# Patient Record
Sex: MALE | Race: BLACK OR AFRICAN AMERICAN | Employment: UNEMPLOYED | URBAN - METROPOLITAN AREA
[De-identification: names, ages, dates, MRNs, and addresses within clinical notes are randomized per-mention and may not be internally consistent; named-entity substitution may affect disease eponyms.]

---

## 2020-09-14 ENCOUNTER — APPOINTMENT (OUTPATIENT)
Dept: GENERAL RADIOLOGY | Age: 55
End: 2020-09-14
Attending: EMERGENCY MEDICINE
Payer: MEDICAID

## 2020-09-14 ENCOUNTER — HOSPITAL ENCOUNTER (EMERGENCY)
Age: 55
Discharge: HOME | End: 2020-09-14
Attending: EMERGENCY MEDICINE | Admitting: EMERGENCY MEDICINE
Payer: MEDICAID

## 2020-09-14 ENCOUNTER — APPOINTMENT (OUTPATIENT)
Dept: MRI IMAGING | Age: 55
End: 2020-09-14
Attending: EMERGENCY MEDICINE
Payer: MEDICAID

## 2020-09-14 ENCOUNTER — APPOINTMENT (OUTPATIENT)
Dept: VASCULAR SURGERY | Age: 55
End: 2020-09-14
Attending: EMERGENCY MEDICINE
Payer: MEDICAID

## 2020-09-14 VITALS
BODY MASS INDEX: 22.4 KG/M2 | RESPIRATION RATE: 15 BRPM | SYSTOLIC BLOOD PRESSURE: 120 MMHG | TEMPERATURE: 98.8 F | HEIGHT: 71 IN | WEIGHT: 160 LBS | DIASTOLIC BLOOD PRESSURE: 71 MMHG | OXYGEN SATURATION: 99 % | HEART RATE: 86 BPM

## 2020-09-14 DIAGNOSIS — E11.65 POORLY CONTROLLED DIABETES MELLITUS (HCC): ICD-10-CM

## 2020-09-14 DIAGNOSIS — L97.529 SKIN ULCER OF LEFT GREAT TOE, UNSPECIFIED ULCER STAGE (HCC): Primary | ICD-10-CM

## 2020-09-14 DIAGNOSIS — Z59.00 HOMELESSNESS: ICD-10-CM

## 2020-09-14 LAB
ALBUMIN SERPL-MCNC: 3.2 G/DL (ref 3.4–5)
ALBUMIN/GLOB SERPL: 0.7 {RATIO} (ref 0.8–1.7)
ALP SERPL-CCNC: 143 U/L (ref 45–117)
ALT SERPL-CCNC: 72 U/L (ref 16–61)
ANION GAP SERPL CALC-SCNC: 4 MMOL/L (ref 3–18)
AST SERPL-CCNC: 64 U/L (ref 10–38)
BASOPHILS # BLD: 0 K/UL (ref 0–0.1)
BASOPHILS NFR BLD: 0 % (ref 0–2)
BILIRUB SERPL-MCNC: 0.4 MG/DL (ref 0.2–1)
BUN SERPL-MCNC: 21 MG/DL (ref 7–18)
BUN/CREAT SERPL: 22 (ref 12–20)
CALCIUM SERPL-MCNC: 9.4 MG/DL (ref 8.5–10.1)
CHLORIDE SERPL-SCNC: 105 MMOL/L (ref 100–111)
CO2 SERPL-SCNC: 29 MMOL/L (ref 21–32)
CREAT SERPL-MCNC: 0.94 MG/DL (ref 0.6–1.3)
DIFFERENTIAL METHOD BLD: ABNORMAL
EOSINOPHIL # BLD: 0.3 K/UL (ref 0–0.4)
EOSINOPHIL NFR BLD: 3 % (ref 0–5)
ERYTHROCYTE [DISTWIDTH] IN BLOOD BY AUTOMATED COUNT: 11.5 % (ref 11.6–14.5)
GLOBULIN SER CALC-MCNC: 4.6 G/DL (ref 2–4)
GLUCOSE SERPL-MCNC: 224 MG/DL (ref 74–99)
HCT VFR BLD AUTO: 37.4 % (ref 36–48)
HGB BLD-MCNC: 13.5 G/DL (ref 13–16)
LACTATE BLD-SCNC: 0.83 MMOL/L (ref 0.4–2)
LYMPHOCYTES # BLD: 3 K/UL (ref 0.9–3.6)
LYMPHOCYTES NFR BLD: 34 % (ref 21–52)
MCH RBC QN AUTO: 32.7 PG (ref 24–34)
MCHC RBC AUTO-ENTMCNC: 36.1 G/DL (ref 31–37)
MCV RBC AUTO: 90.6 FL (ref 74–97)
MONOCYTES # BLD: 1 K/UL (ref 0.05–1.2)
MONOCYTES NFR BLD: 12 % (ref 3–10)
NEUTS SEG # BLD: 4.6 K/UL (ref 1.8–8)
NEUTS SEG NFR BLD: 51 % (ref 40–73)
PLATELET # BLD AUTO: 197 K/UL (ref 135–420)
PMV BLD AUTO: 10.8 FL (ref 9.2–11.8)
POTASSIUM SERPL-SCNC: 3.9 MMOL/L (ref 3.5–5.5)
PROT SERPL-MCNC: 7.8 G/DL (ref 6.4–8.2)
RBC # BLD AUTO: 4.13 M/UL (ref 4.7–5.5)
SODIUM SERPL-SCNC: 138 MMOL/L (ref 136–145)
WBC # BLD AUTO: 8.8 K/UL (ref 4.6–13.2)

## 2020-09-14 PROCEDURE — 87040 BLOOD CULTURE FOR BACTERIA: CPT

## 2020-09-14 PROCEDURE — 93923 UPR/LXTR ART STDY 3+ LVLS: CPT

## 2020-09-14 PROCEDURE — 73718 MRI LOWER EXTREMITY W/O DYE: CPT

## 2020-09-14 PROCEDURE — 96367 TX/PROPH/DG ADDL SEQ IV INF: CPT

## 2020-09-14 PROCEDURE — 83605 ASSAY OF LACTIC ACID: CPT

## 2020-09-14 PROCEDURE — 74011250637 HC RX REV CODE- 250/637: Performed by: PHYSICIAN ASSISTANT

## 2020-09-14 PROCEDURE — 87186 SC STD MICRODIL/AGAR DIL: CPT

## 2020-09-14 PROCEDURE — 99284 EMERGENCY DEPT VISIT MOD MDM: CPT

## 2020-09-14 PROCEDURE — 96366 THER/PROPH/DIAG IV INF ADDON: CPT

## 2020-09-14 PROCEDURE — 87205 SMEAR GRAM STAIN: CPT

## 2020-09-14 PROCEDURE — 74011250636 HC RX REV CODE- 250/636: Performed by: EMERGENCY MEDICINE

## 2020-09-14 PROCEDURE — 73660 X-RAY EXAM OF TOE(S): CPT

## 2020-09-14 PROCEDURE — 87147 CULTURE TYPE IMMUNOLOGIC: CPT

## 2020-09-14 PROCEDURE — 96365 THER/PROPH/DIAG IV INF INIT: CPT

## 2020-09-14 PROCEDURE — 80053 COMPREHEN METABOLIC PANEL: CPT

## 2020-09-14 PROCEDURE — 87077 CULTURE AEROBIC IDENTIFY: CPT

## 2020-09-14 PROCEDURE — 73630 X-RAY EXAM OF FOOT: CPT

## 2020-09-14 PROCEDURE — 74011000258 HC RX REV CODE- 258: Performed by: EMERGENCY MEDICINE

## 2020-09-14 PROCEDURE — 85025 COMPLETE CBC W/AUTO DIFF WBC: CPT

## 2020-09-14 RX ORDER — LEVOFLOXACIN 750 MG/1
750 TABLET ORAL DAILY
Qty: 7 TAB | Refills: 0 | Status: SHIPPED | OUTPATIENT
Start: 2020-09-14 | End: 2020-09-21

## 2020-09-14 RX ORDER — VANCOMYCIN 1.75 GRAM/500 ML IN 0.9 % SODIUM CHLORIDE INTRAVENOUS
1750 ONCE
Status: DISCONTINUED | OUTPATIENT
Start: 2020-09-14 | End: 2020-09-14 | Stop reason: DRUGHIGH

## 2020-09-14 RX ORDER — VANCOMYCIN 1.75 GRAM/500 ML IN 0.9 % SODIUM CHLORIDE INTRAVENOUS
1750 ONCE
Status: COMPLETED | OUTPATIENT
Start: 2020-09-14 | End: 2020-09-14

## 2020-09-14 RX ORDER — ACETAMINOPHEN 500 MG
1000 TABLET ORAL
Status: COMPLETED | OUTPATIENT
Start: 2020-09-14 | End: 2020-09-14

## 2020-09-14 RX ORDER — AMOXICILLIN AND CLAVULANATE POTASSIUM 875; 125 MG/1; MG/1
1 TABLET, FILM COATED ORAL 2 TIMES DAILY
Qty: 20 TAB | Refills: 0 | Status: SHIPPED | OUTPATIENT
Start: 2020-09-14 | End: 2020-09-24

## 2020-09-14 RX ADMIN — ACETAMINOPHEN 1000 MG: 500 TABLET ORAL at 20:09

## 2020-09-14 RX ADMIN — PIPERACILLIN AND TAZOBACTAM 4.5 G: 4; .5 INJECTION, POWDER, LYOPHILIZED, FOR SOLUTION INTRAVENOUS; PARENTERAL at 17:00

## 2020-09-14 RX ADMIN — PIPERACILLIN AND TAZOBACTAM 4.5 G: 4; .5 INJECTION, POWDER, LYOPHILIZED, FOR SOLUTION INTRAVENOUS; PARENTERAL at 20:10

## 2020-09-14 RX ADMIN — VANCOMYCIN HYDROCHLORIDE 1750 MG: 10 INJECTION, POWDER, LYOPHILIZED, FOR SOLUTION INTRAVENOUS at 17:40

## 2020-09-14 SDOH — ECONOMIC STABILITY - HOUSING INSECURITY: HOMELESSNESS UNSPECIFIED: Z59.00

## 2020-09-14 NOTE — ED PROVIDER NOTES
EMERGENCY DEPARTMENT HISTORY AND PHYSICAL EXAM    Date: 9/14/2020  Patient Name: Johanna Patel    History of Presenting Illness     Chief Complaint   Patient presents with    Foot Ulcer         History Provided By: Patient    Additional History (Context): Johanna Patel is a 50 y.o. male with diabetes, hypertension and hepatitis who presents with worsening right great toe discomfort in appearance. Patient was admitted to [facility] last month and discharged on topical bacitracin for a diagnosis of potential osteomyelitis. He was supposed to follow-up with podiatry but did not; is a known poorly medically compliant diabetic who is been recently homeless for about 6 months. PCP: None        Past History     Past Medical History:  Past Medical History:   Diagnosis Date    Diabetes ([address]     Hepatitis C infection     Hypertension        Past Surgical History:  History reviewed. No pertinent surgical history. Family History:  History reviewed. No pertinent family history. Social History:  Social History     Tobacco Use    Smoking status: Current Every Day Smoker     Packs/day: 1.00    Smokeless tobacco: Never Used   Substance Use Topics    Alcohol use: Never     Frequency: Never     Comment: socially     Drug use: Not Currently       Allergies:  No Known Allergies      Review of Systems   Review of Systems   Constitutional: Negative for fever. HENT: Negative. Eyes: Negative. Respiratory: Negative. Cardiovascular: Negative. Gastrointestinal: Negative. Endocrine: Negative. Genitourinary: Negative. Musculoskeletal: Positive for arthralgias. Skin: Negative. Neurological: Negative. Hematological: Negative. Psychiatric/Behavioral: Negative.       All Other Systems Negative  Physical Exam     Vitals:    09/14/20 1128   BP: 120/71   Pulse: 86   Resp: 15   Temp: 98.8 °F (37.1 °C)   SpO2: 99%   Weight: 72.6 kg (160 lb)   Height: 5' 11\" (1.803 m)     Physical Exam  Vitals signs and nursing note reviewed. Constitutional:       General: He is not in acute distress. Appearance: He is well-developed. He is not ill-appearing, toxic-appearing or diaphoretic. HENT:      Head: Normocephalic and atraumatic. Neck:      Musculoskeletal: Normal range of motion and neck supple. Thyroid: No thyromegaly. Vascular: No carotid bruit. Trachea: No tracheal deviation. Cardiovascular:      Rate and Rhythm: Normal rate and regular rhythm. Heart sounds: Normal heart sounds. No murmur. No friction rub. No gallop. Pulmonary:      Effort: Pulmonary effort is normal. No respiratory distress. Breath sounds: Normal breath sounds. No stridor. No wheezing or rales. Chest:      Chest wall: No tenderness. Abdominal:      General: There is no distension. Palpations: Abdomen is soft. There is no mass. Tenderness: There is no abdominal tenderness. There is no guarding or rebound. Musculoskeletal: Normal range of motion. General: Swelling and tenderness present. Skin:     General: Skin is warm and dry. Coloration: Skin is not pale. Findings: Lesion present. Comments: Right great toe: completely avulsed nail. The distal phalanx is swollen, slightly dusky with peeling skin at the tip. Ulcerated plantar surface. Left foot: dorsal distal fore foot surface ulcerated lesion. Size approx 0.25cm diameter. DP PT pulses palpable. Neurological:      Mental Status: He is alert. Psychiatric:         Speech: Speech normal.         Behavior: Behavior normal.         Thought Content:  Thought content normal.         Judgment: Judgment normal.              Diagnostic Study Results     Labs -     Recent Results (from the past 12 hour(s))   CBC WITH AUTOMATED DIFF    Collection Time: 09/14/20  1:10 PM   Result Value Ref Range    WBC 8.8 4.6 - 13.2 K/uL    RBC 4.13 (L) 4.70 - 5.50 M/uL    HGB 13.5 13.0 - 16.0 g/dL    HCT 37.4 36.0 - 48.0 %    MCV 90.6 74.0 - 97.0 FL    MCH 32.7 24.0 - 34.0 PG    MCHC 36.1 31.0 - 37.0 g/dL    RDW 11.5 (L) 11.6 - 14.5 %    PLATELET 075 824 - 078 K/uL    MPV 10.8 9.2 - 11.8 FL    NEUTROPHILS 51 40 - 73 %    LYMPHOCYTES 34 21 - 52 %    MONOCYTES 12 (H) 3 - 10 %    EOSINOPHILS 3 0 - 5 %    BASOPHILS 0 0 - 2 %    ABS. NEUTROPHILS 4.6 1.8 - 8.0 K/UL    ABS. LYMPHOCYTES 3.0 0.9 - 3.6 K/UL    ABS. MONOCYTES 1.0 0.05 - 1.2 K/UL    ABS. EOSINOPHILS 0.3 0.0 - 0.4 K/UL    ABS. BASOPHILS 0.0 0.0 - 0.1 K/UL    DF AUTOMATED     METABOLIC PANEL, COMPREHENSIVE    Collection Time: 09/14/20  1:10 PM   Result Value Ref Range    Sodium 138 136 - 145 mmol/L    Potassium 3.9 3.5 - 5.5 mmol/L    Chloride 105 100 - 111 mmol/L    CO2 29 21 - 32 mmol/L    Anion gap 4 3.0 - 18 mmol/L    Glucose 224 (H) 74 - 99 mg/dL    BUN 21 (H) 7.0 - 18 MG/DL    Creatinine 0.94 0.6 - 1.3 MG/DL    BUN/Creatinine ratio 22 (H) 12 - 20      GFR est AA >60 >60 ml/min/1.73m2    GFR est non-AA >60 >60 ml/min/1.73m2    Calcium 9.4 8.5 - 10.1 MG/DL    Bilirubin, total 0.4 0.2 - 1.0 MG/DL    ALT (SGPT) 72 (H) 16 - 61 U/L    AST (SGOT) 64 (H) 10 - 38 U/L    Alk.  phosphatase 143 (H) 45 - 117 U/L    Protein, total 7.8 6.4 - 8.2 g/dL    Albumin 3.2 (L) 3.4 - 5.0 g/dL    Globulin 4.6 (H) 2.0 - 4.0 g/dL    A-G Ratio 0.7 (L) 0.8 - 1.7     LOWER EXT ART PVR MULT LEVEL SEG PRESSURES    Collection Time: 09/14/20  3:19 PM   Result Value Ref Range    Left arm  mmHg    Left calf pressure 179 mmHg    Left posterior tibial 170 mmHg    Left anterior tibial 167 mmHg    Left toe pressure 133 mmHg    Right arm  mmHg    Right calf pressure 186 mmHg    Right posterior tibial 166 mmHg    Right anterior tibial 167 mmHg    Right toe pressure 150 mmHg    Left JUWAN 1.17     Right JUWAN 1.15     Left TBI 0.92     Right TBI 1.03    POC LACTIC ACID    Collection Time: 09/14/20  3:56 PM   Result Value Ref Range    Lactic Acid (POC) 0.83 0.40 - 2.00 mmol/L       Radiologic Studies -   XR GREAT TOE RT MIN 2 V Final Result   IMPRESSION:      No definite acute osseous abnormality. If symptoms persist recommend MRI for   clarification. MRI FOOT LT WO CONT    (Results Pending)   XR FOOT LT MIN 3 V    (Results Pending)     CT Results  (Last 48 hours)    None        CXR Results  (Last 48 hours)    None            Medical Decision Making   I am the first provider for this patient. I reviewed the vital signs, available nursing notes, past medical history, past surgical history, family history and social history. Vital Signs-Reviewed the patient's vital signs. Records Reviewed: Nursing Notes, Old Medical Records, Previous Radiology Studies and Previous Laboratory Studies    Procedures:  Procedures    Provider Notes (Medical Decision Making): spoke with Dr. Hal Hines for podiatry who will come to see pt after his office hrs and asks for MRI now. 5:40 PM  Hal Hines has examined pt; wants MRI to left foot as appearance of right foot is more c/w calloused skin and the open ulcer on the left foot needs further eval.      5:45 PM : Pt care transferred to [address] [place] [address]. History of patient complaint(s), available diagnostic reports and current treatment plan has been discussed thoroughly. Bedside rounding on patient occured : no . Intended disposition of patient : TBD: admit if osteomyelitis  Pending diagnostics reports and/or labs (please list): MRI, update podiatry. Dr. Sukumar Blanc cell number is [phone]. Culture sent; please see podiatry note re: outpatient ABX regimen if no osteomyelitis. GALEN Rodriguez's assistance in completion of this plan is great appreciated but it should be noted that I will be the provider of record for this patient. 9:55 PM  MRI negative for OM. Podiatry Hal Hines updated- recommends d/c home with augmentin and levaquin and f/u int he office 3-5 days. Culture was sent.    Kim Richards PA-C     MED RECONCILIATION:  Current Facility-Administered Medications   Medication Dose Route Frequency    piperacillin-tazobactam (ZOSYN) 4.5 g in 0.9% sodium chloride (MBP/ADV) 100 mL MBP  4.5 g IntraVENous Q6H    vancomycin (VANCOCIN) 1750 mg in  ml infusion  1,750 mg IntraVENous ONCE    Followed by   Fide Davis ON 9/15/2020] vancomycin (VANCOCIN) 1,000 mg in 0.9% sodium chloride (MBP/ADV) 250 mL adv  1,000 mg IntraVENous Q12H     No current outpatient medications on file. Disposition:  home      Follow-up Information    None         There are no discharge medications for this patient. Diagnosis     Clinical Impression:   1. Homelessness    2.  Poorly controlled diabetes mellitus ([facility] [address]

## 2020-09-14 NOTE — ED TRIAGE NOTES
Pt presents to triage from home with c/o diabetic ulcer to right great toe \"I think it's infected\" pt reports being hospitalized for it 2 weeks ago.

## 2020-09-14 NOTE — CONSULTS
Podiatry Consult    Subjective:         Date of Consultation: September 14, 2020     Referring Physician: GALEN Kc    Patient is a 50 y.o.  male who is being seen for bilateral great toe ulcers. Patient states that he was recently admitted to [facility] in [place] and referred to f/u with [id number]. However, patient had to move down here due to housing and couldn't follow up with them. Patient states that his right great toenail fell off and he had ulcer on plantar right great toe which healed with hyperkeratotic tissue. Patient is concerned about infection and decided to come in to ER today. Denies N/V/C/F. There are no active problems to display for this patient. Past Medical History:   Diagnosis Date    Diabetes ([address]     Hepatitis C infection     Hypertension       History reviewed. No pertinent surgical history. History reviewed. No pertinent family history. Social History     Tobacco Use    Smoking status: Current Every Day Smoker     Packs/day: 1.00    Smokeless tobacco: Never Used   Substance Use Topics    Alcohol use: Never     Frequency: Never     Comment: socially      Current Facility-Administered Medications   Medication Dose Route Frequency Provider Last Rate Last Dose    piperacillin-tazobactam (ZOSYN) 4.5 g in 0.9% sodium chloride (MBP/ADV) 100 mL MBP  4.5 g IntraVENous Q6H Romina George  mL/hr at 09/14/20 1700 4.5 g at 09/14/20 1700    vancomycin (VANCOCIN) 1750 mg in  ml infusion  1,750 mg IntraVENous Emily Hewitt MD        Followed by   Promise Edmonds ON 9/15/2020] vancomycin (VANCOCIN) 1,000 mg in 0.9% sodium chloride (MBP/ADV) 250 mL adv  1,000 mg IntraVENous Q12H Parvin Villanueva MD          No Known Allergies     Review of Systems:  A comprehensive review of systems was negative except for that written in the History of Present Illness.     Objective:     Patient Vitals for the past 8 hrs:   BP Temp Pulse Resp SpO2 Height Weight   09/14/20 1128 120/71 98.8 °F (37.1 °C) 86 15 99 % 5' 11\" (1.803 m) 72.6 kg (160 lb)     Temp (24hrs), Av.8 °F (37.1 °C), Min:98.8 °F (37.1 °C), Max:98.8 °F (37.1 °C)      Physical Exam:    Bilateral JEANETTE: palpable 2/4 DP and PT pulses, CFT less than 3 seconds to distal digits, right great toe nail plate not present, pre-ulcerative lesion to tip of right great toe plantarly with surrounding hyperkeratotic tissue. No erythema, edema or purulence noted. Left 1st MTPJ dorsal ulcer of about 0.3 cm which probes deep to periosteum/bone. Purulence noted as well. No erythema or edema present. Diminished protective sensation noted to both feet. Manual muscle strength 5/5 of all extrinsic muscle groups on both feet. Lab Review:   Recent Results (from the past 24 hour(s))   CBC WITH AUTOMATED DIFF    Collection Time: 20  1:10 PM   Result Value Ref Range    WBC 8.8 4.6 - 13.2 K/uL    RBC 4.13 (L) 4.70 - 5.50 M/uL    HGB 13.5 13.0 - 16.0 g/dL    HCT 37.4 36.0 - 48.0 %    MCV 90.6 74.0 - 97.0 FL    MCH 32.7 24.0 - 34.0 PG    MCHC 36.1 31.0 - 37.0 g/dL    RDW 11.5 (L) 11.6 - 14.5 %    PLATELET 938 719 - 105 K/uL    MPV 10.8 9.2 - 11.8 FL    NEUTROPHILS 51 40 - 73 %    LYMPHOCYTES 34 21 - 52 %    MONOCYTES 12 (H) 3 - 10 %    EOSINOPHILS 3 0 - 5 %    BASOPHILS 0 0 - 2 %    ABS. NEUTROPHILS 4.6 1.8 - 8.0 K/UL    ABS. LYMPHOCYTES 3.0 0.9 - 3.6 K/UL    ABS. MONOCYTES 1.0 0.05 - 1.2 K/UL    ABS. EOSINOPHILS 0.3 0.0 - 0.4 K/UL    ABS.  BASOPHILS 0.0 0.0 - 0.1 K/UL    DF AUTOMATED     METABOLIC PANEL, COMPREHENSIVE    Collection Time: 20  1:10 PM   Result Value Ref Range    Sodium 138 136 - 145 mmol/L    Potassium 3.9 3.5 - 5.5 mmol/L    Chloride 105 100 - 111 mmol/L    CO2 29 21 - 32 mmol/L    Anion gap 4 3.0 - 18 mmol/L    Glucose 224 (H) 74 - 99 mg/dL    BUN 21 (H) 7.0 - 18 MG/DL    Creatinine 0.94 0.6 - 1.3 MG/DL    BUN/Creatinine ratio 22 (H) 12 - 20      GFR est AA >60 >60 ml/min/1.73m2    GFR est non-AA >60 >60 ml/min/1.73m2 Calcium 9.4 8.5 - 10.1 MG/DL    Bilirubin, total 0.4 0.2 - 1.0 MG/DL    ALT (SGPT) 72 (H) 16 - 61 U/L    AST (SGOT) 64 (H) 10 - 38 U/L    Alk. phosphatase 143 (H) 45 - 117 U/L    Protein, total 7.8 6.4 - 8.2 g/dL    Albumin 3.2 (L) 3.4 - 5.0 g/dL    Globulin 4.6 (H) 2.0 - 4.0 g/dL    A-G Ratio 0.7 (L) 0.8 - 1.7     LOWER EXT ART PVR MULT LEVEL SEG PRESSURES    Collection Time: 09/14/20  3:19 PM   Result Value Ref Range    Left arm  mmHg    Left calf pressure 179 mmHg    Left posterior tibial 170 mmHg    Left anterior tibial 167 mmHg    Left toe pressure 133 mmHg    Right arm  mmHg    Right calf pressure 186 mmHg    Right posterior tibial 166 mmHg    Right anterior tibial 167 mmHg    Right toe pressure 150 mmHg    Left JUWAN 1.17     Right JUWAN 1.15     Left TBI 0.92     Right TBI 1.03    POC LACTIC ACID    Collection Time: 09/14/20  3:56 PM   Result Value Ref Range    Lactic Acid (POC) 0.83 0.40 - 2.00 mmol/L       Impression:     Uncontrolled DM with foot ulcer, possible osteomyelitis of left 1st met head    Recommendation:     - x-ray of right foot reviewed. No acute bony pathology noted. No soft tissue gas noted. - Rec x-ray and MRI of left foot to r/o osteomyelitis of 1st metatarsal head. - Remain NWB to bilateral forefoot. Please dispense surgical shoe. - If MRI is positive for OM then admit patient to hospitalist service and will need OR debridement/amputation. If MRI negative then patient can be discharged on PO Augmentin and levofloxacin and f/u in office in 3-5 days. Swab wound culture obtained from left 1st MTPJ ulcer.  - Right foot needs daily dressing change with betadine to nail bed and cover with dry gauze. - Will continue to follow.

## 2020-09-14 NOTE — ED NOTES
MRI called to obtain estimated time for test. [facility] in MRI notified this RN that the earliest time for the test would be 1730 this evening

## 2020-09-15 LAB
LEFT ABI: 1.17
LEFT ANTERIOR TIBIAL: 167 MMHG
LEFT ARM BP: 145 MMHG
LEFT CALF PRESSURE: 179 MMHG
LEFT POSTERIOR TIBIAL: 170 MMHG
LEFT TBI: 0.92
LEFT TOE PRESSURE: 133 MMHG
RIGHT ABI: 1.15
RIGHT ANTERIOR TIBIAL: 167 MMHG
RIGHT ARM BP: 140 MMHG
RIGHT CALF PRESSURE: 186 MMHG
RIGHT POSTERIOR TIBIAL: 166 MMHG
RIGHT TBI: 1.03
RIGHT TOE PRESSURE: 150 MMHG

## 2020-09-15 NOTE — DISCHARGE INSTRUCTIONS
Patient Education        Diabetic Foot Ulcer: Care Instructions  Your Care Instructions  Diabetes can damage the nerve endings and blood vessels in your feet. That means you are less likely to notice when your feet are injured. A small skin problem like a callus, blister, or cracked skin can turn into a larger sore, called a foot ulcer. Foot ulcers form most often on the pad (ball) of the foot or the bottom of the big toe. You can also get them on the top and bottom of each toe. Foot ulcers can get infected. If the infection is severe, then tissue in the foot can die. This is called gangrene. In that case, one or more of the toes, part or all of the foot, and sometimes part of the leg may have to be removed (amputated). Your doctor may have removed the dead tissue and cleaned the ulcer. Your foot wound may be wrapped in a protective bandage. It is very important to keep your weight off your injured foot. After a foot ulcer has formed, it will not heal as long as you keep putting weight on the area. Always get early treatment for foot problems. A minor irritation can lead to a major problem if it's not taken care of soon. Follow-up care is a key part of your treatment and safety. Be sure to make and go to all appointments, and call your doctor if you are having problems. It's also a good idea to know your test results and keep a list of the medicines you take. How can you care for yourself at home? · Follow your doctor's instructions about keeping pressure off the foot ulcer. You may need to use crutches or a wheelchair. Or you may wear a cast or a walking boot. · Follow your doctor's instructions on how to clean the ulcer and change the bandage. · If your doctor prescribed antibiotics, take them as directed. Do not stop taking them just because you feel better. You need to take the full course of antibiotics.   To prevent foot ulcers  · Keep your blood sugar close to normal by watching what and how much you eat. Track your blood sugar, take medicines if prescribed, and get regular exercise. · Do not smoke. Smoking affects blood flow and can make foot problems worse. If you need help quitting, talk to your doctor about stop-smoking programs and medicines. These can increase your chances of quitting for good. · Do not go barefoot. Protect your feet by wearing shoes that fit well. Choose shoes that are made of materials that are flexible and breathable, such as leather or cloth. · Inspect your feet daily for blisters, cuts, cracks, or sores. If you can't see well, use a mirror or have someone help you. · Have your doctor check your feet during each visit. If you have a foot problem, see your doctor. Do not try to treat your foot problem on your own. Home remedies or treatments that you can buy without a prescription (such as corn removers) can be harmful. When should you call for help? Call your doctor now or seek immediate medical care if:    · You have symptoms of infection, such as:  ? Increased pain, swelling, warmth, or redness. ? Red streaks leading from the area. ? Pus draining from the area. ? A fever. Watch closely for changes in your health, and be sure to contact your doctor if:    · You have a new problem with your feet, such as:  ? A new sore or ulcer. ? A break in the skin that is not healing after several days. ? Bleeding corns or calluses. ? An ingrown toenail.     · You do not get better as expected. Where can you learn more? Go to http://chelsi-gus.info/  Enter T131 in the search box to learn more about \"Diabetic Foot Ulcer: Care Instructions. \"  Current as of: December 20, 2019               Content Version: 12.6  © 9646-9199 [employer]. Care instructions adapted under license by [employer] (which disclaims liability or warranty for this information).  If you have questions about a medical condition or this instruction, always ask your healthcare professional. [address] any warranty or liability for your use of this information.

## 2020-09-17 LAB
BACTERIA SPEC CULT: ABNORMAL
GRAM STN SPEC: ABNORMAL
GRAM STN SPEC: ABNORMAL
SERVICE CMNT-IMP: ABNORMAL

## 2020-09-20 LAB
BACTERIA SPEC CULT: NORMAL
BACTERIA SPEC CULT: NORMAL
SERVICE CMNT-IMP: NORMAL
SERVICE CMNT-IMP: NORMAL

## 2020-11-09 ENCOUNTER — HOSPITAL ENCOUNTER (EMERGENCY)
Age: 55
Discharge: HOME | End: 2020-11-10
Attending: EMERGENCY MEDICINE
Payer: MEDICAID

## 2020-11-09 ENCOUNTER — APPOINTMENT (OUTPATIENT)
Dept: GENERAL RADIOLOGY | Age: 55
End: 2020-11-09
Attending: EMERGENCY MEDICINE
Payer: MEDICAID

## 2020-11-09 VITALS
TEMPERATURE: 98.5 F | DIASTOLIC BLOOD PRESSURE: 74 MMHG | HEIGHT: 71 IN | OXYGEN SATURATION: 99 % | HEART RATE: 77 BPM | WEIGHT: 148 LBS | SYSTOLIC BLOOD PRESSURE: 139 MMHG | BODY MASS INDEX: 20.72 KG/M2 | RESPIRATION RATE: 16 BRPM

## 2020-11-09 DIAGNOSIS — M79.674 PAIN OF TOE OF RIGHT FOOT: ICD-10-CM

## 2020-11-09 DIAGNOSIS — R73.9 HYPERGLYCEMIA: Primary | ICD-10-CM

## 2020-11-09 LAB
ADMINISTERED INITIALS, ADMINIT: NORMAL
ALBUMIN SERPL-MCNC: 2.9 G/DL (ref 3.4–5)
ALBUMIN/GLOB SERPL: 0.7 {RATIO} (ref 0.8–1.7)
ALP SERPL-CCNC: 338 U/L (ref 45–117)
ALT SERPL-CCNC: 55 U/L (ref 16–61)
ANION GAP SERPL CALC-SCNC: 6 MMOL/L (ref 3–18)
APPEARANCE UR: CLEAR
AST SERPL-CCNC: 42 U/L (ref 10–38)
BASOPHILS # BLD: 0 K/UL (ref 0–0.1)
BASOPHILS NFR BLD: 0 % (ref 0–2)
BILIRUB SERPL-MCNC: 0.3 MG/DL (ref 0.2–1)
BILIRUB UR QL: NEGATIVE
BUN SERPL-MCNC: 22 MG/DL (ref 7–18)
BUN/CREAT SERPL: 16 (ref 12–20)
CALCIUM SERPL-MCNC: 8.9 MG/DL (ref 8.5–10.1)
CHLORIDE SERPL-SCNC: 97 MMOL/L (ref 100–111)
CO2 SERPL-SCNC: 28 MMOL/L (ref 21–32)
COLOR UR: YELLOW
CREAT SERPL-MCNC: 1.35 MG/DL (ref 0.6–1.3)
D50 ADMINISTERED, D50ADM: 0 ML
D50 ORDER, D50ORD: 0 ML
DIFFERENTIAL METHOD BLD: ABNORMAL
EOSINOPHIL # BLD: 0.1 K/UL (ref 0–0.4)
EOSINOPHIL NFR BLD: 2 % (ref 0–5)
ERYTHROCYTE [DISTWIDTH] IN BLOOD BY AUTOMATED COUNT: 11.5 % (ref 11.6–14.5)
GLOBULIN SER CALC-MCNC: 4 G/DL (ref 2–4)
GLUCOSE SERPL-MCNC: 643 MG/DL (ref 74–99)
GLUCOSE UR STRIP.AUTO-MCNC: >1000 MG/DL
GLUCOSE, GLC: 601 MG/DL
HCT VFR BLD AUTO: 39 % (ref 36–48)
HGB BLD-MCNC: 14.1 G/DL (ref 13–16)
HGB UR QL STRIP: NEGATIVE
HIGH TARGET, HITG: 180 MG/DL
INSULIN ADMINSTERED, INSADM: 10.8 UNITS/HOUR
INSULIN ORDER, INSORD: 10.8 UNITS/HOUR
KETONES UR QL STRIP.AUTO: NEGATIVE MG/DL
LEUKOCYTE ESTERASE UR QL STRIP.AUTO: NEGATIVE
LOW TARGET, LOT: 140 MG/DL
LYMPHOCYTES # BLD: 2 K/UL (ref 0.9–3.6)
LYMPHOCYTES NFR BLD: 52 % (ref 21–52)
MCH RBC QN AUTO: 33.1 PG (ref 24–34)
MCHC RBC AUTO-ENTMCNC: 37.5 G/DL (ref 31–37)
MCV RBC AUTO: 88.3 FL (ref 74–97)
MINUTES UNTIL NEXT BG, NBG: 60 MIN
MONOCYTES # BLD: 0.4 K/UL (ref 0.05–1.2)
MONOCYTES NFR BLD: 11 % (ref 3–10)
MULTIPLIER, MUL: 0.02
NEUTS SEG # BLD: 1.3 K/UL (ref 1.8–8)
NEUTS SEG NFR BLD: 35 % (ref 40–73)
NITRITE UR QL STRIP.AUTO: NEGATIVE
ORDER INITIALS, ORDINIT: NORMAL
PH UR STRIP: 5.5 [PH] (ref 5–8)
PLATELET # BLD AUTO: 97 K/UL (ref 135–420)
PMV BLD AUTO: 12.8 FL (ref 9.2–11.8)
POTASSIUM SERPL-SCNC: 4.3 MMOL/L (ref 3.5–5.5)
PROT SERPL-MCNC: 6.9 G/DL (ref 6.4–8.2)
PROT UR STRIP-MCNC: NEGATIVE MG/DL
RBC # BLD AUTO: 4.26 M/UL (ref 4.7–5.5)
SODIUM SERPL-SCNC: 131 MMOL/L (ref 136–145)
SP GR UR REFRACTOMETRY: 1.03 (ref 1–1.03)
UROBILINOGEN UR QL STRIP.AUTO: 0.2 EU/DL (ref 0.2–1)
WBC # BLD AUTO: 3.8 K/UL (ref 4.6–13.2)

## 2020-11-09 PROCEDURE — 85025 COMPLETE CBC W/AUTO DIFF WBC: CPT

## 2020-11-09 PROCEDURE — 73660 X-RAY EXAM OF TOE(S): CPT

## 2020-11-09 PROCEDURE — 74011250636 HC RX REV CODE- 250/636: Performed by: EMERGENCY MEDICINE

## 2020-11-09 PROCEDURE — 80048 BASIC METABOLIC PNL TOTAL CA: CPT

## 2020-11-09 PROCEDURE — 80053 COMPREHEN METABOLIC PANEL: CPT

## 2020-11-09 PROCEDURE — 96365 THER/PROPH/DIAG IV INF INIT: CPT

## 2020-11-09 PROCEDURE — 83036 HEMOGLOBIN GLYCOSYLATED A1C: CPT

## 2020-11-09 PROCEDURE — 84100 ASSAY OF PHOSPHORUS: CPT

## 2020-11-09 PROCEDURE — 81003 URINALYSIS AUTO W/O SCOPE: CPT

## 2020-11-09 PROCEDURE — 83735 ASSAY OF MAGNESIUM: CPT

## 2020-11-09 PROCEDURE — 99284 EMERGENCY DEPT VISIT MOD MDM: CPT

## 2020-11-09 RX ORDER — DEXTROSE 50 % IN WATER (D50W) INTRAVENOUS SYRINGE
25-50 AS NEEDED
Status: DISCONTINUED | OUTPATIENT
Start: 2020-11-09 | End: 2020-11-10 | Stop reason: HOSPADM

## 2020-11-09 RX ORDER — MAGNESIUM SULFATE 100 %
4 CRYSTALS MISCELLANEOUS AS NEEDED
Status: DISCONTINUED | OUTPATIENT
Start: 2020-11-09 | End: 2020-11-10 | Stop reason: HOSPADM

## 2020-11-09 RX ADMIN — HUMAN INSULIN 10.8 UNITS/HR: 100 INJECTION, SOLUTION SUBCUTANEOUS at 22:55

## 2020-11-10 LAB
ADMINISTERED INITIALS, ADMINIT: NORMAL
ANION GAP SERPL CALC-SCNC: 5 MMOL/L (ref 3–18)
BUN SERPL-MCNC: 20 MG/DL (ref 7–18)
BUN/CREAT SERPL: 17 (ref 12–20)
CALCIUM SERPL-MCNC: 9 MG/DL (ref 8.5–10.1)
CHLORIDE SERPL-SCNC: 103 MMOL/L (ref 100–111)
CO2 SERPL-SCNC: 27 MMOL/L (ref 21–32)
CREAT SERPL-MCNC: 1.15 MG/DL (ref 0.6–1.3)
D50 ADMINISTERED, D50ADM: 0 ML
D50 ORDER, D50ORD: 0 ML
EST. AVERAGE GLUCOSE BLD GHB EST-MCNC: 258 MG/DL
GLUCOSE BLD STRIP.AUTO-MCNC: 211 MG/DL (ref 70–110)
GLUCOSE BLD STRIP.AUTO-MCNC: 286 MG/DL (ref 70–110)
GLUCOSE SERPL-MCNC: 405 MG/DL (ref 74–99)
GLUCOSE, GLC: 286 MG/DL
HBA1C MFR BLD: 10.6 % (ref 4.2–5.6)
HIGH TARGET, HITG: 180 MG/DL
INSULIN ADMINSTERED, INSADM: 2.3 UNITS/HOUR
INSULIN ORDER, INSORD: 2.3 UNITS/HOUR
LOW TARGET, LOT: 140 MG/DL
MAGNESIUM SERPL-MCNC: 2.2 MG/DL (ref 1.6–2.6)
MINUTES UNTIL NEXT BG, NBG: 60 MIN
MULTIPLIER, MUL: 0.01
ORDER INITIALS, ORDINIT: NORMAL
PHOSPHATE SERPL-MCNC: 3.1 MG/DL (ref 2.5–4.9)
POTASSIUM SERPL-SCNC: 3.7 MMOL/L (ref 3.5–5.5)
SODIUM SERPL-SCNC: 135 MMOL/L (ref 136–145)

## 2020-11-10 PROCEDURE — 74011250637 HC RX REV CODE- 250/637: Performed by: EMERGENCY MEDICINE

## 2020-11-10 PROCEDURE — 74011250636 HC RX REV CODE- 250/636: Performed by: EMERGENCY MEDICINE

## 2020-11-10 PROCEDURE — 82962 GLUCOSE BLOOD TEST: CPT

## 2020-11-10 RX ORDER — GABAPENTIN 100 MG/1
100 CAPSULE ORAL 3 TIMES DAILY
Status: DISCONTINUED | OUTPATIENT
Start: 2020-11-10 | End: 2020-11-10

## 2020-11-10 RX ORDER — GABAPENTIN 100 MG/1
100 CAPSULE ORAL
Status: COMPLETED | OUTPATIENT
Start: 2020-11-10 | End: 2020-11-10

## 2020-11-10 RX ADMIN — HUMAN INSULIN 2.3 UNITS/HR: 100 INJECTION, SOLUTION SUBCUTANEOUS at 00:14

## 2020-11-10 RX ADMIN — GABAPENTIN 100 MG: 100 CAPSULE ORAL at 01:29

## 2020-11-10 NOTE — ED PROVIDER NOTES
EMERGENCY DEPARTMENT HISTORY AND PHYSICAL EXAM  This was created with voice recognition software and transcription errors may be present. 8:31 PM  Date: 11/9/2020  Patient Name: Maikel Petty    History of Presenting Illness     Chief Complaint:    History Provided By:     HPI: Maikel Petty is a 50 y.o. male past medical history of diabetes hep C hypertension presents with elevated blood glucose. Patient states that he is noted that his readings are not reading any more on his glucometer. He states his glucometer goes up to 700. He notes that he is being followed by a physician for a foot wound but has not seen him in some time. Denies feeling sick chest notes he is feeling fatigue no fevers or chills. He notes he walked on his foot some yesterday and it hurt. He has diabetic neuropathy so he does not really feel his feet well. PCP: None      Past History     Past Medical History:  Past Medical History:   Diagnosis Date    Diabetes ([facility] [address]     Hepatitis C infection     Hypertension        Past Surgical History:  No past surgical history on file. Family History:  No family history on file. Social History:  Social History     Tobacco Use    Smoking status: Current Every Day Smoker     Packs/day: 1.00    Smokeless tobacco: Never Used   Substance Use Topics    Alcohol use: Never     Frequency: Never     Comment: socially     Drug use: Not Currently       Allergies:  No Known Allergies    Review of Systems     Review of Systems   Constitutional: Negative for chills. HENT: Negative for congestion. Respiratory: Positive for apnea. 10 point review of systems otherwise negative unless noted in HPI. Physical Exam       Physical Exam  Constitutional:       Appearance: He is well-developed. HENT:      Head: Normocephalic and atraumatic. Eyes:      Pupils: Pupils are equal, round, and reactive to light. Neck:      Musculoskeletal: Normal range of motion and neck supple. Cardiovascular:      Rate and Rhythm: Normal rate and regular rhythm. Heart sounds: Normal heart sounds. No murmur. No friction rub. Pulmonary:      Effort: Pulmonary effort is normal. No respiratory distress. Breath sounds: Normal breath sounds. No wheezing. Abdominal:      General: There is no distension. Palpations: Abdomen is soft. Tenderness: There is no abdominal tenderness. There is no guarding or rebound. Musculoskeletal: Normal range of motion. Comments: Patient does have a little bit of bruising to the bottom of his big toe on his right foot. There is no obvious erythema or warmth to that area. Skin:     General: Skin is warm and dry. Neurological:      Mental Status: He is alert and oriented to person, place, and time. Psychiatric:         Behavior: Behavior normal.         Thought Content: Thought content normal.         Diagnostic Study Results     Vital Signs   Visit Vitals  BP (!) 170/91   Pulse 77   Temp 98.5 °F (36.9 °C)   Resp 16   SpO2 98%      EKG:  Labs: CBC slightly low white count low platelets no shift no bands    Imaging:     Medical Decision Making     ED Course: Progress Notes, Reevaluation, and Consults:      Provider Notes (Medical Decision Making): Presents with elevated glucose and some bruising to the base of his right toe. Area is firm unclear if it is callused as its not similar to the left toe but it certainly is not warm or erythematous. Xray neg for osteo. Diagnosis     Clinical Impression: No diagnosis found.     Disposition:    Patient's Medications    No medications on file

## 2020-11-10 NOTE — DISCHARGE INSTRUCTIONS

## 2020-11-10 NOTE — ED TRIAGE NOTES
Patient has been out of his medication for the past 2 months including his insulin. He was going to go to the doctor's office tomorrow but he started feeling bad and nauseous. Patient's blood sugar read high on the meter.

## 2020-12-04 ENCOUNTER — HOSPITAL ENCOUNTER (EMERGENCY)
Age: 55
Discharge: HOME | End: 2020-12-04
Attending: EMERGENCY MEDICINE
Payer: MEDICAID

## 2020-12-04 VITALS
OXYGEN SATURATION: 99 % | DIASTOLIC BLOOD PRESSURE: 97 MMHG | SYSTOLIC BLOOD PRESSURE: 171 MMHG | HEIGHT: 71 IN | BODY MASS INDEX: 21.42 KG/M2 | TEMPERATURE: 98.5 F | HEART RATE: 80 BPM | WEIGHT: 153 LBS | RESPIRATION RATE: 16 BRPM

## 2020-12-04 DIAGNOSIS — F19.10 DRUG ABUSE (HCC): ICD-10-CM

## 2020-12-04 DIAGNOSIS — R73.9 HYPERGLYCEMIA: Primary | ICD-10-CM

## 2020-12-04 LAB
ALBUMIN SERPL-MCNC: 3.3 G/DL (ref 3.4–5)
ALBUMIN/GLOB SERPL: 0.7 {RATIO} (ref 0.8–1.7)
ALP SERPL-CCNC: 205 U/L (ref 45–117)
ALT SERPL-CCNC: 60 U/L (ref 16–61)
AMPHET UR QL SCN: NEGATIVE
ANION GAP SERPL CALC-SCNC: 6 MMOL/L (ref 3–18)
AST SERPL-CCNC: 49 U/L (ref 10–38)
BARBITURATES UR QL SCN: NEGATIVE
BASOPHILS # BLD: 0 K/UL (ref 0–0.1)
BASOPHILS NFR BLD: 0 % (ref 0–2)
BENZODIAZ UR QL: NEGATIVE
BILIRUB SERPL-MCNC: 0.4 MG/DL (ref 0.2–1)
BUN SERPL-MCNC: 18 MG/DL (ref 7–18)
BUN/CREAT SERPL: 13 (ref 12–20)
CALCIUM SERPL-MCNC: 9.3 MG/DL (ref 8.5–10.1)
CANNABINOIDS UR QL SCN: NEGATIVE
CHLORIDE SERPL-SCNC: 95 MMOL/L (ref 100–111)
CO2 SERPL-SCNC: 29 MMOL/L (ref 21–32)
COCAINE UR QL SCN: POSITIVE
CREAT SERPL-MCNC: 1.44 MG/DL (ref 0.6–1.3)
DIFFERENTIAL METHOD BLD: ABNORMAL
EOSINOPHIL # BLD: 0.1 K/UL (ref 0–0.4)
EOSINOPHIL NFR BLD: 2 % (ref 0–5)
ERYTHROCYTE [DISTWIDTH] IN BLOOD BY AUTOMATED COUNT: 11.7 % (ref 11.6–14.5)
ETHANOL SERPL-MCNC: <3 MG/DL (ref 0–3)
GLOBULIN SER CALC-MCNC: 4.8 G/DL (ref 2–4)
GLUCOSE BLD STRIP.AUTO-MCNC: 256 MG/DL (ref 70–110)
GLUCOSE SERPL-MCNC: 637 MG/DL (ref 74–99)
HCT VFR BLD AUTO: 40 % (ref 36–48)
HDSCOM,HDSCOM: ABNORMAL
HGB BLD-MCNC: 14.6 G/DL (ref 13–16)
LYMPHOCYTES # BLD: 2.2 K/UL (ref 0.9–3.6)
LYMPHOCYTES NFR BLD: 36 % (ref 21–52)
MCH RBC QN AUTO: 33 PG (ref 24–34)
MCHC RBC AUTO-ENTMCNC: 36.5 G/DL (ref 31–37)
MCV RBC AUTO: 90.5 FL (ref 74–97)
METHADONE UR QL: NEGATIVE
MONOCYTES # BLD: 0.8 K/UL (ref 0.05–1.2)
MONOCYTES NFR BLD: 13 % (ref 3–10)
NEUTS SEG # BLD: 3 K/UL (ref 1.8–8)
NEUTS SEG NFR BLD: 49 % (ref 40–73)
OPIATES UR QL: NEGATIVE
PCP UR QL: NEGATIVE
PLATELET # BLD AUTO: 165 K/UL (ref 135–420)
PMV BLD AUTO: 11.1 FL (ref 9.2–11.8)
POTASSIUM SERPL-SCNC: 4.4 MMOL/L (ref 3.5–5.5)
PROT SERPL-MCNC: 8.1 G/DL (ref 6.4–8.2)
RBC # BLD AUTO: 4.42 M/UL (ref 4.7–5.5)
SODIUM SERPL-SCNC: 130 MMOL/L (ref 136–145)
WBC # BLD AUTO: 6.1 K/UL (ref 4.6–13.2)

## 2020-12-04 PROCEDURE — 99283 EMERGENCY DEPT VISIT LOW MDM: CPT

## 2020-12-04 PROCEDURE — 80307 DRUG TEST PRSMV CHEM ANLYZR: CPT

## 2020-12-04 PROCEDURE — 85025 COMPLETE CBC W/AUTO DIFF WBC: CPT

## 2020-12-04 PROCEDURE — 94762 N-INVAS EAR/PLS OXIMTRY CONT: CPT

## 2020-12-04 PROCEDURE — 82962 GLUCOSE BLOOD TEST: CPT

## 2020-12-04 PROCEDURE — 80053 COMPREHEN METABOLIC PANEL: CPT

## 2020-12-04 NOTE — DISCHARGE INSTRUCTIONS
Patient Education        Substance Use Disorder: Care Instructions  Overview     You can improve your life and health by stopping your use of alcohol or drugs. When you don't drink or use drugs, you may feel and sleep better. You may get along better with your family, friends, and coworkers. There are medicines and programs that can help with substance use disorder. How can you care for yourself at home? · If you have been given medicine to help keep you sober or reduce your cravings, be sure to take it as prescribed. · Talk to your doctor about programs that can help you stop using drugs or drinking alcohol. · If your doctor prescribes disulfiram (Antabuse), do not drink any alcohol while you are taking this medicine. You may have severe, even life-threatening, side effects from even small amounts of alcohol. · Do not tempt yourself by keeping alcohol or drugs in your home. · Learn how to say no when other people drink or use drugs. Or don't spend time with people who drink or use drugs. · Use the time and money spent on drinking or drugs to do something fun with your family or friends. Preventing a relapse  · Do not drink alcohol or use drugs at all. Using any amount of alcohol or drugs greatly increases your risk for relapse. · Seek help from organizations such as Alcoholics Anonymous, Narcotics Anonymous, or treatment facilities if you feel the need to drink alcohol or use drugs again. · Remember that recovery is a lifelong process. · Stay away from situations, friends, or places that may lead you to drink or use drugs. · Have a plan to spot and deal with relapse. Learn to recognize changes in your thinking that lead you to drink or use drugs. These are warning signs. Get help before you start to drink or use drugs again. · Get help as soon as you can if you relapse. Some people make a plan with another person that outlines what they want that person to do for them if they relapse.  The plan usually includes how to handle the relapse and who to notify in case of relapse. · Don't give up. Remember that a relapse does not mean that you have failed. Use the experience to learn the triggers that lead you to drink or use drugs. Then quit again. Many people have several relapses before they are able to quit for good. Follow-up care is a key part of your treatment and safety. Be sure to make and go to all appointments, and call your doctor if you are having problems. It's also a good idea to know your test results and keep a list of the medicines you take. When should you call for help? Call  911 anytime you think you may need emergency care. For example, call if:    · You feel you cannot stop from hurting yourself or someone else. Call your doctor now or seek immediate medical care if:    · You have serious withdrawal symptoms, such as confusion, hallucinations, severe trembling, or seizures. Watch closely for changes in your health, and be sure to contact your doctor if:    · You have a relapse.     · You need more help or support to stop. Where can you learn more? Go to http://www.gray.com/  Enter H573 in the search box to learn more about \"Substance Use Disorder: Care Instructions. \"  Current as of: June 29, 2020               Content Version: 12.6  © 0410-2034 [employer]. Care instructions adapted under license by [employer] (which disclaims liability or warranty for this information). If you have questions about a medical condition or this instruction, always ask your healthcare professional. [place] [address] any warranty or liability for your use of this information.

## 2020-12-04 NOTE — ED PROVIDER NOTES
EMERGENCY DEPARTMENT HISTORY AND PHYSICAL EXAM  This was created with voice recognition software and transcription errors may be present. 2:46 PM  Date: 12/4/2020  Patient Name: Elaine Guillen    History of Presenting Illness     Chief Complaint:    History Provided By:     HPI: Elaine Guillen is a 50 y.o. male has medical history of diabetes hep C and hypertension who presents with wanting to quit opiates and cocaine. No suicidal homicidal ideation patient has a known history of diabetes and is on insulin he is type II diabetic he has no fever no chills no cough no shortness of breath no nausea no vomiting no abdominal pain no other symptoms. PCP: Ant Sherman DO      Past History     Past Medical History:  Past Medical History:   Diagnosis Date    Diabetes ([facility] [address]     Hepatitis C infection     Hypertension        Past Surgical History:  No past surgical history on file. Family History:  No family history on file. Social History:  Social History     Tobacco Use    Smoking status: Current Every Day Smoker     Packs/day: 1.00    Smokeless tobacco: Never Used   Substance Use Topics    Alcohol use: Never     Frequency: Never     Comment: socially     Drug use: Not Currently       Allergies:  No Known Allergies    Review of Systems     Review of Systems   All other systems reviewed and are negative. 10 point review of systems otherwise negative unless noted in HPI. Physical Exam       Physical Exam  Constitutional:       Appearance: He is well-developed. HENT:      Head: Normocephalic and atraumatic. Eyes:      Pupils: Pupils are equal, round, and reactive to light. Neck:      Musculoskeletal: Normal range of motion and neck supple. Cardiovascular:      Rate and Rhythm: Normal rate and regular rhythm. Heart sounds: Normal heart sounds. No murmur. No friction rub. Pulmonary:      Effort: Pulmonary effort is normal. No respiratory distress.       Breath sounds: Normal breath sounds. No wheezing. Abdominal:      General: There is no distension. Palpations: Abdomen is soft. Tenderness: There is no abdominal tenderness. There is no guarding or rebound. Musculoskeletal: Normal range of motion. Skin:     General: Skin is warm and dry. Neurological:      Mental Status: He is alert and oriented to person, place, and time. Psychiatric:         Behavior: Behavior normal.         Thought Content: Thought content normal.         Diagnostic Study Results     Vital Signs  EKG:  Labs:   Imaging:     Medical Decision Making     ED Course: Progress Notes, Reevaluation, and Consults:      Provider Notes (Medical Decision Making): 47-year-old male presents for opiate and cocaine abuse glucose is fairly high given some insulin here. He is not gap his bicarb is normal not in DKA feels fine    Hunter the case with crisis. Will recheck his blood sugar. now     Repeat blood glucose in 200s'    Dw/ crisis; they recommend outpt care. Will dC       Diagnosis     Clinical Impression: No diagnosis found.     Disposition:    Patient's Medications    No medications on file

## 2020-12-04 NOTE — BSMART NOTE
Patient was seen in [address] [place] [address] to provide resources for opioid/heroin and cocaine detox. Patient is not suicidal or homicidal and appears neat and well groomed. Patient requested to come into the hospital for detox. This writer tried to explain that we do not do straight opioid detox here and provided him a list of out patient resources that could help him. [facility] was on the list. The patient stated he had already been there and did not want treatment for his addiction, just detox. Patient has a history of Bipolar II but was not presenting with symptoms at this time. Patient was upset that we would not admit him to the behavioral health unit. He stated he could become suicidal if that would get him admitted. Patient does NOT meet criteria for admission. He is not a danger to self, others, or unable to care for himself at this time. Recommend discharge to outpatient care. Discussed patient with Dr Celestine Gama.

## 2020-12-04 NOTE — ED TRIAGE NOTES
Patient arrived from home presenting with withdrawal from heroine and cocaine. Last time used heroine and cocaine this morning.

## 2021-01-11 ENCOUNTER — HOSPITAL ENCOUNTER (INPATIENT)
Age: 56
LOS: 10 days | Discharge: HOME | DRG: 751 | End: 2021-01-21
Attending: EMERGENCY MEDICINE | Admitting: PSYCHIATRY & NEUROLOGY
Payer: MEDICAID

## 2021-01-11 DIAGNOSIS — M79.2 NEUROPATHIC PAIN: Primary | ICD-10-CM

## 2021-01-11 DIAGNOSIS — F33.2 MDD (MAJOR DEPRESSIVE DISORDER), RECURRENT SEVERE, WITHOUT PSYCHOSIS (HCC): ICD-10-CM

## 2021-01-11 PROBLEM — F32.3 MAJOR DEPRESSION WITH PSYCHOTIC FEATURES (HCC): Status: ACTIVE | Noted: 2021-01-11

## 2021-01-11 LAB
ALBUMIN SERPL-MCNC: 3 G/DL (ref 3.4–5)
ALBUMIN/GLOB SERPL: 0.7 {RATIO} (ref 0.8–1.7)
ALP SERPL-CCNC: 248 U/L (ref 45–117)
ALT SERPL-CCNC: 91 U/L (ref 16–61)
AMPHET UR QL SCN: NEGATIVE
ANION GAP SERPL CALC-SCNC: 4 MMOL/L (ref 3–18)
APPEARANCE UR: CLEAR
AST SERPL-CCNC: 110 U/L (ref 10–38)
BACTERIA URNS QL MICRO: NEGATIVE /HPF
BARBITURATES UR QL SCN: NEGATIVE
BASOPHILS # BLD: 0 K/UL (ref 0–0.1)
BASOPHILS NFR BLD: 0 % (ref 0–2)
BENZODIAZ UR QL: NEGATIVE
BILIRUB SERPL-MCNC: 0.4 MG/DL (ref 0.2–1)
BILIRUB UR QL: NEGATIVE
BUN SERPL-MCNC: 17 MG/DL (ref 7–18)
BUN/CREAT SERPL: 15 (ref 12–20)
CALCIUM SERPL-MCNC: 9.3 MG/DL (ref 8.5–10.1)
CANNABINOIDS UR QL SCN: NEGATIVE
CHLORIDE SERPL-SCNC: 103 MMOL/L (ref 100–111)
CO2 SERPL-SCNC: 30 MMOL/L (ref 21–32)
COCAINE UR QL SCN: POSITIVE
COLOR UR: YELLOW
COVID-19 RAPID TEST, COVR: NOT DETECTED
CREAT SERPL-MCNC: 1.1 MG/DL (ref 0.6–1.3)
DIFFERENTIAL METHOD BLD: ABNORMAL
EOSINOPHIL # BLD: 0.2 K/UL (ref 0–0.4)
EOSINOPHIL NFR BLD: 4 % (ref 0–5)
EPITH CASTS URNS QL MICRO: NEGATIVE /LPF (ref 0–5)
ERYTHROCYTE [DISTWIDTH] IN BLOOD BY AUTOMATED COUNT: 11.4 % (ref 11.6–14.5)
ETHANOL SERPL-MCNC: <3 MG/DL (ref 0–3)
GLOBULIN SER CALC-MCNC: 4.5 G/DL (ref 2–4)
GLUCOSE BLD STRIP.AUTO-MCNC: 195 MG/DL (ref 70–110)
GLUCOSE BLD STRIP.AUTO-MCNC: 334 MG/DL (ref 70–110)
GLUCOSE BLD STRIP.AUTO-MCNC: 355 MG/DL (ref 70–110)
GLUCOSE BLD STRIP.AUTO-MCNC: 361 MG/DL (ref 70–110)
GLUCOSE BLD STRIP.AUTO-MCNC: 405 MG/DL (ref 70–110)
GLUCOSE SERPL-MCNC: 348 MG/DL (ref 74–99)
GLUCOSE UR STRIP.AUTO-MCNC: >1000 MG/DL
HAV IGM SER QL: NEGATIVE
HBV CORE IGM SER QL: NEGATIVE
HBV SURFACE AG SER QL: <0.1 INDEX
HBV SURFACE AG SER QL: NEGATIVE
HCT VFR BLD AUTO: 40.8 % (ref 36–48)
HCV AB SER IA-ACNC: >11 INDEX
HCV AB SERPL QL IA: POSITIVE
HCV COMMENT,HCGAC: ABNORMAL
HDSCOM,HDSCOM: ABNORMAL
HGB BLD-MCNC: 14.4 G/DL (ref 13–16)
HGB UR QL STRIP: NEGATIVE
KETONES UR QL STRIP.AUTO: NEGATIVE MG/DL
LEUKOCYTE ESTERASE UR QL STRIP.AUTO: NEGATIVE
LYMPHOCYTES # BLD: 2.4 K/UL (ref 0.9–3.6)
LYMPHOCYTES NFR BLD: 42 % (ref 21–52)
MAGNESIUM SERPL-MCNC: 2.2 MG/DL (ref 1.6–2.6)
MCH RBC QN AUTO: 32.1 PG (ref 24–34)
MCHC RBC AUTO-ENTMCNC: 35.3 G/DL (ref 31–37)
MCV RBC AUTO: 91.1 FL (ref 74–97)
METHADONE UR QL: NEGATIVE
MONOCYTES # BLD: 0.7 K/UL (ref 0.05–1.2)
MONOCYTES NFR BLD: 12 % (ref 3–10)
NEUTS SEG # BLD: 2.3 K/UL (ref 1.8–8)
NEUTS SEG NFR BLD: 42 % (ref 40–73)
NITRITE UR QL STRIP.AUTO: NEGATIVE
OPIATES UR QL: NEGATIVE
PCP UR QL: NEGATIVE
PH UR STRIP: 6.5 [PH] (ref 5–8)
PLATELET # BLD AUTO: 156 K/UL (ref 135–420)
PMV BLD AUTO: 11.4 FL (ref 9.2–11.8)
POTASSIUM SERPL-SCNC: 4.3 MMOL/L (ref 3.5–5.5)
PROT SERPL-MCNC: 7.5 G/DL (ref 6.4–8.2)
PROT UR STRIP-MCNC: ABNORMAL MG/DL
RBC # BLD AUTO: 4.48 M/UL (ref 4.7–5.5)
RBC #/AREA URNS HPF: NORMAL /HPF (ref 0–5)
SODIUM SERPL-SCNC: 137 MMOL/L (ref 136–145)
SOURCE, COVRS: NORMAL
SP GR UR REFRACTOMETRY: >1.03 (ref 1–1.03)
SP1: ABNORMAL
SP2: ABNORMAL
SP3: ABNORMAL
SPECIMEN TYPE, XMCV1T: NORMAL
UROBILINOGEN UR QL STRIP.AUTO: 1 EU/DL (ref 0.2–1)
WBC # BLD AUTO: 5.5 K/UL (ref 4.6–13.2)
WBC URNS QL MICRO: NORMAL /HPF (ref 0–4)

## 2021-01-11 PROCEDURE — 80074 ACUTE HEPATITIS PANEL: CPT

## 2021-01-11 PROCEDURE — 82962 GLUCOSE BLOOD TEST: CPT

## 2021-01-11 PROCEDURE — 99285 EMERGENCY DEPT VISIT HI MDM: CPT

## 2021-01-11 PROCEDURE — 74011636637 HC RX REV CODE- 636/637: Performed by: PSYCHIATRY & NEUROLOGY

## 2021-01-11 PROCEDURE — 74011250637 HC RX REV CODE- 250/637: Performed by: PSYCHIATRY & NEUROLOGY

## 2021-01-11 PROCEDURE — 82077 ASSAY SPEC XCP UR&BREATH IA: CPT

## 2021-01-11 PROCEDURE — 80053 COMPREHEN METABOLIC PANEL: CPT

## 2021-01-11 PROCEDURE — 65220000005 HC RM SEMIPRIVATE PSYCH 3 OR 4 BED

## 2021-01-11 PROCEDURE — 85025 COMPLETE CBC W/AUTO DIFF WBC: CPT

## 2021-01-11 PROCEDURE — 87635 SARS-COV-2 COVID-19 AMP PRB: CPT

## 2021-01-11 PROCEDURE — 80307 DRUG TEST PRSMV CHEM ANLYZR: CPT

## 2021-01-11 PROCEDURE — 81001 URINALYSIS AUTO W/SCOPE: CPT

## 2021-01-11 PROCEDURE — 83735 ASSAY OF MAGNESIUM: CPT

## 2021-01-11 PROCEDURE — 74011250636 HC RX REV CODE- 250/636: Performed by: EMERGENCY MEDICINE

## 2021-01-11 RX ORDER — HALOPERIDOL 5 MG/ML
5 INJECTION INTRAMUSCULAR
Status: DISCONTINUED | OUTPATIENT
Start: 2021-01-11 | End: 2021-01-21 | Stop reason: HOSPADM

## 2021-01-11 RX ORDER — INSULIN GLARGINE 100 [IU]/ML
20 INJECTION, SOLUTION SUBCUTANEOUS
Status: DISCONTINUED | OUTPATIENT
Start: 2021-01-11 | End: 2021-01-12

## 2021-01-11 RX ORDER — SPIRONOLACTONE 25 MG/1
25 TABLET ORAL DAILY
Status: DISCONTINUED | OUTPATIENT
Start: 2021-01-11 | End: 2021-01-21 | Stop reason: HOSPADM

## 2021-01-11 RX ORDER — QUETIAPINE FUMARATE 100 MG/1
100 TABLET, FILM COATED ORAL
Status: DISCONTINUED | OUTPATIENT
Start: 2021-01-11 | End: 2021-01-21 | Stop reason: HOSPADM

## 2021-01-11 RX ORDER — MAGNESIUM SULFATE 100 %
16 CRYSTALS MISCELLANEOUS AS NEEDED
Status: DISCONTINUED | OUTPATIENT
Start: 2021-01-11 | End: 2021-01-21 | Stop reason: HOSPADM

## 2021-01-11 RX ORDER — DEXTROSE 50 % IN WATER (D50W) INTRAVENOUS SYRINGE
25-50 AS NEEDED
Status: DISCONTINUED | OUTPATIENT
Start: 2021-01-11 | End: 2021-01-21 | Stop reason: HOSPADM

## 2021-01-11 RX ORDER — PRAZOSIN HYDROCHLORIDE 1 MG/1
1 CAPSULE ORAL
Status: DISCONTINUED | OUTPATIENT
Start: 2021-01-11 | End: 2021-01-15

## 2021-01-11 RX ORDER — HYDROXYZINE PAMOATE 50 MG/1
50 CAPSULE ORAL
Status: DISCONTINUED | OUTPATIENT
Start: 2021-01-11 | End: 2021-01-21 | Stop reason: HOSPADM

## 2021-01-11 RX ORDER — GABAPENTIN 300 MG/1
300 CAPSULE ORAL 2 TIMES DAILY
Status: DISCONTINUED | OUTPATIENT
Start: 2021-01-11 | End: 2021-01-21 | Stop reason: HOSPADM

## 2021-01-11 RX ORDER — ROSUVASTATIN CALCIUM 20 MG/1
40 TABLET, COATED ORAL
Status: DISCONTINUED | OUTPATIENT
Start: 2021-01-11 | End: 2021-01-21 | Stop reason: HOSPADM

## 2021-01-11 RX ORDER — TRAZODONE HYDROCHLORIDE 50 MG/1
50 TABLET ORAL
Status: DISCONTINUED | OUTPATIENT
Start: 2021-01-11 | End: 2021-01-21 | Stop reason: HOSPADM

## 2021-01-11 RX ORDER — INSULIN LISPRO 100 [IU]/ML
INJECTION, SOLUTION INTRAVENOUS; SUBCUTANEOUS
Status: DISCONTINUED | OUTPATIENT
Start: 2021-01-11 | End: 2021-01-21 | Stop reason: HOSPADM

## 2021-01-11 RX ORDER — ACETAMINOPHEN 325 MG/1
650 TABLET ORAL
Status: DISCONTINUED | OUTPATIENT
Start: 2021-01-11 | End: 2021-01-21 | Stop reason: HOSPADM

## 2021-01-11 RX ORDER — INDAPAMIDE 2.5 MG/1
2.5 TABLET, FILM COATED ORAL DAILY
Status: DISCONTINUED | OUTPATIENT
Start: 2021-01-11 | End: 2021-01-19

## 2021-01-11 RX ORDER — HALOPERIDOL 5 MG/1
5 TABLET ORAL
Status: DISCONTINUED | OUTPATIENT
Start: 2021-01-11 | End: 2021-01-21 | Stop reason: HOSPADM

## 2021-01-11 RX ADMIN — ROSUVASTATIN CALCIUM 40 MG: 20 TABLET, COATED ORAL at 20:27

## 2021-01-11 RX ADMIN — SPIRONOLACTONE 25 MG: 25 TABLET ORAL at 18:15

## 2021-01-11 RX ADMIN — QUETIAPINE FUMARATE 100 MG: 100 TABLET, FILM COATED ORAL at 20:27

## 2021-01-11 RX ADMIN — GABAPENTIN 300 MG: 300 CAPSULE ORAL at 20:27

## 2021-01-11 RX ADMIN — INDAPAMIDE 2.5 MG: 2.5 TABLET ORAL at 18:15

## 2021-01-11 RX ADMIN — SODIUM CHLORIDE 1000 ML: 900 INJECTION, SOLUTION INTRAVENOUS at 08:13

## 2021-01-11 RX ADMIN — PRAZOSIN HYDROCHLORIDE 1 MG: 1 CAPSULE ORAL at 20:27

## 2021-01-11 RX ADMIN — INSULIN GLARGINE 20 UNITS: 100 INJECTION, SOLUTION SUBCUTANEOUS at 20:37

## 2021-01-11 RX ADMIN — INSULIN LISPRO 12 UNITS: 100 INJECTION, SOLUTION INTRAVENOUS; SUBCUTANEOUS at 21:19

## 2021-01-11 NOTE — ED NOTES
Patient states his blood sugar has been running, \"high\" lately. Patient states, he took 70/30 insulin at 0530 this morning.

## 2021-01-11 NOTE — BH NOTES
Arrives via wheelchair. Patient states to ER that he is SI with a plan to hurt self overdosing on insulin. Suffers from PTSD insomnia depression hypertension. Cooperative. Dull flat reserved sad depressed guarded. Isolative anxious paranoid. Stays to self through most of shift. Interacts very little with staff and or peers. Homeless. Presently patient does not state any SI HI AVH. Will continue to monitor and support. RNs will initiate develop implement review or revise treatment plan.

## 2021-01-11 NOTE — PROGRESS NOTES
Problem: Suicide  Goal: *STG: Remains safe in hospital  1/11/2021 1706 by Roderick PETERSON  Note: Remains safe daily while in hospital.  1/11/2021 1637 by Roderick PETERSON  Outcome: Progressing Towards Goal  Goal: *STG: Seeks staff when feelings of self harm or harm towards others arise  Outcome: Progressing Towards Goal  Goal: *STG: Attends activities and groups  Outcome: Progressing Towards Goal

## 2021-01-11 NOTE — ED TRIAGE NOTES
Patient A/O x 4, presented to ED with complaint of suicidal ideations x 1 week. Patient states his plan is to overdose on insulin. Patient has hx of diabetes.
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
## 2021-01-11 NOTE — GROUP NOTE
ELGIN  GROUP DOCUMENTATION INDIVIDUAL Group Therapy Note Date: 1/11/2021 Group Start Time: 4313 Group End Time: 4823 Group Topic: Nursing [facility] ADULT CHEM DEP Kizzy Wing, JUANCARLOS EISENBERG  GROUP DOCUMENTATION GROUP Group Therapy Note: Importance of humor in daily lives Attendees: 5 Attendance: Did Not Attend Additional Notes:  Patient did not attend due to admission process Bin Zablaa

## 2021-01-11 NOTE — ED NOTES
TRANSFER - OUT REPORT:    Verbal report given to [facility] (name) on Tracee Noriega  being transferred to [address] [place] [address] ([facility]) for routine progression of care       Report consisted of patients Situation, Background, Assessment and   Recommendations(SBAR). Information from the following report(s) ED Summary and MAR was reviewed with the receiving nurse. Lines:   Peripheral IV 01/11/21 Right Antecubital (Active)   Site Assessment Clean, dry, & intact 01/11/21 0812   Phlebitis Assessment 0 01/11/21 0812   Infiltration Assessment 0 01/11/21 0812   Dressing Status Clean, dry, & intact 01/11/21 0812   Dressing Type Transparent 01/11/21 0812   Hub Color/Line Status Patent; Flushed 01/11/21 1102        Opportunity for questions and clarification was provided.       Patient transported with:   Registered Nurse and security

## 2021-01-11 NOTE — DIABETES MGMT
Glycemic Control Plan of Care  T2DM with current A1c level of 10.6% (11/09/2020)  Home diabetes medications: Patient reported on 01/11:  Novolin 70/30 mix insulin 20 units two times daily before meals: breakfast and dinner    Patient was admitted today, 01/11/2011, with report of suicidal ideation for the past week. 01/11: Seen patient this evening and reported that he has been homeless for the past 2 weeks therefore not able to take his insulin. Glycemic recommendation(s):  1.) add basal lantus insulin 20 units daily starting today at bedtime. Order obtained. 2.) modify correctional lispro insulin to very resistant dose, done. 3.) add bedtime diabetic snack, done. Glycemic assessment:    POC BG 01/11 at time of review: 361, 195  Lab BG 01/11 at 0702 HR: 348        Current A1C: 10.6% (11/09/2020) which is equivalent to estimated average blood glucose of 258 mg/dL during the past 2-3 months    Current hospital diabetes medications:  Basal lantus insulin 20 units daily at betime, first dose ordered 01/11/2021. Correctional lispro insulin ACHS. Modified to very resistant dose. Total daily dose insulin requirement previous day: N/A. Patient admitted today, 01/11/2021    Diet: Diabetic consistent carb regular; bedtime diabetic snack    Goals:  Blood glucose will be within target range of  mg/dL by 01/14/2021    Education:  Pending assessment of home diabetes management and education.   ___  Refer to Diabetes Education Record  ___  Education not indicated at this time    Kamila Meyer RN [facility]  Pager: [phone]

## 2021-01-11 NOTE — ED PROVIDER NOTES
EMERGENCY DEPARTMENT HISTORY AND PHYSICAL EXAM  This was created with voice recognition software and transcription errors may be present. 9:08 AM  Date: 1/11/2021  Patient Name: Rachael Flood    History of Presenting Illness     Chief Complaint:    History Provided By:     HPI: Rachael Flood is a 50 y.o. male history of diabetes hep C hypertension PTSD who presents with suicidal ideation. Patient states he went to bed last night feeling some sort this per side woke up and was still having thoughts recommend for further evaluation no medical complaints at this time. Symptoms been going on for the past few days no aggravating or alleviating factors no other associated symptoms    PCP: Alberto Glass DO      Past History     Past Medical History:  Past Medical History:   Diagnosis Date    Diabetes ([facility] [address]     Hepatitis C infection     Hypertension        Past Surgical History:  No past surgical history on file. Family History:  No family history on file. Social History:  Social History     Tobacco Use    Smoking status: Current Every Day Smoker     Packs/day: 1.00    Smokeless tobacco: Never Used   Substance Use Topics    Alcohol use: Never     Frequency: Never     Comment: socially     Drug use: Not Currently       Allergies:  No Known Allergies    Review of Systems     Review of Systems   All other systems reviewed and are negative. 10 point review of systems otherwise negative unless noted in HPI. Physical Exam       Physical Exam  Constitutional:       Appearance: He is well-developed. HENT:      Head: Normocephalic and atraumatic. Eyes:      Pupils: Pupils are equal, round, and reactive to light. Neck:      Musculoskeletal: Normal range of motion and neck supple. Cardiovascular:      Rate and Rhythm: Normal rate and regular rhythm. Heart sounds: Normal heart sounds. No murmur. No friction rub. Pulmonary:      Effort: Pulmonary effort is normal. No respiratory distress. Breath sounds: Normal breath sounds. No wheezing. Abdominal:      General: There is no distension. Palpations: Abdomen is soft. Tenderness: There is no abdominal tenderness. There is no guarding or rebound. Musculoskeletal: Normal range of motion. Skin:     General: Skin is warm and dry. Neurological:      Mental Status: He is alert and oriented to person, place, and time. Psychiatric:         Behavior: Behavior normal.         Thought Content: Thought content normal.         Diagnostic Study Results     Vital Signs  EKG:  Labs: CBC is unremarkable UA is negative, she noted for slight bump in his LFTs  Alcohol negative drug screen positive for cocaine  Imaging:     Medical Decision Making     ED Course: Progress Notes, Reevaluation, and Consults:      Provider Notes (Medical Decision Making): 81-year-old gentleman presents with suicidal ideation will discuss with crisis         Diagnosis     Clinical Impression: No diagnosis found.     Disposition:    Patient's Medications    No medications on file

## 2021-01-11 NOTE — PROGRESS NOTES
Problem: Suicide  Goal: *STG: Remains safe in hospital  Description: Remains safe daily while in hospital.  1/11/2021 1706 by Romaine PETERSON  Note: Remains safe daily while in hospital.  1/11/2021 1637 by Romaine PETERSON  Outcome: Progressing Towards Goal  Goal: *STG: Seeks staff when feelings of self harm or harm towards others arise  Description: Seeks staff when feelings of self harm or harm towards others arise daily while in hospital.  Outcome: Progressing Towards Goal  Goal: *STG: Attends activities and groups  Description: Attends activities and groups daily while in hospital.  Outcome: Progressing Towards Goal

## 2021-01-12 PROBLEM — F33.2 MDD (MAJOR DEPRESSIVE DISORDER), RECURRENT SEVERE, WITHOUT PSYCHOSIS (HCC): Status: ACTIVE | Noted: 2021-01-12

## 2021-01-12 LAB
ATRIAL RATE: 91 BPM
CALCULATED P AXIS, ECG09: 42 DEGREES
CALCULATED R AXIS, ECG10: 23 DEGREES
CALCULATED T AXIS, ECG11: 60 DEGREES
DIAGNOSIS, 93000: NORMAL
GLUCOSE BLD STRIP.AUTO-MCNC: 217 MG/DL (ref 70–110)
GLUCOSE BLD STRIP.AUTO-MCNC: 227 MG/DL (ref 70–110)
GLUCOSE BLD STRIP.AUTO-MCNC: 351 MG/DL (ref 70–110)
GLUCOSE BLD STRIP.AUTO-MCNC: 416 MG/DL (ref 70–110)
GLUCOSE BLD STRIP.AUTO-MCNC: 454 MG/DL (ref 70–110)
P-R INTERVAL, ECG05: 146 MS
Q-T INTERVAL, ECG07: 340 MS
QRS DURATION, ECG06: 74 MS
QTC CALCULATION (BEZET), ECG08: 418 MS
VENTRICULAR RATE, ECG03: 91 BPM

## 2021-01-12 PROCEDURE — 99222 1ST HOSP IP/OBS MODERATE 55: CPT | Performed by: PSYCHIATRY & NEUROLOGY

## 2021-01-12 PROCEDURE — 93005 ELECTROCARDIOGRAM TRACING: CPT

## 2021-01-12 PROCEDURE — 74011250637 HC RX REV CODE- 250/637: Performed by: PSYCHIATRY & NEUROLOGY

## 2021-01-12 PROCEDURE — 82962 GLUCOSE BLOOD TEST: CPT

## 2021-01-12 PROCEDURE — 74011636637 HC RX REV CODE- 636/637: Performed by: PSYCHIATRY & NEUROLOGY

## 2021-01-12 PROCEDURE — 65220000005 HC RM SEMIPRIVATE PSYCH 3 OR 4 BED

## 2021-01-12 PROCEDURE — 74011000250 HC RX REV CODE- 250: Performed by: NURSE PRACTITIONER

## 2021-01-12 RX ORDER — INSULIN GLARGINE 100 [IU]/ML
10 INJECTION, SOLUTION SUBCUTANEOUS ONCE
Status: COMPLETED | OUTPATIENT
Start: 2021-01-12 | End: 2021-01-12

## 2021-01-12 RX ORDER — INSULIN GLARGINE 100 [IU]/ML
32 INJECTION, SOLUTION SUBCUTANEOUS
Status: DISCONTINUED | OUTPATIENT
Start: 2021-01-12 | End: 2021-01-13

## 2021-01-12 RX ORDER — BACITRACIN ZINC 500 UNIT/G
OINTMENT (GRAM) TOPICAL 2 TIMES DAILY
Status: DISCONTINUED | OUTPATIENT
Start: 2021-01-12 | End: 2021-01-21 | Stop reason: HOSPADM

## 2021-01-12 RX ADMIN — Medication: at 20:54

## 2021-01-12 RX ADMIN — GABAPENTIN 300 MG: 300 CAPSULE ORAL at 08:20

## 2021-01-12 RX ADMIN — PRAZOSIN HYDROCHLORIDE 1 MG: 1 CAPSULE ORAL at 20:32

## 2021-01-12 RX ADMIN — INSULIN LISPRO 15 UNITS: 100 INJECTION, SOLUTION INTRAVENOUS; SUBCUTANEOUS at 17:06

## 2021-01-12 RX ADMIN — INSULIN LISPRO 6 UNITS: 100 INJECTION, SOLUTION INTRAVENOUS; SUBCUTANEOUS at 08:20

## 2021-01-12 RX ADMIN — INSULIN LISPRO 15 UNITS: 100 INJECTION, SOLUTION INTRAVENOUS; SUBCUTANEOUS at 11:21

## 2021-01-12 RX ADMIN — QUETIAPINE FUMARATE 100 MG: 100 TABLET, FILM COATED ORAL at 20:24

## 2021-01-12 RX ADMIN — GABAPENTIN 300 MG: 300 CAPSULE ORAL at 20:24

## 2021-01-12 RX ADMIN — ROSUVASTATIN CALCIUM 40 MG: 20 TABLET, COATED ORAL at 20:23

## 2021-01-12 RX ADMIN — INDAPAMIDE 2.5 MG: 2.5 TABLET ORAL at 08:20

## 2021-01-12 RX ADMIN — SPIRONOLACTONE 25 MG: 25 TABLET ORAL at 08:20

## 2021-01-12 RX ADMIN — INSULIN GLARGINE 32 UNITS: 100 INJECTION, SOLUTION SUBCUTANEOUS at 20:42

## 2021-01-12 RX ADMIN — INSULIN LISPRO 6 UNITS: 100 INJECTION, SOLUTION INTRAVENOUS; SUBCUTANEOUS at 21:01

## 2021-01-12 RX ADMIN — INSULIN GLARGINE 10 UNITS: 100 INJECTION, SOLUTION SUBCUTANEOUS at 12:12

## 2021-01-12 NOTE — BSMART NOTE
ART THERAPY GROUP PROGRESS NOTE Group time:10:00 The patient did not refuse group, but did not join.

## 2021-01-12 NOTE — WOUND CARE
Physical Exam 
Musculoskeletal:  
     Feet: 
 
 
  
Left great toe diabetic ulcer, POA. Bed is pale, with pale whitish slough. Received with wound open to air, covered with lint from his sock. This is removed to reveal wound. Periwound is calloused and discolored. No drainage noted. Topical treatment protocol in place. Clean saline or with soap and water during shower, then apply thin layer of Therahoney and cover with silicone dressing. Change daily and prn soilage. Assist patient to provide wound care post shower. Care discussed with primary nurse, Fani Marr. Care turned over to nursing staff at this time. Kobe Sutherland RN, BSN, [facility]
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
## 2021-01-12 NOTE — PROGRESS NOTES
Problem: Suicide  Goal: *STG: Remains safe in hospital  Description: Remains safe daily while in hospital.  Outcome: Progressing Towards Goal as evidence by being free from harmful behaviors this shift. Problem: Diabetes Self-Management  Goal: *Monitoring blood glucose, interpreting and using results  Description: Identify recommended blood glucose targets  and personal targets. Outcome: Progressing Towards Goal as evidence by compliance with blood glucose checks and insulin administration per protocol. Patient has irritable edge but has been cooperative with staff. Patient compliant with wound care nurse assessment. Thera Honey left by wound care and placed in plastic bag in medication cabinet with patient name for future dressing changes. Patient made aware of location of Thera Honey. Patient compliant with scheduled medications and has not required PRN medications this shift. Patient has eaten all meals and was given snack. Patient has been compliant with unit guidelines, free from falls and harm. Will continue to monitor and provide interventions as appropriate.

## 2021-01-12 NOTE — DIABETES MGMT
Glycemic Control Plan of Care  T2DM with current A1c level of 10.6% (11/09/2020)  See separate notes, 01/12/2021, for assessment diabetes management prior to admission and education. Patient reported that he has been homeless for the past 2 weeks therefore not able to take his insulin. Home diabetes medications: Patient reported on 01/11:  Novolin 70/30 mix insulin 20 units two times daily before meals: breakfast and dinner    Patient was admitted on 01/11/2011 with report of suicidal ideation for the past week. 01/12: Seen patient this afternoon and discussed titration of lantus insulin dose due to elevated blood glucose readings. He verbalized understanding. Glycemic recommendation(s):   1.) Discussed recommendation to increase basal lantus insulin dose with Dr. Alisia Campbell because of persistent high blood sugar readings. Order obtained. Increased lantus insulin dose from 20 units daily at bedtime to 32 units starting tonight. Give patient a one time dose of lantus insulin 10 units now.  2.) if not able to continue on insulin at discharge because he is homeless, consider Janumet  mg two times daily with meals and follow-up with his PCP for dose adjustment. Glycemic assessment:    POC BG 01/11: 361, 195, 405, 355, 334  POC BG 01/12 at time of review: 227, 416 (at 11:16 AM), 778 (at 11:20 AM). Current A1C: 10.6% (11/09/2020) which is equivalent to estimated average blood glucose of 258 mg/dL during the past 2-3 months    Current hospital diabetes medications:  Increased basal lantus insulin dose to 32 units daily at bedtime, 01/12/2021  One time dose of lantus insulin 10 units, NOW, 01/12/2021  Correctional lispro insulin ACHS. Very resistant dose.     Total daily dose insulin requirement previous day: 01/11/2021:  Lantus: 20 units  Lispro: 12 units  TDD insulin: 32 units    Diet: Diabetic consistent carb regular; bedtime diabetic snack    Goals:  Blood glucose will be within target range of  mg/dL by 01/15/2021    Education:    __X_  Refer to Diabetes Education Record: 01/12/2021  ___  Education not indicated at this time    Georgina Benites RN [facility]  Pager: [phone]

## 2021-01-12 NOTE — DIABETES MGMT
Diabetes Patient/Family Education Record  Factors That  May Influence Patients Ability  to Learn or  Comply with Recommendations   []   Language barrier    []   Cultural needs   []   Motivation    []   Cognitive limitation    []   Physical   [x]   Education    []   Physiological factors   []   Hearing/vision/speaking impairment   []   Sabianist beliefs    []   Financial factors   []  Other:   []  No factors identified at this time. Person Instructed:   [x]   Patient   []   Family   []  Other     Preference for Learning:   [x]   Verbal   []   Written   []  Demonstration     Level of Comprehension & Competence:    [x]  Good                                      [] Fair                                     []  Poor                             []  Needs Reinforcement   [x]  Teachback completed    Education Component:   [x]  Medication management, including how to administer insulin (if appropriate) and potential medication interactions: Yes. Patient reported not taking his 70/30 mix insulin regularly. He was prescribed to take 20 units two times daily before meals. Patient also stated that it is challenging for him to take his insulin now that he is homeless. May need to consider taking oral medication for diabetes if he will be homeless at discharge. [x]  Nutritional management -obtain usual meal pattern: Yes. Patient is not following a specific meal plan but has basic understanding of nutrition for management of diabetes regarding serving size/portion control of carbs (starches, fruits, dairy) and limiting intake of concentrated sweets. [x]  Exercise: Yes. Patient knows that regular physical activity is part of his diabetes treatment plan. He is able to tolerate walking exercise. [x]  Signs, symptoms, and treatment of hyperglycemia and hypoglycemia: Yes.  Patient stated that his blood sugar has been running high before admission because he was not taking his insulin as prescribed and it will be challenging now to take it since he is homeless at this time. [x] Prevention, recognition and treatment of hyperglycemia and hypoglycemia: Yes. Patient knows about signs/symptoms of low blood sugar but reported no history of low blood sugar. He knows not to ignore the symptoms and to treat low blood sugar less than 70 right away by consuming concentrated sweets, recheck blood sugar within 15 minutes, and repeat treatment every 15 minutes until low blood sugar is above 70. Also discussed prevention by not missing meal.     [x]  Importance of blood glucose monitoring and how to obtain a blood glucose meter    [x]  Instruction on use of the blood glucose meter: Yes. Meter sample, Contour next [employer]. [employer] Flyer:  Generic meter at [place]. Cost of meter: $9.00  Cost of 50 count test strips: $9.00  Cost of 100 count lancets: $1.56     []  Discuss the importance of HbA1C monitoring    []  Sick day guidelines   [x]  Proper use and disposal of lancets, needles, syringes or insulin pens (if appropriate): Yes. [x]  Potential long-term complications (retinopathy, kidney disease, neuropathy, foot care): Yes. [x] Information about whom to contact in case of emergency or for more information: Yes. [x]  Goal:  Patient/family will demonstrate understanding of Diabetes Self Management Skills by: 01/19/2021  Plan for post-discharge education or self-management support:    [] Outpatient class schedule provided            [] Patient Declined    [] Scheduled for outpatient classes (date) _______  Verify:  Does patient understand how diabetes medications work? Yes. Does patient know what their most recent A1c is? Yes. Does patient monitor glucose at home? No, and patient reported that he is now homeless. Does patient have difficulty obtaining diabetes medications and testing supplies?  No. Noted that he has [place] Medicaid       Steven Banks RN [facility]  Pager: [phone]
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
## 2021-01-12 NOTE — BSMART NOTE
OCCUPATIONAL THERAPY PROGRESS NOTE Group Time:  1400 Attendance: The patient attended full group. Participation: The patient participated with minimal elaboration in the activity. Attention: The patient was able to focus on the activity. Interaction: The patient acknowledges others or responds to questions,  with no spontaneous interaction. Superficial in initial participation in activity .

## 2021-01-12 NOTE — H&P
History and Physical        Patient: Dimitri Downs               Sex: male          DOA: 1/11/2021         YOB: 1965      Age:  50 y.o.        LOS:  LOS: 1 day        HPI:     Dimitri Downs is a 50 y.o. AA male who was admitted experiencing depression and suicidal ideation after experiencing a negative situation where he lived. Principal Problem:    MDD (major depressive disorder), recurrent severe, without psychosis [facility] (1/12/2021)        Past Medical History:   Diagnosis Date    Diabetes ([facility]     Hepatitis C infection     Hypertension        No past surgical history on file. No family history on file. Social History     Socioeconomic History    Marital status: SINGLE     Spouse name: Not on file    Number of children: Not on file    Years of education: Not on file    Highest education level: Not on file   Tobacco Use    Smoking status: Current Every Day Smoker     Packs/day: 1.00    Smokeless tobacco: Never Used   Substance and Sexual Activity    Alcohol use: Never     Frequency: Never     Comment: socially     Drug use: Not Currently       Prior to Admission medications    Not on File       No Known Allergies    Review of Systems  A comprehensive review of systems was negative except for that written in the History of Present Illness. Physical Exam:      Visit Vitals  /74 (BP 1 Location: Right arm, BP Patient Position: At rest)   Pulse 90   Temp 98.1 °F (36.7 °C)   Resp 18   SpO2 100%       Physical Exam:  Physical Exam:   General:  Alert, cooperative, no distress, appears stated age. Eyes:  Conjunctivae/corneas clear. PERRL, EOMs intact. Fundi benign   Ears:  Normal TMs and external ear canals both ears. Nose: Nares normal. Septum midline. Mucosa normal. No drainage or sinus tenderness.    Mouth/Throat: Lips, mucosa, and tongue normal. Teeth and gums normal.   Neck: Supple, symmetrical, trachea midline, no adenopathy, thyroid: no enlargement/tenderness/nodules, no carotid bruit and no JVD. Back:   Symmetric, no curvature. ROM normal. No CVA tenderness. Lungs:   Clear to auscultation bilaterally. Heart:  Regular rate and rhythm, S1, S2 normal, no murmur, click, rub or gallop. Abdomen:   Soft, non-tender. Bowel sounds normal. No masses,  No organomegaly. Extremities: Extremities normal, atraumatic, no cyanosis or edema. Pulses: 2+ and symmetric all extremities. Skin: Skin color, texture, turgor normal. Raised, small, TTP, closed, pimple-like area right upper back. Lymph nodes: Cervical, supraclavicular, and axillary nodes normal.   Neurologic: CNII-XII intact. Normal strength, sensation and reflexes throughout. Assessment/Plan     Patient was appropriate during exam. Plan is for patient to participate in all unit activities, take medications as scheduled and   Follow all discharge orders.

## 2021-01-12 NOTE — BH NOTES
Doctor informed of critically high blood sugar result of 415 and 454. Just as doctor was informed, diabetic educator entered unit and was also consulted. Patient given 15 units per protocol and was able to have another consultation with diabetic educator. Will continue to monitor. UPDATE 1215:   Patient received one time dose of Lantus 10 units to abdomen per order. Patient also made aware \"he changed your Lantus so you'll get one dose in the morning and another at night. \"  Patient voiced understanding and able to note Lantus is long-acting Insulin.

## 2021-01-13 LAB
CHOLEST SERPL-MCNC: 146 MG/DL
GLUCOSE BLD STRIP.AUTO-MCNC: 256 MG/DL (ref 70–110)
GLUCOSE BLD STRIP.AUTO-MCNC: 278 MG/DL (ref 70–110)
GLUCOSE BLD STRIP.AUTO-MCNC: 285 MG/DL (ref 70–110)
GLUCOSE BLD STRIP.AUTO-MCNC: 304 MG/DL (ref 70–110)
HBA1C MFR BLD: 11.3 % (ref 4.2–5.6)
HDLC SERPL-MCNC: 39 MG/DL (ref 40–60)
HDLC SERPL: 3.7 {RATIO} (ref 0–5)
LDLC SERPL CALC-MCNC: 51.4 MG/DL (ref 0–100)
LIPID PROFILE,FLP: ABNORMAL
TRIGL SERPL-MCNC: 278 MG/DL (ref ?–150)
TSH SERPL DL<=0.05 MIU/L-ACNC: 0.95 UIU/ML (ref 0.36–3.74)
VLDLC SERPL CALC-MCNC: 55.6 MG/DL

## 2021-01-13 PROCEDURE — 82962 GLUCOSE BLOOD TEST: CPT

## 2021-01-13 PROCEDURE — 84443 ASSAY THYROID STIM HORMONE: CPT

## 2021-01-13 PROCEDURE — 36415 COLL VENOUS BLD VENIPUNCTURE: CPT

## 2021-01-13 PROCEDURE — 83036 HEMOGLOBIN GLYCOSYLATED A1C: CPT

## 2021-01-13 PROCEDURE — 74011250637 HC RX REV CODE- 250/637: Performed by: PSYCHIATRY & NEUROLOGY

## 2021-01-13 PROCEDURE — 74011636637 HC RX REV CODE- 636/637: Performed by: PSYCHIATRY & NEUROLOGY

## 2021-01-13 PROCEDURE — 65220000005 HC RM SEMIPRIVATE PSYCH 3 OR 4 BED

## 2021-01-13 PROCEDURE — 80061 LIPID PANEL: CPT

## 2021-01-13 PROCEDURE — 99232 SBSQ HOSP IP/OBS MODERATE 35: CPT | Performed by: PSYCHIATRY & NEUROLOGY

## 2021-01-13 RX ORDER — DULOXETIN HYDROCHLORIDE 30 MG/1
30 CAPSULE, DELAYED RELEASE ORAL DAILY
Status: DISCONTINUED | OUTPATIENT
Start: 2021-01-13 | End: 2021-01-15

## 2021-01-13 RX ORDER — INSULIN GLARGINE 100 [IU]/ML
5 INJECTION, SOLUTION SUBCUTANEOUS ONCE
Status: COMPLETED | OUTPATIENT
Start: 2021-01-13 | End: 2021-01-13

## 2021-01-13 RX ORDER — INSULIN GLARGINE 100 [IU]/ML
40 INJECTION, SOLUTION SUBCUTANEOUS
Status: DISCONTINUED | OUTPATIENT
Start: 2021-01-13 | End: 2021-01-14

## 2021-01-13 RX ADMIN — INSULIN LISPRO 9 UNITS: 100 INJECTION, SOLUTION INTRAVENOUS; SUBCUTANEOUS at 11:58

## 2021-01-13 RX ADMIN — INSULIN GLARGINE 40 UNITS: 100 INJECTION, SOLUTION SUBCUTANEOUS at 20:49

## 2021-01-13 RX ADMIN — QUETIAPINE FUMARATE 100 MG: 100 TABLET, FILM COATED ORAL at 20:19

## 2021-01-13 RX ADMIN — ROSUVASTATIN CALCIUM 40 MG: 20 TABLET, COATED ORAL at 20:18

## 2021-01-13 RX ADMIN — INSULIN GLARGINE 5 UNITS: 100 INJECTION, SOLUTION SUBCUTANEOUS at 11:58

## 2021-01-13 RX ADMIN — INSULIN LISPRO 12 UNITS: 100 INJECTION, SOLUTION INTRAVENOUS; SUBCUTANEOUS at 21:03

## 2021-01-13 RX ADMIN — Medication: at 08:00

## 2021-01-13 RX ADMIN — GABAPENTIN 300 MG: 300 CAPSULE ORAL at 08:17

## 2021-01-13 RX ADMIN — DULOXETINE HYDROCHLORIDE 30 MG: 30 CAPSULE, DELAYED RELEASE ORAL at 12:46

## 2021-01-13 RX ADMIN — GABAPENTIN 300 MG: 300 CAPSULE ORAL at 20:18

## 2021-01-13 RX ADMIN — INSULIN LISPRO 9 UNITS: 100 INJECTION, SOLUTION INTRAVENOUS; SUBCUTANEOUS at 08:17

## 2021-01-13 RX ADMIN — PRAZOSIN HYDROCHLORIDE 1 MG: 1 CAPSULE ORAL at 20:18

## 2021-01-13 RX ADMIN — INSULIN LISPRO 9 UNITS: 100 INJECTION, SOLUTION INTRAVENOUS; SUBCUTANEOUS at 16:36

## 2021-01-13 NOTE — BSMART NOTE
Pt. is a 77-year-old homeless male with history of Major depressive disorder, severe, recurrent, without psychotic symptoms. Post-traumatic stress disorder. Chronic alcohol use disorder, severe. Cocaine use disorder, severe. Opioid use disorder, severe. Pt.'s case was discussed this a.am  Pt. continues to appear depressed and withdrawn. SW Contact:SW met with pt. to discuss dc planning. Pt. Expressed he feels depressed. Pt expressed interest in the rehab programs this SW informed him about. SW discussed pt.'s rehab options [facility], [facility], [facility]. Pt also expressed interest in [facility] on [place], [place]. Pt. Ask of more information for both [facility] [address] [facility] [place]. SW encouraged pt. to utilize positive coping skills and safety plan. Pt. continues to have a flat and depressed affect but fair insight. SW will continue to assist pt. with dc planning.   
 
Derrick Valle MA, LMHP-R

## 2021-01-13 NOTE — BH NOTES
Dull flat sad depressed cooperative. Loose association. Anxious paranoid. Guarded reserved isolative. Interacts very little with staff and or peers. Stays to self through most of shift. Can be rude sarcastic condescending. Entitled demanding homeless. Will continue to monitor and support.

## 2021-01-13 NOTE — PROGRESS NOTES
Problem: Suicide  Goal: *STG: Remains safe in hospital  Description: Remains safe daily while in hospital.  Outcome: Progressing Towards Goal  Goal: *STG: Seeks staff when feelings of self harm or harm towards others arise  Description: Seeks staff when feelings of self harm or harm towards others arise daily while in hospital.  Outcome: Progressing Towards Goal  Goal: *STG/LTG: Complies with medication therapy  Description: Complies with medication therapy daily while in hospital.  Outcome: Progressing Towards Goal     Pt presents with dull affect, depressed mood. Pt has been withdrawn to self on the unit. Pt is participative in groups and is adherent with unit guidelines. Pt states, \"I still feel kind of depressed today. \" Pt endorses SI, but denies intent or plan at this time. Pt is medication compliant. Wound care on foot completed. Right great toe presents with scant amount of slough on the tip of his toe, and scant amount of purulent drainage. Area cleansed with NS and medihoney and gauze dressing was applied. Bacitracin was applied to area on pt's back. Area is raised with scant amount of purulent drainage, and is tender to the touch. Bandage was applied. Will continue to monitor.

## 2021-01-13 NOTE — BH NOTES
Patient visible in dayroom most of this shift. Pleasant on approach. Mood depressed. Denies SI/HI/AH. No glycemic reactions noted. Compliant with medications. Patient states he was taking an \"antibiotic\" for his stomach and noted he hasn't received it here; referred to his doctor in the am.  Contracts for safety on unit. Will continue to monitor and offer support as needed.

## 2021-01-13 NOTE — GROUP NOTE
ELGIN  GROUP DOCUMENTATION INDIVIDUAL Group Therapy Note Date: 1/12/2021 Group Start Time: 0 Group End Time: 1930 Group Topic: Nursing [facility] ADULT CHEM DEP Dane Velazquez, RN 
 
ELGIN  GROUP DOCUMENTATION GROUP Group Therapy Note Attendees: 4 Attendance: Attended Interventions/techniques: Challenged and Informed Follows Directions: Followed directions Interactions: Interacted appropriately Mental Status: Calm Behavior/appearance: Attentive and Cooperative Goals Achieved: Able to engage in interactions, Able to listen to others and Able to give feedback to another Jay Montes [place]

## 2021-01-13 NOTE — DIABETES MGMT
Glycemic Control Plan of Care  T2DM with current A1c level of 10.6% (11/09/2020)  See separate notes, 01/12/2021, for assessment diabetes management prior to admission and education. Patient reported that he has been homeless for the past 2 weeks therefore not able to take his insulin. Home diabetes medications: Patient reported on 01/11:  Novolin 70/30 mix insulin 20 units two times daily before meals: breakfast and dinner    Patient was admitted on 01/11/2011 with report of suicidal ideation for the past week. 01/13: Seen patient this morning and discussed need to titrate his lantus insulin dose because his blood glucose readings are still elevated. Divya Olmedo He verbalized understanding. Patient reported eating meals with very good appetite. Glycemic recommendation(s):   1.) Discussed recommendation to increase basal lantus insulin dose with Dr. Merlinda Pencil since blood glucose readings still elevated. Order obtained. Increased lantus insulin dose from 32 units daily at bedtime to 40 units starting tonight. Give patient a one time dose of lantus insulin 5 units now.  2.) if not able to continue on insulin at discharge because he is homeless, consider Janumet  mg two times daily with meals and follow-up with his PCP for dose adjustment. Glycemic assessment:    POC BG 01/12: 227, 416 and repeated with result of 454, 351, 217  POC BG 01/13 at time of review: 256        Current A1C: 10.6% (11/09/2020) which is equivalent to estimated average blood glucose of 258 mg/dL during the past 2-3 months    Current hospital diabetes medications:  Increased basal lantus insulin dose to 40 units daily at bedtime, 01/13/2021  One time dose of lantus insulin 5 units, NOW, 01/13/2021  Correctional lispro insulin ACHS. Very resistant dose.     Total daily dose insulin requirement previous day: 01/12/21:  Lantus: 42 units  Lispro: 42 units  TDD insulin: 84 units    Diet: Diabetic consistent carb regular; bedtime diabetic snack    Goals:  Blood glucose will be within target range of  mg/dL by 01/16/2021    Education:    __X_  Refer to Diabetes Education Record: 01/12/2021  ___  Education not indicated at this time    Uzair Burleson RN [facility]  Pager: [phone]

## 2021-01-13 NOTE — BSMART NOTE
OCCUPATIONAL THERAPY PROGRESS NOTE Group Time:  2211 Attendance: The patient attended full group. [place] Participation: The patient participated with moderate elaboration in the activity. Attention: The patient was able to focus on the activity. Interaction: The patient acknowledges others or responds to questions,  with no spontaneous interaction. Responses less avoidant than yesterday. Appropriate and on subject.

## 2021-01-13 NOTE — GROUP NOTE
ELGIN  GROUP DOCUMENTATION INDIVIDUAL Group Therapy Note Date: 1/13/2021 Group Start Time: 1000 Group End Time: 2392 Group Topic: Nursing [facility] 1 ADULT CHEM DEP Darryn EISENBERG  GROUP DOCUMENTATION GROUP Group Therapy Note Attendees: 2 Attendance: Did not attend Flaquita Fontanez

## 2021-01-13 NOTE — PROGRESS NOTES
[address]  Inpatient Progress Note     Date of Service: 01/13/21  Hospital Day: 2     Subjective/Interval History   01/13/21    Treatment Team Notes:  Notes reviewed and/or discussed and report that Jada Gustafson is a patient with a history of her recurrent major depressive disorder currently without psychotic symptoms recently admitted to the facility. Attention is invited to the dictated admission note which is self-explanatory. Patient interview: Jada Gustafson was interviewed by this writer today. Describing self as \"depressed, very low,\" and being uncomfortable withdrawing from \"opioids and cocaine,\" the patient was not very verbal when we met this morning. His case was also discussed with the diabetes treatment team they are helping greatly appreciated. We also are appreciated. Only help provided by the wound care nurse. Attention is invited to the consultants notes which are self-explanatory. Otherwise treatment with antidepressant therapy  to be started. The patient does have a chronic history of pain related difficulties and so duloxetine appears to be a good choice.       Objective     Visit Vitals  BP 95/69   Pulse 94   Temp 98.1 °F (36.7 °C)   Resp 18   SpO2 100%     Mild hypotension noted    Recent Results (from the past 24 hour(s))   GLUCOSE, POC    Collection Time: 01/12/21  4:35 PM   Result Value Ref Range    Glucose (POC) 351 (H) 70 - 110 mg/dL   GLUCOSE, POC    Collection Time: 01/12/21  8:03 PM   Result Value Ref Range    Glucose (POC) 217 (H) 70 - 110 mg/dL   HEMOGLOBIN A1C W/O EAG    Collection Time: 01/13/21  6:30 AM   Result Value Ref Range    Hemoglobin A1c 11.3 (H) 4.2 - 5.6 %   LIPID PANEL    Collection Time: 01/13/21  6:30 AM   Result Value Ref Range    LIPID PROFILE          Cholesterol, total 146 <200 MG/DL    Triglyceride 278 (H) <150 MG/DL    HDL Cholesterol 39 (L) 40 - 60 MG/DL    LDL, calculated 51.4 0 - 100 MG/DL    VLDL, calculated 55.6 MG/DL CHOL/HDL Ratio 3.7 0 - 5.0     TSH 3RD GENERATION    Collection Time: 01/13/21  6:30 AM   Result Value Ref Range    TSH 0.95 0.36 - 3.74 uIU/mL   GLUCOSE, POC    Collection Time: 01/13/21  6:38 AM   Result Value Ref Range    Glucose (POC) 256 (H) 70 - 110 mg/dL   GLUCOSE, POC    Collection Time: 01/13/21 11:34 AM   Result Value Ref Range    Glucose (POC) 285 (H) 70 - 110 mg/dL     Above test results noted. Elevated A1c associated to the lack off diabetes control    Mental Status Examination     Appearance/Hygiene 50 y.o. BLACK OR  male  Hygiene: Poor   Behavior/Social Relatedness  lives alone, irritable   Musculoskeletal Gait/Station: appropriate  Tone (flaccid, cogwheeling, spastic): not assessed  Psychomotor (hyperkinetic, hypokinetic): Irritable  Involuntary movements (tics, dyskinesias, akathisa, stereotypies): none   Speech   Rate, rhythm, volume, fluency and articulation are appropriate   Mood   depressed   Affect    labile at times   Thought Process Linear and goal directed   Thought Content and Perceptual Disturbances  still having suicidal thoughts however able to contract for safety. Denies any hallucinatory process. Sensorium and Cognition  Grossly intact   Insight  Limited   Judgment  Limited        Assessment/Plan      Psychiatric Diagnoses:   Patient Active Problem List   Diagnosis Code    Major depression with psychotic features ([address] F32.3    MDD (major depressive disorder), recurrent severe, without psychosis ([address] F33.2       Medical Diagnoses: Same    Psychosocial and contextual factors: Same    Level of impairment/disability: Severe    1. Treatment with duloxetine will be started. 2.  Reviewed instructions, risks, benefits and side effects of medications  3. Disposition/Discharge Date: self-care/home, to be determined.     Lee Ann Davenport MD, [address] [place] [address]  Psychiatry

## 2021-01-13 NOTE — BSMART NOTE
Social Work Group 1/12/21 Coping Skills Group Attendance  attended Number of participants 5 Time in 3:15 Time out 3:45 Total Time 30 Interaction  participated appears depressed Interventions/techniques Informed and encouraged, Provided positive feedback and support

## 2021-01-14 LAB
GLUCOSE BLD STRIP.AUTO-MCNC: 226 MG/DL (ref 70–110)
GLUCOSE BLD STRIP.AUTO-MCNC: 275 MG/DL (ref 70–110)
GLUCOSE BLD STRIP.AUTO-MCNC: 343 MG/DL (ref 70–110)
GLUCOSE BLD STRIP.AUTO-MCNC: 353 MG/DL (ref 70–110)

## 2021-01-14 PROCEDURE — 74011250637 HC RX REV CODE- 250/637: Performed by: PSYCHIATRY & NEUROLOGY

## 2021-01-14 PROCEDURE — 82962 GLUCOSE BLOOD TEST: CPT

## 2021-01-14 PROCEDURE — 65220000005 HC RM SEMIPRIVATE PSYCH 3 OR 4 BED

## 2021-01-14 PROCEDURE — 74011636637 HC RX REV CODE- 636/637: Performed by: PSYCHIATRY & NEUROLOGY

## 2021-01-14 PROCEDURE — 99232 SBSQ HOSP IP/OBS MODERATE 35: CPT | Performed by: PSYCHIATRY & NEUROLOGY

## 2021-01-14 RX ORDER — INSULIN GLARGINE 100 [IU]/ML
5 INJECTION, SOLUTION SUBCUTANEOUS ONCE
Status: COMPLETED | OUTPATIENT
Start: 2021-01-14 | End: 2021-01-14

## 2021-01-14 RX ORDER — PROMETHAZINE HYDROCHLORIDE 25 MG/1
25 TABLET ORAL ONCE
Status: COMPLETED | OUTPATIENT
Start: 2021-01-14 | End: 2021-01-14

## 2021-01-14 RX ORDER — INSULIN LISPRO 100 [IU]/ML
3 INJECTION, SOLUTION INTRAVENOUS; SUBCUTANEOUS
Status: DISCONTINUED | OUTPATIENT
Start: 2021-01-14 | End: 2021-01-15

## 2021-01-14 RX ORDER — LOPERAMIDE HYDROCHLORIDE 2 MG/1
4 CAPSULE ORAL ONCE
Status: COMPLETED | OUTPATIENT
Start: 2021-01-14 | End: 2021-01-14

## 2021-01-14 RX ORDER — INSULIN GLARGINE 100 [IU]/ML
45 INJECTION, SOLUTION SUBCUTANEOUS
Status: DISCONTINUED | OUTPATIENT
Start: 2021-01-14 | End: 2021-01-18

## 2021-01-14 RX ADMIN — INSULIN LISPRO 3 UNITS: 100 INJECTION, SOLUTION INTRAVENOUS; SUBCUTANEOUS at 11:30

## 2021-01-14 RX ADMIN — INSULIN GLARGINE 45 UNITS: 100 INJECTION, SOLUTION SUBCUTANEOUS at 20:56

## 2021-01-14 RX ADMIN — QUETIAPINE FUMARATE 100 MG: 100 TABLET, FILM COATED ORAL at 20:29

## 2021-01-14 RX ADMIN — INSULIN LISPRO 9 UNITS: 100 INJECTION, SOLUTION INTRAVENOUS; SUBCUTANEOUS at 07:30

## 2021-01-14 RX ADMIN — GABAPENTIN 300 MG: 300 CAPSULE ORAL at 08:25

## 2021-01-14 RX ADMIN — INSULIN LISPRO 15 UNITS: 100 INJECTION, SOLUTION INTRAVENOUS; SUBCUTANEOUS at 20:58

## 2021-01-14 RX ADMIN — PROMETHAZINE HYDROCHLORIDE 25 MG: 25 TABLET ORAL at 20:28

## 2021-01-14 RX ADMIN — DULOXETINE HYDROCHLORIDE 30 MG: 30 CAPSULE, DELAYED RELEASE ORAL at 08:25

## 2021-01-14 RX ADMIN — PRAZOSIN HYDROCHLORIDE 1 MG: 1 CAPSULE ORAL at 20:29

## 2021-01-14 RX ADMIN — ROSUVASTATIN CALCIUM 40 MG: 20 TABLET, COATED ORAL at 20:28

## 2021-01-14 RX ADMIN — LOPERAMIDE HYDROCHLORIDE 4 MG: 2 CAPSULE ORAL at 20:28

## 2021-01-14 RX ADMIN — INSULIN LISPRO 12 UNITS: 100 INJECTION, SOLUTION INTRAVENOUS; SUBCUTANEOUS at 11:30

## 2021-01-14 RX ADMIN — INSULIN LISPRO 3 UNITS: 100 INJECTION, SOLUTION INTRAVENOUS; SUBCUTANEOUS at 16:30

## 2021-01-14 RX ADMIN — INDAPAMIDE 2.5 MG: 2.5 TABLET ORAL at 08:25

## 2021-01-14 RX ADMIN — INSULIN GLARGINE 5 UNITS: 100 INJECTION, SOLUTION SUBCUTANEOUS at 12:00

## 2021-01-14 RX ADMIN — Medication: at 08:26

## 2021-01-14 RX ADMIN — SPIRONOLACTONE 25 MG: 25 TABLET ORAL at 08:25

## 2021-01-14 RX ADMIN — GABAPENTIN 300 MG: 300 CAPSULE ORAL at 20:29

## 2021-01-14 RX ADMIN — INSULIN LISPRO 6 UNITS: 100 INJECTION, SOLUTION INTRAVENOUS; SUBCUTANEOUS at 16:30

## 2021-01-14 NOTE — ROUTINE PROCESS
Dr. Ray Clinton called and notified about pt asking to resume his antibiotic (flagyl  500 mg TID) that he was taking prior to admission. Dean Carlson will not resume this medication until he discusses with patient tomorrow.

## 2021-01-14 NOTE — BH NOTES
FAUZIA Note: The above pt was re-directed for not having his mask on and the above pt verbalized to staff \"you are very ignorant you know that\"-\"I have a reason to be to be here you are just ignorant\" staff re-directed staff and educated him on the importance of wearing mask and washing hands.

## 2021-01-14 NOTE — BSMART NOTE
Pt. is a 81-year-old homeless male with history of Major depressive disorder, severe, recurrent, without psychotic symptoms.  Post-traumatic stress disorder.  Chronic alcohol use disorder, severe.  Cocaine use disorder, severe.  Opioid use disorder, severe. Pt.'s case was discussed this a.m . Pt. continues to have a flat and depressed affect. SW  assisting pt with exploring SA residential programs. SW Contact: SW met with pt to discuss dc planning. \" I am waiting for medication to kick in \". \" I still feel depressed\". \" I am trying to figure out to get a handle on this addiction thing\". SW discussed SA residential options to include [address] and information on [facility]. Pt.. is still indecisive if he would like to pursue  residential rehab. Pt. Appears depressed and has fair insight. Pt. contracts for safety and denies ideations and hallucinations.   SW will continue to assist pt. with dc planning.  
  
Gigi Max MA, LMHP-R

## 2021-01-14 NOTE — BSMART NOTE
SOCIAL WORK GROUP THERAPY PROGRESS NOTE Group Time:  1pm 
 
Group Topic:  Coping Skills Group Participation:  
 
Pt expressed not interested in attending session despite staff support

## 2021-01-14 NOTE — DIABETES MGMT
Glycemic Control Plan of Care  T2DM with current A1c level of 10.6% (11/09/2020)  See separate notes, 01/12/2021, for assessment diabetes management prior to admission and education. Patient reported that he has been homeless for the past 2 weeks therefore not able to take his insulin. Home diabetes medications: Patient reported on 01/11:  Novolin 70/30 mix insulin 20 units two times daily before meals: breakfast and dinner    Patient was admitted on 01/11/2011 with report of suicidal ideation for the past week. 01/14: Seen patient this morning and discussed need to cont titration of his lantus insulin dose since blood glucose readings are still elevated. He verbalized understanding. Patient reported eating meals with very good appetite, he is always hungry, and requested double portions of food but not his carbs. Glycemic recommendation(s):   1.) Discussed recommendation to increase basal lantus insulin dose with Dr. Ruchi Ramsey since blood glucose readings still elevated. Order obtained. Increased lantus insulin dose from 40 units daily at bedtime to 45 units starting tonight. Give patient a one time dose of lantus insulin 5 units now.  2.) add bolus mealtime lispro insulin TID AC in addition to sliding scale insulin, entered order. 3.) modify diabetic diet for double portions of vegetables, entered order. 4.) if not able to continue on insulin at discharge because he is homeless, consider Janumet  mg two times daily with meals and follow-up with his PCP for dose adjustment.     Glycemic assessment:    POC BG 01/13: 256, 285, 278, 304  POC BG 01/14 at time of review: 278        Current A1C: 10.6% (11/09/2020) which is equivalent to estimated average blood glucose of 258 mg/dL during the past 2-3 months    Current hospital diabetes medications:  Increased basal lantus insulin dose to 45 units daily at bedtime, 01/14/2021  One time dose of lantus insulin 5 units, NOW, 01/14/2021  Correctional lispro insulin ACHS. Very resistant dose.   Bolus mealtime lispro insulin 3 units TID AC, first dose 1/14/2021    Total daily dose insulin requirement previous day: 01/13/21:  Lantus: 45 units  Lispro: 39 units  TDD insulin: 84 units    Diet: Diabetic consistent carb regular; double portion vegetables; bedtime diabetic snack    Goals:  Blood glucose will be within target range of  mg/dL by 01/17/2021    Education:    __X_  Refer to Diabetes Education Record: 01/12/2021  ___  Education not indicated at this time    Della De Jesus RN [facility]  Pager: [phone]

## 2021-01-14 NOTE — PROGRESS NOTES
[address]  Inpatient Progress Note     Date of Service: 01/14/21  Hospital Day: 3     Subjective/Interval History   01/14/21    Treatment Team Notes:  Notes reviewed and/or discussed and report that Charlene Bocanegra is a patient with a history of a recurrent major depressive disorder and poly drug use disorder remaining rather withdrawn and helpless. Patient interview: Charlene Bocanegra was interviewed by this writer today. During today's session the patient was confronted with the statements he had made about not seeing the undersigned the day before. So today I reminded him how he had specifically said to me regarding his depression, and the way he was feeling physically. Shortly thereafter he was able \"to remember seeing me. \"  Rather despondent, the patient was suggested to begin to attend groups, the same as to spend longer periods of time away from his room. Obviously it is too soon for us to see any changes with the severity of his depression. Objective     Visit Vitals  BP (!) 188/60   Pulse 100   Temp 97.9 °F (36.6 °C)   Resp 18   SpO2 100%     Elevated systolic blood pressure noted above. Recent Results (from the past 24 hour(s))   GLUCOSE, POC    Collection Time: 01/13/21  4:26 PM   Result Value Ref Range    Glucose (POC) 278 (H) 70 - 110 mg/dL   GLUCOSE, POC    Collection Time: 01/13/21  8:17 PM   Result Value Ref Range    Glucose (POC) 304 (H) 70 - 110 mg/dL   GLUCOSE, POC    Collection Time: 01/14/21  6:23 AM   Result Value Ref Range    Glucose (POC) 275 (H) 70 - 110 mg/dL   GLUCOSE, POC    Collection Time: 01/14/21 11:07 AM   Result Value Ref Range    Glucose (POC) 343 (H) 70 - 110 mg/dL       Mental Status Examination     Appearance/Hygiene 50 y.o.  BLACK OR  male  Hygiene limited   Behavior/Social Relatedness  withdrawn   Musculoskeletal Gait/Station: appropriate  Tone (flaccid, cogwheeling, spastic): not assessed  Psychomotor (hyperkinetic, hypokinetic): Irritable  Involuntary movements (tics, dyskinesias, akathisa, stereotypies): none   Speech   Rate, rhythm, volume, fluency and articulation are appropriate   Mood   depressed   Affect    irritable   Thought Process Linear and goal directed   Thought Content and Perceptual Disturbances  suicidal ideations are still present. However he continues to indicate that he would be able to talk to her staff if unable to control thoughts of self-harm. No evidence of hallucinations, ideas of reference or influence, or delusional thoughts. Sensorium and Cognition  Grossly intact   Insight  Limited   Judgment  poor by history        Assessment/Plan      Psychiatric Diagnoses:   Patient Active Problem List   Diagnosis Code    Major depression with psychotic features ([facility] F32.3    MDD (major depressive disorder), recurrent severe, without psychosis ([facility] F33.2       Medical Diagnoses: Same    Psychosocial and contextual factors: Same    Level of impairment/disability: Severe      1. We will continue current combination of medications the same. Will discuss with his assigned inpatient  regarding the referral to an outpatient support system such as impact or others. 2.  Reviewed instructions, risks, benefits and side effects of medications  3. Disposition/Discharge Date: self-care/home, to be determined.     Rhys Klinefelter, MD, [facility]  Psychiatry

## 2021-01-14 NOTE — BH NOTES
FAUZIA Note: PT was monitored throughout the shift, PT was compliant with med pass and vitals. PT became disrespectful towards [facility]. PT called [facility] rude because PT was asked to put mask on. PT went into room after lunch and slept the remainder of the shift. PT will  continue to  Be monitored the remainder of the shift.

## 2021-01-15 LAB
GLUCOSE BLD STRIP.AUTO-MCNC: 163 MG/DL (ref 70–110)
GLUCOSE BLD STRIP.AUTO-MCNC: 183 MG/DL (ref 70–110)
GLUCOSE BLD STRIP.AUTO-MCNC: 297 MG/DL (ref 70–110)
GLUCOSE BLD STRIP.AUTO-MCNC: 487 MG/DL (ref 70–110)
GLUCOSE BLD STRIP.AUTO-MCNC: 490 MG/DL (ref 70–110)

## 2021-01-15 PROCEDURE — 74011250637 HC RX REV CODE- 250/637: Performed by: PSYCHIATRY & NEUROLOGY

## 2021-01-15 PROCEDURE — 65220000005 HC RM SEMIPRIVATE PSYCH 3 OR 4 BED

## 2021-01-15 PROCEDURE — 99232 SBSQ HOSP IP/OBS MODERATE 35: CPT | Performed by: PSYCHIATRY & NEUROLOGY

## 2021-01-15 PROCEDURE — 82962 GLUCOSE BLOOD TEST: CPT

## 2021-01-15 PROCEDURE — 74011636637 HC RX REV CODE- 636/637: Performed by: PSYCHIATRY & NEUROLOGY

## 2021-01-15 RX ORDER — DULOXETIN HYDROCHLORIDE 60 MG/1
60 CAPSULE, DELAYED RELEASE ORAL DAILY
Status: DISCONTINUED | OUTPATIENT
Start: 2021-01-16 | End: 2021-01-21 | Stop reason: HOSPADM

## 2021-01-15 RX ORDER — INSULIN LISPRO 100 [IU]/ML
6 INJECTION, SOLUTION INTRAVENOUS; SUBCUTANEOUS
Status: DISCONTINUED | OUTPATIENT
Start: 2021-01-15 | End: 2021-01-18

## 2021-01-15 RX ADMIN — GABAPENTIN 300 MG: 300 CAPSULE ORAL at 20:14

## 2021-01-15 RX ADMIN — INSULIN LISPRO 6 UNITS: 100 INJECTION, SOLUTION INTRAVENOUS; SUBCUTANEOUS at 16:56

## 2021-01-15 RX ADMIN — INSULIN LISPRO 3 UNITS: 100 INJECTION, SOLUTION INTRAVENOUS; SUBCUTANEOUS at 11:30

## 2021-01-15 RX ADMIN — INSULIN LISPRO 3 UNITS: 100 INJECTION, SOLUTION INTRAVENOUS; SUBCUTANEOUS at 20:17

## 2021-01-15 RX ADMIN — INSULIN LISPRO 3 UNITS: 100 INJECTION, SOLUTION INTRAVENOUS; SUBCUTANEOUS at 16:30

## 2021-01-15 RX ADMIN — INSULIN LISPRO 9 UNITS: 100 INJECTION, SOLUTION INTRAVENOUS; SUBCUTANEOUS at 08:12

## 2021-01-15 RX ADMIN — DULOXETINE HYDROCHLORIDE 30 MG: 30 CAPSULE, DELAYED RELEASE ORAL at 08:12

## 2021-01-15 RX ADMIN — INSULIN LISPRO 15 UNITS: 100 INJECTION, SOLUTION INTRAVENOUS; SUBCUTANEOUS at 11:48

## 2021-01-15 RX ADMIN — Medication: at 21:00

## 2021-01-15 RX ADMIN — INSULIN LISPRO 3 UNITS: 100 INJECTION, SOLUTION INTRAVENOUS; SUBCUTANEOUS at 08:14

## 2021-01-15 RX ADMIN — QUETIAPINE FUMARATE 100 MG: 100 TABLET, FILM COATED ORAL at 20:14

## 2021-01-15 RX ADMIN — INSULIN GLARGINE 45 UNITS: 100 INJECTION, SOLUTION SUBCUTANEOUS at 20:17

## 2021-01-15 RX ADMIN — Medication: at 08:00

## 2021-01-15 RX ADMIN — ROSUVASTATIN CALCIUM 40 MG: 20 TABLET, COATED ORAL at 20:14

## 2021-01-15 RX ADMIN — GABAPENTIN 300 MG: 300 CAPSULE ORAL at 08:12

## 2021-01-15 NOTE — BSMART NOTE
OCCUPATIONAL THERAPY PROGRESS NOTE Group time:4122 The patient did not awaken/get up when called for group.

## 2021-01-15 NOTE — BSMART NOTE
SOCIAL WORK GROUP THERAPY PROGRESS NOTE Group Time:  10:30am 
 
Group Topic:  Coping Skills Group Participation: Pt was unavailable for group due to still wanting to rest more despite staff reminders & encouragement.

## 2021-01-15 NOTE — PROGRESS NOTES
[address]  Inpatient Progress Note     Date of Service: 01/15/21  Hospital Day: 4     Subjective/Interval History   01/15/21    Treatment Team Notes:  Notes reviewed and/or discussed and report that Mai Valencia is a patient with a history of depression and polysubstance use disorder. The patient's group therapy participation has been very limited, with his being a strongly suggested to improve in that respect. Patient interview: Mai Valencia was interviewed by this writer today. Still depressed, withdrawn, tolerating current prescription for duloxetine which will be increased tomorrow morning. The patient's withdrawn behavior appears to be multifactorial.  He described the tiredness associated to cocaine withdrawal.  However he could be associated also with the severity of his depression, this considered to be high. Objective     Visit Vitals  BP (!) 88/60 (BP 1 Location: Right arm, BP Patient Position: Sitting)   Pulse 90   Temp 98.5 °F (36.9 °C)   Resp 18   SpO2 100%     Mild hypotension noted above. Recent Results (from the past 24 hour(s))   GLUCOSE, POC    Collection Time: 01/14/21  7:51 PM   Result Value Ref Range    Glucose (POC) 353 (H) 70 - 110 mg/dL   GLUCOSE, POC    Collection Time: 01/15/21  6:34 AM   Result Value Ref Range    Glucose (POC) 297 (H) 70 - 110 mg/dL   GLUCOSE, POC    Collection Time: 01/15/21 11:22 AM   Result Value Ref Range    Glucose (POC) 487 (HH) 70 - 110 mg/dL   GLUCOSE, POC    Collection Time: 01/15/21 11:23 AM   Result Value Ref Range    Glucose (POC) 490 (HH) 70 - 110 mg/dL   GLUCOSE, POC    Collection Time: 01/15/21  4:30 PM   Result Value Ref Range    Glucose (POC) 183 (H) 70 - 110 mg/dL     Elevated Accu-Chek results noted. Discussed with the diabetic treatment team.  Lack of treatment compliance with his diet appears to be the main problem. Lack of activity is also an influence.     Mental Status Examination     Appearance/Hygiene 50 y.o. BLACK OR  male  Hygiene: Limited   Behavior/Social Relatedness  withdrawn   Musculoskeletal Gait/Station: appropriate  Tone (flaccid, cogwheeling, spastic): not assessed  Psychomotor (hyperkinetic, hypokinetic): calm   Involuntary movements (tics, dyskinesias, akathisa, stereotypies): none   Speech   Rate, rhythm, volume, fluency and articulation are appropriate   Mood   depressed   Affect    irritable   Thought Process Linear and goal directed   Thought Content and Perceptual Disturbances  suicidal thoughts are less intense. No evidence of potential for harming others. No evidence of psychotic symptoms present. Sensorium and Cognition  Grossly intact   Insight  Limited   Judgment  Limited        Assessment/Plan      Psychiatric Diagnoses:   Patient Active Problem List   Diagnosis Code    Major depression with psychotic features ([facility] F32.3    MDD (major depressive disorder), recurrent severe, without psychosis ([facility] F33.2       Medical Diagnoses: Same    Psychosocial and contextual factors: Same    Level of impairment/disability: Severe    1. Duloxetine dose will be increased. Please see medications orders. We will continue to support group therapy activity. 2.  Reviewed instructions, risks, benefits and side effects of medications  3.   Disposition/Discharge Date: self-care/the patient is considering referral to a 28 days inpatient rehab program.    Madison Campo MD, [address] [place] [address]

## 2021-01-15 NOTE — PROGRESS NOTES
Problem: Suicide  Goal: *STG: Remains safe in hospital  Description: Remains safe daily while in hospital.  Outcome: Progressing Towards Goal  Note: Remains safe. Goal: *STG: Attends activities and groups  Description: Attends activities and groups daily while in hospital.  Outcome: Progressing Towards Goal  Note: Active. Patient alert and oriented. Patient tolerated evening meds. Patient observed dry heaving, reported nausea, and diarrhea. MD notified and patient's condition presented to MD on call. New orders provided. Patient made aware of situation and agreed with plan. Provided with crackers for stomach upset.   Will continue to monitor and support as needed

## 2021-01-15 NOTE — DIABETES MGMT
Glycemic Control Plan of Care  T2DM with current A1c level of 10.6% (11/09/2020)  See separate notes, 01/12/2021, for assessment diabetes management prior to admission and education. Patient reported that he has been homeless for the past 2 weeks therefore not able to take his insulin. Home diabetes medications: Patient reported on 01/11:  Novolin 70/30 mix insulin 20 units two times daily before meals: breakfast and dinner    Patient was admitted on 01/11/2011 with report of suicidal ideation for the past week. 01/15: Seen patient this morning to discuss persistent elevated blood sugar readings and I saw empty packets of helene crackers on the floor next to his bed. Patient stated that he has been eating all the time because he is always hungry therefore his accuchek blood sugar readings were not accurate. Patient understand about the importance of nutrition compliance but he just can't commit to it. Explained to patient that increasing insulin dose will not help if he doesn't comply with his diet. Discussed above with nursing staff because it is necessary to help remind the patient to only eat meals and bedtime snack in order to get an accurate fasting blood glucose reading and prevent hyperglycemia. Staff reported patient does not listen and helps himself by getting snack in the unit because it is available. Regarding insulin at discharge: patient is hoping to have a place to live, if not, he will go back to being homeless again. He would like to continue to take the 70/30 mix insulin. Patient verbalized understanding that insulin must be kept in room temperature - not exposed to heat and sun. I discussed oral diabetes meds and he will think about it. Glycemic recommendation(s):   1.) Discussed above with Dr. Kiet Rosa and recommendation to increase bolus mealtime dose of lispro which is in addition to sliding scale coverage. Order obtained.   2.) if not able to continue on insulin at discharge because he is homeless, consider Janumet  mg two times daily with meals and follow-up with his PCP for dose adjustment. Glycemic assessment:    POC BG 01/14: 275, 343, 226, 353   POC BG 01/15 at time of review: 297 (before breakfast), 487 and repeated with result of 490 (before lunch) - addressed with patient and he reported BG readings not fasting. See notes above. Current A1C: 10.6% (11/09/2020) which is equivalent to estimated average blood glucose of 258 mg/dL during the past 2-3 months    Current hospital diabetes medications:  Basal lantus insulin 45 units daily at bedtime. Correctional lispro insulin ACHS. Very resistant dose.   Increased bolus mealtime lispro insulin to 6 units TID AC on 1/15/2021    Total daily dose insulin requirement previous day: 01/14/21:  Lantus: 50 units  Lispro: 48 units  TDD insulin: 98 units    Diet: Diabetic consistent carb regular; double portion vegetables; bedtime diabetic snack    Goals:  Blood glucose will be within target range of  mg/dL by 01/18/2021    Education:    __X_  Refer to Diabetes Education Record: 01/12/2021  ___  Education not indicated at this time    Tabby Ngo RN [facility]  Pager: [phone]

## 2021-01-15 NOTE — BH NOTES
Patient reported having \"a stomach infection. I was taking antibiotics for it. But now it's getting worse. I haven't had any since I been here. \"  Patient with report of diarrhea, stomach pains, and nausea. Patient observed guarding stomach and dry heaving. MD on call made aware of patient's condition, new orders provided.

## 2021-01-15 NOTE — BSMART NOTE
Pt. is a 55-year-old homeless male with history of Major depressive disorder, severe, recurrent, without psychotic symptoms.  Post-traumatic stress disorder.  Chronic alcohol use disorder, severe.  Cocaine use disorder, severe.  Opioid use disorder, severe. 
  
Pt.'s case was discussed this a.m .Pt. has expressed feeling physically ill. Pt expressed to having a stomach bug.  SW informed the team pt can return to transitional housing.  
 
SW Contact:  SW met with pt.   Pt informed SW , she is feeling under the weather. \" I think I have a stomach virus\".  \" I have diarrhea and feel sweaty\".  Pt. 's medical being addressed by the nursing staff, SW provided positive encouragement.  Pt. Appears depressed and has fair insight.  Pt. contracts for safety and denies ideations and hallucinations.  SW will continue to assist pt. with dc planning.  
  
Jackie Pizarro MA, LMHP-R

## 2021-01-16 LAB
GLUCOSE BLD STRIP.AUTO-MCNC: 139 MG/DL (ref 70–110)
GLUCOSE BLD STRIP.AUTO-MCNC: 245 MG/DL (ref 70–110)
GLUCOSE BLD STRIP.AUTO-MCNC: 304 MG/DL (ref 70–110)
GLUCOSE BLD STRIP.AUTO-MCNC: 324 MG/DL (ref 70–110)

## 2021-01-16 PROCEDURE — 74011636637 HC RX REV CODE- 636/637: Performed by: PSYCHIATRY & NEUROLOGY

## 2021-01-16 PROCEDURE — 74011250637 HC RX REV CODE- 250/637: Performed by: PSYCHIATRY & NEUROLOGY

## 2021-01-16 PROCEDURE — 82962 GLUCOSE BLOOD TEST: CPT

## 2021-01-16 PROCEDURE — 65220000005 HC RM SEMIPRIVATE PSYCH 3 OR 4 BED

## 2021-01-16 PROCEDURE — 99231 SBSQ HOSP IP/OBS SF/LOW 25: CPT | Performed by: PSYCHIATRY & NEUROLOGY

## 2021-01-16 RX ORDER — METRONIDAZOLE 500 MG/1
500 TABLET ORAL 3 TIMES DAILY
Status: DISCONTINUED | OUTPATIENT
Start: 2021-01-16 | End: 2021-01-21 | Stop reason: HOSPADM

## 2021-01-16 RX ORDER — LOPERAMIDE HYDROCHLORIDE 2 MG/1
2 CAPSULE ORAL
Status: DISCONTINUED | OUTPATIENT
Start: 2021-01-16 | End: 2021-01-21 | Stop reason: HOSPADM

## 2021-01-16 RX ADMIN — LOPERAMIDE HYDROCHLORIDE 2 MG: 2 CAPSULE ORAL at 20:14

## 2021-01-16 RX ADMIN — DULOXETINE HYDROCHLORIDE 60 MG: 60 CAPSULE, DELAYED RELEASE PELLETS ORAL at 08:37

## 2021-01-16 RX ADMIN — METRONIDAZOLE 500 MG: 500 TABLET ORAL at 20:12

## 2021-01-16 RX ADMIN — INSULIN LISPRO 12 UNITS: 100 INJECTION, SOLUTION INTRAVENOUS; SUBCUTANEOUS at 11:31

## 2021-01-16 RX ADMIN — INSULIN LISPRO 6 UNITS: 100 INJECTION, SOLUTION INTRAVENOUS; SUBCUTANEOUS at 08:34

## 2021-01-16 RX ADMIN — METRONIDAZOLE 500 MG: 500 TABLET ORAL at 12:43

## 2021-01-16 RX ADMIN — QUETIAPINE FUMARATE 100 MG: 100 TABLET, FILM COATED ORAL at 21:00

## 2021-01-16 RX ADMIN — INSULIN LISPRO 6 UNITS: 100 INJECTION, SOLUTION INTRAVENOUS; SUBCUTANEOUS at 08:35

## 2021-01-16 RX ADMIN — INSULIN LISPRO 12 UNITS: 100 INJECTION, SOLUTION INTRAVENOUS; SUBCUTANEOUS at 21:12

## 2021-01-16 RX ADMIN — GABAPENTIN 300 MG: 300 CAPSULE ORAL at 08:37

## 2021-01-16 RX ADMIN — ROSUVASTATIN CALCIUM 40 MG: 20 TABLET, COATED ORAL at 20:12

## 2021-01-16 RX ADMIN — GABAPENTIN 300 MG: 300 CAPSULE ORAL at 20:13

## 2021-01-16 RX ADMIN — INSULIN GLARGINE 45 UNITS: 100 INJECTION, SOLUTION SUBCUTANEOUS at 20:15

## 2021-01-16 RX ADMIN — METRONIDAZOLE 500 MG: 500 TABLET ORAL at 14:00

## 2021-01-16 RX ADMIN — INSULIN LISPRO 6 UNITS: 100 INJECTION, SOLUTION INTRAVENOUS; SUBCUTANEOUS at 11:32

## 2021-01-16 RX ADMIN — INDAPAMIDE 2.5 MG: 2.5 TABLET ORAL at 08:36

## 2021-01-16 NOTE — PROGRESS NOTES
Problem: Suicide  Goal: *STG: Remains safe in hospital  Description: Remains safe daily while in hospital.  Outcome: Progressing Towards Goal  Goal: *STG: Seeks staff when feelings of self harm or harm towards others arise  Description: Seeks staff when feelings of self harm or harm towards others arise daily while in hospital.  Outcome: Progressing Towards Goal  Goal: *STG: Attends activities and groups  Description: Attends activities and groups daily while in hospital.  Outcome: Progressing Towards Goal    Patient has been cooperative throughout shift. Patient states he feels depressed and just not himself and doesn't know if it is because of new medications or not. Patient does not interact with staff but engages with staff appropriately.  Will continue to monitor

## 2021-01-16 NOTE — GROUP NOTE
IP  GROUP DOCUMENTATION INDIVIDUAL Group Therapy Note Date: 1/16/2021 Group Start Time: 0800 Group End Time: 0830 Group Topic: Medication [facility] 1 ADULT CHEM DEP Estelita Rodriguez RN 
 
[facility] GROUP DOCUMENTATION GROUP Group Therapy Note Attendees: 10 Attendance: Attended Interventions/techniques: Informed and Validated Follows Directions: Followed directions Interactions: Interacted appropriately Mental Status: Calm Behavior/appearance: Cooperative Goals Achieved: Identified medication adherence strategies JUANCARLOS Carrillo RN

## 2021-01-16 NOTE — GROUP NOTE
[facility] GROUP DOCUMENTATION INDIVIDUAL Group Therapy Note Date: 1/15/2021 Group Start Time: 2000 Group End Time: 2030 Group Topic: Nursing [facility] 1 ADULT CHEM DEP Cyrus Negro RN 
 
[facility] GROUP DOCUMENTATION GROUP Group Therapy Note Attendees: 9 Attendance: Attended Patient's Goal:  Understanding medication and reflection Interventions/techniques: Informed, Validated, Provide feedback and Reinforced Follows Directions: Followed directions Interactions: Interacted appropriately Mental Status: Calm and Flat Behavior/appearance: Cooperative Goals Achieved: Able to reflect/comment on own behavior and Able to receive feedback Lilly Vegas RN

## 2021-01-16 NOTE — GROUP NOTE
ELGIN MALDONADO GROUP DOCUMENTATION INDIVIDUAL Group Therapy Note Date: 1/15/2021 Group Start Time: 5630 Group End Time: 1526 Group Topic: Nursing [facility] 1 ADULT CHEM DEP Cathryne Curling IP  GROUP DOCUMENTATION GROUP Group Therapy Note Attendees: 4 Attendance: Did not attend Martha Staff

## 2021-01-16 NOTE — PROGRESS NOTES
[address]  Inpatient Progress Note     Date of Service: 01/16/21  Hospital Day: 5     Subjective/Interval History   01/16/21    Treatment Team Notes:  Notes reviewed and/or discussed and report that Erica Montoya is a patient with a history of depression and polysubstance dependence. Patient interview: Erica Montoya was interviewed by this writer today. He described the presence of diarrhea and he is being prescribed by his primary care physician with Flagyl 500 mg 3 times a day. This will be restarted. In addition he described the presence of a painful but not infected cyst on the upper area of his left scapula. There is no drainage and again there is no evidence of infection. An outpatient referral will be necessary. Objective     Visit Vitals  /75   Pulse 100   Temp 97.7 °F (36.5 °C)   Resp 18   SpO2 100%     Vitals are stable    Recent Results (from the past 24 hour(s))   GLUCOSE, POC    Collection Time: 01/15/21  4:30 PM   Result Value Ref Range    Glucose (POC) 183 (H) 70 - 110 mg/dL   GLUCOSE, POC    Collection Time: 01/15/21  8:13 PM   Result Value Ref Range    Glucose (POC) 163 (H) 70 - 110 mg/dL   GLUCOSE, POC    Collection Time: 01/16/21  7:49 AM   Result Value Ref Range    Glucose (POC) 245 (H) 70 - 110 mg/dL   GLUCOSE, POC    Collection Time: 01/16/21 11:22 AM   Result Value Ref Range    Glucose (POC) 304 (H) 70 - 110 mg/dL       Mental Status Examination     Appearance/Hygiene 50 y.o.  BLACK OR  male  Hygiene: Limited   Behavior/Social Relatedness Appropriate, less withdrawn, less irritable   Musculoskeletal Gait/Station: appropriate  Tone (flaccid, cogwheeling, spastic): not assessed  Psychomotor (hyperkinetic, hypokinetic): calm   Involuntary movements (tics, dyskinesias, akathisa, stereotypies): none   Speech   Rate, rhythm, volume, fluency and articulation are appropriate   Mood   depressed   Affect    mild irritability noted   Thought Process Linear and goal directed   Thought Content and Perceptual Disturbances Denies self-injurious behavior (SIB), suicidal ideation (SI), aggressive behavior or homicidal ideation (HI)    Denies auditory and visual hallucinations   Sensorium and Cognition  Grossly intact   Insight  improving   Judgment  improving        Assessment/Plan      Psychiatric Diagnoses:   Patient Active Problem List   Diagnosis Code    Major depression with psychotic features ([facility] [address] F32.3    MDD (major depressive disorder), recurrent severe, without psychosis ([facility] [address] F33.2       Medical Diagnoses: Same    Psychosocial and contextual factors: Same    Level of impairment/disability: Moderately severe    1. Still considering a referral to an inpatient 28 days rehab. His depression continues to be more stable. 2.  Reviewed instructions, risks, benefits and side effects of medications  3. Disposition/Discharge Date: self-care/possibly to rehab. Date not determined.     Melbourne Mcardle, MD, [address] [place] [address]  Psychiatry

## 2021-01-16 NOTE — BH NOTES
Blood glucose fingerstick at 1122 was 487. Recheck was 490. MD notified, and orders for IP consult to diabetes management were placed. Will continue to monitor for signs and symptoms of hyperglycemia.

## 2021-01-16 NOTE — PROGRESS NOTES
Problem: Suicide  Goal: *STG: Remains safe in hospital  Description: Remains safe daily while in hospital.  Outcome: Progressing Towards Goal  Goal: *STG: Attends activities and groups  Description: Attends activities and groups daily while in hospital.  Outcome: Progressing Towards Goal  Goal: *STG/LTG: Complies with medication therapy  Description: Complies with medication therapy daily while in hospital.  Outcome: Progressing Towards Goal     Pt presents with dull affect, depressed mood, anxious at times. Pt states, \"I feel much better today. My mood has been pretty good. \" Pt has been more sociable on the unit. Pt is adherent with unit guidelines. Pt denies SI/HI at this time. Pt is medication compliant. Will continue to monitor.

## 2021-01-17 LAB
GLUCOSE BLD STRIP.AUTO-MCNC: 221 MG/DL (ref 70–110)
GLUCOSE BLD STRIP.AUTO-MCNC: 233 MG/DL (ref 70–110)
GLUCOSE BLD STRIP.AUTO-MCNC: 336 MG/DL (ref 70–110)
GLUCOSE BLD STRIP.AUTO-MCNC: 346 MG/DL (ref 70–110)

## 2021-01-17 PROCEDURE — 74011250637 HC RX REV CODE- 250/637: Performed by: PSYCHIATRY & NEUROLOGY

## 2021-01-17 PROCEDURE — 99231 SBSQ HOSP IP/OBS SF/LOW 25: CPT | Performed by: PSYCHIATRY & NEUROLOGY

## 2021-01-17 PROCEDURE — 74011636637 HC RX REV CODE- 636/637: Performed by: PSYCHIATRY & NEUROLOGY

## 2021-01-17 PROCEDURE — 82962 GLUCOSE BLOOD TEST: CPT

## 2021-01-17 PROCEDURE — 65220000005 HC RM SEMIPRIVATE PSYCH 3 OR 4 BED

## 2021-01-17 RX ADMIN — INSULIN LISPRO 12 UNITS: 100 INJECTION, SOLUTION INTRAVENOUS; SUBCUTANEOUS at 11:33

## 2021-01-17 RX ADMIN — INSULIN LISPRO 6 UNITS: 100 INJECTION, SOLUTION INTRAVENOUS; SUBCUTANEOUS at 16:11

## 2021-01-17 RX ADMIN — GABAPENTIN 300 MG: 300 CAPSULE ORAL at 20:33

## 2021-01-17 RX ADMIN — DULOXETINE HYDROCHLORIDE 60 MG: 60 CAPSULE, DELAYED RELEASE PELLETS ORAL at 09:00

## 2021-01-17 RX ADMIN — INSULIN LISPRO 6 UNITS: 100 INJECTION, SOLUTION INTRAVENOUS; SUBCUTANEOUS at 09:18

## 2021-01-17 RX ADMIN — METRONIDAZOLE 500 MG: 500 TABLET ORAL at 20:33

## 2021-01-17 RX ADMIN — INSULIN LISPRO 12 UNITS: 100 INJECTION, SOLUTION INTRAVENOUS; SUBCUTANEOUS at 21:04

## 2021-01-17 RX ADMIN — GABAPENTIN 300 MG: 300 CAPSULE ORAL at 09:17

## 2021-01-17 RX ADMIN — INSULIN GLARGINE 45 UNITS: 100 INJECTION, SOLUTION SUBCUTANEOUS at 20:30

## 2021-01-17 RX ADMIN — INSULIN LISPRO 6 UNITS: 100 INJECTION, SOLUTION INTRAVENOUS; SUBCUTANEOUS at 09:20

## 2021-01-17 RX ADMIN — INSULIN LISPRO 6 UNITS: 100 INJECTION, SOLUTION INTRAVENOUS; SUBCUTANEOUS at 11:32

## 2021-01-17 RX ADMIN — TRAZODONE HYDROCHLORIDE 50 MG: 50 TABLET ORAL at 20:34

## 2021-01-17 RX ADMIN — INDAPAMIDE 2.5 MG: 2.5 TABLET ORAL at 09:17

## 2021-01-17 RX ADMIN — QUETIAPINE FUMARATE 100 MG: 100 TABLET, FILM COATED ORAL at 20:33

## 2021-01-17 RX ADMIN — ROSUVASTATIN CALCIUM 40 MG: 20 TABLET, COATED ORAL at 20:33

## 2021-01-17 RX ADMIN — METRONIDAZOLE 500 MG: 500 TABLET ORAL at 13:45

## 2021-01-17 RX ADMIN — METRONIDAZOLE 500 MG: 500 TABLET ORAL at 09:17

## 2021-01-17 NOTE — PROGRESS NOTES
[address]  Inpatient Progress Note     Date of Service: 01/17/21  Hospital Day: 6     Subjective/Interval History   01/17/21    Treatment Team Notes:  Notes reviewed and/or discussed and report that Carlos Castelan is a patient with a history of a mood disorder and polysubstance use disorder. Patient interview: Carlos Castelan was interviewed by this writer today. Group participation has begun to improve. He remains somewhat irritable, however. Still depressed. Objective     Visit Vitals  BP 98/80 (BP 1 Location: Right arm, BP Patient Position: At rest)   Pulse 96   Temp 97.6 °F (36.4 °C)   Resp 18   SpO2 100%     Vitals are stable    Recent Results (from the past 24 hour(s))   GLUCOSE, POC    Collection Time: 01/16/21  4:20 PM   Result Value Ref Range    Glucose (POC) 139 (H) 70 - 110 mg/dL   GLUCOSE, POC    Collection Time: 01/16/21  7:42 PM   Result Value Ref Range    Glucose (POC) 324 (H) 70 - 110 mg/dL   GLUCOSE, POC    Collection Time: 01/17/21  6:37 AM   Result Value Ref Range    Glucose (POC) 221 (H) 70 - 110 mg/dL   GLUCOSE, POC    Collection Time: 01/17/21 11:27 AM   Result Value Ref Range    Glucose (POC) 346 (H) 70 - 110 mg/dL     Above results noted     Mental Status Examination     Appearance/Hygiene 50 y.o. BLACK OR  male  Hygiene: Fair   Behavior/Social Relatedness Appropriate, less irritable   Musculoskeletal Gait/Station: appropriate  Tone (flaccid, cogwheeling, spastic): not assessed  Psychomotor (hyperkinetic, hypokinetic): calmer  Involuntary movements (tics, dyskinesias, akathisa, stereotypies): none   Speech   Rate, rhythm, volume, fluency and articulation are appropriate   Mood   depressed   Affect    labile at times   Thought Process Linear and goal directed   Thought Content and Perceptual Disturbances  no evidence of psychotic symptoms described.   Still hopeless at times, somewhat obsessive, continues to have difficulty letting go with what happened prior admission. Sensorium and Cognition  Grossly intact   Insight  improving   Judgment  improving        Assessment/Plan      Psychiatric Diagnoses:   Patient Active Problem List   Diagnosis Code    Major depression with psychotic features ([facility] [address] F32.3    MDD (major depressive disorder), recurrent severe, without psychosis ([facility] [address] F33.2       Medical Diagnoses: Same    Psychosocial and contextual factors: Same    Level of impairment/disability: Moderately severe      1. Tolerating current dose of duloxetine without difficulties. We will continue the same. 2.  Reviewed instructions, risks, benefits and side effects of medications  3.   Disposition/Discharge Date: self-care/hopefully to a 28 days rehab program.    Padma Andujar MD, [address] [place] [address]  Psychiatry the patient with a history of a mood disorder

## 2021-01-17 NOTE — BH NOTES
Patient spend some time in the milieu this evening watching television. Patient did not interact with peers while on the unit. Patient had snack and retired to bed early. Staff will continue to monitor patient for safety.

## 2021-01-17 NOTE — PROGRESS NOTES
Problem: Suicide  Goal: *STG: Remains safe in hospital  Description: Remains safe daily while in hospital.  Outcome: Progressing Towards Goal  Goal: *STG: Attends activities and groups  Description: Attends activities and groups daily while in hospital.  Outcome: Progressing Towards Goal  Goal: *STG/LTG: Complies with medication therapy  Description: Complies with medication therapy daily while in hospital.  Outcome: Progressing Towards Goal       Pat Mosquera is a 50 y.o. Male that presented today as cooperative and pleasant and offered no complaints. Pat Mosquera has been compliant with medications, groups and meals today. RN's will initiate, develop, implement, review, and revise treatment plans as necessary. Will continue to provide education, redirection, and support as needed or verbalized by patient.    Signed By: Fabián Cabrera     January 17, 2021

## 2021-01-17 NOTE — GROUP NOTE
ELGIN  GROUP DOCUMENTATION INDIVIDUAL Group Therapy Note Date: 1/17/2021 Group Start Time: 0800 Group End Time: 9314 Group Topic: Nursing [facility] 1 ADULT CHEM DEP 
 
 
 
[facility] GROUP DOCUMENTATION GROUP Group Therapy Note Attendees: 8 Attendance: Attended Interventions/techniques: Informed Follows Directions: Followed directions Interactions: Interacted appropriately Mental Status: Calm Behavior/appearance: Cooperative Goals Achieved: Able to listen to others Vin Luong RN

## 2021-01-18 LAB
GLUCOSE BLD STRIP.AUTO-MCNC: 156 MG/DL (ref 70–110)
GLUCOSE BLD STRIP.AUTO-MCNC: 262 MG/DL (ref 70–110)
GLUCOSE BLD STRIP.AUTO-MCNC: 280 MG/DL (ref 70–110)
GLUCOSE BLD STRIP.AUTO-MCNC: 404 MG/DL (ref 70–110)
GLUCOSE BLD STRIP.AUTO-MCNC: 473 MG/DL (ref 70–110)

## 2021-01-18 PROCEDURE — 74011250637 HC RX REV CODE- 250/637: Performed by: PSYCHIATRY & NEUROLOGY

## 2021-01-18 PROCEDURE — 82962 GLUCOSE BLOOD TEST: CPT

## 2021-01-18 PROCEDURE — 74011636637 HC RX REV CODE- 636/637: Performed by: PSYCHIATRY & NEUROLOGY

## 2021-01-18 PROCEDURE — 65220000003 HC RM SEMIPRIVATE PSYCH

## 2021-01-18 PROCEDURE — 99232 SBSQ HOSP IP/OBS MODERATE 35: CPT | Performed by: PSYCHIATRY & NEUROLOGY

## 2021-01-18 RX ORDER — INSULIN LISPRO 100 [IU]/ML
10 INJECTION, SOLUTION INTRAVENOUS; SUBCUTANEOUS
Status: DISCONTINUED | OUTPATIENT
Start: 2021-01-18 | End: 2021-01-21 | Stop reason: HOSPADM

## 2021-01-18 RX ORDER — INSULIN GLARGINE 100 [IU]/ML
55 INJECTION, SOLUTION SUBCUTANEOUS
Status: DISCONTINUED | OUTPATIENT
Start: 2021-01-18 | End: 2021-01-20

## 2021-01-18 RX ADMIN — Medication: at 08:00

## 2021-01-18 RX ADMIN — TRAZODONE HYDROCHLORIDE 50 MG: 50 TABLET ORAL at 22:27

## 2021-01-18 RX ADMIN — INSULIN LISPRO 15 UNITS: 100 INJECTION, SOLUTION INTRAVENOUS; SUBCUTANEOUS at 11:39

## 2021-01-18 RX ADMIN — METRONIDAZOLE 500 MG: 500 TABLET ORAL at 20:30

## 2021-01-18 RX ADMIN — Medication: at 21:09

## 2021-01-18 RX ADMIN — METRONIDAZOLE 500 MG: 500 TABLET ORAL at 14:13

## 2021-01-18 RX ADMIN — INSULIN LISPRO 9 UNITS: 100 INJECTION, SOLUTION INTRAVENOUS; SUBCUTANEOUS at 16:37

## 2021-01-18 RX ADMIN — QUETIAPINE FUMARATE 100 MG: 100 TABLET, FILM COATED ORAL at 20:30

## 2021-01-18 RX ADMIN — INSULIN LISPRO 10 UNITS: 100 INJECTION, SOLUTION INTRAVENOUS; SUBCUTANEOUS at 16:37

## 2021-01-18 RX ADMIN — INSULIN GLARGINE 55 UNITS: 100 INJECTION, SOLUTION SUBCUTANEOUS at 20:31

## 2021-01-18 RX ADMIN — ROSUVASTATIN CALCIUM 40 MG: 20 TABLET, COATED ORAL at 20:30

## 2021-01-18 RX ADMIN — INSULIN LISPRO 3 UNITS: 100 INJECTION, SOLUTION INTRAVENOUS; SUBCUTANEOUS at 21:11

## 2021-01-18 RX ADMIN — GABAPENTIN 300 MG: 300 CAPSULE ORAL at 20:30

## 2021-01-18 NOTE — DIABETES MGMT
Glycemic Control Plan of Care  T2DM with current A1c level of 10.6% (11/09/2020)  See separate notes, 01/12/2021, for assessment diabetes management prior to admission and education. Patient reported that he has been homeless for the past 2 weeks therefore not able to take his insulin. 1/15: Reinforced diabetes treatment compliance by adhering to diet. Patient reported that he has been eating snacks therefore his not accurate measurement of fasting BG. Regarding insulin at discharge: patient is hoping to have a place to live, if not, he will go back to being homeless again. He would like to continue to take the 70/30 mix insulin. Patient verbalized understanding that insulin must be kept in room temperature - not exposed to heat and sun. I discussed oral diabetes meds and he will think about it. Home diabetes medications: Patient reported on 01/11:  Novolin 70/30 mix insulin 20 units two times daily before meals: breakfast and dinner    Patient was admitted on 01/11/2011 with report of suicidal ideation for the past week. 01/18: Seen patient this afternoon and reported diet compliance since I spoke to him on 1/15 by not eating snacks in between meals. Glycemic recommendation(s):   1.) Discussed above with Dr. Cassia Bernal and recommendation to increase basal lantus insulin to 55 units daily at bedtime and increase bolus mealtime lispro insulin to 10 units. Obtained order. 2.) discussed insulin protocol compliance with RN: do not hold sliding scale insulin, do not hold bolus mealtime lispro insulin when patient is eating  2.) if not able to continue on insulin at discharge because he is homeless, consider Janumet  mg two times daily with meals and follow-up with his PCP for dose adjustment. Glycemic assessment:    Elevated pattern of post meal BG. POC BG 01/17: 221, 346, 233, 336   POC BG 01/18 at time of review: 262 (before breakfast), 404 and repeated with result of 473 (before lunch).   Received report that nursing held both bolus mealtime lispro and sliding scale insulin this morning because patient didn't eat his breakfast.  RN also reported that she held bolus mealtime lispro for lunch today and patient ate his meal.  Patient stated that he stopped eating snacks between meals since I spoke to him on 1/15. Current A1C: 10.6% (11/09/2020) which is equivalent to estimated average blood glucose of 258 mg/dL during the past 2-3 months    Current hospital diabetes medications:  Basal lantus insulin 55 units daily at bedtime. Correctional lispro insulin ACHS. Very resistant dose.   Increased bolus mealtime lispro insulin to 10 units TID AC on 1/15/2021    Total daily dose insulin requirement previous day: 01/17/21:  Lantus: 45 units  Lispro: 54 units  TDD insulin: 99 units    Diet: Diabetic consistent carb regular; double portion vegetables; bedtime diabetic snack    Goals:  Blood glucose will be within target range of  mg/dL by 01/21/2021    Education:    __X_  Refer to Diabetes Education Record: 01/12/2021  ___  Education not indicated at this time    Gifty Javier RN [facility]  Pager: [phone]

## 2021-01-18 NOTE — BSMART NOTE
SOCIAL WORK GROUP THERAPY PROGRESS NOTE Group Time:  10:30am 
 
Group Topic:  Coping Skills Group Participation: Pt was unavailable for group due to taking care of personal hygiene & meeting with staff.

## 2021-01-18 NOTE — PROGRESS NOTES
Problem: Suicide  Goal: *STG: Remains safe in hospital  Description: Remains safe daily while in hospital.  Outcome: Progressing Towards Goal  Goal: *STG: Attends activities and groups  Description: Attends activities and groups daily while in hospital.  Outcome: Progressing Towards Goal  Goal: *STG/LTG: No longer expresses self destructive or suicidal thoughts  Description: No longer expresses self destructive or suicidal thoughts daily while in hospital.  Outcome: Progressing Towards Goal     Pt presents with dull affect, depressed mood. Pt has been withdrawn to self on the unit, mostly resting in his room this afternoon. Pt denies SI/HI at this time. Pt is being treated per glucose monitoring protocol. Pt refused morning medications. Will continue to monitor.

## 2021-01-18 NOTE — BH NOTES
Blood glucose fingerstick 404. Recheck blood glucose of 473. Pt did not eat breakfast and refused morning insulin. MD notified. No orders received at this time. Will continue to monitor for signs and symptoms of hyperglycemia.

## 2021-01-18 NOTE — GROUP NOTE
ELGIN  GROUP DOCUMENTATION INDIVIDUAL Group Therapy Note Date: 1/18/2021 Group Start Time: 4918 Group End Time: 2140 Group Topic: Nursing [address] 1 ADULT CHEM CHAPIS EISENBERG  GROUP DOCUMENTATION GROUP Group Therapy Note Attendees: 5 Attendance: Did not attend Blank Vergara

## 2021-01-18 NOTE — BSMART NOTE
OCCUPATIONAL THERAPY PROGRESS NOTE Group time:6673 The patient did not refuse verbally, but, did not come to group. In bed.

## 2021-01-18 NOTE — PROGRESS NOTES
Comprehensive Nutrition Assessment    Type and Reason for Visit: Initial    Nutrition Recommendations/Plan:   -Continue Diabetic diet as tolerated and monitor po intake       Nutrition Assessment:  Pt is a 50 yr old male admitted for MDD. Met w/ pt on 1/18/21 and pt says appetite is good and ate all his breakfast this morning. Pt says last BM was 1/17/21 and UBW is 165-170#. Pt says he has been having nausea and diarrhea. Estimated Daily Nutrient Needs:  Energy (kcal): 4083-3648; Weight Used for Energy Requirements: Current  Protein (g): 56-69; Weight Used for Protein Requirements:    Fluid (ml/day): 0291-7716; Method Used for Fluid Requirements: 1 ml/kcal      Nutrition Related Findings:  none      Wounds:    None       Current Nutrition Therapies:  DIET DIABETIC CONSISTENT CARB Regular    Anthropometric Measures:  · Height:  5' 11\" (180.3 cm)  · Current Body Wt:  69.4 kg (153 lb)   · Admission Body Wt:  153 lb    · Usual Body Wt:  75 kg (165 lb 5.5 oz)     · Ideal Body Wt:  172 lbs:  89 %   · Adjusted Body Weight:   ; Weight Adjustment for: No adjustment   · Adjusted BMI:       · BMI Category:  Underweight (BMI less than 22) age over 72       Nutrition Diagnosis:   · Altered nutrition-related lab values related to endocrine dysfunction as evidenced by (blood glucose levels 404-473H)      Nutrition Interventions:   Food and/or Nutrient Delivery: Continue current diet  Nutrition Education and Counseling: No recommendations at this time  Coordination of Nutrition Care: Continue to monitor while inpatient    Goals:  PO nutrition intake will meet >75% of patient estimated nutritional needs within the next 7 days. Nutrition Monitoring and Evaluation:   Behavioral-Environmental Outcomes: None identified  Food/Nutrient Intake Outcomes: Food and nutrient intake  Physical Signs/Symptoms Outcomes: Diarrhea, Nausea/vomiting    Discharge Planning:     Too soon to determine, No discharge needs at this time Electronically signed by Janneth Parnell RD on 1/18/2021 at [address] [place] [address] St: [phone]

## 2021-01-18 NOTE — PROGRESS NOTES
[address]  Inpatient Progress Note     Date of Service: 01/18/21  Hospital Day: 7     Subjective/Interval History   01/18/21    Des Alf #8 RTI improvement Notes reviewed and/or discussed and report that Jag Mas is a patient with a history of depression and polysubstance use disorder is still withdrawn and irritable at times. Patient interview: Jag Mas was interviewed by this writer today. The patient remains depressed, is still withdrawn and psychomotor retarded. group participation improved somewhat yesterday, the patient being encouraged to do so today. The case was discussed with his assigned inpatient . When the patient is a stable he will be able to be discharged to [facility] crisis stabilization unit this possibly happening Wednesday or Thursday. Objective     Visit Vitals  BP 98/80 (BP 1 Location: Right arm, BP Patient Position: At rest)   Pulse 96   Temp 97.6 °F (36.4 °C)   Resp 18   SpO2 100%     Vitals are stable    Recent Results (from the past 24 hour(s))   GLUCOSE, POC    Collection Time: 01/17/21  3:27 PM   Result Value Ref Range    Glucose (POC) 233 (H) 70 - 110 mg/dL   GLUCOSE, POC    Collection Time: 01/17/21  7:34 PM   Result Value Ref Range    Glucose (POC) 336 (H) 70 - 110 mg/dL   GLUCOSE, POC    Collection Time: 01/18/21  6:16 AM   Result Value Ref Range    Glucose (POC) 262 (H) 70 - 110 mg/dL   GLUCOSE, POC    Collection Time: 01/18/21 11:34 AM   Result Value Ref Range    Glucose (POC) 404 (HH) 70 - 110 mg/dL   GLUCOSE, POC    Collection Time: 01/18/21 11:44 AM   Result Value Ref Range    Glucose (POC) 473 (HH) 70 - 110 mg/dL     Above results noticed    Mental Status Examination     Appearance/Hygiene 50 y.o.  BLACK OR  male  Hygiene: Limited   Behavior/Social Relatedness Appropriate   Musculoskeletal Gait/Station: appropriate  Tone (flaccid, cogwheeling, spastic): not assessed  Psychomotor (hyperkinetic, hypokinetic): calm   Involuntary movements (tics, dyskinesias, akathisa, stereotypies): none   Speech   Rate, rhythm, volume, fluency and articulation are appropriate   Mood   depressed   Affect    flat at times irritable others   Thought Process Linear and goal directed   Thought Content and Perceptual Disturbances  as the patient's depression gets better, his potential for self-harm is decreasing. Denies auditory and visual hallucinations   Sensorium and Cognition  Grossly intact   Insight  improving   Judgment  improving        Assessment/Plan      Psychiatric Diagnoses:   Patient Active Problem List   Diagnosis Code    Major depression with psychotic features ([facility] [address] F32.3    MDD (major depressive disorder), recurrent severe, without psychosis ([facility] [address] F33.2       Medical Diagnoses: Same    Psychosocial and contextual factors: Same    Level of impairment/disability: Moderately severe      1. The patient is tolerating his current dose of duloxetine very well. He also described his GI symptoms improving  2. Reviewed instructions, risks, benefits and side effects of medications  3. Disposition/Discharge Date: self-care/to [facility] crisis stabilization unit Wednesday or Thursday.     Joe Hummel MD, [address] [place] [address]  Psychiatry

## 2021-01-18 NOTE — BSMART NOTE
Pt. is a 82-year-old homeless male with history of Major depressive disorder, severe, recurrent, without psychotic symptoms.  Post-traumatic stress disorder.  Chronic alcohol use disorder, severe.  Cocaine use disorder, severe.  Opioid use disorder, severe. 
  
Pt.'s case was discussed this a.m. Pt. continues to be isolative at times. ELIANA Contact:  SW met with pt. to discuss dc planning this a.m. Pt reports his stomach is feeling a little better today than compared the last 2 days. Pt states he would like to explore  residential and crisis stabilization in [facility]. SW provided pt. with positive feedback and encouragement. Pt. denies ideations and hallucinations Pt. Pears depressed and insight is improving. SW will continue to assist pt. with dc planning. ELIANA Collateral: Pt was referred to [facility] Pt was denied due to recent SI on this admission. SW will assist with [address].    
 
[facility]  MA, [facility]

## 2021-01-19 LAB
GLUCOSE BLD STRIP.AUTO-MCNC: 188 MG/DL (ref 70–110)
GLUCOSE BLD STRIP.AUTO-MCNC: 231 MG/DL (ref 70–110)
GLUCOSE BLD STRIP.AUTO-MCNC: 236 MG/DL (ref 70–110)
GLUCOSE BLD STRIP.AUTO-MCNC: 395 MG/DL (ref 70–110)

## 2021-01-19 PROCEDURE — 74011636637 HC RX REV CODE- 636/637: Performed by: PSYCHIATRY & NEUROLOGY

## 2021-01-19 PROCEDURE — 99232 SBSQ HOSP IP/OBS MODERATE 35: CPT | Performed by: PSYCHIATRY & NEUROLOGY

## 2021-01-19 PROCEDURE — 65220000005 HC RM SEMIPRIVATE PSYCH 3 OR 4 BED

## 2021-01-19 PROCEDURE — 74011250637 HC RX REV CODE- 250/637: Performed by: PSYCHIATRY & NEUROLOGY

## 2021-01-19 PROCEDURE — 82962 GLUCOSE BLOOD TEST: CPT

## 2021-01-19 RX ORDER — INDAPAMIDE 2.5 MG/1
1.25 TABLET, FILM COATED ORAL DAILY
Status: DISCONTINUED | OUTPATIENT
Start: 2021-01-20 | End: 2021-01-21 | Stop reason: HOSPADM

## 2021-01-19 RX ADMIN — DULOXETINE HYDROCHLORIDE 60 MG: 60 CAPSULE, DELAYED RELEASE PELLETS ORAL at 08:01

## 2021-01-19 RX ADMIN — TRAZODONE HYDROCHLORIDE 50 MG: 50 TABLET ORAL at 21:05

## 2021-01-19 RX ADMIN — Medication: at 08:00

## 2021-01-19 RX ADMIN — GABAPENTIN 300 MG: 300 CAPSULE ORAL at 21:04

## 2021-01-19 RX ADMIN — INSULIN LISPRO 3 UNITS: 100 INJECTION, SOLUTION INTRAVENOUS; SUBCUTANEOUS at 07:56

## 2021-01-19 RX ADMIN — INSULIN LISPRO 6 UNITS: 100 INJECTION, SOLUTION INTRAVENOUS; SUBCUTANEOUS at 21:09

## 2021-01-19 RX ADMIN — QUETIAPINE FUMARATE 100 MG: 100 TABLET, FILM COATED ORAL at 21:04

## 2021-01-19 RX ADMIN — INSULIN LISPRO 10 UNITS: 100 INJECTION, SOLUTION INTRAVENOUS; SUBCUTANEOUS at 11:29

## 2021-01-19 RX ADMIN — INSULIN GLARGINE 55 UNITS: 100 INJECTION, SOLUTION SUBCUTANEOUS at 21:09

## 2021-01-19 RX ADMIN — METRONIDAZOLE 500 MG: 500 TABLET ORAL at 08:01

## 2021-01-19 RX ADMIN — INSULIN LISPRO 10 UNITS: 100 INJECTION, SOLUTION INTRAVENOUS; SUBCUTANEOUS at 16:46

## 2021-01-19 RX ADMIN — INSULIN LISPRO 6 UNITS: 100 INJECTION, SOLUTION INTRAVENOUS; SUBCUTANEOUS at 16:47

## 2021-01-19 RX ADMIN — INSULIN LISPRO 10 UNITS: 100 INJECTION, SOLUTION INTRAVENOUS; SUBCUTANEOUS at 07:56

## 2021-01-19 RX ADMIN — METRONIDAZOLE 500 MG: 500 TABLET ORAL at 21:04

## 2021-01-19 RX ADMIN — ROSUVASTATIN CALCIUM 40 MG: 20 TABLET, COATED ORAL at 21:04

## 2021-01-19 RX ADMIN — Medication: at 21:07

## 2021-01-19 RX ADMIN — INSULIN LISPRO 15 UNITS: 100 INJECTION, SOLUTION INTRAVENOUS; SUBCUTANEOUS at 11:30

## 2021-01-19 RX ADMIN — METRONIDAZOLE 500 MG: 500 TABLET ORAL at 14:43

## 2021-01-19 RX ADMIN — GABAPENTIN 300 MG: 300 CAPSULE ORAL at 08:01

## 2021-01-19 NOTE — PROGRESS NOTES
Problem: Suicide  Goal: *STG: Remains safe in hospital  Description: Remains safe daily while in hospital.  Outcome: Progressing Towards Goal  Goal: *STG:  Verbalizes alternative ways of dealing with maladaptive feelings/behaviors  Description: Verbalizes alternative ways of dealing with maladaptive feelings/behaviors daily while in hospital.  Outcome: Progressing Towards Goal  Goal: *STG/LTG: Complies with medication therapy  Description: Complies with medication therapy daily while in hospital.  Outcome: Progressing Towards Goal     Pt presents with dull affect, euthymic mood. Pt has been more interactive with his peers on the unit today. Pt states, \"I feel better today. My stomach feels good. \" Pt denies SI/HI at this time. BP medications held for BP 84/56 HR 92. Pt is medication compliant. Will continue to monitor.

## 2021-01-19 NOTE — BH NOTES
Pt appeared to have slept for 6.25 hours thus far. He was transferred to room 121 as a safety precaution (roommate unpredictable). Will continue to monitor for safety.

## 2021-01-19 NOTE — BH NOTES
Performed wound care to patient Right foot big toe and back will continue to follow current POC and interventions per policies/procotols.

## 2021-01-19 NOTE — BSMART NOTE
Pt. is a 74-year-old homeless male with history of Major depressive disorder, severe, recurrent, without psychotic symptoms.  Post-traumatic stress disorder.  Chronic alcohol use disorder, severe.  Cocaine use disorder, severe.  Opioid use disorder, severe. Pt.'s case was discussed this a.m  Pt. Was declined by [facility]. Pt. Will be dc to [facility]. SW Contact: SW met with pt. And discussed the above. Pt states he needs to focus and concentrate on his SA issues. Pt. Has agreed to a referral to [facility] and [employer]. SW provided pt with positive feedback. Pt. Expressed he feels as though the  medications are working for him. Pt. Denies ideations and hallucinations. Pt. 's mood and insight are improving. SW will continue to assist pt with dc planning.   
 
 
Shamika Copeland MA, LMHP-R

## 2021-01-19 NOTE — GROUP NOTE
IP  GROUP DOCUMENTATION INDIVIDUAL Group Therapy Note Date: 1/18/2021 Group Start Time: 1 Group End Time: 1945 Group Topic: Medication [facility] 1 ADULT CHEM DEP Angelito Rios RN 
 
[facility] GROUP DOCUMENTATION GROUP Group Therapy Note Attendees: 4 Attendance: Did not attend Additional Notes:  Patient asleep Inell JUANCARLOS Forbes

## 2021-01-19 NOTE — BSMART NOTE
SOCIAL WORK GROUP THERAPY PROGRESS NOTE Group Time:  10:30am 
 
Group Topic:  Coping Skills Group Participation:  
 
Pt expressed not interested in attending session despite staff support, choosing to try & sleep more in room.

## 2021-01-19 NOTE — DIABETES MGMT
Glycemic Control Plan of Care  T2DM with current A1c level of 10.6% (11/09/2020)  See separate notes, 01/12/2021, for assessment diabetes management prior to admission and education.  Patient reported that he has been homeless for the past 2 weeks therefore not able to take his insulin.  1/15: Reinforced diabetes treatment compliance by adhering to diet. Patient reported that he has been eating snacks therefore his not accurate measurement of fasting BG.  Regarding insulin at discharge: patient is hoping to have a place to live, if not, he will go back to being homeless again. He would like to continue to take the 70/30 mix insulin. Patient verbalized understanding that insulin must be kept in room temperature - not exposed to heat and sun. I discussed oral diabetes meds and he will think about it.     Home diabetes medications: Patient reported on 01/11:  Novolin 70/30 mix insulin 20 units two times daily before meals: breakfast and dinner    Patient was admitted on 01/11/2011 with report of suicidal ideation for the past week.    01/19: Seen patient this afternoon and discussed sudden spike of blood glucose from 188 before breakfast to 395 before lunch. Patient reported that he stopped eating snacks before meals and described his breakfast tray, diabetic, with 3 servings of fruit juice. Patient consumed everything but suspect that it was the servings of fruit juice (x3) that caused elevated blood glucose before lunch. Patient is willing to give up fruit juice with meals and trade it for fruits.    Glycemic recommendation(s):   1.) no change in insulin orders  2.) modify diabetic diet: no fruit juice, give only a serving of fruit  3.) if not able to continue on insulin at discharge because he is homeless, consider Janumet  mg two times daily with meals and follow-up with his PCP for dose adjustment.    Glycemic assessment:    POC BG 01/18: 262, 404, 473, 280. Nursing did not give scheduled bolus and sliding  scale insulin    POC BG 01/19 at time of review: 188 (before breakfast), 395 (before lunch, suspect due to fruit juice x3 servings with breakfast)        Current A1C: 10.6% (11/09/2020) which is equivalent to estimated average blood glucose of 258 mg/dL during the past 2-3 months    Current hospital diabetes medications:  Basal lantus insulin 55 units daily at bedtime. Correctional lispro insulin ACHS. Very resistant dose.   Increased bolus mealtime lispro insulin to 10 units TID AC on 1/15/2021    Total daily dose insulin requirement previous day: 01/18/21:  Lantus: 55 units  Lispro: 37 units  TDD insulin: 92 units    Diet: Diabetic consistent carb regular; double portion vegetables; no fruit juice; give only a serving of fruit with meals; bedtime diabetic snack    Goals:  Blood glucose will be within target range of  mg/dL by 01/22/2021    Education:    __X_  Refer to Diabetes Education Record: 01/12/2021  ___  Education not indicated at this time    Troy Perez RN [facility]  Pager: [phone]

## 2021-01-19 NOTE — BSMART NOTE
ART THERAPY GROUP PROGRESS NOTE Group time:950 The patient refused group despite encouragement. In bed.

## 2021-01-19 NOTE — PROGRESS NOTES
[address] [facility] [address]  Inpatient Progress Note     Date of Service: 01/19/21  Hospital Day: 8     Subjective/Interval History   01/19/21      Notes reviewed and/or discussed and report that Jammie Arnold is a patient with a history of her mood disorder and polysubstance use disorder, considering the possibility of a rehab referral.      Patient interview: Jammie Arnold was interviewed by this writer today. When the patient was seen individually by the undersigned this morning he described concerns about going to the crisis stabilization unit for only a short period of time. He indicated that he would like to be considered for a referral to the [facility] this issue being brought to the attention of his inpatient . However I was informed by his  that his request to go to the [facility] was denied by  them. Not clear as to the reason for which this happened. I also was informed by his  that depending upon how he is doing, his  stay in the crisis stabilization unit could be longer. Further discussion with the patient will follow. Objective     Visit Vitals  BP (!) 84/56 (BP 1 Location: Right arm, BP Patient Position: Sitting)   Pulse 92   Temp 97.7 °F (36.5 °C)   Resp 20   Ht 5' 11\" (1.803 m)   Wt 69.4 kg (153 lb)   SpO2 100%   BMI 21.34 kg/m²     Blood pressure remains low. The patient is being prescribed with indapamide 2.5 mg daily, the dose to be reduced to 1.25 mg starting tomorrow.     Recent Results (from the past 24 hour(s))   GLUCOSE, POC    Collection Time: 01/18/21 11:34 AM   Result Value Ref Range    Glucose (POC) 404 (HH) 70 - 110 mg/dL   GLUCOSE, POC    Collection Time: 01/18/21 11:44 AM   Result Value Ref Range    Glucose (POC) 473 (HH) 70 - 110 mg/dL   GLUCOSE, POC    Collection Time: 01/18/21  4:23 PM   Result Value Ref Range    Glucose (POC) 280 (H) 70 - 110 mg/dL   GLUCOSE, POC    Collection Time: 01/18/21  7:49 PM Result Value Ref Range    Glucose (POC) 156 (H) 70 - 110 mg/dL   GLUCOSE, POC    Collection Time: 01/19/21  5:25 AM   Result Value Ref Range    Glucose (POC) 188 (H) 70 - 110 mg/dL   GLUCOSE, POC    Collection Time: 01/19/21 10:40 AM   Result Value Ref Range    Glucose (POC) 395 (H) 70 - 110 mg/dL     Above results noted    Mental Status Examination     Appearance/Hygiene 50 y.o. BLACK OR  male  Hygiene: Limited   Behavior/Social Relatedness  still withdrawn   Musculoskeletal Gait/Station: appropriate  Tone (flaccid, cogwheeling, spastic): not assessed  Psychomotor (hyperkinetic, hypokinetic): calm   Involuntary movements (tics, dyskinesias, akathisa, stereotypies): none   Speech   Rate, rhythm, volume, fluency and articulation are appropriate   Mood   still depressed but improving   Affect    less irritable   Thought Process Linear and goal directed   Thought Content and Perceptual Disturbances Denies self-injurious behavior (SIB), suicidal ideation (SI), aggressive behavior or homicidal ideation (HI)    Denies auditory and visual hallucinations   Sensorium and Cognition  Grossly intact   Insight  improving   Judgment  improving        Assessment/Plan      Psychiatric Diagnoses:   Patient Active Problem List   Diagnosis Code    Major depression with psychotic features ([facility] [address] F32.3    MDD (major depressive disorder), recurrent severe, without psychosis ([address] F33.2       Medical Diagnoses: Same    Psychosocial and contextual factors: Same    Level of impairment/disability: Severe to moderately severe    1. Please see medications orders regarding decrease of the dose of the antihypertensive treatment. The patient had expressed concerns about the length of time that he will be allowed to stay in the crisis stabilization program.  I was informed that patient screw to stay longer if deemed appropriate by the providing staff.   2.  Reviewed instructions, risks, benefits and side effects of medications  3. Disposition/Discharge Date: self-care/to be determined. Behavioral Services  Medicare Certification Upon Admission    I certify that this patient's inpatient psychiatric hospital admission is medically necessary for:      [x] Treatment which could reasonably be expected to improve this patient's condition,       [] For diagnostic study;     AND     [x] The inpatient psychiatric services are provided while the individual is under the care of a physician and are included in the individualized plan of care. Estimated length of stay/service 5 more days. Plan for post-hospital care: Crisis stabilization unit in [place] [address].     Electronically signed by [unfilled] on 1/19/2021 at 11:36 AM      Lorraine Gaviria MD, [address] [place] [address]

## 2021-01-19 NOTE — BH NOTES
The writer has observed the patient's behavior throughout this shift ([id number]). During this shift patient has been withdrawn and isolative in his room for the vast majority. Patient did not attend group sessions, but did eat 100% of his meals. Patient seems to have poor eye contact and speaks quietly. In addition, patient appeared from his room to his meals and to take his scheduled medications and watch television. Will continue to monitor the patient's well-being and safety locations.

## 2021-01-19 NOTE — BH NOTES
Patient given Trazodone for insomnia per patient request will continue to follow current POC and interventions per policies/protocols. Mo Patel

## 2021-01-20 LAB
GLUCOSE BLD STRIP.AUTO-MCNC: 194 MG/DL (ref 70–110)
GLUCOSE BLD STRIP.AUTO-MCNC: 268 MG/DL (ref 70–110)
GLUCOSE BLD STRIP.AUTO-MCNC: 280 MG/DL (ref 70–110)
GLUCOSE BLD STRIP.AUTO-MCNC: 327 MG/DL (ref 70–110)

## 2021-01-20 PROCEDURE — 65220000005 HC RM SEMIPRIVATE PSYCH 3 OR 4 BED

## 2021-01-20 PROCEDURE — 74011636637 HC RX REV CODE- 636/637: Performed by: PSYCHIATRY & NEUROLOGY

## 2021-01-20 PROCEDURE — 74011250637 HC RX REV CODE- 250/637: Performed by: PSYCHIATRY & NEUROLOGY

## 2021-01-20 PROCEDURE — 99232 SBSQ HOSP IP/OBS MODERATE 35: CPT | Performed by: PSYCHIATRY & NEUROLOGY

## 2021-01-20 PROCEDURE — 82962 GLUCOSE BLOOD TEST: CPT

## 2021-01-20 RX ORDER — INSULIN GLARGINE 100 [IU]/ML
60 INJECTION, SOLUTION SUBCUTANEOUS
Status: DISCONTINUED | OUTPATIENT
Start: 2021-01-20 | End: 2021-01-21 | Stop reason: HOSPADM

## 2021-01-20 RX ADMIN — INSULIN LISPRO 10 UNITS: 100 INJECTION, SOLUTION INTRAVENOUS; SUBCUTANEOUS at 07:30

## 2021-01-20 RX ADMIN — INDAPAMIDE 1.25 MG: 2.5 TABLET, FILM COATED ORAL at 10:07

## 2021-01-20 RX ADMIN — Medication: at 20:26

## 2021-01-20 RX ADMIN — INSULIN LISPRO 9 UNITS: 100 INJECTION, SOLUTION INTRAVENOUS; SUBCUTANEOUS at 21:04

## 2021-01-20 RX ADMIN — DULOXETINE HYDROCHLORIDE 60 MG: 60 CAPSULE, DELAYED RELEASE PELLETS ORAL at 08:31

## 2021-01-20 RX ADMIN — Medication: at 08:31

## 2021-01-20 RX ADMIN — METRONIDAZOLE 500 MG: 500 TABLET ORAL at 20:25

## 2021-01-20 RX ADMIN — GABAPENTIN 300 MG: 300 CAPSULE ORAL at 08:31

## 2021-01-20 RX ADMIN — ROSUVASTATIN CALCIUM 40 MG: 20 TABLET, COATED ORAL at 20:25

## 2021-01-20 RX ADMIN — INSULIN LISPRO 9 UNITS: 100 INJECTION, SOLUTION INTRAVENOUS; SUBCUTANEOUS at 07:30

## 2021-01-20 RX ADMIN — INSULIN LISPRO 10 UNITS: 100 INJECTION, SOLUTION INTRAVENOUS; SUBCUTANEOUS at 16:30

## 2021-01-20 RX ADMIN — GABAPENTIN 300 MG: 300 CAPSULE ORAL at 20:25

## 2021-01-20 RX ADMIN — METRONIDAZOLE 500 MG: 500 TABLET ORAL at 08:31

## 2021-01-20 RX ADMIN — INSULIN GLARGINE 60 UNITS: 100 INJECTION, SOLUTION SUBCUTANEOUS at 20:31

## 2021-01-20 RX ADMIN — INSULIN LISPRO 12 UNITS: 100 INJECTION, SOLUTION INTRAVENOUS; SUBCUTANEOUS at 16:30

## 2021-01-20 RX ADMIN — INSULIN LISPRO 3 UNITS: 100 INJECTION, SOLUTION INTRAVENOUS; SUBCUTANEOUS at 11:30

## 2021-01-20 RX ADMIN — QUETIAPINE FUMARATE 100 MG: 100 TABLET, FILM COATED ORAL at 20:25

## 2021-01-20 RX ADMIN — INSULIN LISPRO 10 UNITS: 100 INJECTION, SOLUTION INTRAVENOUS; SUBCUTANEOUS at 11:30

## 2021-01-20 RX ADMIN — METRONIDAZOLE 500 MG: 500 TABLET ORAL at 14:11

## 2021-01-20 NOTE — BH NOTES
Treatment team met -     Medical Director: _____present   Psychiatrist: __x___present   Charge nurse: __x___present   MSW: _x___present   : _____present   Nurse Manager: _____present   Student RNs: _____present   Medical Students: _____present   Art Therapist: _____present   Clinical Coordinator: _x__present    Occupational Therapist: _____present   : _______ present  UR  _______ present  Crisis Supervisor_______present      Plan of care discussed and updated as appropriate. Patient was present for treatment team meeting. Patient voiced interest in  rehab, the  have made referrals. The patient will be going to [address] [place] [address] on tomorrow as step down, while waiting to hear the referrals. Patient stated he's feeling better and the medications is helping. Patient been medication compliant and going to some of the groups.

## 2021-01-20 NOTE — DIABETES MGMT
Glycemic Control Plan of Care  T2DM with current A1c level of 10.6% (11/09/2020)  See separate notes, 01/12/2021, for assessment diabetes management prior to admission and education. Patient reported that he has been homeless for the past 2 weeks therefore not able to take his insulin. 1/15: Reinforced diabetes treatment compliance by adhering to diet. Patient reported that he has been eating snacks therefore his not accurate measurement of fasting BG. Regarding insulin at discharge: patient is hoping to have a place to live, if not, he will go back to being homeless again. He would like to continue to take the 70/30 mix insulin. Patient verbalized understanding that insulin must be kept in room temperature - not exposed to heat and sun. I discussed oral diabetes meds and he will think about it. Home diabetes medications: Patient reported on 01/11:  Novolin 70/30 mix insulin 20 units two times daily before meals: breakfast and dinner    Patient was admitted on 01/11/2011 with report of suicidal ideation for the past week. 01/20: Seen patient this afternoon and reported that he did not get fruit juices with his meals. Patient anticipate disch tomorrow, crisis stabilization unit in [facility], instead of being homeless. Glycemic recommendation(s):   1.) increase basal lantus insulin dose to 60 units daily at bedtime starting tonight. Order obtained. 2.) if not able to continue on insulin at discharge because he is homeless, consider Janumet  mg two times daily with meals and follow-up with his PCP for dose adjustment. Glycemic assessment:    POC BG 01/19: 188, 395, 236, 231    POC BG 01/20 at time of review: 280, 194        Current A1C: 10.6% (11/09/2020) which is equivalent to estimated average blood glucose of 258 mg/dL during the past 2-3 months    Current hospital diabetes medications:  Basal lantus insulin 60 units daily at bedtime. Correctional lispro insulin ACHS.  Very resistant dose.  Bolus mealtime lispro insulin to 10 units TID AC    Total daily dose insulin requirement previous day: 01/19/21:  Lantus: 55 units  Lispro: 60 units  TDD insulin: 115 units    Diet: Diabetic consistent carb regular; double portion vegetables; no fruit juice; give only a serving of fruit with meals; bedtime diabetic snack    Goals:  Blood glucose will be within target range of  mg/dL by 01/23/2021    Education:    __X_  Refer to Diabetes Education Record: 01/12/2021  ___  Education not indicated at this time    Adams Amador RN [facility]  Pager: [phone]

## 2021-01-20 NOTE — BH NOTES
On 1/19/21 during the 3-11 pm shift, patient is polite and manner-able. Patient spent the majority of this shift in the milieu watching TV and occasionally laughing and socializing about the movies he is watching. Patient ate all of his dinner with no complaints and had snacks during snack time. Patient attended all groups this evening as well as participated. This writer has witnessed no behavorial issues from patient during this shift and will continue to monitor.

## 2021-01-20 NOTE — BSMART NOTE
ART THERAPY GROUP PROGRESS NOTE PATIENT SCHEDULED FOR GROUP AT: 1300 ATTENDANCE: Full PARTICIPATION LEVEL: Participates fully in the art process ATTENTION LEVEL :Able to focus on task FOCUS: Values SYMBOLIC & THEMATIC CONTENT AS NOTED IN IMAGERY: He was calm, alert, and presented with a bright affect. He was invested in the task at hand and actively participated in group discussion. He shared how \"spirituality sets the ground work\" for his personal choices and direction when discussing the importance of values.

## 2021-01-20 NOTE — PROGRESS NOTES
[address]  Inpatient Progress Note     Date of Service: 01/20/21  Hospital Day: 9     Subjective/Interval History   01/20/21    Treatment Team Notes:  Notes reviewed and/or discussed and report that Charlene Bocanegra is a patient with a history of a mood disorder discussed during treatment team today. The patient was present during the discussion and participated appropriately. Patient interview: Charlene Bocanegra was interviewed by this writer today. Depression is improving, most of the above treatment team discussion was for the purpose of determining disposition after discharge. Current plans are to refer the patient to the [facility] rehab program in [place]. Since it appears there will be some delay on his admission there due to beds not being available, a referral to the crisis stabilization unit in [facility]  is a still being followed. The patient is in agreement to the current treatment plan. Objective     Visit Vitals  /69 (BP 1 Location: Right arm, BP Patient Position: At rest)   Pulse 91   Temp 97.5 °F (36.4 °C)   Resp 20   Ht 5' 11\" (1.803 m)   Wt 69.4 kg (153 lb)   SpO2 100%   BMI 21.34 kg/m²     Vitals are stable    Recent Results (from the past 24 hour(s))   GLUCOSE, POC    Collection Time: 01/19/21  4:15 PM   Result Value Ref Range    Glucose (POC) 236 (H) 70 - 110 mg/dL   GLUCOSE, POC    Collection Time: 01/19/21  7:30 PM   Result Value Ref Range    Glucose (POC) 231 (H) 70 - 110 mg/dL   GLUCOSE, POC    Collection Time: 01/20/21  5:56 AM   Result Value Ref Range    Glucose (POC) 280 (H) 70 - 110 mg/dL   GLUCOSE, POC    Collection Time: 01/20/21 11:16 AM   Result Value Ref Range    Glucose (POC) 194 (H) 70 - 110 mg/dL       Mental Status Examination     Appearance/Hygiene 50 y.o.  [address] male  Hygiene limited   Behavior/Social Relatedness Appropriate   Musculoskeletal Gait/Station: appropriate  Tone (flaccid, cogwheeling, spastic): not assessed  Psychomotor (hyperkinetic, hypokinetic): calm   Involuntary movements (tics, dyskinesias, akathisa, stereotypies): none   Speech   Rate, rhythm, volume, fluency and articulation are appropriate   Mood   depression has improved   Affect    appropriate to situation   Thought Process Linear and goal directed   Thought Content and Perceptual Disturbances Denies self-injurious behavior (SIB), suicidal ideation (SI), aggressive behavior or homicidal ideation (HI)    Denies auditory and visual hallucinations   Sensorium and Cognition  Grossly intact   Insight  improving   Judgment  improving        Assessment/Plan      Psychiatric Diagnoses:   Patient Active Problem List   Diagnosis Code    Major depression with psychotic features ([facility] [address] F32.3    MDD (major depressive disorder), recurrent severe, without psychosis ([facility] [address] F33.2       Medical Diagnoses same    Psychosocial and contextual factors: Same    Level of impairment/disability: Moderately severe    1. The wound nurse checked the patient's back. Apparently he has a \"boil\" on his left upper back. Will ask a surgeon to see him. 2.  Reviewed instructions, risks, benefits and side effects of medications  3. Disposition/Discharge Date: self-care/to [place] crisis stabilization unit tomorrow.     Joseph Daly MD, [address] [facility]  Psychiatry

## 2021-01-20 NOTE — BSMART NOTE
Social Work Group 1/20/21 Recovery Plan and Goals Attendance attended Number of participants 5 Time in 2:35 Time out 3:05 Total Time 30 Participation  participated Interventions/techniques Informed, provided positive feedback 
and encouraged Kristopher Quan MA, LMHP-R

## 2021-01-20 NOTE — BH NOTES
Group Therapy Note    Patient: Johnny Shepherd    Group Type: Medication Management    Group Time: 15 minutes    Method used: Directed discussion    Attendance: Johnny Shepherd was      compliant with group and participated when directly confronted for 100% of the duration. Interaction Narrative: Johnny Shepherd had a Depressed mood, was Cooperative during group and Shannan Dhillon's thought content was Goal Directed. Writer Reinforced patient's understanding of medications by providing an overview of potential side effects, what the medications are for, and how to best take them to avoid any negative issues. Patient had Identified medication adherence strategies and voiced understanding of education given. Will continue to monitor and provide redirection, education, and support as needed.

## 2021-01-20 NOTE — BSMART NOTE
Pt. is a 53-year-old homeless male with history of Major depressive disorder, severe, recurrent, without psychotic symptoms.  Post-traumatic stress disorder.  Chronic alcohol use disorder, severe.  Cocaine use disorder, severe.  Opioid use disorder, severe. Pt. 's case was discussed in treatment team this a.m . \" I feel better\". Pt. denies ideations and hallucinations. The pt. has been referred to [facility] in [place], [place] for [address] [facility] program.  Pt. Will  step-down to crisis stabilization program in [address] [facility]. The pt.'s mood is improving. SW will assist pt with dc process.    
 
 
[facility] MA, AZAEL-R

## 2021-01-20 NOTE — BH NOTES
Progress Note   Writer informed Dr. Alisia Campbell of need for consult for general surgery for patient need for I and D to back area. Dr. Alisia Campbell to call and set up follow up, will continue to monitor and provide support as needed.

## 2021-01-20 NOTE — WOUND CARE
Physical Exam 
Pulmonary: Musculoskeletal:  
     Feet: 
 
 
  
Room 126/04: Focused wound assess Discussed care with primary nurse Keila Neil. Right great toe, distal tip, diabetic ulcer, 0.7x0.8x0.1cm, 100% dry yellow base, POA. Left upper shoulder, abscess boil, no drainage, POA. Topical treatment orders per nursing unit skin care protocol. Wound care orders Right great toe: Unit staff nurses to cleanse wound with wound spray and 4x4 gauze or patient can cleanse while in shower, apply thin layer of Therahoney Gel, cover with Baby Optifoam gentle Silicone dressing, change every 48hrs & prn soilage & after each shower. Pt has abscess boil on left posterior shoulder, suggest eval for surgical lancing of this area. Discussed with primary nurse Will turn over care to nursing staff at this time.  
Sajan CARRERAN, RN, [facility], [address]

## 2021-01-20 NOTE — BSMART NOTE
OCCUPATIONAL THERAPY PROGRESS NOTE Group Time:  1400 Attendance: The patient attended full group. Participation:. The patient participated with minimal elaboration in the activity. Attention: The patient was able to focus on the activity. Interaction: The patient occasionally  interacts with others.

## 2021-01-20 NOTE — GROUP NOTE
[facility] GROUP DOCUMENTATION INDIVIDUAL Group Therapy Note Date: 1/19/2021 Group Start Time: 1 Group End Time: 1945 Group Topic: Medication [facility] 1 ADULT CHEM DEP Timoteo Dent RN 
 
[facility] GROUP DOCUMENTATION GROUP Group Therapy Note Attendees: 3 Attendance: Attended Interventions/techniques: Informed Follows Directions: Followed directions Interactions: Interacted appropriately Mental Status: Calm Behavior/appearance: Cooperative Goals Achieved: Able to listen to others Andrew Reis RN

## 2021-01-20 NOTE — BH NOTES
Patient was sitting in Dayroom upon Staff arrival on unit this morning. Patient ate about 80% of his Breakfast and Lunch during shift today. Patient Attended and Participated in Group Sessions today during shift. Patient interacted well with Peers and Staff during shift today, as well as Patient did cooperate well with Staff as deemed necessary. Patient did Not exhibit any defiant, aggressive behavior during shift today. Patient spent time sitting in the Dayroom today during shift with Peers and Staff, as he was watching, and appeared very in tuned to Presidential Inauguration via TV. Staff will continue to monitor Patient for safety, behavior and location.

## 2021-01-20 NOTE — BH NOTES
Patient given Trazodone for insomnia per patient request will continue to follow current POC and interventions per protocols/poicies.

## 2021-01-21 VITALS
WEIGHT: 153 LBS | SYSTOLIC BLOOD PRESSURE: 104 MMHG | OXYGEN SATURATION: 98 % | RESPIRATION RATE: 16 BRPM | BODY MASS INDEX: 21.42 KG/M2 | DIASTOLIC BLOOD PRESSURE: 71 MMHG | TEMPERATURE: 98 F | HEART RATE: 94 BPM | HEIGHT: 71 IN

## 2021-01-21 LAB
GLUCOSE BLD STRIP.AUTO-MCNC: 157 MG/DL (ref 70–110)
GLUCOSE BLD STRIP.AUTO-MCNC: 180 MG/DL (ref 70–110)

## 2021-01-21 PROCEDURE — 74011636637 HC RX REV CODE- 636/637: Performed by: PSYCHIATRY & NEUROLOGY

## 2021-01-21 PROCEDURE — 74011250637 HC RX REV CODE- 250/637: Performed by: PSYCHIATRY & NEUROLOGY

## 2021-01-21 PROCEDURE — 99223 1ST HOSP IP/OBS HIGH 75: CPT | Performed by: SURGERY

## 2021-01-21 PROCEDURE — 82962 GLUCOSE BLOOD TEST: CPT

## 2021-01-21 PROCEDURE — 99238 HOSP IP/OBS DSCHRG MGMT 30/<: CPT | Performed by: PSYCHIATRY & NEUROLOGY

## 2021-01-21 RX ORDER — GABAPENTIN 300 MG/1
300 CAPSULE ORAL 2 TIMES DAILY
Qty: 60 CAP | Refills: 0 | OUTPATIENT
Start: 2021-01-21 | End: 2021-02-17

## 2021-01-21 RX ORDER — INDAPAMIDE 1.25 MG/1
1.25 TABLET, FILM COATED ORAL DAILY
Qty: 30 TAB | Refills: 0 | Status: SHIPPED | OUTPATIENT
Start: 2021-01-22 | End: 2021-04-10

## 2021-01-21 RX ORDER — QUETIAPINE FUMARATE 100 MG/1
100 TABLET, FILM COATED ORAL
Qty: 30 TAB | Refills: 0 | Status: SHIPPED | OUTPATIENT
Start: 2021-01-21 | End: 2021-04-10

## 2021-01-21 RX ORDER — ROSUVASTATIN CALCIUM 40 MG/1
40 TABLET, COATED ORAL
Qty: 30 TAB | Refills: 0 | Status: SHIPPED | OUTPATIENT
Start: 2021-01-21

## 2021-01-21 RX ORDER — METRONIDAZOLE 500 MG/1
500 TABLET ORAL 3 TIMES DAILY
Qty: 12 TAB | Refills: 0 | Status: ON HOLD
Start: 2021-01-21 | End: 2021-04-02 | Stop reason: ALTCHOICE

## 2021-01-21 RX ORDER — TRAZODONE HYDROCHLORIDE 50 MG/1
50 TABLET ORAL
Qty: 30 TAB | Refills: 0 | Status: SHIPPED | OUTPATIENT
Start: 2021-01-21

## 2021-01-21 RX ORDER — INSULIN LISPRO 100 [IU]/ML
INJECTION, SOLUTION INTRAVENOUS; SUBCUTANEOUS
Qty: 1 VIAL | Refills: 0 | Status: SHIPPED | OUTPATIENT
Start: 2021-01-21 | End: 2022-09-12

## 2021-01-21 RX ORDER — DULOXETIN HYDROCHLORIDE 60 MG/1
60 CAPSULE, DELAYED RELEASE ORAL DAILY
Qty: 30 CAP | Refills: 0 | Status: SHIPPED | OUTPATIENT
Start: 2021-01-22

## 2021-01-21 RX ORDER — INSULIN GLARGINE 100 [IU]/ML
INJECTION, SOLUTION SUBCUTANEOUS
Qty: 1 VIAL | Refills: 0 | Status: SHIPPED | OUTPATIENT
Start: 2021-01-21 | End: 2022-09-12

## 2021-01-21 RX ADMIN — GABAPENTIN 300 MG: 300 CAPSULE ORAL at 08:12

## 2021-01-21 RX ADMIN — METRONIDAZOLE 500 MG: 500 TABLET ORAL at 08:12

## 2021-01-21 RX ADMIN — DULOXETINE HYDROCHLORIDE 60 MG: 60 CAPSULE, DELAYED RELEASE PELLETS ORAL at 08:12

## 2021-01-21 RX ADMIN — INDAPAMIDE 1.25 MG: 2.5 TABLET, FILM COATED ORAL at 08:12

## 2021-01-21 RX ADMIN — Medication: at 08:12

## 2021-01-21 RX ADMIN — INSULIN LISPRO 3 UNITS: 100 INJECTION, SOLUTION INTRAVENOUS; SUBCUTANEOUS at 11:30

## 2021-01-21 RX ADMIN — INSULIN LISPRO 10 UNITS: 100 INJECTION, SOLUTION INTRAVENOUS; SUBCUTANEOUS at 11:30

## 2021-01-21 RX ADMIN — INSULIN LISPRO 10 UNITS: 100 INJECTION, SOLUTION INTRAVENOUS; SUBCUTANEOUS at 07:30

## 2021-01-21 RX ADMIN — INSULIN LISPRO 3 UNITS: 100 INJECTION, SOLUTION INTRAVENOUS; SUBCUTANEOUS at 07:30

## 2021-01-21 NOTE — BSMART NOTE
ART THERAPY GROUP PROGRESS NOTE Group time: 945 The patient did not awaken/get up when called for group.

## 2021-01-21 NOTE — DISCHARGE INSTRUCTIONS
Patient Education        Depression and Chronic Disease: Care Instructions  Your Care Instructions     A chronic disease is one that you have for a long time. Some chronic diseases can be controlled, but they usually cannot be cured. Depression is common in people with chronic diseases, but it often goes unnoticed. Many people have concerns about seeking treatment for a mental health problem. You may think it's a sign of weakness, or you don't want people to know about it. It's important to overcome these reasons for not seeking treatment. Treating depression or anxiety is good for your health. Follow-up care is a key part of your treatment and safety. Be sure to make and go to all appointments, and call your doctor if you are having problems. It's also a good idea to know your test results and keep a list of the medicines you take. How can you care for yourself at home? Watch for symptoms of depression  The symptoms of depression are often subtle at first. You may think they are caused by your disease rather than depression. Or you may think it is normal to be depressed when you have a chronic disease. If you are depressed you may:  · Feel sad or hopeless. · Feel guilty or worthless. · Not enjoy the things you used to enjoy. · Feel hopeless, as though life is not worth living. · Have trouble thinking or remembering. · Have low energy, and you may not eat or sleep well. · Pull away from others. · Think often about death or killing yourself. (Keep the numbers for these national suicide hotlines: 0-267-046-TALK [1-307.212.5241] and 8-900-PGGOTQL [1-686.109.2259]. )  Get treatment  By treating your depression, you can feel more hopeful and have more energy. If you feel better, you may take better care of yourself, so your health may improve. · Talk to your doctor if you have any changes in mood during treatment for your disease. · Ask your doctor for help.  Counseling, antidepressant medicine, or a combination of the two can help most people with depression. Often a combination works best. Counseling can also help you cope with having a chronic disease. When should you call for help? Call 911 anytime you think you may need emergency care. For example, call if:    · You feel like hurting yourself or someone else.     · Someone you know has depression and is about to attempt or is attempting suicide. Call your doctor now or seek immediate medical care if:    · You hear voices.     · Someone you know has depression and:  ? Starts to give away his or her possessions. ? Uses illegal drugs or drinks alcohol heavily. ? Talks or writes about death, including writing suicide notes or talking about guns, knives, or pills. ? Starts to spend a lot of time alone. ? Acts very aggressively or suddenly appears calm. Watch closely for changes in your health, and be sure to contact your doctor if:    · You do not get better as expected. Where can you learn more? Go to http://www.gray.com/  Enter A548 in the search box to learn more about \"Depression and Chronic Disease: Care Instructions. \"  Current as of: January 31, 2020               Content Version: 12.6  © 2593-2059 [employer]. Care instructions adapted under license by [employer] (which disclaims liability or warranty for this information). If you have questions about a medical condition or this instruction, always ask your healthcare professional. [place] [address] any warranty or liability for your use of this information.

## 2021-01-21 NOTE — BSMART NOTE
Pt. is a 73-year-old homeless male with history of Major   depressive disorder, severe, recurrent, without psychotic symptoms.  Post-traumatic stress disorder.  Chronic alcohol use disorder, severe.  Cocaine use disorder, severe.  Opioid use disorder, severe. Pt. Will be dc today. Pt. denies ideations and hallucinations. Pt. Will  step-down to crisis stabilization program in [facility]. The provide will pick pt up @ 11:30 today.  SW referred pt to [facility] for [facility]. The pt. 's insurance is not accepted by [facility] [place], LMHP-R

## 2021-01-21 NOTE — GROUP NOTE
[facility] GROUP DOCUMENTATION INDIVIDUAL Group Therapy Note Date: 1/20/2021 Group Start Time: 2000 Group End Time: 2030 Group Topic: Nursing [facility] ADULT CHEM DEP Ria Partida RN 
 
[facility] GROUP DOCUMENTATION GROUP Group Therapy Note Attendees: 6 Attendance: Attended Patient's Goal:  Understanding mediation and reflection. Interventions/techniques: Informed, Validated, Provide feedback and Reinforced Follows Directions: Followed directions Interactions: Interacted appropriately Mental Status: Calm Behavior/appearance: Cooperative Goals Achieved: Able to give feedback to another and Able to manage/cope with feelings Albert Thakkar RN

## 2021-01-21 NOTE — BH NOTES
Discharge Note                Upon discharge, patient presented as Stable and admits Calm. Patient received discharge instructions and verbalized understanding. Writer also offered patient the flu immunization and education, to which the patient said No (due to patient states does not wish to receive due to personal reasons) and so the flu immunization was not administered. All patient belongings have been returned:  Dental Appliance: Dental Appliances: None  Vision: Visual Aid: None  Hearing Aid:    Jewelry: Jewelry: None  Clothing: Clothing: Pants, Socks, Footwear, Shirt  Other Valuables: Other Valuables: Cell Phone  Valuables sent to safe:    Patient armband removed and shredded, and was escorted out of facility, ambulatory, with staff.

## 2021-01-21 NOTE — PROGRESS NOTES
Problem: Suicide  Goal: *STG: Remains safe in hospital  Description: Remains safe daily while in hospital.  Outcome: Progressing Towards Goal  Note: Remains safe.   Goal: *STG/LTG: Complies with medication therapy  Description: Complies with medication therapy daily while in hospital.  Outcome: Progressing Towards Goal  Note: Compliant.     Patient isolates to self in day area.  Pleasant upon approach.  Patient received education on medication being provided.  Patient is displeased with uncontrolled glucose assessments.  Causes for elevated blood glucose. Patient understood teaching.  Will continue to monitor and support as needed.

## 2021-01-22 NOTE — CONSULTS
[facility]  General Surgery    Subjective:      HPI: Patient is a very pleasant 51-year-old male with a past medical history remarkable for hepatitis C, hypertension, diabetes, major depressive disorder recurrent severe without psychosis, and major depression with psychotic features. I was asked to see the patient by his attending physician for assessment and management of the cystic lesion on the back. The patient states that the lesion was first noted last week. It increased in size rapidly and became painful. He denies any drainage. He has never had anything like this in the past.  He denies any injury to the site or insect bite. Patient Active Problem List    Diagnosis Date Noted    MDD (major depressive disorder), recurrent severe, without psychosis ([facility] 01/12/2021    Major depression with psychotic features ([facility] 01/11/2021     Past Medical History:   Diagnosis Date    Diabetes ([facility]     Hepatitis C infection     Hypertension       No past surgical history on file. No family history on file. Social History     Tobacco Use    Smoking status: Current Every Day Smoker     Packs/day: 1.00    Smokeless tobacco: Never Used   Substance Use Topics    Alcohol use: Never     Frequency: Never     Comment: socially       No Known Allergies    Prior to Admission medications    Medication Sig Start Date End Date Taking? Authorizing Provider   DULoxetine (CYMBALTA) 60 mg capsule Take 1 Cap by mouth daily. Indications: diabetic complication causing injury to some body nerves, major depressive disorder 1/22/21  Yes Echo Anderson MD   gabapentin (NEURONTIN) 300 mg capsule Take 1 Cap by mouth two (2) times a day. Max Daily Amount: 600 mg. Indications: neuropathic pain 1/21/21  Yes Echo Anderson MD   indapamide (LOZOL) 1.25 mg tablet Take 1 Tab by mouth daily.  Indications: high blood pressure 1/22/21  Yes Echo Anderson MD   insulin glargine (LANTUS) 100 unit/mL injection Administer 60 units underneath the skin, every night after supper  Indications: type 2 diabetes mellitus 1/21/21  Yes Lana Floyd MD   insulin lispro (HUMALOG) 100 unit/mL injection Administer 10 underneath the skin, three times a day before meals. Indications: type 2 diabetes mellitus 1/21/21  Yes Lana Floyd MD   metroNIDAZOLE (FLAGYL) 500 mg tablet Take 1 Tab by mouth three (3) times daily. Indications: GI infection 1/21/21  Yes Lana Floyd MD   QUEtiapine (SEROquel) 100 mg tablet Take 1 Tab by mouth nightly. Indications: additional medications to treat depression 1/21/21  Yes Lana Floyd MD   rosuvastatin (CRESTOR) 40 mg tablet Take 1 Tab by mouth nightly. Indications: high cholesterol 1/21/21  Yes Lana Floyd MD   traZODone (DESYREL) 50 mg tablet Take 1 Tab by mouth nightly as needed for Sleep. Indications: insomnia associated with depression 1/21/21  Yes Lana Floyd MD       Review of Systems:    14 systems were reviewed. The results are as above in the HPI and otherwise negative. Objective:     Vitals:    01/18/21 1938 01/19/21 0730 01/20/21 0741 01/21/21 0800   BP: 90/61 (!) 84/56 104/69 104/71   Pulse: 97 92 91 94   Resp:  20 20 16   Temp: 98.3 °F (36.8 °C) 97.7 °F (36.5 °C) 97.5 °F (36.4 °C) 98 °F (36.7 °C)   SpO2:    98%   Weight:       Height:           Physical Exam:  GENERAL: alert, cooperative, no distress, appears stated age,   EYE: conjunctivae/corneas clear. PERRL, EOM's intact. THROAT & NECK: normal and no erythema or exudates noted. ,    LYMPHATIC: Cervical, supraclavicular, and axillary nodes normal. ,   LUNG: clear to auscultation bilaterally,   HEART: regular rate and rhythm, S1, S2 normal, no murmur, click, rub or gallop,   ABDOMEN: soft, non-tender.  Bowel sounds normal. No masses,  no organomegaly,   EXTREMITIES:  extremities normal, atraumatic, no cyanosis or edema,   SKIN: Back-left upper-2 cm x 2 cm cystic lesion without surrounding cellulitis fluctuance or crepitance. NEUROLOGIC: AOx3. Cranial nerves 2-12 and sensation grossly intact. ,     Data Review:    Recent Results (from the past 24 hour(s))   GLUCOSE, POC    Collection Time: 01/20/21  8:29 PM   Result Value Ref Range    Glucose (POC) 268 (H) 70 - 110 mg/dL   GLUCOSE, POC    Collection Time: 01/21/21  6:37 AM   Result Value Ref Range    Glucose (POC) 157 (H) 70 - 110 mg/dL   GLUCOSE, POC    Collection Time: 01/21/21 10:59 AM   Result Value Ref Range    Glucose (POC) 180 (H) 70 - 110 mg/dL       Impression:     · Patient with a cystic lesion of the upper back on the left side.     Plan:     · Cystic lesion of the upper back the left side without evidence of infection  · No need for surgical invention at this time  · Please call the office to set up a follow-up appointment for removal upon discharge    Signed By: Vicki Colvin MD     January 21, 2021

## 2021-02-02 NOTE — DISCHARGE SUMMARY
[facility]    Name:  Micah Calvert  MR#:   [id number]  :  1965  ACCOUNT #:  [de-identified]  ADMIT DATE:  2021  DISCHARGE DATE:  2021    TYPE OF ADMISSION:  Voluntary. SIGNIFICANT FINDINGS:  History and physical exam was performed shortly after the patient was admitted to the facility, attention invited to this document, a place where the patient's reasons for admission, the findings upon his being evaluated at the emergency department, the same as his prior psychiatric history are very clearly stated. For the purpose of this discharge summary, the reader is advised that the patient, who has a history of recurrent major depressive disorder, had described his symptoms getting worse, this resulting from his losing his place to stay, which had been provided by his outpatient support program, due to difficulties with another individual that proven to be, as the patient said when he was admitted, a mistake on their part. Regardless, the patient, who has a history of depression and multiple medical problems including type 2 diabetes mellitus, hepatitis C infection, hypertension and PTSD, had described being suicidal when examined in the emergency department, so the basis for the admission. Multiple labs were performed while the patient was at the emergency department including a CBC with differential that showed minor, not clinically significant changes with a slight decrease of RBC to 4.48, normal hemoglobin and normal hematocrit noted. Urinalysis showed the presence of glycosuria related to the patient's diabetes with a trace of ketones, no evidence of an infection. Blood chemistry showed normal electrolytes, normal kidney functioning tests, an elevated blood sugar of 348 and also the presence of an alkaline phosphatase elevated to 248 with an AST of 110 and ALT of 91 associated to the patient's history of hepatitis C.   The last hemoglobin A1c in the chart was from 11/09/2020 and was 10.6. In addition, other studies performed in the emergency room including a urine which was positive for cocaine, a hepatitis panel A was requested also in the emergency room showing negative results; however, hepatitis C virus was considered to be highly positive. SARS-CoV-19 came back negative from a nasopharyngeal study. COURSE DURING HOSPITALIZATION AND TREATMENT:  Admitted to the adult program, the patient was seen on daily individual psychiatric basis and was referred to the groups within context of the program.  Rather depressed, the patient required treatment with a combination of medications which included during this admission treatment with quetiapine 100 mg every night and also duloxetine 60 mg a day hoping to help with his depression and the pain associated to his peripheral diabetic neuropathy. The patient was also requiring treatment with gabapentin 300 mg twice a day, and for the treatment of his hypertension, treatment with Lozol 1.25 mg daily was ordered. The patient had a history of some stomach difficulties for which his outpatient provider had prescribed him with metronidazole which was also prescribed during this admission. Due to his history of diabetes, our diabetes educator evaluated the patient with treatment with insulin being ordered in addition to treatment with insulin lispro 10 units underneath the skin three times a day before meals. While he was here, treatment with Lantus insulin was prescribed. However, it was switched by the time of discharge back to the prescription for insulin lispro which the patient felt rather comfortable with.     While in the facility, several other consults followed including our wound care nurse due to the presence of a left great toe diabetic ulcer which did not show the presence of an infection, treatment with TheraHoney and cover with silicone dressing was ordered with daily cleaning with saline or with salt and water during shower time. Nursing staff helped the patient with the necessary wound care that was required. In addition to this, Dr. Zainab Villa was requested to see the patient in consultation due to the presence of a cystic lesion on the patient's upper left back. Attention is invited to Dr. Zainab Villa' surgery consult which is self-explanatory. At the time of the patient's evaluation, no evidence of an infectious process in the cystic lesion, Dr. Zainab Villa suggested the patient to call his office after discharge from the facility to consider then surgical treatment of the lesion. He at the time of evaluation considered the patient not requiring an acute surgical treatment for this lesion. It is also to be mentioned that while the patient was hospitalized in the facility, not only he attended group therapy sessions but also was seen daily as stated with the support that he required being provided. Treatment with antidepressants proven to be effective with the patient considering, due to his history of drug use, the possibility of a referral to a chemical dependency rehab program.  The steps taken included a referral to the [facility] with attention being invited to his inpatient 's note which is self-explanatory. A referral to a program in the [facility] did not accept the [employer]. By the time of discharge, the patient was found to be much more stable, showing no evidence of suicidal thoughts. He did describe some questionable auditory hallucinations; however, steps to increase his quetiapine dose at night were refused by the patient. So, by the time of discharge, the same dose of Seroquel at 100 mg had been maintained with the patient showing positive stability with this dose.     CONDITION ON DISCHARGE:  Not suicidal or homicidal, not psychotic, showing no evidence of cognitive impairment and ready for referral to the acute rehab facility after being for a while in the crisis stabilization unit in the [facility] with his step-down being appropriate to be able to provide the patient with the [facility] that he [facility] [address] [facility] for [facility]. Other studies performed during the patient's admission included a lipid panel which showed triglycerides elevated to 279, total cholesterol of 136, HDL cholesterol 39, cholesterol-HDL ratio of 3.7, LDL of 51.4, and A1c elevated to 11.3%. A TSH was also obtained and showed normal results at 0.95 international units/mL. FINAL DIAGNOSES:  AXIS I:  Major depressive disorder, recurrent, without psychotic symptoms. Post-traumatic stress disorder, chronic. Chronic alcohol use disorder, severe. Cocaine use disorder, severe. Opioid use disorder, severe. AXIS II:  Deferred. AXIS III:  Hypertension, under treatment. Type 2 diabetes mellitus, under treatment. Diabetic neuropathy by history, right foot ulcer associated to his chronic smoking and also his type 2 diabetes. Cystic lesion, left upper back, showing no evidence of an infection. Elevated liver enzymes with history of hepatitis C infection. Dyslipidemia by history. DISPOSITION:  The patient was discharged to the [address] [place] [address] Unit awaiting for a call from the [address] [place] [address] for rehab admission to that treatment program.  He was also suggested to continue with further outpatient psychiatric care and to follow the treatment with the PCP of his choice for the treatment of his diabetes. He also was going to call Dr. Radha Hartman for an outpatient appointment to consider the possibility of surgical treatment of his cystic lesion on his back. PRESCRIPTIONS UPON DISCHARGE:  The following prescriptions will be given written for 30 days including  1. Cymbalta 60 mg every morning. 2.  Quetiapine 100 mg every night at bedtime. 3.  Gabapentin 300 mg twice a day. 4.  Lozol 1.25 mg daily.   5.  Insulin lispro 10 units underneath the skin three times a day before meals. 6.  Crestor 40 mg every night. 7.  Trazodone 50 mg at bedtime as needed for insomnia. Flagyl 500 mg oral three times a day to complete the outpatient prescription he was given prior to admission. No evidence of medication-related side effects noted upon discharge. Prognosis:  Fair to guarded depending upon the patient's treatment compliance and his being able to maintain himself drug-free.       Benita Bill MD, LFAPA      [id number]  D:  02/02/2021 16:31  T:  02/02/2021 17:34  JOB #:  [id number]

## 2021-02-17 ENCOUNTER — HOSPITAL ENCOUNTER (EMERGENCY)
Age: 56
Discharge: HOME | End: 2021-02-17
Attending: EMERGENCY MEDICINE
Payer: MEDICAID

## 2021-02-17 VITALS
BODY MASS INDEX: 22.4 KG/M2 | SYSTOLIC BLOOD PRESSURE: 107 MMHG | DIASTOLIC BLOOD PRESSURE: 72 MMHG | WEIGHT: 160 LBS | HEIGHT: 71 IN | TEMPERATURE: 98.1 F | OXYGEN SATURATION: 100 % | HEART RATE: 99 BPM | RESPIRATION RATE: 16 BRPM

## 2021-02-17 DIAGNOSIS — G56.03 CARPAL TUNNEL SYNDROME, BILATERAL: Primary | ICD-10-CM

## 2021-02-17 LAB
ALBUMIN SERPL-MCNC: 3.4 G/DL (ref 3.5–5)
ALBUMIN/GLOB SERPL: 0.8 {RATIO} (ref 1.1–2.2)
ALP SERPL-CCNC: 126 U/L (ref 45–117)
ALT SERPL-CCNC: 121 U/L (ref 12–78)
ANION GAP SERPL CALC-SCNC: 7 MMOL/L (ref 5–15)
AST SERPL W P-5'-P-CCNC: 120 U/L (ref 15–37)
BASOPHILS # BLD: 0 K/UL (ref 0–0.2)
BASOPHILS NFR BLD: 0 % (ref 0–2.5)
BILIRUB SERPL-MCNC: 0.3 MG/DL (ref 0.2–1)
BUN SERPL-MCNC: 27 MG/DL (ref 6–20)
BUN/CREAT SERPL: 19 (ref 12–20)
CA-I BLD-MCNC: 9.8 MG/DL (ref 8.5–10.1)
CHLORIDE SERPL-SCNC: 106 MMOL/L (ref 97–108)
CO2 SERPL-SCNC: 27 MMOL/L (ref 21–32)
CREAT SERPL-MCNC: 1.42 MG/DL (ref 0.7–1.3)
EOSINOPHIL # BLD: 0.2 K/UL (ref 0–0.7)
EOSINOPHIL NFR BLD: 3 % (ref 0.9–2.9)
ERYTHROCYTE [DISTWIDTH] IN BLOOD BY AUTOMATED COUNT: 11.7 % (ref 11.5–14.5)
ERYTHROCYTE [SEDIMENTATION RATE] IN BLOOD: 28 MM/HR
GLOBULIN SER CALC-MCNC: 4.3 G/DL (ref 2–4)
GLUCOSE BLD STRIP.AUTO-MCNC: 104 MG/DL (ref 65–100)
GLUCOSE BLD STRIP.AUTO-MCNC: 43 MG/DL (ref 65–100)
GLUCOSE BLD STRIP.AUTO-MCNC: 43 MG/DL (ref 65–100)
GLUCOSE BLD STRIP.AUTO-MCNC: 79 MG/DL (ref 65–100)
GLUCOSE SERPL-MCNC: 69 MG/DL (ref 65–100)
HCT VFR BLD AUTO: 40.6 % (ref 41–53)
HGB BLD-MCNC: 13.7 G/DL (ref 13.5–17.5)
LYMPHOCYTES # BLD: 2.6 K/UL (ref 1–4.8)
LYMPHOCYTES NFR BLD: 49 % (ref 20.5–51.1)
MCH RBC QN AUTO: 31.4 PG (ref 31–34)
MCHC RBC AUTO-ENTMCNC: 33.7 G/DL (ref 31–36)
MCV RBC AUTO: 92.9 FL (ref 80–100)
MONOCYTES # BLD: 0.7 K/UL (ref 0.2–2.4)
MONOCYTES NFR BLD: 13 % (ref 1.7–9.3)
NEUTS SEG # BLD: 1.9 K/UL (ref 1.8–7.7)
NEUTS SEG NFR BLD: 35 % (ref 42–75)
NRBC # BLD: 0 K/UL
NRBC BLD-RTO: 0 PER 100 WBC
PERFORMED BY, TECHID: ABNORMAL
PERFORMED BY, TECHID: NORMAL
PLATELET # BLD AUTO: 156 K/UL
PMV BLD AUTO: 8.2 FL (ref 6.5–11.5)
POTASSIUM SERPL-SCNC: 4.7 MMOL/L (ref 3.5–5.1)
PROT SERPL-MCNC: 7.7 G/DL (ref 6.4–8.2)
RBC # BLD AUTO: 4.37 M/UL (ref 4.5–5.9)
SODIUM SERPL-SCNC: 140 MMOL/L (ref 136–145)
URATE SERPL-MCNC: 3.7 MG/DL (ref 3.5–7.2)
WBC # BLD AUTO: 5.4 K/UL (ref 4.4–11.3)

## 2021-02-17 PROCEDURE — 85025 COMPLETE CBC W/AUTO DIFF WBC: CPT

## 2021-02-17 PROCEDURE — 74011250637 HC RX REV CODE- 250/637: Performed by: EMERGENCY MEDICINE

## 2021-02-17 PROCEDURE — 80053 COMPREHEN METABOLIC PANEL: CPT

## 2021-02-17 PROCEDURE — 85652 RBC SED RATE AUTOMATED: CPT

## 2021-02-17 PROCEDURE — 74011250636 HC RX REV CODE- 250/636: Performed by: EMERGENCY MEDICINE

## 2021-02-17 PROCEDURE — 84550 ASSAY OF BLOOD/URIC ACID: CPT

## 2021-02-17 PROCEDURE — 96372 THER/PROPH/DIAG INJ SC/IM: CPT

## 2021-02-17 PROCEDURE — 99284 EMERGENCY DEPT VISIT MOD MDM: CPT

## 2021-02-17 PROCEDURE — 74011636637 HC RX REV CODE- 636/637: Performed by: EMERGENCY MEDICINE

## 2021-02-17 PROCEDURE — 82962 GLUCOSE BLOOD TEST: CPT

## 2021-02-17 PROCEDURE — 36415 COLL VENOUS BLD VENIPUNCTURE: CPT

## 2021-02-17 RX ORDER — PREDNISONE 20 MG/1
20 TABLET ORAL DAILY
Qty: 10 TAB | Refills: 0 | Status: SHIPPED | OUTPATIENT
Start: 2021-02-17 | End: 2021-02-27

## 2021-02-17 RX ORDER — GABAPENTIN 300 MG/1
300 CAPSULE ORAL 3 TIMES DAILY
Status: DISCONTINUED | OUTPATIENT
Start: 2021-02-17 | End: 2021-02-17 | Stop reason: HOSPADM

## 2021-02-17 RX ORDER — PREDNISONE 20 MG/1
40 TABLET ORAL
Status: COMPLETED | OUTPATIENT
Start: 2021-02-17 | End: 2021-02-17

## 2021-02-17 RX ORDER — GABAPENTIN 300 MG/1
300 CAPSULE ORAL
Status: COMPLETED | OUTPATIENT
Start: 2021-02-17 | End: 2021-02-17

## 2021-02-17 RX ORDER — GABAPENTIN 300 MG/1
300 CAPSULE ORAL 3 TIMES DAILY
Qty: 30 CAP | Refills: 0 | Status: SHIPPED | OUTPATIENT
Start: 2021-02-17 | End: 2021-02-27

## 2021-02-17 RX ADMIN — METHYLPREDNISOLONE SODIUM SUCCINATE 125 MG: 125 INJECTION, POWDER, FOR SOLUTION INTRAMUSCULAR; INTRAVENOUS at 09:33

## 2021-02-17 RX ADMIN — GABAPENTIN 300 MG: 300 CAPSULE ORAL at 09:33

## 2021-02-17 RX ADMIN — PREDNISONE 40 MG: 20 TABLET ORAL at 09:33

## 2021-02-17 NOTE — ED NOTES
Ate box lunch. Dr Nelly Conti ntfd of blood sugar recheck of 79. Pt drove to hospital. Want sugar at least up to 90 prior to discharge.

## 2021-02-17 NOTE — DISCHARGE INSTRUCTIONS
Thank you! Thank you for allowing me to care for you in the emergency department. I sincerely hope that you are satisfied with your visit today. It is my goal to provide you with excellent care. Below you will find a list of your labs and imaging from your visit today. Should you have any questions regarding these results please do not hesitate to call the emergency department. Labs -     Recent Results (from the past 12 hour(s))   METABOLIC PANEL, COMPREHENSIVE    Collection Time: 02/17/21  9:20 AM   Result Value Ref Range    Sodium 140 136 - 145 mmol/L    Potassium 4.7 3.5 - 5.1 mmol/L    Chloride 106 97 - 108 mmol/L    CO2 27 21 - 32 mmol/L    Anion gap 7 5 - 15 mmol/L    Glucose 69 65 - 100 mg/dL    BUN 27 (H) 6 - 20 mg/dL    Creatinine 1.42 (H) 0.70 - 1.30 mg/dL    BUN/Creatinine ratio 19 12 - 20      GFR est AA >60 >60 ml/min/1.73m2    GFR est non-AA 52 (L) >60 ml/min/1.73m2    Calcium 9.8 8.5 - 10.1 mg/dL    Bilirubin, total 0.3 0.2 - 1.0 mg/dL    AST (SGOT) 120 (H) 15 - 37 U/L    ALT (SGPT) 121 (H) 12 - 78 U/L    Alk. phosphatase 126 (H) 45 - 117 U/L    Protein, total 7.7 6.4 - 8.2 g/dL    Albumin 3.4 (L) 3.5 - 5.0 g/dL    Globulin 4.3 (H) 2.0 - 4.0 g/dL    A-G Ratio 0.8 (L) 1.1 - 2.2     CBC WITH AUTOMATED DIFF    Collection Time: 02/17/21  9:20 AM   Result Value Ref Range    WBC 5.4 4.4 - 11.3 K/uL    RBC 4.37 (L) 4.50 - 5.90 M/uL    HGB 13.7 13.5 - 17.5 g/dL    HCT 40.6 (L) 41 - 53 %    MCV 92.9 80 - 100 FL    MCH 31.4 31 - 34 PG    MCHC 33.7 31.0 - 36.0 g/dL    RDW 11.7 11.5 - 14.5 %    PLATELET 971 K/uL    MPV 8.2 6.5 - 11.5 FL    NRBC 0.0  WBC    ABSOLUTE NRBC 0.00 K/uL    NEUTROPHILS 35 (L) 42 - 75 %    LYMPHOCYTES 49 20.5 - 51.1 %    MONOCYTES 13 (H) 1.7 - 9.3 %    EOSINOPHILS 3 (H) 0.9 - 2.9 %    BASOPHILS 0 0.0 - 2.5 %    ABS. NEUTROPHILS 1.9 1.8 - 7.7 K/UL    ABS. LYMPHOCYTES 2.6 1.0 - 4.8 K/UL    ABS. MONOCYTES 0.7 0.2 - 2.4 K/UL    ABS. EOSINOPHILS 0.2 0.0 - 0.7 K/UL    ABS. BASOPHILS 0.0 0.0 - 0.2 K/UL   URIC ACID    Collection Time: 02/17/21  9:20 AM   Result Value Ref Range    Uric acid 3.7 3.5 - 7.2 mg/dL       Radiologic Studies -   No orders to display     CT Results  (Last 48 hours)      None          CXR Results  (Last 48 hours)      None               If you feel that you have not received excellent quality care or timely care, please ask to speak to the nurse manager. Please choose us in the future for your continued health care needs. ------------------------------------------------------------------------------------------------------------  The exam and treatment you received in the Emergency Department were for an urgent problem and are not intended as complete care. It is important that you follow-up with a doctor, nurse practitioner, or physician assistant to:  (1) confirm your diagnosis,  (2) re-evaluation of changes in your illness and treatment, and  (3) for ongoing care. If your symptoms become worse or you do not improve as expected and you are unable to reach your usual health care provider, you should return to the Emergency Department. We are available 24 hours a day. Please take your discharge instructions with you when you go to your follow-up appointment. If you have any problem arranging a follow-up appointment, contact the Emergency Department immediately. If a prescription has been provided, please have it filled as soon as possible to prevent a delay in treatment. Read the entire medication instruction sheet provided to you by the pharmacy. If you have any questions or reservations about taking the medication due to side effects or interactions with other medications, please call your primary care physician or contact the ER to speak with the charge nurse.      Make an appointment with your family doctor or the physician you were referred to for follow-up of this visit as instructed on your discharge paperwork, as this is a mandatory follow-up. Return to the ER if you are unable to be seen or if you are unable to be seen in a timely manner. If you have any problem arranging the follow-up visit, contact the Emergency Department immediately.

## 2021-02-17 NOTE — LETTER
NOTIFICATION RETURN TO WORK / SCHOOL 
 
2/17/2021 11:24 AM 
 
Mr. Carlos Castelan [address] [place] [id number] To Whom It May Concern: 
 
Carlos Castelan is currently under the care of [facility] EMERGENCY DEPT. He will return to work/school on: 2/19/21 If there are questions or concerns please have the patient contact our office. Pt is here for treatment of hands bilaterally. Pt given medications and to follow up with PCP Sincerely, 
 
 
Dominique Doan RN

## 2021-02-17 NOTE — ED TRIAGE NOTES
.  Chief Complaint   Patient presents with    Hand Swelling     pt presents to ED states that he has had tingling and burn to hands bilaterally. Pt states hands are swollen, with knot to left hand. 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 Pt in [place].  States that his hands feell like they are on fire

## 2021-02-17 NOTE — ED NOTES
240 ml total orange juice given with helene crackers and peanut butter, blood sugar recheck and is 43. Box lunch given., Will reeval. HR 97, Sats 100%, /88. Dr Araceli Dumont.

## 2021-02-17 NOTE — ED NOTES
Pt called out and reports that he feels like his sugar is dropping, has history of DM, ate apple this am and took insulin. Blood sugar check at bedside and is 43. Orange juice given. Dr Ralph Pavon at bedside and ntfd.

## 2021-02-18 NOTE — ED PROVIDER NOTES
EMERGENCY DEPARTMENT HISTORY AND PHYSICAL EXAM      Date: 2/17/2021  Patient Name: Mai Valencia    History of Presenting Illness     Chief Complaint   Patient presents with    Hand Swelling     pt presents to ED states that he has had tingling and burn to hands bilaterally. Pt states hands are swollen, with knot to left hand. 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 Pt in [place]. States that his hands feell like they are on fire       History Provided By: Patient    HPI: Mai Valencia, 50 y.o. male with a past medical history significant for diabetes, hypertension and hyperlipidemia who presents to the ED with cc of bilateral hand pain and numbness x 24 hours. Patient denies any trauma, and pain & numbness is constant and does not worsens with movement. Good radial pulses noted bilaterally. There are no other complaints, changes, or physical findings at this time. PCP: Lane Hernandez, DO    No current facility-administered medications on file prior to encounter. Current Outpatient Medications on File Prior to Encounter   Medication Sig Dispense Refill    DULoxetine (CYMBALTA) 60 mg capsule Take 1 Cap by mouth daily. Indications: diabetic complication causing injury to some body nerves, major depressive disorder 30 Cap 0    indapamide (LOZOL) 1.25 mg tablet Take 1 Tab by mouth daily. Indications: high blood pressure 30 Tab 0    insulin glargine (LANTUS) 100 unit/mL injection Administer 60 units underneath the skin, every night after supper  Indications: type 2 diabetes mellitus 1 Vial 0    insulin lispro (HUMALOG) 100 unit/mL injection Administer 10 underneath the skin, three times a day before meals. Indications: type 2 diabetes mellitus 1 Vial 0    metroNIDAZOLE (FLAGYL) 500 mg tablet Take 1 Tab by mouth three (3) times daily. Indications: GI infection 12 Tab 0    QUEtiapine (SEROquel) 100 mg tablet Take 1 Tab by mouth nightly.  Indications: additional medications to treat depression 30 Tab 0    rosuvastatin (CRESTOR) 40 mg tablet Take 1 Tab by mouth nightly. Indications: high cholesterol 30 Tab 0    traZODone (DESYREL) 50 mg tablet Take 1 Tab by mouth nightly as needed for Sleep. Indications: insomnia associated with depression 30 Tab 0    [DISCONTINUED] gabapentin (NEURONTIN) 300 mg capsule Take 1 Cap by mouth two (2) times a day. Max Daily Amount: 600 mg. Indications: neuropathic pain 60 Cap 0       Past History     Past Medical History:  Past Medical History:   Diagnosis Date    Diabetes ([facility] [address]     Hepatitis C infection     Hypertension        Past Surgical History:  No past surgical history on file. Family History:  No family history on file. Social History:  Social History     Tobacco Use    Smoking status: Current Every Day Smoker     Packs/day: 1.00    Smokeless tobacco: Never Used   Substance Use Topics    Alcohol use: Never     Frequency: Never     Comment: socially     Drug use: Not Currently       Allergies:  No Known Allergies      Review of Systems     Review of Systems   Constitutional: Negative. HENT: Negative. Eyes: Negative. Respiratory: Negative. Cardiovascular: Negative. Gastrointestinal: Negative. Endocrine: Negative. Genitourinary: Negative. Musculoskeletal: Negative. Allergic/Immunologic: Negative. Neurological: Negative. Hematological: Negative. Psychiatric/Behavioral: Negative. Physical Exam     Physical Exam  Vitals signs and nursing note reviewed. Constitutional:       Appearance: Normal appearance. He is normal weight. HENT:      Head: Normocephalic and atraumatic. Right Ear: Tympanic membrane normal.      Left Ear: Tympanic membrane normal.      Nose: Nose normal.      Mouth/Throat:      Mouth: Mucous membranes are moist.   Eyes:      Extraocular Movements: Extraocular movements intact. Pupils: Pupils are equal, round, and reactive to light.    Neck:      Musculoskeletal: Normal range of motion and neck supple. Cardiovascular:      Rate and Rhythm: Normal rate and regular rhythm. Pulses: Normal pulses. Heart sounds: Normal heart sounds. Pulmonary:      Effort: Pulmonary effort is normal.      Breath sounds: Normal breath sounds. Abdominal:      General: Abdomen is flat. Bowel sounds are normal.      Palpations: Abdomen is soft. Musculoskeletal:         General: Tenderness present. Right hand: He exhibits decreased range of motion and tenderness. Decreased sensation noted. Decreased sensation is present in the ulnar distribution, is present in the medial distribution and is present in the radial distribution. He exhibits no finger abduction, no thumb/finger opposition and no wrist extension trouble. Left hand: He exhibits decreased range of motion and tenderness. Decreased sensation noted. Decreased sensation is present in the ulnar distribution, is present in the medial distribution and is present in the radial distribution. He exhibits no finger abduction, no thumb/finger opposition and no wrist extension trouble. Hands:    Skin:     General: Skin is warm and dry. Neurological:      Mental Status: He is alert and oriented to person, place, and time. Mental status is at baseline. Sensory: Sensory deficit present. Psychiatric:         Mood and Affect: Mood normal.         Behavior: Behavior normal.         Thought Content:  Thought content normal.         Judgment: Judgment normal.         Lab and Diagnostic Study Results     Labs -     Recent Results (from the past 12 hour(s))   GLUCOSE, POC    Collection Time: 02/17/21 10:05 AM   Result Value Ref Range    Glucose (POC) 43 (LL) 65 - 100 mg/dL    Performed by Manisha Wang    GLUCOSE, POC    Collection Time: 02/17/21 10:26 AM   Result Value Ref Range    Glucose (POC) 43 (LL) 65 - 100 mg/dL    Performed by ITT EXIM, POC    Collection Time: 02/17/21 10:48 AM   Result Value Ref Range    Glucose (POC) 79 65 - 100 mg/dL    Performed by Leobardo Armstrong    GLUCOSE, POC    Collection Time: 02/17/21 11:18 AM   Result Value Ref Range    Glucose (POC) 104 (H) 65 - 100 mg/dL    Performed by Kris Life        Radiologic Studies -   @lastxrresult@  CT Results  (Last 48 hours)    None        CXR Results  (Last 48 hours)    None            Medical Decision Making   - I am the first provider for this patient. - I reviewed the vital signs, available nursing notes, past medical history, past surgical history, family history and social history. - Initial assessment performed. The patients presenting problems have been discussed, and they are in agreement with the care plan formulated and outlined with them. I have encouraged them to ask questions as they arise throughout their visit. Vital Signs-Reviewed the patient's vital signs. No data found. Records Reviewed: Nursing Notes    The patient presents with Bilateral hand numbness with a differential diagnosis of Carpal tunnel syndrome, tendonitis, autoimmune, gout     ED Course: Patient's symptoms, examination and lab results were reviewed and noted. Patient's symptoms & findings appear to be consistent with Carpal tunnel syndrome         Provider Notes (Medical Decision Making):   Patient's symptoms, examination and lab results were reviewed and noted. Patient's symptoms & findings appear to be consistent with Carpal tunnel syndrome      MDM       Procedures   Medical Decision Makingedical Decision Making  Performed by: Ian Gordillo MD  PROCEDURES    None  Procedures       Disposition   Disposition: DC- Adult Discharges: All of the diagnostic tests were reviewed and questions answered. Diagnosis, care plan and treatment options were discussed. The patient understands the instructions and will follow up as directed. The patients results have been reviewed with them. They have been counseled regarding their diagnosis.   The patient verbally convey understanding and agreement of the signs, symptoms, diagnosis, treatment and prognosis and additionally agrees to follow up as recommended with their PCP in 24 - 48 hours. They also agree with the care-plan and convey that all of their questions have been answered. I have also put together some discharge instructions for them that include: 1) educational information regarding their diagnosis, 2) how to care for their diagnosis at home, as well a 3) list of reasons why they would want to return to the ED prior to their follow-up appointment, should their condition change. Discharged    DISCHARGE PLAN:  1. Cannot display discharge medications since this patient is not currently admitted. 2.   Follow-up Information     Follow up With Specialties Details Why Contact Marv Pantoja, DO Family Medicine Call in 3 days If symptoms worsen         3. Return to ED if worse   4. Discharge Medication List as of 2/17/2021 11:20 AM      START taking these medications    Details   predniSONE (DELTASONE) 20 mg tablet Take 20 mg by mouth daily for 10 days. With Breakfast, Normal, Disp-10 Tab, R-0         CONTINUE these medications which have CHANGED    Details   gabapentin (NEURONTIN) 300 mg capsule Take 1 Cap by mouth three (3) times daily for 10 days. Max Daily Amount: 900 mg. Indications: neuropathic pain, Normal, Disp-30 Cap, R-0         CONTINUE these medications which have NOT CHANGED    Details   DULoxetine (CYMBALTA) 60 mg capsule Take 1 Cap by mouth daily. Indications: diabetic complication causing injury to some body nerves, major depressive disorder, Print, Disp-30 Cap, R-0      indapamide (LOZOL) 1.25 mg tablet Take 1 Tab by mouth daily.  Indications: high blood pressure, Print, Disp-30 Tab, R-0      insulin glargine (LANTUS) 100 unit/mL injection Administer 60 units underneath the skin, every night after supper  Indications: type 2 diabetes mellitus, Print, Disp-1 Vial, R-0      insulin lispro (HUMALOG) 100 unit/mL injection Administer 10 underneath the skin, three times a day before meals. Indications: type 2 diabetes mellitus, Print, Disp-1 Vial, R-0      metroNIDAZOLE (FLAGYL) 500 mg tablet Take 1 Tab by mouth three (3) times daily. Indications: GI infection, No Print, Disp-12 Tab, R-0      QUEtiapine (SEROquel) 100 mg tablet Take 1 Tab by mouth nightly. Indications: additional medications to treat depression, Print, Disp-30 Tab, R-0      rosuvastatin (CRESTOR) 40 mg tablet Take 1 Tab by mouth nightly. Indications: high cholesterol, Print, Disp-30 Tab, R-0      traZODone (DESYREL) 50 mg tablet Take 1 Tab by mouth nightly as needed for Sleep. Indications: insomnia associated with depression, Print, Disp-30 Tab, R-0               Diagnosis     Clinical Impression:   1. Carpal tunnel syndrome, bilateral        Attestations:    Darcy Zhong MD    Please note that this dictation was completed with [employer], the [employer] voice recognition software. Quite often unanticipated grammatical, syntax, homophones, and other interpretive errors are inadvertently transcribed by the computer software. Please disregard these errors. Please excuse any errors that have escaped final proofreading. Thank you.

## 2021-02-19 NOTE — ED NOTES
2/19/2021 1228- Restrictions were mistakenly added to patient's work note. Pt came in requesting that the restrictions be removed, because his employer will not let him come back to work. Note corrected and given to patient.

## 2021-03-03 ENCOUNTER — HOSPITAL ENCOUNTER (EMERGENCY)
Age: 56
Discharge: HOME | End: 2021-03-03
Attending: EMERGENCY MEDICINE
Payer: MEDICAID

## 2021-03-03 VITALS
DIASTOLIC BLOOD PRESSURE: 90 MMHG | WEIGHT: 165 LBS | HEIGHT: 71 IN | SYSTOLIC BLOOD PRESSURE: 154 MMHG | TEMPERATURE: 97.3 F | HEART RATE: 95 BPM | BODY MASS INDEX: 23.1 KG/M2 | RESPIRATION RATE: 20 BRPM | OXYGEN SATURATION: 97 %

## 2021-03-03 DIAGNOSIS — G56.03 CARPAL TUNNEL SYNDROME ON BOTH SIDES: Primary | ICD-10-CM

## 2021-03-03 PROCEDURE — 74011636637 HC RX REV CODE- 636/637: Performed by: EMERGENCY MEDICINE

## 2021-03-03 PROCEDURE — 99284 EMERGENCY DEPT VISIT MOD MDM: CPT

## 2021-03-03 PROCEDURE — 74011250637 HC RX REV CODE- 250/637: Performed by: EMERGENCY MEDICINE

## 2021-03-03 RX ORDER — GABAPENTIN 300 MG/1
300 CAPSULE ORAL 3 TIMES DAILY
Qty: 30 CAP | Refills: 0 | Status: SHIPPED | OUTPATIENT
Start: 2021-03-03 | End: 2021-03-13

## 2021-03-03 RX ORDER — PREDNISONE 20 MG/1
40 TABLET ORAL
Status: COMPLETED | OUTPATIENT
Start: 2021-03-03 | End: 2021-03-03

## 2021-03-03 RX ORDER — HYDROCODONE BITARTRATE AND ACETAMINOPHEN 5; 325 MG/1; MG/1
2 TABLET ORAL
Status: COMPLETED | OUTPATIENT
Start: 2021-03-03 | End: 2021-03-03

## 2021-03-03 RX ADMIN — PREDNISONE 40 MG: 20 TABLET ORAL at 13:39

## 2021-03-03 RX ADMIN — HYDROCODONE BITARTRATE AND ACETAMINOPHEN 2 TABLET: 5; 325 TABLET ORAL at 13:39

## 2021-03-03 NOTE — ED NOTES
3/3/2021      RE: Michele Suarez      To Whom it May Concern: This is to certify that Michele Suarez may may return to work on March 05 2021. Please feel free to contact my office if you have any questions or concerns. Thank you for your assistance in this matter.     Sincerely,            Dr Camacho Morin RN

## 2021-03-04 NOTE — ED PROVIDER NOTES
EMERGENCY DEPARTMENT HISTORY AND PHYSICAL EXAM      Date: 3/3/2021  Patient Name: Nicolasa Sheikh    History of Presenting Illness     No chief complaint on file. History Provided By: Patient    HPI: Nicolasa Sheikh, 50 y.o. male with a past medical history significant diabetes, hypertension and hyperlipidemia presents to the ED with cc of bilateral numbness and pain of right & left hands. Patient was previously seen for the same complaints last week and was told to follow-up with neurologist to evaluated symptoms. Patient failed to do that and continued to work using his hands moving boxes constantly he states. No Hx of trauma, no fevers, no deformities, no infections and no skin lesions  There are no other complaints, changes, or physical findings at this time. PCP: Jose Charles, DO    No current facility-administered medications on file prior to encounter. Current Outpatient Medications on File Prior to Encounter   Medication Sig Dispense Refill    DULoxetine (CYMBALTA) 60 mg capsule Take 1 Cap by mouth daily. Indications: diabetic complication causing injury to some body nerves, major depressive disorder 30 Cap 0    indapamide (LOZOL) 1.25 mg tablet Take 1 Tab by mouth daily. Indications: high blood pressure 30 Tab 0    insulin glargine (LANTUS) 100 unit/mL injection Administer 60 units underneath the skin, every night after supper  Indications: type 2 diabetes mellitus 1 Vial 0    insulin lispro (HUMALOG) 100 unit/mL injection Administer 10 underneath the skin, three times a day before meals. Indications: type 2 diabetes mellitus 1 Vial 0    metroNIDAZOLE (FLAGYL) 500 mg tablet Take 1 Tab by mouth three (3) times daily. Indications: GI infection 12 Tab 0    QUEtiapine (SEROquel) 100 mg tablet Take 1 Tab by mouth nightly. Indications: additional medications to treat depression 30 Tab 0    rosuvastatin (CRESTOR) 40 mg tablet Take 1 Tab by mouth nightly.  Indications: high cholesterol 30 Tab 0  traZODone (DESYREL) 50 mg tablet Take 1 Tab by mouth nightly as needed for Sleep. Indications: insomnia associated with depression 30 Tab 0       Past History     Past Medical History:  Past Medical History:   Diagnosis Date    Diabetes ([address]     Hepatitis C infection     Hypertension        Past Surgical History:  No past surgical history on file. Family History:  No family history on file. Social History:  Social History     Tobacco Use    Smoking status: Current Every Day Smoker     Packs/day: 1.00    Smokeless tobacco: Never Used   Substance Use Topics    Alcohol use: Never     Frequency: Never     Comment: socially     Drug use: Not Currently       Allergies:  No Known Allergies      Review of Systems     Review of Systems   Constitutional: Negative. HENT: Negative. Eyes: Negative. Respiratory: Negative. Cardiovascular: Negative. Gastrointestinal: Negative. Endocrine: Negative. Genitourinary: Negative. Musculoskeletal: Negative. Allergic/Immunologic: Negative. Neurological: Positive for weakness and numbness. Hematological: Negative. Psychiatric/Behavioral: Negative. Physical Exam     Physical Exam  Vitals signs and nursing note reviewed. Constitutional:       Appearance: Normal appearance. He is normal weight. HENT:      Head: Normocephalic and atraumatic. Right Ear: Tympanic membrane normal.      Left Ear: Tympanic membrane normal.      Nose: Nose normal.      Mouth/Throat:      Mouth: Mucous membranes are moist.   Eyes:      Extraocular Movements: Extraocular movements intact. Pupils: Pupils are equal, round, and reactive to light. Neck:      Musculoskeletal: Normal range of motion and neck supple. Cardiovascular:      Rate and Rhythm: Normal rate and regular rhythm. Pulses: Normal pulses. Heart sounds: Normal heart sounds. Pulmonary:      Effort: Pulmonary effort is normal.      Breath sounds: Normal breath sounds. Abdominal:      General: Abdomen is flat. Bowel sounds are normal.      Palpations: Abdomen is soft. Musculoskeletal: Normal range of motion. Skin:     General: Skin is warm and dry. Neurological:      General: No focal deficit present. Mental Status: He is alert and oriented to person, place, and time. Mental status is at baseline. Cranial Nerves: Cranial nerves are intact. Motor: Weakness present. Coordination: Coordination abnormal.      Comments: Noted to have numbness and tingling of both hands on exam   Psychiatric:         Mood and Affect: Mood normal.         Behavior: Behavior normal.         Thought Content: Thought content normal.         Judgment: Judgment normal.         Lab and Diagnostic Study Results     Labs -   No results found for this or any previous visit (from the past 12 hour(s)). Radiologic Studies -   @lastxrresult@  CT Results  (Last 48 hours)    None        CXR Results  (Last 48 hours)    None            Medical Decision Making   - I am the first provider for this patient. - I reviewed the vital signs, available nursing notes, past medical history, past surgical history, family history and social history. - Initial assessment performed. The patients presenting problems have been discussed, and they are in agreement with the care plan formulated and outlined with them. I have encouraged them to ask questions as they arise throughout their visit. Vital Signs-Reviewed the patient's vital signs. No data found. Records Reviewed: Nursing Notes    The patient presents with numbness of hands with a differential diagnosis of Carpal tunnel syndrome, diabetic neuropathy, raynaud's disease and cellulitis       ED Course:   Patient's symptoms, examination were noted & reviewed. Patient was given pain medicines and told to follow up with neurologist to evaluate symptoms.  Findings consistent with Carpal tunnel syndrome         Provider Notes (Medical Decision Making):   Patient's symptoms, examination were noted & reviewed. Patient was given pain medicines and told to follow up with neurologist to evaluate symptoms. Findings consistent with Carpal tunnel syndrome    MDM       Procedures   Medical Decision Makingedical Decision Making  Performed by: Aidee Guillen MD  PROCEDURES:Procedures   None    Disposition   Disposition: DC- Adult Discharges: All of the diagnostic tests were reviewed and questions answered. Diagnosis, care plan and treatment options were discussed. The patient understands the instructions and will follow up as directed. The patients results have been reviewed with them. They have been counseled regarding their diagnosis. The patient verbally convey understanding and agreement of the signs, symptoms, diagnosis, treatment and prognosis and additionally agrees to follow up as recommended with their PCP in 24 - 48 hours. They also agree with the care-plan and convey that all of their questions have been answered. I have also put together some discharge instructions for them that include: 1) educational information regarding their diagnosis, 2) how to care for their diagnosis at home, as well a 3) list of reasons why they would want to return to the ED prior to their follow-up appointment, should their condition change. Discharged    DISCHARGE PLAN:  1. Cannot display discharge medications since this patient is not currently admitted. 2.   Follow-up Information     Follow up With Specialties Details Why Contact Info    Jill Enciso, DO Family Medicine Call in 3 days If symptoms worsen         3. Return to ED if worse   4. Discharge Medication List as of 3/3/2021  1:35 PM      START taking these medications    Details   gabapentin (NEURONTIN) 300 mg capsule Take 1 Cap by mouth three (3) times daily for 10 days. Max Daily Amount: 900 mg.  Indications: neuropathic pain, Normal, Disp-30 Cap, R-0         CONTINUE these medications which have NOT CHANGED    Details   DULoxetine (CYMBALTA) 60 mg capsule Take 1 Cap by mouth daily. Indications: diabetic complication causing injury to some body nerves, major depressive disorder, Print, Disp-30 Cap, R-0      indapamide (LOZOL) 1.25 mg tablet Take 1 Tab by mouth daily. Indications: high blood pressure, Print, Disp-30 Tab, R-0      insulin glargine (LANTUS) 100 unit/mL injection Administer 60 units underneath the skin, every night after supper  Indications: type 2 diabetes mellitus, Print, Disp-1 Vial, R-0      insulin lispro (HUMALOG) 100 unit/mL injection Administer 10 underneath the skin, three times a day before meals. Indications: type 2 diabetes mellitus, Print, Disp-1 Vial, R-0      metroNIDAZOLE (FLAGYL) 500 mg tablet Take 1 Tab by mouth three (3) times daily. Indications: GI infection, No Print, Disp-12 Tab, R-0      QUEtiapine (SEROquel) 100 mg tablet Take 1 Tab by mouth nightly. Indications: additional medications to treat depression, Print, Disp-30 Tab, R-0      rosuvastatin (CRESTOR) 40 mg tablet Take 1 Tab by mouth nightly. Indications: high cholesterol, Print, Disp-30 Tab, R-0      traZODone (DESYREL) 50 mg tablet Take 1 Tab by mouth nightly as needed for Sleep. Indications: insomnia associated with depression, Print, Disp-30 Tab, R-0               Diagnosis     Clinical Impression:   1. Carpal tunnel syndrome on both sides        Attestations:    Jose Rafael Singh MD    Please note that this dictation was completed with [employer], the [employer] voice recognition software. Quite often unanticipated grammatical, syntax, homophones, and other interpretive errors are inadvertently transcribed by the computer software. Please disregard these errors. Please excuse any errors that have escaped final proofreading. Thank you.

## 2021-04-01 ENCOUNTER — HOSPITAL ENCOUNTER (INPATIENT)
Age: 56
LOS: 8 days | Discharge: NURSING, REHAB OR LONG-TERM CARE | DRG: 344 | End: 2021-04-10
Attending: EMERGENCY MEDICINE | Admitting: FAMILY MEDICINE
Payer: MEDICAID

## 2021-04-01 ENCOUNTER — HOSPITAL ENCOUNTER (EMERGENCY)
Age: 56
Discharge: ANOTHER HOSPITAL | End: 2021-04-01
Attending: FAMILY MEDICINE
Payer: MEDICAID

## 2021-04-01 ENCOUNTER — APPOINTMENT (OUTPATIENT)
Dept: GENERAL RADIOLOGY | Age: 56
DRG: 344 | End: 2021-04-01
Attending: EMERGENCY MEDICINE
Payer: MEDICAID

## 2021-04-01 ENCOUNTER — APPOINTMENT (OUTPATIENT)
Dept: GENERAL RADIOLOGY | Age: 56
End: 2021-04-01
Attending: FAMILY MEDICINE
Payer: MEDICAID

## 2021-04-01 VITALS
WEIGHT: 160 LBS | OXYGEN SATURATION: 98 % | SYSTOLIC BLOOD PRESSURE: 164 MMHG | TEMPERATURE: 97.9 F | RESPIRATION RATE: 16 BRPM | BODY MASS INDEX: 22.4 KG/M2 | HEIGHT: 71 IN | HEART RATE: 76 BPM | DIASTOLIC BLOOD PRESSURE: 88 MMHG

## 2021-04-01 DIAGNOSIS — B18.2 CHRONIC HEPATITIS C WITHOUT HEPATIC COMA (HCC): ICD-10-CM

## 2021-04-01 DIAGNOSIS — Z22.322 MRSA COLONIZATION: ICD-10-CM

## 2021-04-01 DIAGNOSIS — R45.851 SUICIDAL IDEATION: ICD-10-CM

## 2021-04-01 DIAGNOSIS — F33.2 MDD (MAJOR DEPRESSIVE DISORDER), RECURRENT SEVERE, WITHOUT PSYCHOSIS (HCC): ICD-10-CM

## 2021-04-01 DIAGNOSIS — M86.9 OSTEOMYELITIS OF TOE (HCC): Primary | ICD-10-CM

## 2021-04-01 DIAGNOSIS — M86.9 OSTEOMYELITIS, UNSPECIFIED SITE, UNSPECIFIED TYPE (HCC): Primary | ICD-10-CM

## 2021-04-01 DIAGNOSIS — M86.671 OTHER CHRONIC OSTEOMYELITIS OF RIGHT FOOT (HCC): ICD-10-CM

## 2021-04-01 DIAGNOSIS — F14.29 COCAINE DEPENDENCE WITH COCAINE-INDUCED DISORDER (HCC): ICD-10-CM

## 2021-04-01 DIAGNOSIS — F32.3 MAJOR DEPRESSION WITH PSYCHOTIC FEATURES (HCC): ICD-10-CM

## 2021-04-01 LAB
ALBUMIN SERPL-MCNC: 2.9 G/DL (ref 3.5–5)
ALBUMIN/GLOB SERPL: 0.7 {RATIO} (ref 1.1–2.2)
ALP SERPL-CCNC: 204 U/L (ref 45–117)
ALT SERPL-CCNC: 50 U/L (ref 12–78)
AMPHET UR QL SCN: NEGATIVE
ANION GAP SERPL CALC-SCNC: 5 MMOL/L (ref 5–15)
APPEARANCE UR: CLEAR
AST SERPL W P-5'-P-CCNC: 42 U/L (ref 15–37)
BACTERIA URNS QL MICRO: ABNORMAL /HPF
BARBITURATES UR QL SCN: NEGATIVE
BASOPHILS # BLD: 0 K/UL (ref 0–0.2)
BASOPHILS NFR BLD: 0 % (ref 0–2.5)
BENZODIAZ UR QL: NEGATIVE
BILIRUB SERPL-MCNC: 0.3 MG/DL (ref 0.2–1)
BILIRUB UR QL: NEGATIVE
BUN SERPL-MCNC: 10 MG/DL (ref 6–20)
BUN/CREAT SERPL: 8 (ref 12–20)
CA-I BLD-MCNC: 9.5 MG/DL (ref 8.5–10.1)
CANNABINOIDS UR QL SCN: NEGATIVE
CHLORIDE SERPL-SCNC: 100 MMOL/L (ref 97–108)
CO2 SERPL-SCNC: 32 MMOL/L (ref 21–32)
COCAINE UR QL SCN: POSITIVE
COLOR UR: ABNORMAL
CREAT SERPL-MCNC: 1.23 MG/DL (ref 0.7–1.3)
DRUG SCRN COMMENT,DRGCM: ABNORMAL
ECSTASY, ECST: NEGATIVE
EOSINOPHIL # BLD: 0.1 K/UL (ref 0–0.7)
EOSINOPHIL NFR BLD: 1 % (ref 0.9–2.9)
ERYTHROCYTE [DISTWIDTH] IN BLOOD BY AUTOMATED COUNT: 13.5 % (ref 11.5–14.5)
ERYTHROCYTE [SEDIMENTATION RATE] IN BLOOD: 53 MM/HR
GLOBULIN SER CALC-MCNC: 4.3 G/DL (ref 2–4)
GLUCOSE BLD STRIP.AUTO-MCNC: 349 MG/DL (ref 65–100)
GLUCOSE SERPL-MCNC: 353 MG/DL (ref 65–100)
GLUCOSE UR STRIP.AUTO-MCNC: >1000 MG/DL
HCT VFR BLD AUTO: 37 % (ref 41–53)
HGB BLD-MCNC: 13.1 G/DL (ref 13.5–17.5)
HGB UR QL STRIP: NEGATIVE
KETONES UR QL STRIP.AUTO: NEGATIVE MG/DL
LEUKOCYTE ESTERASE UR QL STRIP.AUTO: NEGATIVE
LYMPHOCYTES # BLD: 2.2 K/UL (ref 1–4.8)
LYMPHOCYTES NFR BLD: 41 % (ref 20.5–51.1)
MCH RBC QN AUTO: 32.7 PG (ref 31–34)
MCHC RBC AUTO-ENTMCNC: 35.3 G/DL (ref 31–36)
MCV RBC AUTO: 92.7 FL (ref 80–100)
METHADONE UR QL: NEGATIVE
MONOCYTES # BLD: 0.8 K/UL (ref 0.2–2.4)
MONOCYTES NFR BLD: 15 % (ref 1.7–9.3)
NEUTS SEG # BLD: 2.3 K/UL (ref 1.8–7.7)
NEUTS SEG NFR BLD: 43 % (ref 42–75)
NITRITE UR QL STRIP.AUTO: NEGATIVE
NRBC # BLD: 0 K/UL
NRBC BLD-RTO: 0.1 PER 100 WBC
OPIATES UR QL: NEGATIVE
PCP UR QL: NEGATIVE
PERFORMED BY, TECHID: ABNORMAL
PH UR STRIP: 8 [PH] (ref 5–8)
PLATELET # BLD AUTO: 146 K/UL
PMV BLD AUTO: 9.3 FL (ref 6.5–11.5)
POTASSIUM SERPL-SCNC: 3.9 MMOL/L (ref 3.5–5.1)
PROT SERPL-MCNC: 7.2 G/DL (ref 6.4–8.2)
PROT UR STRIP-MCNC: ABNORMAL MG/DL
RBC # BLD AUTO: 4 M/UL (ref 4.5–5.9)
RBC #/AREA URNS HPF: ABNORMAL /HPF (ref 0–3)
SARS-COV-2, COV2: NORMAL
SARS-COV-2, COV2: NOT DETECTED
SODIUM SERPL-SCNC: 137 MMOL/L (ref 136–145)
SP GR UR REFRACTOMETRY: 1.02 (ref 1–1.03)
UROBILINOGEN UR QL STRIP.AUTO: 2 EU/DL (ref 0.2–1)
WBC # BLD AUTO: 5.4 K/UL (ref 4.4–11.3)
WBC URNS QL MICRO: ABNORMAL /HPF (ref 0–5)

## 2021-04-01 PROCEDURE — 96365 THER/PROPH/DIAG IV INF INIT: CPT

## 2021-04-01 PROCEDURE — 80053 COMPREHEN METABOLIC PANEL: CPT

## 2021-04-01 PROCEDURE — 96374 THER/PROPH/DIAG INJ IV PUSH: CPT

## 2021-04-01 PROCEDURE — 99285 EMERGENCY DEPT VISIT HI MDM: CPT

## 2021-04-01 PROCEDURE — 85652 RBC SED RATE AUTOMATED: CPT

## 2021-04-01 PROCEDURE — 74011250636 HC RX REV CODE- 250/636: Performed by: FAMILY MEDICINE

## 2021-04-01 PROCEDURE — 80307 DRUG TEST PRSMV CHEM ANLYZR: CPT

## 2021-04-01 PROCEDURE — 96366 THER/PROPH/DIAG IV INF ADDON: CPT

## 2021-04-01 PROCEDURE — 36415 COLL VENOUS BLD VENIPUNCTURE: CPT

## 2021-04-01 PROCEDURE — 87077 CULTURE AEROBIC IDENTIFY: CPT

## 2021-04-01 PROCEDURE — 96375 TX/PRO/DX INJ NEW DRUG ADDON: CPT

## 2021-04-01 PROCEDURE — 82962 GLUCOSE BLOOD TEST: CPT

## 2021-04-01 PROCEDURE — 87635 SARS-COV-2 COVID-19 AMP PRB: CPT

## 2021-04-01 PROCEDURE — 73630 X-RAY EXAM OF FOOT: CPT

## 2021-04-01 PROCEDURE — 87040 BLOOD CULTURE FOR BACTERIA: CPT

## 2021-04-01 PROCEDURE — 83605 ASSAY OF LACTIC ACID: CPT

## 2021-04-01 PROCEDURE — 87205 SMEAR GRAM STAIN: CPT

## 2021-04-01 PROCEDURE — 81001 URINALYSIS AUTO W/SCOPE: CPT

## 2021-04-01 PROCEDURE — 93005 ELECTROCARDIOGRAM TRACING: CPT

## 2021-04-01 PROCEDURE — 73660 X-RAY EXAM OF TOE(S): CPT

## 2021-04-01 PROCEDURE — 99284 EMERGENCY DEPT VISIT MOD MDM: CPT

## 2021-04-01 PROCEDURE — 87186 SC STD MICRODIL/AGAR DIL: CPT

## 2021-04-01 PROCEDURE — 85025 COMPLETE CBC W/AUTO DIFF WBC: CPT

## 2021-04-01 PROCEDURE — 86141 C-REACTIVE PROTEIN HS: CPT

## 2021-04-01 PROCEDURE — 86140 C-REACTIVE PROTEIN: CPT

## 2021-04-01 PROCEDURE — 83735 ASSAY OF MAGNESIUM: CPT

## 2021-04-01 RX ADMIN — VANCOMYCIN HYDROCHLORIDE 1250 MG: 1.25 INJECTION, POWDER, LYOPHILIZED, FOR SOLUTION INTRAVENOUS at 18:01

## 2021-04-01 NOTE — ED TRIAGE NOTES
AAO cooperative patient presents with police who have since departed requesting voluntary hold related to homelessness and SI. Patient denies any current attempt at self harm. Patient states his \"plan\" is to overdose. Patient denies HI.

## 2021-04-01 NOTE — Clinical Note
Patient Class[de-identified] OBSERVATION [343]   Type of Bed: Surgical [18]   Reason for Observation: osteomyelitis   Admitting Diagnosis: Osteomyelitis [facility]) [[id number]]   Admitting Physician: Reyna Monzon [[id number]]   Attending Physician: Reyna Monzon [[id number]]

## 2021-04-01 NOTE — ED PROVIDER NOTES
EMERGENCY DEPARTMENT HISTORY AND PHYSICAL EXAM      Date: 4/1/2021  Patient Name: Kenneth Guerrero    History of Presenting Illness     Chief Complaint   Patient presents with    Mental Health Problem       History Provided By:     Yoelleonel Luo, is an extremely pleasant 49 y.o. male presenting to the ED with a chief complaint of no pain. Right great toe pain. He states he is homeless and has been walking around in poor conditions. He states approximate month ago he developed a blister along his great toe and since this time it has become red, painful and the skin has been falling off. He states despite his best efforts to keep it clean and it seems to be getting worse. He states he is diabetic and has a hard time keeping his sugar under control. He denies any fevers, chills or rigors. No nausea vomiting or diarrhea. He still has a good appetite. Of note he states he has bipolar and has not been doing well. He states he is suicidal.  He has plans to potentially overdose on his pills. He denies any homicidal ideation. He smokes cigarettes on a daily basis. There are no other complaints, changes, or physical findings at this time. PCP: GOMEZ Chacko    No current facility-administered medications on file prior to encounter. No current outpatient medications on file prior to encounter. Past History     Past Medical History:  Past Medical History:   Diagnosis Date    Diabetes ([address]     Psychiatric disorder        Past Surgical History:  History reviewed. No pertinent surgical history. Family History:  History reviewed. No pertinent family history. Social History:  Social History     Tobacco Use    Smoking status: Current Every Day Smoker    Smokeless tobacco: Never Used   Substance Use Topics    Alcohol use: Yes    Drug use:  Yes       Allergies:  No Known Allergies      Review of Systems     Review of Systems   Constitutional: Negative for activity change, appetite change, chills, fatigue and fever. HENT: Negative for congestion and sore throat. Eyes: Negative for photophobia and visual disturbance. Respiratory: Negative for cough, shortness of breath and wheezing. Cardiovascular: Negative for chest pain, palpitations and leg swelling. Gastrointestinal: Negative for abdominal pain, diarrhea, nausea and vomiting. Endocrine: Negative for cold intolerance and heat intolerance. Musculoskeletal: Positive for gait problem. Negative for joint swelling. Right toe pain, redness, sores   Skin: Negative for color change and rash. Neurological: Negative for dizziness and headaches. Psychiatric/Behavioral:        Suicidal ideation with a plan         Physical Exam     Physical Exam  Constitutional:       Appearance: Normal appearance. HENT:      Head: Normocephalic and atraumatic. Mouth/Throat:      Mouth: Mucous membranes are moist.      Pharynx: Oropharynx is clear. Eyes:      Extraocular Movements: Extraocular movements intact. Conjunctiva/sclera: Conjunctivae normal.   Neck:      Musculoskeletal: Normal range of motion and neck supple. Cardiovascular:      Rate and Rhythm: Normal rate and regular rhythm. Heart sounds: Normal heart sounds. Pulmonary:      Effort: Pulmonary effort is normal. No respiratory distress. Breath sounds: Normal breath sounds. No wheezing, rhonchi or rales. Abdominal:      General: There is no distension. Palpations: Abdomen is soft. Tenderness: There is no abdominal tenderness. There is no guarding. Musculoskeletal:         General: No deformity. Left lower leg: No edema. Skin:     Findings: No erythema or rash. Comments: He has what appears to be fresh tissue loss at the distal third of the right great toe. It is erythematous, slightly warm to the touch. Minimal purulent drainage. No crepitus. No lymphangitis   Neurological:      General: No focal deficit present.       Mental Status: He is alert and oriented to person, place, and time. Gait: Gait normal.   Psychiatric:      Comments: Depressed affect           Lab and Diagnostic Study Results     Labs -     Recent Results (from the past 12 hour(s))   CBC WITH AUTOMATED DIFF    Collection Time: 04/01/21 12:30 PM   Result Value Ref Range    WBC 5.4 4.4 - 11.3 K/uL    RBC 4.00 (L) 4.50 - 5.90 M/uL    HGB 13.1 (L) 13.5 - 17.5 g/dL    HCT 37.0 (L) 41 - 53 %    MCV 92.7 80 - 100 FL    MCH 32.7 31 - 34 PG    MCHC 35.3 31.0 - 36.0 g/dL    RDW 13.5 11.5 - 14.5 %    PLATELET 199 K/uL    MPV 9.3 6.5 - 11.5 FL    NRBC 0.1  WBC    ABSOLUTE NRBC 0.00 K/uL    NEUTROPHILS 43 42 - 75 %    LYMPHOCYTES 41 20.5 - 51.1 %    MONOCYTES 15 (H) 1.7 - 9.3 %    EOSINOPHILS 1 0.9 - 2.9 %    BASOPHILS 0 0.0 - 2.5 %    ABS. NEUTROPHILS 2.3 1.8 - 7.7 K/UL    ABS. LYMPHOCYTES 2.2 1.0 - 4.8 K/UL    ABS. MONOCYTES 0.8 0.2 - 2.4 K/UL    ABS. EOSINOPHILS 0.1 0.0 - 0.7 K/UL    ABS. BASOPHILS 0.0 0.0 - 0.2 K/UL   METABOLIC PANEL, COMPREHENSIVE    Collection Time: 04/01/21 12:30 PM   Result Value Ref Range    Sodium 137 136 - 145 mmol/L    Potassium 3.9 3.5 - 5.1 mmol/L    Chloride 100 97 - 108 mmol/L    CO2 32 21 - 32 mmol/L    Anion gap 5 5 - 15 mmol/L    Glucose 353 (H) 65 - 100 mg/dL    BUN 10 6 - 20 mg/dL    Creatinine 1.23 0.70 - 1.30 mg/dL    BUN/Creatinine ratio 8 (L) 12 - 20      GFR est AA >60 >60 ml/min/1.73m2    GFR est non-AA >60 >60 ml/min/1.73m2    Calcium 9.5 8.5 - 10.1 mg/dL    Bilirubin, total 0.3 0.2 - 1.0 mg/dL    AST (SGOT) 42 (H) 15 - 37 U/L    ALT (SGPT) 50 12 - 78 U/L    Alk.  phosphatase 204 (H) 45 - 117 U/L    Protein, total 7.2 6.4 - 8.2 g/dL    Albumin 2.9 (L) 3.5 - 5.0 g/dL    Globulin 4.3 (H) 2.0 - 4.0 g/dL    A-G Ratio 0.7 (L) 1.1 - 2.2     SED RATE (ESR)    Collection Time: 04/01/21 12:30 PM   Result Value Ref Range    Sed rate, automated 53 mm/hr   SARS-COV-2    Collection Time: 04/01/21 12:35 PM   Result Value Ref Range SARS-CoV-2 Nasopharyngeal     URINALYSIS W/ RFLX MICROSCOPIC    Collection Time: 04/01/21  3:00 PM   Result Value Ref Range    Color Yellow/Straw      Appearance Clear Clear      Specific gravity 1.020 1.003 - 1.030      pH (UA) 8.0 5.0 - 8.0      Protein Trace (A) Negative mg/dL    Glucose >1,000 (A) Negative mg/dL    Ketone Negative Negative mg/dL    Bilirubin Negative Negative      Blood Negative Negative      Urobilinogen 2.0 (H) 0.2 - 1.0 EU/dL    Nitrites Negative Negative      Leukocyte Esterase Negative Negative     DRUG SCREEN, URINE    Collection Time: 04/01/21  3:00 PM   Result Value Ref Range    AMPHETAMINES Negative Negative      BARBITURATES Negative Negative      BENZODIAZEPINES Negative Negative      COCAINE Positive (A) Negative      ECSTASY, MDMA Negative Negative      METHADONE Negative Negative      OPIATES Negative Negative      PCP(PHENCYCLIDINE) Negative Negative      THC (TH-CANNABINOL) Negative Negative      Drug screen comment        This test is a screen for drugs of abuse in a medical setting only (i.e., they are unconfirmed results and as such must not be used for non-medical purposes, e.g.,employment testing, legal testing). Due to its inherent nature, false positive (FP) and false negative (FN) results may be obtained. Therefore, if necessary for medical care, recommend confirmation of positive findings by GC/MS. URINE MICROSCOPIC    Collection Time: 04/01/21  3:00 PM   Result Value Ref Range    WBC 0-4 0 - 5 /hpf    RBC 0-5 0 - 3 /hpf    Bacteria 1+ (A) Negative /hpf   GLUCOSE, POC    Collection Time: 04/01/21  6:13 PM   Result Value Ref Range    Glucose (POC) 349 (H) 65 - 100 mg/dL    Performed by Eugene Sanchez        Radiologic Studies -   @lastxrresult@  CT Results  (Last 48 hours)    None        CXR Results  (Last 48 hours)    None            Medical Decision Making   - I am the first provider for this patient.   - I reviewed the vital signs, available nursing notes, past medical history, past surgical history, family history and social history. - Initial assessment performed. The patients presenting problems have been discussed, and they are in agreement with the care plan formulated and outlined with them. I have encouraged them to ask questions as they arise throughout their visit. Vital Signs-Reviewed the patient's vital signs. Patient Vitals for the past 12 hrs:   Temp Pulse Resp BP SpO2   04/01/21 1906 -- 85 19 (!) 163/97 97 %   04/01/21 1209 -- -- -- -- 98 %   04/01/21 1148 97.9 °F (36.6 °C) 81 20 (!) 156/80 98 %         ED Course/ Provider Notes (Medical Decision Making):     Patient presented to the emergency department with a chief complaint of right toe pain and infection. Given the chronicity of this lesion I was concerned for osteomyelitis particularly in the setting of poor wound healing with his poorly controlled type 2 diabetes. No systemic signs of infection. CRP and ESR are still pending at the time of this dictation. X-ray of the toe demonstrates Bone demineralization, nonregular appearance tuft portion distal aspect, distal  phalanx right great toe. Findings concerning for osteomyelitis. Soft tissue swelling. Anticipate patient will need MRI for further work-up. He was started on vancomycin and Zosyn. The case was discussed in detail with physician assistant Abraham Laird who graciously accepted the ER to ER transfer. Of note, upon reevaluating the patient he states he is still having some thoughts of SI but states he no longer has a plan. Procedures   Medical Decision Makingedical Decision Making  Performed by: Janette Brown DO  Procedures  None       Disposition   Disposition:     Transfer to [facility]         Diagnosis     Clinical Impression:    1. Osteomyelitis of toe [facility])        Attestations:    Janettejagdish Brown, DO    Please note that this dictation was completed with [email or web address], the computer voice recognition software.   Quite often unanticipated grammatical, syntax, homophones, and other interpretive errors are inadvertently transcribed by the computer software. Please disregard these errors. Please excuse any errors that have escaped final proofreading. Thank you.

## 2021-04-01 NOTE — ED NOTES
Patient care and report received from [facility], [facility]. The patient is provided a warm blanket for comfort. The patient is requesting another meal tray. Same was denied due to being transferred to another facility and it is unknown what the treatment plan will be. The patient verbalized understanding of same. He has no other concerns at this time.

## 2021-04-02 PROBLEM — M86.9 OSTEOMYELITIS (HCC): Status: ACTIVE | Noted: 2021-04-02

## 2021-04-02 LAB
ALBUMIN SERPL-MCNC: 2.9 G/DL (ref 3.5–5)
ALBUMIN/GLOB SERPL: 0.7 {RATIO} (ref 1.1–2.2)
ALP SERPL-CCNC: 204 U/L (ref 45–117)
ALT SERPL-CCNC: 52 U/L (ref 12–78)
ANION GAP SERPL CALC-SCNC: 2 MMOL/L (ref 5–15)
AST SERPL W P-5'-P-CCNC: 44 U/L (ref 15–37)
ATRIAL RATE: 76 BPM
BASOPHILS # BLD: 0 K/UL (ref 0–0.1)
BASOPHILS NFR BLD: 0 % (ref 0–1)
BILIRUB SERPL-MCNC: 0.3 MG/DL (ref 0.2–1)
BUN SERPL-MCNC: 11 MG/DL (ref 6–20)
BUN/CREAT SERPL: 9 (ref 12–20)
CA-I BLD-MCNC: 9.1 MG/DL (ref 8.5–10.1)
CALCULATED R AXIS, ECG10: 34 DEGREES
CALCULATED T AXIS, ECG11: 47 DEGREES
CHLORIDE SERPL-SCNC: 100 MMOL/L (ref 97–108)
CO2 SERPL-SCNC: 32 MMOL/L (ref 21–32)
COVID-19 RAPID TEST, COVR: NOT DETECTED
CREAT SERPL-MCNC: 1.23 MG/DL (ref 0.7–1.3)
CRP SERPL HS-MCNC: 2.2 MG/L
CRP SERPL-MCNC: <0.29 MG/DL (ref 0–0.6)
DIAGNOSIS, 93000: NORMAL
DIFFERENTIAL METHOD BLD: ABNORMAL
EOSINOPHIL # BLD: 0.1 K/UL (ref 0–0.4)
EOSINOPHIL NFR BLD: 2 % (ref 0–7)
ERYTHROCYTE [DISTWIDTH] IN BLOOD BY AUTOMATED COUNT: 12.8 % (ref 11.5–14.5)
ERYTHROCYTE [SEDIMENTATION RATE] IN BLOOD: 43 MM/HR
GLOBULIN SER CALC-MCNC: 4.4 G/DL (ref 2–4)
GLUCOSE BLD STRIP.AUTO-MCNC: 219 MG/DL (ref 65–100)
GLUCOSE BLD STRIP.AUTO-MCNC: 349 MG/DL (ref 65–100)
GLUCOSE BLD STRIP.AUTO-MCNC: 388 MG/DL (ref 65–100)
GLUCOSE BLD STRIP.AUTO-MCNC: 495 MG/DL (ref 65–100)
GLUCOSE SERPL-MCNC: 402 MG/DL (ref 65–100)
HCT VFR BLD AUTO: 37.1 % (ref 36.6–50.3)
HGB BLD-MCNC: 12.9 G/DL (ref 12.1–17)
IMM GRANULOCYTES # BLD AUTO: 0 K/UL (ref 0–0.04)
IMM GRANULOCYTES NFR BLD AUTO: 0 % (ref 0–0.5)
LACTATE SERPL-SCNC: 1.2 MMOL/L (ref 0.4–2)
LYMPHOCYTES # BLD: 2.2 K/UL (ref 0.8–3.5)
LYMPHOCYTES NFR BLD: 42 % (ref 12–49)
MAGNESIUM SERPL-MCNC: 1.8 MG/DL (ref 1.6–2.4)
MCH RBC QN AUTO: 32.6 PG (ref 26–34)
MCHC RBC AUTO-ENTMCNC: 34.8 G/DL (ref 30–36.5)
MCV RBC AUTO: 93.7 FL (ref 80–99)
MONOCYTES # BLD: 0.6 K/UL (ref 0–1)
MONOCYTES NFR BLD: 11 % (ref 5–13)
MRSA DNA SPEC QL NAA+PROBE: DETECTED
NEUTS SEG # BLD: 2.4 K/UL (ref 1.8–8)
NEUTS SEG NFR BLD: 45 % (ref 32–75)
NRBC # BLD: 0 K/UL (ref 0–0.01)
NRBC BLD-RTO: 0 PER 100 WBC
P-R INTERVAL, ECG05: 159 MS
PERFORMED BY, TECHID: ABNORMAL
PLATELET # BLD AUTO: 173 K/UL (ref 150–400)
PMV BLD AUTO: 11.6 FL (ref 8.9–12.9)
POTASSIUM SERPL-SCNC: 4.2 MMOL/L (ref 3.5–5.1)
PROT SERPL-MCNC: 7.3 G/DL (ref 6.4–8.2)
Q-T INTERVAL, ECG07: 390 MS
QRS DURATION, ECG06: 85 MS
QTC CALCULATION (BEZET), ECG08: 439 MS
RBC # BLD AUTO: 3.96 M/UL (ref 4.1–5.7)
SODIUM SERPL-SCNC: 134 MMOL/L (ref 136–145)
SPECIMEN SOURCE: NORMAL
VANCOMYCIN TROUGH SERPL-MCNC: 10.5 UG/ML (ref 5–10)
VENTRICULAR RATE, ECG03: 76 BPM
WBC # BLD AUTO: 5.3 K/UL (ref 4.1–11.1)

## 2021-04-02 PROCEDURE — 80202 ASSAY OF VANCOMYCIN: CPT

## 2021-04-02 PROCEDURE — 65270000029 HC RM PRIVATE

## 2021-04-02 PROCEDURE — 74011250636 HC RX REV CODE- 250/636: Performed by: FAMILY MEDICINE

## 2021-04-02 PROCEDURE — 74011636637 HC RX REV CODE- 636/637: Performed by: EMERGENCY MEDICINE

## 2021-04-02 PROCEDURE — 87635 SARS-COV-2 COVID-19 AMP PRB: CPT

## 2021-04-02 PROCEDURE — 87641 MR-STAPH DNA AMP PROBE: CPT

## 2021-04-02 PROCEDURE — 87040 BLOOD CULTURE FOR BACTERIA: CPT

## 2021-04-02 PROCEDURE — 83036 HEMOGLOBIN GLYCOSYLATED A1C: CPT

## 2021-04-02 PROCEDURE — 99222 1ST HOSP IP/OBS MODERATE 55: CPT | Performed by: PODIATRIST

## 2021-04-02 PROCEDURE — 99222 1ST HOSP IP/OBS MODERATE 55: CPT | Performed by: INTERNAL MEDICINE

## 2021-04-02 PROCEDURE — 74011000258 HC RX REV CODE- 258: Performed by: FAMILY MEDICINE

## 2021-04-02 PROCEDURE — 74011000258 HC RX REV CODE- 258: Performed by: EMERGENCY MEDICINE

## 2021-04-02 PROCEDURE — 82962 GLUCOSE BLOOD TEST: CPT

## 2021-04-02 PROCEDURE — 74011636637 HC RX REV CODE- 636/637: Performed by: FAMILY MEDICINE

## 2021-04-02 PROCEDURE — 74011250637 HC RX REV CODE- 250/637: Performed by: FAMILY MEDICINE

## 2021-04-02 PROCEDURE — 99218 HC RM OBSERVATION: CPT

## 2021-04-02 PROCEDURE — 74011250636 HC RX REV CODE- 250/636: Performed by: EMERGENCY MEDICINE

## 2021-04-02 RX ORDER — INSULIN LISPRO 100 [IU]/ML
INJECTION, SOLUTION INTRAVENOUS; SUBCUTANEOUS EVERY 4 HOURS
Status: DISCONTINUED | OUTPATIENT
Start: 2021-04-02 | End: 2021-04-10 | Stop reason: HOSPADM

## 2021-04-02 RX ORDER — QUETIAPINE FUMARATE 100 MG/1
100 TABLET, FILM COATED ORAL
Status: DISCONTINUED | OUTPATIENT
Start: 2021-04-02 | End: 2021-04-10 | Stop reason: HOSPADM

## 2021-04-02 RX ORDER — TRAZODONE HYDROCHLORIDE 50 MG/1
50 TABLET ORAL
Status: DISCONTINUED | OUTPATIENT
Start: 2021-04-02 | End: 2021-04-10 | Stop reason: HOSPADM

## 2021-04-02 RX ORDER — INSULIN LISPRO 100 [IU]/ML
12 INJECTION, SOLUTION INTRAVENOUS; SUBCUTANEOUS ONCE
Status: COMPLETED | OUTPATIENT
Start: 2021-04-02 | End: 2021-04-02

## 2021-04-02 RX ORDER — MAGNESIUM SULFATE 100 %
4 CRYSTALS MISCELLANEOUS AS NEEDED
Status: DISCONTINUED | OUTPATIENT
Start: 2021-04-02 | End: 2021-04-10 | Stop reason: HOSPADM

## 2021-04-02 RX ORDER — INSULIN LISPRO 100 [IU]/ML
INJECTION, SOLUTION INTRAVENOUS; SUBCUTANEOUS
Status: DISCONTINUED | OUTPATIENT
Start: 2021-04-02 | End: 2021-04-02

## 2021-04-02 RX ORDER — POLYETHYLENE GLYCOL 3350 17 G/17G
17 POWDER, FOR SOLUTION ORAL DAILY PRN
Status: DISCONTINUED | OUTPATIENT
Start: 2021-04-02 | End: 2021-04-10 | Stop reason: HOSPADM

## 2021-04-02 RX ORDER — ACETAMINOPHEN 325 MG/1
650 TABLET ORAL
Status: DISCONTINUED | OUTPATIENT
Start: 2021-04-02 | End: 2021-04-10 | Stop reason: HOSPADM

## 2021-04-02 RX ORDER — MAGNESIUM SULFATE 100 %
4 CRYSTALS MISCELLANEOUS AS NEEDED
Status: DISCONTINUED | OUTPATIENT
Start: 2021-04-02 | End: 2021-04-02

## 2021-04-02 RX ORDER — HYDRALAZINE HYDROCHLORIDE 20 MG/ML
10 INJECTION INTRAMUSCULAR; INTRAVENOUS EVERY 6 HOURS
Status: DISCONTINUED | OUTPATIENT
Start: 2021-04-03 | End: 2021-04-02

## 2021-04-02 RX ORDER — ONDANSETRON 4 MG/1
4 TABLET, ORALLY DISINTEGRATING ORAL
Status: DISCONTINUED | OUTPATIENT
Start: 2021-04-02 | End: 2021-04-10 | Stop reason: HOSPADM

## 2021-04-02 RX ORDER — DEXTROSE 50 % IN WATER (D50W) INTRAVENOUS SYRINGE
25-50 AS NEEDED
Status: DISCONTINUED | OUTPATIENT
Start: 2021-04-02 | End: 2021-04-10 | Stop reason: HOSPADM

## 2021-04-02 RX ORDER — HYDRALAZINE HYDROCHLORIDE 20 MG/ML
10 INJECTION INTRAMUSCULAR; INTRAVENOUS
Status: DISCONTINUED | OUTPATIENT
Start: 2021-04-02 | End: 2021-04-10 | Stop reason: HOSPADM

## 2021-04-02 RX ORDER — SODIUM CHLORIDE 9 MG/ML
75 INJECTION, SOLUTION INTRAVENOUS CONTINUOUS
Status: DISCONTINUED | OUTPATIENT
Start: 2021-04-02 | End: 2021-04-08

## 2021-04-02 RX ORDER — ATORVASTATIN CALCIUM 40 MG/1
80 TABLET, FILM COATED ORAL
Status: DISCONTINUED | OUTPATIENT
Start: 2021-04-02 | End: 2021-04-10 | Stop reason: HOSPADM

## 2021-04-02 RX ORDER — ONDANSETRON 2 MG/ML
4 INJECTION INTRAMUSCULAR; INTRAVENOUS
Status: DISCONTINUED | OUTPATIENT
Start: 2021-04-02 | End: 2021-04-10 | Stop reason: HOSPADM

## 2021-04-02 RX ORDER — INSULIN GLARGINE 100 [IU]/ML
60 INJECTION, SOLUTION SUBCUTANEOUS
Status: DISCONTINUED | OUTPATIENT
Start: 2021-04-02 | End: 2021-04-10 | Stop reason: HOSPADM

## 2021-04-02 RX ORDER — DULOXETIN HYDROCHLORIDE 30 MG/1
60 CAPSULE, DELAYED RELEASE ORAL DAILY
Status: DISCONTINUED | OUTPATIENT
Start: 2021-04-02 | End: 2021-04-10 | Stop reason: HOSPADM

## 2021-04-02 RX ORDER — DEXTROSE 50 % IN WATER (D50W) INTRAVENOUS SYRINGE
25-50 AS NEEDED
Status: DISCONTINUED | OUTPATIENT
Start: 2021-04-02 | End: 2021-04-02

## 2021-04-02 RX ORDER — ACETAMINOPHEN 650 MG/1
650 SUPPOSITORY RECTAL
Status: DISCONTINUED | OUTPATIENT
Start: 2021-04-02 | End: 2021-04-10 | Stop reason: HOSPADM

## 2021-04-02 RX ADMIN — HYDRALAZINE HYDROCHLORIDE 10 MG: 20 INJECTION INTRAMUSCULAR; INTRAVENOUS at 23:00

## 2021-04-02 RX ADMIN — INSULIN LISPRO 15 UNITS: 100 INJECTION, SOLUTION INTRAVENOUS; SUBCUTANEOUS at 21:21

## 2021-04-02 RX ADMIN — PIPERACILLIN AND TAZOBACTAM 3.38 G: 3; .375 INJECTION, POWDER, FOR SOLUTION INTRAVENOUS at 01:58

## 2021-04-02 RX ADMIN — INSULIN LISPRO 12 UNITS: 100 INJECTION, SOLUTION INTRAVENOUS; SUBCUTANEOUS at 07:01

## 2021-04-02 RX ADMIN — QUETIAPINE FUMARATE 100 MG: 100 TABLET ORAL at 21:21

## 2021-04-02 RX ADMIN — DULOXETINE HYDROCHLORIDE 60 MG: 30 CAPSULE, DELAYED RELEASE ORAL at 12:52

## 2021-04-02 RX ADMIN — ATORVASTATIN CALCIUM 80 MG: 40 TABLET, FILM COATED ORAL at 21:20

## 2021-04-02 RX ADMIN — TRAZODONE HYDROCHLORIDE 50 MG: 50 TABLET ORAL at 21:20

## 2021-04-02 RX ADMIN — INSULIN LISPRO 10 UNITS: 100 INJECTION, SOLUTION INTRAVENOUS; SUBCUTANEOUS at 12:52

## 2021-04-02 RX ADMIN — SODIUM CHLORIDE 75 ML/HR: 9 INJECTION, SOLUTION INTRAVENOUS at 11:52

## 2021-04-02 RX ADMIN — VANCOMYCIN HYDROCHLORIDE 1000 MG: 1 INJECTION, POWDER, LYOPHILIZED, FOR SOLUTION INTRAVENOUS at 21:28

## 2021-04-02 RX ADMIN — PIPERACILLIN AND TAZOBACTAM 3.38 G: 3; .375 INJECTION, POWDER, LYOPHILIZED, FOR SOLUTION INTRAVENOUS at 11:52

## 2021-04-02 RX ADMIN — PIPERACILLIN AND TAZOBACTAM 3.38 G: 3; .375 INJECTION, POWDER, LYOPHILIZED, FOR SOLUTION INTRAVENOUS at 17:54

## 2021-04-02 RX ADMIN — VANCOMYCIN HYDROCHLORIDE 1500 MG: 10 INJECTION, POWDER, LYOPHILIZED, FOR SOLUTION INTRAVENOUS at 04:41

## 2021-04-02 RX ADMIN — INSULIN LISPRO 4 UNITS: 100 INJECTION, SOLUTION INTRAVENOUS; SUBCUTANEOUS at 17:54

## 2021-04-02 RX ADMIN — INSULIN GLARGINE 60 UNITS: 100 INJECTION, SOLUTION SUBCUTANEOUS at 21:21

## 2021-04-02 NOTE — PROGRESS NOTES
Reason for Admission:   osteomyelitis                    RUR Score:     18%             PCP: First and Last name:   GOMEZ Ordoñez     Name of Practice:    Are you a current patient: Yes/No:    Approximate date of last visit:    Can you participate in a virtual visit if needed:     Do you (patient/family) have any concerns for transition/discharge? Plan for utilizing home health:       Current Advanced Directive/Advance Care Plan:  Full Code      Healthcare Decision Maker:   Click here to complete [address] including selection of the Healthcare Decision Maker Relationship (ie \"Primary\")              Transition of Care Plan:          SNF / [facility]    The patient has been discharged on 1/21/21 to crisis stabilazation program in [facility]. The patient was already transferred to [place] [address] and Cm was not able to speak with the patient. Cm will follow with [address] [place] [address] consult and medical needs on DC time.

## 2021-04-02 NOTE — ROUTINE PROCESS
2 person skin assessment done by Meryl Justin RN and Sudheer Jorgensen RN. Diabetic ulcer on great right toe. No other open wounds or lesions. Will continue to monitor.

## 2021-04-02 NOTE — ROUTINE PROCESS
TRANSFER - OUT REPORT:    Verbal report given to Kristy Nelson  being transferred to  for routine progression of care       Report consisted of patients Situation, Background, Assessment and   Recommendations(SBAR). Information from the following report(s) SBAR, ED Summary, [facility] and Recent Results was reviewed with the receiving nurse. Lines:   Peripheral IV 04/02/21 Left;Right Hand (Active)        Opportunity for questions and clarification was provided.       Patient transported with:   Registered Nurse

## 2021-04-02 NOTE — PROGRESS NOTES
Pharmacy Vancomycin Dosing Service per Dr. Raghav Parmar for [facility]    Treatment Day one    Patient previously received vancomycin 1500mg IV loading dose    Administer vancomycin 1000mg IV [id number]    Draw vancomycin level 04/04/21 at 06:00 before 08:00 dose    Pharmacy will continue to follow per protocol,  Debby CraneD.

## 2021-04-02 NOTE — WOUND CARE
Confirmed that Dr. Sobia Singh will treat wound to right 1st toe. WCN will sign off. Re-consult WCN if needed.

## 2021-04-02 NOTE — ED PROVIDER NOTES
EMERGENCY DEPARTMENT HISTORY AND PHYSICAL EXAM      Date: 4/1/2021  Patient Name: Stephanie Ngo    History of Presenting Illness     Chief Complaint   Patient presents with    Transfer Of Care       History Provided By: Patient    HPI: Stephanie Ngo, 49 y.o. male with PMHx as noted below presents the emergency department for evaluation of possible osteomyelitis of the right great toe. Patient states he was a psychiatric patient in [place] [address] with a diagnosis was made so was transferred here for further evaluation. Patient is complaining of feeling fatigued for the last several days but otherwise no fevers, chills or other systemic symptoms. He does have a dull aching pain in the toe that is constant, worse with movement. Pain does not radiate. PCP: GOMEZ St    Current Facility-Administered Medications   Medication Dose Route Frequency Provider Last Rate Last Admin    vancomycin (VANCOCIN) 1,500 mg in 0.9% sodium chloride 500 mL IVPB  1,500 mg IntraVENous NOW Phyllis Lara  mL/hr at 04/02/21 0441 1,500 mg at 04/02/21 0441    insulin lispro (HUMALOG) injection 12 Units  12 Units SubCUTAneous ONCE Phyllis Lara MD        insulin lispro (HUMALOG) injection   SubCUTAneous AC&HS Phyllis Lara MD        glucose chewable tablet 16 g  4 Tab Oral PRN Phyllis Lara MD        glucagon (GLUCAGEN) injection 1 mg  1 mg IntraMUSCular PRN Phyllis Lara MD        dextrose (D50W) injection syrg 12.5-25 g  25-50 mL IntraVENous PRN Phyllis Lara MD         Current Outpatient Medications   Medication Sig Dispense Refill    DULoxetine (CYMBALTA) 60 mg capsule Take 1 Cap by mouth daily. Indications: diabetic complication causing injury to some body nerves, major depressive disorder 30 Cap 0    indapamide (LOZOL) 1.25 mg tablet Take 1 Tab by mouth daily.  Indications: high blood pressure 30 Tab 0    insulin glargine (LANTUS) 100 unit/mL injection Administer 60 units underneath the skin, every night after supper  Indications: type 2 diabetes mellitus 1 Vial 0    insulin lispro (HUMALOG) 100 unit/mL injection Administer 10 underneath the skin, three times a day before meals. Indications: type 2 diabetes mellitus 1 Vial 0    metroNIDAZOLE (FLAGYL) 500 mg tablet Take 1 Tab by mouth three (3) times daily. Indications: GI infection 12 Tab 0    QUEtiapine (SEROquel) 100 mg tablet Take 1 Tab by mouth nightly. Indications: additional medications to treat depression 30 Tab 0    rosuvastatin (CRESTOR) 40 mg tablet Take 1 Tab by mouth nightly. Indications: high cholesterol 30 Tab 0    traZODone (DESYREL) 50 mg tablet Take 1 Tab by mouth nightly as needed for Sleep. Indications: insomnia associated with depression 30 Tab 0       Past History     Past Medical History:  Past Medical History:   Diagnosis Date    Diabetes ([facility] [address]     Hepatitis C infection     Hypertension        Past Surgical History:  No past surgical history on file. Family History:  No family history on file. Social History:  Social History     Tobacco Use    Smoking status: Current Every Day Smoker     Packs/day: 1.00    Smokeless tobacco: Never Used   Substance Use Topics    Alcohol use: Yes     Frequency: Never     Comment: 2 times a week     Drug use: Yes     Types: Cocaine       Allergies:  No Known Allergies      Review of Systems   Review of Systems  Constitutional: Negative for fever, chills, and fatigue. HENT: Negative for congestion, sore throat, rhinorrhea, sneezing and neck stiffness   Eyes: Negative for discharge and redness. Respiratory: Negative for  shortness of breath, wheezing   Cardiovascular: Negative for chest pain, palpitations   Gastrointestinal: Negative for nausea, vomiting, abdominal pain, constipation, diarrhea and blood in stool.    Genitourinary: Negative for dysuria, urgency, frequency, hematuria, flank pain, decreased urine volume, discharge,   Musculoskeletal: Negative for myalgias or joint pain . Skin: Negative for rash or lesions . Neurological: Negative weakness, light-headedness, numbness and headaches. Physical Exam   Physical Exam    GENERAL: alert and oriented, no acute distress  EYES: PEERL, No injection, discharge or icterus. ENT: Mucous membranes pink and moist.  NECK: Supple  LUNGS: Airway patent. Non-labored respirations. Breath sounds clear with good air entry bilaterally. HEART: Regular rate and rhythm. No peripheral edema  ABDOMEN: Non-distended and non-tender, without guarding or rebound. SKIN:  warm, dry  EXTREMITIES: Right great toe is discolored, cool. NEUROLOGICAL: Alert, oriented      Diagnostic Study Results     Labs -     Recent Results (from the past 12 hour(s))   CBC WITH AUTOMATED DIFF    Collection Time: 04/01/21 11:35 PM   Result Value Ref Range    WBC 5.3 4.1 - 11.1 K/uL    RBC 3.96 (L) 4.10 - 5.70 M/uL    HGB 12.9 12.1 - 17.0 g/dL    HCT 37.1 36.6 - 50.3 %    MCV 93.7 80.0 - 99.0 FL    MCH 32.6 26.0 - 34.0 PG    MCHC 34.8 30.0 - 36.5 g/dL    RDW 12.8 11.5 - 14.5 %    PLATELET 497 611 - 897 K/uL    MPV 11.6 8.9 - 12.9 FL    NRBC 0.0 0  WBC    ABSOLUTE NRBC 0.00 0.00 - 0.01 K/uL    NEUTROPHILS 45 32 - 75 %    LYMPHOCYTES 42 12 - 49 %    MONOCYTES 11 5 - 13 %    EOSINOPHILS 2 0 - 7 %    BASOPHILS 0 0 - 1 %    IMMATURE GRANULOCYTES 0 0.0 - 0.5 %    ABS. NEUTROPHILS 2.4 1.8 - 8.0 K/UL    ABS. LYMPHOCYTES 2.2 0.8 - 3.5 K/UL    ABS. MONOCYTES 0.6 0.0 - 1.0 K/UL    ABS. EOSINOPHILS 0.1 0.0 - 0.4 K/UL    ABS. BASOPHILS 0.0 0.0 - 0.1 K/UL    ABS. IMM.  GRANS. 0.0 0.00 - 0.04 K/UL    DF AUTOMATED     METABOLIC PANEL, COMPREHENSIVE    Collection Time: 04/01/21 11:35 PM   Result Value Ref Range    Sodium 134 (L) 136 - 145 mmol/L    Potassium 4.2 3.5 - 5.1 mmol/L    Chloride 100 97 - 108 mmol/L    CO2 32 21 - 32 mmol/L    Anion gap 2 (L) 5 - 15 mmol/L    Glucose 402 (H) 65 - 100 mg/dL    BUN 11 6 - 20 mg/dL    Creatinine 1.23 0.70 - 1.30 mg/dL BUN/Creatinine ratio 9 (L) 12 - 20      GFR est AA >60 >60 ml/min/1.73m2    GFR est non-AA >60 >60 ml/min/1.73m2    Calcium 9.1 8.5 - 10.1 mg/dL    Bilirubin, total 0.3 0.2 - 1.0 mg/dL    AST (SGOT) 44 (H) 15 - 37 U/L    ALT (SGPT) 52 12 - 78 U/L    Alk. phosphatase 204 (H) 45 - 117 U/L    Protein, total 7.3 6.4 - 8.2 g/dL    Albumin 2.9 (L) 3.5 - 5.0 g/dL    Globulin 4.4 (H) 2.0 - 4.0 g/dL    A-G Ratio 0.7 (L) 1.1 - 2.2     LACTIC ACID    Collection Time: 04/01/21 11:35 PM   Result Value Ref Range    Lactic acid 1.2 0.4 - 2.0 mmol/L   MAGNESIUM    Collection Time: 04/01/21 11:35 PM   Result Value Ref Range    Magnesium 1.8 1.6 - 2.4 mg/dL   SED RATE (ESR)    Collection Time: 04/01/21 11:35 PM   Result Value Ref Range    Sed rate, automated 43 mm/hr   C REACTIVE PROTEIN, QT    Collection Time: 04/01/21 11:35 PM   Result Value Ref Range    C-Reactive protein <0.29 0.00 - 0.60 mg/dL       Radiologic Studies -   XR GREAT TOE RT MIN 2 V   Final Result   Osteomyelitis involving the terminal tuft of the distal phalange of   the great toe. CT Results  (Last 48 hours)    None        CXR Results  (Last 48 hours)    None            Medical Decision Making     IMarion MD am the first provider for this patient and am the attending of record for this patient encounter. I reviewed the vital signs, available nursing notes, past medical history, past surgical history, family history and social history. Vital Signs-Reviewed the patient's vital signs. Patient Vitals for the past 12 hrs:   Temp Pulse Resp BP SpO2   04/02/21 0449 -- 86 16 (!) 155/76 97 %   04/02/21 0346 -- 82 18 (!) 176/95 98 %   04/02/21 0202 -- 84 20 (!) 187/107 99 %   04/01/21 2200 98.3 °F (36.8 °C) 83 18 (!) 192/102 98 %       Records Reviewed: Nursing Notes and Old Medical Records    Provider Notes (Medical Decision Making): On presentation, the patient is well appearing, in no acute distress with normal vital signs.   Based on my history and exam the differential diagnosis for this patient includes osteomyelitis, sepsis, cellulitis, abscess. Work-up, imaging consistent with osteomyelitis. Culture sent. Started on broad-spectrum antibiotics. Will admit for further management. ED Course:   Initial assessment performed. The patients presenting problems have been discussed, and they are in agreement with the care plan formulated and outlined with them. I have encouraged them to ask questions as they arise throughout their visit. PROGRESS:  The patient has been re-evaluated . Reviewed available results with patient and have counseled them on diagnosis and care plan. They have expressed understanding, and all their questions have been answered. They agree with plan for admission. CONSULT:  Jens Yee MD spoke with the hospitalist.  Discussed HPI and PE, available diagnostic tests and clinical findings. He is in agreement with care plans as outlined and will evaluate for admission     Admit Note  Patient is being admitted to the hospitalist.  The results of their tests and reasons for their admission have been discussed with them and/or available family. They convey agreement and understanding for the need to be admitted and for their admission diagnosis. Consultation has been made with the inpatient physician specialist for hospitalization. Disposition:  admission    PLAN:  1. Admit     Diagnosis     Clinical Impression:   1. Osteomyelitis, unspecified site, unspecified type [facility])        Please note that this dictation was completed with Dragon, computer voice recognition software. Quite often unanticipated grammatical, syntax, homophones, and other interpretive errors are inadvertently transcribed by the computer software. Please disregard these errors. Additionally, please excuse any errors that have escaped final proofreading.

## 2021-04-02 NOTE — H&P
History and Physical    NAMEBerto Merritt   :  1965   MRN:  [id number]     Date/Time:  2021 9:30 AM    Patient PCP: GOMEZ Walker  ______________________________________________________________________             Subjective:     CHIEF COMPLAINT:     Right toe pain    HISTORY OF PRESENT ILLNESS:       Patient is a 49y.o. year old male homeless [place] not take any medications significant past medical history of diabetes hypertension hyperlipidemia hepatitis C carpal tunnel c/o  right toe pain, drink alcohol use drug cocaine severe depression bipolar    Patient was [facility] in [facility] and transferred here    Complaining of right toe pain for a while now is getting worse came to emergency room    X-ray done shows osteomyelitis    Past Medical History:   Diagnosis Date    Diabetes ([facility] [address]     Hepatitis C infection     Hypertension         No past surgical history on file. Social History     Tobacco Use    Smoking status: Current Every Day Smoker     Packs/day: 1.00    Smokeless tobacco: Never Used   Substance Use Topics    Alcohol use: Yes     Frequency: Never     Comment: 2 times a week         No family history on file. No Known Allergies     Prior to Admission medications    Medication Sig Start Date End Date Taking? Authorizing Provider   DULoxetine (CYMBALTA) 60 mg capsule Take 1 Cap by mouth daily. Indications: diabetic complication causing injury to some body nerves, major depressive disorder 21   Bj Ruiz MD   indapamide (LOZOL) 1.25 mg tablet Take 1 Tab by mouth daily. Indications: high blood pressure 21   Bj Ruiz MD   insulin glargine (LANTUS) 100 unit/mL injection Administer 60 units underneath the skin, every night after supper  Indications: type 2 diabetes mellitus 21   Bj Ruiz MD   insulin lispro (HUMALOG) 100 unit/mL injection Administer 10 underneath the skin, three times a day before meals.   Indications: type 2 diabetes mellitus 1/21/21   Chris Sandy MD   metroNIDAZOLE (FLAGYL) 500 mg tablet Take 1 Tab by mouth three (3) times daily. Indications: GI infection 1/21/21   Chris Sandy MD   QUEtiapine (SEROquel) 100 mg tablet Take 1 Tab by mouth nightly. Indications: additional medications to treat depression 1/21/21   Chris Sandy MD   rosuvastatin (CRESTOR) 40 mg tablet Take 1 Tab by mouth nightly. Indications: high cholesterol 1/21/21   Chris Sandy MD   traZODone (DESYREL) 50 mg tablet Take 1 Tab by mouth nightly as needed for Sleep.  Indications: insomnia associated with depression 1/21/21   Chris Sandy MD         Current Facility-Administered Medications:     insulin lispro (HUMALOG) injection, , SubCUTAneous, AC&HS, Ellen Robbins MD    glucose chewable tablet 16 g, 4 Tab, Oral, PRN, Ellen Robbins MD    glucagon (GLUCAGEN) injection 1 mg, 1 mg, IntraMUSCular, PRN, Ellen Robbins MD    dextrose (D50W) injection syrg 12.5-25 g, 25-50 mL, IntraVENous, PRN, Ellen Robbins MD    DULoxetine (CYMBALTA) capsule 60 mg, 60 mg, Oral, DAILY, Ellen Robbins MD    insulin glargine (LANTUS) injection 60 Units, 60 Units, SubCUTAneous, QHS, Garima Robbins MD    QUEtiapine (SEROquel) tablet 100 mg, 100 mg, Oral, QHS, Garima Robbins MD    rosuvastatin (CRESTOR) tablet 40 mg, 40 mg, Oral, QHS, Garima Robbins MD    traZODone (DESYREL) tablet 50 mg, 50 mg, Oral, QHS PRN, Ellen Robbins MD    insulin lispro (HUMALOG) injection, , SubCUTAneous, Q4H, Garima Robbins MD    glucose chewable tablet 16 g, 4 Tab, Oral, PRN, Garima Robbins MD    dextrose (D50W) injection syrg 12.5-25 g, 25-50 mL, IntraVENous, PRN, Garima Robbins MD    glucagon (GLUCAGEN) injection 1 mg, 1 mg, IntraMUSCular, PRN, Garima Robbnis MD    0.9% sodium chloride infusion, 75 mL/hr, IntraVENous, CONTINUOUS, Garima Robbins MD    acetaminophen (TYLENOL) tablet 650 mg, 650 mg, Oral, Q6H PRN **OR** acetaminophen (TYLENOL) suppository 650 mg, 650 mg, Rectal, Q6H PRN, Tucker Robbins MD    polyethylene glycol (MIRALAX) packet 17 g, 17 g, Oral, DAILY PRN, Tucker Robbins MD    ondansetron (ZOFRAN ODT) tablet 4 mg, 4 mg, Oral, Q8H PRN **OR** ondansetron (ZOFRAN) injection 4 mg, 4 mg, IntraVENous, Q6H PRN, Garima Robbins MD    piperacillin-tazobactam (ZOSYN) 3.375 g in 0.9% sodium chloride (MBP/ADV) 100 mL MBP, 3.375 g, IntraVENous, Q8H, Garima Robbins MD    Current Outpatient Medications:     DULoxetine (CYMBALTA) 60 mg capsule, Take 1 Cap by mouth daily. Indications: diabetic complication causing injury to some body nerves, major depressive disorder, Disp: 30 Cap, Rfl: 0    indapamide (LOZOL) 1.25 mg tablet, Take 1 Tab by mouth daily. Indications: high blood pressure, Disp: 30 Tab, Rfl: 0    insulin glargine (LANTUS) 100 unit/mL injection, Administer 60 units underneath the skin, every night after supper  Indications: type 2 diabetes mellitus, Disp: 1 Vial, Rfl: 0    insulin lispro (HUMALOG) 100 unit/mL injection, Administer 10 underneath the skin, three times a day before meals. Indications: type 2 diabetes mellitus, Disp: 1 Vial, Rfl: 0    metroNIDAZOLE (FLAGYL) 500 mg tablet, Take 1 Tab by mouth three (3) times daily. Indications: GI infection, Disp: 12 Tab, Rfl: 0    QUEtiapine (SEROquel) 100 mg tablet, Take 1 Tab by mouth nightly. Indications: additional medications to treat depression, Disp: 30 Tab, Rfl: 0    rosuvastatin (CRESTOR) 40 mg tablet, Take 1 Tab by mouth nightly. Indications: high cholesterol, Disp: 30 Tab, Rfl: 0    traZODone (DESYREL) 50 mg tablet, Take 1 Tab by mouth nightly as needed for Sleep.  Indications: insomnia associated with depression, Disp: 30 Tab, Rfl: 0    LAB DATA REVIEWED:    Recent Results (from the past 24 hour(s))   CBC WITH AUTOMATED DIFF    Collection Time: 04/01/21 11:35 PM   Result Value Ref Range    WBC 5.3 4.1 - 11.1 K/uL    RBC 3.96 (L) 4.10 - 5.70 M/uL    HGB 12.9 12.1 - 17.0 g/dL    HCT 37.1 36.6 - 50.3 %    MCV 93.7 80.0 - 99.0 FL    MCH 32.6 26.0 - 34.0 PG    MCHC 34.8 30.0 - 36.5 g/dL    RDW 12.8 11.5 - 14.5 %    PLATELET 654 218 - 447 K/uL    MPV 11.6 8.9 - 12.9 FL    NRBC 0.0 0  WBC    ABSOLUTE NRBC 0.00 0.00 - 0.01 K/uL    NEUTROPHILS 45 32 - 75 %    LYMPHOCYTES 42 12 - 49 %    MONOCYTES 11 5 - 13 %    EOSINOPHILS 2 0 - 7 %    BASOPHILS 0 0 - 1 %    IMMATURE GRANULOCYTES 0 0.0 - 0.5 %    ABS. NEUTROPHILS 2.4 1.8 - 8.0 K/UL    ABS. LYMPHOCYTES 2.2 0.8 - 3.5 K/UL    ABS. MONOCYTES 0.6 0.0 - 1.0 K/UL    ABS. EOSINOPHILS 0.1 0.0 - 0.4 K/UL    ABS. BASOPHILS 0.0 0.0 - 0.1 K/UL    ABS. IMM. GRANS. 0.0 0.00 - 0.04 K/UL    DF AUTOMATED     METABOLIC PANEL, COMPREHENSIVE    Collection Time: 04/01/21 11:35 PM   Result Value Ref Range    Sodium 134 (L) 136 - 145 mmol/L    Potassium 4.2 3.5 - 5.1 mmol/L    Chloride 100 97 - 108 mmol/L    CO2 32 21 - 32 mmol/L    Anion gap 2 (L) 5 - 15 mmol/L    Glucose 402 (H) 65 - 100 mg/dL    BUN 11 6 - 20 mg/dL    Creatinine 1.23 0.70 - 1.30 mg/dL    BUN/Creatinine ratio 9 (L) 12 - 20      GFR est AA >60 >60 ml/min/1.73m2    GFR est non-AA >60 >60 ml/min/1.73m2    Calcium 9.1 8.5 - 10.1 mg/dL    Bilirubin, total 0.3 0.2 - 1.0 mg/dL    AST (SGOT) 44 (H) 15 - 37 U/L    ALT (SGPT) 52 12 - 78 U/L    Alk.  phosphatase 204 (H) 45 - 117 U/L    Protein, total 7.3 6.4 - 8.2 g/dL    Albumin 2.9 (L) 3.5 - 5.0 g/dL    Globulin 4.4 (H) 2.0 - 4.0 g/dL    A-G Ratio 0.7 (L) 1.1 - 2.2     LACTIC ACID    Collection Time: 04/01/21 11:35 PM   Result Value Ref Range    Lactic acid 1.2 0.4 - 2.0 mmol/L   MAGNESIUM    Collection Time: 04/01/21 11:35 PM   Result Value Ref Range    Magnesium 1.8 1.6 - 2.4 mg/dL   SED RATE (ESR)    Collection Time: 04/01/21 11:35 PM   Result Value Ref Range    Sed rate, automated 43 mm/hr   C REACTIVE PROTEIN, QT    Collection Time: 04/01/21 11:35 PM   Result Value Ref Range    C-Reactive protein <0.29 0.00 - 0.60 mg/dL   GLUCOSE, POC    Collection Time: 04/02/21  6:52 AM   Result Value Ref Range    Glucose (POC) 495 (H) 65 - 100 mg/dL    Performed by Amadou Aldrich    COVID-19 RAPID TEST    Collection Time: 04/02/21  8:17 AM   Result Value Ref Range    Specimen source Nasopharyngeal      COVID-19 rapid test Not Detected Not Detected         XR Results (most recent):  Results from Hospital Encounter encounter on 04/01/21   XR GREAT TOE RT MIN 2 V    Narrative XR GREAT TOE RT MIN 2 V    Comparison: November 9, 2020. There is diffuse soft tissue swelling in the distal great toe. There has been  interval lytic destruction of the terminal tuft of the distal phalange  consistent with osteomyelitis. There is no soft tissue gas. Impression Osteomyelitis involving the terminal tuft of the distal phalange of  the great toe. XR GREAT TOE RT MIN 2 V   Final Result   Osteomyelitis involving the terminal tuft of the distal phalange of   the great toe. Review of Systems:  Constitutional: Negative for chills and fever. HENT: Negative. Eyes: Negative. Respiratory: Negative. Cardiovascular: Negative. Gastrointestinal: Negative for abdominal pain and nausea. Skin: Negative. /Right toe pain  Neurological: Negative. Objective:   VITALS:    Visit Vitals  /88 (BP 1 Location: Right upper arm, BP Patient Position: At rest)   Pulse 82   Temp 98.3 °F (36.8 °C)   Resp 16   Ht 5' 11\" (1.803 m)   Wt 72.6 kg (160 lb)   SpO2 97%   BMI 22.32 kg/m²       Physical Exam:   Constitutional: pt is oriented to person, place, and time. HENT:   Head: Normocephalic and atraumatic. Eyes: Pupils are equal, round, and reactive to light. EOM are normal.   Cardiovascular: Normal rate, regular rhythm and normal heart sounds. Pulmonary/Chest: Breath sounds normal. No wheezes. No rales. Exhibits no tenderness. Abdominal: Soft. Bowel sounds are normal. There is no abdominal tenderness.  There is no rebound and no guarding. Musculoskeletal: Right toe swollen tender   neurological: pt is alert and oriented to person, place, and time. Alert. Normal strength. No cranial nerve deficit or sensory deficit. Displays a negative Romberg sign.         ASSESSMENT & PLAN:    Osteomyelitis of right toe  Uncontrolled diabetes  Hypertension  Hyperlipidemia  Hepatitis C  Carpal tunnel syndrome  Alcohol dependency   cocaine Abuse    Telemetry floor order blood cultures wound cultures start on vancomycin and Zosyn ID consult and podiatry consult  Start on sliding scale insulin    Patient is homeless do not remember how much insulin taking     Consult psychiatry for bipolar depression    Current Facility-Administered Medications:     insulin lispro (HUMALOG) injection, , SubCUTAneous, AC&HS, Deo Robbins MD    glucose chewable tablet 16 g, 4 Tab, Oral, PRN, Deo Robbins MD    glucagon (GLUCAGEN) injection 1 mg, 1 mg, IntraMUSCular, PRN, Deo Robbins MD    dextrose (D50W) injection syrg 12.5-25 g, 25-50 mL, IntraVENous, PRN, Deo Robbins MD    DULoxetine (CYMBALTA) capsule 60 mg, 60 mg, Oral, DAILY, Deo Robbins MD    insulin glargine (LANTUS) injection 60 Units, 60 Units, SubCUTAneous, QHS, Garima Robbins MD    QUEtiapine (SEROquel) tablet 100 mg, 100 mg, Oral, QHS, Garima Robbins MD    rosuvastatin (CRESTOR) tablet 40 mg, 40 mg, Oral, QHS, Garima Robbins MD    traZODone (DESYREL) tablet 50 mg, 50 mg, Oral, QHS PRN, Garima Robbins MD    insulin lispro (HUMALOG) injection, , SubCUTAneous, Q4H, Garima Robbins MD    glucose chewable tablet 16 g, 4 Tab, Oral, PRN, Garima Robbins MD    dextrose (D50W) injection syrg 12.5-25 g, 25-50 mL, IntraVENous, PRN, Garima Robbins MD    glucagon (GLUCAGEN) injection 1 mg, 1 mg, IntraMUSCular, PRN, Garima Robbins MD    0.9% sodium chloride infusion, 75 mL/hr, IntraVENous, CONTINUOUS, Garima Robbins MD    acetaminophen (TYLENOL) tablet 650 mg, 650 mg, Oral, Q6H PRN **OR** acetaminophen (TYLENOL) suppository 650 mg, 650 mg, Rectal, Q6H PRN, Ellen Robbins MD    polyethylene glycol (MIRALAX) packet 17 g, 17 g, Oral, DAILY PRN, Ellen Robbins MD    ondansetron (ZOFRAN ODT) tablet 4 mg, 4 mg, Oral, Q8H PRN **OR** ondansetron (ZOFRAN) injection 4 mg, 4 mg, IntraVENous, Q6H PRN, Garima Robbins MD    piperacillin-tazobactam (ZOSYN) 3.375 g in 0.9% sodium chloride (MBP/ADV) 100 mL MBP, 3.375 g, IntraVENous, Q8H, Garima Robbins MD    Current Outpatient Medications:     DULoxetine (CYMBALTA) 60 mg capsule, Take 1 Cap by mouth daily. Indications: diabetic complication causing injury to some body nerves, major depressive disorder, Disp: 30 Cap, Rfl: 0    indapamide (LOZOL) 1.25 mg tablet, Take 1 Tab by mouth daily. Indications: high blood pressure, Disp: 30 Tab, Rfl: 0    insulin glargine (LANTUS) 100 unit/mL injection, Administer 60 units underneath the skin, every night after supper  Indications: type 2 diabetes mellitus, Disp: 1 Vial, Rfl: 0    insulin lispro (HUMALOG) 100 unit/mL injection, Administer 10 underneath the skin, three times a day before meals. Indications: type 2 diabetes mellitus, Disp: 1 Vial, Rfl: 0    metroNIDAZOLE (FLAGYL) 500 mg tablet, Take 1 Tab by mouth three (3) times daily. Indications: GI infection, Disp: 12 Tab, Rfl: 0    QUEtiapine (SEROquel) 100 mg tablet, Take 1 Tab by mouth nightly. Indications: additional medications to treat depression, Disp: 30 Tab, Rfl: 0    rosuvastatin (CRESTOR) 40 mg tablet, Take 1 Tab by mouth nightly. Indications: high cholesterol, Disp: 30 Tab, Rfl: 0    traZODone (DESYREL) 50 mg tablet, Take 1 Tab by mouth nightly as needed for Sleep.  Indications: insomnia associated with depression, Disp: 30 Tab, Rfl: 0        ________________________________________________________________________    Signed: Loral Raider, MD

## 2021-04-02 NOTE — CONSULTS
[facility] CONSULT NOTE    Subjective:         Date of Consultation: April 2, 2021     Referring Physician: Michael Martinez MD    Patient is a 49 y.o. male who is being seen for right hallux wound. Patient has a hx of HTN, Hep C, DM with Neuropathy and MDD. Patient states he has suffered with the wound to his right hallux for roughly 6 months. He states he has not received any care as he is homeless. He denies any associated pain. He denies any associated systemic signs of infection. He denies any overt traumatic inciting event. He denies any other pedal complaints    Patient Active Problem List    Diagnosis Date Noted    Osteomyelitis ([facility] 04/02/2021    MDD (major depressive disorder), recurrent severe, without psychosis ([facility] 01/12/2021    Major depression with psychotic features ([facility] 01/11/2021     Past Medical History:   Diagnosis Date    Diabetes ([facility]     Hepatitis C infection     Hypertension       No past surgical history on file. No family history on file.    Social History     Tobacco Use    Smoking status: Current Every Day Smoker     Packs/day: 1.00    Smokeless tobacco: Never Used   Substance Use Topics    Alcohol use: Yes     Frequency: Never     Comment: 2 times a week      Current Facility-Administered Medications   Medication Dose Route Frequency Provider Last Rate Last Admin    insulin lispro (HUMALOG) injection   SubCUTAneous AC&HS Graima Robbins MD   Stopped at 04/02/21 1230    glucose chewable tablet 16 g  4 Tab Oral PRN Paola Robbins MD        glucagon (GLUCAGEN) injection 1 mg  1 mg IntraMUSCular PRN Paola Robbins MD        dextrose (D50W) injection syrg 12.5-25 g  25-50 mL IntraVENous PRN Paola Robbins MD        DULoxetine (CYMBALTA) capsule 60 mg  60 mg Oral DAILY Garima Robbins MD   60 mg at 04/02/21 1252    insulin glargine (LANTUS) injection 60 Units  60 Units SubCUTAneous QHS Paola Robbins MD        QUEtiapine (SEROquel) tablet 100 mg  100 mg Oral QHS Dominga Robbins MD        atorvastatin (LIPITOR) tablet 80 mg  80 mg Oral QHS Garima Robbins MD        traZODone (DESYREL) tablet 50 mg  50 mg Oral QHS PRN Dominga Robbins MD       [address] [place] [address] insulin lispro (HUMALOG) injection   SubCUTAneous Q4H Garima Robbins MD   10 Units at 04/02/21 1252    glucose chewable tablet 16 g  4 Tab Oral PRN Dominga Robbins MD        dextrose (D50W) injection syrg 12.5-25 g  25-50 mL IntraVENous PRN Ashley Olson MD        glucagon (GLUCAGEN) injection 1 mg  1 mg IntraMUSCular PRN Ashley Olson MD        0.9% sodium chloride infusion  75 mL/hr IntraVENous CONTINUOUS Garima Robbins MD 75 mL/hr at 04/02/21 1152 75 mL/hr at 04/02/21 1152    acetaminophen (TYLENOL) tablet 650 mg  650 mg Oral Q6H PRN Dominga Robbins MD        Or   [address] [place] [address] acetaminophen (TYLENOL) suppository 650 mg  650 mg Rectal Q6H PRN Dominga Robbins MD        polyethylene glycol (MIRALAX) packet 17 g  17 g Oral DAILY PRN Dominga Robbins MD        ondansetron (ZOFRAN ODT) tablet 4 mg  4 mg Oral Q8H PRN Dominga Robbins MD        Or    ondansetron [facility]) injection 4 mg  4 mg IntraVENous Q6H PRN Dominga Robbins MD        piperacillin-tazobactam (ZOSYN) 3.375 g in 0.9% sodium chloride (MBP/ADV) 100 mL MBP  3.375 g IntraVENous Q8H Ashley Olson  mL/hr at 04/02/21 1152 3.375 g at 04/02/21 1152      No Known Allergies     Review of Systems:    Constitutional: No fever, No chills, No sweats, No weakness, No fatigue  Respiratory: No shortness of breath, No cough, No sputum production, No hemoptysis, No wheezing, No cyanosis. Cardiovascular: No chest pain, No palpitations, No bradycardia, No tachycardia, No peripheral edema, No syncope. Gastrointestinal: No nausea, No vomiting, No diarrhea, No constipation, No heartburn, No abdominal pain.   Endocrine: No excessive thirst, No polyuria, No cold intolerance, No heat intolerance, No excessive hunger. Immunologic: + immunocompromised, No recurrent fevers, + recurrent infections. Musculoskeletal: No back pain, No neck pain, No joint pain, No muscle pain, No claudication, No decreased range of motion, No trauma. Integumentary: No rash, No pruritus, No abrasions. Neurologic: Alert and oriented X4, No abnormal balance, No headache, No confusion, + numbness, No tingling. Psychiatric: + anxiety, + depression, + radha. Objective:     Patient Vitals for the past 8 hrs:   BP Temp Pulse Resp SpO2   21 1600 -- -- 85 -- --   21 1450 (!) 149/99 98.4 °F (36.9 °C) 89 18 100 %   21 1200 -- -- 84 -- --     Temp (24hrs), Av.4 °F (36.9 °C), Min:98.3 °F (36.8 °C), Max:98.4 °F (36.9 °C)      Physical Exam:    GEN: Pt is AAOx4 and in NAD. No dressing noted to B/L LE. No family noted at [facility]  DERM: Wound noted to the distal aspect of the right hallux. No breaks in skin. No proximal lymphatic streaking. No malodor or drainage noted  VASC: Pedal pulses (DP/PT) palpable to B/L LE. CFT<3sec to all digits of B/L LE. No pedal hair growth noted to the level of the digits for B/L LE. Skin temp is warm to warm from proximal to distal for B/L LE. Neg homans/mariama signs to B/L LE. No varicosities or telangectasias noted to B/L LE.  NEURO: Protective and epicritic sensations grossly absent to B/L LE  MSK: (-) POP, No gross deformities.  Good muscle tone and bulk noted to B/L LE.  PSYCH: Cooperative with normal mood and affect    Lab Review:   Recent Results (from the past 24 hour(s))   CBC WITH AUTOMATED DIFF    Collection Time: 21 11:35 PM   Result Value Ref Range    WBC 5.3 4.1 - 11.1 K/uL    RBC 3.96 (L) 4.10 - 5.70 M/uL    HGB 12.9 12.1 - 17.0 g/dL    HCT 37.1 36.6 - 50.3 %    MCV 93.7 80.0 - 99.0 FL    MCH 32.6 26.0 - 34.0 PG    MCHC 34.8 30.0 - 36.5 g/dL    RDW 12.8 11.5 - 14.5 %    PLATELET 948 915 - 419 K/uL    MPV 11.6 8.9 - 12.9 FL    NRBC 0.0 0  WBC    ABSOLUTE NRBC 0.00 0.00 - 0.01 K/uL    NEUTROPHILS 45 32 - 75 %    LYMPHOCYTES 42 12 - 49 %    MONOCYTES 11 5 - 13 %    EOSINOPHILS 2 0 - 7 %    BASOPHILS 0 0 - 1 %    IMMATURE GRANULOCYTES 0 0.0 - 0.5 %    ABS. NEUTROPHILS 2.4 1.8 - 8.0 K/UL    ABS. LYMPHOCYTES 2.2 0.8 - 3.5 K/UL    ABS. MONOCYTES 0.6 0.0 - 1.0 K/UL    ABS. EOSINOPHILS 0.1 0.0 - 0.4 K/UL    ABS. BASOPHILS 0.0 0.0 - 0.1 K/UL    ABS. IMM. GRANS. 0.0 0.00 - 0.04 K/UL    DF AUTOMATED     METABOLIC PANEL, COMPREHENSIVE    Collection Time: 04/01/21 11:35 PM   Result Value Ref Range    Sodium 134 (L) 136 - 145 mmol/L    Potassium 4.2 3.5 - 5.1 mmol/L    Chloride 100 97 - 108 mmol/L    CO2 32 21 - 32 mmol/L    Anion gap 2 (L) 5 - 15 mmol/L    Glucose 402 (H) 65 - 100 mg/dL    BUN 11 6 - 20 mg/dL    Creatinine 1.23 0.70 - 1.30 mg/dL    BUN/Creatinine ratio 9 (L) 12 - 20      GFR est AA >60 >60 ml/min/1.73m2    GFR est non-AA >60 >60 ml/min/1.73m2    Calcium 9.1 8.5 - 10.1 mg/dL    Bilirubin, total 0.3 0.2 - 1.0 mg/dL    AST (SGOT) 44 (H) 15 - 37 U/L    ALT (SGPT) 52 12 - 78 U/L    Alk.  phosphatase 204 (H) 45 - 117 U/L    Protein, total 7.3 6.4 - 8.2 g/dL    Albumin 2.9 (L) 3.5 - 5.0 g/dL    Globulin 4.4 (H) 2.0 - 4.0 g/dL    A-G Ratio 0.7 (L) 1.1 - 2.2     LACTIC ACID    Collection Time: 04/01/21 11:35 PM   Result Value Ref Range    Lactic acid 1.2 0.4 - 2.0 mmol/L   MAGNESIUM    Collection Time: 04/01/21 11:35 PM   Result Value Ref Range    Magnesium 1.8 1.6 - 2.4 mg/dL   SED RATE (ESR)    Collection Time: 04/01/21 11:35 PM   Result Value Ref Range    Sed rate, automated 43 mm/hr   C REACTIVE PROTEIN, QT    Collection Time: 04/01/21 11:35 PM   Result Value Ref Range    C-Reactive protein <0.29 0.00 - 0.60 mg/dL   GLUCOSE, POC    Collection Time: 04/02/21  6:52 AM   Result Value Ref Range    Glucose (POC) 495 (H) 65 - 100 mg/dL    Performed by [facility]    COVID-19 RAPID TEST    Collection Time: 04/02/21  8:17 AM   Result Value Ref Range    Specimen source Nasopharyngeal COVID-19 rapid test Not Detected Not Detected     MRSA SCREEN - PCR (NASAL)    Collection Time: 04/02/21 12:15 PM   Result Value Ref Range    MRSA by PCR, Nasal DETECTED (A) Not Detected     GLUCOSE, POC    Collection Time: 04/02/21 12:45 PM   Result Value Ref Range    Glucose (POC) 349 (H) 65 - 100 mg/dL    Performed by Chely Cobb, POC    Collection Time: 04/02/21  3:41 PM   Result Value Ref Range    Glucose (POC) 219 (H) 65 - 100 mg/dL    Performed by Lupe Milian        Impression:     Right hallux diabetic wound with underlying osteomyelitis  DM with Neuropathy    Recommendation:     Patient seen and evaluated at bedside  - Current labs personally reviewed and findings dicussed with patient  - XR images personally reviewed of the right foot and findings discussed with patient, noting osteomyelitis of the distal phalanx of the right hallux. No other fracture or dislocation noted  - Discuss treatment options, long-term IV antibiotics versus surgical excision. Patient to decide which course he would like to proceed with  - Recommend leaving open to air at this time, no wound care needed  - Antibiotics per infectious disease  - I will follow closely    Thank you for the consult! John Neely DPM, [facility]  [address] [place] [address], [address] [place] [address]:  [phone]  F:  [phone]  C:  [phone]

## 2021-04-02 NOTE — ED NOTES
Patient care and report given to [facility] with [facility].

## 2021-04-02 NOTE — CONSULTS
Infectious Disease Consult Note    Reason for Consult: Dr Kristofer Perkins   Date of Consultation: April 2, 2021  Date of Admission: 4/1/2021  Referring Physician: Right great toe osteomyelitis       HPI: 44-year-old black male admitted as a transfer from [facility] where he presented w c/o right great toe pain/swelling. His medical history is significant for bipolar d/o, chronic hep C infection, insulin-dependent DM, peripheral neuropathy and hyperlipidemia. He became homeless about 2 wks ago after losing his job and was unable to pay rent. He reports noticing a scab on his right great toe a couple of wks ago, w/o preceding trauma, denies noting increased drainage or gangrenous changes. X-ray of his right foot 4/1 revealed osteomyelitis involving the distal phalanx of his right great toe. He is MRSA colonized. Blood Cx is pending. He is on Vanc and Zosyn. ID has been consulted for mgt of his osteomyelitis. Review of Systems:     Gen: Negative for chills, fevers, weight loss, weight gain   HEENT: Negative for headache, vision changes, ear ache or discharge   CV:  Negative for chest pain, dyspnea on exertion, leg edema   Lungs: Negative for shortness of breath, cough, wheezing   Abdomen: Negative for abdominal pain, nausea, vomiting, diarrhea, constipation   Genitourinary: Negative for genital pain or genital discharge     Skin: Negative for rash, sores/open wounds   Musculoskeletal: Right great toe pain    Psych: Negative for manic behavior       Past Medical History:  Past Medical History:   Diagnosis Date    Diabetes ([address]     Hepatitis C infection     Hypertension        Past surgical history   No past surgical history on file.      Social History   Social History     Tobacco Use    Smoking status: Current Every Day Smoker     Packs/day: 1.00    Smokeless tobacco: Never Used   Substance Use Topics    Alcohol use: Yes     Frequency: Never     Comment: 2 times a week     Drug use: Yes     Types: Cocaine Family history   No family history on file. Allergies:  No Known Allergies      Medications:  No current facility-administered medications on file prior to encounter. Current Outpatient Medications on File Prior to Encounter   Medication Sig Dispense Refill    DULoxetine (CYMBALTA) 60 mg capsule Take 1 Cap by mouth daily. Indications: diabetic complication causing injury to some body nerves, major depressive disorder 30 Cap 0    indapamide (LOZOL) 1.25 mg tablet Take 1 Tab by mouth daily. Indications: high blood pressure 30 Tab 0    insulin glargine (LANTUS) 100 unit/mL injection Administer 60 units underneath the skin, every night after supper  Indications: type 2 diabetes mellitus 1 Vial 0    insulin lispro (HUMALOG) 100 unit/mL injection Administer 10 underneath the skin, three times a day before meals. Indications: type 2 diabetes mellitus 1 Vial 0    QUEtiapine (SEROquel) 100 mg tablet Take 1 Tab by mouth nightly. Indications: additional medications to treat depression 30 Tab 0    rosuvastatin (CRESTOR) 40 mg tablet Take 1 Tab by mouth nightly. Indications: high cholesterol 30 Tab 0    traZODone (DESYREL) 50 mg tablet Take 1 Tab by mouth nightly as needed for Sleep.  Indications: insomnia associated with depression 30 Tab 0       Physical Exam:    Vitals:   Patient Vitals for the past 24 hrs:   Temp Pulse Resp BP SpO2   04/02/21 1450 98.4 °F (36.9 °C) 89 18 (!) 149/99 100 %   04/02/21 1200 -- 84 -- -- --   04/02/21 0741 -- 82 16 136/88 97 %   04/02/21 0609 -- 73 16 (!) 155/76 100 %   04/02/21 0449 -- 86 16 (!) 155/76 97 %   04/02/21 0346 -- 82 18 (!) 176/95 98 %   04/02/21 0202 -- 84 20 (!) 187/107 99 %   04/01/21 2200 98.3 °F (36.8 °C) 83 18 (!) 192/102 98 %   ·   · GEN: NAD, AAO x 4  · HEENT: NCAT, PERRLA  · HEART: S1, S2+, RRR, No murmur   · Lungs: CTA B/l,decreased at the bases, no wheeze/rhonchi   · Abdomen: soft, ND, NT, +BS   · Genitourinary: no genital discharge, no ramiro  · Extremities: Right great toe swelling, ulcerated area, minimal drainage, no exposed bone      Labs:   Recent Labs     04/01/21  2335   WBC 5.3   HGB 12.9   HCT 37.1        Recent Labs     04/01/21  2335   BUN 11   CREA 1.23       Lab Results   Component Value Date/Time    C-Reactive protein <0.29 04/01/2021 11:35 PM      No results found for: SR       Microbiology Data:  - MRSA colonized   - Blood Cx 04/02: Pending     Imaging:   Right foot X-ray 04/01: Osteomyelitis involving the terminal tuft of the distal phalange of  the great toe. Assessment / Plan:     1. Osteomyelitis involving distal phalanx of right great toe, underlying DM       Afebrile, normal WBC on routine       No draining ulcer for Cx, MRSA colonized      Continue on Vanc and Zosyn for now      CRP and ESR added to morning labs    2. H/o chronic hep C, denies prior Tx, will check HCV RNA VL, out tx if detectable     3. H/o uncontrolled DM w associated neuropathy     4.  H/o Bipolar d/o and homelessness     Jennifer Rivera MD     4/2/2021

## 2021-04-03 LAB
ALBUMIN SERPL-MCNC: 2.4 G/DL (ref 3.5–5)
ALBUMIN/GLOB SERPL: 0.6 {RATIO} (ref 1.1–2.2)
ALP SERPL-CCNC: 126 U/L (ref 45–117)
ALT SERPL-CCNC: 44 U/L (ref 12–78)
ANION GAP SERPL CALC-SCNC: 7 MMOL/L (ref 5–15)
AST SERPL W P-5'-P-CCNC: 33 U/L (ref 15–37)
BASOPHILS # BLD: 0 K/UL (ref 0–0.1)
BASOPHILS NFR BLD: 0 % (ref 0–1)
BILIRUB SERPL-MCNC: 0.3 MG/DL (ref 0.2–1)
BUN SERPL-MCNC: 18 MG/DL (ref 6–20)
BUN/CREAT SERPL: 17 (ref 12–20)
CA-I BLD-MCNC: 9 MG/DL (ref 8.5–10.1)
CHLORIDE SERPL-SCNC: 104 MMOL/L (ref 97–108)
CO2 SERPL-SCNC: 28 MMOL/L (ref 21–32)
CREAT SERPL-MCNC: 1.07 MG/DL (ref 0.7–1.3)
CRP SERPL-MCNC: <0.29 MG/DL (ref 0–0.6)
DIFFERENTIAL METHOD BLD: ABNORMAL
EOSINOPHIL # BLD: 0.1 K/UL (ref 0–0.4)
EOSINOPHIL NFR BLD: 2 % (ref 0–7)
ERYTHROCYTE [DISTWIDTH] IN BLOOD BY AUTOMATED COUNT: 12.8 % (ref 11.5–14.5)
ERYTHROCYTE [SEDIMENTATION RATE] IN BLOOD: 45 MM/HR
EST. AVERAGE GLUCOSE BLD GHB EST-MCNC: 275 MG/DL
GLOBULIN SER CALC-MCNC: 3.8 G/DL (ref 2–4)
GLUCOSE BLD STRIP.AUTO-MCNC: 121 MG/DL (ref 65–100)
GLUCOSE BLD STRIP.AUTO-MCNC: 169 MG/DL (ref 65–100)
GLUCOSE BLD STRIP.AUTO-MCNC: 227 MG/DL (ref 65–100)
GLUCOSE BLD STRIP.AUTO-MCNC: 261 MG/DL (ref 65–100)
GLUCOSE BLD STRIP.AUTO-MCNC: 272 MG/DL (ref 65–100)
GLUCOSE BLD STRIP.AUTO-MCNC: 315 MG/DL (ref 65–100)
GLUCOSE BLD STRIP.AUTO-MCNC: 425 MG/DL (ref 65–100)
GLUCOSE SERPL-MCNC: 150 MG/DL (ref 65–100)
HBA1C MFR BLD: 11.2 % (ref 4–5.6)
HCT VFR BLD AUTO: 33.4 % (ref 36.6–50.3)
HGB BLD-MCNC: 11.5 G/DL (ref 12.1–17)
IMM GRANULOCYTES # BLD AUTO: 0 K/UL (ref 0–0.04)
IMM GRANULOCYTES NFR BLD AUTO: 1 % (ref 0–0.5)
LYMPHOCYTES # BLD: 2.4 K/UL (ref 0.8–3.5)
LYMPHOCYTES NFR BLD: 41 % (ref 12–49)
MCH RBC QN AUTO: 31.8 PG (ref 26–34)
MCHC RBC AUTO-ENTMCNC: 34.4 G/DL (ref 30–36.5)
MCV RBC AUTO: 92.3 FL (ref 80–99)
MONOCYTES # BLD: 0.7 K/UL (ref 0–1)
MONOCYTES NFR BLD: 12 % (ref 5–13)
NEUTS SEG # BLD: 2.6 K/UL (ref 1.8–8)
NEUTS SEG NFR BLD: 44 % (ref 32–75)
NRBC # BLD: 0 K/UL (ref 0–0.01)
NRBC BLD-RTO: 0 PER 100 WBC
PERFORMED BY, TECHID: ABNORMAL
PLATELET # BLD AUTO: 148 K/UL (ref 150–400)
PMV BLD AUTO: 11.4 FL (ref 8.9–12.9)
POTASSIUM SERPL-SCNC: 3.3 MMOL/L (ref 3.5–5.1)
PROT SERPL-MCNC: 6.2 G/DL (ref 6.4–8.2)
RBC # BLD AUTO: 3.62 M/UL (ref 4.1–5.7)
SODIUM SERPL-SCNC: 139 MMOL/L (ref 136–145)
WBC # BLD AUTO: 5.8 K/UL (ref 4.1–11.1)

## 2021-04-03 PROCEDURE — 86140 C-REACTIVE PROTEIN: CPT

## 2021-04-03 PROCEDURE — 74011250636 HC RX REV CODE- 250/636: Performed by: FAMILY MEDICINE

## 2021-04-03 PROCEDURE — 87522 HEPATITIS C REVRS TRNSCRPJ: CPT

## 2021-04-03 PROCEDURE — 74011250637 HC RX REV CODE- 250/637: Performed by: FAMILY MEDICINE

## 2021-04-03 PROCEDURE — 74011636637 HC RX REV CODE- 636/637: Performed by: FAMILY MEDICINE

## 2021-04-03 PROCEDURE — 74011000258 HC RX REV CODE- 258: Performed by: FAMILY MEDICINE

## 2021-04-03 PROCEDURE — 86803 HEPATITIS C AB TEST: CPT

## 2021-04-03 PROCEDURE — 80053 COMPREHEN METABOLIC PANEL: CPT

## 2021-04-03 PROCEDURE — 99232 SBSQ HOSP IP/OBS MODERATE 35: CPT | Performed by: INTERNAL MEDICINE

## 2021-04-03 PROCEDURE — 82962 GLUCOSE BLOOD TEST: CPT

## 2021-04-03 PROCEDURE — 85025 COMPLETE CBC W/AUTO DIFF WBC: CPT

## 2021-04-03 PROCEDURE — 85652 RBC SED RATE AUTOMATED: CPT

## 2021-04-03 PROCEDURE — 65270000029 HC RM PRIVATE

## 2021-04-03 PROCEDURE — 36415 COLL VENOUS BLD VENIPUNCTURE: CPT

## 2021-04-03 RX ORDER — POTASSIUM CHLORIDE 750 MG/1
40 TABLET, FILM COATED, EXTENDED RELEASE ORAL
Status: COMPLETED | OUTPATIENT
Start: 2021-04-03 | End: 2021-04-03

## 2021-04-03 RX ADMIN — INSULIN LISPRO 4 UNITS: 100 INJECTION, SOLUTION INTRAVENOUS; SUBCUTANEOUS at 13:30

## 2021-04-03 RX ADMIN — PIPERACILLIN AND TAZOBACTAM 3.38 G: 3; .375 INJECTION, POWDER, LYOPHILIZED, FOR SOLUTION INTRAVENOUS at 10:39

## 2021-04-03 RX ADMIN — PIPERACILLIN AND TAZOBACTAM 3.38 G: 3; .375 INJECTION, POWDER, LYOPHILIZED, FOR SOLUTION INTRAVENOUS at 01:35

## 2021-04-03 RX ADMIN — INSULIN LISPRO 7 UNITS: 100 INJECTION, SOLUTION INTRAVENOUS; SUBCUTANEOUS at 01:30

## 2021-04-03 RX ADMIN — INSULIN LISPRO 4 UNITS: 100 INJECTION, SOLUTION INTRAVENOUS; SUBCUTANEOUS at 21:30

## 2021-04-03 RX ADMIN — VANCOMYCIN HYDROCHLORIDE 1000 MG: 1 INJECTION, POWDER, LYOPHILIZED, FOR SOLUTION INTRAVENOUS at 20:49

## 2021-04-03 RX ADMIN — ATORVASTATIN CALCIUM 80 MG: 40 TABLET, FILM COATED ORAL at 21:49

## 2021-04-03 RX ADMIN — VANCOMYCIN HYDROCHLORIDE 1000 MG: 1 INJECTION, POWDER, LYOPHILIZED, FOR SOLUTION INTRAVENOUS at 09:16

## 2021-04-03 RX ADMIN — DULOXETINE HYDROCHLORIDE 60 MG: 30 CAPSULE, DELAYED RELEASE ORAL at 09:21

## 2021-04-03 RX ADMIN — INSULIN GLARGINE 60 UNITS: 100 INJECTION, SOLUTION SUBCUTANEOUS at 22:00

## 2021-04-03 RX ADMIN — INSULIN LISPRO 10 UNITS: 100 INJECTION, SOLUTION INTRAVENOUS; SUBCUTANEOUS at 17:30

## 2021-04-03 RX ADMIN — POTASSIUM CHLORIDE 40 MEQ: 750 TABLET, FILM COATED, EXTENDED RELEASE ORAL at 15:34

## 2021-04-03 RX ADMIN — SODIUM CHLORIDE 75 ML/HR: 9 INJECTION, SOLUTION INTRAVENOUS at 17:03

## 2021-04-03 RX ADMIN — PIPERACILLIN AND TAZOBACTAM 3.38 G: 3; .375 INJECTION, POWDER, LYOPHILIZED, FOR SOLUTION INTRAVENOUS at 17:03

## 2021-04-03 RX ADMIN — INSULIN LISPRO 3 UNITS: 100 INJECTION, SOLUTION INTRAVENOUS; SUBCUTANEOUS at 05:30

## 2021-04-03 RX ADMIN — HYDRALAZINE HYDROCHLORIDE 10 MG: 20 INJECTION INTRAMUSCULAR; INTRAVENOUS at 17:04

## 2021-04-03 NOTE — PROGRESS NOTES
Infectious Disease Progress Note               Subjective:   Pt seen on f/u, denies new complaints. No acute events since last seen. Denies new complaints. Objective:   Physical Exam:     Visit Vitals  /80 (BP 1 Location: Right upper arm, BP Patient Position: At rest)   Pulse 79   Temp 98.2 °F (36.8 °C)   Resp 18   Ht 5' 11\" (1.803 m)   Wt 160 lb (72.6 kg)   SpO2 99%   BMI 22.32 kg/m²      O2 Device: Room air    Temp (24hrs), Av.3 °F (36.8 °C), Min:98.1 °F (36.7 °C), Max:98.5 °F (36.9 °C)    No intake/output data recorded.  1901 -  0700  In: 50 [I.V.:50]  Out: -     General: NAD, alert, AAO x 4  HEENT: RIKA, Moist mucosa   Lungs: CTA b/l, decreased at the bases, no wheeze/rhonchi   Heart: S1S2+, RRR, no murmur  Abdo: Soft, NT, ND, +BS   : no indwelling rubio cath   Exts: Swollen right great toe, no drainage   Skin: No wounds, No rashes or lesions      Data Review:       Recent Days:  Recent Labs     21  0621  2335   WBC 5.8 5.3   HGB 11.5* 12.9   HCT 33.4* 37.1   * 173     Recent Labs     21  0600 21  2335   BUN 18 11   CREA 1.07 1.23       Lab Results   Component Value Date/Time    C-Reactive protein <0.29 2021 06:00 AM      Microbiology   - Blood Cx : Neg     Diagnostics   CXR Results  (Last 48 hours)    None        Assessment/Plan     1. Osteomyelitis involving distal phalanx of right great toe, underlying DM       No draining wound to Cx, MRSA colonized       Afebrile w a normal WBC on routine labs, ESR 45 and CRP <0.29      Continue on Vanc and Zosyn.  If no surgery, would recommend 6 wks of IV antibiotics       Arranging for out pt antibiotic will be a challenge w homeless status, and there is no guarantee infection will be eradicated w conservative mgt       Routine labs in the morning      2. H/o chronic hep C, denies prior Tx      HCV RNA VL is pending, out tx if detectable      3. H/o uncontrolled DM w associated neuropathy      4. H/o Bipolar d/o and homelessness     Criselda Rodrigues MD    4/3/2021

## 2021-04-03 NOTE — PROGRESS NOTES
Spiritual Care Assessment/Progress Note  [facility]      NAME: Saleem Ivory      MRN: [id number]  AGE: 49 y.o.  SEX: male  Zoroastrian Affiliation: Baptist   Language: English     4/3/2021     Total Time (in minutes): 6     Spiritual Assessment begun in [facility] through conversation with:         [x]Patient        [] Family    [] Friend(s)        Reason for Consult: Request by patient     Spiritual beliefs: (Please include comment if needed)     [] Identifies with a michelle tradition:         [] Supported by a michelle community:            [] Claims no spiritual orientation:           [] Seeking spiritual identity:                [] Adheres to an individual form of spirituality:           [x] Not able to assess:                           Identified resources for coping:      [] Prayer                               [] Music                  [] Guided Imagery     [] Family/friends                 [] Pet visits     [] Devotional reading                         [x] Unknown     [] Other:                                               Interventions offered during this visit: (See comments for more details)    Patient Interventions: Affirmation of emotions/emotional suffering, Coping skills reviewed/reinforced, Initial/Spiritual assessment, patient floor           Plan of Care:     [] Support spiritual and/or cultural needs    [] Support AMD and/or advance care planning process      [] Support grieving process   [] Coordinate Rites and/or Rituals    [] Coordination with community clergy   [] No spiritual needs identified at this time   [] Detailed Plan of Care below (See Comments)  [] Make referral to Music Therapy  [] Make referral to Pet Therapy     [] Make referral to Addiction services  [] Make referral to [facility]  [] Make referral to Spiritual Care Partner  [] No future visits requested        [x] Follow up upon further referrals     Comments:  Visited patient in the [facility] surgical unit for admission patient request.  I provided telechaplaincy due to patient's placement in isolation room and Mr. Zana Vegas answered the phone. He stated he is doing okay and seemed to indicate that he has no immediate needs at this time. I provided chaplaincy education and listened with empathy. I explored his needs and advised of  availability should any needs or concerns arise. Chaplains will follow up if further referrals are requested. Chaplain May Medina M.Div.    can be reached by calling the  at [facility]  [phone]

## 2021-04-03 NOTE — CONSULTS
[facility] [facility]    Name:  Benji Hernandez  MR#:  [id number]  :  1965  ACCOUNT #:  [de-identified]  DATE OF SERVICE:  2021    PSYCHIATRIC CONSULTATION    REASON FOR CONSULT:  Bipolar disorder. I was called by the nursing staff stating that the patient coming to , that Dr. Lb Hall has asked for a consult for bipolar disorder. I saw the patient, chart reviewed. CHIEF COMPLAINT:  \"Not so good. \"    HISTORY OF PRESENT ILLNESS:  He says he got a toe infection going on for a while and his past psychiatric followup for depression. He says he has had depression all his life, but just started getting help a few months ago. Currently taking Cymbalta. He says he was put in Crisis Stabilization Unit in [facility] from hospital in [facility], where he was there, I believe, a couple of weeks. He told he was homeless but staying in a motel. He is from [place]. He says Cymbalta is working. His appetite is good. Sleep is not good. Energy and motivation are not good. Denies suicidal thought, homicidal thought, hallucinations, delusions, paranoia. PAST HISTORY:  He had a prior psychiatric treatment, of course, most recently in [facility]. Diagnosis:  Bipolar disorder. He says he has no support system. He works at a [email or web address], I believe, in [facility]. 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 TRAUMA HISTORY:  None. FAMILY HISTORY:  None. SUBSTANCE ABUSE HISTORY:  Cigarettes one pack per day. Done some cocaine on and off. MEDICAL HISTORY:  Diabetes mellitus, hypertension, hyperlipidemia. CURRENT MEDICATIONS:  Atorvastatin 80 mg daily, duloxetine 60 mg daily, insulin, Zosyn, Seroquel 100 mg at night, vancomycin, p.r.n. Tylenol, dextrose, Glucagon, Zofran. ALLERGIES:  NO KNOWN ALLERGIES TO MEDICATIONS. MENTAL STATUS EXAM:  Tall and medium-built muscular male patient, alert, verbal, polite, cooperative, articulate.   During the whole time I was talking, he was playing on his telephone some kind of a game. He did reduce the volume on the TV. Flat affect. Denied suicidal thought or homicidal thought. He knew the day of the week and the date, month. Insight limited. Judgment fair. DIAGNOSES:  AXIS I:  History of bipolar disorder, in remission, no psychosis. Osteomyelitis, diabetes mellitus type 2, hyperlipidemia, hypertension, nicotine abuse, cocaine abuse. DISPOSITION:  Continue duloxetine. I talked to him about trying some Abilify to help with energy, motivation as he only had limited improvement with duloxetine. Recommend followup with [facility] upon discharge from here.       MD APPLE [id number]  D:  04/02/2021 15:13  T:  04/02/2021 19:15  JOB #:  [id number]

## 2021-04-03 NOTE — PROGRESS NOTES
General Daily Progress Note          Patient Name:   Homero Kinsey       YOB: 1965       Age:  49 y.o.       Admit Date: 4/1/2021      Subjective:         Patient is alert awake denies any chest pain or shortness of breath nausea vomiting diarrhea no constipation    He told me that he discussed with the podiatrist regarding treatment plan surgery with versus antibiotics IV        Objective:     Visit Vitals  /80 (BP 1 Location: Right upper arm, BP Patient Position: At rest)   Pulse 79   Temp 98.2 °F (36.8 °C)   Resp 18   Ht 5' 11\" (1.803 m)   Wt 72.6 kg (160 lb)   SpO2 99%   BMI 22.32 kg/m²        Recent Results (from the past 24 hour(s))   GLUCOSE, POC    Collection Time: 04/02/21  3:41 PM   Result Value Ref Range    Glucose (POC) 219 (H) 65 - 100 mg/dL    Performed by Pedrito Bradford    GLUCOSE, POC    Collection Time: 04/02/21  8:03 PM   Result Value Ref Range    Glucose (POC) 388 (H) 65 - 100 mg/dL    Performed by Jayne Cuba, TROUGH    Collection Time: 04/02/21  8:42 PM   Result Value Ref Range    Vancomycin,trough 10.5 (H) 5.0 - 10.0 ug/mL   GLUCOSE, POC    Collection Time: 04/03/21  1:29 AM   Result Value Ref Range    Glucose (POC) 261 (H) 65 - 100 mg/dL    Performed by [address] [place] [address], POC    Collection Time: 04/03/21  5:24 AM   Result Value Ref Range    Glucose (POC) 169 (H) 65 - 100 mg/dL    Performed by [address] [place] [address], COMPREHENSIVE    Collection Time: 04/03/21  6:00 AM   Result Value Ref Range    Sodium 139 136 - 145 mmol/L    Potassium 3.3 (L) 3.5 - 5.1 mmol/L    Chloride 104 97 - 108 mmol/L    CO2 28 21 - 32 mmol/L    Anion gap 7 5 - 15 mmol/L    Glucose 150 (H) 65 - 100 mg/dL    BUN 18 6 - 20 mg/dL    Creatinine 1.07 0.70 - 1.30 mg/dL    BUN/Creatinine ratio 17 12 - 20      GFR est AA >60 >60 ml/min/1.73m2    GFR est non-AA >60 >60 ml/min/1.73m2    Calcium 9.0 8.5 - 10.1 mg/dL    Bilirubin, total 0.3 0.2 - 1.0 mg/dL    AST (SGOT) 33 15 - 37 U/L    ALT (SGPT) 44 12 - 78 U/L    Alk. phosphatase 126 (H) 45 - 117 U/L    Protein, total 6.2 (L) 6.4 - 8.2 g/dL    Albumin 2.4 (L) 3.5 - 5.0 g/dL    Globulin 3.8 2.0 - 4.0 g/dL    A-G Ratio 0.6 (L) 1.1 - 2.2     CBC WITH AUTOMATED DIFF    Collection Time: 04/03/21  6:00 AM   Result Value Ref Range    WBC 5.8 4.1 - 11.1 K/uL    RBC 3.62 (L) 4.10 - 5.70 M/uL    HGB 11.5 (L) 12.1 - 17.0 g/dL    HCT 33.4 (L) 36.6 - 50.3 %    MCV 92.3 80.0 - 99.0 FL    MCH 31.8 26.0 - 34.0 PG    MCHC 34.4 30.0 - 36.5 g/dL    RDW 12.8 11.5 - 14.5 %    PLATELET 932 (L) 213 - 400 K/uL    MPV 11.4 8.9 - 12.9 FL    NRBC 0.0 0  WBC    ABSOLUTE NRBC 0.00 0.00 - 0.01 K/uL    NEUTROPHILS 44 32 - 75 %    LYMPHOCYTES 41 12 - 49 %    MONOCYTES 12 5 - 13 %    EOSINOPHILS 2 0 - 7 %    BASOPHILS 0 0 - 1 %    IMMATURE GRANULOCYTES 1 (H) 0.0 - 0.5 %    ABS. NEUTROPHILS 2.6 1.8 - 8.0 K/UL    ABS. LYMPHOCYTES 2.4 0.8 - 3.5 K/UL    ABS. MONOCYTES 0.7 0.0 - 1.0 K/UL    ABS. EOSINOPHILS 0.1 0.0 - 0.4 K/UL    ABS. BASOPHILS 0.0 0.0 - 0.1 K/UL    ABS. IMM. GRANS. 0.0 0.00 - 0.04 K/UL    DF AUTOMATED     C REACTIVE PROTEIN, QT    Collection Time: 04/03/21  6:00 AM   Result Value Ref Range    C-Reactive protein <0.29 0.00 - 0.60 mg/dL   SED RATE (ESR)    Collection Time: 04/03/21  6:00 AM   Result Value Ref Range    Sed rate, automated 45 mm/hr   GLUCOSE, POC    Collection Time: 04/03/21  7:33 AM   Result Value Ref Range    Glucose (POC) 121 (H) 65 - 100 mg/dL    Performed by Ralph Young    GLUCOSE, POC    Collection Time: 04/03/21 11:53 AM   Result Value Ref Range    Glucose (POC) 227 (H) 65 - 100 mg/dL    Performed by Ralph Young      [unfilled]      Review of Systems    Constitutional: Negative for chills and fever. HENT: Negative. Eyes: Negative. Respiratory: Negative. Cardiovascular: Negative. Gastrointestinal: Negative for abdominal pain and nausea. Skin: Negative. Neurological: Negative.         Physical Exam: Constitutional: pt is oriented to person, place, and time. HENT:   Head: Normocephalic and atraumatic. Eyes: Pupils are equal, round, and reactive to light. EOM are normal.   Cardiovascular: Normal rate, regular rhythm and normal heart sounds. Pulmonary/Chest: Breath sounds normal. No wheezes. No rales. Exhibits no tenderness. Abdominal: Soft. Bowel sounds are normal. There is no abdominal tenderness. There is no rebound and no guarding. Musculoskeletal: Normal range of motion. Neurological: pt is alert and oriented to person, place, and time. XR GREAT TOE RT MIN 2 V   Final Result   Osteomyelitis involving the terminal tuft of the distal phalange of   the great toe.            Recent Results (from the past 24 hour(s))   GLUCOSE, POC    Collection Time: 04/02/21  3:41 PM   Result Value Ref Range    Glucose (POC) 219 (H) 65 - 100 mg/dL    Performed by Melvin Stone    GLUCOSE, POC    Collection Time: 04/02/21  8:03 PM   Result Value Ref Range    Glucose (POC) 388 (H) 65 - 100 mg/dL    Performed by Mehran Stevens, TROUGH    Collection Time: 04/02/21  8:42 PM   Result Value Ref Range    Vancomycin,trough 10.5 (H) 5.0 - 10.0 ug/mL   GLUCOSE, POC    Collection Time: 04/03/21  1:29 AM   Result Value Ref Range    Glucose (POC) 261 (H) 65 - 100 mg/dL    Performed by [address] [place] [address], POC    Collection Time: 04/03/21  5:24 AM   Result Value Ref Range    Glucose (POC) 169 (H) 65 - 100 mg/dL    Performed by [address] [place] [address], COMPREHENSIVE    Collection Time: 04/03/21  6:00 AM   Result Value Ref Range    Sodium 139 136 - 145 mmol/L    Potassium 3.3 (L) 3.5 - 5.1 mmol/L    Chloride 104 97 - 108 mmol/L    CO2 28 21 - 32 mmol/L    Anion gap 7 5 - 15 mmol/L    Glucose 150 (H) 65 - 100 mg/dL    BUN 18 6 - 20 mg/dL    Creatinine 1.07 0.70 - 1.30 mg/dL    BUN/Creatinine ratio 17 12 - 20      GFR est AA >60 >60 ml/min/1.73m2    GFR est non-AA >60 >60 ml/min/1.73m2 Calcium 9.0 8.5 - 10.1 mg/dL    Bilirubin, total 0.3 0.2 - 1.0 mg/dL    AST (SGOT) 33 15 - 37 U/L    ALT (SGPT) 44 12 - 78 U/L    Alk. phosphatase 126 (H) 45 - 117 U/L    Protein, total 6.2 (L) 6.4 - 8.2 g/dL    Albumin 2.4 (L) 3.5 - 5.0 g/dL    Globulin 3.8 2.0 - 4.0 g/dL    A-G Ratio 0.6 (L) 1.1 - 2.2     CBC WITH AUTOMATED DIFF    Collection Time: 04/03/21  6:00 AM   Result Value Ref Range    WBC 5.8 4.1 - 11.1 K/uL    RBC 3.62 (L) 4.10 - 5.70 M/uL    HGB 11.5 (L) 12.1 - 17.0 g/dL    HCT 33.4 (L) 36.6 - 50.3 %    MCV 92.3 80.0 - 99.0 FL    MCH 31.8 26.0 - 34.0 PG    MCHC 34.4 30.0 - 36.5 g/dL    RDW 12.8 11.5 - 14.5 %    PLATELET 328 (L) 232 - 400 K/uL    MPV 11.4 8.9 - 12.9 FL    NRBC 0.0 0  WBC    ABSOLUTE NRBC 0.00 0.00 - 0.01 K/uL    NEUTROPHILS 44 32 - 75 %    LYMPHOCYTES 41 12 - 49 %    MONOCYTES 12 5 - 13 %    EOSINOPHILS 2 0 - 7 %    BASOPHILS 0 0 - 1 %    IMMATURE GRANULOCYTES 1 (H) 0.0 - 0.5 %    ABS. NEUTROPHILS 2.6 1.8 - 8.0 K/UL    ABS. LYMPHOCYTES 2.4 0.8 - 3.5 K/UL    ABS. MONOCYTES 0.7 0.0 - 1.0 K/UL    ABS. EOSINOPHILS 0.1 0.0 - 0.4 K/UL    ABS. BASOPHILS 0.0 0.0 - 0.1 K/UL    ABS. IMM.  GRANS. 0.0 0.00 - 0.04 K/UL    DF AUTOMATED     C REACTIVE PROTEIN, QT    Collection Time: 04/03/21  6:00 AM   Result Value Ref Range    C-Reactive protein <0.29 0.00 - 0.60 mg/dL   SED RATE (ESR)    Collection Time: 04/03/21  6:00 AM   Result Value Ref Range    Sed rate, automated 45 mm/hr   GLUCOSE, POC    Collection Time: 04/03/21  7:33 AM   Result Value Ref Range    Glucose (POC) 121 (H) 65 - 100 mg/dL    Performed by Dorma All    GLUCOSE, POC    Collection Time: 04/03/21 11:53 AM   Result Value Ref Range    Glucose (POC) 227 (H) 65 - 100 mg/dL    Performed by Dorma All        Results     Procedure Component Value Units Date/Time    MRSA SCREEN - PCR (NASAL) [[id number]]  (Abnormal) Collected: 04/02/21 1215    Order Status: Completed Specimen: Swab Updated: 04/02/21 1325     MRSA by PCR, Nasal DETECTED        Comment: Results verified, phoned to and read back by Melia Barger AT [address], BLOOD #1 [[id number]] Collected: 04/02/21 1211    Order Status: Completed Specimen: Blood Updated: 04/02/21 1354    CULTURE, BLOOD #2 [[id number]] Collected: 04/02/21 1211    Order Status: Completed Specimen: Blood Updated: 04/03/21 0923     Special Requests: No Special Requests        Culture result: No growth after 19 hours       CULTURE, Bobby Other STAIN [[id number]]     Order Status: Sent Specimen: Foot     COVID-19 RAPID TEST [[id number]] Collected: 04/02/21 0817    Order Status: Completed Specimen: Nasopharyngeal Updated: 04/02/21 0921     Specimen source Nasopharyngeal        COVID-19 rapid test Not Detected        Comment: Rapid Abbott ID Now   Rapid NAAT:  The specimen is NEGATIVE for SARS-CoV-2, the novel coronavirus associated with COVID-19. Negative results should be treated as presumptive and, if inconsistent with clinical signs and symptoms or necessary for patient management, should be tested with an alternative molecular assay. Negative results do not preclude SARS-CoV-2 infection and should not be used as the sole basis for patient management decisions. This test has been authorized by the FDA under   an Emergency Use Authorization (EUA) for use by authorized laboratories.  Fact sheet for Healthcare Providers: ConventionUpdate.co.nz Fact sheet for Patients: ConventionUpdate.co.nz   Methodology: Isothermal Nucleic Acid Amplification         CULTURE, BLOOD, PAIRED [[id number]] Collected: 04/01/21 2335    Order Status: Completed Specimen: Blood Updated: 04/03/21 0923     Special Requests: No Special Requests        Culture result: No growth 1 day              Labs:     Recent Labs     04/03/21 0600 04/01/21 2335   WBC 5.8 5.3   HGB 11.5* 12.9   HCT 33.4* 37.1   * 173     Recent Labs     04/03/21  0600 04/01/21 2335    134*   K 3.3* 4.2  100   CO2 28 32   BUN 18 11   CREA 1.07 1.23   * 402*   CA 9.0 9.1   MG  --  1.8     Recent Labs     04/03/21  0600 04/01/21  2335   ALT 44 52   * 204*   TBILI 0.3 0.3   TP 6.2* 7.3   ALB 2.4* 2.9*   GLOB 3.8 4.4*     No results for input(s): INR, PTP, APTT, INREXT in the last 72 hours. No results for input(s): FE, TIBC, PSAT, FERR in the last 72 hours. No results found for: FOL, RBCF   No results for input(s): PH, PCO2, PO2 in the last 72 hours. No results for input(s): CPK, CKNDX, TROIQ in the last 72 hours.     No lab exists for component: CPKMB  Lab Results   Component Value Date/Time    Cholesterol, total 146 01/13/2021 06:30 AM    HDL Cholesterol 39 (L) 01/13/2021 06:30 AM    LDL, calculated 51.4 01/13/2021 06:30 AM    Triglyceride 278 (H) 01/13/2021 06:30 AM    CHOL/HDL Ratio 3.7 01/13/2021 06:30 AM     Lab Results   Component Value Date/Time    Glucose (POC) 227 (H) 04/03/2021 11:53 AM    Glucose (POC) 121 (H) 04/03/2021 07:33 AM    Glucose (POC) 169 (H) 04/03/2021 05:24 AM    Glucose (POC) 261 (H) 04/03/2021 01:29 AM    Glucose (POC) 388 (H) 04/02/2021 08:03 PM     Lab Results   Component Value Date/Time    Color YELLOW 01/11/2021 07:02 AM    Appearance CLEAR 01/11/2021 07:02 AM    Specific gravity >1.030 (H) 01/11/2021 07:02 AM    pH (UA) 6.5 01/11/2021 07:02 AM    Protein TRACE (A) 01/11/2021 07:02 AM    Glucose >1,000 (A) 01/11/2021 07:02 AM    Ketone Negative 01/11/2021 07:02 AM    Bilirubin Negative 01/11/2021 07:02 AM    Urobilinogen 1.0 01/11/2021 07:02 AM    Nitrites Negative 01/11/2021 07:02 AM    Leukocyte Esterase Negative 01/11/2021 07:02 AM    Epithelial cells Negative 01/11/2021 07:02 AM    Bacteria Negative 01/11/2021 07:02 AM    WBC 0 to 1 01/11/2021 07:02 AM    RBC 0 to 1 01/11/2021 07:02 AM         Assessment:     Osteomyelitis of right toe  Uncontrolled diabetes  Hypertension  Hyperlipidemia  Hepatitis C  Carpal tunnel syndrome  Alcohol dependency   cocaine Abuse  History of bipolar disorder  MRSA nares        Plan:     Replace potassium  On vancomycin IV Zosyn    Follow with psychiatrist infectious disease and podiatrist  Repeat the labs in the morning  Monitor blood sugars closely          Current Facility-Administered Medications:     glucose chewable tablet 16 g, 4 Tab, Oral, PRN, Vandana Robbins MD    glucagon (GLUCAGEN) injection 1 mg, 1 mg, IntraMUSCular, PRN, Vandana Robbins MD    dextrose (D50W) injection syrg 12.5-25 g, 25-50 mL, IntraVENous, PRN, Vandana Robbins MD    DULoxetine (CYMBALTA) capsule 60 mg, 60 mg, Oral, DAILY, Garima Robbins MD, 60 mg at 04/03/21 3212    insulin glargine (LANTUS) injection 60 Units, 60 Units, SubCUTAneous, QHS, Garima Robbins MD, 60 Units at 04/02/21 2121    QUEtiapine (SEROquel) tablet 100 mg, 100 mg, Oral, QHS, Garima Robbins MD, 100 mg at 04/02/21 2121    atorvastatin (LIPITOR) tablet 80 mg, 80 mg, Oral, QHS, Garima Robbins MD, 80 mg at 04/02/21 2120    traZODone (DESYREL) tablet 50 mg, 50 mg, Oral, QHS PRN, Mariam Barnes MD, 50 mg at 04/02/21 2120    insulin lispro (HUMALOG) injection, , SubCUTAneous, Q4H, Mariam Barnes MD, Stopped at 04/03/21 0930    0.9% sodium chloride infusion, 75 mL/hr, IntraVENous, CONTINUOUS, Garima Robbins MD, Last Rate: 75 mL/hr at 04/02/21 1152, 75 mL/hr at 04/02/21 1152    acetaminophen (TYLENOL) tablet 650 mg, 650 mg, Oral, Q6H PRN **OR** acetaminophen (TYLENOL) suppository 650 mg, 650 mg, Rectal, Q6H PRN, Vandana Robbins MD    polyethylene glycol (MIRALAX) packet 17 g, 17 g, Oral, DAILY PRN, Vandana Robbins Julien, MD    ondansetron (ZOFRAN ODT) tablet 4 mg, 4 mg, Oral, Q8H PRN **OR** ondansetron (ZOFRAN) injection 4 mg, 4 mg, IntraVENous, Q6H PRN, Garima Robbins MD    piperacillin-tazobactam (ZOSYN) 3.375 g in 0.9% sodium chloride (MBP/ADV) 100 mL MBP, 3.375 g, IntraVENous, Q8H, Garima Robbins MD, Last Rate: 200 mL/hr at 04/03/21 1039, 3.375 g at 04/03/21 1039    VANCOMYCIN INFORMATION NOTE 1 Each, 1 Each, Other, Rx Dosing/Monitoring, Garima Robbins MD    vancomycin (VANCOCIN) 1,000 mg in 0.9% sodium chloride 250 mL (VIAL-MATE), 1,000 mg, IntraVENous, Q12H, Garima Robbins MD, 1,000 mg at 04/03/21 0916    [START ON 4/4/2021] Draw vancomycin trough on 04/04/21 at 06:00 before 08:00 dose, , Other, ONCE, Garima Robbins MD    hydrALAZINE (APRESOLINE) 20 mg/mL injection 10 mg, 10 mg, IntraVENous, Q6H PRN, Garima Robbins MD, 10 mg at 04/02/21 2300

## 2021-04-04 LAB
ALBUMIN SERPL-MCNC: 2.5 G/DL (ref 3.5–5)
ALBUMIN/GLOB SERPL: 0.6 {RATIO} (ref 1.1–2.2)
ALP SERPL-CCNC: 119 U/L (ref 45–117)
ALT SERPL-CCNC: 48 U/L (ref 12–78)
ANION GAP SERPL CALC-SCNC: 7 MMOL/L (ref 5–15)
AST SERPL W P-5'-P-CCNC: 43 U/L (ref 15–37)
BACTERIA SPEC CULT: NORMAL
BASOPHILS # BLD: 0 K/UL (ref 0–0.1)
BASOPHILS NFR BLD: 0 % (ref 0–1)
BILIRUB SERPL-MCNC: 0.4 MG/DL (ref 0.2–1)
BUN SERPL-MCNC: 18 MG/DL (ref 6–20)
BUN/CREAT SERPL: 17 (ref 12–20)
CA-I BLD-MCNC: 9 MG/DL (ref 8.5–10.1)
CHLORIDE SERPL-SCNC: 105 MMOL/L (ref 97–108)
CO2 SERPL-SCNC: 25 MMOL/L (ref 21–32)
CREAT SERPL-MCNC: 1.07 MG/DL (ref 0.7–1.3)
DATE LAST DOSE: ABNORMAL
DIFFERENTIAL METHOD BLD: ABNORMAL
EOSINOPHIL # BLD: 0.1 K/UL (ref 0–0.4)
EOSINOPHIL NFR BLD: 1 % (ref 0–7)
ERYTHROCYTE [DISTWIDTH] IN BLOOD BY AUTOMATED COUNT: 12.9 % (ref 11.5–14.5)
GLOBULIN SER CALC-MCNC: 4.1 G/DL (ref 2–4)
GLUCOSE BLD STRIP.AUTO-MCNC: 194 MG/DL (ref 65–100)
GLUCOSE BLD STRIP.AUTO-MCNC: 218 MG/DL (ref 65–100)
GLUCOSE BLD STRIP.AUTO-MCNC: 222 MG/DL (ref 65–100)
GLUCOSE BLD STRIP.AUTO-MCNC: 288 MG/DL (ref 65–100)
GLUCOSE BLD STRIP.AUTO-MCNC: 308 MG/DL (ref 65–100)
GLUCOSE BLD STRIP.AUTO-MCNC: 331 MG/DL (ref 65–100)
GLUCOSE SERPL-MCNC: 213 MG/DL (ref 65–100)
GRAM STN SPEC: NORMAL
GRAM STN SPEC: NORMAL
HCT VFR BLD AUTO: 34.7 % (ref 36.6–50.3)
HGB BLD-MCNC: 12.2 G/DL (ref 12.1–17)
IMM GRANULOCYTES # BLD AUTO: 0.1 K/UL (ref 0–0.04)
IMM GRANULOCYTES NFR BLD AUTO: 1 % (ref 0–0.5)
LYMPHOCYTES # BLD: 2.5 K/UL (ref 0.8–3.5)
LYMPHOCYTES NFR BLD: 40 % (ref 12–49)
MCH RBC QN AUTO: 32.6 PG (ref 26–34)
MCHC RBC AUTO-ENTMCNC: 35.2 G/DL (ref 30–36.5)
MCV RBC AUTO: 92.8 FL (ref 80–99)
MONOCYTES # BLD: 0.7 K/UL (ref 0–1)
MONOCYTES NFR BLD: 12 % (ref 5–13)
NEUTS SEG # BLD: 2.9 K/UL (ref 1.8–8)
NEUTS SEG NFR BLD: 46 % (ref 32–75)
PERFORMED BY, TECHID: ABNORMAL
PLATELET # BLD AUTO: 154 K/UL (ref 150–400)
PMV BLD AUTO: 11.2 FL (ref 8.9–12.9)
POTASSIUM SERPL-SCNC: 3.9 MMOL/L (ref 3.5–5.1)
PROT SERPL-MCNC: 6.6 G/DL (ref 6.4–8.2)
RBC # BLD AUTO: 3.74 M/UL (ref 4.1–5.7)
REPORTED DOSE,DOSE: ABNORMAL UNITS
SODIUM SERPL-SCNC: 137 MMOL/L (ref 136–145)
SPECIAL REQUESTS,SREQ: NORMAL
VANCOMYCIN TROUGH SERPL-MCNC: 14.4 UG/ML (ref 5–10)
WBC # BLD AUTO: 6.2 K/UL (ref 4.1–11.1)

## 2021-04-04 PROCEDURE — 74011000258 HC RX REV CODE- 258: Performed by: FAMILY MEDICINE

## 2021-04-04 PROCEDURE — 99232 SBSQ HOSP IP/OBS MODERATE 35: CPT | Performed by: PODIATRIST

## 2021-04-04 PROCEDURE — 36415 COLL VENOUS BLD VENIPUNCTURE: CPT

## 2021-04-04 PROCEDURE — 65270000029 HC RM PRIVATE

## 2021-04-04 PROCEDURE — 74011250637 HC RX REV CODE- 250/637: Performed by: FAMILY MEDICINE

## 2021-04-04 PROCEDURE — 74011636637 HC RX REV CODE- 636/637: Performed by: FAMILY MEDICINE

## 2021-04-04 PROCEDURE — 80053 COMPREHEN METABOLIC PANEL: CPT

## 2021-04-04 PROCEDURE — 74011250636 HC RX REV CODE- 250/636: Performed by: FAMILY MEDICINE

## 2021-04-04 PROCEDURE — 99232 SBSQ HOSP IP/OBS MODERATE 35: CPT | Performed by: INTERNAL MEDICINE

## 2021-04-04 PROCEDURE — 82962 GLUCOSE BLOOD TEST: CPT

## 2021-04-04 PROCEDURE — 74011250636 HC RX REV CODE- 250/636: Performed by: INTERNAL MEDICINE

## 2021-04-04 PROCEDURE — 80202 ASSAY OF VANCOMYCIN: CPT

## 2021-04-04 PROCEDURE — 85025 COMPLETE CBC W/AUTO DIFF WBC: CPT

## 2021-04-04 RX ORDER — HEPARIN SODIUM 5000 [USP'U]/ML
5000 INJECTION, SOLUTION INTRAVENOUS; SUBCUTANEOUS EVERY 8 HOURS
Status: DISCONTINUED | OUTPATIENT
Start: 2021-04-04 | End: 2021-04-10 | Stop reason: HOSPADM

## 2021-04-04 RX ADMIN — ACETAMINOPHEN 650 MG: 325 TABLET, FILM COATED ORAL at 22:42

## 2021-04-04 RX ADMIN — INSULIN LISPRO 3 UNITS: 100 INJECTION, SOLUTION INTRAVENOUS; SUBCUTANEOUS at 09:00

## 2021-04-04 RX ADMIN — INSULIN LISPRO 7 UNITS: 100 INJECTION, SOLUTION INTRAVENOUS; SUBCUTANEOUS at 01:30

## 2021-04-04 RX ADMIN — VANCOMYCIN HYDROCHLORIDE 1000 MG: 1 INJECTION, POWDER, LYOPHILIZED, FOR SOLUTION INTRAVENOUS at 08:16

## 2021-04-04 RX ADMIN — HYDRALAZINE HYDROCHLORIDE 10 MG: 20 INJECTION INTRAMUSCULAR; INTRAVENOUS at 11:56

## 2021-04-04 RX ADMIN — INSULIN LISPRO 4 UNITS: 100 INJECTION, SOLUTION INTRAVENOUS; SUBCUTANEOUS at 12:30

## 2021-04-04 RX ADMIN — HEPARIN SODIUM 5000 UNITS: 5000 INJECTION INTRAVENOUS; SUBCUTANEOUS at 22:42

## 2021-04-04 RX ADMIN — INSULIN GLARGINE 60 UNITS: 100 INJECTION, SOLUTION SUBCUTANEOUS at 22:00

## 2021-04-04 RX ADMIN — HEPARIN SODIUM 5000 UNITS: 5000 INJECTION INTRAVENOUS; SUBCUTANEOUS at 16:11

## 2021-04-04 RX ADMIN — ATORVASTATIN CALCIUM 80 MG: 40 TABLET, FILM COATED ORAL at 22:42

## 2021-04-04 RX ADMIN — VANCOMYCIN HYDROCHLORIDE 1250 MG: 1.25 INJECTION, POWDER, LYOPHILIZED, FOR SOLUTION INTRAVENOUS at 20:01

## 2021-04-04 RX ADMIN — DULOXETINE HYDROCHLORIDE 60 MG: 30 CAPSULE, DELAYED RELEASE ORAL at 08:15

## 2021-04-04 RX ADMIN — HYDRALAZINE HYDROCHLORIDE 10 MG: 20 INJECTION INTRAMUSCULAR; INTRAVENOUS at 01:45

## 2021-04-04 RX ADMIN — PIPERACILLIN AND TAZOBACTAM 3.38 G: 3; .375 INJECTION, POWDER, LYOPHILIZED, FOR SOLUTION INTRAVENOUS at 17:00

## 2021-04-04 RX ADMIN — INSULIN LISPRO 2 UNITS: 100 INJECTION, SOLUTION INTRAVENOUS; SUBCUTANEOUS at 05:30

## 2021-04-04 RX ADMIN — INSULIN LISPRO 5 UNITS: 100 INJECTION, SOLUTION INTRAVENOUS; SUBCUTANEOUS at 22:45

## 2021-04-04 RX ADMIN — INSULIN LISPRO 10 UNITS: 100 INJECTION, SOLUTION INTRAVENOUS; SUBCUTANEOUS at 17:00

## 2021-04-04 RX ADMIN — PIPERACILLIN AND TAZOBACTAM 3.38 G: 3; .375 INJECTION, POWDER, LYOPHILIZED, FOR SOLUTION INTRAVENOUS at 01:46

## 2021-04-04 RX ADMIN — PIPERACILLIN AND TAZOBACTAM 3.38 G: 3; .375 INJECTION, POWDER, LYOPHILIZED, FOR SOLUTION INTRAVENOUS at 09:00

## 2021-04-04 NOTE — PROGRESS NOTES
Progress Note  Date:2021       Room:  Patient Name:Gayle Dhillon     YOB: 1965     Age:56 y.o. Subjective    Subjective   Pt seen at [place]. Denies any new complaints. Per nursing, no acute overnight events    Review of Systems   Constitutional: No fever, No chills, No sweats, No weakness, No fatigue  Immunologic: + immunocompromised, No recurrent fevers, + recurrent infections. Musculoskeletal: No back pain, No neck pain, No joint pain, No muscle pain, No claudication, No decreased range of motion, No trauma. Integumentary: No rash, No pruritus, No abrasions. Neurologic: Alert and oriented X4, No abnormal balance, No headache, No confusion, + numbness, No tingling. Psychiatric: + anxiety, + depression, + radha. Objective         Vitals Last 24 Hours:  TEMPERATURE:  Temp  Av.4 °F (36.9 °C)  Min: 98.2 °F (36.8 °C)  Max: 98.5 °F (36.9 °C)  RESPIRATIONS RANGE: Resp  Av.6  Min: 16  Max: 18  PULSE OXIMETRY RANGE: SpO2  Av.5 %  Min: 99 %  Max: 100 %  PULSE RANGE: Pulse  Av.9  Min: 79  Max: 88  BLOOD PRESSURE RANGE: Systolic (85NOI), TCY:334 , Min:135 , KPP:046   ; Diastolic (50WPT), JMW:29, Min:80, Max:99    I/O (24Hr): No intake or output data in the 24 hours ending 21 1045  Objective   GEN: Pt is AAOx4 and in NAD. No dressing noted to B/L LE. No family noted at [place]  DERM: Wound noted to the distal aspect of the right hallux. No breaks in skin. No proximal lymphatic streaking. No malodor or drainage noted  VASC: Pedal pulses (DP/PT) palpable to B/L LE. CFT<3sec to all digits of B/L LE. No pedal hair growth noted to the level of the digits for B/L LE. Skin temp is warm to warm from proximal to distal for B/L LE. Neg homans/mariama signs to B/L LE. No varicosities or telangectasias noted to B/L LE.  NEURO: Protective and epicritic sensations grossly absent to B/L LE  MSK: (-) POP, No gross deformities.  Good muscle tone and bulk noted to B/L LE.  PSYCH: Cooperative with normal mood and affect    Labs/Imaging/Diagnostics    Labs:  CBC:  Recent Labs     04/04/21  0650 04/03/21  0600 04/01/21  2335   WBC 6.2 5.8 5.3   RBC 3.74* 3.62* 3.96*   HGB 12.2 11.5* 12.9   HCT 34.7* 33.4* 37.1   MCV 92.8 92.3 93.7   RDW 12.9 12.8 12.8    148* 173     CHEMISTRIES:  Recent Labs     04/04/21  0650 04/03/21  0600 04/01/21  2335    139 134*   K 3.9 3.3* 4.2    104 100   CO2 25 28 32   BUN 18 18 11   CA 9.0 9.0 9.1   MG  --   --  1.8   PT/INR:No results for input(s): INR, INREXT in the last 72 hours. No lab exists for component: PROTIME  APTT:No results for input(s): APTT in the last 72 hours. LIVER PROFILE:  Recent Labs     04/04/21  0650 04/03/21  0600 04/01/21  2335   AST 43* 33 44*   ALT 48 44 52     Lab Results   Component Value Date/Time    ALT (SGPT) 48 04/04/2021 06:50 AM    AST (SGOT) 43 (H) 04/04/2021 06:50 AM    Alk. phosphatase 119 (H) 04/04/2021 06:50 AM    Bilirubin, total 0.4 04/04/2021 06:50 AM       Imaging Last 24 Hours:  No results found. Assessment//Plan   Active Problems:    Osteomyelitis ([address] (4/2/2021)      Assessment & Plan    Right hallux diabetic wound with underlying osteomyelitis  DM with Neuropathy      Patient seen and evaluated at bedside  - Current labs personally reviewed and findings dicussed with patient  - Cont discussion regarding tx options. Pt would like to save his toe if possible. He will discuss with CM regarding options for outpatient placement as he is homeless  - Cont to recommend leaving open to air at this time, no wound care needed  - Antibiotics per infectious disease  - I will follow closely           John VALADEZ  [address], DPM, [facility]  [address] [place] [address], [address] [place] [address]  Savannah Bettencourtjayy:  [phone]  F:  [phone]  C:  [phone]    Electronically signed by Dean Patel DPM on 4/4/2021 at 10:45 AM

## 2021-04-04 NOTE — PROGRESS NOTES
Infectious Disease Progress Note           Subjective:   Stable, currently does not want by surgery on his right foot. Denies new complaints. No fever/chills   Objective:   Physical Exam:     Visit Vitals  BP (!) 176/88 (BP 1 Location: Right upper arm, BP Patient Position: At rest)   Pulse 77   Temp 98.6 °F (37 °C)   Resp 16   Ht 5' 11\" (1.803 m)   Wt 160 lb (72.6 kg)   SpO2 99%   BMI 22.32 kg/m²      O2 Device: Room air    Temp (24hrs), Av.5 °F (36.9 °C), Min:98.4 °F (36.9 °C), Max:98.6 °F (37 °C)    No intake/output data recorded. No intake/output data recorded. General: NAD, alert, AAO x 4  HEENT: RIKA, Moist mucosa   Lungs: CTA b/l, decreased at the bases, no wheeze/rhonchi   Heart: S1S2+, RRR, no murmur  Abdo: Soft, NT, ND, +BS   : no indwelling rubio cath   Exts: Swollen right great toe, no drainage   Skin: No wounds, No rashes or lesions      Data Review:       Recent Days:  Recent Labs     21  0650 21  0600 21  2335   WBC 6.2 5.8 5.3   HGB 12.2 11.5* 12.9   HCT 34.7* 33.4* 37.1    148* 173     Recent Labs     21  0650 21  0600 21  2335   BUN 18 18 11   CREA 1.07 1.07 1.23       Lab Results   Component Value Date/Time    C-Reactive protein <0.29 2021 06:00 AM      Microbiology   - Blood Cx : Neg     Diagnostics   CXR Results  (Last 48 hours)    None        Assessment/Plan     1. Osteomyelitis involving distal phalanx of right great toe, underlying DM       No draining wound to Cx, MRSA colonized       Afebrile w a normal WBC on routine labs, ESR 45 and CRP <0.29      Pt currently declines wound debridement, will continue on Van and Zosyn       If no Cxs to guide antimicrobial therapy, will recommend 6 wks of Vanc given MRSA colonization and Ceftriaxone for GNR coverage      2. H/o chronic hep C, denies prior Tx      HCV RNA VL is pending, out tx if detectable      3. H/o uncontrolled DM w associated neuropathy      4. Cocaine dependence w a positive UDS     Philipp Mack MD    4/4/2021

## 2021-04-04 NOTE — PROGRESS NOTES
Consult for Vancomycin Dosing by Pharmacy by Dr. Clement Arenas provided for this 49y.o. year old male , for indication of SSTI (osteomyelitis). Day of Therapy: 3  Goal of Level(s): 15-20mcg/dL    Other Current Antibiotics: Zosyn    Serum Creatinine Creatinine   Date Value Ref Range Status   04/04/2021 1.07 0.70 - 1.30 mg/dL Final   04/03/2021 1.07 0.70 - 1.30 mg/dL Final   04/01/2021 1.23 0.70 - 1.30 mg/dL Final      Creatinine Clearance Estimated Creatinine Clearance: 81 mL/min (based on SCr of 1.07 mg/dL). BUN Lab Results   Component Value Date/Time    BUN 18 04/04/2021 06:50 AM      WBC Lab Results   Component Value Date/Time    WBC 6.2 04/04/2021 06:50 AM      Temp 98.5 °F (36.9 °C)     Last Level:   Vancomycin,trough   Date Value Ref Range Status   04/04/2021 14.4 (H) 5.0 - 10.0 ug/mL Final     Comment:        Trough levels of 15-20 ug/mL should be targeted for patients with coagulase negative Staphylococcus and MRSA pneumonia, endocarditis,osteomyelitis, meningitis, and bacteremia, as well as patients not responding to lower levels. Trough levels of 10-15 ug/mL for infections from other sources (e.g. urinary tract, cellulitis) are appropriate. All patients receiving concomitant nephrotoxic therapies should have their function closely monitored regardless of peak or trough levels. Ht Readings from Last 1 Encounters:   04/01/21 180.3 cm (71\")        Wt Readings from Last 1 Encounters:   04/01/21 72.6 kg (160 lb)     Ideal body weight: 75.3 kg (166 lb 0.1 oz)     Previous Regimen: 1000mg IV q12h    New Regimen:   Vancomycin trough is subtherapeutic. Increased Vancomycin 1250mg IV q12h. Pharmacy to follow daily and will make changes to dose and/or frequency based on clinical status.   _________________________________     Pharmacist Saba Ambrocio

## 2021-04-04 NOTE — PROGRESS NOTES
General Daily Progress Note          Patient Name:   Obi Baer       YOB: 1965       Age:  49 y.o.       Admit Date: 4/1/2021      Subjective:         Patient is alert awake denies any chest pain or shortness of breath nausea vomiting diarrhea no constipation    He told me that he discussed with the podiatrist regarding treatment plan surgery versus antibiotics IV        Objective:     Visit Vitals  BP (!) 152/91 (BP 1 Location: Right upper arm, BP Patient Position: At rest)   Pulse 87   Temp 98.5 °F (36.9 °C)   Resp 16   Ht 5' 11\" (1.803 m)   Wt 72.6 kg (160 lb)   SpO2 99%   BMI 22.32 kg/m²        Recent Results (from the past 24 hour(s))   GLUCOSE, POC    Collection Time: 04/03/21 11:53 AM   Result Value Ref Range    Glucose (POC) 227 (H) 65 - 100 mg/dL    Performed by Brittany Ayala    GLUCOSE, POC    Collection Time: 04/03/21  3:11 PM   Result Value Ref Range    Glucose (POC) 425 (H) 65 - 100 mg/dL    Performed by Brtitany Ayala    GLUCOSE, POC    Collection Time: 04/03/21  3:30 PM   Result Value Ref Range    Glucose (POC) 315 (H) 65 - 100 mg/dL    Performed by Angy Braun    GLUCOSE, POC    Collection Time: 04/03/21  9:40 PM   Result Value Ref Range    Glucose (POC) 272 (H) 65 - 100 mg/dL    Performed by Tara Dubin    GLUCOSE, POC    Collection Time: 04/04/21  1:43 AM   Result Value Ref Range    Glucose (POC) 288 (H) 65 - 100 mg/dL    Performed by Tara Dubin    GLUCOSE, POC    Collection Time: 04/04/21  5:32 AM   Result Value Ref Range    Glucose (POC) 218 (H) 65 - 100 mg/dL    Performed by Harry Munguia, TROUGH    Collection Time: 04/04/21  6:50 AM   Result Value Ref Range    Vancomycin,trough 14.4 (H) 5.0 - 10.0 ug/mL    Reported dose date Not provided      Reported dose: Not provided Units   CBC WITH AUTOMATED DIFF    Collection Time: 04/04/21  6:50 AM   Result Value Ref Range    WBC 6.2 4.1 - 11.1 K/uL    RBC 3.74 (L) 4.10 - 5.70 M/uL    HGB 12.2 12.1 - 17.0 g/dL    HCT 34.7 (L) 36.6 - 50.3 %    MCV 92.8 80.0 - 99.0 FL    MCH 32.6 26.0 - 34.0 PG    MCHC 35.2 30.0 - 36.5 g/dL    RDW 12.9 11.5 - 14.5 %    PLATELET 420 035 - 937 K/uL    MPV 11.2 8.9 - 12.9 FL    NEUTROPHILS 46 32 - 75 %    LYMPHOCYTES 40 12 - 49 %    MONOCYTES 12 5 - 13 %    EOSINOPHILS 1 0 - 7 %    BASOPHILS 0 0 - 1 %    IMMATURE GRANULOCYTES 1 (H) 0.0 - 0.5 %    ABS. NEUTROPHILS 2.9 1.8 - 8.0 K/UL    ABS. LYMPHOCYTES 2.5 0.8 - 3.5 K/UL    ABS. MONOCYTES 0.7 0.0 - 1.0 K/UL    ABS. EOSINOPHILS 0.1 0.0 - 0.4 K/UL    ABS. BASOPHILS 0.0 0.0 - 0.1 K/UL    ABS. IMM. GRANS. 0.1 (H) 0.00 - 0.04 K/UL    DF AUTOMATED     METABOLIC PANEL, COMPREHENSIVE    Collection Time: 04/04/21  6:50 AM   Result Value Ref Range    Sodium 137 136 - 145 mmol/L    Potassium 3.9 3.5 - 5.1 mmol/L    Chloride 105 97 - 108 mmol/L    CO2 25 21 - 32 mmol/L    Anion gap 7 5 - 15 mmol/L    Glucose 213 (H) 65 - 100 mg/dL    BUN 18 6 - 20 mg/dL    Creatinine 1.07 0.70 - 1.30 mg/dL    BUN/Creatinine ratio 17 12 - 20      GFR est AA >60 >60 ml/min/1.73m2    GFR est non-AA >60 >60 ml/min/1.73m2    Calcium 9.0 8.5 - 10.1 mg/dL    Bilirubin, total 0.4 0.2 - 1.0 mg/dL    AST (SGOT) 43 (H) 15 - 37 U/L    ALT (SGPT) 48 12 - 78 U/L    Alk. phosphatase 119 (H) 45 - 117 U/L    Protein, total 6.6 6.4 - 8.2 g/dL    Albumin 2.5 (L) 3.5 - 5.0 g/dL    Globulin 4.1 (H) 2.0 - 4.0 g/dL    A-G Ratio 0.6 (L) 1.1 - 2.2     GLUCOSE, POC    Collection Time: 04/04/21  7:03 AM   Result Value Ref Range    Glucose (POC) 194 (H) 65 - 100 mg/dL    Performed by Roddy Garza      [unfilled]      Review of Systems    Constitutional: Negative for chills and fever. HENT: Negative. Eyes: Negative. Respiratory: Negative. Cardiovascular: Negative. Gastrointestinal: Negative for abdominal pain and nausea. Skin: Negative. Neurological: Negative. Physical Exam:      Constitutional: pt is oriented to person, place, and time.    HENT:   Head: Normocephalic and atraumatic. Eyes: Pupils are equal, round, and reactive to light. EOM are normal.   Cardiovascular: Normal rate, regular rhythm and normal heart sounds. Pulmonary/Chest: Breath sounds normal. No wheezes. No rales. Exhibits no tenderness. Abdominal: Soft. Bowel sounds are normal. There is no abdominal tenderness. There is no rebound and no guarding. Musculoskeletal: Normal range of motion. Neurological: pt is alert and oriented to person, place, and time. XR GREAT TOE RT MIN 2 V   Final Result   Osteomyelitis involving the terminal tuft of the distal phalange of   the great toe.            Recent Results (from the past 24 hour(s))   GLUCOSE, POC    Collection Time: 04/03/21 11:53 AM   Result Value Ref Range    Glucose (POC) 227 (H) 65 - 100 mg/dL    Performed by Ervin Monteiro    GLUCOSE, POC    Collection Time: 04/03/21  3:11 PM   Result Value Ref Range    Glucose (POC) 425 (H) 65 - 100 mg/dL    Performed by Ervin Monteiro    GLUCOSE, POC    Collection Time: 04/03/21  3:30 PM   Result Value Ref Range    Glucose (POC) 315 (H) 65 - 100 mg/dL    Performed by Geovanna Chinchilla    GLUCOSE, POC    Collection Time: 04/03/21  9:40 PM   Result Value Ref Range    Glucose (POC) 272 (H) 65 - 100 mg/dL    Performed by Lina Mt    GLUCOSE, POC    Collection Time: 04/04/21  1:43 AM   Result Value Ref Range    Glucose (POC) 288 (H) 65 - 100 mg/dL    Performed by Lina Mt    GLUCOSE, POC    Collection Time: 04/04/21  5:32 AM   Result Value Ref Range    Glucose (POC) 218 (H) 65 - 100 mg/dL    Performed by Lupe Mcadams TROUGH    Collection Time: 04/04/21  6:50 AM   Result Value Ref Range    Vancomycin,trough 14.4 (H) 5.0 - 10.0 ug/mL    Reported dose date Not provided      Reported dose: Not provided Units   CBC WITH AUTOMATED DIFF    Collection Time: 04/04/21  6:50 AM   Result Value Ref Range    WBC 6.2 4.1 - 11.1 K/uL    RBC 3.74 (L) 4.10 - 5.70 M/uL    HGB 12.2 12.1 - 17.0 g/dL    HCT 34.7 (L) 36.6 - 50.3 %    MCV 92.8 80.0 - 99.0 FL    MCH 32.6 26.0 - 34.0 PG    MCHC 35.2 30.0 - 36.5 g/dL    RDW 12.9 11.5 - 14.5 %    PLATELET 703 604 - 149 K/uL    MPV 11.2 8.9 - 12.9 FL    NEUTROPHILS 46 32 - 75 %    LYMPHOCYTES 40 12 - 49 %    MONOCYTES 12 5 - 13 %    EOSINOPHILS 1 0 - 7 %    BASOPHILS 0 0 - 1 %    IMMATURE GRANULOCYTES 1 (H) 0.0 - 0.5 %    ABS. NEUTROPHILS 2.9 1.8 - 8.0 K/UL    ABS. LYMPHOCYTES 2.5 0.8 - 3.5 K/UL    ABS. MONOCYTES 0.7 0.0 - 1.0 K/UL    ABS. EOSINOPHILS 0.1 0.0 - 0.4 K/UL    ABS. BASOPHILS 0.0 0.0 - 0.1 K/UL    ABS. IMM. GRANS. 0.1 (H) 0.00 - 0.04 K/UL    DF AUTOMATED     METABOLIC PANEL, COMPREHENSIVE    Collection Time: 04/04/21  6:50 AM   Result Value Ref Range    Sodium 137 136 - 145 mmol/L    Potassium 3.9 3.5 - 5.1 mmol/L    Chloride 105 97 - 108 mmol/L    CO2 25 21 - 32 mmol/L    Anion gap 7 5 - 15 mmol/L    Glucose 213 (H) 65 - 100 mg/dL    BUN 18 6 - 20 mg/dL    Creatinine 1.07 0.70 - 1.30 mg/dL    BUN/Creatinine ratio 17 12 - 20      GFR est AA >60 >60 ml/min/1.73m2    GFR est non-AA >60 >60 ml/min/1.73m2    Calcium 9.0 8.5 - 10.1 mg/dL    Bilirubin, total 0.4 0.2 - 1.0 mg/dL    AST (SGOT) 43 (H) 15 - 37 U/L    ALT (SGPT) 48 12 - 78 U/L    Alk.  phosphatase 119 (H) 45 - 117 U/L    Protein, total 6.6 6.4 - 8.2 g/dL    Albumin 2.5 (L) 3.5 - 5.0 g/dL    Globulin 4.1 (H) 2.0 - 4.0 g/dL    A-G Ratio 0.6 (L) 1.1 - 2.2     GLUCOSE, POC    Collection Time: 04/04/21  7:03 AM   Result Value Ref Range    Glucose (POC) 194 (H) 65 - 100 mg/dL    Performed by Mitchel Antonio        Results     Procedure Component Value Units Date/Time    MRSA SCREEN - PCR (NASAL) [[id number]]  (Abnormal) Collected: 04/02/21 1215    Order Status: Completed Specimen: Swab Updated: 04/02/21 1325     MRSA by PCR, Nasal DETECTED        Comment: Results verified, phoned to and read back by Constanza Zamudio AT 1325 BY        CULTURE, BLOOD #1 [[id number]] Collected: 04/02/21 1211    Order Status: Completed Specimen: Blood Updated: 04/04/21 1003     Special Requests: No Special Requests        Culture result: No growth 1 day       CULTURE, BLOOD #2 [[id number]] Collected: 04/02/21 1211    Order Status: Completed Specimen: Blood Updated: 04/04/21 0729     Special Requests: No Special Requests        Culture result: No growth 2 days       CULTURE, [place] STAIN [[id number]]     Order Status: Sent Specimen: Foot     COVID-19 RAPID TEST [[id number]] Collected: 04/02/21 0817    Order Status: Completed Specimen: Nasopharyngeal Updated: 04/02/21 0921     Specimen source Nasopharyngeal        COVID-19 rapid test Not Detected        Comment: Rapid Abbott ID Now   Rapid NAAT:  The specimen is NEGATIVE for SARS-CoV-2, the novel coronavirus associated with COVID-19. Negative results should be treated as presumptive and, if inconsistent with clinical signs and symptoms or necessary for patient management, should be tested with an alternative molecular assay. Negative results do not preclude SARS-CoV-2 infection and should not be used as the sole basis for patient management decisions. This test has been authorized by the FDA under   an Emergency Use Authorization (EUA) for use by authorized laboratories.  Fact sheet for Healthcare Providers: ConventionUpdate.co.nz Fact sheet for Patients: ConventionUpdate.co.nz   Methodology: Isothermal Nucleic Acid Amplification         CULTURE, BLOOD, PAIRED [[id number]] Collected: 04/01/21 2335    Order Status: Completed Specimen: Blood Updated: 04/04/21 0729     Special Requests: No Special Requests        Culture result: No growth 2 days              Labs:     Recent Labs     04/04/21  0650 04/03/21  0600   WBC 6.2 5.8   HGB 12.2 11.5*   HCT 34.7* 33.4*    148*     Recent Labs     04/04/21  0650 04/03/21  0600 04/01/21  2335    139 134*   K 3.9 3.3* 4.2    104 100   CO2 25 28 32   BUN 18 18 11   CREA 1.07 1.07 1.23 * 150* 402*   CA 9.0 9.0 9.1   MG  --   --  1.8     Recent Labs     04/04/21  0650 04/03/21  0600 04/01/21  2335   ALT 48 44 52   * 126* 204*   TBILI 0.4 0.3 0.3   TP 6.6 6.2* 7.3   ALB 2.5* 2.4* 2.9*   GLOB 4.1* 3.8 4.4*     No results for input(s): INR, PTP, APTT, INREXT, INREXT in the last 72 hours. No results for input(s): FE, TIBC, PSAT, FERR in the last 72 hours. No results found for: FOL, RBCF   No results for input(s): PH, PCO2, PO2 in the last 72 hours. No results for input(s): CPK, CKNDX, TROIQ in the last 72 hours.     No lab exists for component: CPKMB  Lab Results   Component Value Date/Time    Cholesterol, total 146 01/13/2021 06:30 AM    HDL Cholesterol 39 (L) 01/13/2021 06:30 AM    LDL, calculated 51.4 01/13/2021 06:30 AM    Triglyceride 278 (H) 01/13/2021 06:30 AM    CHOL/HDL Ratio 3.7 01/13/2021 06:30 AM     Lab Results   Component Value Date/Time    Glucose (POC) 194 (H) 04/04/2021 07:03 AM    Glucose (POC) 218 (H) 04/04/2021 05:32 AM    Glucose (POC) 288 (H) 04/04/2021 01:43 AM    Glucose (POC) 272 (H) 04/03/2021 09:40 PM    Glucose (POC) 315 (H) 04/03/2021 03:30 PM     Lab Results   Component Value Date/Time    Color YELLOW 01/11/2021 07:02 AM    Appearance CLEAR 01/11/2021 07:02 AM    Specific gravity >1.030 (H) 01/11/2021 07:02 AM    pH (UA) 6.5 01/11/2021 07:02 AM    Protein TRACE (A) 01/11/2021 07:02 AM    Glucose >1,000 (A) 01/11/2021 07:02 AM    Ketone Negative 01/11/2021 07:02 AM    Bilirubin Negative 01/11/2021 07:02 AM    Urobilinogen 1.0 01/11/2021 07:02 AM    Nitrites Negative 01/11/2021 07:02 AM    Leukocyte Esterase Negative 01/11/2021 07:02 AM    Epithelial cells Negative 01/11/2021 07:02 AM    Bacteria Negative 01/11/2021 07:02 AM    WBC 0 to 1 01/11/2021 07:02 AM    RBC 0 to 1 01/11/2021 07:02 AM         Assessment:     Osteomyelitis of right toe  Uncontrolled diabetes  Hypertension  Hyperlipidemia  Hepatitis C  Carpal tunnel syndrome  Alcohol dependency cocaine Abuse  History of bipolar disorder  MRSA nares        Plan:     Replace potassium  On vancomycin IV Zosyn  Continue Lipitor Cymbalta Lantus and Seroquel  Heparin subcu for DVT prophylaxis    Follow with psychiatrist infectious disease and podiatrist  Repeat the labs in the morning  Monitor blood sugars closely    Follow with podiatrist for further treatment plan        Current Facility-Administered Medications:     glucose chewable tablet 16 g, 4 Tab, Oral, PRN, Zaynab Robbins MD    glucagon (GLUCAGEN) injection 1 mg, 1 mg, IntraMUSCular, PRN, Zaynab Robbins MD    dextrose (D50W) injection syrg 12.5-25 g, 25-50 mL, IntraVENous, PRN, Zaynab Robbins MD    DULoxetine (CYMBALTA) capsule 60 mg, 60 mg, Oral, DAILY, Garima Robbins MD, 60 mg at 04/04/21 0815    insulin glargine (LANTUS) injection 60 Units, 60 Units, SubCUTAneous, QHS, Garima Robbins MD, 60 Units at 04/03/21 2200    QUEtiapine (SEROquel) tablet 100 mg, 100 mg, Oral, QHS, Garima Robbins MD, 100 mg at 04/02/21 2121    atorvastatin (LIPITOR) tablet 80 mg, 80 mg, Oral, QHS, Garima Robbins MD, 80 mg at 04/03/21 2149    traZODone (DESYREL) tablet 50 mg, 50 mg, Oral, QHS PRN, Julian Yousif MD, 50 mg at 04/02/21 2120    insulin lispro (HUMALOG) injection, , SubCUTAneous, Q4H, Garima Robbins MD, 3 Units at 04/04/21 0900    0.9% sodium chloride infusion, 75 mL/hr, IntraVENous, CONTINUOUS, Garima Robbins MD, Last Rate: 75 mL/hr at 04/03/21 1703, 75 mL/hr at 04/03/21 1703    acetaminophen (TYLENOL) tablet 650 mg, 650 mg, Oral, Q6H PRN **OR** acetaminophen (TYLENOL) suppository 650 mg, 650 mg, Rectal, Q6H PRN, Garima Robbins MD    polyethylene glycol (MIRALAX) packet 17 g, 17 g, Oral, DAILY PRN, Zaynab Robbins MD    ondansetron (ZOFRAN ODT) tablet 4 mg, 4 mg, Oral, Q8H PRN **OR** ondansetron (ZOFRAN) injection 4 mg, 4 mg, IntraVENous, Q6H PRN, Garima Robbins MD    piperacillin-tazobactam (ZOSYN) 3.375 g in 0.9% sodium chloride (MBP/ADV) 100 mL MBP, 3.375 g, IntraVENous, Q8H, Garima Robbins MD, Last Rate: 200 mL/hr at 04/04/21 0900, 3.375 g at 04/04/21 0900    VANCOMYCIN INFORMATION NOTE 1 Each, 1 Each, Other, Rx Dosing/Monitoring, Garima Robbins MD    vancomycin (VANCOCIN) 1,000 mg in 0.9% sodium chloride 250 mL (VIAL-MATE), 1,000 mg, IntraVENous, Q12H, Garima Robbins MD, 1,000 mg at 04/04/21 0816    hydrALAZINE (APRESOLINE) 20 mg/mL injection 10 mg, 10 mg, IntraVENous, Q6H PRN, Garima Robbins MD, 10 mg at 04/04/21 0145

## 2021-04-05 LAB
GLUCOSE BLD STRIP.AUTO-MCNC: 176 MG/DL (ref 65–100)
GLUCOSE BLD STRIP.AUTO-MCNC: 212 MG/DL (ref 65–100)
GLUCOSE BLD STRIP.AUTO-MCNC: 304 MG/DL (ref 65–100)
GLUCOSE BLD STRIP.AUTO-MCNC: 310 MG/DL (ref 65–100)
GLUCOSE BLD STRIP.AUTO-MCNC: 384 MG/DL (ref 65–100)
HCV AB SER IA-ACNC: >11 INDEX
HCV AB SERPL QL IA: REACTIVE
HCV COMMENT,HCGAC: ABNORMAL
PERFORMED BY, TECHID: ABNORMAL

## 2021-04-05 PROCEDURE — 74011250637 HC RX REV CODE- 250/637: Performed by: FAMILY MEDICINE

## 2021-04-05 PROCEDURE — 74011250636 HC RX REV CODE- 250/636: Performed by: INTERNAL MEDICINE

## 2021-04-05 PROCEDURE — 99232 SBSQ HOSP IP/OBS MODERATE 35: CPT | Performed by: INTERNAL MEDICINE

## 2021-04-05 PROCEDURE — 65270000029 HC RM PRIVATE

## 2021-04-05 PROCEDURE — 82962 GLUCOSE BLOOD TEST: CPT

## 2021-04-05 PROCEDURE — 74011000258 HC RX REV CODE- 258: Performed by: FAMILY MEDICINE

## 2021-04-05 PROCEDURE — 74011250636 HC RX REV CODE- 250/636: Performed by: FAMILY MEDICINE

## 2021-04-05 PROCEDURE — 74011636637 HC RX REV CODE- 636/637: Performed by: FAMILY MEDICINE

## 2021-04-05 RX ORDER — HYDRALAZINE HYDROCHLORIDE 25 MG/1
25 TABLET, FILM COATED ORAL 2 TIMES DAILY
Status: DISCONTINUED | OUTPATIENT
Start: 2021-04-05 | End: 2021-04-10 | Stop reason: HOSPADM

## 2021-04-05 RX ADMIN — HEPARIN SODIUM 5000 UNITS: 5000 INJECTION INTRAVENOUS; SUBCUTANEOUS at 22:02

## 2021-04-05 RX ADMIN — DULOXETINE HYDROCHLORIDE 60 MG: 30 CAPSULE, DELAYED RELEASE ORAL at 10:29

## 2021-04-05 RX ADMIN — INSULIN LISPRO 10 UNITS: 100 INJECTION, SOLUTION INTRAVENOUS; SUBCUTANEOUS at 17:30

## 2021-04-05 RX ADMIN — HYDRALAZINE HYDROCHLORIDE 10 MG: 20 INJECTION INTRAMUSCULAR; INTRAVENOUS at 02:11

## 2021-04-05 RX ADMIN — HEPARIN SODIUM 5000 UNITS: 5000 INJECTION INTRAVENOUS; SUBCUTANEOUS at 06:28

## 2021-04-05 RX ADMIN — INSULIN LISPRO 15 UNITS: 100 INJECTION, SOLUTION INTRAVENOUS; SUBCUTANEOUS at 02:30

## 2021-04-05 RX ADMIN — PIPERACILLIN AND TAZOBACTAM 3.38 G: 3; .375 INJECTION, POWDER, LYOPHILIZED, FOR SOLUTION INTRAVENOUS at 10:29

## 2021-04-05 RX ADMIN — INSULIN LISPRO 5 UNITS: 100 INJECTION, SOLUTION INTRAVENOUS; SUBCUTANEOUS at 22:00

## 2021-04-05 RX ADMIN — PIPERACILLIN AND TAZOBACTAM 3.38 G: 3; .375 INJECTION, POWDER, LYOPHILIZED, FOR SOLUTION INTRAVENOUS at 18:26

## 2021-04-05 RX ADMIN — INSULIN LISPRO 3 UNITS: 100 INJECTION, SOLUTION INTRAVENOUS; SUBCUTANEOUS at 06:00

## 2021-04-05 RX ADMIN — INSULIN LISPRO 4 UNITS: 100 INJECTION, SOLUTION INTRAVENOUS; SUBCUTANEOUS at 13:30

## 2021-04-05 RX ADMIN — INSULIN LISPRO 4 UNITS: 100 INJECTION, SOLUTION INTRAVENOUS; SUBCUTANEOUS at 09:30

## 2021-04-05 RX ADMIN — PIPERACILLIN AND TAZOBACTAM 3.38 G: 3; .375 INJECTION, POWDER, LYOPHILIZED, FOR SOLUTION INTRAVENOUS at 02:08

## 2021-04-05 RX ADMIN — HEPARIN SODIUM 5000 UNITS: 5000 INJECTION INTRAVENOUS; SUBCUTANEOUS at 14:19

## 2021-04-05 RX ADMIN — HYDRALAZINE HYDROCHLORIDE 25 MG: 25 TABLET, FILM COATED ORAL at 22:03

## 2021-04-05 RX ADMIN — INSULIN GLARGINE 60 UNITS: 100 INJECTION, SOLUTION SUBCUTANEOUS at 22:00

## 2021-04-05 RX ADMIN — VANCOMYCIN HYDROCHLORIDE 1250 MG: 1.25 INJECTION, POWDER, LYOPHILIZED, FOR SOLUTION INTRAVENOUS at 21:00

## 2021-04-05 RX ADMIN — ACETAMINOPHEN 650 MG: 325 TABLET, FILM COATED ORAL at 22:02

## 2021-04-05 RX ADMIN — ATORVASTATIN CALCIUM 80 MG: 40 TABLET, FILM COATED ORAL at 22:03

## 2021-04-05 RX ADMIN — VANCOMYCIN HYDROCHLORIDE 1250 MG: 1.25 INJECTION, POWDER, LYOPHILIZED, FOR SOLUTION INTRAVENOUS at 08:03

## 2021-04-05 NOTE — PROGRESS NOTES
Nutrition Education    · Verbally reviewed information with Patient  · Educated on Type II Diabetes Nutrition Therapy  · Written educational materials provided. · Contact name and number provided. · Refer to Patient Education activity for more details. RD provided DM edu for pt with A1c 11.2. Social history reviewed. Pt reports typically consuming 2 meals/day with occasional snacks. Usual foods described as red meats, \"a lot of starches\" and \"not a lot of greens\". Drinks primarily water. Pt reports usually cooking for self, likes frozen steamer bags, dislikes canned veg, and snacks on chips. Stated financial constraints d/t no longer working, lack of time and energy and depression interfere with eating habits and BG control. RD reviewed basics of food groups, CHO choices, Myplate method, tips for eating well on a budget, affordable food options and easy to prepare/microwavable meal options. Pt very receptive to edu. Teach-back method utilized.      Electronically signed by Elbert Mena on 4/5/2021 at 12:02 PM    Contact Number: [phone] or via [employer]

## 2021-04-05 NOTE — PROGRESS NOTES
Infectious Disease Progress Note           Subjective:   Pt seen and examined at bedside. Doing well, denies new complaints. No acute events since last seen   Objective:   Physical Exam:     Visit Vitals  BP (!) 154/92 (BP 1 Location: Right upper arm, BP Patient Position: At rest)   Pulse 85   Temp 98 °F (36.7 °C)   Resp 18   Ht 5' 11\" (1.803 m)   Wt 160 lb (72.6 kg)   SpO2 99%   BMI 22.32 kg/m²      O2 Device: Room air    Temp (24hrs), Av.3 °F (36.8 °C), Min:97.7 °F (36.5 °C), Max:99 °F (37.2 °C)    No intake/output data recorded. No intake/output data recorded. General: NAD, alert, AAO x 4  HEENT: RIKA, Moist mucosa   Lungs: CTA b/l, decreased at the bases, no wheeze/rhonchi   Heart: S1S2+, RRR, no murmur  Abdo: Soft, NT, ND, +BS   : no indwelling rubio cath   Exts: Swollen right great toe, no drainage   Skin: No wounds, No rashes or lesions    Data Review:       Recent Days:  Recent Labs     21  0650 21  0600   WBC 6.2 5.8   HGB 12.2 11.5*   HCT 34.7* 33.4*    148*     Recent Labs     21  0650 21  0600   BUN 18 18   CREA 1.07 1.07       Lab Results   Component Value Date/Time    C-Reactive protein <0.29 2021 06:00 AM      Microbiology   - Blood Cx : Neg     Diagnostics   CXR Results  (Last 48 hours)    None        Assessment/Plan     1. Osteomyelitis involving distal phalanx of right great toe, underlying DM       No draining wound to Cx, MRSA colonized       Afebrile w a normal WBC on routine labs, ESR 45 and CRP <0.29      Continue on Vanc and Zosyn for now, will plan for 6 wks of IV      Routine labs in the morning           2. H/o chronic hep C, denies prior Tx, HCV RNA VL is pending     3.  H/o uncontrolled DM w associated neuropathy      4. Cocaine dependence w a positive UDS     Dmitry Kirk MD    2021

## 2021-04-05 NOTE — PROGRESS NOTES
General Daily Progress Note          Patient Name:   Paige Cloud       YOB: 1965       Age:  49 y.o. Admit Date: 4/1/2021      Subjective:         Patient is alert awake denies any chest pain or shortness of breath nausea vomiting diarrhea no constipation      Patient discussed with the podiatrist and plan for IV antibiotic no surgery    Seen by infectious disease recommended vancomycin for 6 weeks        Objective:     Visit Vitals  /87 (BP 1 Location: Right upper arm, BP Patient Position: At rest)   Pulse 96   Temp 99 °F (37.2 °C)   Resp 18   Ht 5' 11\" (1.803 m)   Wt 72.6 kg (160 lb)   SpO2 99%   BMI 22.32 kg/m²        Recent Results (from the past 24 hour(s))   GLUCOSE, POC    Collection Time: 04/04/21 11:34 AM   Result Value Ref Range    Glucose (POC) 222 (H) 65 - 100 mg/dL    Performed by Claudean Peon    GLUCOSE, POC    Collection Time: 04/04/21  3:17 PM   Result Value Ref Range    Glucose (POC) 308 (H) 65 - 100 mg/dL    Performed by Claudean Peon    GLUCOSE, POC    Collection Time: 04/04/21  8:07 PM   Result Value Ref Range    Glucose (POC) 331 (H) 65 - 100 mg/dL    Performed by Hardy Garcia    GLUCOSE, POC    Collection Time: 04/05/21  2:08 AM   Result Value Ref Range    Glucose (POC) 384 (H) 65 - 100 mg/dL    Performed by Hardy Garcia    GLUCOSE, POC    Collection Time: 04/05/21  6:25 AM   Result Value Ref Range    Glucose (POC) 176 (H) 65 - 100 mg/dL    Performed by Hardy Garcia    GLUCOSE, POC    Collection Time: 04/05/21 10:20 AM   Result Value Ref Range    Glucose (POC) 212 (H) 65 - 100 mg/dL    Performed by Bin Burrows      [unfilled]      Review of Systems    Constitutional: Negative for chills and fever. HENT: Negative. Eyes: Negative. Respiratory: Negative. Cardiovascular: Negative. Gastrointestinal: Negative for abdominal pain and nausea. Skin: Negative. Neurological: Negative.         Physical Exam:      Constitutional: pt is oriented to person, place, and time. HENT:   Head: Normocephalic and atraumatic. Eyes: Pupils are equal, round, and reactive to light. EOM are normal.   Cardiovascular: Normal rate, regular rhythm and normal heart sounds. Pulmonary/Chest: Breath sounds normal. No wheezes. No rales. Exhibits no tenderness. Abdominal: Soft. Bowel sounds are normal. There is no abdominal tenderness. There is no rebound and no guarding. Musculoskeletal: Normal range of motion. Neurological: pt is alert and oriented to person, place, and time. XR GREAT TOE RT MIN 2 V   Final Result   Osteomyelitis involving the terminal tuft of the distal phalange of   the great toe.            Recent Results (from the past 24 hour(s))   GLUCOSE, POC    Collection Time: 04/04/21 11:34 AM   Result Value Ref Range    Glucose (POC) 222 (H) 65 - 100 mg/dL    Performed by Wandy Davis    GLUCOSE, POC    Collection Time: 04/04/21  3:17 PM   Result Value Ref Range    Glucose (POC) 308 (H) 65 - 100 mg/dL    Performed by Lars Ceja, POC    Collection Time: 04/04/21  8:07 PM   Result Value Ref Range    Glucose (POC) 331 (H) 65 - 100 mg/dL    Performed by Gautam Ann, POC    Collection Time: 04/05/21  2:08 AM   Result Value Ref Range    Glucose (POC) 384 (H) 65 - 100 mg/dL    Performed by Gautam Ann, POC    Collection Time: 04/05/21  6:25 AM   Result Value Ref Range    Glucose (POC) 176 (H) 65 - 100 mg/dL    Performed by Gautam Ann, POC    Collection Time: 04/05/21 10:20 AM   Result Value Ref Range    Glucose (POC) 212 (H) 65 - 100 mg/dL    Performed by Daniel Ngo        Results     Procedure Component Value Units Date/Time    MRSA SCREEN - PCR (NASAL) [[id number]]  (Abnormal) Collected: 04/02/21 1215    Order Status: Completed Specimen: Swab Updated: 04/02/21 1325     MRSA by PCR, Nasal DETECTED        Comment: Results verified, phoned to and read back by Darin Cerda AT [address], BLOOD #1 [[id number]] Collected: 04/02/21 1211    Order Status: Completed Specimen: Blood Updated: 04/05/21 0758     Special Requests: No Special Requests        Culture result: No growth 2 days       CULTURE, BLOOD #2 [[id number]] Collected: 04/02/21 1211    Order Status: Completed Specimen: Blood Updated: 04/05/21 0758     Special Requests: No Special Requests        Culture result: No growth 3 days       CULTURE, Ivette Parody STAIN [[id number]] Collected: 04/02/21 0930    Order Status: Canceled Specimen: Foot     COVID-19 RAPID TEST [[id number]] Collected: 04/02/21 0817    Order Status: Completed Specimen: Nasopharyngeal Updated: 04/02/21 0921     Specimen source Nasopharyngeal        COVID-19 rapid test Not Detected        Comment: Rapid Abbott ID Now   Rapid NAAT:  The specimen is NEGATIVE for SARS-CoV-2, the novel coronavirus associated with COVID-19. Negative results should be treated as presumptive and, if inconsistent with clinical signs and symptoms or necessary for patient management, should be tested with an alternative molecular assay. Negative results do not preclude SARS-CoV-2 infection and should not be used as the sole basis for patient management decisions. This test has been authorized by the FDA under   an Emergency Use Authorization (EUA) for use by authorized laboratories.  Fact sheet for Healthcare Providers: ConventionUpdate.co.nz Fact sheet for Patients: ConventionUpdate.co.nz   Methodology: Isothermal Nucleic Acid Amplification         CULTURE, BLOOD, PAIRED [[id number]] Collected: 04/01/21 2335    Order Status: Completed Specimen: Blood Updated: 04/05/21 0758     Special Requests: No Special Requests        Culture result: No growth 3 days              Labs:     Recent Labs     04/04/21  0650 04/03/21  0600   WBC 6.2 5.8   HGB 12.2 11.5*   HCT 34.7* 33.4*    148*     Recent Labs     04/04/21  0650 04/03/21  0600    139   K 3.9 3.3*    104   CO2 25 28   BUN 18 18   CREA 1.07 1.07   * 150*   CA 9.0 9.0     Recent Labs     04/04/21  0650 04/03/21  0600   ALT 48 44   * 126*   TBILI 0.4 0.3   TP 6.6 6.2*   ALB 2.5* 2.4*   GLOB 4.1* 3.8     No results for input(s): INR, PTP, APTT, INREXT, INREXT in the last 72 hours. No results for input(s): FE, TIBC, PSAT, FERR in the last 72 hours. No results found for: FOL, RBCF   No results for input(s): PH, PCO2, PO2 in the last 72 hours. No results for input(s): CPK, CKNDX, TROIQ in the last 72 hours.     No lab exists for component: CPKMB  Lab Results   Component Value Date/Time    Cholesterol, total 146 01/13/2021 06:30 AM    HDL Cholesterol 39 (L) 01/13/2021 06:30 AM    LDL, calculated 51.4 01/13/2021 06:30 AM    Triglyceride 278 (H) 01/13/2021 06:30 AM    CHOL/HDL Ratio 3.7 01/13/2021 06:30 AM     Lab Results   Component Value Date/Time    Glucose (POC) 212 (H) 04/05/2021 10:20 AM    Glucose (POC) 176 (H) 04/05/2021 06:25 AM    Glucose (POC) 384 (H) 04/05/2021 02:08 AM    Glucose (POC) 331 (H) 04/04/2021 08:07 PM    Glucose (POC) 308 (H) 04/04/2021 03:17 PM     Lab Results   Component Value Date/Time    Color YELLOW 01/11/2021 07:02 AM    Appearance CLEAR 01/11/2021 07:02 AM    Specific gravity >1.030 (H) 01/11/2021 07:02 AM    pH (UA) 6.5 01/11/2021 07:02 AM    Protein TRACE (A) 01/11/2021 07:02 AM    Glucose >1,000 (A) 01/11/2021 07:02 AM    Ketone Negative 01/11/2021 07:02 AM    Bilirubin Negative 01/11/2021 07:02 AM    Urobilinogen 1.0 01/11/2021 07:02 AM    Nitrites Negative 01/11/2021 07:02 AM    Leukocyte Esterase Negative 01/11/2021 07:02 AM    Epithelial cells Negative 01/11/2021 07:02 AM    Bacteria Negative 01/11/2021 07:02 AM    WBC 0 to 1 01/11/2021 07:02 AM    RBC 0 to 1 01/11/2021 07:02 AM         Assessment:     Osteomyelitis of right toe  Uncontrolled diabetes  Hypertension  Hyperlipidemia  Hepatitis C  Carpal tunnel syndrome  Alcohol dependency cocaine Abuse  History of bipolar disorder  MRSA nares        Plan:     Replace potassium  On vancomycin IV Zosyn  Continue Lipitor Cymbalta Lantus and Seroquel  Heparin subcu for DVT prophylaxis    Plan to discharge to skilled care facility for 6 weeks of IV antibiotic    consult for discharge planning        Current Facility-Administered Medications:     vancomycin (VANCOCIN) 1,250 mg in 0.9% sodium chloride 250 mL (VIAL-MATE), 1,250 mg, IntraVENous, Q12H, Jim Burrell MD, 1,250 mg at 04/05/21 0803    heparin (porcine) injection 5,000 Units, 5,000 Units, SubCUTAneous, Q8H, Garima Robbins MD, 5,000 Units at 04/05/21 5711    glucose chewable tablet 16 g, 4 Tab, Oral, PRN, Tucker Robbins MD    glucagon (GLUCAGEN) injection 1 mg, 1 mg, IntraMUSCular, PRN, Tucker Robbins MD    dextrose (D50W) injection syrg 12.5-25 g, 25-50 mL, IntraVENous, PRN, Tucker Robbins MD    DULoxetine (CYMBALTA) capsule 60 mg, 60 mg, Oral, DAILY, Garima Robbins MD, 60 mg at 04/05/21 1029    insulin glargine (LANTUS) injection 60 Units, 60 Units, SubCUTAneous, QHS, Garima Robbins MD, 60 Units at 04/04/21 2200    QUEtiapine (SEROquel) tablet 100 mg, 100 mg, Oral, QHS, Garima Robbins MD, 100 mg at 04/02/21 2121    atorvastatin (LIPITOR) tablet 80 mg, 80 mg, Oral, QHS, Garima Robbins MD, 80 mg at 04/04/21 2242    traZODone (DESYREL) tablet 50 mg, 50 mg, Oral, QHS PRN, Garima Robbins MD, 50 mg at 04/02/21 2120    insulin lispro (HUMALOG) injection, , SubCUTAneous, Q4H, Garima Robbins MD, 4 Units at 04/05/21 0930    0.9% sodium chloride infusion, 75 mL/hr, IntraVENous, CONTINUOUS, Garima Robbins MD, Last Rate: 75 mL/hr at 04/03/21 1703, 75 mL/hr at 04/03/21 1703    acetaminophen (TYLENOL) tablet 650 mg, 650 mg, Oral, Q6H PRN, 650 mg at 04/04/21 2242 **OR** acetaminophen (TYLENOL) suppository 650 mg, 650 mg, Rectal, Q6H PRN, Tucker Robbins MD    polyethylene glycol [facility]) packet 17 g, 17 g, Oral, DAILY PRN, Cinthya Robbins MD    ondansetron (ZOFRAN ODT) tablet 4 mg, 4 mg, Oral, Q8H PRN **OR** ondansetron (ZOFRAN) injection 4 mg, 4 mg, IntraVENous, Q6H PRN, Garima Robbins MD    piperacillin-tazobactam (ZOSYN) 3.375 g in 0.9% sodium chloride (MBP/ADV) 100 mL MBP, 3.375 g, IntraVENous, Q8H, Garima Robbins MD, Last Rate: 200 mL/hr at 04/05/21 1029, 3.375 g at 04/05/21 1029    VANCOMYCIN INFORMATION NOTE 1 Each, 1 Each, Other, Rx Dosing/Monitoring, Garima Robbins MD    hydrALAZINE (APRESOLINE) 20 mg/mL injection 10 mg, 10 mg, IntraVENous, Q6H PRN, Garima Robbins MD, 10 mg at 04/05/21 0211

## 2021-04-05 NOTE — PROGRESS NOTES
Problem: Falls - Risk of  Goal: *Absence of Falls  Description: Document Alba Aldridge Fall Risk and appropriate interventions in the flowsheet.   Outcome: Progressing Towards Goal  Note: Fall Risk Interventions:            Medication Interventions: Patient to call before getting OOB, Teach patient to arise slowly

## 2021-04-05 NOTE — PROGRESS NOTES
Problem: Pain - Acute  Goal: *Control of acute pain  Outcome: Progressing Towards Goal     Problem: Patient Education: Go to Patient Education Activity  Goal: Patient/Family Education  Outcome: Progressing Towards Goal     Problem: Diabetes Self-Management  Goal: *Disease process and treatment process  Description: Define diabetes and identify own type of diabetes; list 3 options for treating diabetes. Outcome: Progressing Towards Goal  Goal: *Incorporating nutritional management into lifestyle  Description: Describe effect of type, amount and timing of food on blood glucose; list 3 methods for planning meals. Outcome: Progressing Towards Goal  Goal: *Incorporating physical activity into lifestyle  Description: State effect of exercise on blood glucose levels. Outcome: Progressing Towards Goal  Goal: *Developing strategies to promote health/change behavior  Description: Define the ABC's of diabetes; identify appropriate screenings, schedule and personal plan for screenings. Outcome: Progressing Towards Goal  Goal: *Using medications safely  Description: State effect of diabetes medications on diabetes; name diabetes medication taking, action and side effects. Outcome: Progressing Towards Goal  Goal: *Monitoring blood glucose, interpreting and using results  Description: Identify recommended blood glucose targets  and personal targets. Outcome: Progressing Towards Goal  Goal: *Prevention, detection, treatment of acute complications  Description: List symptoms of hyper- and hypoglycemia; describe how to treat low blood sugar and actions for lowering  high blood glucose level. Outcome: Progressing Towards Goal  Goal: *Prevention, detection and treatment of chronic complications  Description: Define the natural course of diabetes and describe the relationship of blood glucose levels to long term complications of diabetes.   Outcome: Progressing Towards Goal  Goal: *Developing strategies to address psychosocial issues  Description: Describe feelings about living with diabetes; identify support needed and support network  Outcome: Progressing Towards Goal  Goal: *Insulin pump training  Outcome: Progressing Towards Goal  Goal: *Sick day guidelines  Outcome: Progressing Towards Goal  Goal: *Patient Specific Goal (EDIT GOAL, INSERT TEXT)  Outcome: Progressing Towards Goal     Problem: Falls - Risk of  Goal: *Absence of Falls  Description: Document Nicolás Fall Risk and appropriate interventions in the flowsheet.   Outcome: Progressing Towards Goal  Note: Fall Risk Interventions:            Medication Interventions: Patient to call before getting OOB, Teach patient to arise slowly                   Problem: Patient Education: Go to Patient Education Activity  Goal: Patient/Family Education  Outcome: Progressing Towards Goal     Problem: Patient Education: Go to Patient Education Activity  Goal: Patient/Family Education  Outcome: Progressing Towards Goal

## 2021-04-06 LAB
CREAT SERPL-MCNC: 0.97 MG/DL (ref 0.7–1.3)
DATE LAST DOSE: ABNORMAL
GLUCOSE BLD STRIP.AUTO-MCNC: 122 MG/DL (ref 65–100)
GLUCOSE BLD STRIP.AUTO-MCNC: 128 MG/DL (ref 65–100)
GLUCOSE BLD STRIP.AUTO-MCNC: 137 MG/DL (ref 65–100)
GLUCOSE BLD STRIP.AUTO-MCNC: 150 MG/DL (ref 65–100)
GLUCOSE BLD STRIP.AUTO-MCNC: 166 MG/DL (ref 65–100)
GLUCOSE BLD STRIP.AUTO-MCNC: 197 MG/DL (ref 65–100)
GLUCOSE BLD STRIP.AUTO-MCNC: 84 MG/DL (ref 65–100)
PERFORMED BY, TECHID: ABNORMAL
PERFORMED BY, TECHID: NORMAL
REPORTED DOSE,DOSE: ABNORMAL UNITS
VANCOMYCIN SERPL-MCNC: 19.8 UG/ML
VANCOMYCIN TROUGH SERPL-MCNC: 44 UG/ML (ref 5–10)

## 2021-04-06 PROCEDURE — 74011636637 HC RX REV CODE- 636/637: Performed by: FAMILY MEDICINE

## 2021-04-06 PROCEDURE — 99232 SBSQ HOSP IP/OBS MODERATE 35: CPT | Performed by: INTERNAL MEDICINE

## 2021-04-06 PROCEDURE — 74011250637 HC RX REV CODE- 250/637: Performed by: FAMILY MEDICINE

## 2021-04-06 PROCEDURE — 36415 COLL VENOUS BLD VENIPUNCTURE: CPT

## 2021-04-06 PROCEDURE — 80202 ASSAY OF VANCOMYCIN: CPT

## 2021-04-06 PROCEDURE — 74011250636 HC RX REV CODE- 250/636: Performed by: INTERNAL MEDICINE

## 2021-04-06 PROCEDURE — 82565 ASSAY OF CREATININE: CPT

## 2021-04-06 PROCEDURE — 74011000258 HC RX REV CODE- 258: Performed by: FAMILY MEDICINE

## 2021-04-06 PROCEDURE — 36569 INSJ PICC 5 YR+ W/O IMAGING: CPT

## 2021-04-06 PROCEDURE — 74011250636 HC RX REV CODE- 250/636: Performed by: FAMILY MEDICINE

## 2021-04-06 PROCEDURE — 82962 GLUCOSE BLOOD TEST: CPT

## 2021-04-06 PROCEDURE — 02HV33Z INSERTION OF INFUSION DEVICE INTO SUPERIOR VENA CAVA, PERCUTANEOUS APPROACH: ICD-10-PCS | Performed by: FAMILY MEDICINE

## 2021-04-06 PROCEDURE — 65270000029 HC RM PRIVATE

## 2021-04-06 RX ORDER — HYDRALAZINE HYDROCHLORIDE 25 MG/1
25 TABLET, FILM COATED ORAL 2 TIMES DAILY
Qty: 90 TAB | Refills: 0 | Status: ON HOLD | OUTPATIENT
Start: 2021-04-06 | End: 2021-06-24 | Stop reason: SDUPTHER

## 2021-04-06 RX ADMIN — DULOXETINE HYDROCHLORIDE 60 MG: 30 CAPSULE, DELAYED RELEASE ORAL at 08:54

## 2021-04-06 RX ADMIN — HEPARIN SODIUM 5000 UNITS: 5000 INJECTION INTRAVENOUS; SUBCUTANEOUS at 14:25

## 2021-04-06 RX ADMIN — ACETAMINOPHEN 650 MG: 325 TABLET, FILM COATED ORAL at 20:09

## 2021-04-06 RX ADMIN — QUETIAPINE FUMARATE 100 MG: 100 TABLET ORAL at 20:09

## 2021-04-06 RX ADMIN — PIPERACILLIN AND TAZOBACTAM 3.38 G: 3; .375 INJECTION, POWDER, LYOPHILIZED, FOR SOLUTION INTRAVENOUS at 08:54

## 2021-04-06 RX ADMIN — HYDRALAZINE HYDROCHLORIDE 10 MG: 20 INJECTION INTRAMUSCULAR; INTRAVENOUS at 02:28

## 2021-04-06 RX ADMIN — VANCOMYCIN HYDROCHLORIDE 1250 MG: 1.25 INJECTION, POWDER, LYOPHILIZED, FOR SOLUTION INTRAVENOUS at 08:54

## 2021-04-06 RX ADMIN — PIPERACILLIN AND TAZOBACTAM 3.38 G: 3; .375 INJECTION, POWDER, LYOPHILIZED, FOR SOLUTION INTRAVENOUS at 17:40

## 2021-04-06 RX ADMIN — INSULIN LISPRO 3 UNITS: 100 INJECTION, SOLUTION INTRAVENOUS; SUBCUTANEOUS at 13:30

## 2021-04-06 RX ADMIN — SODIUM CHLORIDE 75 ML/HR: 9 INJECTION, SOLUTION INTRAVENOUS at 02:28

## 2021-04-06 RX ADMIN — HYDRALAZINE HYDROCHLORIDE 25 MG: 25 TABLET, FILM COATED ORAL at 08:54

## 2021-04-06 RX ADMIN — PIPERACILLIN AND TAZOBACTAM 3.38 G: 3; .375 INJECTION, POWDER, LYOPHILIZED, FOR SOLUTION INTRAVENOUS at 02:28

## 2021-04-06 RX ADMIN — HYDRALAZINE HYDROCHLORIDE 25 MG: 25 TABLET, FILM COATED ORAL at 20:00

## 2021-04-06 RX ADMIN — INSULIN LISPRO 3 UNITS: 100 INJECTION, SOLUTION INTRAVENOUS; SUBCUTANEOUS at 20:30

## 2021-04-06 RX ADMIN — ATORVASTATIN CALCIUM 80 MG: 40 TABLET, FILM COATED ORAL at 20:09

## 2021-04-06 RX ADMIN — VANCOMYCIN HYDROCHLORIDE 1250 MG: 1.25 INJECTION, POWDER, LYOPHILIZED, FOR SOLUTION INTRAVENOUS at 21:56

## 2021-04-06 RX ADMIN — HEPARIN SODIUM 5000 UNITS: 5000 INJECTION INTRAVENOUS; SUBCUTANEOUS at 21:58

## 2021-04-06 RX ADMIN — INSULIN GLARGINE 60 UNITS: 100 INJECTION, SOLUTION SUBCUTANEOUS at 22:00

## 2021-04-06 RX ADMIN — HEPARIN SODIUM 5000 UNITS: 5000 INJECTION INTRAVENOUS; SUBCUTANEOUS at 06:00

## 2021-04-06 NOTE — PROGRESS NOTES
Vancomycin  trough was ordered to be drawn today at 0600. The dose was started at . The trough was drawn at 1046 which is approximately 1 hour after the dose finished infusing. The level is therefore invalid. I have ordered another trough level to be  drawn at 1900 this evening - prior to the 2100 dose being hung. Pharmacy will continue to monitor the vancomycin therapy for Mr. Caron Asher and adjust as necessary.   Beckie Montilla, [facility]

## 2021-04-06 NOTE — PROGRESS NOTES
Problem: Diabetes Self-Management  Goal: *Incorporating nutritional management into lifestyle  Description: Describe effect of type, amount and timing of food on blood glucose; list 3 methods for planning meals. Outcome: Progressing Towards Goal  Goal: *Developing strategies to promote health/change behavior  Description: Define the ABC's of diabetes; identify appropriate screenings, schedule and personal plan for screenings. Outcome: Progressing Towards Goal     Problem: Risk for Spread of Infection  Goal: Prevent transmission of infectious organism to others  Description: Prevent the transmission of infectious organisms to other patients, staff members, and visitors.   Outcome: Progressing Towards Goal

## 2021-04-06 NOTE — PROGRESS NOTES
Infectious Disease Progress Note           Subjective:   Doing well clinically, denies new complaints, no acute events since last seen. Currently declines any kind of surgery for his right foot. Objective:   Physical Exam:     Visit Vitals  BP (!) 169/99 (BP 1 Location: Right upper arm, BP Patient Position: At rest)   Pulse 79   Temp 98.7 °F (37.1 °C)   Resp 18   Ht 5' 11\" (1.803 m)   Wt 160 lb (72.6 kg)   SpO2 98%   BMI 22.32 kg/m²      O2 Device: Room air    Temp (24hrs), Av.6 °F (37 °C), Min:98 °F (36.7 °C), Max:99.1 °F (37.3 °C)    No intake/output data recorded.  1901 -  0700  In: 6008 [P.O.:750; I.V.:2595]  Out: -     General: NAD, alert, AAO x 4  HEENT: RIKA, Moist mucosa   Lungs: CTA b/l, decreased at the bases, no wheeze/rhonchi   Heart: S1S2+, RRR, no murmur  Abdo: Soft, NT, ND, +BS   : no indwelling rubio cath   Exts: Swollen right great toe, no drainage   Skin: No wounds, No rashes or lesions    Data Review:       Recent Days:  Recent Labs     21  0650   WBC 6.2   HGB 12.2   HCT 34.7*        Recent Labs     21  1046 21  0650   BUN  --  18   CREA 0.97 1.07       Lab Results   Component Value Date/Time    C-Reactive protein <0.29 2021 06:00 AM      Microbiology   - Blood Cx : Neg     Diagnostics   CXR Results  (Last 48 hours)    None        Assessment/Plan     1. Osteomyelitis involving distal phalanx of right great toe, underlying DM       No draining wound to Cx, MRSA colonized      ESR 45 and CRP <0.29. Remains afebrile with a normal WBC on routine labs      Currently does not want any surgery intervention       Continue on Vanco and Zosyn, plans for 6 weeks of antibiotics for treatment of osteomyelitis       PICC line placed for out pt antibiotics     2. H/o chronic hep C, denies prior Tx      HCV RNA VL is pending, will need tx if detectable     3.  H/o uncontrolled DM w associated neuropathy      4. Cocaine dependence w a positive UDS, counseled      Todd Funk MD    4/6/2021

## 2021-04-06 NOTE — PROGRESS NOTES
Problem: Pain - Acute  Goal: *Control of acute pain  Outcome: Progressing Towards Goal     Problem: Patient Education: Go to Patient Education Activity  Goal: Patient/Family Education  Outcome: Progressing Towards Goal     Problem: Diabetes Self-Management  Goal: *Disease process and treatment process  Description: Define diabetes and identify own type of diabetes; list 3 options for treating diabetes. Outcome: Progressing Towards Goal  Goal: *Incorporating nutritional management into lifestyle  Description: Describe effect of type, amount and timing of food on blood glucose; list 3 methods for planning meals. Outcome: Progressing Towards Goal  Goal: *Incorporating physical activity into lifestyle  Description: State effect of exercise on blood glucose levels. Outcome: Progressing Towards Goal  Goal: *Developing strategies to promote health/change behavior  Description: Define the ABC's of diabetes; identify appropriate screenings, schedule and personal plan for screenings. Outcome: Progressing Towards Goal  Goal: *Using medications safely  Description: State effect of diabetes medications on diabetes; name diabetes medication taking, action and side effects. Outcome: Progressing Towards Goal  Goal: *Monitoring blood glucose, interpreting and using results  Description: Identify recommended blood glucose targets  and personal targets. Outcome: Progressing Towards Goal  Goal: *Prevention, detection, treatment of acute complications  Description: List symptoms of hyper- and hypoglycemia; describe how to treat low blood sugar and actions for lowering  high blood glucose level. Outcome: Progressing Towards Goal  Goal: *Prevention, detection and treatment of chronic complications  Description: Define the natural course of diabetes and describe the relationship of blood glucose levels to long term complications of diabetes.   Outcome: Progressing Towards Goal  Goal: *Developing strategies to address psychosocial issues  Description: Describe feelings about living with diabetes; identify support needed and support network  Outcome: Progressing Towards Goal  Goal: *Insulin pump training  Outcome: Progressing Towards Goal  Goal: *Sick day guidelines  Outcome: Progressing Towards Goal  Goal: *Patient Specific Goal (EDIT GOAL, INSERT TEXT)  Outcome: Progressing Towards Goal     Problem: Patient Education: Go to Patient Education Activity  Goal: Patient/Family Education  Outcome: Progressing Towards Goal     Problem: Falls - Risk of  Goal: *Absence of Falls  Description: Document Nicolás Fall Risk and appropriate interventions in the flowsheet.   Outcome: Progressing Towards Goal  Note: Fall Risk Interventions:            Medication Interventions: Patient to call before getting OOB, Teach patient to arise slowly

## 2021-04-06 NOTE — MANAGEMENT PLAN
Upon completing [facility] and DMAS 95 patient states he has only been in an inpatient psych unit once in the last 2 years and that was in January. CM will continue to follow.

## 2021-04-06 NOTE — PROGRESS NOTES
PICC Placement Note    PRE-PROCEDURE VERIFICATION  Correct Procedure: yes  Correct Site:  yes  Temperature: Temp: 98.7 °F (37.1 °C), Temperature Source: Temp Source: Oral  Recent Labs     04/06/21  1046 04/04/21  0650   BUN  --  18   CREA 0.97 1.07   PLT  --  154   WBC  --  6.2     Allergies: Patient has no known allergies. Education materials for PICC Care given to family: yes. See Patient Education activity for further details. PICC Booklet placed on chart: yes    PROCEDURE DETAIL  A single lumen PICC line was started for antibiotic therapy. The following documentation is in addition to the PICC properties in the lines/airways flowsheet :  Lot #: [id number]  xylocaine used: yes  Mid-Arm Circumference: 41 (cm)  Internal Catheter Length: 40 (cm)  Internal Catheter Total Length: 40 (cm)  Vein Selection for PICC:right basilic  Central Line Bundle followed yes  Complication Related to Insertion: none    The placement was verified by X-ray: no.  The 3CG results state the tip location is on the right side and the tip overlies the lower superior vena cava    Line is okay to use: yes    Julieta Stubbs RN

## 2021-04-06 NOTE — MANAGEMENT PLAN
CM met with patient at bedside. Patient will need placement in LTC as he is homeless and will require IV ABX x 6 weeks. Patient is agreeable to placement in first available LTC. CM notes patient will require a PICC line. CM has notified nurse, Mila Figueredo. CM has sent referrals via [facility] for LTC placement. CM will continue to follow.

## 2021-04-07 LAB
ATRIAL RATE: 83 BPM
BACTERIA SPEC CULT: NORMAL
CALCULATED P AXIS, ECG09: 38 DEGREES
CALCULATED T AXIS, ECG11: 75 DEGREES
DIAGNOSIS, 93000: NORMAL
GLUCOSE BLD STRIP.AUTO-MCNC: 104 MG/DL (ref 65–100)
GLUCOSE BLD STRIP.AUTO-MCNC: 169 MG/DL (ref 65–100)
GLUCOSE BLD STRIP.AUTO-MCNC: 185 MG/DL (ref 65–100)
GLUCOSE BLD STRIP.AUTO-MCNC: 190 MG/DL (ref 65–100)
GLUCOSE BLD STRIP.AUTO-MCNC: 193 MG/DL (ref 65–100)
GLUCOSE BLD STRIP.AUTO-MCNC: 194 MG/DL (ref 65–100)
GLUCOSE BLD STRIP.AUTO-MCNC: 327 MG/DL (ref 65–100)
MAGNESIUM SERPL-MCNC: 2 MG/DL (ref 1.6–2.4)
P-R INTERVAL, ECG05: 146 MS
PERFORMED BY, TECHID: ABNORMAL
POTASSIUM SERPL-SCNC: 3.9 MMOL/L (ref 3.5–5.1)
Q-T INTERVAL, ECG07: 380 MS
QRS DURATION, ECG06: 90 MS
QTC CALCULATION (BEZET), ECG08: 446 MS
SPECIAL REQUESTS,SREQ: NORMAL
VENTRICULAR RATE, ECG03: 83 BPM

## 2021-04-07 PROCEDURE — 93005 ELECTROCARDIOGRAM TRACING: CPT

## 2021-04-07 PROCEDURE — 74011250636 HC RX REV CODE- 250/636: Performed by: INTERNAL MEDICINE

## 2021-04-07 PROCEDURE — 74011636637 HC RX REV CODE- 636/637: Performed by: FAMILY MEDICINE

## 2021-04-07 PROCEDURE — 36415 COLL VENOUS BLD VENIPUNCTURE: CPT

## 2021-04-07 PROCEDURE — 74011250637 HC RX REV CODE- 250/637: Performed by: FAMILY MEDICINE

## 2021-04-07 PROCEDURE — 65270000029 HC RM PRIVATE

## 2021-04-07 PROCEDURE — 84132 ASSAY OF SERUM POTASSIUM: CPT

## 2021-04-07 PROCEDURE — 83735 ASSAY OF MAGNESIUM: CPT

## 2021-04-07 PROCEDURE — 74011250636 HC RX REV CODE- 250/636: Performed by: FAMILY MEDICINE

## 2021-04-07 PROCEDURE — 82962 GLUCOSE BLOOD TEST: CPT

## 2021-04-07 PROCEDURE — 74011000258 HC RX REV CODE- 258: Performed by: FAMILY MEDICINE

## 2021-04-07 PROCEDURE — 74011250637 HC RX REV CODE- 250/637: Performed by: INTERNAL MEDICINE

## 2021-04-07 PROCEDURE — 99232 SBSQ HOSP IP/OBS MODERATE 35: CPT | Performed by: INTERNAL MEDICINE

## 2021-04-07 RX ORDER — MUPIROCIN 20 MG/G
OINTMENT TOPICAL 2 TIMES DAILY
Status: DISCONTINUED | OUTPATIENT
Start: 2021-04-07 | End: 2021-04-10 | Stop reason: HOSPADM

## 2021-04-07 RX ADMIN — QUETIAPINE FUMARATE 100 MG: 100 TABLET ORAL at 20:25

## 2021-04-07 RX ADMIN — HEPARIN SODIUM 5000 UNITS: 5000 INJECTION INTRAVENOUS; SUBCUTANEOUS at 05:07

## 2021-04-07 RX ADMIN — INSULIN GLARGINE 60 UNITS: 100 INJECTION, SOLUTION SUBCUTANEOUS at 22:00

## 2021-04-07 RX ADMIN — HYDRALAZINE HYDROCHLORIDE 25 MG: 25 TABLET, FILM COATED ORAL at 09:13

## 2021-04-07 RX ADMIN — POLYETHYLENE GLYCOL 3350 17 G: 17 POWDER, FOR SOLUTION ORAL at 21:54

## 2021-04-07 RX ADMIN — ATORVASTATIN CALCIUM 80 MG: 40 TABLET, FILM COATED ORAL at 20:23

## 2021-04-07 RX ADMIN — MUPIROCIN: 20 OINTMENT TOPICAL at 20:23

## 2021-04-07 RX ADMIN — DULOXETINE HYDROCHLORIDE 60 MG: 30 CAPSULE, DELAYED RELEASE ORAL at 09:13

## 2021-04-07 RX ADMIN — HYDRALAZINE HYDROCHLORIDE 25 MG: 25 TABLET, FILM COATED ORAL at 20:24

## 2021-04-07 RX ADMIN — INSULIN LISPRO 3 UNITS: 100 INJECTION, SOLUTION INTRAVENOUS; SUBCUTANEOUS at 19:38

## 2021-04-07 RX ADMIN — INSULIN LISPRO 3 UNITS: 100 INJECTION, SOLUTION INTRAVENOUS; SUBCUTANEOUS at 00:29

## 2021-04-07 RX ADMIN — SODIUM CHLORIDE 75 ML/HR: 9 INJECTION, SOLUTION INTRAVENOUS at 14:48

## 2021-04-07 RX ADMIN — HEPARIN SODIUM 5000 UNITS: 5000 INJECTION INTRAVENOUS; SUBCUTANEOUS at 19:37

## 2021-04-07 RX ADMIN — INSULIN LISPRO 3 UNITS: 100 INJECTION, SOLUTION INTRAVENOUS; SUBCUTANEOUS at 05:30

## 2021-04-07 RX ADMIN — PIPERACILLIN AND TAZOBACTAM 3.38 G: 3; .375 INJECTION, POWDER, LYOPHILIZED, FOR SOLUTION INTRAVENOUS at 00:28

## 2021-04-07 RX ADMIN — VANCOMYCIN HYDROCHLORIDE 1250 MG: 1.25 INJECTION, POWDER, LYOPHILIZED, FOR SOLUTION INTRAVENOUS at 09:13

## 2021-04-07 RX ADMIN — PIPERACILLIN AND TAZOBACTAM 3.38 G: 3; .375 INJECTION, POWDER, LYOPHILIZED, FOR SOLUTION INTRAVENOUS at 19:37

## 2021-04-07 RX ADMIN — VANCOMYCIN HYDROCHLORIDE 1250 MG: 1.25 INJECTION, POWDER, LYOPHILIZED, FOR SOLUTION INTRAVENOUS at 20:23

## 2021-04-07 NOTE — DISCHARGE SUMMARY
General Daily Progress Note Patient Name:  
Jessica Colon YOB: 1965 Age: 
49 y.o. Admit Date: 4/1/2021 Subjective:  
 
 
 
Patient is alert awake denies any chest pain or shortness of breath nausea vomiting diarrhea no constipation Patient discussed with the podiatrist and plan for IV antibiotic no surgery Seen by infectious disease recommended vancomycin for 6 weeks Waiting for placement for IV antibiotic 
 
vanco trough 44 Creatinine 0.97 Objective:  
 
Visit Vitals /75 (BP 1 Location: Left upper arm, BP Patient Position: Lying right side) Pulse 78 Temp 98.2 °F (36.8 °C) Resp 16 Ht 5' 11\" (1.803 m) Wt 160 lb (72.6 kg) SpO2 100% BMI 22.32 kg/m² Recent Results (from the past 24 hour(s)) Deisy Harris Collection Time: 04/06/21 10:46 AM  
Result Value Ref Range Vancomycin,trough 44.0 (HH) 5.0 - 10.0 ug/mL Reported dose date Not provided Reported dose: Not provided Units CREATININE Collection Time: 04/06/21 10:46 AM  
Result Value Ref Range Creatinine 0.97 0.70 - 1.30 mg/dL GLUCOSE, POC Collection Time: 04/06/21 12:52 PM  
Result Value Ref Range Glucose (POC) 166 (H) 65 - 100 mg/dL Performed by Karen Martinez GLUCOSE, POC Collection Time: 04/06/21  5:35 PM  
Result Value Ref Range Glucose (POC) 137 (H) 65 - 100 mg/dL Performed by Karen NEFF, POC Collection Time: 04/06/21  8:03 PM  
Result Value Ref Range Glucose (POC) 197 (H) 65 - 100 mg/dL Performed by Yoana Cheema Collection Time: 04/06/21  8:05 PM  
Result Value Ref Range Vancomycin, random 19.8 ug/mL GLUCOSE, POC Collection Time: 04/06/21 11:23 PM  
Result Value Ref Range Glucose (POC) 150 (H) 65 - 100 mg/dL Performed by Renato Tan   
GLUCOSE, POC Collection Time: 04/07/21  4:53 AM  
Result Value Ref Range  Glucose (POC) 190 (H) 65 - 100 mg/dL Performed by Romana Peeling   
 
[unfilled] Review of Systems Constitutional: Negative for chills and fever. HENT: Negative. Eyes: Negative. Respiratory: Negative. Cardiovascular: Negative. Gastrointestinal: Negative for abdominal pain and nausea. Skin: Negative. Neurological: Negative. Physical Exam:   
 
Constitutional: pt is oriented to person, place, and time. HENT:  
Head: Normocephalic and atraumatic. Eyes: Pupils are equal, round, and reactive to light. EOM are normal.  
Cardiovascular: Normal rate, regular rhythm and normal heart sounds. Pulmonary/Chest: Breath sounds normal. No wheezes. No rales. Exhibits no tenderness. Abdominal: Soft. Bowel sounds are normal. There is no abdominal tenderness. There is no rebound and no guarding. Musculoskeletal: Normal range of motion. Neurological: pt is alert and oriented to person, place, and time. XR GREAT TOE RT MIN 2 V Final Result Osteomyelitis involving the terminal tuft of the distal phalange of  
the great toe. Recent Results (from the past 24 hour(s)) Marwan Jara Collection Time: 04/06/21 10:46 AM  
Result Value Ref Range Vancomycin,trough 44.0 (HH) 5.0 - 10.0 ug/mL Reported dose date Not provided Reported dose: Not provided Units CREATININE Collection Time: 04/06/21 10:46 AM  
Result Value Ref Range Creatinine 0.97 0.70 - 1.30 mg/dL GLUCOSE, POC Collection Time: 04/06/21 12:52 PM  
Result Value Ref Range Glucose (POC) 166 (H) 65 - 100 mg/dL Performed by [employer] GLUCOSE, POC Collection Time: 04/06/21  5:35 PM  
Result Value Ref Range Glucose (POC) 137 (H) 65 - 100 mg/dL Performed by [employer] GLUCOSE, POC Collection Time: 04/06/21  8:03 PM  
Result Value Ref Range Glucose (POC) 197 (H) 65 - 100 mg/dL Performed by Olivia Domínguez  Collection Time: 04/06/21  8:05 PM  
Result Value Ref Range Vancomycin, random 19.8 ug/mL GLUCOSE, POC Collection Time: 04/06/21 11:23 PM  
Result Value Ref Range Glucose (POC) 150 (H) 65 - 100 mg/dL Performed by Hernan Bain   
GLUCOSE, POC Collection Time: 04/07/21  4:53 AM  
Result Value Ref Range Glucose (POC) 190 (H) 65 - 100 mg/dL Performed by Elizabeth Berg Results Procedure Component Value Units Date/Time MRSA SCREEN - PCR (NASAL) [[id number]]  (Abnormal) Collected: 04/02/21 1215 Order Status: Completed Specimen: Swab Updated: 04/02/21 1325 MRSA by PCR, Nasal DETECTED Comment: Results verified, phoned to and read back by Justina Caldwell [place] [address], BLOOD #1 [[id number]] Collected: 04/02/21 1211 Order Status: Completed Specimen: Blood Updated: 04/06/21 2224 Special Requests: No Special Requests Culture result: No growth 3 days CULTURE, BLOOD #2 [[id number]] Collected: 04/02/21 1211 Order Status: Completed Specimen: Blood Updated: 04/06/21 1272 Special Requests: No Special Requests Culture result: No growth 4 days CULTURE, Lonnie Corrente STAIN [[id number]] Collected: 04/02/21 0930 Order Status: Canceled Specimen: Foot COVID-19 RAPID TEST [[id number]] Collected: 04/02/21 0817 Order Status: Completed Specimen: Nasopharyngeal Updated: 04/02/21 3218 Specimen source Nasopharyngeal     
  COVID-19 rapid test Not Detected Comment: Rapid Abbott ID Now   Rapid NAAT:  The specimen is NEGATIVE for SARS-CoV-2, the novel coronavirus associated with COVID-19. Negative results should be treated as presumptive and, if inconsistent with clinical signs and symptoms or necessary for patient management, should be tested with an alternative molecular assay. Negative results do not preclude SARS-CoV-2 infection and should not be used as the sole basis for patient management decisions.    This test has been authorized by the FDA under  
an Emergency Use Authorization (EUA) for use by authorized laboratories. Fact sheet for Healthcare Providers: [employer]co.nz Fact sheet for Patients: [employer]co.nz   Methodology: Isothermal Nucleic Acid Amplification CULTURE, BLOOD, PAIRED [[id number]] Collected: 04/01/21 5179 Order Status: Completed Specimen: Blood Updated: 04/06/21 1466 Special Requests: No Special Requests Culture result: No growth 4 days Labs:  
 
No results for input(s): WBC, HGB, HCT, PLT, HGBEXT, HCTEXT, PLTEXT, HGBEXT, HCTEXT, PLTEXT in the last 72 hours. Recent Labs 04/06/21 
1046 CREA 0.97 No results for input(s): ALT, AP, TBIL, TBILI, TP, ALB, GLOB, GGT, AML, LPSE in the last 72 hours. No lab exists for component: SGOT, GPT, AMYP, HLPSE No results for input(s): INR, PTP, APTT, INREXT, INREXT in the last 72 hours. No results for input(s): FE, TIBC, PSAT, FERR in the last 72 hours. No results found for: FOL, RBCF No results for input(s): PH, PCO2, PO2 in the last 72 hours. No results for input(s): CPK, CKNDX, TROIQ in the last 72 hours. No lab exists for component: CPKMB Lab Results Component Value Date/Time Cholesterol, total 146 01/13/2021 06:30 AM  
 HDL Cholesterol 39 (L) 01/13/2021 06:30 AM  
 LDL, calculated 51.4 01/13/2021 06:30 AM  
 Triglyceride 278 (H) 01/13/2021 06:30 AM  
 CHOL/HDL Ratio 3.7 01/13/2021 06:30 AM  
 
Lab Results Component Value Date/Time Glucose (POC) 190 (H) 04/07/2021 04:53 AM  
 Glucose (POC) 150 (H) 04/06/2021 11:23 PM  
 Glucose (POC) 197 (H) 04/06/2021 08:03 PM  
 Glucose (POC) 137 (H) 04/06/2021 05:35 PM  
 Glucose (POC) 166 (H) 04/06/2021 12:52 PM  
 
Lab Results Component Value Date/Time  Color YELLOW 01/11/2021 07:02 AM  
 Appearance CLEAR 01/11/2021 07:02 AM  
 Specific gravity >1.030 (H) 01/11/2021 07:02 AM  
 pH (UA) 6.5 01/11/2021 07:02 AM  
 Protein TRACE (A) 01/11/2021 07:02 AM  
 Glucose >1,000 (A) 01/11/2021 07:02 AM  
 Ketone Negative 01/11/2021 07:02 AM  
 Bilirubin Negative 01/11/2021 07:02 AM  
 Urobilinogen 1.0 01/11/2021 07:02 AM  
 Nitrites Negative 01/11/2021 07:02 AM  
 Leukocyte Esterase Negative 01/11/2021 07:02 AM  
 Epithelial cells Negative 01/11/2021 07:02 AM  
 Bacteria Negative 01/11/2021 07:02 AM  
 WBC 0 to 1 01/11/2021 07:02 AM  
 RBC 0 to 1 01/11/2021 07:02 AM  
 
   
Assessment:  
 
Osteomyelitis of right toe Uncontrolled diabetes Hypertension Hyperlipidemia Hepatitis C Carpal tunnel syndrome Alcohol dependency 
 cocaine Abuse History of bipolar disorder MRSA nares Plan:  
 
Patient cleared for discharge to skilled care on IV antibiotic for 6 weeks  consult for discharge planning Current Facility-Administered Medications:  
  [START ON 4/9/2021] Vancomycin trough level 4/9/21 at 06:00, , Other, ONCE, Jim Burrell MD 
  hydrALAZINE (APRESOLINE) tablet 25 mg, 25 mg, Oral, BID, Garima Robbins MD, 25 mg at 04/06/21 2000   vancomycin (VANCOCIN) 1,250 mg in 0.9% sodium chloride 250 mL (VIAL-MATE), 1,250 mg, IntraVENous, Q12H, Jim Burrell MD, 1,250 mg at 04/06/21 2156   heparin (porcine) injection 5,000 Units, 5,000 Units, SubCUTAneous, Q8H, Garima Robbins MD, 5,000 Units at 04/07/21 0507 
  glucose chewable tablet 16 g, 4 Tab, Oral, PRN, Tucker Robbins MD 
  glucagon (GLUCAGEN) injection 1 mg, 1 mg, IntraMUSCular, PRN, Tucker Robbins MD 
  dextrose (D50W) injection syrg 12.5-25 g, 25-50 mL, IntraVENous, PRN, Tucker Robbins MD 
  DULoxetine (CYMBALTA) capsule 60 mg, 60 mg, Oral, DAILY, Garima Robbins MD, 60 mg at 04/06/21 3520   insulin glargine (LANTUS) injection 60 Units, 60 Units, SubCUTAneous, QHS, Garima Robbins MD, 60 Units at 04/06/21 2200   QUEtiapine (SEROquel) tablet 100 mg, 100 mg, Oral, QHS, Garima Robbins MD, 100 mg at 04/06/21 2009   atorvastatin (LIPITOR) tablet 80 mg, 80 mg, Oral, QHS, Garima Robbins MD, 80 mg at 04/06/21 2009   traZODone (DESYREL) tablet 50 mg, 50 mg, Oral, QHS PRN, Garima Robbins MD, 50 mg at 04/02/21 2120 
  insulin lispro (HUMALOG) injection, , SubCUTAneous, Q4H, Garima Robbins MD, 3 Units at 04/07/21 0530 
  0.9% sodium chloride infusion, 75 mL/hr, IntraVENous, CONTINUOUS, Garima Robbins MD, Last Rate: 75 mL/hr at 04/06/21 0228, 75 mL/hr at 04/06/21 0228   acetaminophen (TYLENOL) tablet 650 mg, 650 mg, Oral, Q6H PRN, 650 mg at 04/06/21 2009 **OR** acetaminophen (TYLENOL) suppository 650 mg, 650 mg, Rectal, Q6H PRN, Taniya Robbins MD 
  polyethylene glycol (MIRALAX) packet 17 g, 17 g, Oral, DAILY PRN, Taniya Robbins MD 
  ondansetron (ZOFRAN ODT) tablet 4 mg, 4 mg, Oral, Q8H PRN **OR** ondansetron (ZOFRAN) injection 4 mg, 4 mg, IntraVENous, Q6H PRN, Garima Robbins MD 
  piperacillin-tazobactam (ZOSYN) 3.375 g in 0.9% sodium chloride (MBP/ADV) 100 mL MBP, 3.375 g, IntraVENous, Q8H, Garima Robbins MD, Last Rate: 200 mL/hr at 04/07/21 0028, 3.375 g at 04/07/21 0028   VANCOMYCIN INFORMATION NOTE 1 Each, 1 Each, Other, Rx Dosing/Monitoring, Garima Robbins MD 
  hydrALAZINE (APRESOLINE) 20 mg/mL injection 10 mg, 10 mg, IntraVENous, Q6H PRN, Garima Robbins MD, 10 mg at 04/06/21 7299

## 2021-04-07 NOTE — PROGRESS NOTES
Infectious Disease Progress Note           Subjective:   Doing well, denies new complaints, no acute events since last seen. Objective:   Physical Exam:     Visit Vitals  BP (!) 145/82 (BP 1 Location: Left upper arm, BP Patient Position: At rest)   Pulse 76   Temp 98.2 °F (36.8 °C)   Resp 18   Ht 5' 11\" (1.803 m)   Wt 160 lb (72.6 kg)   SpO2 100%   BMI 22.32 kg/m²      O2 Device: Room air    Temp (24hrs), Av °F (36.7 °C), Min:97.7 °F (36.5 °C), Max:98.2 °F (36.8 °C)    No intake/output data recorded.  1901 -  0700  In: 4485 [P.O.:1300; I.V.:3185]  Out: -     General: NAD, alert, AAO x 4  HEENT: RIKA, Moist mucosa   Lungs: CTA b/l, decreased at the bases, no wheeze/rhonchi   Heart: S1S2+, RRR, no murmur  Abdo: Soft, NT, ND, +BS   : no indwelling rubio cath   Exts: Swollen right great toe, no drainage   Skin: No wounds, No rashes or lesions    Data Review:       Recent Days:  No results for input(s): WBC, HGB, HCT, PLT, HGBEXT, HCTEXT, PLTEXT, HGBEXT, HCTEXT, PLTEXT in the last 72 hours. Recent Labs     21  1046   CREA 0.97     Lab Results   Component Value Date/Time    C-Reactive protein <0.29 2021 06:00 AM      Microbiology   - Blood Cx : Neg     Diagnostics   CXR Results  (Last 48 hours)    None        Assessment/Plan     1. Osteomyelitis involving distal phalanx of right great toe, underlying DM       No draining wound to Cx, MRSA colonized      ESR 45 and CRP <0.29. Remains afebrile with a normal WBC on routine labs      Continue on Vanc and Zosyn, since no established MRSA infection, will likely continue Zosyn for the remainder of therapy       CBC, CRP and ESR w AM labs     2. MRSA colonization, will decolonized w mupirocin     3. H/o uncontrolled DM w associated neuropathy      4. Cocaine dependence w a positive UDS, counseled     5.  Chronic hep C untreated      Guilherme Benton MD    2021

## 2021-04-07 NOTE — DISCHARGE SUMMARY
General Daily Progress Note          Patient Name:   Zaira Nelson       YOB: 1965       Age:  49 y.o.       Admit Date: 4/1/2021      Subjective:         Patient is alert awake denies any chest pain or shortness of breath nausea vomiting diarrhea no constipation      Patient discussed with the podiatrist and plan for IV antibiotic no surgery    Seen by infectious disease recommended vancomycin for 6 weeks    Waiting for placement for IV antibiotic        Objective:     Visit Vitals  /74 (BP 1 Location: Left upper arm, BP Patient Position: At rest)   Pulse 77   Temp 97.8 °F (36.6 °C)   Resp 16   Ht 5' 11\" (1.803 m)   Wt 72.6 kg (160 lb)   SpO2 100%   BMI 22.32 kg/m²        Recent Results (from the past 24 hour(s))   GLUCOSE, POC    Collection Time: 04/06/21  7:48 AM   Result Value Ref Range    Glucose (POC) 128 (H) 65 - 100 mg/dL    Performed by Nancy Varma, TROUGH    Collection Time: 04/06/21 10:46 AM   Result Value Ref Range    Vancomycin,trough 44.0 (HH) 5.0 - 10.0 ug/mL    Reported dose date Not provided      Reported dose: Not provided Units   CREATININE    Collection Time: 04/06/21 10:46 AM   Result Value Ref Range    Creatinine 0.97 0.70 - 1.30 mg/dL   GLUCOSE, POC    Collection Time: 04/06/21 12:52 PM   Result Value Ref Range    Glucose (POC) 166 (H) 65 - 100 mg/dL    Performed by [address], POC    Collection Time: 04/06/21  5:35 PM   Result Value Ref Range    Glucose (POC) 137 (H) 65 - 100 mg/dL    Performed by [address], POC    Collection Time: 04/06/21  8:03 PM   Result Value Ref Range    Glucose (POC) 197 (H) 65 - 100 mg/dL    Performed by Neil Khan, RANDOM    Collection Time: 04/06/21  8:05 PM   Result Value Ref Range    Vancomycin, random 19.8 ug/mL   GLUCOSE, POC    Collection Time: 04/06/21 11:23 PM   Result Value Ref Range    Glucose (POC) 150 (H) 65 - 100 mg/dL    Performed by Víctor Newell GLUCOSE, POC    Collection Time: 04/07/21  4:53 AM   Result Value Ref Range    Glucose (POC) 190 (H) 65 - 100 mg/dL    Performed by Dianna Eastman      [unfilled]      Review of Systems    Constitutional: Negative for chills and fever. HENT: Negative. Eyes: Negative. Respiratory: Negative. Cardiovascular: Negative. Gastrointestinal: Negative for abdominal pain and nausea. Skin: Negative. Neurological: Negative. Physical Exam:      Constitutional: pt is oriented to person, place, and time. HENT:   Head: Normocephalic and atraumatic. Eyes: Pupils are equal, round, and reactive to light. EOM are normal.   Cardiovascular: Normal rate, regular rhythm and normal heart sounds. Pulmonary/Chest: Breath sounds normal. No wheezes. No rales. Exhibits no tenderness. Abdominal: Soft. Bowel sounds are normal. There is no abdominal tenderness. There is no rebound and no guarding. Musculoskeletal: Normal range of motion. Neurological: pt is alert and oriented to person, place, and time. XR GREAT TOE RT MIN 2 V   Final Result   Osteomyelitis involving the terminal tuft of the distal phalange of   the great toe.            Recent Results (from the past 24 hour(s))   GLUCOSE, POC    Collection Time: 04/06/21  7:48 AM   Result Value Ref Range    Glucose (POC) 128 (H) 65 - 100 mg/dL    Performed by Will Bailey, TROUGH    Collection Time: 04/06/21 10:46 AM   Result Value Ref Range    Vancomycin,trough 44.0 (HH) 5.0 - 10.0 ug/mL    Reported dose date Not provided      Reported dose: Not provided Units   CREATININE    Collection Time: 04/06/21 10:46 AM   Result Value Ref Range    Creatinine 0.97 0.70 - 1.30 mg/dL   GLUCOSE, POC    Collection Time: 04/06/21 12:52 PM   Result Value Ref Range    Glucose (POC) 166 (H) 65 - 100 mg/dL    Performed by [address], POC    Collection Time: 04/06/21  5:35 PM   Result Value Ref Range    Glucose (POC) 137 (H) 65 - 100 mg/dL    Performed by [address], POC    Collection Time: 04/06/21  8:03 PM   Result Value Ref Range    Glucose (POC) 197 (H) 65 - 100 mg/dL    Performed by Anitra Gates, RANDOM    Collection Time: 04/06/21  8:05 PM   Result Value Ref Range    Vancomycin, random 19.8 ug/mL   GLUCOSE, POC    Collection Time: 04/06/21 11:23 PM   Result Value Ref Range    Glucose (POC) 150 (H) 65 - 100 mg/dL    Performed by Sharlene, POC    Collection Time: 04/07/21  4:53 AM   Result Value Ref Range    Glucose (POC) 190 (H) 65 - 100 mg/dL    [facility]        Results     Procedure Component Value Units Date/Time    MRSA SCREEN - PCR (NASAL) [[id number]]  (Abnormal) Collected: 04/02/21 1215    Order Status: Completed Specimen: Swab Updated: 04/02/21 1325     MRSA by PCR, Nasal DETECTED        Comment: Results verified, phoned to and read back by Darin Cerda AT [address], BLOOD #1 [[id number]] Collected: 04/02/21 1211    Order Status: Completed Specimen: Blood Updated: 04/06/21 0924     Special Requests: No Special Requests        Culture result: No growth 3 days       CULTURE, BLOOD #2 [[id number]] Collected: 04/02/21 1211    Order Status: Completed Specimen: Blood Updated: 04/06/21 0924     Special Requests: No Special Requests        Culture result: No growth 4 days       CULTURE, Mark Vicki STAIN [[id number]] Collected: 04/02/21 0930    Order Status: Canceled Specimen: Foot     COVID-19 RAPID TEST [[id number]] Collected: 04/02/21 0817    Order Status: Completed Specimen: Nasopharyngeal Updated: 04/02/21 0921     Specimen source Nasopharyngeal        COVID-19 rapid test Not Detected        Comment: Rapid Abbott ID Now   Rapid NAAT:  The specimen is NEGATIVE for SARS-CoV-2, the novel coronavirus associated with COVID-19.    Negative results should be treated as presumptive and, if inconsistent with clinical signs and symptoms or necessary for patient management, should be tested with an alternative molecular assay. Negative results do not preclude SARS-CoV-2 infection and should not be used as the sole basis for patient management decisions. This test has been authorized by the FDA under   an Emergency Use Authorization (EUA) for use by authorized laboratories. Fact sheet for Healthcare Providers: [employer] The[employer]date.co.nz Fact sheet for Patients: [employer]co.nz   Methodology: Isothermal Nucleic Acid Amplification         CULTURE, BLOOD, PAIRED [[id number]] Collected: 04/01/21 2335    Order Status: Completed Specimen: Blood Updated: 04/06/21 0924     Special Requests: No Special Requests        Culture result: No growth 4 days              Labs:     No results for input(s): WBC, HGB, HCT, PLT, HGBEXT, HCTEXT, PLTEXT, HGBEXT, HCTEXT, PLTEXT in the last 72 hours. Recent Labs     04/06/21  1046   CREA 0.97     No results for input(s): ALT, AP, TBIL, TBILI, TP, ALB, GLOB, GGT, AML, LPSE in the last 72 hours. No lab exists for component: SGOT, GPT, AMYP, HLPSE  No results for input(s): INR, PTP, APTT, INREXT, INREXT in the last 72 hours. No results for input(s): FE, TIBC, PSAT, FERR in the last 72 hours. No results found for: FOL, RBCF   No results for input(s): PH, PCO2, PO2 in the last 72 hours. No results for input(s): CPK, CKNDX, TROIQ in the last 72 hours.     No lab exists for component: CPKMB  Lab Results   Component Value Date/Time    Cholesterol, total 146 01/13/2021 06:30 AM    HDL Cholesterol 39 (L) 01/13/2021 06:30 AM    LDL, calculated 51.4 01/13/2021 06:30 AM    Triglyceride 278 (H) 01/13/2021 06:30 AM    CHOL/HDL Ratio 3.7 01/13/2021 06:30 AM     Lab Results   Component Value Date/Time    Glucose (POC) 190 (H) 04/07/2021 04:53 AM    Glucose (POC) 150 (H) 04/06/2021 11:23 PM    Glucose (POC) 197 (H) 04/06/2021 08:03 PM    Glucose (POC) 137 (H) 04/06/2021 05:35 PM    Glucose (POC) 166 (H) 04/06/2021 12:52 PM     Lab Results   Component Value Date/Time    Color YELLOW 01/11/2021 07:02 AM    Appearance CLEAR 01/11/2021 07:02 AM    Specific gravity >1.030 (H) 01/11/2021 07:02 AM    pH (UA) 6.5 01/11/2021 07:02 AM    Protein TRACE (A) 01/11/2021 07:02 AM    Glucose >1,000 (A) 01/11/2021 07:02 AM    Ketone Negative 01/11/2021 07:02 AM    Bilirubin Negative 01/11/2021 07:02 AM    Urobilinogen 1.0 01/11/2021 07:02 AM    Nitrites Negative 01/11/2021 07:02 AM    Leukocyte Esterase Negative 01/11/2021 07:02 AM    Epithelial cells Negative 01/11/2021 07:02 AM    Bacteria Negative 01/11/2021 07:02 AM    WBC 0 to 1 01/11/2021 07:02 AM    RBC 0 to 1 01/11/2021 07:02 AM         Assessment:     Osteomyelitis of right toe  Uncontrolled diabetes  Hypertension  Hyperlipidemia  Hepatitis C  Carpal tunnel syndrome  Alcohol dependency   cocaine Abuse  History of bipolar disorder  MRSA nares        Plan:     Patient cleared for discharge to skilled care on IV antibiotic for 6 weeks      consult for discharge planning        Current Facility-Administered Medications:     [START ON 4/9/2021] Vancomycin trough level 4/9/21 at 06:00, , Other, ONCE, Jim Burrell MD    hydrALAZINE (APRESOLINE) tablet 25 mg, 25 mg, Oral, BID, Garima Robbins MD, 25 mg at 04/06/21 2000    vancomycin (VANCOCIN) 1,250 mg in 0.9% sodium chloride 250 mL (VIAL-MATE), 1,250 mg, IntraVENous, Q12H, Jim Burrell MD, 1,250 mg at 04/06/21 2156    heparin (porcine) injection 5,000 Units, 5,000 Units, SubCUTAneous, Q8H, Garima Robbins MD, 5,000 Units at 04/07/21 0507    glucose chewable tablet 16 g, 4 Tab, Oral, PRN, Garima Robbins MD    glucagon (GLUCAGEN) injection 1 mg, 1 mg, IntraMUSCular, PRN, Garima Robbins MD    dextrose (D50W) injection syrg 12.5-25 g, 25-50 mL, IntraVENous, PRN, Garima Robbins MD    DULoxetine (CYMBALTA) capsule 60 mg, 60 mg, Oral, DAILY, Garima Robbins MD, 60 mg at 04/06/21 0854    insulin glargine (LANTUS) injection 60 Units, 60 Units, SubCUTAneous, QHS, Garima Robbins MD, 60 Units at 04/06/21 2200    QUEtiapine (SEROquel) tablet 100 mg, 100 mg, Oral, QHS, Garima Robbins MD, 100 mg at 04/06/21 2009    atorvastatin (LIPITOR) tablet 80 mg, 80 mg, Oral, QHS, Garima Robbins MD, 80 mg at 04/06/21 2009    traZODone (DESYREL) tablet 50 mg, 50 mg, Oral, QHS PRN, Garima Robbins MD, 50 mg at 04/02/21 2120    insulin lispro (HUMALOG) injection, , SubCUTAneous, Q4H, Garima Robbins MD, 3 Units at 04/07/21 0530    0.9% sodium chloride infusion, 75 mL/hr, IntraVENous, CONTINUOUS, Garima Robbins MD, Last Rate: 75 mL/hr at 04/06/21 0228, 75 mL/hr at 04/06/21 0228    acetaminophen (TYLENOL) tablet 650 mg, 650 mg, Oral, Q6H PRN, 650 mg at 04/06/21 2009 **OR** acetaminophen (TYLENOL) suppository 650 mg, 650 mg, Rectal, Q6H PRN, Mulu Robbins MD    polyethylene glycol (MIRALAX) packet 17 g, 17 g, Oral, DAILY PRN, Mulu Robbins MD    ondansetron (ZOFRAN ODT) tablet 4 mg, 4 mg, Oral, Q8H PRN **OR** ondansetron (ZOFRAN) injection 4 mg, 4 mg, IntraVENous, Q6H PRN, Garima Robbins MD    piperacillin-tazobactam (ZOSYN) 3.375 g in 0.9% sodium chloride (MBP/ADV) 100 mL MBP, 3.375 g, IntraVENous, Q8H, Garima Robbins MD, Last Rate: 200 mL/hr at 04/07/21 0028, 3.375 g at 04/07/21 0028    VANCOMYCIN INFORMATION NOTE 1 Each, 1 Each, Other, Rx Dosing/Monitoring, Garima Robbins MD    hydrALAZINE (APRESOLINE) 20 mg/mL injection 10 mg, 10 mg, IntraVENous, Q6H PRN, Garima Robbins MD, 10 mg at 04/06/21 9681

## 2021-04-07 NOTE — PROGRESS NOTES
Consult for Vancomycin Dosing by Pharmacy by Dr. Francisco Raines provided for this 49y.o. year old male , for indication of SSTI (osteomyelitis). Day of Therapy: 5  Goal of Level(s): 15-20mcg/dL    Other Current Antibiotics: Zosyn    Serum Creatinine Creatinine   Date Value Ref Range Status   04/06/2021 0.97 0.70 - 1.30 mg/dL Final   04/04/2021 1.07 0.70 - 1.30 mg/dL Final   04/03/2021 1.07 0.70 - 1.30 mg/dL Final      Creatinine Clearance Estimated Creatinine Clearance: 89.4 mL/min (based on SCr of 0.97 mg/dL). BUN Lab Results   Component Value Date/Time    BUN 18 04/04/2021 06:50 AM      WBC Lab Results   Component Value Date/Time    WBC 6.2 04/04/2021 06:50 AM      Temp 98.7 °F (37.1 °C)     Last Level:   Vancomycin,trough   Date Value Ref Range Status   04/06/2021 44.0 (HH) 5.0 - 10.0 ug/mL Final     Comment:        Trough levels of 15-20 ug/mL should be targeted for patients with coagulase negative Staphylococcus and MRSA pneumonia, endocarditis,osteomyelitis, meningitis, and bacteremia, as well as patients not responding to lower levels. Trough levels of 10-15 ug/mL for infections from other sources (e.g. urinary tract, cellulitis) are appropriate. All patients receiving concomitant nephrotoxic therapies should have their function closely monitored regardless of peak or trough levels. Results verified, phoned to and read back by Betsy Chambers RN @ [phone]           Ht Readings from Last 1 Encounters:   04/01/21 180.3 cm (71\")        Wt Readings from Last 1 Encounters:   04/01/21 72.6 kg (160 lb)     Ideal body weight: 75.3 kg (166 lb 0.1 oz)     Previous Regimen: 1250mg IV q12h    New Regimen:   Vancomycin trough is 19.8 at 20:05, 1 hour before true trough. Continue regimen, with trough scheduled  4/9 at 06:00 before 08:00 dose. Pharmacy to follow daily and will make changes to dose and/or frequency based on clinical status.   _________________________________     Jonathan Valle, [facility] [place]

## 2021-04-07 NOTE — PROGRESS NOTES
Skin assessment completed with Faith Ruiz RN. Surgical incision dried, open to air on right great toe. Blister to left great toe. No other open skin or wounds noted.

## 2021-04-08 LAB
BACTERIA SPEC CULT: NORMAL
BACTERIA SPEC CULT: NORMAL
BASOPHILS # BLD: 0 K/UL (ref 0–0.1)
BASOPHILS NFR BLD: 0 % (ref 0–1)
CRP SERPL-MCNC: <0.29 MG/DL (ref 0–0.6)
DIFFERENTIAL METHOD BLD: ABNORMAL
EOSINOPHIL # BLD: 0.1 K/UL (ref 0–0.4)
EOSINOPHIL NFR BLD: 2 % (ref 0–7)
ERYTHROCYTE [DISTWIDTH] IN BLOOD BY AUTOMATED COUNT: 13.3 % (ref 11.5–14.5)
ERYTHROCYTE [SEDIMENTATION RATE] IN BLOOD: 36 MM/HR
GLUCOSE BLD STRIP.AUTO-MCNC: 139 MG/DL (ref 65–100)
GLUCOSE BLD STRIP.AUTO-MCNC: 173 MG/DL (ref 65–100)
GLUCOSE BLD STRIP.AUTO-MCNC: 183 MG/DL (ref 65–100)
GLUCOSE BLD STRIP.AUTO-MCNC: 208 MG/DL (ref 65–100)
GLUCOSE BLD STRIP.AUTO-MCNC: 83 MG/DL (ref 65–100)
HCT VFR BLD AUTO: 33.3 % (ref 36.6–50.3)
HGB BLD-MCNC: 11.4 G/DL (ref 12.1–17)
IMM GRANULOCYTES # BLD AUTO: 0 K/UL (ref 0–0.04)
IMM GRANULOCYTES NFR BLD AUTO: 0 % (ref 0–0.5)
LYMPHOCYTES # BLD: 2.3 K/UL (ref 0.8–3.5)
LYMPHOCYTES NFR BLD: 48 % (ref 12–49)
MCH RBC QN AUTO: 32 PG (ref 26–34)
MCHC RBC AUTO-ENTMCNC: 34.2 G/DL (ref 30–36.5)
MCV RBC AUTO: 93.5 FL (ref 80–99)
MONOCYTES # BLD: 0.7 K/UL (ref 0–1)
MONOCYTES NFR BLD: 15 % (ref 5–13)
NEUTS SEG # BLD: 1.6 K/UL (ref 1.8–8)
NEUTS SEG NFR BLD: 35 % (ref 32–75)
PERFORMED BY, TECHID: ABNORMAL
PERFORMED BY, TECHID: NORMAL
PLATELET # BLD AUTO: 153 K/UL (ref 150–400)
PMV BLD AUTO: 11.5 FL (ref 8.9–12.9)
RBC # BLD AUTO: 3.56 M/UL (ref 4.1–5.7)
SPECIAL REQUESTS,SREQ: NORMAL
SPECIAL REQUESTS,SREQ: NORMAL
WBC # BLD AUTO: 4.7 K/UL (ref 4.1–11.1)

## 2021-04-08 PROCEDURE — 82962 GLUCOSE BLOOD TEST: CPT

## 2021-04-08 PROCEDURE — 74011250636 HC RX REV CODE- 250/636: Performed by: FAMILY MEDICINE

## 2021-04-08 PROCEDURE — 85652 RBC SED RATE AUTOMATED: CPT

## 2021-04-08 PROCEDURE — 74011250637 HC RX REV CODE- 250/637: Performed by: FAMILY MEDICINE

## 2021-04-08 PROCEDURE — 86140 C-REACTIVE PROTEIN: CPT

## 2021-04-08 PROCEDURE — 74011636637 HC RX REV CODE- 636/637: Performed by: FAMILY MEDICINE

## 2021-04-08 PROCEDURE — 65270000029 HC RM PRIVATE

## 2021-04-08 PROCEDURE — 74011000258 HC RX REV CODE- 258: Performed by: FAMILY MEDICINE

## 2021-04-08 PROCEDURE — 36415 COLL VENOUS BLD VENIPUNCTURE: CPT

## 2021-04-08 PROCEDURE — 99232 SBSQ HOSP IP/OBS MODERATE 35: CPT | Performed by: INTERNAL MEDICINE

## 2021-04-08 PROCEDURE — 85025 COMPLETE CBC W/AUTO DIFF WBC: CPT

## 2021-04-08 PROCEDURE — 74011250637 HC RX REV CODE- 250/637: Performed by: INTERNAL MEDICINE

## 2021-04-08 PROCEDURE — 74011250636 HC RX REV CODE- 250/636: Performed by: INTERNAL MEDICINE

## 2021-04-08 RX ADMIN — INSULIN LISPRO 10 UNITS: 100 INJECTION, SOLUTION INTRAVENOUS; SUBCUTANEOUS at 00:06

## 2021-04-08 RX ADMIN — HYDRALAZINE HYDROCHLORIDE 25 MG: 25 TABLET, FILM COATED ORAL at 08:48

## 2021-04-08 RX ADMIN — DULOXETINE HYDROCHLORIDE 60 MG: 30 CAPSULE, DELAYED RELEASE ORAL at 08:48

## 2021-04-08 RX ADMIN — MUPIROCIN: 20 OINTMENT TOPICAL at 08:48

## 2021-04-08 RX ADMIN — QUETIAPINE FUMARATE 100 MG: 100 TABLET ORAL at 21:36

## 2021-04-08 RX ADMIN — HEPARIN SODIUM 5000 UNITS: 5000 INJECTION INTRAVENOUS; SUBCUTANEOUS at 16:16

## 2021-04-08 RX ADMIN — INSULIN LISPRO 4 UNITS: 100 INJECTION, SOLUTION INTRAVENOUS; SUBCUTANEOUS at 16:17

## 2021-04-08 RX ADMIN — PIPERACILLIN AND TAZOBACTAM 3.38 G: 3; .375 INJECTION, POWDER, LYOPHILIZED, FOR SOLUTION INTRAVENOUS at 04:00

## 2021-04-08 RX ADMIN — ATORVASTATIN CALCIUM 80 MG: 40 TABLET, FILM COATED ORAL at 21:00

## 2021-04-08 RX ADMIN — HEPARIN SODIUM 5000 UNITS: 5000 INJECTION INTRAVENOUS; SUBCUTANEOUS at 20:04

## 2021-04-08 RX ADMIN — INSULIN GLARGINE 60 UNITS: 100 INJECTION, SOLUTION SUBCUTANEOUS at 21:00

## 2021-04-08 RX ADMIN — HYDRALAZINE HYDROCHLORIDE 25 MG: 25 TABLET, FILM COATED ORAL at 20:04

## 2021-04-08 RX ADMIN — INSULIN LISPRO 3 UNITS: 100 INJECTION, SOLUTION INTRAVENOUS; SUBCUTANEOUS at 04:30

## 2021-04-08 RX ADMIN — MUPIROCIN: 20 OINTMENT TOPICAL at 20:04

## 2021-04-08 RX ADMIN — INSULIN LISPRO 4 UNITS: 100 INJECTION, SOLUTION INTRAVENOUS; SUBCUTANEOUS at 21:00

## 2021-04-08 RX ADMIN — VANCOMYCIN HYDROCHLORIDE 1250 MG: 1.25 INJECTION, POWDER, LYOPHILIZED, FOR SOLUTION INTRAVENOUS at 08:48

## 2021-04-08 RX ADMIN — PIPERACILLIN AND TAZOBACTAM 3.38 G: 3; .375 INJECTION, POWDER, LYOPHILIZED, FOR SOLUTION INTRAVENOUS at 16:15

## 2021-04-08 RX ADMIN — SODIUM CHLORIDE 75 ML/HR: 9 INJECTION, SOLUTION INTRAVENOUS at 04:05

## 2021-04-08 RX ADMIN — HYDRALAZINE HYDROCHLORIDE 10 MG: 20 INJECTION INTRAMUSCULAR; INTRAVENOUS at 20:04

## 2021-04-08 RX ADMIN — HEPARIN SODIUM 5000 UNITS: 5000 INJECTION INTRAVENOUS; SUBCUTANEOUS at 04:00

## 2021-04-08 NOTE — PROGRESS NOTES
Bedside shift change report given to Katheryn Armstrong RN (oncoming nurse) by Mamta Elena (offgoing nurse). Report included the following information SBAR.

## 2021-04-08 NOTE — PROGRESS NOTES
Dual Nurse Skin Assessment. Pt assessed with Emilie Cabrera RN. Pt skin is warm and dry to touch. Pt has foot ulcer noted on the right greater toe. No other skin issues noted.

## 2021-04-08 NOTE — PROGRESS NOTES
Infectious Disease Progress Note           Subjective:   Pt seen and examined at bedside. Doing well, denies new complaints. No acute events since last seen   Objective:   Physical Exam:     Visit Vitals  /78   Pulse 87   Temp 98.4 °F (36.9 °C)   Resp 16   Ht 5' 11\" (1.803 m)   Wt 160 lb (72.6 kg)   SpO2 99%   BMI 22.32 kg/m²      O2 Device: Room air    Temp (24hrs), Av °F (36.7 °C), Min:97 °F (36.1 °C), Max:98.7 °F (37.1 °C)    No intake/output data recorded.  1901 -  0700  In: 8572 [P.O.:1370; I.V.:1200]  Out: -     General: NAD, alert, AAO x 4  HEENT: RIKA, Moist mucosa   Lungs: CTA b/l, decreased at the bases, no wheeze/rhonchi   Heart: S1S2+, RRR, no murmur  Abdo: Soft, NT, ND, +BS   : no indwelling rubio cath   Exts: Swollen right great toe, no drainage   Skin: No wounds, No rashes or lesions    Data Review:       Recent Days:  Recent Labs     21  0605   WBC 4.7   HGB 11.4*   HCT 33.3*        Recent Labs     21  1046   CREA 0.97     Lab Results   Component Value Date/Time    C-Reactive protein <0.29 2021 06:05 AM      Microbiology   - Blood Cx : Neg     Diagnostics   CXR Results  (Last 48 hours)    None        Assessment/Plan     1. Osteomyelitis involving distal phalanx of right great toe, underlying DM       No draining wound to Cx, MRSA colonized      Remains afebrile w a normal WBC on routine labs      CRP <0.28 and ESR 36 ()       Continue on empiric Zosyn for 6 wk from , Vanc discontinued on         2. MRSA colonization, on day # 2/5 of mupirocin     3. H/o uncontrolled DM w associated neuropathy      4. Cocaine dependence w a positive UDS, counseled     5.  Chronic hep C untreated: Pt to f/u as out pt for treatment     Dispo: Awaiting placement for d/c      Ricardo Moreira MD    2021

## 2021-04-08 NOTE — PROGRESS NOTES
Bedside shift change report given to Oscar Zacarias RN (oncoming nurse) by Compa Mccormack RN (offgoing nurse). Report included the following information SBAR.

## 2021-04-08 NOTE — PROGRESS NOTES
Comprehensive Nutrition Assessment    Type and Reason for Visit: RD nutrition re-screen/LOS    Nutrition Recommendations/Plan:     Continue Diabetic diet  Honor pt preferences within [facility] restrictions    Document intakes, BMs in I/Os  Updated measured wt    Nutrition Assessment:  Admitted for R toe pain, Podiatry following for DFU. Pt cleared to d/c on IV abx, awaiting placement. Ordered Diabetic diet. PO intakes % consistently per EMR documentation. Pt denied specific preferences at this time. No acute nutrition concerns. DM edu provided - documented in separate note 4/5. Labs and meds reviewed. A1c 11.2. Malnutrition Assessment:  Malnutrition Status:  No malnutrition    Context:  Acute illness       Nutrition Related Findings:  NFPE not performed. Appears nourished. Denied c/s difficulty, n/v or c/d. BM documented 4/7. No edema. Wounds:    Diabetic ulcer(to toe)       Current Nutrition Therapies:  DIET DIABETIC CONSISTENT CARB Regular    Anthropometric Measures:  · Height:  5' 11\" (180.3 cm)  · Current Body Wt:  72.6 kg (160 lb)(4/1)   · Ideal Body Wt:  172 lbs:  93 %    · BMI Category:  Normal weight (BMI 18.5-24. 9)     Wt Readings from Last 5 Encounters:   04/01/21 72.6 kg (160 lb)   03/03/21 74.8 kg (165 lb)   02/17/21 72.6 kg (160 lb)   12/04/20 69.4 kg (153 lb)   11/09/20 67.1 kg (148 lb)   Per previous RD assessment, standing wt obtained 12/4 - 69.4kg. No updated wt this admit.     Nutrition Diagnosis:   No nutrition diagnosis at this time     Nutrition Interventions:   Food and/or Nutrient Delivery: Continue current diet  Nutrition Education and Counseling: Education completed(DM ed 4/5)  Coordination of Nutrition Care: Continue to monitor while inpatient    Nutrition Monitoring and Evaluation:   Behavioral-Environmental Outcomes: None identified  Food/Nutrient Intake Outcomes: Food and nutrient intake  Physical Signs/Symptoms Outcomes: GI status, Weight, Biochemical data    Discharge Planning: Recommend pursue outpatient diabetes education     Electronically signed by Greg Cerda on 4/8/2021 at 10:06 AM    Contact: [phone] or via [employer]

## 2021-04-08 NOTE — DISCHARGE SUMMARY
General Daily Progress Note          Patient Name:   Gogo Aly       YOB: 1965       Age:  49 y.o.       Admit Date: 4/1/2021      Subjective:       Patient resting the bed alert awake no new complaints      Patient discussed with the podiatrist and plan for IV antibiotic no surgery    Seen by infectious disease recommended vancomycin for 6 weeks    Waiting for placement for IV antibiotic        Objective:     Visit Vitals  /74   Pulse 84   Temp 97.7 °F (36.5 °C)   Resp 16   Ht 5' 11\" (1.803 m)   Wt 72.6 kg (160 lb)   SpO2 98%   BMI 22.32 kg/m²        Recent Results (from the past 24 hour(s))   GLUCOSE, POC    Collection Time: 04/07/21  8:43 AM   Result Value Ref Range    Glucose (POC) 104 (H) 65 - 100 mg/dL    Performed by Nita Kinney    EKG, 12 LEAD, INITIAL    Collection Time: 04/07/21  9:44 AM   Result Value Ref Range    Ventricular Rate 83 BPM    Atrial Rate 83 BPM    P-R Interval 146 ms    QRS Duration 90 ms    Q-T Interval 380 ms    QTC Calculation (Bezet) 446 ms    Calculated P Axis 38 degrees    Calculated T Axis 75 degrees    Diagnosis       Normal sinus rhythm  Nonspecific T wave abnormality  Abnormal ECG  No previous ECGs available  Confirmed by Tiffanie Roche ([id number]) on 4/7/2021 4:27:01 PM     MAGNESIUM    Collection Time: 04/07/21 10:18 AM   Result Value Ref Range    Magnesium 2.0 1.6 - 2.4 mg/dL   POTASSIUM    Collection Time: 04/07/21 10:18 AM   Result Value Ref Range    Potassium 3.9 3.5 - 5.1 mmol/L   GLUCOSE, POC    Collection Time: 04/07/21 11:20 AM   Result Value Ref Range    Glucose (POC) 185 (H) 65 - 100 mg/dL    Performed by Nita Kinney    GLUCOSE, POC    Collection Time: 04/07/21  3:44 PM   Result Value Ref Range    Glucose (POC) 194 (H) 65 - 100 mg/dL    Performed by CANDACE MOHR    GLUCOSE, POC    Collection Time: 04/07/21  6:11 PM   Result Value Ref Range    Glucose (POC) 193 (H) 65 - 100 mg/dL    Performed by Gina Hoff    GLUCOSE, POC Collection Time: 04/07/21  7:29 PM   Result Value Ref Range    Glucose (POC) 169 (H) 65 - 100 mg/dL    Performed by Sarah Ballesteros    GLUCOSE, POC    Collection Time: 04/07/21 11:48 PM   Result Value Ref Range    Glucose (POC) 327 (H) 65 - 100 mg/dL    Performed by [address], POC    Collection Time: 04/08/21  3:57 AM   Result Value Ref Range    Glucose (POC) 173 (H) 65 - 100 mg/dL    Performed by NAA CARLOS    C REACTIVE PROTEIN, QT    Collection Time: 04/08/21  6:05 AM   Result Value Ref Range    C-Reactive protein <0.29 0.00 - 0.60 mg/dL   CBC WITH AUTOMATED DIFF    Collection Time: 04/08/21  6:05 AM   Result Value Ref Range    WBC 4.7 4.1 - 11.1 K/uL    RBC 3.56 (L) 4.10 - 5.70 M/uL    HGB 11.4 (L) 12.1 - 17.0 g/dL    HCT 33.3 (L) 36.6 - 50.3 %    MCV 93.5 80.0 - 99.0 FL    MCH 32.0 26.0 - 34.0 PG    MCHC 34.2 30.0 - 36.5 g/dL    RDW 13.3 11.5 - 14.5 %    PLATELET 988 398 - 388 K/uL    MPV 11.5 8.9 - 12.9 FL    NEUTROPHILS 35 32 - 75 %    LYMPHOCYTES 48 12 - 49 %    MONOCYTES 15 (H) 5 - 13 %    EOSINOPHILS 2 0 - 7 %    BASOPHILS 0 0 - 1 %    IMMATURE GRANULOCYTES 0 0.0 - 0.5 %    ABS. NEUTROPHILS 1.6 (L) 1.8 - 8.0 K/UL    ABS. LYMPHOCYTES 2.3 0.8 - 3.5 K/UL    ABS. MONOCYTES 0.7 0.0 - 1.0 K/UL    ABS. EOSINOPHILS 0.1 0.0 - 0.4 K/UL    ABS. BASOPHILS 0.0 0.0 - 0.1 K/UL    ABS. IMM. GRANS. 0.0 0.00 - 0.04 K/UL    DF AUTOMATED       [unfilled]      Review of Systems    Constitutional: Negative for chills and fever. HENT: Negative. Eyes: Negative. Respiratory: Negative. Cardiovascular: Negative. Gastrointestinal: Negative for abdominal pain and nausea. Skin: Negative. Neurological: Negative. Physical Exam:      Constitutional: pt is oriented to person, place, and time. HENT:   Head: Normocephalic and atraumatic. Eyes: Pupils are equal, round, and reactive to light. EOM are normal.   Cardiovascular: Normal rate, regular rhythm and normal heart sounds. Pulmonary/Chest: Breath sounds normal. No wheezes. No rales. Exhibits no tenderness. Abdominal: Soft. Bowel sounds are normal. There is no abdominal tenderness. There is no rebound and no guarding. Musculoskeletal: Normal range of motion. Neurological: pt is alert and oriented to person, place, and time. XR GREAT TOE RT MIN 2 V   Final Result   Osteomyelitis involving the terminal tuft of the distal phalange of   the great toe.            Recent Results (from the past 24 hour(s))   GLUCOSE, POC    Collection Time: 04/07/21  8:43 AM   Result Value Ref Range    Glucose (POC) 104 (H) 65 - 100 mg/dL    Performed by Fide Julio    EKG, 12 LEAD, INITIAL    Collection Time: 04/07/21  9:44 AM   Result Value Ref Range    Ventricular Rate 83 BPM    Atrial Rate 83 BPM    P-R Interval 146 ms    QRS Duration 90 ms    Q-T Interval 380 ms    QTC Calculation (Bezet) 446 ms    Calculated P Axis 38 degrees    Calculated T Axis 75 degrees    Diagnosis       Normal sinus rhythm  Nonspecific T wave abnormality  Abnormal ECG  No previous ECGs available  Confirmed by Geovanna Light ([id number]) on 4/7/2021 4:27:01 PM     MAGNESIUM    Collection Time: 04/07/21 10:18 AM   Result Value Ref Range    Magnesium 2.0 1.6 - 2.4 mg/dL   POTASSIUM    Collection Time: 04/07/21 10:18 AM   Result Value Ref Range    Potassium 3.9 3.5 - 5.1 mmol/L   GLUCOSE, POC    Collection Time: 04/07/21 11:20 AM   Result Value Ref Range    Glucose (POC) 185 (H) 65 - 100 mg/dL    Performed by Fide Julio    GLUCOSE, POC    Collection Time: 04/07/21  3:44 PM   Result Value Ref Range    Glucose (POC) 194 (H) 65 - 100 mg/dL    Performed by CANDACE MOHR    GLUCOSE, POC    Collection Time: 04/07/21  6:11 PM   Result Value Ref Range    Glucose (POC) 193 (H) 65 - 100 mg/dL    Performed by Mark Humphrey    GLUCOSE, POC    Collection Time: 04/07/21  7:29 PM   Result Value Ref Range    Glucose (POC) 169 (H) 65 - 100 mg/dL    Performed by Paulina Moreno GLUCOSE, POC    Collection Time: 04/07/21 11:48 PM   Result Value Ref Range    Glucose (POC) 327 (H) 65 - 100 mg/dL    Performed by [employer], POC    Collection Time: 04/08/21  3:57 AM   Result Value Ref Range    Glucose (POC) 173 (H) 65 - 100 mg/dL    Performed by NAA CARLOS    C REACTIVE PROTEIN, QT    Collection Time: 04/08/21  6:05 AM   Result Value Ref Range    C-Reactive protein <0.29 0.00 - 0.60 mg/dL   CBC WITH AUTOMATED DIFF    Collection Time: 04/08/21  6:05 AM   Result Value Ref Range    WBC 4.7 4.1 - 11.1 K/uL    RBC 3.56 (L) 4.10 - 5.70 M/uL    HGB 11.4 (L) 12.1 - 17.0 g/dL    HCT 33.3 (L) 36.6 - 50.3 %    MCV 93.5 80.0 - 99.0 FL    MCH 32.0 26.0 - 34.0 PG    MCHC 34.2 30.0 - 36.5 g/dL    RDW 13.3 11.5 - 14.5 %    PLATELET 705 051 - 153 K/uL    MPV 11.5 8.9 - 12.9 FL    NEUTROPHILS 35 32 - 75 %    LYMPHOCYTES 48 12 - 49 %    MONOCYTES 15 (H) 5 - 13 %    EOSINOPHILS 2 0 - 7 %    BASOPHILS 0 0 - 1 %    IMMATURE GRANULOCYTES 0 0.0 - 0.5 %    ABS. NEUTROPHILS 1.6 (L) 1.8 - 8.0 K/UL    ABS. LYMPHOCYTES 2.3 0.8 - 3.5 K/UL    ABS. MONOCYTES 0.7 0.0 - 1.0 K/UL    ABS. EOSINOPHILS 0.1 0.0 - 0.4 K/UL    ABS. BASOPHILS 0.0 0.0 - 0.1 K/UL    ABS. IMM.  GRANS. 0.0 0.00 - 0.04 K/UL    DF AUTOMATED         Results     Procedure Component Value Units Date/Time    MRSA SCREEN - PCR (NASAL) [[id number]]  (Abnormal) Collected: 04/02/21 1215    Order Status: Completed Specimen: Swab Updated: 04/02/21 1325     MRSA by PCR, Nasal DETECTED        Comment: Results verified, phoned to and read back by Yi Adams AT [facility], BLOOD #1 [[id number]] Collected: 04/02/21 1211    Order Status: Completed Specimen: Blood Updated: 04/07/21 0913     Special Requests: No Special Requests        Culture result: No growth 4 days       CULTURE, BLOOD #2 [[id number]] Collected: 04/02/21 1211    Order Status: Completed Specimen: Blood Updated: 04/07/21 0913     Special Requests: No Special Requests Culture result: No growth 5 days       CULTURE, Tanisha Volodymyr [[id number]] Collected: 04/02/21 0930    Order Status: Canceled Specimen: Foot     COVID-19 RAPID TEST [[id number]] Collected: 04/02/21 0817    Order Status: Completed Specimen: Nasopharyngeal Updated: 04/02/21 0921     Specimen source Nasopharyngeal        COVID-19 rapid test Not Detected        Comment: Rapid Abbott ID Now   Rapid NAAT:  The specimen is NEGATIVE for SARS-CoV-2, the novel coronavirus associated with COVID-19. Negative results should be treated as presumptive and, if inconsistent with clinical signs and symptoms or necessary for patient management, should be tested with an alternative molecular assay. Negative results do not preclude SARS-CoV-2 infection and should not be used as the sole basis for patient management decisions. This test has been authorized by the FDA under   an Emergency Use Authorization (EUA) for use by authorized laboratories. Fact sheet for Healthcare Providers: CELLFORdaCraft Dragon.co.nz Fact sheet for Patients: Chip Estimate.co.nz   Methodology: Isothermal Nucleic Acid Amplification         CULTURE, BLOOD, PAIRED [[id number]] Collected: 04/01/21 2335    Order Status: Completed Specimen: Blood Updated: 04/07/21 0913     Special Requests: No Special Requests        Culture result: No growth 5 days              Labs:     Recent Labs     04/08/21  0605   WBC 4.7   HGB 11.4*   HCT 33.3*        Recent Labs     04/07/21  1018 04/06/21  1046   K 3.9  --    CREA  --  0.97   MG 2.0  --      No results for input(s): ALT, AP, TBIL, TBILI, TP, ALB, GLOB, GGT, AML, LPSE in the last 72 hours. No lab exists for component: SGOT, GPT, AMYP, HLPSE  No results for input(s): INR, PTP, APTT, INREXT, INREXT in the last 72 hours. No results for input(s): FE, TIBC, PSAT, FERR in the last 72 hours.    No results found for: FOL, RBCF   No results for input(s): PH, PCO2, PO2 in the last 72 hours. No results for input(s): CPK, CKNDX, TROIQ in the last 72 hours.     No lab exists for component: CPKMB  Lab Results   Component Value Date/Time    Cholesterol, total 146 01/13/2021 06:30 AM    HDL Cholesterol 39 (L) 01/13/2021 06:30 AM    LDL, calculated 51.4 01/13/2021 06:30 AM    Triglyceride 278 (H) 01/13/2021 06:30 AM    CHOL/HDL Ratio 3.7 01/13/2021 06:30 AM     Lab Results   Component Value Date/Time    Glucose (POC) 173 (H) 04/08/2021 03:57 AM    Glucose (POC) 327 (H) 04/07/2021 11:48 PM    Glucose (POC) 169 (H) 04/07/2021 07:29 PM    Glucose (POC) 193 (H) 04/07/2021 06:11 PM    Glucose (POC) 194 (H) 04/07/2021 03:44 PM     Lab Results   Component Value Date/Time    Color YELLOW 01/11/2021 07:02 AM    Appearance CLEAR 01/11/2021 07:02 AM    Specific gravity >1.030 (H) 01/11/2021 07:02 AM    pH (UA) 6.5 01/11/2021 07:02 AM    Protein TRACE (A) 01/11/2021 07:02 AM    Glucose >1,000 (A) 01/11/2021 07:02 AM    Ketone Negative 01/11/2021 07:02 AM    Bilirubin Negative 01/11/2021 07:02 AM    Urobilinogen 1.0 01/11/2021 07:02 AM    Nitrites Negative 01/11/2021 07:02 AM    Leukocyte Esterase Negative 01/11/2021 07:02 AM    Epithelial cells Negative 01/11/2021 07:02 AM    Bacteria Negative 01/11/2021 07:02 AM    WBC 0 to 1 01/11/2021 07:02 AM    RBC 0 to 1 01/11/2021 07:02 AM         Assessment:     Osteomyelitis of right toe  Uncontrolled diabetes  Hypertension  Hyperlipidemia  Hepatitis C  Carpal tunnel syndrome  Alcohol dependency   cocaine Abuse  History of bipolar disorder  MRSA nares        Plan:     Patient cleared for discharge to skilled care on IV antibiotic for 6 weeks      consult for discharge planning        Current Facility-Administered Medications:     mupirocin (BACTROBAN) 2 % ointment, , Topical, BID, Jim Burrell MD, Given at 04/07/21 2023    [START ON 4/9/2021] Vancomycin trough level 4/9/21 at 06:00, , Other, ONCE, Jim Burrell MD    hydrALAZINE (APRESOLINE) tablet 25 mg, 25 mg, Oral, BID, Garima Robbins MD, 25 mg at 04/07/21 2024    vancomycin (VANCOCIN) 1,250 mg in 0.9% sodium chloride 250 mL (VIAL-MATE), 1,250 mg, IntraVENous, Q12H, Jim Burrell MD, 1,250 mg at 04/07/21 2023    heparin (porcine) injection 5,000 Units, 5,000 Units, SubCUTAneous, Q8H, Garima Robbins MD, Stopped at 04/08/21 0600    glucose chewable tablet 16 g, 4 Tab, Oral, PRN, Guille Robbins MD    glucagon (GLUCAGEN) injection 1 mg, 1 mg, IntraMUSCular, PRN, Guille Robbins MD    dextrose (D50W) injection syrg 12.5-25 g, 25-50 mL, IntraVENous, PRN, Guille Robbins MD    DULoxetine (CYMBALTA) capsule 60 mg, 60 mg, Oral, DAILY, Garima Robbins MD, 60 mg at 04/07/21 0913    insulin glargine (LANTUS) injection 60 Units, 60 Units, SubCUTAneous, QHS, Garima Robbins MD, 60 Units at 04/07/21 2200    QUEtiapine (SEROquel) tablet 100 mg, 100 mg, Oral, QHS, Garima Robbins MD, 100 mg at 04/07/21 2025    atorvastatin (LIPITOR) tablet 80 mg, 80 mg, Oral, QHS, Garima Robbins MD, 80 mg at 04/07/21 2023    traZODone (DESYREL) tablet 50 mg, 50 mg, Oral, QHS PRN, Garima Robbins MD, 50 mg at 04/02/21 2120    insulin lispro (HUMALOG) injection, , SubCUTAneous, Q4H, Garima Robbins MD, 3 Units at 04/08/21 0430    0.9% sodium chloride infusion, 75 mL/hr, IntraVENous, CONTINUOUS, Garima Robbins MD, Last Rate: 75 mL/hr at 04/08/21 0405, 75 mL/hr at 04/08/21 0405    acetaminophen (TYLENOL) tablet 650 mg, 650 mg, Oral, Q6H PRN, 650 mg at 04/06/21 2009 **OR** acetaminophen (TYLENOL) suppository 650 mg, 650 mg, Rectal, Q6H PRN, Guille Robbins MD    polyethylene glycol (MIRALAX) packet 17 g, 17 g, Oral, DAILY PRN, Garima Robbins MD, 17 g at 04/07/21 2484    ondansetron (ZOFRAN ODT) tablet 4 mg, 4 mg, Oral, Q8H PRN **OR** ondansetron (ZOFRAN) injection 4 mg, 4 mg, IntraVENous, Q6H PRN, Garima Robbins MD    piperacillin-tazobactam (ZOSYN) 3.375 g in 0.9% sodium chloride (MBP/ADV) 100 mL MBP, 3.375 g, IntraVENous, Q8H, Garima Robbins MD, Last Rate: 25 mL/hr at 04/08/21 0400, 3.375 g at 04/08/21 0400    VANCOMYCIN INFORMATION NOTE 1 Each, 1 Each, Other, Rx Dosing/Monitoring, Garima Robbins MD    hydrALAZINE (APRESOLINE) 20 mg/mL injection 10 mg, 10 mg, IntraVENous, Q6H PRN, Garima Robbins MD, 10 mg at 04/06/21 5462

## 2021-04-09 VITALS
RESPIRATION RATE: 18 BRPM | HEIGHT: 71 IN | BODY MASS INDEX: 22.4 KG/M2 | DIASTOLIC BLOOD PRESSURE: 94 MMHG | SYSTOLIC BLOOD PRESSURE: 180 MMHG | HEART RATE: 82 BPM | OXYGEN SATURATION: 99 % | TEMPERATURE: 98.2 F | WEIGHT: 160 LBS

## 2021-04-09 LAB
BACTERIA SPEC CULT: NORMAL
GLUCOSE BLD STRIP.AUTO-MCNC: 133 MG/DL (ref 65–100)
GLUCOSE BLD STRIP.AUTO-MCNC: 171 MG/DL (ref 65–100)
GLUCOSE BLD STRIP.AUTO-MCNC: 188 MG/DL (ref 65–100)
GLUCOSE BLD STRIP.AUTO-MCNC: 190 MG/DL (ref 65–100)
GLUCOSE BLD STRIP.AUTO-MCNC: 236 MG/DL (ref 65–100)
GLUCOSE BLD STRIP.AUTO-MCNC: 238 MG/DL (ref 65–100)
PERFORMED BY, TECHID: ABNORMAL
SPECIAL REQUESTS,SREQ: NORMAL

## 2021-04-09 PROCEDURE — 74011636637 HC RX REV CODE- 636/637: Performed by: FAMILY MEDICINE

## 2021-04-09 PROCEDURE — 74011250637 HC RX REV CODE- 250/637: Performed by: INTERNAL MEDICINE

## 2021-04-09 PROCEDURE — 82962 GLUCOSE BLOOD TEST: CPT

## 2021-04-09 PROCEDURE — 74011000258 HC RX REV CODE- 258: Performed by: FAMILY MEDICINE

## 2021-04-09 PROCEDURE — 74011250637 HC RX REV CODE- 250/637: Performed by: FAMILY MEDICINE

## 2021-04-09 PROCEDURE — 99232 SBSQ HOSP IP/OBS MODERATE 35: CPT | Performed by: INTERNAL MEDICINE

## 2021-04-09 PROCEDURE — 65270000029 HC RM PRIVATE

## 2021-04-09 PROCEDURE — 74011250636 HC RX REV CODE- 250/636: Performed by: FAMILY MEDICINE

## 2021-04-09 RX ADMIN — HEPARIN SODIUM 5000 UNITS: 5000 INJECTION INTRAVENOUS; SUBCUTANEOUS at 05:23

## 2021-04-09 RX ADMIN — INSULIN LISPRO 3 UNITS: 100 INJECTION, SOLUTION INTRAVENOUS; SUBCUTANEOUS at 17:00

## 2021-04-09 RX ADMIN — PIPERACILLIN AND TAZOBACTAM 3.38 G: 3; .375 INJECTION, POWDER, LYOPHILIZED, FOR SOLUTION INTRAVENOUS at 01:18

## 2021-04-09 RX ADMIN — INSULIN GLARGINE 60 UNITS: 100 INJECTION, SOLUTION SUBCUTANEOUS at 21:00

## 2021-04-09 RX ADMIN — HEPARIN SODIUM 5000 UNITS: 5000 INJECTION INTRAVENOUS; SUBCUTANEOUS at 12:50

## 2021-04-09 RX ADMIN — DULOXETINE HYDROCHLORIDE 60 MG: 30 CAPSULE, DELAYED RELEASE ORAL at 09:09

## 2021-04-09 RX ADMIN — INSULIN LISPRO 7 UNITS: 100 INJECTION, SOLUTION INTRAVENOUS; SUBCUTANEOUS at 01:30

## 2021-04-09 RX ADMIN — PIPERACILLIN AND TAZOBACTAM 3.38 G: 3; .375 INJECTION, POWDER, LYOPHILIZED, FOR SOLUTION INTRAVENOUS at 09:10

## 2021-04-09 RX ADMIN — INSULIN LISPRO 3 UNITS: 100 INJECTION, SOLUTION INTRAVENOUS; SUBCUTANEOUS at 13:30

## 2021-04-09 RX ADMIN — HYDRALAZINE HYDROCHLORIDE 25 MG: 25 TABLET, FILM COATED ORAL at 09:10

## 2021-04-09 RX ADMIN — INSULIN LISPRO 3 UNITS: 100 INJECTION, SOLUTION INTRAVENOUS; SUBCUTANEOUS at 05:30

## 2021-04-09 RX ADMIN — INSULIN LISPRO 2 UNITS: 100 INJECTION, SOLUTION INTRAVENOUS; SUBCUTANEOUS at 21:30

## 2021-04-09 RX ADMIN — MUPIROCIN: 20 OINTMENT TOPICAL at 09:09

## 2021-04-09 RX ADMIN — HYDRALAZINE HYDROCHLORIDE 25 MG: 25 TABLET, FILM COATED ORAL at 20:36

## 2021-04-09 RX ADMIN — PIPERACILLIN AND TAZOBACTAM 3.38 G: 3; .375 INJECTION, POWDER, LYOPHILIZED, FOR SOLUTION INTRAVENOUS at 17:00

## 2021-04-09 NOTE — PROGRESS NOTES
Infectious Disease Progress Note           Subjective:   Assessed pt at bedside, doing well, denies new complaints, no acute events since last seen. Objective:   Physical Exam:     Visit Vitals  BP (!) 151/86 (BP Patient Position: At rest)   Pulse 82   Temp 97.7 °F (36.5 °C)   Resp 18   Ht 5' 11\" (1.803 m)   Wt 160 lb (72.6 kg)   SpO2 97%   BMI 22.32 kg/m²      O2 Device: None (Room air)    Temp (24hrs), Av.3 °F (36.8 °C), Min:97.7 °F (36.5 °C), Max:98.7 °F (37.1 °C)    No intake/output data recorded.  1901 -  0700  In: 1800 [P.O.:1200; I.V.:600]  Out: -     General: NAD, alert, AAO x 4  HEENT: RIKA, Moist mucosa   Lungs: CTA b/l, decreased at the bases, no wheeze/rhonchi   Heart: S1S2+, RRR, no murmur  Abdo: Soft, NT, ND, +BS   : no indwelling rubio cath   Exts: Swollen right great toe, no drainage   Skin: No wounds, No rashes or lesions    Data Review:       Recent Days:  Recent Labs     21  0605   WBC 4.7   HGB 11.4*   HCT 33.3*        No results for input(s): BUN, CREA in the last 72 hours. Lab Results   Component Value Date/Time    C-Reactive protein <0.29 2021 06:05 AM      Microbiology   - Blood Cx : Neg     Diagnostics   CXR Results  (Last 48 hours)    None        Assessment/Plan     1. Osteomyelitis involving distal phalanx of right great toe, underlying DM       No draining wound to Cx, MRSA colonized      CRP <0.28 and ESR 36 ()       Remains afebrile with a normal WBC on routine lab      On day # 8/42 of Zosyn. PICC line placed for out pt abx       D/c MARs up dated to reflect d/c antibiotics         2. MRSA colonization, on day # 3/5 of mupirocin     3. H/o uncontrolled DM w associated neuropathy      4. Cocaine dependence w a positive UDS, counseled     5.  Chronic hep C untreated: Pt to f/u as out pt for treatment     Cleared for d/c from ID stand point on long term Zosyn as above      Iglesia Browne MD    2021

## 2021-04-09 NOTE — PROGRESS NOTES
Report called to RN at [facility]. Patient aware of discharged, all appropriate documentation sent with transport.

## 2021-04-09 NOTE — DISCHARGE SUMMARY
General Daily Progress Note          Patient Name:   Stormy Barnes       YOB: 1965       Age:  49 y.o. Admit Date: 4/1/2021      Subjective:       Patient awake denies any chest pain or shortness of breath no new complaints      Patient discussed with the podiatrist and plan for IV antibiotic no surgery    Seen by infectious disease recommended vancomycin for 6 weeks    Waiting for placement for IV antibiotic        Objective:     Visit Vitals  /66 (BP 1 Location: Left upper arm, BP Patient Position: At rest)   Pulse 85   Temp 98.6 °F (37 °C)   Resp 18   Ht 5' 11\" (1.803 m)   Wt 72.6 kg (160 lb)   SpO2 99%   BMI 22.32 kg/m²        Recent Results (from the past 24 hour(s))   GLUCOSE, POC    Collection Time: 04/08/21 11:18 AM   Result Value Ref Range    Glucose (POC) 139 (H) 65 - 100 mg/dL    Performed by Garret Fuentes    GLUCOSE, POC    Collection Time: 04/08/21  4:04 PM   Result Value Ref Range    Glucose (POC) 208 (H) 65 - 100 mg/dL    Performed by Ramona Bocanegra    GLUCOSE, POC    Collection Time: 04/08/21  7:54 PM   Result Value Ref Range    Glucose (POC) 183 (H) 65 - 100 mg/dL    Performed by Jessi Oliveira, POC    Collection Time: 04/09/21 12:56 AM   Result Value Ref Range    Glucose (POC) 238 (H) 65 - 100 mg/dL    Performed by Melanie Worrell, POC    Collection Time: 04/09/21  5:11 AM   Result Value Ref Range    Glucose (POC) 188 (H) 65 - 100 mg/dL    Performed by Dianna Llanos    GLUCOSE, POC    Collection Time: 04/09/21  7:26 AM   Result Value Ref Range    Glucose (POC) 133 (H) 65 - 100 mg/dL    Performed by Monique Nuñez      [unfilled]      Review of Systems    Constitutional: Negative for chills and fever. HENT: Negative. Eyes: Negative. Respiratory: Negative. Cardiovascular: Negative. Gastrointestinal: Negative for abdominal pain and nausea. Skin: Negative. Neurological: Negative.         Physical Exam:      Constitutional: pt is oriented to person, place, and time. HENT:   Head: Normocephalic and atraumatic. Eyes: Pupils are equal, round, and reactive to light. EOM are normal.   Cardiovascular: Normal rate, regular rhythm and normal heart sounds. Pulmonary/Chest: Breath sounds normal. No wheezes. No rales. Exhibits no tenderness. Abdominal: Soft. Bowel sounds are normal. There is no abdominal tenderness. There is no rebound and no guarding. Musculoskeletal: Normal range of motion. Neurological: pt is alert and oriented to person, place, and time. XR GREAT TOE RT MIN 2 V   Final Result   Osteomyelitis involving the terminal tuft of the distal phalange of   the great toe.            Recent Results (from the past 24 hour(s))   GLUCOSE, POC    Collection Time: 04/08/21 11:18 AM   Result Value Ref Range    Glucose (POC) 139 (H) 65 - 100 mg/dL    Performed by Wali Joyce    GLUCOSE, POC    Collection Time: 04/08/21  4:04 PM   Result Value Ref Range    Glucose (POC) 208 (H) 65 - 100 mg/dL    Performed by Barbara Wong    GLUCOSE, POC    Collection Time: 04/08/21  7:54 PM   Result Value Ref Range    Glucose (POC) 183 (H) 65 - 100 mg/dL    Performed by Karson Zambrano, POC    Collection Time: 04/09/21 12:56 AM   Result Value Ref Range    Glucose (POC) 238 (H) 65 - 100 mg/dL    Performed by Robb Holter, POC    Collection Time: 04/09/21  5:11 AM   Result Value Ref Range    Glucose (POC) 188 (H) 65 - 100 mg/dL    Performed by Kierra Reynolds    GLUCOSE, POC    Collection Time: 04/09/21  7:26 AM   Result Value Ref Range    Glucose (POC) 133 (H) 65 - 100 mg/dL    Performed by Huber Bingham        Results     Procedure Component Value Units Date/Time    MRSA SCREEN - PCR (NASAL) [[id number]]  (Abnormal) Collected: 04/02/21 1215    Order Status: Completed Specimen: Swab Updated: 04/02/21 1325     MRSA by PCR, Nasal DETECTED        Comment: Results verified, phoned to and read back by ALECIA Vizcaino #1 [[id number]] Collected: 04/02/21 1211    Order Status: Completed Specimen: Blood Updated: 04/08/21 0808     Special Requests: No Special Requests        Culture result: No growth 5 days       CULTURE, BLOOD #2 [[id number]] Collected: 04/02/21 1211    Order Status: Completed Specimen: Blood Updated: 04/08/21 0808     Special Requests: No Special Requests        Culture result: No growth 6 days       CULTURE, [place] STAIN [[id number]] Collected: 04/02/21 0930    Order Status: Canceled Specimen: Foot     COVID-19 RAPID TEST [[id number]] Collected: 04/02/21 0817    Order Status: Completed Specimen: Nasopharyngeal Updated: 04/02/21 0921     Specimen source Nasopharyngeal        COVID-19 rapid test Not Detected        Comment: Rapid Abbott ID Now   Rapid NAAT:  The specimen is NEGATIVE for SARS-CoV-2, the novel coronavirus associated with COVID-19. Negative results should be treated as presumptive and, if inconsistent with clinical signs and symptoms or necessary for patient management, should be tested with an alternative molecular assay. Negative results do not preclude SARS-CoV-2 infection and should not be used as the sole basis for patient management decisions. This test has been authorized by the FDA under   an Emergency Use Authorization (EUA) for use by authorized laboratories.  Fact sheet for Healthcare Providers: ConventionUpdate.co.nz Fact sheet for Patients: ConventionUpdate.co.nz   Methodology: Isothermal Nucleic Acid Amplification         CULTURE, BLOOD, PAIRED [[id number]] Collected: 04/01/21 2335    Order Status: Completed Specimen: Blood Updated: 04/08/21 0808     Special Requests: No Special Requests        Culture result: No growth 6 days              Labs:     Recent Labs     04/08/21  0605   WBC 4.7   HGB 11.4*   HCT 33.3*        Recent Labs     04/07/21  1018 04/06/21  1046   K 3.9  --    CREA  --  0.97   MG 2.0  --      No results for input(s): ALT, AP, TBIL, TBILI, TP, ALB, GLOB, GGT, AML, LPSE in the last 72 hours. No lab exists for component: SGOT, GPT, AMYP, HLPSE  No results for input(s): INR, PTP, APTT, INREXT, INREXT in the last 72 hours. No results for input(s): FE, TIBC, PSAT, FERR in the last 72 hours. No results found for: FOL, RBCF   No results for input(s): PH, PCO2, PO2 in the last 72 hours. No results for input(s): CPK, CKNDX, TROIQ in the last 72 hours.     No lab exists for component: CPKMB  Lab Results   Component Value Date/Time    Cholesterol, total 146 01/13/2021 06:30 AM    HDL Cholesterol 39 (L) 01/13/2021 06:30 AM    LDL, calculated 51.4 01/13/2021 06:30 AM    Triglyceride 278 (H) 01/13/2021 06:30 AM    CHOL/HDL Ratio 3.7 01/13/2021 06:30 AM     Lab Results   Component Value Date/Time    Glucose (POC) 133 (H) 04/09/2021 07:26 AM    Glucose (POC) 188 (H) 04/09/2021 05:11 AM    Glucose (POC) 238 (H) 04/09/2021 12:56 AM    Glucose (POC) 183 (H) 04/08/2021 07:54 PM    Glucose (POC) 208 (H) 04/08/2021 04:04 PM     Lab Results   Component Value Date/Time    Color YELLOW 01/11/2021 07:02 AM    Appearance CLEAR 01/11/2021 07:02 AM    Specific gravity >1.030 (H) 01/11/2021 07:02 AM    pH (UA) 6.5 01/11/2021 07:02 AM    Protein TRACE (A) 01/11/2021 07:02 AM    Glucose >1,000 (A) 01/11/2021 07:02 AM    Ketone Negative 01/11/2021 07:02 AM    Bilirubin Negative 01/11/2021 07:02 AM    Urobilinogen 1.0 01/11/2021 07:02 AM    Nitrites Negative 01/11/2021 07:02 AM    Leukocyte Esterase Negative 01/11/2021 07:02 AM    Epithelial cells Negative 01/11/2021 07:02 AM    Bacteria Negative 01/11/2021 07:02 AM    WBC 0 to 1 01/11/2021 07:02 AM    RBC 0 to 1 01/11/2021 07:02 AM         Assessment:     Osteomyelitis of right toe  Uncontrolled diabetes  Hypertension  Hyperlipidemia  Hepatitis C  Carpal tunnel syndrome  Alcohol dependency   cocaine Abuse  History of bipolar disorder  MRSA nares        Plan:     Patient cleared for discharge to skilled care on IV antibiotic for 6 weeks      consult for discharge planning  Continue Lipitor 80 mg daily  Continue Cymbalta 60 mg daily  Subcu heparin for DVT prophylaxis  Hydralazine 25 mg twice a day  On Lantus 60 units subcu at bedtime  On sliding scale insulin  On IV Zosyn every 8 hours and   Seroquel 100 mg at bedtime        Current Facility-Administered Medications:     mupirocin (BACTROBAN) 2 % ointment, , Topical, BID, Jim Burrell MD, Given at 04/08/21 2004    hydrALAZINE (APRESOLINE) tablet 25 mg, 25 mg, Oral, BID, Garima Robbins MD, 25 mg at 04/08/21 2004    heparin (porcine) injection 5,000 Units, 5,000 Units, SubCUTAneous, Q8H, Garima Robbins MD, 5,000 Units at 04/09/21 0523    glucose chewable tablet 16 g, 4 Tab, Oral, PRN, Garima Robbins MD    glucagon (GLUCAGEN) injection 1 mg, 1 mg, IntraMUSCular, PRN, Garima Robbins MD    dextrose (D50W) injection syrg 12.5-25 g, 25-50 mL, IntraVENous, PRN, Marion Robbins MD    DULoxetine (CYMBALTA) capsule 60 mg, 60 mg, Oral, DAILY, Garima Robbins MD, 60 mg at 04/08/21 0848    insulin glargine (LANTUS) injection 60 Units, 60 Units, SubCUTAneous, QHS, Garima Robbins MD, 60 Units at 04/08/21 2100    QUEtiapine (SEROquel) tablet 100 mg, 100 mg, Oral, QHS, Garima Robbins MD, 100 mg at 04/08/21 2136    atorvastatin (LIPITOR) tablet 80 mg, 80 mg, Oral, QHS, Garima Robbins MD, 80 mg at 04/08/21 2100    traZODone (DESYREL) tablet 50 mg, 50 mg, Oral, QHS PRN, Garima Robbins MD, 50 mg at 04/02/21 2120    insulin lispro (HUMALOG) injection, , SubCUTAneous, Q4H, Garima Robbins MD, Stopped at 04/09/21 0930    acetaminophen (TYLENOL) tablet 650 mg, 650 mg, Oral, Q6H PRN, 650 mg at 04/06/21 2009 **OR** acetaminophen (TYLENOL) suppository 650 mg, 650 mg, Rectal, Q6H PRN, Garima Robbins MD    polyethylene glycol (MIRALAX) packet 17 g, 17 g, Oral, DAILY PRN, Garima Robbins MD, 17 g at 04/07/21 2154    ondansetron [facility] ODT) tablet 4 mg, 4 mg, Oral, Q8H PRN **OR** ondansetron (ZOFRAN) injection 4 mg, 4 mg, IntraVENous, Q6H PRN, Garima Robbins MD    piperacillin-tazobactam (ZOSYN) 3.375 g in 0.9% sodium chloride (MBP/ADV) 100 mL MBP, 3.375 g, IntraVENous, Q8H, Garima Robbins MD, Last Rate: 200 mL/hr at 04/09/21 0118, 3.375 g at 04/09/21 0118    hydrALAZINE (APRESOLINE) 20 mg/mL injection 10 mg, 10 mg, IntraVENous, Q6H PRN, Garima Robbins MD, 10 mg at 04/08/21 2004

## 2021-04-09 NOTE — PROGRESS NOTES
Problem: Pain - Acute  Goal: *Control of acute pain  Outcome: Progressing Towards Goal     Problem: Patient Education: Go to Patient Education Activity  Goal: Patient/Family Education  Outcome: Progressing Towards Goal     Problem: Diabetes Self-Management  Goal: *Disease process and treatment process  Description: Define diabetes and identify own type of diabetes; list 3 options for treating diabetes. Outcome: Progressing Towards Goal  Goal: *Incorporating nutritional management into lifestyle  Description: Describe effect of type, amount and timing of food on blood glucose; list 3 methods for planning meals. Outcome: Progressing Towards Goal  Goal: *Incorporating physical activity into lifestyle  Description: State effect of exercise on blood glucose levels. Outcome: Progressing Towards Goal  Goal: *Developing strategies to promote health/change behavior  Description: Define the ABC's of diabetes; identify appropriate screenings, schedule and personal plan for screenings. Outcome: Progressing Towards Goal  Goal: *Using medications safely  Description: State effect of diabetes medications on diabetes; name diabetes medication taking, action and side effects. Outcome: Progressing Towards Goal  Goal: *Monitoring blood glucose, interpreting and using results  Description: Identify recommended blood glucose targets  and personal targets. Outcome: Progressing Towards Goal  Goal: *Prevention, detection, treatment of acute complications  Description: List symptoms of hyper- and hypoglycemia; describe how to treat low blood sugar and actions for lowering  high blood glucose level. Outcome: Progressing Towards Goal  Goal: *Prevention, detection and treatment of chronic complications  Description: Define the natural course of diabetes and describe the relationship of blood glucose levels to long term complications of diabetes.   Outcome: Progressing Towards Goal  Goal: *Developing strategies to address psychosocial issues  Description: Describe feelings about living with diabetes; identify support needed and support network  Outcome: Progressing Towards Goal  Goal: *Insulin pump training  Outcome: Progressing Towards Goal  Goal: *Sick day guidelines  Outcome: Progressing Towards Goal  Goal: *Patient Specific Goal (EDIT GOAL, INSERT TEXT)  Outcome: Progressing Towards Goal     Problem: Patient Education: Go to Patient Education Activity  Goal: Patient/Family Education  Outcome: Progressing Towards Goal     Problem: Falls - Risk of  Goal: *Absence of Falls  Description: Document Nicolás Fall Risk and appropriate interventions in the flowsheet. Outcome: Progressing Towards Goal  Note: Fall Risk Interventions:            Medication Interventions: Evaluate medications/consider consulting pharmacy, Patient to call before getting OOB, Teach patient to arise slowly         History of Falls Interventions: Consult care management for discharge planning, Evaluate medications/consider consulting pharmacy, Assess for delayed presentation/identification of injury for 48 hrs (comment for end date)         Problem: Patient Education: Go to Patient Education Activity  Goal: Patient/Family Education  Outcome: Progressing Towards Goal     Problem: Risk for Spread of Infection  Goal: Prevent transmission of infectious organism to others  Description: Prevent the transmission of infectious organisms to other patients, staff members, and visitors.   Outcome: Progressing Towards Goal     Problem: Patient Education:  Go to Education Activity  Goal: Patient/Family Education  Outcome: Progressing Towards Goal     Problem: Patient Education: Go to Patient Education Activity  Goal: Patient/Family Education  Outcome: Progressing Towards Goal

## 2021-04-10 NOTE — PROGRESS NOTES
Pt transport arrived. Peripheral IV removed PICC line still in place. Vital signs stable. Report given to EMS patient taken to [facility].

## 2021-06-02 ENCOUNTER — HOSPITAL ENCOUNTER (OUTPATIENT)
Dept: GENERAL RADIOLOGY | Age: 56
Discharge: HOME | End: 2021-06-02
Payer: MEDICAID

## 2021-06-02 ENCOUNTER — TRANSCRIBE ORDER (OUTPATIENT)
Dept: REGISTRATION | Age: 56
End: 2021-06-02

## 2021-06-02 DIAGNOSIS — L97.509 TOE ULCER (HCC): Primary | ICD-10-CM

## 2021-06-02 DIAGNOSIS — L97.509 TOE ULCER (HCC): ICD-10-CM

## 2021-06-02 PROCEDURE — 73630 X-RAY EXAM OF FOOT: CPT

## 2021-06-18 ENCOUNTER — HOSPITAL ENCOUNTER (INPATIENT)
Age: 56
LOS: 5 days | Discharge: NURSING, REHAB OR LONG-TERM CARE | DRG: 710 | End: 2021-06-24
Attending: EMERGENCY MEDICINE | Admitting: INTERNAL MEDICINE
Payer: MEDICAID

## 2021-06-18 ENCOUNTER — APPOINTMENT (OUTPATIENT)
Dept: GENERAL RADIOLOGY | Age: 56
DRG: 710 | End: 2021-06-18
Attending: PHYSICIAN ASSISTANT
Payer: MEDICAID

## 2021-06-18 DIAGNOSIS — Z79.4 TYPE 2 DIABETES MELLITUS WITH DIABETIC PERIPHERAL ANGIOPATHY WITHOUT GANGRENE, WITH LONG-TERM CURRENT USE OF INSULIN (HCC): Primary | ICD-10-CM

## 2021-06-18 DIAGNOSIS — L97.526 DIABETIC ULCER OF TOE OF LEFT FOOT ASSOCIATED WITH TYPE 2 DIABETES MELLITUS, WITH BONE INVOLVEMENT WITHOUT EVIDENCE OF NECROSIS (HCC): ICD-10-CM

## 2021-06-18 DIAGNOSIS — N17.9 AKI (ACUTE KIDNEY INJURY) (HCC): ICD-10-CM

## 2021-06-18 DIAGNOSIS — A41.9 SEVERE SEPSIS (HCC): ICD-10-CM

## 2021-06-18 DIAGNOSIS — R65.20 SEVERE SEPSIS (HCC): ICD-10-CM

## 2021-06-18 DIAGNOSIS — E11.621 DIABETIC ULCER OF TOE OF LEFT FOOT ASSOCIATED WITH TYPE 2 DIABETES MELLITUS, WITH BONE INVOLVEMENT WITHOUT EVIDENCE OF NECROSIS (HCC): ICD-10-CM

## 2021-06-18 DIAGNOSIS — E11.51 TYPE 2 DIABETES MELLITUS WITH DIABETIC PERIPHERAL ANGIOPATHY WITHOUT GANGRENE, WITH LONG-TERM CURRENT USE OF INSULIN (HCC): Primary | ICD-10-CM

## 2021-06-18 LAB
ALBUMIN SERPL-MCNC: 3.5 G/DL (ref 3.4–5)
ALBUMIN/GLOB SERPL: 0.6 {RATIO} (ref 0.8–1.7)
ALP SERPL-CCNC: 73 U/L (ref 45–117)
ALT SERPL-CCNC: 40 U/L (ref 16–61)
ANION GAP SERPL CALC-SCNC: 10 MMOL/L (ref 3–18)
AST SERPL-CCNC: 29 U/L (ref 10–38)
BASOPHILS # BLD: 0 K/UL (ref 0–0.1)
BASOPHILS NFR BLD: 0 % (ref 0–2)
BILIRUB SERPL-MCNC: 0.9 MG/DL (ref 0.2–1)
BUN SERPL-MCNC: 21 MG/DL (ref 7–18)
BUN/CREAT SERPL: 15 (ref 12–20)
CALCIUM SERPL-MCNC: 9.6 MG/DL (ref 8.5–10.1)
CHLORIDE SERPL-SCNC: 89 MMOL/L (ref 100–111)
CO2 SERPL-SCNC: 26 MMOL/L (ref 21–32)
CREAT SERPL-MCNC: 1.4 MG/DL (ref 0.6–1.3)
DIFFERENTIAL METHOD BLD: ABNORMAL
EOSINOPHIL # BLD: 0 K/UL (ref 0–0.4)
EOSINOPHIL NFR BLD: 0 % (ref 0–5)
ERYTHROCYTE [DISTWIDTH] IN BLOOD BY AUTOMATED COUNT: 10.7 % (ref 11.6–14.5)
GLOBULIN SER CALC-MCNC: 6 G/DL (ref 2–4)
GLUCOSE SERPL-MCNC: 408 MG/DL (ref 74–99)
HCT VFR BLD AUTO: 38.9 % (ref 36–48)
HGB BLD-MCNC: 13.7 G/DL (ref 13–16)
LACTATE SERPL-SCNC: 1.6 MMOL/L (ref 0.4–2)
LYMPHOCYTES # BLD: 1.6 K/UL (ref 0.9–3.6)
LYMPHOCYTES NFR BLD: 13 % (ref 21–52)
MCH RBC QN AUTO: 31.1 PG (ref 24–34)
MCHC RBC AUTO-ENTMCNC: 35.2 G/DL (ref 31–37)
MCV RBC AUTO: 88.4 FL (ref 74–97)
MONOCYTES # BLD: 1.2 K/UL (ref 0.05–1.2)
MONOCYTES NFR BLD: 10 % (ref 3–10)
NEUTS SEG # BLD: 9.5 K/UL (ref 1.8–8)
NEUTS SEG NFR BLD: 76 % (ref 40–73)
PLATELET # BLD AUTO: 166 K/UL (ref 135–420)
PMV BLD AUTO: 11.5 FL (ref 9.2–11.8)
POTASSIUM SERPL-SCNC: 4.2 MMOL/L (ref 3.5–5.5)
PROT SERPL-MCNC: 9.5 G/DL (ref 6.4–8.2)
RBC # BLD AUTO: 4.4 M/UL (ref 4.35–5.65)
SODIUM SERPL-SCNC: 125 MMOL/L (ref 136–145)
VANCOMYCIN SERPL-MCNC: <0.8 UG/ML (ref 5–40)
WBC # BLD AUTO: 12.4 K/UL (ref 4.6–13.2)

## 2021-06-18 PROCEDURE — 73630 X-RAY EXAM OF FOOT: CPT

## 2021-06-18 PROCEDURE — 99285 EMERGENCY DEPT VISIT HI MDM: CPT

## 2021-06-18 PROCEDURE — 80202 ASSAY OF VANCOMYCIN: CPT

## 2021-06-18 PROCEDURE — 83605 ASSAY OF LACTIC ACID: CPT

## 2021-06-18 PROCEDURE — 96366 THER/PROPH/DIAG IV INF ADDON: CPT

## 2021-06-18 PROCEDURE — 74011000258 HC RX REV CODE- 258: Performed by: INTERNAL MEDICINE

## 2021-06-18 PROCEDURE — 96365 THER/PROPH/DIAG IV INF INIT: CPT

## 2021-06-18 PROCEDURE — 71045 X-RAY EXAM CHEST 1 VIEW: CPT

## 2021-06-18 PROCEDURE — 74011250636 HC RX REV CODE- 250/636: Performed by: PHYSICIAN ASSISTANT

## 2021-06-18 PROCEDURE — 93005 ELECTROCARDIOGRAM TRACING: CPT

## 2021-06-18 PROCEDURE — 87040 BLOOD CULTURE FOR BACTERIA: CPT

## 2021-06-18 PROCEDURE — 85025 COMPLETE CBC W/AUTO DIFF WBC: CPT

## 2021-06-18 PROCEDURE — 74011000258 HC RX REV CODE- 258: Performed by: PHYSICIAN ASSISTANT

## 2021-06-18 PROCEDURE — 80053 COMPREHEN METABOLIC PANEL: CPT

## 2021-06-18 RX ORDER — SODIUM CHLORIDE 0.9 % (FLUSH) 0.9 %
5-10 SYRINGE (ML) INJECTION AS NEEDED
Status: DISCONTINUED | OUTPATIENT
Start: 2021-06-18 | End: 2021-06-24 | Stop reason: HOSPADM

## 2021-06-18 RX ORDER — VANCOMYCIN/0.9 % SOD CHLORIDE 1.5G/250ML
1500 PLASTIC BAG, INJECTION (ML) INTRAVENOUS ONCE
Status: COMPLETED | OUTPATIENT
Start: 2021-06-18 | End: 2021-06-19

## 2021-06-18 RX ADMIN — SODIUM CHLORIDE 1000 ML: 900 INJECTION, SOLUTION INTRAVENOUS at 22:45

## 2021-06-18 RX ADMIN — SODIUM CHLORIDE 1000 ML: 900 INJECTION, SOLUTION INTRAVENOUS at 22:44

## 2021-06-18 RX ADMIN — VANCOMYCIN HYDROCHLORIDE 1500 MG: 10 INJECTION, POWDER, LYOPHILIZED, FOR SOLUTION INTRAVENOUS at 23:02

## 2021-06-18 RX ADMIN — SODIUM CHLORIDE 178 ML: 9 INJECTION, SOLUTION INTRAVENOUS at 22:58

## 2021-06-18 RX ADMIN — PIPERACILLIN AND TAZOBACTAM 3.38 G: 3; .375 INJECTION, POWDER, LYOPHILIZED, FOR SOLUTION INTRAVENOUS at 22:50

## 2021-06-18 NOTE — Clinical Note
Status[de-identified] INPATIENT [101]   Type of Bed: Telemetry [19]   Cardiac Monitoring Required?: No   Inpatient Hospitalization Certified Necessary for the Following Reasons: 3.  Patient receiving treatment that can only be provided in an inpatient setting (further clarification in H&P documentation)   Admitting Diagnosis: Severe sepsis [facility] [[id number]]   Admitting Physician: Quintin Ruelas [[id number]]   Attending Physician: Quintin Ruelas [[id number]]   Estimated Length of Stay: 3-4 Midnights   Discharge Plan[de-identified] Home with Office Follow-up

## 2021-06-19 PROBLEM — R50.9 FEVER: Status: ACTIVE | Noted: 2021-06-19

## 2021-06-19 PROBLEM — R65.20 SEVERE SEPSIS (HCC): Status: ACTIVE | Noted: 2021-06-19

## 2021-06-19 PROBLEM — N17.9 ACUTE KIDNEY FAILURE (HCC): Status: ACTIVE | Noted: 2021-06-19

## 2021-06-19 PROBLEM — A41.9 SEVERE SEPSIS (HCC): Status: ACTIVE | Noted: 2021-06-19

## 2021-06-19 PROBLEM — I10 HYPERTENSION: Status: ACTIVE | Noted: 2021-06-19

## 2021-06-19 PROBLEM — R73.9 HYPERGLYCEMIA: Status: ACTIVE | Noted: 2021-06-19

## 2021-06-19 PROBLEM — E11.65 HYPERGLYCEMIA DUE TO TYPE 2 DIABETES MELLITUS (HCC): Status: ACTIVE | Noted: 2021-06-19

## 2021-06-19 PROBLEM — E87.1 HYPONATREMIA: Status: ACTIVE | Noted: 2021-06-19

## 2021-06-19 PROBLEM — R00.0 TACHYCARDIA: Status: ACTIVE | Noted: 2021-06-19

## 2021-06-19 LAB
ALBUMIN SERPL-MCNC: 2.7 G/DL (ref 3.4–5)
ALBUMIN/GLOB SERPL: 0.6 {RATIO} (ref 0.8–1.7)
ALP SERPL-CCNC: 48 U/L (ref 45–117)
ALT SERPL-CCNC: 27 U/L (ref 16–61)
ANION GAP SERPL CALC-SCNC: 5 MMOL/L (ref 3–18)
APPEARANCE UR: CLEAR
AST SERPL-CCNC: 23 U/L (ref 10–38)
BACTERIA URNS QL MICRO: ABNORMAL /HPF
BILIRUB DIRECT SERPL-MCNC: 0.5 MG/DL (ref 0–0.2)
BILIRUB SERPL-MCNC: 1 MG/DL (ref 0.2–1)
BILIRUB UR QL: NEGATIVE
BUN SERPL-MCNC: 16 MG/DL (ref 7–18)
BUN/CREAT SERPL: 16 (ref 12–20)
CALCIUM SERPL-MCNC: 8.8 MG/DL (ref 8.5–10.1)
CHLORIDE SERPL-SCNC: 102 MMOL/L (ref 100–111)
CO2 SERPL-SCNC: 26 MMOL/L (ref 21–32)
COLOR UR: YELLOW
CREAT SERPL-MCNC: 1.01 MG/DL (ref 0.6–1.3)
EPITH CASTS URNS QL MICRO: ABNORMAL /LPF (ref 0–5)
FOLATE SERPL-MCNC: >20 NG/ML (ref 3.1–17.5)
GLOBULIN SER CALC-MCNC: 4.3 G/DL (ref 2–4)
GLUCOSE BLD STRIP.AUTO-MCNC: 181 MG/DL (ref 70–110)
GLUCOSE BLD STRIP.AUTO-MCNC: 189 MG/DL (ref 70–110)
GLUCOSE BLD STRIP.AUTO-MCNC: 195 MG/DL (ref 70–110)
GLUCOSE BLD STRIP.AUTO-MCNC: 216 MG/DL (ref 70–110)
GLUCOSE BLD STRIP.AUTO-MCNC: 330 MG/DL (ref 70–110)
GLUCOSE SERPL-MCNC: 215 MG/DL (ref 74–99)
GLUCOSE UR STRIP.AUTO-MCNC: >1000 MG/DL
HBA1C MFR BLD: 11.3 % (ref 4.2–5.6)
HGB UR QL STRIP: ABNORMAL
KETONES UR QL STRIP.AUTO: 15 MG/DL
LEUKOCYTE ESTERASE UR QL STRIP.AUTO: NEGATIVE
MAGNESIUM SERPL-MCNC: 2.2 MG/DL (ref 1.6–2.6)
NITRITE UR QL STRIP.AUTO: NEGATIVE
PH UR STRIP: 5 [PH] (ref 5–8)
POTASSIUM SERPL-SCNC: 3.9 MMOL/L (ref 3.5–5.5)
PROCALCITONIN SERPL-MCNC: 0.84 NG/ML
PROT SERPL-MCNC: 7 G/DL (ref 6.4–8.2)
PROT UR STRIP-MCNC: 100 MG/DL
RBC #/AREA URNS HPF: ABNORMAL /HPF (ref 0–5)
SODIUM SERPL-SCNC: 133 MMOL/L (ref 136–145)
SP GR UR REFRACTOMETRY: 1.02 (ref 1–1.03)
TSH SERPL DL<=0.05 MIU/L-ACNC: 1.24 UIU/ML (ref 0.36–3.74)
UROBILINOGEN UR QL STRIP.AUTO: 1 EU/DL (ref 0.2–1)
VIT B12 SERPL-MCNC: 392 PG/ML (ref 211–911)
WBC URNS QL MICRO: ABNORMAL /HPF (ref 0–5)

## 2021-06-19 PROCEDURE — 99218 HC RM OBSERVATION: CPT

## 2021-06-19 PROCEDURE — 2709999900 HC NON-CHARGEABLE SUPPLY

## 2021-06-19 PROCEDURE — 74011636637 HC RX REV CODE- 636/637: Performed by: INTERNAL MEDICINE

## 2021-06-19 PROCEDURE — 87077 CULTURE AEROBIC IDENTIFY: CPT

## 2021-06-19 PROCEDURE — 96372 THER/PROPH/DIAG INJ SC/IM: CPT

## 2021-06-19 PROCEDURE — 74011250637 HC RX REV CODE- 250/637: Performed by: INTERNAL MEDICINE

## 2021-06-19 PROCEDURE — 65270000029 HC RM PRIVATE

## 2021-06-19 PROCEDURE — 74011250637 HC RX REV CODE- 250/637: Performed by: FAMILY MEDICINE

## 2021-06-19 PROCEDURE — 97165 OT EVAL LOW COMPLEX 30 MIN: CPT

## 2021-06-19 PROCEDURE — 80353 DRUG SCREENING COCAINE: CPT

## 2021-06-19 PROCEDURE — 84443 ASSAY THYROID STIM HORMONE: CPT

## 2021-06-19 PROCEDURE — 83735 ASSAY OF MAGNESIUM: CPT

## 2021-06-19 PROCEDURE — 96376 TX/PRO/DX INJ SAME DRUG ADON: CPT

## 2021-06-19 PROCEDURE — 99219 PR INITIAL OBSERVATION CARE/DAY 50 MINUTES: CPT | Performed by: INTERNAL MEDICINE

## 2021-06-19 PROCEDURE — 87186 SC STD MICRODIL/AGAR DIL: CPT

## 2021-06-19 PROCEDURE — 74011250636 HC RX REV CODE- 250/636: Performed by: EMERGENCY MEDICINE

## 2021-06-19 PROCEDURE — 82746 ASSAY OF FOLIC ACID SERUM: CPT

## 2021-06-19 PROCEDURE — 81001 URINALYSIS AUTO W/SCOPE: CPT

## 2021-06-19 PROCEDURE — 74011250636 HC RX REV CODE- 250/636: Performed by: INTERNAL MEDICINE

## 2021-06-19 PROCEDURE — 82962 GLUCOSE BLOOD TEST: CPT

## 2021-06-19 PROCEDURE — 74011636637 HC RX REV CODE- 636/637: Performed by: EMERGENCY MEDICINE

## 2021-06-19 PROCEDURE — 87147 CULTURE TYPE IMMUNOLOGIC: CPT

## 2021-06-19 PROCEDURE — 80048 BASIC METABOLIC PNL TOTAL CA: CPT

## 2021-06-19 PROCEDURE — 87070 CULTURE OTHR SPECIMN AEROBIC: CPT

## 2021-06-19 PROCEDURE — 83036 HEMOGLOBIN GLYCOSYLATED A1C: CPT

## 2021-06-19 PROCEDURE — 84145 PROCALCITONIN (PCT): CPT

## 2021-06-19 PROCEDURE — 80076 HEPATIC FUNCTION PANEL: CPT

## 2021-06-19 PROCEDURE — 74011000258 HC RX REV CODE- 258: Performed by: INTERNAL MEDICINE

## 2021-06-19 PROCEDURE — 96375 TX/PRO/DX INJ NEW DRUG ADDON: CPT

## 2021-06-19 PROCEDURE — 80307 DRUG TEST PRSMV CHEM ANLYZR: CPT

## 2021-06-19 PROCEDURE — 36415 COLL VENOUS BLD VENIPUNCTURE: CPT

## 2021-06-19 PROCEDURE — 97161 PT EVAL LOW COMPLEX 20 MIN: CPT

## 2021-06-19 RX ORDER — LORAZEPAM 2 MG/ML
0.5 INJECTION INTRAMUSCULAR
Status: COMPLETED | OUTPATIENT
Start: 2021-06-19 | End: 2021-06-19

## 2021-06-19 RX ORDER — INSULIN LISPRO 100 [IU]/ML
20 INJECTION, SOLUTION INTRAVENOUS; SUBCUTANEOUS
Status: COMPLETED | OUTPATIENT
Start: 2021-06-19 | End: 2021-06-19

## 2021-06-19 RX ORDER — POLYETHYLENE GLYCOL 3350 17 G/17G
17 POWDER, FOR SOLUTION ORAL DAILY PRN
Status: DISCONTINUED | OUTPATIENT
Start: 2021-06-19 | End: 2021-06-24 | Stop reason: HOSPADM

## 2021-06-19 RX ORDER — INSULIN LISPRO 100 [IU]/ML
INJECTION, SOLUTION INTRAVENOUS; SUBCUTANEOUS
Status: DISCONTINUED | OUTPATIENT
Start: 2021-06-19 | End: 2021-06-24 | Stop reason: HOSPADM

## 2021-06-19 RX ORDER — SODIUM CHLORIDE 0.9 % (FLUSH) 0.9 %
5-40 SYRINGE (ML) INJECTION AS NEEDED
Status: DISCONTINUED | OUTPATIENT
Start: 2021-06-19 | End: 2021-06-24 | Stop reason: HOSPADM

## 2021-06-19 RX ORDER — ENOXAPARIN SODIUM 100 MG/ML
40 INJECTION SUBCUTANEOUS DAILY
Status: DISCONTINUED | OUTPATIENT
Start: 2021-06-19 | End: 2021-06-24 | Stop reason: HOSPADM

## 2021-06-19 RX ORDER — HYDRALAZINE HYDROCHLORIDE 25 MG/1
25 TABLET, FILM COATED ORAL 2 TIMES DAILY
Status: DISCONTINUED | OUTPATIENT
Start: 2021-06-19 | End: 2021-06-24 | Stop reason: HOSPADM

## 2021-06-19 RX ORDER — TRAZODONE HYDROCHLORIDE 50 MG/1
50 TABLET ORAL
Status: DISCONTINUED | OUTPATIENT
Start: 2021-06-19 | End: 2021-06-24 | Stop reason: HOSPADM

## 2021-06-19 RX ORDER — ROSUVASTATIN CALCIUM 20 MG/1
40 TABLET, COATED ORAL
Status: DISCONTINUED | OUTPATIENT
Start: 2021-06-19 | End: 2021-06-24 | Stop reason: HOSPADM

## 2021-06-19 RX ORDER — ACETAMINOPHEN 650 MG/1
650 SUPPOSITORY RECTAL
Status: DISCONTINUED | OUTPATIENT
Start: 2021-06-19 | End: 2021-06-24 | Stop reason: HOSPADM

## 2021-06-19 RX ORDER — MAGNESIUM SULFATE 100 %
16 CRYSTALS MISCELLANEOUS AS NEEDED
Status: DISCONTINUED | OUTPATIENT
Start: 2021-06-19 | End: 2021-06-24 | Stop reason: HOSPADM

## 2021-06-19 RX ORDER — SODIUM CHLORIDE 0.9 % (FLUSH) 0.9 %
5-40 SYRINGE (ML) INJECTION EVERY 8 HOURS
Status: DISCONTINUED | OUTPATIENT
Start: 2021-06-19 | End: 2021-06-24 | Stop reason: HOSPADM

## 2021-06-19 RX ORDER — ACETAMINOPHEN 325 MG/1
650 TABLET ORAL
Status: DISCONTINUED | OUTPATIENT
Start: 2021-06-19 | End: 2021-06-24 | Stop reason: HOSPADM

## 2021-06-19 RX ORDER — ONDANSETRON 4 MG/1
4 TABLET, ORALLY DISINTEGRATING ORAL
Status: DISCONTINUED | OUTPATIENT
Start: 2021-06-19 | End: 2021-06-24 | Stop reason: HOSPADM

## 2021-06-19 RX ORDER — DEXTROSE 50 % IN WATER (D50W) INTRAVENOUS SYRINGE
25-50 AS NEEDED
Status: DISCONTINUED | OUTPATIENT
Start: 2021-06-19 | End: 2021-06-24 | Stop reason: HOSPADM

## 2021-06-19 RX ORDER — OXYCODONE AND ACETAMINOPHEN 5; 325 MG/1; MG/1
1-2 TABLET ORAL
Status: DISCONTINUED | OUTPATIENT
Start: 2021-06-19 | End: 2021-06-22

## 2021-06-19 RX ORDER — SODIUM CHLORIDE 9 MG/ML
100 INJECTION, SOLUTION INTRAVENOUS CONTINUOUS
Status: DISCONTINUED | OUTPATIENT
Start: 2021-06-19 | End: 2021-06-24 | Stop reason: HOSPADM

## 2021-06-19 RX ORDER — ONDANSETRON 2 MG/ML
4 INJECTION INTRAMUSCULAR; INTRAVENOUS
Status: DISCONTINUED | OUTPATIENT
Start: 2021-06-19 | End: 2021-06-24 | Stop reason: HOSPADM

## 2021-06-19 RX ADMIN — LORAZEPAM 0.5 MG: 2 INJECTION INTRAMUSCULAR; INTRAVENOUS at 01:45

## 2021-06-19 RX ADMIN — ACETAMINOPHEN 650 MG: 325 TABLET ORAL at 13:52

## 2021-06-19 RX ADMIN — SODIUM CHLORIDE 100 ML/HR: 900 INJECTION, SOLUTION INTRAVENOUS at 04:25

## 2021-06-19 RX ADMIN — INSULIN LISPRO 4 UNITS: 100 INJECTION, SOLUTION INTRAVENOUS; SUBCUTANEOUS at 08:28

## 2021-06-19 RX ADMIN — PIPERACILLIN AND TAZOBACTAM 3.38 G: 3; .375 INJECTION, POWDER, LYOPHILIZED, FOR SOLUTION INTRAVENOUS at 23:20

## 2021-06-19 RX ADMIN — INSULIN LISPRO 20 UNITS: 100 INJECTION, SOLUTION INTRAVENOUS; SUBCUTANEOUS at 02:45

## 2021-06-19 RX ADMIN — INSULIN LISPRO 2 UNITS: 100 INJECTION, SOLUTION INTRAVENOUS; SUBCUTANEOUS at 17:54

## 2021-06-19 RX ADMIN — INSULIN LISPRO 2 UNITS: 100 INJECTION, SOLUTION INTRAVENOUS; SUBCUTANEOUS at 21:11

## 2021-06-19 RX ADMIN — ENOXAPARIN SODIUM 40 MG: 40 INJECTION SUBCUTANEOUS at 08:28

## 2021-06-19 RX ADMIN — Medication 10 ML: at 14:00

## 2021-06-19 RX ADMIN — VANCOMYCIN HYDROCHLORIDE 750 MG: 750 INJECTION, POWDER, LYOPHILIZED, FOR SOLUTION INTRAVENOUS at 11:33

## 2021-06-19 RX ADMIN — Medication 10 ML: at 21:12

## 2021-06-19 RX ADMIN — ACETAMINOPHEN 650 MG: 325 TABLET ORAL at 21:11

## 2021-06-19 RX ADMIN — OXYCODONE HYDROCHLORIDE AND ACETAMINOPHEN 2 TABLET: 5; 325 TABLET ORAL at 18:39

## 2021-06-19 RX ADMIN — SODIUM CHLORIDE 100 ML/HR: 900 INJECTION, SOLUTION INTRAVENOUS at 21:21

## 2021-06-19 RX ADMIN — PIPERACILLIN AND TAZOBACTAM 3.38 G: 3; .375 INJECTION, POWDER, LYOPHILIZED, FOR SOLUTION INTRAVENOUS at 17:55

## 2021-06-19 RX ADMIN — ROSUVASTATIN CALCIUM 40 MG: 20 TABLET, COATED ORAL at 21:12

## 2021-06-19 RX ADMIN — PIPERACILLIN AND TAZOBACTAM 3.38 G: 3; .375 INJECTION, POWDER, LYOPHILIZED, FOR SOLUTION INTRAVENOUS at 10:46

## 2021-06-19 RX ADMIN — PIPERACILLIN AND TAZOBACTAM 3.38 G: 3; .375 INJECTION, POWDER, LYOPHILIZED, FOR SOLUTION INTRAVENOUS at 05:00

## 2021-06-19 RX ADMIN — Medication 10 ML: at 06:00

## 2021-06-19 RX ADMIN — INSULIN LISPRO 2 UNITS: 100 INJECTION, SOLUTION INTRAVENOUS; SUBCUTANEOUS at 11:30

## 2021-06-19 RX ADMIN — HYDRALAZINE HYDROCHLORIDE 25 MG: 25 TABLET, FILM COATED ORAL at 20:57

## 2021-06-19 NOTE — ED NOTES
Bedside and Verbal shift change report given to Crissy Figueredo RN (oncoming nurse) by Sabrina Eden RN (offgoing nurse). Report included the following information SBAR, Kardex, Intake/Output, MAR, Recent Results and Med Rec Status.

## 2021-06-19 NOTE — CONSULTS
Podiatry History and Physical    Subjective:         Date of Consultation:  June 19, 2021    Referring Physician: Dr. Mili Luo    Patient is a 49 y.o. male with PMHx noted below consullted for left foot cellulitis with diabetic ulcer on left great toe. Patient was recently seen in office about 2 weeks ago for same problem and he was suppose to f/u in a week. However, it got worse and he decided to come to ER. Patient found to have increased redness and swelling with warmth to left foot and ankle and reports pain with walking and wearing shoe gear. He also had fever. Patient states that over last week his symptoms got worse. Patient Active Problem List    Diagnosis Date Noted    Severe sepsis ([facility] 06/19/2021    Acute kidney failure ([facility] 06/19/2021    Tachycardia 06/19/2021    Hyperglycemia due to type 2 diabetes mellitus ([facility] 06/19/2021    Fever 06/19/2021    Hypertension 06/19/2021    Hyponatremia 06/19/2021    Hyperglycemia 06/19/2021    Osteomyelitis ([facility] 04/02/2021    MDD (major depressive disorder), recurrent severe, without psychosis ([facility] 01/12/2021    Major depression with psychotic features ([facility] 01/11/2021     Past Medical History:   Diagnosis Date    Diabetes ([facility]     Hepatitis C infection     Hypertension     Psychiatric disorder       No family history on file. Social History     Tobacco Use    Smoking status: Current Every Day Smoker     Packs/day: 1.00    Smokeless tobacco: Never Used   Substance Use Topics    Alcohol use: Yes     Comment: 2 times a week      No past surgical history on file. Prior to Admission medications    Medication Sig Start Date End Date Taking? Authorizing Provider   hydrALAZINE (APRESOLINE) 25 mg tablet Take 1 Tab by mouth two (2) times a day. 4/6/21   Ellen Robbins MD   vancomycin 1.25 gram 1,250 mg, vial-mate 1 Device IVPB 1,250 mg by IntraVENous route every twelve (12) hours every twelve (12) hours.  4/6/21   Floyd Tinajero MD DULoxetine (CYMBALTA) 60 mg capsule Take 1 Cap by mouth daily. Indications: diabetic complication causing injury to some body nerves, major depressive disorder 21   Joe Mcfarland MD   insulin glargine (LANTUS) 100 unit/mL injection Administer 60 units underneath the skin, every night after supper  Indications: type 2 diabetes mellitus 21   Joe Mcfarland MD   insulin lispro (HUMALOG) 100 unit/mL injection Administer 10 underneath the skin, three times a day before meals. Indications: type 2 diabetes mellitus 21   Joe Mcfarland MD   rosuvastatin (CRESTOR) 40 mg tablet Take 1 Tab by mouth nightly. Indications: high cholesterol 21   Joe Mcfarland MD   traZODone (DESYREL) 50 mg tablet Take 1 Tab by mouth nightly as needed for Sleep. Indications: insomnia associated with depression 21   Joe Mcfarland MD     No Known Allergies     Review of Systems:  A comprehensive review of systems was negative except for that written in the HPI.     Objective:     Patient Vitals for the past 8 hrs:   BP Temp Pulse Resp SpO2   21 1710 (!) 142/91 100.1 °F (37.8 °C) 79 18 98 %   21 1445 -- -- 87 20 99 %   21 1435 (!) 155/73 99.5 °F (37.5 °C) 85 18 --   21 1430 131/65 -- 85 20 --   21 1415 -- -- 87 19 --   21 1400 -- -- 86 17 99 %   21 1349 -- (!) 101.3 °F (38.5 °C) -- -- --   21 1345 -- -- 88 14 98 %   21 1330 (!) 155/73 -- 86 19 99 %   21 1315 -- -- 89 17 100 %   21 1300 (!) 182/99 -- 86 17 100 %   21 1245 -- -- 84 20 99 %   21 1230 (!) 150/74 -- 86 22 97 %   21 1215 -- -- 85 14 98 %   21 1200 (!) 143/67 -- 86 24 98 %   21 1145 -- -- 85 21 97 %   21 1130 (!) 152/73 -- 85 21 99 %   21 1115 -- -- 87 22 98 %   21 1100 (!) 154/67 -- 86 -- 99 %   21 1045 -- -- 88 22 99 %     Temp (24hrs), Av.8 °F (38.2 °C), Min:99.5 °F (37.5 °C), Max:103 °F (39.4 °C)      Bilateral JEANETTE:     Musculoskeletal: Muscle strength is 5/5 for all extrinsic muscle groups of the feet bilaterally. First MTPJ and ankle range of motion is within normal limits with no pain or crepitus. Vascular: DP and PT pulses are palpable and 2/4 bilaterally. Sparse hair growth noticed on the dorsal foot and digits. Capillary refill time is less than 3 seconds to distal digits bilaterally. No edema or erythema noted. No varicosities noted. Neurological: Protective sensation is diminished to 5/10 sites of the feet bilaterally upon testing with a 5.07 [place] monofilament test. Vibratory sensation is diminished to the level of medial malleolus bilaterally. Sharp and dull sensation and 2-point discrimination is diminished to the bilateral forefoot. Achilles and patellar deep tendon reflexes are within normal limits bilaterally. Paresthesia symptoms are present to bilateral lower extremities. Dermatological: Skin is noted to have mild atrophy with diffuse xerosis. Web spaces 1-4 bilaterally are clean, dry, and intact. Left great toe ulcer measure 2 x 1 cm x 0.5 cm deep with fibrogranular base with exposure of subcutaneous tissue, undermining noted at 6 o'clock. Probing deep to periosteum or possibly bone. Purulent discharge noted from wound. Warm to touch to left foot. Mild fluctuance noted to dorsal left foot. Nail plate on the right great toe is missing.      Data Review:   Recent Results (from the past 24 hour(s))   CULTURE, BLOOD    Collection Time: 06/18/21 10:07 PM    Specimen: Blood   Result Value Ref Range    Special Requests: RIGHT  ARM        Culture result: NO GROWTH AFTER 8 HOURS     METABOLIC PANEL, COMPREHENSIVE    Collection Time: 06/18/21 10:07 PM   Result Value Ref Range    Sodium 125 (L) 136 - 145 mmol/L    Potassium 4.2 3.5 - 5.5 mmol/L    Chloride 89 (L) 100 - 111 mmol/L    CO2 26 21 - 32 mmol/L    Anion gap 10 3.0 - 18 mmol/L    Glucose 408 (HH) 74 - 99 mg/dL    BUN 21 (H) 7.0 - 18 MG/DL Creatinine 1.40 (H) 0.6 - 1.3 MG/DL    BUN/Creatinine ratio 15 12 - 20      GFR est AA >60 >60 ml/min/1.73m2    GFR est non-AA 53 (L) >60 ml/min/1.73m2    Calcium 9.6 8.5 - 10.1 MG/DL    Bilirubin, total 0.9 0.2 - 1.0 MG/DL    ALT (SGPT) 40 16 - 61 U/L    AST (SGOT) 29 10 - 38 U/L    Alk. phosphatase 73 45 - 117 U/L    Protein, total 9.5 (H) 6.4 - 8.2 g/dL    Albumin 3.5 3.4 - 5.0 g/dL    Globulin 6.0 (H) 2.0 - 4.0 g/dL    A-G Ratio 0.6 (L) 0.8 - 1.7     CBC WITH AUTOMATED DIFF    Collection Time: 06/18/21 10:07 PM   Result Value Ref Range    WBC 12.4 4.6 - 13.2 K/uL    RBC 4.40 4.35 - 5.65 M/uL    HGB 13.7 13.0 - 16.0 g/dL    HCT 38.9 36.0 - 48.0 %    MCV 88.4 74.0 - 97.0 FL    MCH 31.1 24.0 - 34.0 PG    MCHC 35.2 31.0 - 37.0 g/dL    RDW 10.7 (L) 11.6 - 14.5 %    PLATELET 348 469 - 544 K/uL    MPV 11.5 9.2 - 11.8 FL    NEUTROPHILS 76 (H) 40 - 73 %    LYMPHOCYTES 13 (L) 21 - 52 %    MONOCYTES 10 3 - 10 %    EOSINOPHILS 0 0 - 5 %    BASOPHILS 0 0 - 2 %    ABS. NEUTROPHILS 9.5 (H) 1.8 - 8.0 K/UL    ABS. LYMPHOCYTES 1.6 0.9 - 3.6 K/UL    ABS. MONOCYTES 1.2 0.05 - 1.2 K/UL    ABS. EOSINOPHILS 0.0 0.0 - 0.4 K/UL    ABS.  BASOPHILS 0.0 0.0 - 0.1 K/UL    DF AUTOMATED     LACTIC ACID    Collection Time: 06/18/21 10:07 PM   Result Value Ref Range    Lactic acid 1.6 0.4 - 2.0 MMOL/L   VANCOMYCIN, RANDOM    Collection Time: 06/18/21 10:07 PM   Result Value Ref Range    Vancomycin, random <0.8 (L) 5.0 - 40.0 UG/ML   CULTURE, BLOOD    Collection Time: 06/18/21 10:15 PM    Specimen: Blood   Result Value Ref Range    Special Requests: NO SPECIAL REQUESTS      Culture result: NO GROWTH AFTER 8 HOURS     EKG, 12 LEAD, INITIAL    Collection Time: 06/18/21 10:30 PM   Result Value Ref Range    Ventricular Rate 110 BPM    Atrial Rate 110 BPM    P-R Interval 136 ms    QRS Duration 82 ms    Q-T Interval 332 ms    QTC Calculation (Bezet) 449 ms    Calculated P Axis 46 degrees    Calculated R Axis 13 degrees    Calculated T Axis 80 degrees Diagnosis       Sinus tachycardia  Possible Left atrial enlargement  Anteroseptal infarct (cited on or before 12-JAN-2021)  Abnormal ECG  When compared with ECG of 12-JAN-2021 11:28,  Questionable change in initial forces of Anterior leads     URINALYSIS W/ RFLX MICROSCOPIC    Collection Time: 06/19/21 12:21 AM   Result Value Ref Range    Color YELLOW      Appearance CLEAR      Specific gravity 1.024 1.005 - 1.030      pH (UA) 5.0 5.0 - 8.0      Protein 100 (A) NEG mg/dL    Glucose >1,000 (A) NEG mg/dL    Ketone 15 (A) NEG mg/dL    Bilirubin Negative NEG      Blood SMALL (A) NEG      Urobilinogen 1.0 0.2 - 1.0 EU/dL    Nitrites Negative NEG      Leukocyte Esterase Negative NEG     URINE MICROSCOPIC ONLY    Collection Time: 06/19/21 12:21 AM   Result Value Ref Range    WBC 0 to 1 0 - 5 /hpf    RBC 3 to 5 0 - 5 /hpf    Epithelial cells FEW 0 - 5 /lpf    Bacteria FEW (A) NEG /hpf   GLUCOSE, POC    Collection Time: 06/19/21  2:37 AM   Result Value Ref Range    Glucose (POC) 330 (H) 70 - 110 mg/dL   HEPATIC FUNCTION PANEL    Collection Time: 06/19/21  5:20 AM   Result Value Ref Range    Protein, total 7.0 6.4 - 8.2 g/dL    Albumin 2.7 (L) 3.4 - 5.0 g/dL    Globulin 4.3 (H) 2.0 - 4.0 g/dL    A-G Ratio 0.6 (L) 0.8 - 1.7      Bilirubin, total 1.0 0.2 - 1.0 MG/DL    Bilirubin, direct 0.5 (H) 0.0 - 0.2 MG/DL    Alk.  phosphatase 48 45 - 117 U/L    AST (SGOT) 23 10 - 38 U/L    ALT (SGPT) 27 16 - 61 U/L   HEMOGLOBIN A1C W/O EAG    Collection Time: 06/19/21  5:20 AM   Result Value Ref Range    Hemoglobin A1c 11.3 (H) 4.2 - 5.6 %   TSH 3RD GENERATION    Collection Time: 06/19/21  5:20 AM   Result Value Ref Range    TSH 1.24 0.36 - 3.74 uIU/mL   PROCALCITONIN    Collection Time: 06/19/21  5:20 AM   Result Value Ref Range    Procalcitonin 7.74 ng/mL   METABOLIC PANEL, BASIC    Collection Time: 06/19/21  5:20 AM   Result Value Ref Range    Sodium 133 (L) 136 - 145 mmol/L    Potassium 3.9 3.5 - 5.5 mmol/L    Chloride 102 100 - 111 mmol/L    CO2 26 21 - 32 mmol/L    Anion gap 5 3.0 - 18 mmol/L    Glucose 215 (H) 74 - 99 mg/dL    BUN 16 7.0 - 18 MG/DL    Creatinine 1.01 0.6 - 1.3 MG/DL    BUN/Creatinine ratio 16 12 - 20      GFR est AA >60 >60 ml/min/1.73m2    GFR est non-AA >60 >60 ml/min/1.73m2    Calcium 8.8 8.5 - 10.1 MG/DL   VITAMIN B12 & FOLATE    Collection Time: 06/19/21  5:20 AM   Result Value Ref Range    Vitamin B12 392 211 - 911 pg/mL    Folate >20.0 (H) 3.10 - 17.50 ng/mL   MAGNESIUM    Collection Time: 06/19/21  5:20 AM   Result Value Ref Range    Magnesium 2.2 1.6 - 2.6 mg/dL   GLUCOSE, POC    Collection Time: 06/19/21  8:23 AM   Result Value Ref Range    Glucose (POC) 216 (H) 70 - 110 mg/dL   GLUCOSE, POC    Collection Time: 06/19/21 10:52 AM   Result Value Ref Range    Glucose (POC) 181 (H) 70 - 110 mg/dL   GLUCOSE, POC    Collection Time: 06/19/21  3:40 PM   Result Value Ref Range    Glucose (POC) 189 (H) 70 - 110 mg/dL         Impression:     Left great toe diabetic ulcer concerning for osteomyelitis, left foot cellulitis, diabetic neuropathy    Recommendation:     - x-ray of left foot reviewed. Negative for acute bony pathology. Soft tissue swelling noted to great toe. - Swab wound culture obtained and sent for gram stain sensitivity.   - MRI of left forefoot with contrast ordered. - Will need OR debridement with bone biopsy and possibly partial amputation of great toe pending of MRI results. - Meantime remain NWB to left forefoot. - Dressed with dry gauze. Thank you for allowing me to participate in Mr. Bk Martinez fady.

## 2021-06-19 NOTE — PROGRESS NOTES
1935-Assumed care from   Pt in bed resting alert and oriented x3 denies pain at the moment. 2001Assessement completed. Pt has dressing to left foot CDI - pt able to wiggle toes , pt reports feeling numb to bilateral foot. Right great toe missing toe nail , toe numb to touch. Plan of care for the shift explained pt verbalized understanding. IVF infusing well, HOB elevated, bed low and in locked position. Call light and frequently used items within reach. 2111- Pt' temp 101- tylenol 650 mg po given will recheck temp    2322- Temp rechecked 99.5- Pt awake watching tv. Scheduled antibiotic hung infusing well. 0004- Vanco hung infusing well. 0012- Pt medicated for pain with Percocet 2 tab pain left foot 8/10 - pt awake watching tv.   0110- Pt sleeping  0400- Pt sitting at bedside watching tv via his cellophone- pt said that he was diaphoretic and so wanted to take his t shirt off. - nurse assisted to take the shirt off, pt requested that he may wash up. Wipes/ wash clothes/ towel provided pt washed up independently. Clean gown provided, bed linen changed. When nurse wanted to recheck the blood sugar pt declined. Pt said that sweating was not a result of low blood sugar, he was feeling warm. 0435-Pt in bed sleeping. 0630- Pt sleeping IVF infusing well. 0740- Bedside and Verbal shift change report given to Almyra Essex, RN (oncoming nurse) by Hanane Marshall RN (offgoing nurse). Report included the following information SBAR, Kardex, Intake/Output, MAR and Recent Results.

## 2021-06-19 NOTE — H&P
GENERAL GENERIC H&P/CONSULT    CC: Left great toe pain    Subjective:  65-year-old -American male presenting for left great toe pain. Patient has past medical history of diabetes acute kidney injury hypertension and hepatitis C infection. Patient reports 1 week of progressive left toe pain and worsening over the past 3 days with associated ankle and calf pain as well. Patient has had 103 degree fever and uncontrolled hyperglycemia. Patient denies any syncope or chest pain. Past Medical History:   Diagnosis Date    Diabetes ([address]     Hepatitis C infection     Hypertension     Psychiatric disorder       No past surgical history on file. Prior to Admission medications    Medication Sig Start Date End Date Taking? Authorizing Provider   hydrALAZINE (APRESOLINE) 25 mg tablet Take 1 Tab by mouth two (2) times a day. 4/6/21   Doug Robbins MD   vancomycin 1.25 gram 1,250 mg, vial-mate 1 Device IVPB 1,250 mg by IntraVENous route every twelve (12) hours every twelve (12) hours. 4/6/21   Doug Robbins MD   DULoxetine (CYMBALTA) 60 mg capsule Take 1 Cap by mouth daily. Indications: diabetic complication causing injury to some body nerves, major depressive disorder 1/22/21   Nela Nicole MD   insulin glargine (LANTUS) 100 unit/mL injection Administer 60 units underneath the skin, every night after supper  Indications: type 2 diabetes mellitus 1/21/21   Nela Nicole MD   insulin lispro (HUMALOG) 100 unit/mL injection Administer 10 underneath the skin, three times a day before meals. Indications: type 2 diabetes mellitus 1/21/21   Nela Nicole MD   rosuvastatin (CRESTOR) 40 mg tablet Take 1 Tab by mouth nightly. Indications: high cholesterol 1/21/21   Nela Nicole MD   traZODone (DESYREL) 50 mg tablet Take 1 Tab by mouth nightly as needed for Sleep.  Indications: insomnia associated with depression 1/21/21   Nela Nicole MD     No Known Allergies   Social History Tobacco Use    Smoking status: Current Every Day Smoker     Packs/day: 1.00    Smokeless tobacco: Never Used   Substance Use Topics    Alcohol use: Yes     Comment: 2 times a week       No family history on file. Review of Systems   Constitutional: Positive for activity change. HENT: Negative for congestion. Eyes: Negative for discharge. Respiratory: Negative for apnea. Cardiovascular: Negative for chest pain. Gastrointestinal: Negative for abdominal distention. Endocrine: Negative for cold intolerance. Genitourinary: Positive for urgency. Musculoskeletal: Negative for arthralgias. Skin: Positive for wound. Allergic/Immunologic: Negative for environmental allergies. Neurological: Negative for dizziness. Hematological: Negative for adenopathy. Psychiatric/Behavioral: Negative for agitation. Objective:    No intake/output data recorded. No intake/output data recorded. Patient Vitals for the past 8 hrs:   BP Temp Pulse Resp SpO2 Height Weight   06/19/21 0100 -- -- 99 23 98 % -- --   06/19/21 0045 -- -- 99 16 99 % -- --   06/19/21 0030 (!) 169/77 -- (!) 101 19 99 % -- --   06/19/21 0015 -- -- (!) 101 21 98 % -- --   06/19/21 0000 (!) 177/79 -- (!) 109 22 98 % -- --   06/18/21 2345 -- -- (!) 104 20 99 % -- --   06/18/21 2330 (!) 186/92 -- (!) 104 21 99 % -- --   06/18/21 2319 -- -- -- -- 99 % -- --   06/18/21 2315 (!) 182/91 -- (!) 106 14 100 % -- --   06/18/21 2217 -- -- -- -- -- 5' 11\" (1.803 m) --   06/18/21 2153 (!) 145/85 (!) 103 °F (39.4 °C) (!) 113 (!) 111 99 % -- 72.6 kg (160 lb)     Physical Exam  HENT:      Head: Normocephalic. Nose: Nose normal.      Mouth/Throat:      Mouth: Mucous membranes are moist.   Eyes:      Pupils: Pupils are equal, round, and reactive to light. Cardiovascular:      Rate and Rhythm: Tachycardia present. Pulses: Normal pulses. Pulmonary:      Effort: Pulmonary effort is normal.   Abdominal:      General: Abdomen is flat.  There is no distension. Tenderness: There is no abdominal tenderness. Genitourinary:     Comments: deferred  Musculoskeletal:         General: Normal range of motion. Cervical back: Normal range of motion. Skin:     Comments: Left great toe wound   Neurological:      General: No focal deficit present. Mental Status: He is alert. Psychiatric:         Mood and Affect: Mood normal.          Labs:    Recent Results (from the past 24 hour(s))   METABOLIC PANEL, COMPREHENSIVE    Collection Time: 06/18/21 10:07 PM   Result Value Ref Range    Sodium 125 (L) 136 - 145 mmol/L    Potassium 4.2 3.5 - 5.5 mmol/L    Chloride 89 (L) 100 - 111 mmol/L    CO2 26 21 - 32 mmol/L    Anion gap 10 3.0 - 18 mmol/L    Glucose 408 (HH) 74 - 99 mg/dL    BUN 21 (H) 7.0 - 18 MG/DL    Creatinine 1.40 (H) 0.6 - 1.3 MG/DL    BUN/Creatinine ratio 15 12 - 20      GFR est AA >60 >60 ml/min/1.73m2    GFR est non-AA 53 (L) >60 ml/min/1.73m2    Calcium 9.6 8.5 - 10.1 MG/DL    Bilirubin, total 0.9 0.2 - 1.0 MG/DL    ALT (SGPT) 40 16 - 61 U/L    AST (SGOT) 29 10 - 38 U/L    Alk. phosphatase 73 45 - 117 U/L    Protein, total 9.5 (H) 6.4 - 8.2 g/dL    Albumin 3.5 3.4 - 5.0 g/dL    Globulin 6.0 (H) 2.0 - 4.0 g/dL    A-G Ratio 0.6 (L) 0.8 - 1.7     CBC WITH AUTOMATED DIFF    Collection Time: 06/18/21 10:07 PM   Result Value Ref Range    WBC 12.4 4.6 - 13.2 K/uL    RBC 4.40 4.35 - 5.65 M/uL    HGB 13.7 13.0 - 16.0 g/dL    HCT 38.9 36.0 - 48.0 %    MCV 88.4 74.0 - 97.0 FL    MCH 31.1 24.0 - 34.0 PG    MCHC 35.2 31.0 - 37.0 g/dL    RDW 10.7 (L) 11.6 - 14.5 %    PLATELET 301 955 - 500 K/uL    MPV 11.5 9.2 - 11.8 FL    NEUTROPHILS 76 (H) 40 - 73 %    LYMPHOCYTES 13 (L) 21 - 52 %    MONOCYTES 10 3 - 10 %    EOSINOPHILS 0 0 - 5 %    BASOPHILS 0 0 - 2 %    ABS. NEUTROPHILS 9.5 (H) 1.8 - 8.0 K/UL    ABS. LYMPHOCYTES 1.6 0.9 - 3.6 K/UL    ABS. MONOCYTES 1.2 0.05 - 1.2 K/UL    ABS. EOSINOPHILS 0.0 0.0 - 0.4 K/UL    ABS.  BASOPHILS 0.0 0.0 - 0.1 K/UL    DF AUTOMATED     LACTIC ACID    Collection Time: 06/18/21 10:07 PM   Result Value Ref Range    Lactic acid 1.6 0.4 - 2.0 MMOL/L   VANCOMYCIN, RANDOM    Collection Time: 06/18/21 10:07 PM   Result Value Ref Range    Vancomycin, random <0.8 (L) 5.0 - 40.0 UG/ML   URINALYSIS W/ RFLX MICROSCOPIC    Collection Time: 06/19/21 12:21 AM   Result Value Ref Range    Color YELLOW      Appearance CLEAR      Specific gravity 1.024 1.005 - 1.030      pH (UA) 5.0 5.0 - 8.0      Protein 100 (A) NEG mg/dL    Glucose >1,000 (A) NEG mg/dL    Ketone 15 (A) NEG mg/dL    Bilirubin Negative NEG      Blood SMALL (A) NEG      Urobilinogen 1.0 0.2 - 1.0 EU/dL    Nitrites Negative NEG      Leukocyte Esterase Negative NEG     URINE MICROSCOPIC ONLY    Collection Time: 06/19/21 12:21 AM   Result Value Ref Range    WBC 0 to 1 0 - 5 /hpf    RBC 3 to 5 0 - 5 /hpf    Epithelial cells FEW 0 - 5 /lpf    Bacteria FEW (A) NEG /hpf        Chest X-Ray: pending    Left foot xray  There is a dressing overlying the medial aspect of the great toe at the level of  the first interphalangeal joint in the region of skin irregularity/ulceration. The dressing slightly limits assessment of osseous detail. No cortical  destruction or aggressive periosteal reaction appreciated. Arterial vascular  calcifications are noted. Mild talonavicular joint degenerative change. Mild  first MTP joint osteoarthritis. Calcaneal enthesopathy. Increased lucency within  the fourth proximal phalanx with unchanged appearance compared to 9/14/2020] the  appreciable marrow placement on the MRI from 9/14/2020 which suggests this is  most likely reflects focal osteopenia.     No acute fracture or dislocation is detected. The Lisfranc interval appears  unremarkable however there is limited assessment of the Lisfranc interval and  alignment these nonweightbearing views. IMPRESSION  Great toe ulcer with overlying dressing.  No radiographic evidence of acute  osteomyelitis; although please note that MRI is a more sensitive examination.      Assessment:  Active Problems:    Severe sepsis ([address] (6/19/2021)      Acute kidney failure ([address] (6/19/2021)      Tachycardia (6/19/2021)      Hyperglycemia due to type 2 diabetes mellitus ([address] (6/19/2021)      Fever (6/19/2021)      Hypertension (6/19/2021)      Hyponatremia (6/19/2021)        Plan:  Follow up podiatry recommendations  +/- MRI of left great toe--defer to podiatry  Empiric antibiotics with zosyn and vancomycin  NS at 100mL /hr  Sliding scale insulin achs  Drug screen    GLOBAL:  Admit to: telemetry  Cardiac Diet  DVT PPX:lovneox 40mg qD  Full Code  PT/OT  Tylenol/NSAID for pain  Optional Inpatient Sleep Regimen  Anticipate DC 1-2 days    Signed:  Joanie Reza MD 6/19/2021

## 2021-06-19 NOTE — ED PROVIDER NOTES
EMERGENCY DEPARTMENT HISTORY AND PHYSICAL EXAM    10:39 PM severe cramping in ER approximately 60 patients, only 7 nurses 1 tach, were on code black. Patient is seen in the triage room as this is where we have space. Date: 6/18/2021  Patient Name: Tisha Ho    History of Presenting Illness     Chief Complaint   Patient presents with    Foot Pain    Fever         History Provided By: patient    Additional History (Context): Tisha Client is a 49 y.o. male presents with 3 days of worsening pain in his right great toe now in his ankle and calf as well as measured fevers. He is a diabetic. Rates his pain is severe. No syncope. Jackie Choi PCP: GOMEZ Garduno    Chief Complaint:   Duration:    Timing:    Location:   Quality:   Severity:   Modifying Factors:   Associated Symptoms:       Current Facility-Administered Medications   Medication Dose Route Frequency Provider Last Rate Last Admin    vancomycin (VANCOCIN) 750 mg in 0.9% sodium chloride 250 mL (VIAL-MATE)  750 mg IntraVENous Q12H Mode Patino, [address]        sodium chloride (NS) flush 5-10 mL  5-10 mL IntraVENous PRN GALEN Carbajal        piperacillin-tazobactam (ZOSYN) 3.375 g in 0.9% sodium chloride (MBP/ADV) 100 mL MBP  3.375 g IntraVENous Q6H GALEN Potts 200 mL/hr at 06/18/21 2250 3.375 g at 06/18/21 2250    sodium chloride 0.9 % bolus infusion 178 mL  178 mL IntraVENous ONCE Mode Patino, [address]         Current Outpatient Medications   Medication Sig Dispense Refill    hydrALAZINE (APRESOLINE) 25 mg tablet Take 1 Tab by mouth two (2) times a day. 90 Tab 0    vancomycin 1.25 gram 1,250 mg, vial-mate 1 Device IVPB 1,250 mg by IntraVENous route every twelve (12) hours every twelve (12) hours. 10 Dose 0    DULoxetine (CYMBALTA) 60 mg capsule Take 1 Cap by mouth daily.  Indications: diabetic complication causing injury to some body nerves, major depressive disorder 30 Cap 0    insulin glargine (LANTUS) 100 unit/mL injection Administer 60 units underneath the skin, every night after supper  Indications: type 2 diabetes mellitus 1 Vial 0    insulin lispro (HUMALOG) 100 unit/mL injection Administer 10 underneath the skin, three times a day before meals. Indications: type 2 diabetes mellitus 1 Vial 0    rosuvastatin (CRESTOR) 40 mg tablet Take 1 Tab by mouth nightly. Indications: high cholesterol 30 Tab 0    traZODone (DESYREL) 50 mg tablet Take 1 Tab by mouth nightly as needed for Sleep. Indications: insomnia associated with depression 30 Tab 0       Past History     Past Medical History:  Past Medical History:   Diagnosis Date    Diabetes ([facility] [address]     Hepatitis C infection     Hypertension     Psychiatric disorder        Past Surgical History:  No past surgical history on file. Family History:  No family history on file. Social History:  Social History     Tobacco Use    Smoking status: Current Every Day Smoker     Packs/day: 1.00    Smokeless tobacco: Never Used   Substance Use Topics    Alcohol use: Yes     Comment: 2 times a week     Drug use: Yes     Types: Cocaine       Allergies:  No Known Allergies      Review of Systems     Review of Systems   Constitutional: Positive for fever. Negative for diaphoresis. HENT: Negative for congestion and sore throat. Eyes: Negative for pain and itching. Respiratory: Negative for cough and shortness of breath. Cardiovascular: Negative for chest pain and palpitations. Gastrointestinal: Negative for abdominal pain and diarrhea. Endocrine: Negative for polydipsia and polyuria. Genitourinary: Negative for dysuria and hematuria. Musculoskeletal: Negative for arthralgias and myalgias. Skin: Negative for rash and wound. Left great toe wound   Neurological: Negative for seizures and syncope. Hematological: Does not bruise/bleed easily. Psychiatric/Behavioral: Negative for agitation and hallucinations.          Physical Exam       Patient Vitals for the past 12 hrs:   Temp Pulse Resp BP SpO2   06/19/21 0100 -- 99 23 -- 98 %   06/19/21 0045 -- 99 16 -- 99 %   06/19/21 0030 -- (!) 101 19 (!) 169/77 99 %   06/19/21 0015 -- (!) 101 21 -- 98 %   06/19/21 0000 -- (!) 109 22 (!) 177/79 98 %   06/18/21 2345 -- (!) 104 20 -- 99 %   06/18/21 2330 -- (!) 104 21 (!) 186/92 99 %   06/18/21 2319 -- -- -- -- 99 %   06/18/21 2315 -- (!) 106 14 (!) 182/91 100 %   06/18/21 2153 (!) 103 °F (39.4 °C) (!) 113 (!) 111 (!) 145/85 99 %       Physical Exam  Vitals and nursing note reviewed. Constitutional:       Appearance: Normal appearance. He is well-developed. HENT:      Head: Normocephalic and atraumatic. Eyes:      General: No scleral icterus. Conjunctiva/sclera: Conjunctivae normal.   Neck:      Vascular: No JVD. Cardiovascular:      Rate and Rhythm: Normal rate and regular rhythm. Heart sounds: Normal heart sounds. Comments: 4 intact extremity pulses  Pulmonary:      Effort: Pulmonary effort is normal.      Breath sounds: Normal breath sounds. Abdominal:      Palpations: Abdomen is soft. There is no mass. Tenderness: There is no abdominal tenderness. Musculoskeletal:         General: Normal range of motion. Cervical back: Normal range of motion and neck supple. Comments: Left t great toe bandaged, some warmth and tenderness of the foot and leg   Lymphadenopathy:      Cervical: No cervical adenopathy. Skin:     General: Skin is warm and dry. Neurological:      Mental Status: He is alert.            Diagnostic Study Results   Labs -  Recent Results (from the past 12 hour(s))   METABOLIC PANEL, COMPREHENSIVE    Collection Time: 06/18/21 10:07 PM   Result Value Ref Range    Sodium 125 (L) 136 - 145 mmol/L    Potassium 4.2 3.5 - 5.5 mmol/L    Chloride 89 (L) 100 - 111 mmol/L    CO2 26 21 - 32 mmol/L    Anion gap 10 3.0 - 18 mmol/L    Glucose 408 (HH) 74 - 99 mg/dL    BUN 21 (H) 7.0 - 18 MG/DL    Creatinine 1.40 (H) 0.6 - 1.3 MG/DL BUN/Creatinine ratio 15 12 - 20      GFR est AA >60 >60 ml/min/1.73m2    GFR est non-AA 53 (L) >60 ml/min/1.73m2    Calcium 9.6 8.5 - 10.1 MG/DL    Bilirubin, total 0.9 0.2 - 1.0 MG/DL    ALT (SGPT) 40 16 - 61 U/L    AST (SGOT) 29 10 - 38 U/L    Alk. phosphatase 73 45 - 117 U/L    Protein, total 9.5 (H) 6.4 - 8.2 g/dL    Albumin 3.5 3.4 - 5.0 g/dL    Globulin 6.0 (H) 2.0 - 4.0 g/dL    A-G Ratio 0.6 (L) 0.8 - 1.7     CBC WITH AUTOMATED DIFF    Collection Time: 06/18/21 10:07 PM   Result Value Ref Range    WBC 12.4 4.6 - 13.2 K/uL    RBC 4.40 4.35 - 5.65 M/uL    HGB 13.7 13.0 - 16.0 g/dL    HCT 38.9 36.0 - 48.0 %    MCV 88.4 74.0 - 97.0 FL    MCH 31.1 24.0 - 34.0 PG    MCHC 35.2 31.0 - 37.0 g/dL    RDW 10.7 (L) 11.6 - 14.5 %    PLATELET 896 769 - 865 K/uL    MPV 11.5 9.2 - 11.8 FL    NEUTROPHILS 76 (H) 40 - 73 %    LYMPHOCYTES 13 (L) 21 - 52 %    MONOCYTES 10 3 - 10 %    EOSINOPHILS 0 0 - 5 %    BASOPHILS 0 0 - 2 %    ABS. NEUTROPHILS 9.5 (H) 1.8 - 8.0 K/UL    ABS. LYMPHOCYTES 1.6 0.9 - 3.6 K/UL    ABS. MONOCYTES 1.2 0.05 - 1.2 K/UL    ABS. EOSINOPHILS 0.0 0.0 - 0.4 K/UL    ABS.  BASOPHILS 0.0 0.0 - 0.1 K/UL    DF AUTOMATED     LACTIC ACID    Collection Time: 06/18/21 10:07 PM   Result Value Ref Range    Lactic acid 1.6 0.4 - 2.0 MMOL/L   VANCOMYCIN, RANDOM    Collection Time: 06/18/21 10:07 PM   Result Value Ref Range    Vancomycin, random <0.8 (L) 5.0 - 40.0 UG/ML   URINALYSIS W/ RFLX MICROSCOPIC    Collection Time: 06/19/21 12:21 AM   Result Value Ref Range    Color YELLOW      Appearance CLEAR      Specific gravity 1.024 1.005 - 1.030      pH (UA) 5.0 5.0 - 8.0      Protein 100 (A) NEG mg/dL    Glucose >1,000 (A) NEG mg/dL    Ketone 15 (A) NEG mg/dL    Bilirubin Negative NEG      Blood SMALL (A) NEG      Urobilinogen 1.0 0.2 - 1.0 EU/dL    Nitrites Negative NEG      Leukocyte Esterase Negative NEG     URINE MICROSCOPIC ONLY    Collection Time: 06/19/21 12:21 AM   Result Value Ref Range    WBC 0 to 1 0 - 5 /hpf RBC 3 to 5 0 - 5 /hpf    Epithelial cells FEW 0 - 5 /lpf    Bacteria FEW (A) NEG /hpf       Radiologic Studies -   XR CHEST PORT    (Results Pending)   XR FOOT RT MIN 3 V    (Results Pending)     No results found. Medications ordered:   Medications   sodium chloride (NS) flush 5-10 mL (has no administration in time range)   piperacillin-tazobactam (ZOSYN) 3.375 g in 0.9% sodium chloride (MBP/ADV) 100 mL MBP (3.375 g IntraVENous New Bag 6/18/21 2250)   sodium chloride 0.9 % bolus infusion 1,000 mL (1,000 mL IntraVENous New Bag 6/18/21 2244)     Followed by   sodium chloride 0.9 % bolus infusion 1,000 mL (1,000 mL IntraVENous New Bag 6/18/21 2245)     Followed by   sodium chloride 0.9 % bolus infusion 178 mL (has no administration in time range)   vancomycin (VANCOCIN) 750 mg in 0.9% sodium chloride 250 mL (VIAL-MATE) (has no administration in time range)   vancomycin (VANCOCIN) 1500 mg in  ml infusion (1,500 mg IntraVENous New Bag 6/18/21 2302)         Medical Decision Making   Initial Medical Decision Making and DDx:  Reviewed x-rays of the left great toe, no erosive process on my read. His sugar is uncontrolled will give dose of insulin. No signs of DKA or honk. Severe sepsis, organ failure of JORGE. He has been hydrated lactate is negative ABX given. Discussed with Dr. Chun Bro hospitalist he agrees with admission to telemetry unit. Dr. Raissa Beaver podiatry put on treatment team. 1:19 AM so have not called him. ED Course: Progress Notes, Reevaluation, and Consults:  ED Course as of Jun 19 0118 Fri Jun 18, 2021   2255 Twelve-lead EKG sinus tachycardia at 110. [CB]      ED Course User Index  [CB] Alberta Guerrero MD         I am the first provider for this patient. I reviewed the vital signs, available nursing notes, past medical history, past surgical history, family history and social history.     Patient Vitals for the past 12 hrs:   Temp Pulse Resp BP SpO2   06/19/21 0100 -- 99 23 -- 98 % 06/19/21 0045 -- 99 16 -- 99 %   06/19/21 0030 -- (!) 101 19 (!) 169/77 99 %   06/19/21 0015 -- (!) 101 21 -- 98 %   06/19/21 0000 -- (!) 109 22 (!) 177/79 98 %   06/18/21 2345 -- (!) 104 20 -- 99 %   06/18/21 2330 -- (!) 104 21 (!) 186/92 99 %   06/18/21 2319 -- -- -- -- 99 %   06/18/21 2315 -- (!) 106 14 (!) 182/91 100 %   06/18/21 2153 (!) 103 °F (39.4 °C) (!) 113 (!) 111 (!) 145/85 99 %       Vital Signs-Reviewed the patient's vital signs. Pulse Oximetry Analysis, Cardiac Monitor, 12 lead ekg:       Interpreted by the EP. Records Reviewed: Nursing notes reviewed (Time of Review: 10:39 PM)    Procedures:   Critical Care Time:   Aspirin: (was aspirin given for stroke?)    Diagnosis     Clinical Impression:   1. Type 2 diabetes mellitus with diabetic peripheral angiopathy without gangrene, with long-term current use of insulin ([facility] [address]    2. Severe sepsis ([facility] [address]    3. [facility]        Disposition: Admitted      Follow-up Information    None          Patient's Medications   Start Taking    No medications on file   Continue Taking    DULOXETINE (CYMBALTA) 60 MG CAPSULE    Take 1 Cap by mouth daily. Indications: diabetic complication causing injury to some body nerves, major depressive disorder    HYDRALAZINE (APRESOLINE) 25 MG TABLET    Take 1 Tab by mouth two (2) times a day. INSULIN GLARGINE (LANTUS) 100 UNIT/ML INJECTION    Administer 60 units underneath the skin, every night after supper  Indications: type 2 diabetes mellitus    INSULIN LISPRO (HUMALOG) 100 UNIT/ML INJECTION    Administer 10 underneath the skin, three times a day before meals. Indications: type 2 diabetes mellitus    ROSUVASTATIN (CRESTOR) 40 MG TABLET    Take 1 Tab by mouth nightly. Indications: high cholesterol    TRAZODONE (DESYREL) 50 MG TABLET    Take 1 Tab by mouth nightly as needed for Sleep.  Indications: insomnia associated with depression    VANCOMYCIN 1.25 GRAM 1,250 MG, VIAL-MATE 1 DEVICE IVPB    1,250 mg by IntraVENous route every twelve (12) hours every twelve (12) hours.    These Medications have changed    No medications on file   Stop Taking    No medications on file     _______________________________    Notes:    Araseli Acosta MD using Dragon dictation      _______________________________

## 2021-06-19 NOTE — PROGRESS NOTES
OCCUPATIONAL THERAPY EVALUATION/DISCHARGE    Patient: Marisel Peña [de-identified]49 y.o. male)  Date: 6/19/2021  Primary Diagnosis: Severe sepsis ([facility] [address] [A41.9, R65.20]  Hyperglycemia [R73.9]        Precautions:   Contact (MRSA)  PLOF:Pt reports being independent in ADLs and functional mobility. ASSESSMENT AND RECOMMENDATIONS:  Based on the objective data described below, the patient presents with ability to perform basic ADLs and functional transfers at baseline level of function. Pt declined OOB at this time due to pain in L toe. Reports he was able to get up earlier with PT without difficulty and that he is able to perform his ADLs at baseline. Pt demonstrates WFL strength in BUE and was able to perform bed mobility independently. Pt reports feeling off balance sometimes when he experiencing increased pain. Educated pt on safety precautions and on using shower chair in the shower to prevent falls during ADLs. Pt verbalized understanding. Pt reports no questions/concerns for OT at this time. Skilled occupational therapy is not indicated at this time. Discharge Recommendations: None  Further Equipment Recommendations for Discharge: shower chair      SUBJECTIVE:   Patient stated I manage doing everything for myself. I don't have anyone to help me.     OBJECTIVE DATA SUMMARY:     Past Medical History:   Diagnosis Date    Diabetes ([facility]     Hepatitis C infection     Hypertension     Psychiatric disorder    No past surgical history on file.   Barriers to Learning/Limitations: None  Compensate with: visual, verbal, tactile, kinesthetic cues/model    Home Situation:   Home Situation  Home Environment: Private residence (Rents room in a house)  # Steps to Enter: 3  Living Alone: No  Support Systems: Friends \ neighbors  Current DME Used/Available at Home: None  Tub or Shower Type: Shower  [x]     Right hand dominant   []     Left hand dominant    Cognitive/Behavioral Status:  Neurologic State: Alert  Orientation Level: Oriented X4  Cognition: Follows commands  Safety/Judgement: Fall prevention    Skin: visible skin intact  Edema: none noted    Vision/Perceptual:       Acuity: Able to read clock/calendar on wall without difficulty     Coordination: BUE  Coordination: Within functional limits  Fine Motor Skills-Upper: Left Intact; Right Intact    Gross Motor Skills-Upper: Left Intact; Right Intact    Balance:  Sitting: Intact  Standing: Intact    Strength: BUE  Strength: Within functional limits   Tone & Sensation: BUE  Tone: Normal  Sensation: Impaired (Pt reports bilateral CTS)   Range of Motion: BUE  AROM: Within functional limits   Functional Mobility and Transfers for ADLs:  Bed Mobility:     Supine to Sit: Independent  Sit to Supine: Independent     Transfers:  Sit to Stand: Independent  Stand to Sit: Independent     ADL Assessment:  Feeding: Independent  Oral Facial Hygiene/Grooming: Independent  Bathing: Modified independent  Upper Body Dressing: Modified independent  Lower Body Dressing: Modified independent  Toileting: Modified independent      ADL Intervention:   Cognitive Retraining  Safety/Judgement: Fall prevention  Pain:  Pain level pre-treatment: 10/10   Pain level post-treatment: 10/10     Activity Tolerance:   Fair  Please refer to the flowsheet for vital signs taken during this treatment. After treatment:   []  Patient left in no apparent distress sitting up in chair  [x]  Patient left in no apparent distress in bed  [x]  Call bell left within reach  [x]  Nursing notified  []  Caregiver present  []  Bed alarm activated    COMMUNICATION/EDUCATION:   [x]      Role of Occupational Therapy in the acute care setting  [x]      Home safety education was provided and the patient/caregiver indicated understanding. [x]      Patient/family have participated as able and agree with findings and recommendations. []      Patient is unable to participate in plan of care at this time.     Thank you for this referral.  Rosa Lofton Prema Lao OTR/L  Time Calculation: 8 mins      Eval Complexity: History: LOW Complexity : Brief history review ; Examination: LOW Complexity : 1-3 performance deficits relating to physical, cognitive , or psychosocial skils that result in activity limitations and / or participation restrictions ;    Decision Making:LOW Complexity : No comorbidities that affect functional and no verbal or physical assistance needed to complete eval tasks

## 2021-06-19 NOTE — ED NOTES
Report included the following information SBAR, Kardex, MAR and Recent Results.     SITUATION:    Code Status: Full Code   Reason for Admission: Severe sepsis ([facility] [A41.9, R65.20]   Hyperglycemia [R73.9]    [facility] day: 1   Problem List:       Hospital Problems  Never Reviewed        Codes Class Noted POA    Severe sepsis ([facility] ICD-10-CM: A41.9, R65.20  ICD-9-CM: 038.9, 995.92  6/19/2021 Unknown        Acute kidney failure ([facility] ICD-10-CM: N17.9  ICD-9-CM: 584.9  6/19/2021 Unknown        Tachycardia ICD-10-CM: R00.0  ICD-9-CM: 785.0  6/19/2021 Unknown        Hyperglycemia due to type 2 diabetes mellitus ([facility] ICD-10-CM: E11.65  ICD-9-CM: 250.00  6/19/2021 Unknown        Fever ICD-10-CM: R50.9  ICD-9-CM: 780.60  6/19/2021 Unknown        Hypertension ICD-10-CM: I10  ICD-9-CM: 401.9  6/19/2021 Unknown        Hyponatremia ICD-10-CM: E87.1  ICD-9-CM: 276.1  6/19/2021 Unknown        Hyperglycemia ICD-10-CM: R73.9  ICD-9-CM: 790.29  6/19/2021 Unknown              BACKGROUND:    Past Medical History:   Past Medical History:   Diagnosis Date    Diabetes ([facility]     Hepatitis C infection     Hypertension     Psychiatric disorder          Patient taking anticoagulants yes     ASSESSMENT:    Changes in Assessment Throughout Shift: Febrile     Patient has Central Line: no Reasons if yes:    Patient has Kendrick Cath: no Reasons if yes:       Last Vitals:     Vitals:    06/19/21 1400 06/19/21 1415 06/19/21 1430 06/19/21 1435   BP:    (!) 155/73   Pulse: 86 87 85 85   Resp: 17 19 20 18   Temp:    99.5 °F (37.5 °C)   SpO2: 99%      Weight:       Height:            IV and DRAINS (will only show if present)   Peripheral IV 06/18/21 Right;Upper Arm-Site Assessment: Clean, dry, & intact     WOUND (if present)   Wound Type: Necrotic Left Great Toe   Dressing present     Wound Concerns/Notes: Surgical Consult     PAIN    Pain Assessment    Pain Intensity 1: 8 (06/18/21 2320)    Pain Location 1: Foot (left great toe) Patient Stated Pain Goal: 0  o Interventions for Pain:  none  o Intervention effective:   o Time of last intervention:  o Reassessment Completed: no      Last 3 Weights:  Last 3 Recorded Weights in this Encounter    06/18/21 2153   Weight: 72.6 kg (160 lb)     Weight change:      INTAKE/OUPUT    Current Shift: 06/19 0701 - 06/19 1900  In: -   Out: 650 [Urine:650]    Last three shifts: 06/17 1901 - 06/19 0700  In: 2778 [I.V.:2778]  Out: -      LAB RESULTS     Recent Labs     06/18/21 2207   WBC 12.4   HGB 13.7   HCT 38.9           Recent Labs     06/19/21  0520 06/18/21 2207   * 125*   K 3.9 4.2   * 408*   BUN 16 21*   CREA 1.01 1.40*   CA 8.8 9.6   MG 2.2  --        RECOMMENDATIONS AND DISCHARGE PLANNING     1. Pending tests/procedures/ Plan of Care or Other Needs: Surgical Consult/ Wound Care     2. Discharge plan for patient and Needs/Barriers: N/A    3. Estimated Discharge Date: N/A Posted on Whiteboard in Patients Room: no      4. The patient's care plan was reviewed with the oncoming nurse. \"HEALS\" SAFETY CHECK      Fall Risk         Safety Measures:      A safety check occurred in the patient's room between off going nurse and oncoming nurse listed above. The safety check included the below items  Area Items   H  High Alert Medications - Verify all high alert medication drips (heparin, PCA, etc.)   E  Equipment - Suction is set up for ALL patients (with yanker)  - Red plugs utilized for all equipment (IV pumps, etc.)  - WOWs wiped down at end of shift.  - Room stocked with oxygen, suction, and other unit-specific supplies   A  Alarms - Bed alarm is set for fall risk patients  - Ensure chair alarm is in place and activated if patient is up in a chair   L  Lines - Check IV for any infiltration  - Kendrick bag is empty if patient has a Kendrick   - Tubing and IV bags are labeled   S  Safety   - Room is clean, patient is clean, and equipment is clean.   - Hallways are clear from equipment besides carts. - Fall bracelet on for fall risk patients  - Ensure room is clear and free of clutter  - Suction is set up for ALL patients (with hai)  - Hallways are clear from equipment besides carts.    - Isolation precautions followed, supplies available outside room, sign posted     Nikolai Hernandez RN

## 2021-06-19 NOTE — PROGRESS NOTES
PHYSICAL THERAPY EVALUATION AND DISCHARGE    Patient: Mat Nuñez (55 y.o. male)  Date: 6/19/2021  Primary Diagnosis: Severe sepsis ([facility] [A41.9, R65.20]  Hyperglycemia [R73.9]        Precautions: standard     PLOF: Pt is independent with self care and functional mobility    ASSESSMENT :  Based on the objective data described below, the patient is at/close to his baseline level of mobility. He performs functional transfers and gait independently, though with antalgic gait due to 10/10 left toe pain. Pt has intact standing dynamic balance. Skilled PT not indicated at this time and will discharge from PT caseload. PLAN :  Recommendations and Planned Interventions:   No formal PT needs identified at this time. Discharge Recommendations: None  Further Equipment Recommendations for Discharge: N/A     SUBJECTIVE:   Patient stated It needs to be cleaned [ left toe wound].     OBJECTIVE DATA SUMMARY:     Past Medical History:   Diagnosis Date    [facility]     Hepatitis C infection     Hypertension     Psychiatric disorder    No past surgical history on file. Barriers to Learning/Limitations: None  Compensate with: N/A  Home Situation:      Critical Behavior:  Neurologic State: Alert  Orientation Level: Oriented X4  Cognition: Follows commands     Psychosocial  Patient Behaviors: Calm; Cooperative        Strength BLE:    Strength:  Within functional limits        Tone & Sensation BLE:   Tone: Normal  Sensation: Intact        Range Of Motion BLE:  AROM: Within functional limits        Functional Mobility:  Bed Mobility:     Supine to Sit: Independent  Sit to Supine: Independent     Transfers:  Sit to Stand: Independent  Stand to Sit: Independent    Balance:   Sitting: Intact  Standing: Intact       Ambulation/Gait Training:  Distance (ft): 30 Feet (ft)  Ambulation - Level of Assistance: Independent  Gait Description (WDL): Within defined limits  Gait Abnormalities: Antalgic            Pain:  Pain level pre-treatment: 10/10   Pain level post-treatment: 10/10  Pain Intervention(s): Medication (see MAR); Rest, Ice, Repositioning   Response to intervention: Nurse notified, See doc flow    Activity Tolerance:   Fair +  Please refer to the flowsheet for vital signs taken during this treatment. After treatment:   []         Patient left in no apparent distress sitting up in chair  [x]         Patient left in no apparent distress in bed  [x]         Call bell left within reach  [x]         Nursing notified  []         Caregiver present  []         Bed alarm activated  []         SCDs applied    COMMUNICATION/EDUCATION:   [x]         Role of Physical Therapy in the acute care setting. []         Fall prevention education was provided and the patient/caregiver indicated understanding. []         Patient/family have participated as able in goal setting and plan of care. []         Patient/family agree to work toward stated goals and plan of care. []         Patient understands intent and goals of therapy, but is neutral about his/her participation. []         Patient is unable to participate in goal setting/plan of care: ongoing with therapy staff.  []         Other:     Thank you for this referral.  Damian Watts, PT   Time Calculation: 11 mins      Eval Complexity: History: LOW Complexity : Zero comorbidities / personal factors that will impact the outcome / POCExam:LOW Complexity : 1-2 Standardized tests and measures addressing body structure, function, activity limitation and / or participation in recreation  Presentation: LOW Complexity : Stable, uncomplicated  Clinical Decision Making:Low Complexity    Overall Complexity:LOW

## 2021-06-20 ENCOUNTER — APPOINTMENT (OUTPATIENT)
Dept: GENERAL RADIOLOGY | Age: 56
DRG: 710 | End: 2021-06-20
Attending: PODIATRIST
Payer: MEDICAID

## 2021-06-20 LAB
ATRIAL RATE: 110 BPM
CALCULATED P AXIS, ECG09: 46 DEGREES
CALCULATED R AXIS, ECG10: 13 DEGREES
CALCULATED T AXIS, ECG11: 80 DEGREES
DATE LAST DOSE: ABNORMAL
DIAGNOSIS, 93000: NORMAL
GLUCOSE BLD STRIP.AUTO-MCNC: 206 MG/DL (ref 70–110)
GLUCOSE BLD STRIP.AUTO-MCNC: 226 MG/DL (ref 70–110)
GLUCOSE BLD STRIP.AUTO-MCNC: 257 MG/DL (ref 70–110)
GLUCOSE BLD STRIP.AUTO-MCNC: 318 MG/DL (ref 70–110)
P-R INTERVAL, ECG05: 136 MS
Q-T INTERVAL, ECG07: 332 MS
QRS DURATION, ECG06: 82 MS
QTC CALCULATION (BEZET), ECG08: 449 MS
REPORTED DOSE,DOSE: ABNORMAL UNITS
REPORTED DOSE/TIME,TMG: 2200
VANCOMYCIN TROUGH SERPL-MCNC: 7 UG/ML (ref 10–20)
VENTRICULAR RATE, ECG03: 110 BPM

## 2021-06-20 PROCEDURE — 74011250637 HC RX REV CODE- 250/637: Performed by: INTERNAL MEDICINE

## 2021-06-20 PROCEDURE — 2709999900 HC NON-CHARGEABLE SUPPLY

## 2021-06-20 PROCEDURE — 73620 X-RAY EXAM OF FOOT: CPT

## 2021-06-20 PROCEDURE — 74011000258 HC RX REV CODE- 258: Performed by: INTERNAL MEDICINE

## 2021-06-20 PROCEDURE — 74011250637 HC RX REV CODE- 250/637: Performed by: FAMILY MEDICINE

## 2021-06-20 PROCEDURE — 74011250636 HC RX REV CODE- 250/636: Performed by: INTERNAL MEDICINE

## 2021-06-20 PROCEDURE — 96376 TX/PRO/DX INJ SAME DRUG ADON: CPT

## 2021-06-20 PROCEDURE — 74011636637 HC RX REV CODE- 636/637: Performed by: FAMILY MEDICINE

## 2021-06-20 PROCEDURE — 65270000029 HC RM PRIVATE

## 2021-06-20 PROCEDURE — 99233 SBSQ HOSP IP/OBS HIGH 50: CPT | Performed by: FAMILY MEDICINE

## 2021-06-20 PROCEDURE — 96372 THER/PROPH/DIAG INJ SC/IM: CPT

## 2021-06-20 PROCEDURE — 80202 ASSAY OF VANCOMYCIN: CPT

## 2021-06-20 PROCEDURE — 74011250636 HC RX REV CODE- 250/636: Performed by: FAMILY MEDICINE

## 2021-06-20 PROCEDURE — 82962 GLUCOSE BLOOD TEST: CPT

## 2021-06-20 PROCEDURE — 99218 HC RM OBSERVATION: CPT

## 2021-06-20 PROCEDURE — 87040 BLOOD CULTURE FOR BACTERIA: CPT

## 2021-06-20 PROCEDURE — 74011636637 HC RX REV CODE- 636/637: Performed by: INTERNAL MEDICINE

## 2021-06-20 PROCEDURE — 36415 COLL VENOUS BLD VENIPUNCTURE: CPT

## 2021-06-20 RX ORDER — VANCOMYCIN HYDROCHLORIDE
1250 EVERY 12 HOURS
Status: DISCONTINUED | OUTPATIENT
Start: 2021-06-20 | End: 2021-06-23 | Stop reason: ALTCHOICE

## 2021-06-20 RX ORDER — HYDRALAZINE HYDROCHLORIDE 20 MG/ML
10 INJECTION INTRAMUSCULAR; INTRAVENOUS
Status: DISCONTINUED | OUTPATIENT
Start: 2021-06-20 | End: 2021-06-24 | Stop reason: HOSPADM

## 2021-06-20 RX ORDER — INSULIN GLARGINE 100 [IU]/ML
0.1 INJECTION, SOLUTION SUBCUTANEOUS DAILY
Status: DISCONTINUED | OUTPATIENT
Start: 2021-06-20 | End: 2021-06-21

## 2021-06-20 RX ADMIN — VANCOMYCIN HYDROCHLORIDE 1250 MG: 10 INJECTION, POWDER, LYOPHILIZED, FOR SOLUTION INTRAVENOUS at 17:34

## 2021-06-20 RX ADMIN — PIPERACILLIN AND TAZOBACTAM 3.38 G: 3; .375 INJECTION, POWDER, LYOPHILIZED, FOR SOLUTION INTRAVENOUS at 13:11

## 2021-06-20 RX ADMIN — PIPERACILLIN AND TAZOBACTAM 3.38 G: 3; .375 INJECTION, POWDER, LYOPHILIZED, FOR SOLUTION INTRAVENOUS at 05:18

## 2021-06-20 RX ADMIN — INSULIN LISPRO 9 UNITS: 100 INJECTION, SOLUTION INTRAVENOUS; SUBCUTANEOUS at 22:10

## 2021-06-20 RX ADMIN — OXYCODONE HYDROCHLORIDE AND ACETAMINOPHEN 2 TABLET: 5; 325 TABLET ORAL at 00:12

## 2021-06-20 RX ADMIN — INSULIN LISPRO 4 UNITS: 100 INJECTION, SOLUTION INTRAVENOUS; SUBCUTANEOUS at 17:27

## 2021-06-20 RX ADMIN — TRAZODONE HYDROCHLORIDE 50 MG: 50 TABLET ORAL at 23:32

## 2021-06-20 RX ADMIN — INSULIN GLARGINE 7 UNITS: 100 INJECTION, SOLUTION SUBCUTANEOUS at 09:19

## 2021-06-20 RX ADMIN — Medication 10 ML: at 22:11

## 2021-06-20 RX ADMIN — PIPERACILLIN AND TAZOBACTAM 3.38 G: 3; .375 INJECTION, POWDER, LYOPHILIZED, FOR SOLUTION INTRAVENOUS at 17:27

## 2021-06-20 RX ADMIN — ACETAMINOPHEN 650 MG: 325 TABLET ORAL at 09:20

## 2021-06-20 RX ADMIN — ENOXAPARIN SODIUM 40 MG: 40 INJECTION SUBCUTANEOUS at 09:20

## 2021-06-20 RX ADMIN — Medication 10 ML: at 05:18

## 2021-06-20 RX ADMIN — INSULIN LISPRO 4 UNITS: 100 INJECTION, SOLUTION INTRAVENOUS; SUBCUTANEOUS at 13:11

## 2021-06-20 RX ADMIN — HYDRALAZINE HYDROCHLORIDE 25 MG: 25 TABLET, FILM COATED ORAL at 21:00

## 2021-06-20 RX ADMIN — PIPERACILLIN AND TAZOBACTAM 3.38 G: 3; .375 INJECTION, POWDER, LYOPHILIZED, FOR SOLUTION INTRAVENOUS at 23:27

## 2021-06-20 RX ADMIN — Medication 10 ML: at 17:31

## 2021-06-20 RX ADMIN — OXYCODONE HYDROCHLORIDE AND ACETAMINOPHEN 2 TABLET: 5; 325 TABLET ORAL at 23:31

## 2021-06-20 RX ADMIN — ONDANSETRON 4 MG: 4 TABLET, ORALLY DISINTEGRATING ORAL at 09:45

## 2021-06-20 RX ADMIN — SODIUM CHLORIDE 100 ML/HR: 900 INJECTION, SOLUTION INTRAVENOUS at 13:11

## 2021-06-20 RX ADMIN — HYDRALAZINE HYDROCHLORIDE 25 MG: 25 TABLET, FILM COATED ORAL at 09:20

## 2021-06-20 RX ADMIN — OXYCODONE HYDROCHLORIDE AND ACETAMINOPHEN 2 TABLET: 5; 325 TABLET ORAL at 17:27

## 2021-06-20 RX ADMIN — OXYCODONE HYDROCHLORIDE AND ACETAMINOPHEN 2 TABLET: 5; 325 TABLET ORAL at 09:45

## 2021-06-20 RX ADMIN — VANCOMYCIN HYDROCHLORIDE 750 MG: 750 INJECTION, POWDER, LYOPHILIZED, FOR SOLUTION INTRAVENOUS at 00:04

## 2021-06-20 RX ADMIN — ROSUVASTATIN CALCIUM 40 MG: 20 TABLET, COATED ORAL at 22:10

## 2021-06-20 RX ADMIN — INSULIN LISPRO 8 UNITS: 100 INJECTION, SOLUTION INTRAVENOUS; SUBCUTANEOUS at 09:20

## 2021-06-20 NOTE — PROGRESS NOTES
[facility]  Progress Note    Patient: Etienne Peterson Age: 49 y.o. : 1965 MR#: [id number] SSN: [id number]  Date: 2021     Subjective/24-hour events:     Continues to run fevers but feeling okay otherwise. BPs have been stable and have actually been running a bit on the high side. No nausea or vomiting reported. Assessment:   Diabetic foot ulcer, L great toe - concern for osteomyelitis  Sepsis, POA, secondary to above  Recurrent fever  Uncontrolled DM 2 with hyperglycemia, A1c 11.3%  Hyponatremia    Plan:   Continue empiric antibiotic therapy as ordered, follow wound cultures. Obtain blood cultures now given recurrent fever spikes. Await MRI, will order lower extremity arterial PVL to assist.  Continue wound care. OR timing per podiatry recommendations. Add Lantus 7 units daily starting today, continue SSI. Adjust therapy as necessary to optimize glycemic control. May benefit from diabetes education prior to discharge. Will place order. Sodium has improved - remains slightly low but is noncritical.  Continue to monitor. PT/OT, mobilize as tolerated. Disposition to be determined. Will need home health care services at a minimum on discharge. Case discussed with:  [x]Patient  []Family  [x]Nursing  []Case Management  DVT Prophylaxis:  [x]Lovenox  []Hep SQ  []SCDs  []Coumadin   []On Heparin gtt    Objective:   VS:   Visit Vitals  BP (!) 157/82   Pulse 85   Temp 99.5 °F (37.5 °C)   Resp 18   Ht 5' 11\" (1.803 m)   Wt 72.6 kg (160 lb)   SpO2 100%   BMI 22.32 kg/m²      Tmax/24hrs: Temp (24hrs), Av.1 °F (37.8 °C), Min:99.5 °F (37.5 °C), Max:101.3 °F (38.5 °C)      Intake/Output Summary (Last 24 hours) at 2021 0713  Last data filed at 2021 0644  Gross per 24 hour   Intake 1418.33 ml   Output 650 ml   Net 768.33 ml       General:  Nontoxic-appearing. Cardiovascular:  RRR. Pulmonary:  Lungs clear bilaterally, no wheezes.   GI:  Abdomen soft, NTTP.  Extremities:  Warm, no edema or ischemia. Foot dressings intact. Neuro:  Awake and alert. Moves extremities spontaneously.     Labs:    Recent Results (from the past 24 hour(s))   GLUCOSE, POC    Collection Time: 06/19/21  8:23 AM   Result Value Ref Range    Glucose (POC) 216 (H) 70 - 110 mg/dL   GLUCOSE, POC    Collection Time: 06/19/21 10:52 AM   Result Value Ref Range    Glucose (POC) 181 (H) 70 - 110 mg/dL   GLUCOSE, POC    Collection Time: 06/19/21  3:40 PM   Result Value Ref Range    Glucose (POC) 189 (H) 70 - 110 mg/dL   GLUCOSE, POC    Collection Time: 06/19/21  8:53 PM   Result Value Ref Range    Glucose (POC) 195 (H) 70 - 110 mg/dL       Signed By: Zuleika Marley MD     June 20, 2021

## 2021-06-20 NOTE — PROGRESS NOTES
Nurse alerted the patient was generating a MEWS score of 3 with a Temp of 101.7 and /94. Nurse called and spoke with Dr. Avery Car. Order obtained for blood cultures.

## 2021-06-20 NOTE — ROUTINE PROCESS
Bedside shift change report given to Sister Josefina RN (oncoming nurse) by Beatriz Trevizo (offgoing nurse). Report included the following information SBAR, Kardex, MAR, Procedure Verification and Quality Measures.

## 2021-06-20 NOTE — PROGRESS NOTES
Problem: Diabetes Self-Management  Goal: *Disease process and treatment process  Description: Define diabetes and identify own type of diabetes; list 3 options for treating diabetes. Outcome: Progressing Towards Goal  Goal: *Incorporating nutritional management into lifestyle  Description: Describe effect of type, amount and timing of food on blood glucose; list 3 methods for planning meals. Outcome: Progressing Towards Goal  Goal: *Incorporating physical activity into lifestyle  Description: State effect of exercise on blood glucose levels. Outcome: Progressing Towards Goal  Goal: *Developing strategies to promote health/change behavior  Description: Define the ABC's of diabetes; identify appropriate screenings, schedule and personal plan for screenings. Outcome: Progressing Towards Goal  Goal: *Using medications safely  Description: State effect of diabetes medications on diabetes; name diabetes medication taking, action and side effects. Outcome: Progressing Towards Goal  Goal: *Monitoring blood glucose, interpreting and using results  Description: Identify recommended blood glucose targets  and personal targets. Outcome: Progressing Towards Goal  Goal: *Prevention, detection, treatment of acute complications  Description: List symptoms of hyper- and hypoglycemia; describe how to treat low blood sugar and actions for lowering  high blood glucose level. Outcome: Progressing Towards Goal  Goal: *Prevention, detection and treatment of chronic complications  Description: Define the natural course of diabetes and describe the relationship of blood glucose levels to long term complications of diabetes.   Outcome: Progressing Towards Goal  Goal: *Developing strategies to address psychosocial issues  Description: Describe feelings about living with diabetes; identify support needed and support network  Outcome: Progressing Towards Goal     Problem: Patient Education: Go to Patient Education Activity  Goal: Patient/Family Education  Outcome: Progressing Towards Goal     Problem: Falls - Risk of  Goal: *Absence of Falls  Description: Document Demi Gutierrez Fall Risk and appropriate interventions in the flowsheet. Outcome: Progressing Towards Goal  Note: Fall Risk Interventions:  Mobility Interventions: Communicate number of staff needed for ambulation/transfer, Patient to call before getting OOB, PT Consult for mobility concerns         Medication Interventions: Evaluate medications/consider consulting pharmacy, Teach patient to arise slowly, Patient to call before getting OOB                   Problem: Patient Education: Go to Patient Education Activity  Goal: Patient/Family Education  Outcome: Progressing Towards Goal     Problem: Sepsis: Day 2  Goal: *Hemodynamically stable  Outcome: Progressing Towards Goal  Goal: *Tolerating diet  Outcome: Progressing Towards Goal  Goal: Activity/Safety  Outcome: Progressing Towards Goal  Goal: Consults, if ordered  Outcome: Progressing Towards Goal  Goal: Diagnostic Test/Procedures  Outcome: Progressing Towards Goal  Goal: Nutrition/Diet  Outcome: Progressing Towards Goal  Goal: Discharge Planning  Outcome: Progressing Towards Goal  Goal: Medications  Outcome: Progressing Towards Goal  Goal: Respiratory  Outcome: Progressing Towards Goal  Goal: Treatments/Interventions/Procedures  Outcome: Progressing Towards Goal  Goal: Psychosocial  Outcome: Progressing Towards Goal     Problem: General Wound Care  Goal: *Infected Wound: Prevention of further infection and promotion of healing  Description: Infection control procedures (eg: clean dressings, clean gloves, hand washing, precautions to isolate wound from contamination, sterile instruments used for wound debridement) should be implemented.   Outcome: Progressing Towards Goal  Goal: Interventions  Outcome: Progressing Towards Goal     Problem: Pain  Goal: *Control of Pain  Outcome: Progressing Towards Goal     Problem: Patient Education: Go to Patient Education Activity  Goal: Patient/Family Education  Outcome: Progressing Towards Goal

## 2021-06-20 NOTE — PROGRESS NOTES
Bedside report given by Carlita Sahu RN Pt in bed resting alert and oriented x3 denies pain at the moment. 2057-Assessement completed plan of care for the shift explained pt verbalized understanding. IVF infusing well, HOB elevated, bed low and in locked position. Call light and frequently used items within reach. Dressing to left foot( great toe) intact. Pt still has numbness. Pt on contact isolation - MRSA. 56- Pt medicated for pain with 2 percocet- pt also asked for sleeping med trazodone 50 mg po given. Pt asked for broth was given. Pt listening to music/ video from his phone. Pt advised and encouraged to sleep but continued watching video. 0600- Pt sleeping no s/s of distress noted. IVF infusing well. 8265- Bedside and Verbal shift change report given to Zoe Sheriff RN (oncoming nurse) by Ke Guerra RN (offgoing nurse). Report included the following information SBAR, Kardex, Intake/Output, MAR and Recent Results.

## 2021-06-21 ENCOUNTER — ANESTHESIA EVENT (OUTPATIENT)
Dept: SURGERY | Age: 56
DRG: 710 | End: 2021-06-21
Payer: MEDICAID

## 2021-06-21 ENCOUNTER — ANESTHESIA (OUTPATIENT)
Dept: SURGERY | Age: 56
DRG: 710 | End: 2021-06-21
Payer: MEDICAID

## 2021-06-21 ENCOUNTER — APPOINTMENT (OUTPATIENT)
Dept: MRI IMAGING | Age: 56
DRG: 710 | End: 2021-06-21
Attending: PODIATRIST
Payer: MEDICAID

## 2021-06-21 LAB
ANION GAP SERPL CALC-SCNC: 6 MMOL/L (ref 3–18)
BUN SERPL-MCNC: 11 MG/DL (ref 7–18)
BUN/CREAT SERPL: 12 (ref 12–20)
CALCIUM SERPL-MCNC: 8.4 MG/DL (ref 8.5–10.1)
CHLORIDE SERPL-SCNC: 101 MMOL/L (ref 100–111)
CO2 SERPL-SCNC: 28 MMOL/L (ref 21–32)
COVID-19 RAPID TEST, COVR: NOT DETECTED
CREAT SERPL-MCNC: 0.93 MG/DL (ref 0.6–1.3)
GLUCOSE BLD STRIP.AUTO-MCNC: 134 MG/DL (ref 70–110)
GLUCOSE BLD STRIP.AUTO-MCNC: 188 MG/DL (ref 70–110)
GLUCOSE BLD STRIP.AUTO-MCNC: 198 MG/DL (ref 70–110)
GLUCOSE BLD STRIP.AUTO-MCNC: 208 MG/DL (ref 70–110)
GLUCOSE BLD STRIP.AUTO-MCNC: 218 MG/DL (ref 70–110)
GLUCOSE SERPL-MCNC: 199 MG/DL (ref 74–99)
POTASSIUM SERPL-SCNC: 3.8 MMOL/L (ref 3.5–5.5)
SARS-COV-2, COV2: NORMAL
SODIUM SERPL-SCNC: 135 MMOL/L (ref 136–145)
SOURCE, COVRS: NORMAL

## 2021-06-21 PROCEDURE — 76060000032 HC ANESTHESIA 0.5 TO 1 HR: Performed by: PODIATRIST

## 2021-06-21 PROCEDURE — 77030011265 HC ELECTRD BLD HEX COVD -A: Performed by: PODIATRIST

## 2021-06-21 PROCEDURE — 74011000272 HC RX REV CODE- 272: Performed by: PODIATRIST

## 2021-06-21 PROCEDURE — 77030006773 HC BLD SAW OSC BRSM -A: Performed by: PODIATRIST

## 2021-06-21 PROCEDURE — 74011250636 HC RX REV CODE- 250/636: Performed by: INTERNAL MEDICINE

## 2021-06-21 PROCEDURE — 74011250636 HC RX REV CODE- 250/636

## 2021-06-21 PROCEDURE — 01480 ANES OPEN PX LOWER L/A/F NOS: CPT | Performed by: NURSE ANESTHETIST, CERTIFIED REGISTERED

## 2021-06-21 PROCEDURE — 74011250637 HC RX REV CODE- 250/637: Performed by: INTERNAL MEDICINE

## 2021-06-21 PROCEDURE — 88311 DECALCIFY TISSUE: CPT

## 2021-06-21 PROCEDURE — 74011250636 HC RX REV CODE- 250/636: Performed by: NURSE ANESTHETIST, CERTIFIED REGISTERED

## 2021-06-21 PROCEDURE — 99232 SBSQ HOSP IP/OBS MODERATE 35: CPT | Performed by: FAMILY MEDICINE

## 2021-06-21 PROCEDURE — 87077 CULTURE AEROBIC IDENTIFY: CPT

## 2021-06-21 PROCEDURE — 77030013708 HC HNDPC SUC IRR PULS STRY –B: Performed by: PODIATRIST

## 2021-06-21 PROCEDURE — 36415 COLL VENOUS BLD VENIPUNCTURE: CPT

## 2021-06-21 PROCEDURE — 99218 HC RM OBSERVATION: CPT

## 2021-06-21 PROCEDURE — 0Y6Q0Z0 DETACHMENT AT LEFT 1ST TOE, COMPLETE, OPEN APPROACH: ICD-10-PCS | Performed by: PODIATRIST

## 2021-06-21 PROCEDURE — 76010000138 HC OR TIME 0.5 TO 1 HR: Performed by: PODIATRIST

## 2021-06-21 PROCEDURE — 87075 CULTR BACTERIA EXCEPT BLOOD: CPT

## 2021-06-21 PROCEDURE — 87205 SMEAR GRAM STAIN: CPT

## 2021-06-21 PROCEDURE — 73718 MRI LOWER EXTREMITY W/O DYE: CPT

## 2021-06-21 PROCEDURE — 01480 ANES OPEN PX LOWER L/A/F NOS: CPT | Performed by: ANESTHESIOLOGY

## 2021-06-21 PROCEDURE — 74011636637 HC RX REV CODE- 636/637: Performed by: FAMILY MEDICINE

## 2021-06-21 PROCEDURE — 80048 BASIC METABOLIC PNL TOTAL CA: CPT

## 2021-06-21 PROCEDURE — 87147 CULTURE TYPE IMMUNOLOGIC: CPT

## 2021-06-21 PROCEDURE — 74011000258 HC RX REV CODE- 258: Performed by: INTERNAL MEDICINE

## 2021-06-21 PROCEDURE — 76210000006 HC OR PH I REC 0.5 TO 1 HR: Performed by: PODIATRIST

## 2021-06-21 PROCEDURE — 96376 TX/PRO/DX INJ SAME DRUG ADON: CPT

## 2021-06-21 PROCEDURE — 77030040393 HC DRSG OPTIFOAM GENT MDII -B

## 2021-06-21 PROCEDURE — 77030002986 HC SUT PROL J&J -A: Performed by: PODIATRIST

## 2021-06-21 PROCEDURE — 88305 TISSUE EXAM BY PATHOLOGIST: CPT

## 2021-06-21 PROCEDURE — 2709999900 HC NON-CHARGEABLE SUPPLY: Performed by: PODIATRIST

## 2021-06-21 PROCEDURE — 82962 GLUCOSE BLOOD TEST: CPT

## 2021-06-21 PROCEDURE — 65660000000 HC RM CCU STEPDOWN

## 2021-06-21 PROCEDURE — 74011250636 HC RX REV CODE- 250/636: Performed by: FAMILY MEDICINE

## 2021-06-21 PROCEDURE — 87635 SARS-COV-2 COVID-19 AMP PRB: CPT

## 2021-06-21 PROCEDURE — 87186 SC STD MICRODIL/AGAR DIL: CPT

## 2021-06-21 PROCEDURE — 74011250637 HC RX REV CODE- 250/637: Performed by: FAMILY MEDICINE

## 2021-06-21 PROCEDURE — 2709999900 HC NON-CHARGEABLE SUPPLY

## 2021-06-21 PROCEDURE — 74011000250 HC RX REV CODE- 250: Performed by: PODIATRIST

## 2021-06-21 RX ORDER — ONDANSETRON 2 MG/ML
INJECTION INTRAMUSCULAR; INTRAVENOUS
Status: COMPLETED
Start: 2021-06-21 | End: 2021-06-21

## 2021-06-21 RX ORDER — FENTANYL CITRATE 50 UG/ML
50 INJECTION, SOLUTION INTRAMUSCULAR; INTRAVENOUS
Status: DISCONTINUED | OUTPATIENT
Start: 2021-06-21 | End: 2021-06-21 | Stop reason: HOSPADM

## 2021-06-21 RX ORDER — SODIUM CHLORIDE 0.9 % (FLUSH) 0.9 %
5-40 SYRINGE (ML) INJECTION EVERY 8 HOURS
Status: DISCONTINUED | OUTPATIENT
Start: 2021-06-21 | End: 2021-06-21 | Stop reason: HOSPADM

## 2021-06-21 RX ORDER — SODIUM CHLORIDE 0.9 % (FLUSH) 0.9 %
5-40 SYRINGE (ML) INJECTION AS NEEDED
Status: DISCONTINUED | OUTPATIENT
Start: 2021-06-21 | End: 2021-06-21 | Stop reason: HOSPADM

## 2021-06-21 RX ORDER — INSULIN GLARGINE 100 [IU]/ML
10 INJECTION, SOLUTION SUBCUTANEOUS DAILY
Status: DISCONTINUED | OUTPATIENT
Start: 2021-06-21 | End: 2021-06-22

## 2021-06-21 RX ORDER — NALOXONE HYDROCHLORIDE 0.4 MG/ML
0.1 INJECTION, SOLUTION INTRAMUSCULAR; INTRAVENOUS; SUBCUTANEOUS ONCE
Status: DISCONTINUED | OUTPATIENT
Start: 2021-06-21 | End: 2021-06-21 | Stop reason: HOSPADM

## 2021-06-21 RX ORDER — SODIUM CHLORIDE, SODIUM LACTATE, POTASSIUM CHLORIDE, CALCIUM CHLORIDE 600; 310; 30; 20 MG/100ML; MG/100ML; MG/100ML; MG/100ML
50 INJECTION, SOLUTION INTRAVENOUS CONTINUOUS
Status: DISCONTINUED | OUTPATIENT
Start: 2021-06-21 | End: 2021-06-21 | Stop reason: HOSPADM

## 2021-06-21 RX ORDER — INSULIN LISPRO 100 [IU]/ML
INJECTION, SOLUTION INTRAVENOUS; SUBCUTANEOUS ONCE
Status: DISCONTINUED | OUTPATIENT
Start: 2021-06-21 | End: 2021-06-21 | Stop reason: HOSPADM

## 2021-06-21 RX ORDER — BUPIVACAINE HYDROCHLORIDE 5 MG/ML
INJECTION, SOLUTION EPIDURAL; INTRACAUDAL AS NEEDED
Status: DISCONTINUED | OUTPATIENT
Start: 2021-06-21 | End: 2021-06-21 | Stop reason: HOSPADM

## 2021-06-21 RX ORDER — PROPOFOL 10 MG/ML
VIAL (ML) INTRAVENOUS
Status: DISCONTINUED | OUTPATIENT
Start: 2021-06-21 | End: 2021-06-21 | Stop reason: HOSPADM

## 2021-06-21 RX ORDER — DIPHENHYDRAMINE HYDROCHLORIDE 50 MG/ML
12.5 INJECTION, SOLUTION INTRAMUSCULAR; INTRAVENOUS
Status: DISCONTINUED | OUTPATIENT
Start: 2021-06-21 | End: 2021-06-21 | Stop reason: HOSPADM

## 2021-06-21 RX ORDER — ONDANSETRON 2 MG/ML
4 INJECTION INTRAMUSCULAR; INTRAVENOUS ONCE
Status: COMPLETED | OUTPATIENT
Start: 2021-06-21 | End: 2021-06-21

## 2021-06-21 RX ORDER — DEXTROSE 50 % IN WATER (D50W) INTRAVENOUS SYRINGE
25-50 AS NEEDED
Status: DISCONTINUED | OUTPATIENT
Start: 2021-06-21 | End: 2021-06-21 | Stop reason: HOSPADM

## 2021-06-21 RX ORDER — FENTANYL CITRATE 50 UG/ML
INJECTION, SOLUTION INTRAMUSCULAR; INTRAVENOUS AS NEEDED
Status: DISCONTINUED | OUTPATIENT
Start: 2021-06-21 | End: 2021-06-21 | Stop reason: HOSPADM

## 2021-06-21 RX ORDER — PROPOFOL 10 MG/ML
INJECTION, EMULSION INTRAVENOUS AS NEEDED
Status: DISCONTINUED | OUTPATIENT
Start: 2021-06-21 | End: 2021-06-21 | Stop reason: HOSPADM

## 2021-06-21 RX ORDER — LIDOCAINE HYDROCHLORIDE 10 MG/ML
INJECTION, SOLUTION EPIDURAL; INFILTRATION; INTRACAUDAL; PERINEURAL AS NEEDED
Status: DISCONTINUED | OUTPATIENT
Start: 2021-06-21 | End: 2021-06-21 | Stop reason: HOSPADM

## 2021-06-21 RX ORDER — HYDROMORPHONE HYDROCHLORIDE 1 MG/ML
0.5 INJECTION, SOLUTION INTRAMUSCULAR; INTRAVENOUS; SUBCUTANEOUS
Status: DISCONTINUED | OUTPATIENT
Start: 2021-06-21 | End: 2021-06-21 | Stop reason: HOSPADM

## 2021-06-21 RX ORDER — MAGNESIUM SULFATE 100 %
4 CRYSTALS MISCELLANEOUS AS NEEDED
Status: DISCONTINUED | OUTPATIENT
Start: 2021-06-21 | End: 2021-06-21 | Stop reason: HOSPADM

## 2021-06-21 RX ORDER — MIDAZOLAM HYDROCHLORIDE 1 MG/ML
INJECTION, SOLUTION INTRAMUSCULAR; INTRAVENOUS AS NEEDED
Status: DISCONTINUED | OUTPATIENT
Start: 2021-06-21 | End: 2021-06-21 | Stop reason: HOSPADM

## 2021-06-21 RX ADMIN — ONDANSETRON 4 MG: 2 INJECTION INTRAMUSCULAR; INTRAVENOUS at 18:00

## 2021-06-21 RX ADMIN — OXYCODONE HYDROCHLORIDE AND ACETAMINOPHEN 2 TABLET: 5; 325 TABLET ORAL at 21:53

## 2021-06-21 RX ADMIN — PROPOFOL 140 MCG/KG/MIN: 10 INJECTION, EMULSION INTRAVENOUS at 16:48

## 2021-06-21 RX ADMIN — SODIUM CHLORIDE 100 ML/HR: 900 INJECTION, SOLUTION INTRAVENOUS at 23:46

## 2021-06-21 RX ADMIN — Medication 10 ML: at 21:53

## 2021-06-21 RX ADMIN — TRAZODONE HYDROCHLORIDE 50 MG: 50 TABLET ORAL at 21:53

## 2021-06-21 RX ADMIN — INSULIN LISPRO 6 UNITS: 100 INJECTION, SOLUTION INTRAVENOUS; SUBCUTANEOUS at 23:02

## 2021-06-21 RX ADMIN — SODIUM CHLORIDE 100 ML/HR: 900 INJECTION, SOLUTION INTRAVENOUS at 01:55

## 2021-06-21 RX ADMIN — INSULIN LISPRO 6 UNITS: 100 INJECTION, SOLUTION INTRAVENOUS; SUBCUTANEOUS at 07:30

## 2021-06-21 RX ADMIN — INSULIN GLARGINE 10 UNITS: 100 INJECTION, SOLUTION SUBCUTANEOUS at 08:57

## 2021-06-21 RX ADMIN — PIPERACILLIN AND TAZOBACTAM 3.38 G: 3; .375 INJECTION, POWDER, LYOPHILIZED, FOR SOLUTION INTRAVENOUS at 23:04

## 2021-06-21 RX ADMIN — SODIUM CHLORIDE 100 ML/HR: 900 INJECTION, SOLUTION INTRAVENOUS at 15:55

## 2021-06-21 RX ADMIN — PIPERACILLIN AND TAZOBACTAM 3.38 G: 3; .375 INJECTION, POWDER, LYOPHILIZED, FOR SOLUTION INTRAVENOUS at 05:26

## 2021-06-21 RX ADMIN — Medication 10 ML: at 14:09

## 2021-06-21 RX ADMIN — MIDAZOLAM HYDROCHLORIDE 2 MG: 2 INJECTION, SOLUTION INTRAMUSCULAR; INTRAVENOUS at 16:40

## 2021-06-21 RX ADMIN — HYDRALAZINE HYDROCHLORIDE 25 MG: 25 TABLET, FILM COATED ORAL at 08:57

## 2021-06-21 RX ADMIN — VANCOMYCIN HYDROCHLORIDE 1250 MG: 10 INJECTION, POWDER, LYOPHILIZED, FOR SOLUTION INTRAVENOUS at 16:00

## 2021-06-21 RX ADMIN — Medication 10 ML: at 05:26

## 2021-06-21 RX ADMIN — INSULIN LISPRO 3 UNITS: 100 INJECTION, SOLUTION INTRAVENOUS; SUBCUTANEOUS at 16:15

## 2021-06-21 RX ADMIN — PROPOFOL 50 MG: 10 INJECTION, EMULSION INTRAVENOUS at 16:48

## 2021-06-21 RX ADMIN — ACETAMINOPHEN 650 MG: 325 TABLET ORAL at 11:56

## 2021-06-21 RX ADMIN — FENTANYL CITRATE 50 MCG: 50 INJECTION, SOLUTION INTRAMUSCULAR; INTRAVENOUS at 16:40

## 2021-06-21 RX ADMIN — PIPERACILLIN AND TAZOBACTAM 3.38 G: 3; .375 INJECTION, POWDER, LYOPHILIZED, FOR SOLUTION INTRAVENOUS at 11:55

## 2021-06-21 RX ADMIN — ENOXAPARIN SODIUM 40 MG: 40 INJECTION SUBCUTANEOUS at 08:57

## 2021-06-21 RX ADMIN — INSULIN LISPRO 3 UNITS: 100 INJECTION, SOLUTION INTRAVENOUS; SUBCUTANEOUS at 11:54

## 2021-06-21 RX ADMIN — VANCOMYCIN HYDROCHLORIDE 1250 MG: 10 INJECTION, POWDER, LYOPHILIZED, FOR SOLUTION INTRAVENOUS at 04:35

## 2021-06-21 RX ADMIN — HYDRALAZINE HYDROCHLORIDE 25 MG: 25 TABLET, FILM COATED ORAL at 21:53

## 2021-06-21 RX ADMIN — HYDRALAZINE HYDROCHLORIDE 10 MG: 20 INJECTION, SOLUTION INTRAMUSCULAR; INTRAVENOUS at 11:55

## 2021-06-21 RX ADMIN — ROSUVASTATIN CALCIUM 40 MG: 20 TABLET, COATED ORAL at 22:08

## 2021-06-21 NOTE — PERIOP NOTES
TRANSFER - IN REPORT:    Verbal report received from Cruz RN(name) on Sue Castrejon  being received from 4N(unit) for routine progression of care      Report consisted of patients Situation, Background, Assessment and   Recommendations(SBAR). Information from the following report(s) Pre Procedure Checklist was reviewed with the receiving nurse. Opportunity for questions and clarification was provided. Assessment completed upon patients arrival to unit and care assumed.

## 2021-06-21 NOTE — PERIOP NOTES
TRANSFER - OUT REPORT:    Verbal report given to General Park) on Gracy Spray  being transferred to room 453(unit) for routine progression of care       Report consisted of patients Situation, Background, Assessment and   Recommendations(SBAR). Information from the following report(s) SBAR, OR Summary, Intake/Output, MAR and Recent Results was reviewed with the receiving nurse. Lines:   Peripheral IV 06/20/21 Left Forearm (Active)   Site Assessment Clean, dry, & intact 06/21/21 0745   Phlebitis Assessment 0 06/21/21 0745   Infiltration Assessment 0 06/21/21 0745   Dressing Status Clean, dry, & intact 06/21/21 0745   Dressing Type Transparent 06/21/21 0745   Hub Color/Line Status Pink;Flushed;End cap changed; Patent 06/21/21 0745   Action Taken Open ports on tubing capped 06/21/21 0443   Alcohol Cap Used Yes 06/21/21 0745        Opportunity for questions and clarification was provided.       Patient transported with:   [employer]

## 2021-06-21 NOTE — PROGRESS NOTES
Bedside shift change report given to Lord Huber (oncoming nurse) by Kasey Lugo RN/JUANCARLOS Welch (offgoing nurse). Report included the following information SBAR, Kardex, MAR and Recent Results.

## 2021-06-21 NOTE — PROGRESS NOTES
Progress Note    Patient: Chela Mcclain MRN: [id number]  SSN: [id number]    YOB: 1965  Age: 49 y.o. Sex: male      Admit Date: 6/18/2021  * No surgery found *     Procedure:       Subjective:     Patient seen resting quiet and comfortably and no apparent distress. Patient just had an MRI of left foot earlier today. Patient has been spiking fever. Denies any pain to foot. Objective:     Visit Vitals  BP (!) 177/91 (BP 1 Location: Right upper arm, BP Patient Position: At rest)   Pulse 92   Temp 100 °F (37.8 °C)   Resp 16   Ht 5' 11\" (1.803 m)   Wt 72.6 kg (160 lb)   SpO2 98%   BMI 22.32 kg/m²        Physical Exam:  Dressings intact to left foot. Left great toe appear dusky at tip, remaining toes CFL intact. Dry scab noted on dorsal left great toe. Stable ulcer on left plantar great toe covered with dry scab. Soft tissue doesn't healthy to left great toe. Diminished protective sensation to both foot. Labs/Radiology Review: images and reports reviewed    Assessment:     Hospital Problems  Never Reviewed        Codes Class Noted POA    Severe sepsis ([facility] ICD-10-CM: A41.9, R65.20  ICD-9-CM: 038.9, 995.92  6/19/2021 Unknown        Acute kidney failure ([facility] ICD-10-CM: N17.9  ICD-9-CM: 584.9  6/19/2021 Unknown        Tachycardia ICD-10-CM: R00.0  ICD-9-CM: 785.0  6/19/2021 Unknown        Hyperglycemia due to type 2 diabetes mellitus ([facility] ICD-10-CM: E11.65  ICD-9-CM: 250.00  6/19/2021 Unknown        Fever ICD-10-CM: R50.9  ICD-9-CM: 780.60  6/19/2021 Unknown        Hypertension ICD-10-CM: I10  ICD-9-CM: 401.9  6/19/2021 Unknown        Hyponatremia ICD-10-CM: E87.1  ICD-9-CM: 276.1  6/19/2021 Unknown        Hyperglycemia ICD-10-CM: R73.9  ICD-9-CM: 790.29  6/19/2021 Unknown              Plan/Recommendations/Medical Decision Making:     - MRI reviewed and results discussed with patient.   - 1. Moderate size ulcer at the plantar medial aspect of the great toe. Great toe cellulitis.  No drainable abscess. 2.  Acute osteomyelitis in the first distal phalanx. 3.  T2 hyperintense signal within the first proximal phalanx without associated T1 signal hypointensity. This is most consistent with reactive osteitis although early osteomyelitis not excluded. 4.  Minimal flexor hallucis longus tenosynovitis tracking to the level of the  first proximal phalangeal base, likely infectious. 5.  Additional dorsal midfoot and forefoot subcutaneous trace edema which may reflect sequela of third spacing or cellulitis. 6.  Intrinsic foot musculature edema, likely denervation change. 7.  Unchanged trace edema in the medial hallux sesamoid, superimposed on a background sclerosis, likely mild acute on chronic chronic sesamoiditis. Trace first-second intermetatarsal bursitis. - Discussed treatment options with patient. Patient will need left great toe at least partial amputation. Please keep him NPO. Patient has sausage in breakfast.   - Remain NWB to left forefoot. - Continue IV abxs. Will need ID consult.

## 2021-06-21 NOTE — INTERDISCIPLINARY ROUNDS
Interdisciplinary Rounds Note     Interdisciplinary team rounds were held with the following team members Care Management, Nursing, Physician and Clinical Coordinator Plan of care discussed.      Patient Information: Bela Flores 308/14    Attending Provider:  Alden Garcia MD    Admitting Diagnosis: Severe sepsis ([facility] [A41.9, R65.20]  Hyperglycemia [R73.9]    Hospital day:  1    Expected LOS: - - -    Estimated Day of Discharge: 6/22 or 6/23/21    Kendrick/Lines/Catheters: no    Telemetry Needed: no    Mobility Concerns:  no    Disposition:Home    COVID Status: Negative    Plan of care notes: None    Sallie Purcell 7:37 PM

## 2021-06-21 NOTE — PROGRESS NOTES
[facility]  Progress Note    Patient: Gracy Floyd Age: 49 y.o. : 1965 MR#: [id number] SSN: [id number]  Date: 2021     Subjective/24-hour events:     Last fever yesterday at AM, none since. Denies nausea/vomiting, no diarrhea reported. Assessment:   Diabetic foot ulcer, L great toe - concern for osteomyelitis  Sepsis, POA, secondary to above  Recurrent fever  Uncontrolled DM 2 with hyperglycemia, A1c 11.3%  Hyponatremia    Plan:   Needs amputation per podiatry recommendations. This is been discussed with patient today and he is amenable to proceeding. Continue antibiotic therapy as ordered in interim. Have asked  ID to see for recommendations going forward. Assistance appreciated in advance. Lantus/SSI as ordered. Continue to adjust therapy as necessary to optimize glycemic control. Diabetes educator following.. PT/OT, mobilize as tolerated. Anticipate discharge with home health care services. Supportive care otherwise. Follow. Case discussed with:  [x]Patient  []Family  [x]Nursing  [x]Case Management  DVT Prophylaxis:  [x]Lovenox  []Hep SQ  []SCDs  []Coumadin   []On Heparin gtt    Objective:   VS:   Visit Vitals  BP (!) 168/89 (BP 1 Location: Right upper arm, BP Patient Position: At rest)   Pulse 86   Temp 99.8 °F (37.7 °C)   Resp 16   Ht 5' 11\" (1.803 m)   Wt 72.6 kg (160 lb)   SpO2 98%   BMI 22.32 kg/m²      Tmax/24hrs: Temp (24hrs), Av °F (37.2 °C), Min:98 °F (36.7 °C), Max:100 °F (37.8 °C)      Intake/Output Summary (Last 24 hours) at 2021 0909  Last data filed at 2021 0526  Gross per 24 hour   Intake 1200 ml   Output 700 ml   Net 500 ml       General:  Nontoxic-appearing. Cardiovascular:  RRR. Pulmonary:  Lungs clear bilaterally, no wheezes. GI:  Abdomen soft, NTTP. Extremities:  Warm, no edema or ischemia. Foot dressings intact. Neuro:  Awake and alert. Moves extremities spontaneously.     Labs:    Recent Results (from the past 24 hour(s))   VANCOMYCIN, TROUGH    Collection Time: 06/20/21 10:35 AM   Result Value Ref Range    Vancomycin,trough 7.0 (L) 10.0 - 20.0 ug/mL    Reported dose date [id number]      Reported dose time: 2200      Reported dose: 750 MG UNITS   CULTURE, BLOOD    Collection Time: 06/20/21 10:35 AM    Specimen: Blood   Result Value Ref Range    Special Requests: NO SPECIAL REQUESTS      Culture result: NO GROWTH AFTER 18 HOURS     GLUCOSE, POC    Collection Time: 06/20/21 11:23 AM   Result Value Ref Range    Glucose (POC) 226 (H) 70 - 110 mg/dL   GLUCOSE, POC    Collection Time: 06/20/21  3:14 PM   Result Value Ref Range    Glucose (POC) 206 (H) 70 - 110 mg/dL   GLUCOSE, POC    Collection Time: 06/20/21  9:29 PM   Result Value Ref Range    Glucose (POC) 257 (H) 70 - 905 mg/dL   METABOLIC PANEL, BASIC    Collection Time: 06/21/21  2:34 AM   Result Value Ref Range    Sodium 135 (L) 136 - 145 mmol/L    Potassium 3.8 3.5 - 5.5 mmol/L    Chloride 101 100 - 111 mmol/L    CO2 28 21 - 32 mmol/L    Anion gap 6 3.0 - 18 mmol/L    Glucose 199 (H) 74 - 99 mg/dL    BUN 11 7.0 - 18 MG/DL    Creatinine 0.93 0.6 - 1.3 MG/DL    BUN/Creatinine ratio 12 12 - 20      GFR est AA >60 >60 ml/min/1.73m2    GFR est non-AA >60 >60 ml/min/1.73m2    Calcium 8.4 (L) 8.5 - 10.1 MG/DL   GLUCOSE, POC    Collection Time: 06/21/21  7:46 AM   Result Value Ref Range    Glucose (POC) 208 (H) 70 - 110 mg/dL       Signed By: Leah Solano MD     June 21, 2021

## 2021-06-21 NOTE — DIABETES MGMT
Diabetes Plan of Care    Recommend increasing lantus to 10 units daily - order received     Assessment:  Admitted with left foot cellulitis and diabetic ulcer on left great toe - concern for osteomyelitis. States he has a glucometer, insulins and supplies but is not taking insulin regularly and not monitoring BG - no clear reason why. He continues to smoke - says he has received DM education in the past and \" I know what to do, just not doing it\"  Reviewed his A1c of 11.3% and target of 7% - reviewed r/t A1c and foot wound   Reviewed DM meal planning and provided printed materials for BG monitoring and targets   Provided education about hospital BG monitoring policy and recent BG  Encouraged compliance, PCP follow up for wound healing  Will follow -     Most recent blood glucose values:       Within target range     No    Current A1c  Lab Results   Component Value Date/Time    Hemoglobin A1c 11.3 (H) 06/19/2021 05:20 AM     Adequate glycemic control PTA:    No     Current hospital diabetes medications:  Lantus 10 units daily  corrective lispro, very insulin resistant, 4 times daily     TDD previous day = 32 units   7 units lantus  25 units lispro     Home diabetes medications; per EMR - patient unable to verbalize doses  Lantus 60 units daily  humalog 10 units with meals     Goals: Blood glucose will be within target of 70 - 180 mg/dl by: 6/25/2021     Education:  __X___ Refer to Diabetes Education Record                       _____ Education not indicated at this time     Della Bosch MPH RN [address] [facility]  Pager [phone]  Office [phone]

## 2021-06-21 NOTE — ANESTHESIA PREPROCEDURE EVALUATION
Relevant Problems   NEUROLOGY   (+) MDD (major depressive disorder), recurrent severe, without psychosis ([facility]   (+) Major depression with psychotic features ([facility]      CARDIOVASCULAR   (+) Hypertension      RENAL FAILURE   (+) Acute kidney failure (HCC)      HEMATOLOGY   (+) Osteomyelitis (HCC)       Anesthetic History   No history of anesthetic complications            Review of Systems / Medical History  Patient summary reviewed and pertinent labs reviewed    Pulmonary  Within defined limits                 Neuro/Psych         Psychiatric history    Comments: depression Cardiovascular    Hypertension                   GI/Hepatic/Renal           Liver disease     Endo/Other    Diabetes         Other Findings              Physical Exam    Airway  Mallampati: II  TM Distance: > 6 cm  Neck ROM: normal range of motion   Mouth opening: Normal     Cardiovascular  Regular rate and rhythm,  S1 and S2 normal,  no murmur, click, rub, or gallop             Dental    Dentition: Poor dentition     Pulmonary  Breath sounds clear to auscultation               Abdominal  GI exam deferred       Other Findings            Anesthetic Plan    ASA: 3  Anesthesia type: MAC          Induction: Intravenous  Anesthetic plan and risks discussed with: Patient

## 2021-06-21 NOTE — PROGRESS NOTES
conducted an initial consultation and Spiritual Assessment for Homero Kinsey, who is a 49 y.o.,male. Patients Primary Language is: [place]. According to the patients EMR Orthodoxy Affiliation is: [employer]. The reason the Patient came to the hospital is:   Patient Active Problem List    Diagnosis Date Noted    Severe sepsis ([facility] 06/19/2021    Acute kidney failure ([facility] 06/19/2021    Tachycardia 06/19/2021    Hyperglycemia due to type 2 diabetes mellitus ([facility] 06/19/2021    Fever 06/19/2021    Hypertension 06/19/2021    Hyponatremia 06/19/2021    Hyperglycemia 06/19/2021    Osteomyelitis ([facility] 04/02/2021    MDD (major depressive disorder), recurrent severe, without psychosis ([facility] 01/12/2021    Major depression with psychotic features ([facility] 01/11/2021        The  provided the following Interventions:  Initiated a relationship of care and support. Explored issues of michelle, spirituality and/or Latter-day needs while hospitalized. Listened empathically. Provided chaplaincy education. Provided information about Spiritual Care Services. Offered prayer and assurance of continued prayers on patient's behalf. Chart reviewed. The following outcomes were achieved:  Patient shared some information about their medical narrative and spiritual journey/beliefs. Patient processed feeling about current hospitalization. Patient expressed gratitude for the 's visit. Assessment:  Patient did not indicate any spiritual or Latter-day issues which require Spiritual Care Services interventions at this time. Patient does not have any Latter-day/cultural needs that will affect patients preferences in health care. Plan:  Chaplains will continue to follow and will provide pastoral care on an as needed or requested basis.  recommends bedside caregivers page  on duty if patient shows signs of acute spiritual or emotional distress.

## 2021-06-21 NOTE — PERIOP NOTES
Pre-Op Summary    Pt arrived via bed and is oriented to time, place, person and situation. Peripheral IV located on Left forearm. Patients belongings are located NA. Patient's point of contact is tay Abelor and their contact number is: [phone] They will be . They are able to receive medication information. They will be admitted.

## 2021-06-21 NOTE — DIABETES MGMT
Diabetes Patient/Family Education Record    Factors That May Influence Patients Ability to Learn or Comply with Recommendations   []   Language barrier    []   Cultural needs   [x]   Motivation    []   Cognitive limitation    []   Physical   [x]   Education    []   Physiological factors   []   Hearing/vision/speaking impairment   []   Spiritism beliefs    []   Financial factors   []  Other:   []  No factors identified at this time. Person Instructed:   [x]   Patient   []   Family   []  Other     Preference for Learning:   [x]   Verbal   [x]   Written   []  Demonstration     Level of Comprehension & Competence:    []  Good                                      [x] Fair                                     []  Poor                             [x]  Needs Reinforcement   [x]  Teach back completed    Education Component: see DM note    [x]  Medication management - states he is not taking his insulin regularly as prescribed - he is unable to tell me his lantus dose - says \"life is too chaotic at times\" and he can not follow his DM care plan.    [x]  Nutritional management - [x] Obtained usual meal pattern   []   Basic carbohydrate counting  [x]  Plate method  [x]  Limit concentrated sweets and avoid sweetened beverages  []  Portion control  []    Avoid skipping meals  Reviewed DM meal planning and plate method - he states he has received DM education in the past and knows what to do -    [x]  Exercise   [x]  Signs, symptoms, and treatment of hyperglycemia and hypoglycemia   [x] Prevention, recognition and treatment of hyperglycemia and hypoglycemia   [x]  Importance of blood glucose monitoring  [x] Blood Glucose targets   []   Provided patient with blood glucose meter  [x]  Has glucometer and supplies at home - reviewed when to monitor BG and targets    []  Instruction on use of the blood glucose meter and recommended monitoring schedule   [x]  Discuss the importance of HbA1C monitoring. Patients A1c is _11.3 %. This is equivalent to average glucose of    277 mg/dl for the past 2-3 months.  - he understands relationship b/w elevated BG, A1c and foot infection    []  Sick day guidelines   []  Proper use and disposal of lancets, needles, syringes or insulin pens (if appropriate)   [x]  Potential long-term complications (retinopathy, kidney disease, neuropathy, foot care) - here with left foot cellulitis and diabetic ulcer on left great toe   [] Information about whom to contact in case of emergency or for more information    [x]  Goal:  Patient/family will demonstrate understanding of Diabetes Self- Management Skills by: (date) _6/25/2021  Plan for post-discharge education or self-management support:    [] Outpatient class schedule provided            [] Patient Declined    [] Scheduled for outpatient classes (date) _______    [] Written information provided  Verify: [] Prior to admission Diabetes medications   Does patient understand how diabetes medications work? _____reviewed insulin today_________________  Does patient have difficulty obtaining diabetes medications or testing supplies? ____states he has insulin and supplies at home ___________       Isamar Romo MPH RN [address]  Pager [phone]  Office [phone]

## 2021-06-21 NOTE — OP NOTES
Operative Report     Patient: Obi Baer MRN: [id number]  SSN: [id number]    YOB: 1965  Age: 49 y.o. Sex: male       Indications: The patient was admitted to the hospital for left great toe ulcer and left foot swelling with fevers. Patient had an MRI confirming osteomyelitis of left great toe with FHL tendon sheath infection. Patient needed amputation of left great toe to prevent sepsis and to resolve cellulitis of left foot. All risks, benefits, complications of procedure discussed in detail with patient. Possible complications including but not limited to delayed healing, non-healing, requirement of proximal amputation, loss of limb or death. No guarantee made to outcome of procedure. Patient voiced understanding and signed consent. Date of Surgery: 6/21/2021     Preoperative Diagnosis:  Osteomyelitis of left great toe with diabetic foot ulcer    Postoperative Diagnosis: Osteomyelitis of left great toe distal phalanx, septic IPJ, proximal phalanx head osteomyelitis, abscess to 1st web space as well as plantar to hallux IPJ    Surgeon(s) and Role:     * Griselda East DPM - Primary      Surgical Assistants: Operating Room Staff    Anesthesia: MAC with Local    Procedure:   PARTIAL AMPUTATION LEFT GREAT TOE, DECOMPRESSION OF ABSCESS    Procedure in Detail:     The patient was brought to the operative suite and secured in the supine position on the operating room table. After IV sedation from department of anesthesia, local block consisting of 20 cc of 1:1 mixture of 1% lidocaine plain and 0.5% marcaine plain into godoy block. Left foot prepped and draped in usual sterile fashion. Attention directed to left great toe and fish mouth incision performed at base of toe down to bone with # 10 blade. Distal phalanx disarticulated at IPJ and placed on back godoy table. FHL tendon noted to be torn and yellowish discolored. Proximal phalanx head also noted be discolored and brittle.  Proximal phalanx resected with sagittal saw at midshaft level and sent along with distal phalanx as gross specimen. Abscess noted plantar to proximal phalanx and also laterally in first web space. Hemostat used to open up different soft tissue planes and collection of purulent drainage removed. Irrigated with normal saline solution mixed with  solution. Skin at amputation site noted to be mushy which was also excised. Clean margin obtained from remaining proximal phalanx and sent to micro and pathology. Devitalized redundant tissue excised and closed with 3-0 prolene suture. The patient tolerated the procedure and anesthesia well. The patient was sent to the recovery room in apparent satisfactory condition. Normal color, turgor, and texture were present in all remaining digits. Findings:  Septic hallux IPJ with FHL tendon infection, abscess to left first web space and plantar to proximal phalanx.      Estimated Blood Loss:  20 cc    Drains: none           Specimens:   ID Type Source Tests Collected by Time Destination   1 : LEFT GREAT TOE Preservative Toe  J Luis Jerome DPM 6/21/2021 1726 Pathology   1 : Aerobic, Anaerobic C/S of first Proximal Phalanx, Clean Margin Wound Toe CULTURE, ANAEROBIC, CULTURE, WOUND W GRAM STAIN Yana Marin DPM 6/21/2021 1725 Microbiology               Implants:  * No implants in log *           Complications:  None

## 2021-06-21 NOTE — PROGRESS NOTES
Problem: Diabetes Self-Management  Goal: *Disease process and treatment process  Description: Define diabetes and identify own type of diabetes; list 3 options for treating diabetes. Outcome: Progressing Towards Goal  Goal: *Incorporating nutritional management into lifestyle  Description: Describe effect of type, amount and timing of food on blood glucose; list 3 methods for planning meals. Outcome: Progressing Towards Goal  Goal: *Incorporating physical activity into lifestyle  Description: State effect of exercise on blood glucose levels. Outcome: Progressing Towards Goal  Goal: *Developing strategies to promote health/change behavior  Description: Define the ABC's of diabetes; identify appropriate screenings, schedule and personal plan for screenings. Outcome: Progressing Towards Goal  Goal: *Using medications safely  Description: State effect of diabetes medications on diabetes; name diabetes medication taking, action and side effects. Outcome: Progressing Towards Goal  Goal: *Monitoring blood glucose, interpreting and using results  Description: Identify recommended blood glucose targets  and personal targets. Outcome: Progressing Towards Goal  Goal: *Prevention, detection, treatment of acute complications  Description: List symptoms of hyper- and hypoglycemia; describe how to treat low blood sugar and actions for lowering  high blood glucose level. Outcome: Progressing Towards Goal  Goal: *Prevention, detection and treatment of chronic complications  Description: Define the natural course of diabetes and describe the relationship of blood glucose levels to long term complications of diabetes.   Outcome: Progressing Towards Goal  Goal: *Developing strategies to address psychosocial issues  Description: Describe feelings about living with diabetes; identify support needed and support network  Outcome: Progressing Towards Goal  Goal: *Insulin pump training  Outcome: Progressing Towards Goal  Goal: *Sick day guidelines  Outcome: Progressing Towards Goal  Goal: *Patient Specific Goal (EDIT GOAL, INSERT TEXT)  Outcome: Progressing Towards Goal     Problem: Falls - Risk of  Goal: *Absence of Falls  Description: Document Nicolás Fall Risk and appropriate interventions in the flowsheet. Outcome: Progressing Towards Goal  Note: Fall Risk Interventions:  Mobility Interventions: Patient to call before getting OOB, PT Consult for mobility concerns         Medication Interventions: Patient to call before getting OOB, Teach patient to arise slowly, Evaluate medications/consider consulting pharmacy                   Problem: Pain  Goal: *Control of Pain  Outcome: Progressing Towards Goal     Problem: Risk for Spread of Infection  Goal: Prevent transmission of infectious organism to others  Description: Prevent the transmission of infectious organisms to other patients, staff members, and visitors.   Outcome: Progressing Towards Goal     Problem: Patient Education:  Go to Education Activity  Goal: Patient/Family Education  Outcome: Progressing Towards Goal

## 2021-06-22 ENCOUNTER — APPOINTMENT (OUTPATIENT)
Dept: GENERAL RADIOLOGY | Age: 56
DRG: 710 | End: 2021-06-22
Attending: PODIATRIST
Payer: MEDICAID

## 2021-06-22 ENCOUNTER — APPOINTMENT (OUTPATIENT)
Dept: VASCULAR SURGERY | Age: 56
DRG: 710 | End: 2021-06-22
Attending: PODIATRIST
Payer: MEDICAID

## 2021-06-22 PROBLEM — L97.509 DIABETIC FOOT ULCER (HCC): Status: ACTIVE | Noted: 2021-06-22

## 2021-06-22 PROBLEM — E11.621 DIABETIC FOOT ULCER (HCC): Status: ACTIVE | Noted: 2021-06-22

## 2021-06-22 LAB
ANION GAP SERPL CALC-SCNC: 6 MMOL/L (ref 3–18)
BUN SERPL-MCNC: 9 MG/DL (ref 7–18)
BUN/CREAT SERPL: 11 (ref 12–20)
CALCIUM SERPL-MCNC: 8.5 MG/DL (ref 8.5–10.1)
CHLORIDE SERPL-SCNC: 103 MMOL/L (ref 100–111)
CO2 SERPL-SCNC: 26 MMOL/L (ref 21–32)
CREAT SERPL-MCNC: 0.82 MG/DL (ref 0.6–1.3)
DATE LAST DOSE: NORMAL
GLUCOSE BLD STRIP.AUTO-MCNC: 211 MG/DL (ref 70–110)
GLUCOSE BLD STRIP.AUTO-MCNC: 239 MG/DL (ref 70–110)
GLUCOSE BLD STRIP.AUTO-MCNC: 339 MG/DL (ref 70–110)
GLUCOSE SERPL-MCNC: 172 MG/DL (ref 74–99)
LEFT ABI: 1.03
LEFT ANTERIOR TIBIAL: 144 MMHG
LEFT ARM BP: 141 MMHG
LEFT CALF PRESSURE: 177 MMHG
LEFT POSTERIOR TIBIAL: 145 MMHG
POTASSIUM SERPL-SCNC: 4 MMOL/L (ref 3.5–5.5)
REPORTED DOSE,DOSE: NORMAL UNITS
REPORTED DOSE/TIME,TMG: 1600
RIGHT ABI: 1.12
RIGHT ANTERIOR TIBIAL: 145 MMHG
RIGHT ARM BP: 132 MMHG
RIGHT CALF PRESSURE: 176 MMHG
RIGHT POSTERIOR TIBIAL: 158 MMHG
SODIUM SERPL-SCNC: 135 MMOL/L (ref 136–145)
VANCOMYCIN TROUGH SERPL-MCNC: 11.1 UG/ML (ref 10–20)

## 2021-06-22 PROCEDURE — 74011250637 HC RX REV CODE- 250/637: Performed by: FAMILY MEDICINE

## 2021-06-22 PROCEDURE — 73620 X-RAY EXAM OF FOOT: CPT

## 2021-06-22 PROCEDURE — 74011636637 HC RX REV CODE- 636/637: Performed by: FAMILY MEDICINE

## 2021-06-22 PROCEDURE — 80048 BASIC METABOLIC PNL TOTAL CA: CPT

## 2021-06-22 PROCEDURE — 74011250636 HC RX REV CODE- 250/636: Performed by: FAMILY MEDICINE

## 2021-06-22 PROCEDURE — 99233 SBSQ HOSP IP/OBS HIGH 50: CPT | Performed by: FAMILY MEDICINE

## 2021-06-22 PROCEDURE — 2709999900 HC NON-CHARGEABLE SUPPLY

## 2021-06-22 PROCEDURE — 74011000258 HC RX REV CODE- 258: Performed by: INTERNAL MEDICINE

## 2021-06-22 PROCEDURE — 93923 UPR/LXTR ART STDY 3+ LVLS: CPT

## 2021-06-22 PROCEDURE — 65660000000 HC RM CCU STEPDOWN

## 2021-06-22 PROCEDURE — 74011250636 HC RX REV CODE- 250/636: Performed by: INTERNAL MEDICINE

## 2021-06-22 PROCEDURE — 74011250637 HC RX REV CODE- 250/637: Performed by: INTERNAL MEDICINE

## 2021-06-22 PROCEDURE — 82962 GLUCOSE BLOOD TEST: CPT

## 2021-06-22 PROCEDURE — 36415 COLL VENOUS BLD VENIPUNCTURE: CPT

## 2021-06-22 PROCEDURE — 80202 ASSAY OF VANCOMYCIN: CPT

## 2021-06-22 RX ORDER — INSULIN GLARGINE 100 [IU]/ML
8 INJECTION, SOLUTION SUBCUTANEOUS
Status: COMPLETED | OUTPATIENT
Start: 2021-06-22 | End: 2021-06-22

## 2021-06-22 RX ORDER — INSULIN GLARGINE 100 [IU]/ML
18 INJECTION, SOLUTION SUBCUTANEOUS DAILY
Status: DISCONTINUED | OUTPATIENT
Start: 2021-06-23 | End: 2021-06-23

## 2021-06-22 RX ORDER — OXYCODONE AND ACETAMINOPHEN 5; 325 MG/1; MG/1
2 TABLET ORAL
Status: DISCONTINUED | OUTPATIENT
Start: 2021-06-22 | End: 2021-06-24 | Stop reason: HOSPADM

## 2021-06-22 RX ORDER — MORPHINE SULFATE 2 MG/ML
2 INJECTION, SOLUTION INTRAMUSCULAR; INTRAVENOUS
Status: DISCONTINUED | OUTPATIENT
Start: 2021-06-22 | End: 2021-06-24 | Stop reason: HOSPADM

## 2021-06-22 RX ADMIN — INSULIN GLARGINE 10 UNITS: 100 INJECTION, SOLUTION SUBCUTANEOUS at 08:37

## 2021-06-22 RX ADMIN — Medication 10 ML: at 05:53

## 2021-06-22 RX ADMIN — INSULIN LISPRO 12 UNITS: 100 INJECTION, SOLUTION INTRAVENOUS; SUBCUTANEOUS at 11:15

## 2021-06-22 RX ADMIN — PIPERACILLIN AND TAZOBACTAM 3.38 G: 3; .375 INJECTION, POWDER, LYOPHILIZED, FOR SOLUTION INTRAVENOUS at 11:44

## 2021-06-22 RX ADMIN — HYDRALAZINE HYDROCHLORIDE 25 MG: 25 TABLET, FILM COATED ORAL at 20:37

## 2021-06-22 RX ADMIN — MORPHINE SULFATE 2 MG: 2 INJECTION, SOLUTION INTRAMUSCULAR; INTRAVENOUS at 15:27

## 2021-06-22 RX ADMIN — MORPHINE SULFATE 2 MG: 2 INJECTION, SOLUTION INTRAMUSCULAR; INTRAVENOUS at 13:54

## 2021-06-22 RX ADMIN — TRAZODONE HYDROCHLORIDE 50 MG: 50 TABLET ORAL at 20:37

## 2021-06-22 RX ADMIN — VANCOMYCIN HYDROCHLORIDE 1250 MG: 10 INJECTION, POWDER, LYOPHILIZED, FOR SOLUTION INTRAVENOUS at 15:26

## 2021-06-22 RX ADMIN — HYDRALAZINE HYDROCHLORIDE 25 MG: 25 TABLET, FILM COATED ORAL at 08:38

## 2021-06-22 RX ADMIN — SODIUM CHLORIDE 100 ML/HR: 900 INJECTION, SOLUTION INTRAVENOUS at 14:00

## 2021-06-22 RX ADMIN — OXYCODONE HYDROCHLORIDE AND ACETAMINOPHEN 2 TABLET: 5; 325 TABLET ORAL at 11:31

## 2021-06-22 RX ADMIN — VANCOMYCIN HYDROCHLORIDE 1250 MG: 10 INJECTION, POWDER, LYOPHILIZED, FOR SOLUTION INTRAVENOUS at 04:26

## 2021-06-22 RX ADMIN — ROSUVASTATIN CALCIUM 40 MG: 20 TABLET, COATED ORAL at 20:37

## 2021-06-22 RX ADMIN — MORPHINE SULFATE 2 MG: 2 INJECTION, SOLUTION INTRAMUSCULAR; INTRAVENOUS at 20:42

## 2021-06-22 RX ADMIN — PIPERACILLIN AND TAZOBACTAM 3.38 G: 3; .375 INJECTION, POWDER, LYOPHILIZED, FOR SOLUTION INTRAVENOUS at 17:00

## 2021-06-22 RX ADMIN — INSULIN LISPRO 6 UNITS: 100 INJECTION, SOLUTION INTRAVENOUS; SUBCUTANEOUS at 08:37

## 2021-06-22 RX ADMIN — INSULIN LISPRO 6 UNITS: 100 INJECTION, SOLUTION INTRAVENOUS; SUBCUTANEOUS at 11:32

## 2021-06-22 RX ADMIN — MORPHINE SULFATE 2 MG: 2 INJECTION, SOLUTION INTRAMUSCULAR; INTRAVENOUS at 18:19

## 2021-06-22 RX ADMIN — ACETAMINOPHEN 650 MG: 325 TABLET ORAL at 20:36

## 2021-06-22 RX ADMIN — INSULIN GLARGINE 8 UNITS: 100 INJECTION, SOLUTION SUBCUTANEOUS at 13:54

## 2021-06-22 RX ADMIN — ENOXAPARIN SODIUM 40 MG: 40 INJECTION SUBCUTANEOUS at 08:39

## 2021-06-22 RX ADMIN — Medication 10 ML: at 13:56

## 2021-06-22 RX ADMIN — PIPERACILLIN AND TAZOBACTAM 3.38 G: 3; .375 INJECTION, POWDER, LYOPHILIZED, FOR SOLUTION INTRAVENOUS at 06:05

## 2021-06-22 RX ADMIN — INSULIN LISPRO 6 UNITS: 100 INJECTION, SOLUTION INTRAVENOUS; SUBCUTANEOUS at 15:41

## 2021-06-22 RX ADMIN — OXYCODONE HYDROCHLORIDE AND ACETAMINOPHEN 2 TABLET: 5; 325 TABLET ORAL at 04:26

## 2021-06-22 NOTE — PROGRESS NOTES
Reason for Admission:  Severe sepsis ([facility] [A41.9, R65.20]  Hyperglycemia [R73.9]                 RUR Score:    22           Plan for utilizing home health:    yes                      Likelihood of Readmission:   Moderate                         Do you (patient/family) have any concerns for transition/discharge?  yes. Homelessness. Transition of Care Plan:       Initial assessment completed with patient. Cognitive status of patient: oriented to time, place, person and situation. Face sheet information confirmed:  yes. The patient designates his friend Otilio Elder [id number] to participate in his discharge plan and to receive any needed information. This patient is homeless at this time. Patient is able to navigate steps as needed. Prior to hospitalization, patient was considered to be independent with ADLs/IADLS : yes . Patient has a current ACP document on file: no.  Pt declined      Healthcare Decision Maker:     Click here to complete [address] including selection of the Healthcare Decision Maker Relationship (ie \"Primary\")    The Medicaid cab will be available to transport patient home upon discharge. The patient already has none reported medical equipment available in the home. Patient is not currently active with home health. Patient has not stayed in a skilled nursing facility or rehab. Was  stay within last 60 days : no. This patient is on dialysis :no    Currently, the discharge plan is to be determined    The patient states that he can obtain his medications from the pharmacy, and take his medications as directed. Patient's current insurance is [facility]      Care Management Interventions  PCP Verified by CM: Yes  Palliative Care Criteria Met (RRAT>21 & CHF Dx)?: No  Mode of Transport at Discharge: Self  Transition of Care Consult (CM Consult): Discharge Planning  Current Support Network:  Other (homeless)  Confirm Follow Up Transport: Self  Discharge Location  Discharge Placement: Unable to determine at this time        DEBI LaN RN  Care Management  Pager: [phone]

## 2021-06-22 NOTE — ROUTINE PROCESS
Bedside shift change report given to Calleen Roles RN (oncoming nurse) by  Lashae Forman (offgoing nurse). Report included the following information SBAR, Kardex, ED Summary and Intake/Output.

## 2021-06-22 NOTE — PROGRESS NOTES
Interdisciplinary Rounds Note     Interdisciplinary team rounds were held with the following team members Care Management, Nursing, Occupational Therapy, Outcomes Management, Physical Therapy and Physician Plan of care discussed. Patient Information: Raymond Olmos 599/[id number]    Attending Provider:  Janet Helton DO    Admitting Diagnosis: Severe sepsis ([facility] [A41.9, R65.20]  Hyperglycemia [R73.9]    Hospital day:  2    Expected LOS: - - -    Estimated Day of Discharge: 6/23/2021    Kendrick/Lines/Catheters: no    Telemetry Needed: N/A    Mobility Concerns:  no    Disposition:Home    COVID Status: Negative    Plan of care notes: Needs ID follow up for home antibiotics course.      Abel Dasilva 5:08 PM

## 2021-06-22 NOTE — PROGRESS NOTES
Problem: Diabetes Self-Management  Goal: *Disease process and treatment process  Description: Define diabetes and identify own type of diabetes; list 3 options for treating diabetes. Outcome: Progressing Towards Goal  Goal: *Incorporating nutritional management into lifestyle  Description: Describe effect of type, amount and timing of food on blood glucose; list 3 methods for planning meals. Outcome: Progressing Towards Goal  Goal: *Incorporating physical activity into lifestyle  Description: State effect of exercise on blood glucose levels. Outcome: Progressing Towards Goal  Goal: *Developing strategies to promote health/change behavior  Description: Define the ABC's of diabetes; identify appropriate screenings, schedule and personal plan for screenings. Outcome: Progressing Towards Goal  Goal: *Using medications safely  Description: State effect of diabetes medications on diabetes; name diabetes medication taking, action and side effects. Outcome: Progressing Towards Goal  Goal: *Monitoring blood glucose, interpreting and using results  Description: Identify recommended blood glucose targets  and personal targets. Outcome: Progressing Towards Goal  Goal: *Prevention, detection, treatment of acute complications  Description: List symptoms of hyper- and hypoglycemia; describe how to treat low blood sugar and actions for lowering  high blood glucose level. Outcome: Progressing Towards Goal  Goal: *Prevention, detection and treatment of chronic complications  Description: Define the natural course of diabetes and describe the relationship of blood glucose levels to long term complications of diabetes.   Outcome: Progressing Towards Goal  Goal: *Developing strategies to address psychosocial issues  Description: Describe feelings about living with diabetes; identify support needed and support network  Outcome: Progressing Towards Goal  Goal: *Insulin pump training  Outcome: Progressing Towards Goal  Goal: *Sick day guidelines  Outcome: Progressing Towards Goal  Goal: *Patient Specific Goal (EDIT GOAL, INSERT TEXT)  Outcome: Progressing Towards Goal     Problem: Patient Education: Go to Patient Education Activity  Goal: Patient/Family Education  Outcome: Progressing Towards Goal     Problem: Patient Education: Go to Patient Education Activity  Goal: Patient/Family Education  Outcome: Progressing Towards Goal     Problem: Patient Education: Go to Patient Education Activity  Goal: Patient/Family Education  Outcome: Progressing Towards Goal     Problem: Falls - Risk of  Goal: *Absence of Falls  Description: Document Demi Gutierrez Fall Risk and appropriate interventions in the flowsheet.   Outcome: Progressing Towards Goal  Note: Fall Risk Interventions:  Mobility Interventions: Patient to call before getting OOB         Medication Interventions: Patient to call before getting OOB, Teach patient to arise slowly    Elimination Interventions: Call light in reach              Problem: Patient Education: Go to Patient Education Activity  Goal: Patient/Family Education  Outcome: Progressing Towards Goal     Problem: Sepsis: Day 2  Goal: Off Pathway (Use only if patient is Off Pathway)  Outcome: Progressing Towards Goal  Goal: *Central Venous Pressure maintained at 8-12 mm Hg  Outcome: Progressing Towards Goal  Goal: *Hemodynamically stable  Outcome: Progressing Towards Goal  Goal: *Tolerating diet  Outcome: Progressing Towards Goal  Goal: Activity/Safety  Outcome: Progressing Towards Goal  Goal: Consults, if ordered  Outcome: Progressing Towards Goal  Goal: Diagnostic Test/Procedures  Outcome: Progressing Towards Goal  Goal: Nutrition/Diet  Outcome: Progressing Towards Goal  Goal: Discharge Planning  Outcome: Progressing Towards Goal  Goal: Medications  Outcome: Progressing Towards Goal  Goal: Respiratory  Outcome: Progressing Towards Goal  Goal: Treatments/Interventions/Procedures  Outcome: Progressing Towards Goal  Goal: Psychosocial  Outcome: Progressing Towards Goal     Problem: General Wound Care  Goal: *Infected Wound: Prevention of further infection and promotion of healing  Description: Infection control procedures (eg: clean dressings, clean gloves, hand washing, precautions to isolate wound from contamination, sterile instruments used for wound debridement) should be implemented. Outcome: Progressing Towards Goal  Goal: Interventions  Outcome: Progressing Towards Goal     Problem: Pain  Goal: *Control of Pain  Outcome: Progressing Towards Goal     Problem: Patient Education: Go to Patient Education Activity  Goal: Patient/Family Education  Outcome: Progressing Towards Goal     Problem: Risk for Spread of Infection  Goal: Prevent transmission of infectious organism to others  Description: Prevent the transmission of infectious organisms to other patients, staff members, and visitors.   Outcome: Progressing Towards Goal     Problem: Patient Education:  Go to Education Activity  Goal: Patient/Family Education  Outcome: Progressing Towards Goal

## 2021-06-22 NOTE — DIABETES MGMT
GLYCEMIC CONTROL PLAN OF CARE    Assessment:  History:  Admitting diagnosis of severe sepsis  Morning blood glucose:  211 mg/dl  IVF containing dextrose:  None   Steroids:  None   Diet/TF:  Regular, carb control  Recommendations:  Blood glucose elevated this am.  Recommend increasing lantus dose to 18 units daily  Continue corrective insulin coverage as needed  Pt already receiving very insulin resistant coverage  Will continue inpatient monitoring. Most recent BG values:      Results for Trista Berumen (MRN [id number]) as of 6/22/2021 12:31   Ref. Range 6/21/2021 15:27 6/21/2021 18:05 6/21/2021 21:55 6/22/2021 07:45 6/22/2021 11:36   GLUCOSE,FAST - POC Latest Ref Range: 70 - 110 mg/dL 188 (H) 134 (H) 218 (H) 211 (H) 339 (H)     Within target range  mg/dl? No   Current A1C:  11.3% equivalent to an average blood glucose of 278 mg/dl over the past 2-3 months. Adequate glycemic control PTA? No   Current hospital diabetes medications:  Lantus 10 units daily  Lispro corrective insulin coverage AC&HS  Previous days insulin requirements:  lantus 10 units  Lispro 18 units corrective insulin  Home diabetes medication:  patient unable to verbalize doses  Lantus 60 units daily  humalog 10 units with meals   Education:  __x see diabetes education record (6/21/2021)            ___ diabetes education not indicated at this time.     [facility] RN CDE  Ext [phone]

## 2021-06-22 NOTE — PROGRESS NOTES
shift change report given to Janine Loja, JUANCARLOS by Demarcus Vega and Loren Ribeiro RN. Report included the following information SBAR and Kardex.

## 2021-06-22 NOTE — PROGRESS NOTES
Call made to [facility] [phone], spoke with Calvin Rogers in the [employer], they have space available, client should speak with the  for assessment.

## 2021-06-22 NOTE — PROGRESS NOTES
Problem: Diabetes Self-Management  Goal: *Disease process and treatment process  Description: Define diabetes and identify own type of diabetes; list 3 options for treating diabetes. Outcome: Progressing Towards Goal  Goal: *Incorporating nutritional management into lifestyle  Description: Describe effect of type, amount and timing of food on blood glucose; list 3 methods for planning meals. Outcome: Progressing Towards Goal  Goal: *Incorporating physical activity into lifestyle  Description: State effect of exercise on blood glucose levels. Outcome: Progressing Towards Goal  Goal: *Developing strategies to promote health/change behavior  Description: Define the ABC's of diabetes; identify appropriate screenings, schedule and personal plan for screenings. Outcome: Progressing Towards Goal  Goal: *Using medications safely  Description: State effect of diabetes medications on diabetes; name diabetes medication taking, action and side effects. Outcome: Progressing Towards Goal  Goal: *Monitoring blood glucose, interpreting and using results  Description: Identify recommended blood glucose targets  and personal targets. Outcome: Progressing Towards Goal  Goal: *Prevention, detection, treatment of acute complications  Description: List symptoms of hyper- and hypoglycemia; describe how to treat low blood sugar and actions for lowering  high blood glucose level. Outcome: Progressing Towards Goal  Goal: *Prevention, detection and treatment of chronic complications  Description: Define the natural course of diabetes and describe the relationship of blood glucose levels to long term complications of diabetes.   Outcome: Progressing Towards Goal  Goal: *Developing strategies to address psychosocial issues  Description: Describe feelings about living with diabetes; identify support needed and support network  Outcome: Progressing Towards Goal  Goal: *Insulin pump training  Outcome: Progressing Towards Goal  Goal: *Sick day guidelines  Outcome: Progressing Towards Goal  Goal: *Patient Specific Goal (EDIT GOAL, INSERT TEXT)  Outcome: Progressing Towards Goal     Problem: Patient Education: Go to Patient Education Activity  Goal: Patient/Family Education  Outcome: Progressing Towards Goal     Problem: Patient Education: Go to Patient Education Activity  Goal: Patient/Family Education  Outcome: Progressing Towards Goal     Problem: Patient Education: Go to Patient Education Activity  Goal: Patient/Family Education  Outcome: Progressing Towards Goal     Problem: Falls - Risk of  Goal: *Absence of Falls  Description: Document Daron Fermin Fall Risk and appropriate interventions in the flowsheet.   Outcome: Progressing Towards Goal  Note: Fall Risk Interventions:  Mobility Interventions: Patient to call before getting OOB         Medication Interventions: Patient to call before getting OOB, Teach patient to arise slowly    Elimination Interventions: Call light in reach              Problem: Patient Education: Go to Patient Education Activity  Goal: Patient/Family Education  Outcome: Progressing Towards Goal     Problem: Sepsis: Day 2  Goal: Off Pathway (Use only if patient is Off Pathway)  Outcome: Progressing Towards Goal  Goal: *Central Venous Pressure maintained at 8-12 mm Hg  Outcome: Progressing Towards Goal  Goal: *Hemodynamically stable  Outcome: Progressing Towards Goal  Goal: *Tolerating diet  Outcome: Progressing Towards Goal  Goal: Activity/Safety  Outcome: Progressing Towards Goal  Goal: Consults, if ordered  Outcome: Progressing Towards Goal  Goal: Diagnostic Test/Procedures  Outcome: Progressing Towards Goal  Goal: Nutrition/Diet  Outcome: Progressing Towards Goal  Goal: Discharge Planning  Outcome: Progressing Towards Goal  Goal: Medications  Outcome: Progressing Towards Goal  Goal: Respiratory  Outcome: Progressing Towards Goal  Goal: Treatments/Interventions/Procedures  Outcome: Progressing Towards Goal  Goal: Psychosocial  Outcome: Progressing Towards Goal     Problem: General Wound Care  Goal: *Infected Wound: Prevention of further infection and promotion of healing  Description: Infection control procedures (eg: clean dressings, clean gloves, hand washing, precautions to isolate wound from contamination, sterile instruments used for wound debridement) should be implemented. Outcome: Progressing Towards Goal  Goal: Interventions  Outcome: Progressing Towards Goal     Problem: Pain  Goal: *Control of Pain  Outcome: Progressing Towards Goal     Problem: Patient Education: Go to Patient Education Activity  Goal: Patient/Family Education  Outcome: Progressing Towards Goal     Problem: Risk for Spread of Infection  Goal: Prevent transmission of infectious organism to others  Description: Prevent the transmission of infectious organisms to other patients, staff members, and visitors.   Outcome: Progressing Towards Goal     Problem: Patient Education:  Go to Education Activity  Goal: Patient/Family Education  Outcome: Progressing Towards Goal

## 2021-06-22 NOTE — PROGRESS NOTES
Per pt, he cannot go back to address listed on demography. He stated he is homeless at this time and want  to place him somewhere because of his leg wounds. Per pt, he does not have any income at this time. CM informed him that to go to a group home, he has to have income and the next alternative is shelter and pt is not agreeable with this option. He stated he will call his insurance. CM informed him we will look for resources. Discussed with Indigo of Case management. She stated she will look for options. 12noon: Discussed with Jw Grimes of Case Management. Another option is long term care. 1430: Met with pt and he is agreeable to LTC in the area. Informed him that we weill have to [place] the whole [place] and [place].    1520: Pt's clinicals [place]. Left a message for Smitha Whitaker of [facility].           DEBI FrancisN RN  Care Management  Pager: [phone]

## 2021-06-22 NOTE — ACP (ADVANCE CARE PLANNING)
Advance Care Planning     General Advance Care Planning (ACP) Conversation      Date of Conversation:6/22/21  Conducted with: Patient with Decision Making Capacity    Healthcare Decision Maker:       Patient declined.         DEBI BrownN RN  Care Management  Pager: [phone]

## 2021-06-22 NOTE — CONSULTS
Infectious Disease Consultation Note        Reason: Diabetic foot infection    Current abx Prior abx   Zosyn, vancomycin since 6/18/2021      Lines:       Assessment :    49 y.o. male with PMHx of uncontrolled type 2 diabetes -last hemoglobin A1c 11.3 on 6/19/2021, hepatitis C, MRSA (positive nasal screen April 2021 ), hypertension presented to [facility] on 6/18/2021 for left foot cellulitis with  ulcer on left great toe. Clinical presentation c/w acute Osteomyelitis of left great toe with diabetic foot ulcer, abscess plantar left foot Status post amputation left great toe with drainage of abscess on 6/21/2021    Wound culture 6/19-staph aureus, beta-hemolytic strep, gram-negative rickey, Proteus, mixed skin jazmin    Recommendations:    1. Continue piperacillin/tazobactam, vancomycin  2. Follow-up susceptibilities of organisms in wound culture and modify antibiotics as needed  3. Follow-up results of bone biopsy,intra op findings to determine outpatient IV versus oral antibiotics      Thank you for consultation request. Above plan was discussed in details with patient,  Will d/w  dr Branden Reyes. Please call me if any further questions or concerns. Will continue to participate in the care of this patient. HPI:    49 y.o. male with PMHx of uncontrolled type 2 diabetes -last hemoglobin A1c 11.3 on 6/19/2021, hepatitis C, hypertension presented to [facility] on 6/18/2021 for left foot cellulitis with  ulcer on left great toe. Patient states that he has had ulcer on his left great toe for about 6 months. He follows up with podiatrist.  Patient was recently seen in office about 2 weeks ago for same problem and he was suppose to f/u in a week. However, it got worse and he decided to come to ER. Patient found to have increased redness and swelling with warmth to left foot and ankle and reports pain with walking and wearing shoe gear. He also had fever. Patient states that over last week his symptoms got worse.    He was started on pressors and antibiotics. Podiatry consulted. MRI left foot revealed-   Moderate size ulcer at the plantar medial aspect of the great toe. Great toe  cellulitis. No drainable abscess. Acute osteomyelitis in the first distal phalanx. T2 hyperintense signal within the first proximal phalanx without associated  T1 signal hypointensity. Status post amputation left great toe with drainage of abscess on 6/21/2021. I have been consulted for further recommendations. Patient denies history of significant infections in the past.  Denies history of MRSA infection/colonization in the past.    Past Medical History:   Diagnosis Date    Diabetes ([facility] [address]     Hepatitis C infection     Hypertension     Psychiatric disorder        History reviewed. No pertinent surgical history. home Medication List    Details   hydrALAZINE (APRESOLINE) 25 mg tablet Take 1 Tab by mouth two (2) times a day. Qty: 90 Tab, Refills: 0      vancomycin 1.25 gram 1,250 mg, vial-mate 1 Device IVPB 1,250 mg by IntraVENous route every twelve (12) hours every twelve (12) hours. Qty: 10 Dose, Refills: 0    Comments: Vancomycin with pharmacy protocol for 6 weeks      DULoxetine (CYMBALTA) 60 mg capsule Take 1 Cap by mouth daily. Indications: diabetic complication causing injury to some body nerves, major depressive disorder  Qty: 30 Cap, Refills: 0      insulin glargine (LANTUS) 100 unit/mL injection Administer 60 units underneath the skin, every night after supper  Indications: type 2 diabetes mellitus  Qty: 1 Vial, Refills: 0      insulin lispro (HUMALOG) 100 unit/mL injection Administer 10 underneath the skin, three times a day before meals. Indications: type 2 diabetes mellitus  Qty: 1 Vial, Refills: 0      rosuvastatin (CRESTOR) 40 mg tablet Take 1 Tab by mouth nightly. Indications: high cholesterol  Qty: 30 Tab, Refills: 0      traZODone (DESYREL) 50 mg tablet Take 1 Tab by mouth nightly as needed for Sleep.  Indications: insomnia associated with depression  Qty: 30 Tab, Refills: 0             Current Facility-Administered Medications   Medication Dose Route Frequency    insulin glargine (LANTUS) injection 10 Units  10 Units SubCUTAneous DAILY    hydrALAZINE (APRESOLINE) 20 mg/mL injection 10 mg  10 mg IntraVENous Q6H PRN    vancomycin (VANCOCIN) 1250 mg in  ml infusion  1,250 mg IntraVENous Q12H    hydrALAZINE (APRESOLINE) tablet 25 mg  25 mg Oral BID    rosuvastatin (CRESTOR) tablet 40 mg  40 mg Oral QHS    traZODone (DESYREL) tablet 50 mg  50 mg Oral QHS PRN    sodium chloride (NS) flush 5-40 mL  5-40 mL IntraVENous Q8H    sodium chloride (NS) flush 5-40 mL  5-40 mL IntraVENous PRN    acetaminophen (TYLENOL) tablet 650 mg  650 mg Oral Q6H PRN    Or    acetaminophen (TYLENOL) suppository 650 mg  650 mg Rectal Q6H PRN    polyethylene glycol (MIRALAX) packet 17 g  17 g Oral DAILY PRN    ondansetron (ZOFRAN ODT) tablet 4 mg  4 mg Oral Q8H PRN    Or    ondansetron (ZOFRAN) injection 4 mg  4 mg IntraVENous Q6H PRN    enoxaparin (LOVENOX) injection 40 mg  40 mg SubCUTAneous DAILY    0.9% sodium chloride infusion  100 mL/hr IntraVENous CONTINUOUS    insulin lispro (HUMALOG) injection   SubCUTAneous AC&HS    glucose chewable tablet 16 g  16 g Oral PRN    glucagon (GLUCAGEN) injection 1 mg  1 mg IntraMUSCular PRN    dextrose (D50W) injection syrg 12.5-25 g  25-50 mL IntraVENous PRN    oxyCODONE-acetaminophen (PERCOCET) 5-325 mg per tablet 1-2 Tablet  1-2 Tablet Oral Q6H PRN    sodium chloride (NS) flush 5-10 mL  5-10 mL IntraVENous PRN    piperacillin-tazobactam (ZOSYN) 3.375 g in 0.9% sodium chloride (MBP/ADV) 100 mL MBP  3.375 g IntraVENous Q6H       Allergies: Patient has no known allergies. History reviewed. No pertinent family history.   Social History     Socioeconomic History    Marital status: SINGLE     Spouse name: Not on file    Number of children: Not on file    Years of education: Not on file    Highest education level: Not on file   Occupational History    Not on file   Tobacco Use    Smoking status: Current Every Day Smoker     Packs/day: 1.00    Smokeless tobacco: Never Used   Substance and Sexual Activity    Alcohol use: Yes     Comment: 2 times a week     Drug use: Yes     Types: Cocaine    Sexual activity: Not on file   Other Topics Concern    Not on file   Social History Narrative    ** Merged History Encounter **          Social Determinants of Health     Financial Resource Strain:     Difficulty of Paying Living Expenses:    Food Insecurity:     Worried About Running Out of Food in the Last Year:     Ran Out of Food in the Last Year:    Transportation Needs:     Lack of Transportation (Medical):  Lack of Transportation (Non-Medical):    Physical Activity:     Days of Exercise per Week:     Minutes of Exercise per Session:    Stress:     Feeling of Stress :    Social Connections:     Frequency of Communication with Friends and Family:     Frequency of Social Gatherings with Friends and Family:     Attends Religion Services:     Active Member of Clubs or Organizations:     Attends Club or Organization Meetings:     Marital Status:    Intimate Partner Violence:     Fear of Current or Ex-Partner:     Emotionally Abused:     Physically Abused:     Sexually Abused:      Social History     Tobacco Use   Smoking Status Current Every Day Smoker    Packs/day: 1.00   Smokeless Tobacco Never Used        Temp (24hrs), Av.9 °F (37.2 °C), Min:97.8 °F (36.6 °C), Max:100.1 °F (37.8 °C)    Visit Vitals  BP (!) 152/86 (BP 1 Location: Right upper arm, BP Patient Position: At rest)   Pulse 83   Temp 97.9 °F (36.6 °C)   Resp 18   Ht 5' 11\" (1.803 m)   Wt 72.6 kg (160 lb)   SpO2 98%   BMI 22.32 kg/m²       ROS: 12 point ROS obtained in details. Pertinent positives as mentioned in HPI,   otherwise negative    Physical Exam:    HENT:      Head: Normocephalic.       Nose: Nose normal.      Mouth/Throat: Mouth: Mucous membranes are moist.   Eyes:      Pupils: Pupils are equal, round, and reactive to light. Cardiovascular:      Rate and Rhythm: regular     Pulses: Normal pulses. Pulmonary:      Effort: Pulmonary effort is normal.   Abdominal:      General: Abdomen is flat. There is no distension. Tenderness: There is no abdominal tenderness. Genitourinary:     Comments: deferred  Musculoskeletal:         General: Normal range of motion. Cervical back: Normal range of motion. Skin:     Comments: left foot surgical dressing not removed  Neurological:      General: No focal deficit present. Mental Status: He is alert. Psychiatric:         Mood and Affect: Mood normal.        Labs: Results:   Chemistry Recent Labs     06/22/21  0040 06/21/21  0234   * 199*   * 135*   K 4.0 3.8    101   CO2 26 28   BUN 9 11   CREA 0.82 0.93   CA 8.5 8.4*   AGAP 6 6   BUCR 11* 12      CBC w/Diff No results for input(s): WBC, RBC, HGB, HCT, PLT, GRANS, LYMPH, EOS, HGBEXT, HCTEXT, PLTEXT in the last 72 hours. Microbiology Recent Labs     06/21/21  1725 06/20/21  1035 06/19/21  1904   CULT PENDING NO GROWTH 2 DAYS MODERATE GRAM NEGATIVE RODS*  CHECKING FOR POSSIBLE  OTHER ORGANISMS            RADIOLOGY:    All available imaging studies/reports in [facility] for this admission were reviewed      Disclaimer: Sections of this note are dictated utilizing voice recognition software, which may have resulted in some phonetic based errors in grammar and contents. Even though attempts were made to correct all the mistakes, some may have been missed, and remained in the body of the document. If questions arise, please contact our department.     Dr. Rickie Treadwell, Infectious Disease Specialist  [phone]  June 22, 2021  9:44 AM

## 2021-06-22 NOTE — PROGRESS NOTES
Progress Note    Patient: Petar Mello MRN: [id number]  SSN: [id number]    YOB: 1965  Age: 49 y.o. Sex: male      Admit Date: 6/18/2021  1 Day Post-Op     Procedure:   Procedure(s):  AMPUTATION LEFT GREAT TOE    Subjective:     Patient seen resting quiet and comfortably and no apparent distress. ID is on board. Patient had arterial duplex earlier today. Patient reports pain to left foot and leg on anterior aspect. Denies N/V/C/F. Status post: osteomyelitis    Objective:     Visit Vitals  /76 (BP 1 Location: Right upper arm, BP Patient Position: Sitting)   Pulse 86   Temp 97.9 °F (36.6 °C)   Resp 18   Ht 5' 11\" (1.803 m)   Wt 72.6 kg (160 lb)   SpO2 97%   BMI 22.32 kg/m²        Physical Exam:  Surgical site intact. Skin edges approximated. Skin sutures intact. Soft, mushy skin noted on lateral incision site. No erythema or edema noted. Labs/Radiology Review: images and reports reviewed    Assessment:     Hospital Problems  Never Reviewed        Codes Class Noted POA    * (Principal) Diabetic foot ulcer ([facility] ICD-10-CM: E11.621, L97.509  ICD-9-CM: 250.80, 707.15  6/22/2021 Unknown        Severe sepsis ([facility] ICD-10-CM: A41.9, R65.20  ICD-9-CM: 038.9, 995.92  6/19/2021 Unknown        Acute kidney failure ([facility] ICD-10-CM: N17.9  ICD-9-CM: 584.9  6/19/2021 Unknown        Tachycardia ICD-10-CM: R00.0  ICD-9-CM: 785.0  6/19/2021 Unknown        Hyperglycemia due to type 2 diabetes mellitus ([facility] ICD-10-CM: E11.65  ICD-9-CM: 250.00  6/19/2021 Unknown        Fever ICD-10-CM: R50.9  ICD-9-CM: 780.60  6/19/2021 Unknown        Hypertension ICD-10-CM: I10  ICD-9-CM: 401.9  6/19/2021 Unknown        Hyponatremia ICD-10-CM: E87.1  ICD-9-CM: 276.1  6/19/2021 Unknown        Hyperglycemia ICD-10-CM: R73.9  ICD-9-CM: 790.29  6/19/2021 Unknown              Plan/Recommendations/Medical Decision Making:     - Continue abxs per ID recommendation. F/u OR micro/path. - Remain NWB to left forefoot. PT/OT consulted. Please dispense surgical shoe. Only heel weightbearing.   - Arterial duplex WNL. - Medical management per hospitalist.   - Discharge plan per , awaiting shelter placement.

## 2021-06-22 NOTE — ANESTHESIA POSTPROCEDURE EVALUATION
Procedure(s):  AMPUTATION LEFT GREAT TOE.     MAC    Anesthesia Post Evaluation      Multimodal analgesia: multimodal analgesia used between 6 hours prior to anesthesia start to PACU discharge  Patient location during evaluation: PACU  Patient participation: complete - patient participated  Level of consciousness: awake and alert  Pain management: adequate  Airway patency: patent  Anesthetic complications: no  Cardiovascular status: acceptable  Respiratory status: acceptable  Hydration status: acceptable  Post anesthesia nausea and vomiting:  controlled  Final Post Anesthesia Temperature Assessment:  Normothermia (36.0-37.5 degrees C)      INITIAL Post-op Vital signs:   Vitals Value Taken Time   /85 06/21/21 1815   Temp 37 °C (98.6 °F) 06/21/21 1743   Pulse 84 06/21/21 1825   Resp 19 06/21/21 1825   SpO2 100 % 06/21/21 1825

## 2021-06-23 LAB
ANION GAP SERPL CALC-SCNC: 2 MMOL/L (ref 3–18)
BACTERIA SPEC CULT: ABNORMAL
BACTERIA SPEC CULT: NORMAL
BUN SERPL-MCNC: 8 MG/DL (ref 7–18)
BUN/CREAT SERPL: 8 (ref 12–20)
CALCIUM SERPL-MCNC: 8.6 MG/DL (ref 8.5–10.1)
CHLORIDE SERPL-SCNC: 103 MMOL/L (ref 100–111)
CO2 SERPL-SCNC: 32 MMOL/L (ref 21–32)
CREAT SERPL-MCNC: 1.02 MG/DL (ref 0.6–1.3)
GLUCOSE BLD STRIP.AUTO-MCNC: 160 MG/DL (ref 70–110)
GLUCOSE BLD STRIP.AUTO-MCNC: 172 MG/DL (ref 70–110)
GLUCOSE BLD STRIP.AUTO-MCNC: 184 MG/DL (ref 70–110)
GLUCOSE BLD STRIP.AUTO-MCNC: 191 MG/DL (ref 70–110)
GLUCOSE BLD STRIP.AUTO-MCNC: 263 MG/DL (ref 70–110)
GLUCOSE SERPL-MCNC: 150 MG/DL (ref 74–99)
GRAM STN SPEC: ABNORMAL
GRAM STN SPEC: ABNORMAL
POTASSIUM SERPL-SCNC: 3.7 MMOL/L (ref 3.5–5.5)
SERVICE CMNT-IMP: ABNORMAL
SERVICE CMNT-IMP: NORMAL
SODIUM SERPL-SCNC: 137 MMOL/L (ref 136–145)

## 2021-06-23 PROCEDURE — 99233 SBSQ HOSP IP/OBS HIGH 50: CPT | Performed by: FAMILY MEDICINE

## 2021-06-23 PROCEDURE — 97164 PT RE-EVAL EST PLAN CARE: CPT

## 2021-06-23 PROCEDURE — 74011250636 HC RX REV CODE- 250/636: Performed by: INTERNAL MEDICINE

## 2021-06-23 PROCEDURE — 74011250637 HC RX REV CODE- 250/637: Performed by: INTERNAL MEDICINE

## 2021-06-23 PROCEDURE — 36415 COLL VENOUS BLD VENIPUNCTURE: CPT

## 2021-06-23 PROCEDURE — 2709999900 HC NON-CHARGEABLE SUPPLY

## 2021-06-23 PROCEDURE — 74011000258 HC RX REV CODE- 258: Performed by: INTERNAL MEDICINE

## 2021-06-23 PROCEDURE — 74011250636 HC RX REV CODE- 250/636: Performed by: FAMILY MEDICINE

## 2021-06-23 PROCEDURE — 82962 GLUCOSE BLOOD TEST: CPT

## 2021-06-23 PROCEDURE — 74011250637 HC RX REV CODE- 250/637: Performed by: FAMILY MEDICINE

## 2021-06-23 PROCEDURE — 80048 BASIC METABOLIC PNL TOTAL CA: CPT

## 2021-06-23 PROCEDURE — 74011636637 HC RX REV CODE- 636/637: Performed by: FAMILY MEDICINE

## 2021-06-23 PROCEDURE — 65660000000 HC RM CCU STEPDOWN

## 2021-06-23 RX ORDER — INSULIN GLARGINE 100 [IU]/ML
25 INJECTION, SOLUTION SUBCUTANEOUS DAILY
Status: DISCONTINUED | OUTPATIENT
Start: 2021-06-24 | End: 2021-06-24 | Stop reason: HOSPADM

## 2021-06-23 RX ORDER — INSULIN GLARGINE 100 [IU]/ML
7 INJECTION, SOLUTION SUBCUTANEOUS
Status: COMPLETED | OUTPATIENT
Start: 2021-06-23 | End: 2021-06-23

## 2021-06-23 RX ADMIN — INSULIN GLARGINE 7 UNITS: 100 INJECTION, SOLUTION SUBCUTANEOUS at 15:04

## 2021-06-23 RX ADMIN — HYDRALAZINE HYDROCHLORIDE 25 MG: 25 TABLET, FILM COATED ORAL at 09:16

## 2021-06-23 RX ADMIN — PIPERACILLIN AND TAZOBACTAM 3.38 G: 3; .375 INJECTION, POWDER, LYOPHILIZED, FOR SOLUTION INTRAVENOUS at 00:29

## 2021-06-23 RX ADMIN — SODIUM CHLORIDE 100 ML/HR: 900 INJECTION, SOLUTION INTRAVENOUS at 04:45

## 2021-06-23 RX ADMIN — INSULIN LISPRO 3 UNITS: 100 INJECTION, SOLUTION INTRAVENOUS; SUBCUTANEOUS at 22:32

## 2021-06-23 RX ADMIN — Medication 10 ML: at 15:05

## 2021-06-23 RX ADMIN — INSULIN LISPRO 3 UNITS: 100 INJECTION, SOLUTION INTRAVENOUS; SUBCUTANEOUS at 09:16

## 2021-06-23 RX ADMIN — MORPHINE SULFATE 2 MG: 2 INJECTION, SOLUTION INTRAMUSCULAR; INTRAVENOUS at 18:38

## 2021-06-23 RX ADMIN — OXYCODONE HYDROCHLORIDE AND ACETAMINOPHEN 2 TABLET: 5; 325 TABLET ORAL at 20:05

## 2021-06-23 RX ADMIN — INSULIN GLARGINE 18 UNITS: 100 INJECTION, SOLUTION SUBCUTANEOUS at 09:28

## 2021-06-23 RX ADMIN — MORPHINE SULFATE 2 MG: 2 INJECTION, SOLUTION INTRAMUSCULAR; INTRAVENOUS at 09:16

## 2021-06-23 RX ADMIN — MORPHINE SULFATE 2 MG: 2 INJECTION, SOLUTION INTRAMUSCULAR; INTRAVENOUS at 23:23

## 2021-06-23 RX ADMIN — ACETAMINOPHEN 650 MG: 325 TABLET ORAL at 09:16

## 2021-06-23 RX ADMIN — Medication 10 ML: at 22:56

## 2021-06-23 RX ADMIN — ENOXAPARIN SODIUM 40 MG: 40 INJECTION SUBCUTANEOUS at 09:16

## 2021-06-23 RX ADMIN — OXYCODONE HYDROCHLORIDE AND ACETAMINOPHEN 2 TABLET: 5; 325 TABLET ORAL at 15:57

## 2021-06-23 RX ADMIN — MORPHINE SULFATE 2 MG: 2 INJECTION, SOLUTION INTRAMUSCULAR; INTRAVENOUS at 04:45

## 2021-06-23 RX ADMIN — PIPERACILLIN AND TAZOBACTAM 3.38 G: 3; .375 INJECTION, POWDER, LYOPHILIZED, FOR SOLUTION INTRAVENOUS at 11:51

## 2021-06-23 RX ADMIN — INSULIN LISPRO 9 UNITS: 100 INJECTION, SOLUTION INTRAVENOUS; SUBCUTANEOUS at 11:51

## 2021-06-23 RX ADMIN — INSULIN LISPRO 3 UNITS: 100 INJECTION, SOLUTION INTRAVENOUS; SUBCUTANEOUS at 00:26

## 2021-06-23 RX ADMIN — Medication 10 ML: at 00:33

## 2021-06-23 RX ADMIN — HYDRALAZINE HYDROCHLORIDE 25 MG: 25 TABLET, FILM COATED ORAL at 22:33

## 2021-06-23 RX ADMIN — PIPERACILLIN AND TAZOBACTAM 3.38 G: 3; .375 INJECTION, POWDER, LYOPHILIZED, FOR SOLUTION INTRAVENOUS at 06:27

## 2021-06-23 RX ADMIN — MORPHINE SULFATE 2 MG: 2 INJECTION, SOLUTION INTRAMUSCULAR; INTRAVENOUS at 00:29

## 2021-06-23 RX ADMIN — INSULIN LISPRO 3 UNITS: 100 INJECTION, SOLUTION INTRAVENOUS; SUBCUTANEOUS at 15:59

## 2021-06-23 RX ADMIN — Medication 10 ML: at 05:39

## 2021-06-23 RX ADMIN — VANCOMYCIN HYDROCHLORIDE 1250 MG: 10 INJECTION, POWDER, LYOPHILIZED, FOR SOLUTION INTRAVENOUS at 04:45

## 2021-06-23 RX ADMIN — ROSUVASTATIN CALCIUM 40 MG: 20 TABLET, COATED ORAL at 22:30

## 2021-06-23 RX ADMIN — PIPERACILLIN AND TAZOBACTAM 3.38 G: 3; .375 INJECTION, POWDER, LYOPHILIZED, FOR SOLUTION INTRAVENOUS at 18:38

## 2021-06-23 NOTE — PROGRESS NOTES
Problem: Diabetes Self-Management  Goal: *Disease process and treatment process  Description: Define diabetes and identify own type of diabetes; list 3 options for treating diabetes. Outcome: Progressing Towards Goal  Goal: *Incorporating nutritional management into lifestyle  Description: Describe effect of type, amount and timing of food on blood glucose; list 3 methods for planning meals. Outcome: Progressing Towards Goal  Goal: *Incorporating physical activity into lifestyle  Description: State effect of exercise on blood glucose levels. Outcome: Progressing Towards Goal  Goal: *Developing strategies to promote health/change behavior  Description: Define the ABC's of diabetes; identify appropriate screenings, schedule and personal plan for screenings. Outcome: Progressing Towards Goal  Goal: *Using medications safely  Description: State effect of diabetes medications on diabetes; name diabetes medication taking, action and side effects. Outcome: Progressing Towards Goal  Goal: *Monitoring blood glucose, interpreting and using results  Description: Identify recommended blood glucose targets  and personal targets. Outcome: Progressing Towards Goal  Goal: *Prevention, detection, treatment of acute complications  Description: List symptoms of hyper- and hypoglycemia; describe how to treat low blood sugar and actions for lowering  high blood glucose level. Outcome: Progressing Towards Goal  Goal: *Prevention, detection and treatment of chronic complications  Description: Define the natural course of diabetes and describe the relationship of blood glucose levels to long term complications of diabetes.   Outcome: Progressing Towards Goal  Goal: *Developing strategies to address psychosocial issues  Description: Describe feelings about living with diabetes; identify support needed and support network  Outcome: Progressing Towards Goal  Goal: *Insulin pump training  Outcome: Progressing Towards Goal  Goal: *Sick day guidelines  Outcome: Progressing Towards Goal  Goal: *Patient Specific Goal (EDIT GOAL, INSERT TEXT)  Outcome: Progressing Towards Goal     Problem: Patient Education: Go to Patient Education Activity  Goal: Patient/Family Education  Outcome: Progressing Towards Goal     Problem: Patient Education: Go to Patient Education Activity  Goal: Patient/Family Education  Outcome: Progressing Towards Goal     Problem: Patient Education: Go to Patient Education Activity  Goal: Patient/Family Education  Outcome: Progressing Towards Goal     Problem: Falls - Risk of  Goal: *Absence of Falls  Description: Document Jennifer Soto Fall Risk and appropriate interventions in the flowsheet.   Outcome: Progressing Towards Goal  Note: Fall Risk Interventions:  Mobility Interventions: Patient to call before getting OOB         Medication Interventions: Teach patient to arise slowly    Elimination Interventions: Call light in reach              Problem: Patient Education: Go to Patient Education Activity  Goal: Patient/Family Education  Outcome: Progressing Towards Goal     Problem: Sepsis: Day 2  Goal: Off Pathway (Use only if patient is Off Pathway)  Outcome: Progressing Towards Goal  Goal: *Central Venous Pressure maintained at 8-12 mm Hg  Outcome: Progressing Towards Goal  Goal: *Hemodynamically stable  Outcome: Progressing Towards Goal  Goal: *Tolerating diet  Outcome: Progressing Towards Goal  Goal: Activity/Safety  Outcome: Progressing Towards Goal  Goal: Consults, if ordered  Outcome: Progressing Towards Goal  Goal: Diagnostic Test/Procedures  Outcome: Progressing Towards Goal  Goal: Nutrition/Diet  Outcome: Progressing Towards Goal  Goal: Discharge Planning  Outcome: Progressing Towards Goal  Goal: Medications  Outcome: Progressing Towards Goal  Goal: Respiratory  Outcome: Progressing Towards Goal  Goal: Treatments/Interventions/Procedures  Outcome: Progressing Towards Goal  Goal: Psychosocial  Outcome: Progressing Towards Goal Problem: General Wound Care  Goal: *Infected Wound: Prevention of further infection and promotion of healing  Description: Infection control procedures (eg: clean dressings, clean gloves, hand washing, precautions to isolate wound from contamination, sterile instruments used for wound debridement) should be implemented. Outcome: Progressing Towards Goal  Goal: Interventions  Outcome: Progressing Towards Goal     Problem: Pain  Goal: *Control of Pain  Outcome: Progressing Towards Goal     Problem: Patient Education: Go to Patient Education Activity  Goal: Patient/Family Education  Outcome: Progressing Towards Goal     Problem: Risk for Spread of Infection  Goal: Prevent transmission of infectious organism to others  Description: Prevent the transmission of infectious organisms to other patients, staff members, and visitors.   Outcome: Progressing Towards Goal     Problem: Patient Education:  Go to Education Activity  Goal: Patient/Family Education  Outcome: Progressing Towards Goal     Problem: Patient Education: Go to Patient Education Activity  Goal: Patient/Family Education  Outcome: Progressing Towards Goal

## 2021-06-23 NOTE — PROGRESS NOTES
Pt has some acceptances in [facility]. Met with him and he is agreeable t snf close by. Tal Sousa of [facility] will be coming to meet with pt. Informed Joceline of wound care and IV abx Ertapenem. Tal Sousa stated she will look into the abx if they can accommodate.         Velia Francis, DEBIN RN  Care Management  Pager: [phone]

## 2021-06-23 NOTE — ROUTINE PROCESS
Bedside shift change report given to  Teetee Wills RN (oncoming nurse) by  Dorcas Villaseñor (offgoing nurse). Report included the following information SBAR, Kardex, ED Summary and Recent Results.

## 2021-06-23 NOTE — PROGRESS NOTES
[facility] Hospitalists  Progress Note    Patient: Marie Sanz Age: 49 y.o. : 1965 MR#: [id number] SSN: [id number]  Date: 2021     Subjective/24-hour events:     Seen in room today   Walking around the room without much trouble  Still with severe pain to L foot   Denies other significant complaints     [facility]   Weighing whether he wants to go there  We will need outpatient IV ABX    Assessment:     Mr. Marie Sanz is a 48 y/o male with a PMHx of Type II DM, HTN and Hep C who is admitted for sepsis 2/2 L great toe diabetic foot infection w/abscess and osteomyelitis. 1. Sepsis- POA, with fever, tachycardia, tachypnea  2. L great toe diabetic foot ulcer w/abscess status post partial amputation and decompression of abscess  3. Osteomyelitis of L great toe   4. Recurrent fever  5. Type II DM w/hyperglydemia- uncontrolled,  HbA1c 11.3%  6. Hyponatremia       Plan:     Podiatry and ID following   S/p partial amputation of L great toe, decompression of abscess on   BLE arterial duplex wnl   Cont abx per ID- Zosyn and Vancomycin   Will need outpatient IV ABX per ID  PICC line ordered  DM management following   Cont lantus and SSI w/accuchecks    Continue percocet and IV morphine for pain control   PT,OT- surgical shoe and heel weightbearing     Case discussed with:  [x]Patient  []Family  [x]Nursing  [x]Case Management  DVT prophylaxis: Lovenox   Diet: Diabetic   Contact: Sabino Sanchez (friend)      [phone]  Code Status: FULL    Disposition: Cont current care; discharge to [facility] vs LTC pending determination of outpatient abx        DARRIUS Jaramillo DO   2021         Objective:   VS:   Visit Vitals  /79 (BP 1 Location: Left upper arm, BP Patient Position: At rest)   Pulse 76   Temp 97.8 °F (36.6 °C)   Resp 18   Ht 5' 11\" (1.803 m)   Wt 72.6 kg (160 lb)   SpO2 97%   BMI 22.32 kg/m²      Tmax/24hrs: Temp (24hrs), Av.9 °F (37.2 °C), Min:97.8 °F (36.6 °C), Max:101.4 °F (38.6 °C)      Intake/Output Summary (Last 24 hours) at 6/23/2021 1602  Last data filed at 6/23/2021 0746  Gross per 24 hour   Intake 7495 ml   Output 1400 ml   Net 6095 ml       General:  Nontoxic-appearing. Cardiovascular:  RRR. Pulmonary:  Lungs clear bilaterally, no wheezes. GI:  Abdomen soft, NTTP. Extremities:  Warm, no edema or ischemia. Foot dressings intact. Neuro:  Awake and alert. Moves extremities spontaneously.     Labs:    Recent Results (from the past 24 hour(s))   GLUCOSE, POC    Collection Time: 06/23/21 12:25 AM   Result Value Ref Range    Glucose (POC) 191 (H) 70 - 516 mg/dL   METABOLIC PANEL, BASIC    Collection Time: 06/23/21  2:22 AM   Result Value Ref Range    Sodium 137 136 - 145 mmol/L    Potassium 3.7 3.5 - 5.5 mmol/L    Chloride 103 100 - 111 mmol/L    CO2 32 21 - 32 mmol/L    Anion gap 2 (L) 3.0 - 18 mmol/L    Glucose 150 (H) 74 - 99 mg/dL    BUN 8 7.0 - 18 MG/DL    Creatinine 1.02 0.6 - 1.3 MG/DL    BUN/Creatinine ratio 8 (L) 12 - 20      GFR est AA >60 >60 ml/min/1.73m2    GFR est non-AA >60 >60 ml/min/1.73m2    Calcium 8.6 8.5 - 10.1 MG/DL   GLUCOSE, POC    Collection Time: 06/23/21  7:44 AM   Result Value Ref Range    Glucose (POC) 184 (H) 70 - 110 mg/dL   GLUCOSE, POC    Collection Time: 06/23/21 11:29 AM   Result Value Ref Range    Glucose (POC) 263 (H) 70 - 110 mg/dL   GLUCOSE, POC    Collection Time: 06/23/21  3:12 PM   Result Value Ref Range    Glucose (POC) 172 (H) 70 - 110 mg/dL

## 2021-06-23 NOTE — PROGRESS NOTES
Infectious Disease progress Note        Reason: Diabetic foot infection    Current abx Prior abx   Zosyn, vancomycin since 6/18/2021      Lines:       Assessment :    49 y.o. male with PMHx of uncontrolled type 2 diabetes -last hemoglobin A1c 11.3 on 6/19/2021, hepatitis C, MRSA (positive nasal screen April 2021 ), hypertension presented to [facility] on 6/18/2021 for left foot cellulitis with  ulcer on left great toe. Clinical presentation c/w acute Osteomyelitis of left great toe with diabetic foot ulcer, abscess plantar left foot Status post amputation left great toe with drainage of abscess on 6/21/2021    Wound culture 6/19-staph aureus, beta-hemolytic strep, morganella Proteus, mixed skin jazmin  Susceptibilities reviewed. High risk for evolving beta lactam resistance. Fever with Tm;101.4 overnight likely due to left foot infection. Recommendations:    1. Continue piperacillin/tazobactam, d/c vancomycin  2. PICC line for outpatient IV antibiotics  3. Recommend to use IV ertapenem until 8/5/2021 as outpatient since Morganella cefoxitin resistant-high risk of evolving beta-lactam resistant with prolonged antibiotic use    Home health orders on chart (click on \"chart review, other orders, IP home health)       Above plan was discussed in details with patient, dr. Renetta Mac. D/w . All questions answered to their full satisfaction. Spent additional 35 minutes in management and evaluation of this patient. >50% time spent in counselling and coordination of care. Please call me if any further questions or concerns. Will continue to participate in the care of this patient. HPI:    No new complaints      home Medication List    Details   hydrALAZINE (APRESOLINE) 25 mg tablet Take 1 Tab by mouth two (2) times a day. Qty: 90 Tab, Refills: 0      vancomycin 1.25 gram 1,250 mg, vial-mate 1 Device IVPB 1,250 mg by IntraVENous route every twelve (12) hours every twelve (12) hours.   Qty: 10 Dose, Refills: 0 Comments: Vancomycin with pharmacy protocol for 6 weeks      DULoxetine (CYMBALTA) 60 mg capsule Take 1 Cap by mouth daily. Indications: diabetic complication causing injury to some body nerves, major depressive disorder  Qty: 30 Cap, Refills: 0      insulin glargine (LANTUS) 100 unit/mL injection Administer 60 units underneath the skin, every night after supper  Indications: type 2 diabetes mellitus  Qty: 1 Vial, Refills: 0      insulin lispro (HUMALOG) 100 unit/mL injection Administer 10 underneath the skin, three times a day before meals. Indications: type 2 diabetes mellitus  Qty: 1 Vial, Refills: 0      rosuvastatin (CRESTOR) 40 mg tablet Take 1 Tab by mouth nightly. Indications: high cholesterol  Qty: 30 Tab, Refills: 0      traZODone (DESYREL) 50 mg tablet Take 1 Tab by mouth nightly as needed for Sleep.  Indications: insomnia associated with depression  Qty: 30 Tab, Refills: 0             Current Facility-Administered Medications   Medication Dose Route Frequency    insulin glargine (LANTUS) injection 18 Units  18 Units SubCUTAneous DAILY    oxyCODONE-acetaminophen (PERCOCET) 5-325 mg per tablet 2 Tablet  2 Tablet Oral Q4H PRN    morphine injection 2 mg  2 mg IntraVENous Q2H PRN    hydrALAZINE (APRESOLINE) 20 mg/mL injection 10 mg  10 mg IntraVENous Q6H PRN    vancomycin (VANCOCIN) 1250 mg in  ml infusion  1,250 mg IntraVENous Q12H    hydrALAZINE (APRESOLINE) tablet 25 mg  25 mg Oral BID    rosuvastatin (CRESTOR) tablet 40 mg  40 mg Oral QHS    traZODone (DESYREL) tablet 50 mg  50 mg Oral QHS PRN    sodium chloride (NS) flush 5-40 mL  5-40 mL IntraVENous Q8H    sodium chloride (NS) flush 5-40 mL  5-40 mL IntraVENous PRN    acetaminophen (TYLENOL) tablet 650 mg  650 mg Oral Q6H PRN    Or    acetaminophen (TYLENOL) suppository 650 mg  650 mg Rectal Q6H PRN    polyethylene glycol (MIRALAX) packet 17 g  17 g Oral DAILY PRN    ondansetron (ZOFRAN ODT) tablet 4 mg  4 mg Oral Q8H PRN    Or  ondansetron (ZOFRAN) injection 4 mg  4 mg IntraVENous Q6H PRN    enoxaparin (LOVENOX) injection 40 mg  40 mg SubCUTAneous DAILY    0.9% sodium chloride infusion  100 mL/hr IntraVENous CONTINUOUS    insulin lispro (HUMALOG) injection   SubCUTAneous AC&HS    glucose chewable tablet 16 g  16 g Oral PRN    glucagon (GLUCAGEN) injection 1 mg  1 mg IntraMUSCular PRN    dextrose (D50W) injection syrg 12.5-25 g  25-50 mL IntraVENous PRN    sodium chloride (NS) flush 5-10 mL  5-10 mL IntraVENous PRN    piperacillin-tazobactam (ZOSYN) 3.375 g in 0.9% sodium chloride (MBP/ADV) 100 mL MBP  3.375 g IntraVENous Q6H       Allergies: Patient has no known allergies. Temp (24hrs), Av.1 °F (37.3 °C), Min:97.9 °F (36.6 °C), Max:101.4 °F (38.6 °C)    Visit Vitals  BP (!) 152/73 (BP 1 Location: Left upper arm, BP Patient Position: At rest)   Pulse 89   Temp 98.4 °F (36.9 °C)   Resp 19   Ht 5' 11\" (1.803 m)   Wt 72.6 kg (160 lb)   SpO2 94%   BMI 22.32 kg/m²       ROS: 12 point ROS obtained in details. Pertinent positives as mentioned in HPI,   otherwise negative    Physical Exam:    HENT:      Head: Normocephalic. Nose: Nose normal.      Mouth/Throat:      Mouth: Mucous membranes are moist.   Eyes:      Pupils: Pupils are equal, round, and reactive to light. Cardiovascular:      Rate and Rhythm: regular     Pulses: Normal pulses. Pulmonary:      Effort: Pulmonary effort is normal.   Abdominal:      General: Abdomen is flat. There is no distension. Tenderness: There is no abdominal tenderness. Genitourinary:     Comments: deferred  Musculoskeletal:         General: Normal range of motion. Cervical back: Normal range of motion. Skin:     Comments: left foot surgical dressing not removed  Neurological:      General: No focal deficit present. Mental Status: He is alert.    Psychiatric:         Mood and Affect: Mood normal.        Labs: Results:   Chemistry Recent Labs     21  0223 06/22/21  0040 06/21/21  0234   * 172* 199*    135* 135*   K 3.7 4.0 3.8    103 101   CO2 32 26 28   BUN 8 9 11   CREA 1.02 0.82 0.93   CA 8.6 8.5 8.4*   AGAP 2* 6 6   BUCR 8* 11* 12      CBC w/Diff No results for input(s): WBC, RBC, HGB, HCT, PLT, GRANS, LYMPH, EOS, HGBEXT, HCTEXT, PLTEXT, HGBEXT, HCTEXT, PLTEXT in the last 72 hours. Microbiology Recent Labs     06/21/21  1725   CULT NO ANAEROBES ISOLATED SO FAR  FEW POSSIBLE STREPTOCOCCI, BETA HEMOLYTIC*          RADIOLOGY:    All available imaging studies/reports in [facility] for this admission were reviewed      Disclaimer: Sections of this note are dictated utilizing voice recognition software, which may have resulted in some phonetic based errors in grammar and contents. Even though attempts were made to correct all the mistakes, some may have been missed, and remained in the body of the document. If questions arise, please contact our department.     Dr. Nilson Schmidt, Infectious Disease Specialist  [phone]  June 23, 2021  9:44 AM

## 2021-06-23 NOTE — PROGRESS NOTES
Problem: Mobility Impaired (Adult and Pediatric)  Goal: *Acute Goals and Plan of Care (Insert Text)  Outcome: Resolved/Met     PHYSICAL THERAPY EVALUATION AND DISCHARGE    Patient: Paige Cloud (55 y.o. male)  Date: 6/23/2021  Primary Diagnosis: Severe sepsis ([facility] [address] [A41.9, R65.20]  Hyperglycemia [R73.9]  Procedure(s) (LRB):  AMPUTATION LEFT GREAT TOE (Left) 2 Days Post-Op   Precautions:   Contact (MRSA)  PWB L heel  PLOF: ind PTA, homeless, has no AD    ASSESSMENT :  Based on the objective data described below, the patient presents with L great toe amputation (POD 2) and is PWB to L heel. Pt given instruction in keeping L forefoot off the floor and verbalized understanding. Attempted 2-3 ft without AD however pt exhibiting increased unsteadiness due to antalgic gait and thus given RW. Pt then demonstrated safe gait and ability to maintain weight baring precaution with no LOB. Pt exhibits mod ind with bed mobility and transfers with no other functional deficits noted. At this time, recommend RW and surgical shoe(per MD request) upon discharge to allow for safe mobility and proper healing. Will sign off at this time. Pt left sitting EOB with all needs met. Patient does not require further skilled intervention at this level of care. PLAN :  Recommendations and Planned Interventions:   No formal PT needs identified at this time. Discharge Recommendations: None  Further Equipment Recommendations for Discharge: rolling walker and surgical shoe     SUBJECTIVE:   Patient stated Yall are really trying to get me out of this hospital aren't you?     OBJECTIVE DATA SUMMARY:     Past Medical History:   Diagnosis Date    Diabetes ([facility] [place] [address]     Hepatitis C infection     Hypertension     Psychiatric disorder    History reviewed. No pertinent surgical history.   Barriers to Learning/Limitations: yes;  physical  Compensate with: Verbal Cues and Tactile Cues  Home Situation:   Home Situation  Home Environment: Other (comment) (homeless)  # Steps to Enter: 3  One/Two Story Residence: One story  Living Alone: No  Support Systems: Friends \ neighbors  Patient Expects to be Discharged to[de-identified] [employer]  Current DME Used/Available at Home: None  Tub or Shower Type: Shower  Critical Behavior:  Neurologic State: Alert  Orientation Level: Oriented X4  Cognition: Follows commands  Safety/Judgement: Fall prevention  Psychosocial  Patient Behaviors: Calm; Cooperative  Purposeful Interaction: Yes  Pt Identified Daily Priority: Clinical issues (comment)  [employer] Process: Establish trust;Teaching/learning; Attend basic human needs;Create healing environment  Caring Interventions: Reassure  Reassure: Caring rounds  Therapeutic Modalities: Humor; Intentional therapeutic touch                 Strength:    Strength: Within functional limits       Tone & Sensation:   Tone: Normal    Sensation: Intact        Range Of Motion:  AROM: Within functional limits       Functional Mobility:  Bed Mobility:     Supine to Sit: Independent  Sit to Supine: Independent     Transfers:  Sit to Stand: Independent  Stand to Sit: Independent       Balance:   Sitting: Intact  Standing: Intact    Ambulation/Gait Training:  Distance (ft): 25 Feet (ft)  Assistive Device: Walker, rolling  Ambulation - Level of Assistance: Modified independent        Gait Abnormalities: Antalgic     Left Side Weight Bearing: Heel;Partial (%)     Stance: Left decreased  Speed/Mariajose: Pace decreased (<100 feet/min)  Step Length: Right shortened    Pain:  Pain level pre-treatment: not rated, reports increased pain, L foot pain /10   Pain level post-treatment: \"    \"/10  Pain Intervention(s): Medication (see MAR); Rest, Ice, Repositioning   Response to intervention: Nurse notified    Activity Tolerance:   Good    Please refer to the flowsheet for vital signs taken during this treatment.   After treatment:   []         Patient left in no apparent distress sitting up in chair  [x]         Patient left in no apparent distress sitting on edge of bed  [x]         Call bell left within reach  [x]         Nursing notified  []         Caregiver present  []         Bed alarm activated  []         SCDs applied    COMMUNICATION/EDUCATION:   [x]         Role of Physical Therapy in the acute care setting. [x]         Fall prevention education was provided and the patient/caregiver indicated understanding. [x]         Patient/family have participated as able in goal setting and plan of care. []         Patient/family agree to work toward stated goals and plan of care. []         Patient understands intent and goals of therapy, but is neutral about his/her participation. []         Patient is unable to participate in goal setting/plan of care: ongoing with therapy staff.  []         Other:     Thank you for this referral.  Real Golder   Time Calculation: 10 mins      Eval Complexity: History: MEDIUM  Complexity : 1-2 comorbidities / personal factors will impact the outcome/ POC Exam:MEDIUM Complexity : 3 Standardized tests and measures addressing body structure, function, activity limitation and / or participation in recreation  Presentation: LOW Complexity : Stable, uncomplicated  Clinical Decision Making:Low Complexity    Overall Complexity:LOW

## 2021-06-23 NOTE — DIABETES MGMT
GLYCEMIC CONTROL PLAN OF CARE    Assessment:  History:  Admitting diagnosis of severe sepsis  Morning blood glucose:  184 mg/dl  IVF containing dextrose:  None   Steroids:  None   Diet/TF:  Regular, carb control  Recommendations:  Blood glucose remains elevated this am.  Recommend increasing lantus dose to 25 units daily  Continue corrective insulin coverage as needed  Pt already receiving very insulin resistant coverage  Will continue inpatient monitoring. Most recent BG values:      Results for Jessi Cardenas (MRN [id number]) as of 6/23/2021 12:52   Ref. Range 6/22/2021 11:36 6/22/2021 15:34 6/23/2021 00:25 6/23/2021 07:44 6/23/2021 11:29   GLUCOSE,FAST - POC Latest Ref Range: 70 - 110 mg/dL 339 (H) 239 (H) 191 (H) 184 (H) 263 (H)     Within target range  mg/dl? No   Current A1C:  11.3% equivalent to an average blood glucose of 278 mg/dl over the past 2-3 months. Adequate glycemic control PTA? No   Current hospital diabetes medications:  Lantus 18 units daily  Lispro corrective insulin coverage AC&HS  Previous days insulin requirements:  lantus 18 units  Lispro 30 units corrective insulin  Home diabetes medication:  patient unable to verbalize doses  Lantus 60 units daily  humalog 10 units with meals   Education:  __x see diabetes education record (6/21/2021)            ___ diabetes education not indicated at this time.     [facility] RN CDE  Ext [phone]

## 2021-06-23 NOTE — PROGRESS NOTES
Per Dr. John Voss, pt needs surgical shoe and is NWB to L forefoot. Orders added.   Julien Zhang, PT, DPT

## 2021-06-23 NOTE — PROGRESS NOTES
Pt not seen for skilled OT due to:     [ ]  Nausea/vomiting  [ ]  Eating  [ ]  Pain  [ ]  Pt lethargic  []  Off Unit   Other: awaiting delivery of post op shoe for left foot, pt reports 10/10 left foot pain and active bleeding on bandage noted by pt-nursing notified    Igor Herrera MS OTR/L

## 2021-06-24 ENCOUNTER — APPOINTMENT (OUTPATIENT)
Dept: INTERVENTIONAL RADIOLOGY/VASCULAR | Age: 56
DRG: 710 | End: 2021-06-24
Attending: INTERNAL MEDICINE
Payer: MEDICAID

## 2021-06-24 VITALS
RESPIRATION RATE: 18 BRPM | HEIGHT: 71 IN | OXYGEN SATURATION: 96 % | HEART RATE: 77 BPM | BODY MASS INDEX: 22.4 KG/M2 | DIASTOLIC BLOOD PRESSURE: 82 MMHG | TEMPERATURE: 97.4 F | SYSTOLIC BLOOD PRESSURE: 143 MMHG | WEIGHT: 160 LBS

## 2021-06-24 LAB
ANION GAP SERPL CALC-SCNC: 5 MMOL/L (ref 3–18)
BACTERIA SPEC CULT: NORMAL
BACTERIA SPEC CULT: NORMAL
BUN SERPL-MCNC: 7 MG/DL (ref 7–18)
BUN/CREAT SERPL: 7 (ref 12–20)
CALCIUM SERPL-MCNC: 8.7 MG/DL (ref 8.5–10.1)
CHLORIDE SERPL-SCNC: 104 MMOL/L (ref 100–111)
CO2 SERPL-SCNC: 30 MMOL/L (ref 21–32)
CREAT SERPL-MCNC: 1.01 MG/DL (ref 0.6–1.3)
GLUCOSE BLD STRIP.AUTO-MCNC: 102 MG/DL (ref 70–110)
GLUCOSE BLD STRIP.AUTO-MCNC: 171 MG/DL (ref 70–110)
GLUCOSE BLD STRIP.AUTO-MCNC: 234 MG/DL (ref 70–110)
GLUCOSE SERPL-MCNC: 88 MG/DL (ref 74–99)
POTASSIUM SERPL-SCNC: 3.1 MMOL/L (ref 3.5–5.5)
SERVICE CMNT-IMP: NORMAL
SERVICE CMNT-IMP: NORMAL
SODIUM SERPL-SCNC: 139 MMOL/L (ref 136–145)

## 2021-06-24 PROCEDURE — 36415 COLL VENOUS BLD VENIPUNCTURE: CPT

## 2021-06-24 PROCEDURE — 99239 HOSP IP/OBS DSCHRG MGMT >30: CPT | Performed by: FAMILY MEDICINE

## 2021-06-24 PROCEDURE — 74011250636 HC RX REV CODE- 250/636: Performed by: FAMILY MEDICINE

## 2021-06-24 PROCEDURE — 2709999900 HC NON-CHARGEABLE SUPPLY

## 2021-06-24 PROCEDURE — 74011250636 HC RX REV CODE- 250/636: Performed by: INTERNAL MEDICINE

## 2021-06-24 PROCEDURE — 97168 OT RE-EVAL EST PLAN CARE: CPT

## 2021-06-24 PROCEDURE — 74011250637 HC RX REV CODE- 250/637: Performed by: FAMILY MEDICINE

## 2021-06-24 PROCEDURE — 80048 BASIC METABOLIC PNL TOTAL CA: CPT

## 2021-06-24 PROCEDURE — 74011250637 HC RX REV CODE- 250/637: Performed by: INTERNAL MEDICINE

## 2021-06-24 PROCEDURE — 74011636637 HC RX REV CODE- 636/637: Performed by: FAMILY MEDICINE

## 2021-06-24 PROCEDURE — 82962 GLUCOSE BLOOD TEST: CPT

## 2021-06-24 PROCEDURE — 74011000258 HC RX REV CODE- 258: Performed by: INTERNAL MEDICINE

## 2021-06-24 PROCEDURE — 76937 US GUIDE VASCULAR ACCESS: CPT

## 2021-06-24 RX ORDER — HYDRALAZINE HYDROCHLORIDE 25 MG/1
25 TABLET, FILM COATED ORAL 3 TIMES DAILY
Qty: 90 TABLET | Refills: 0 | Status: SHIPPED | OUTPATIENT
Start: 2021-06-24

## 2021-06-24 RX ORDER — LEVOFLOXACIN 5 MG/ML
750 INJECTION, SOLUTION INTRAVENOUS EVERY 24 HOURS
Qty: 150 ML | Refills: 0 | Status: SHIPPED | OUTPATIENT
Start: 2021-06-24 | End: 2021-08-05

## 2021-06-24 RX ORDER — OXYCODONE AND ACETAMINOPHEN 5; 325 MG/1; MG/1
2 TABLET ORAL
Qty: 36 TABLET | Refills: 0 | Status: SHIPPED | OUTPATIENT
Start: 2021-06-24 | End: 2021-06-27

## 2021-06-24 RX ORDER — LEVOFLOXACIN 5 MG/ML
750 INJECTION, SOLUTION INTRAVENOUS EVERY 24 HOURS
Status: DISCONTINUED | OUTPATIENT
Start: 2021-06-24 | End: 2021-06-24 | Stop reason: HOSPADM

## 2021-06-24 RX ORDER — ONDANSETRON 4 MG/1
8 TABLET, ORALLY DISINTEGRATING ORAL
Qty: 30 TABLET | Refills: 0 | Status: SHIPPED | OUTPATIENT
Start: 2021-06-24 | End: 2021-10-29

## 2021-06-24 RX ADMIN — INSULIN GLARGINE 25 UNITS: 100 INJECTION, SOLUTION SUBCUTANEOUS at 09:51

## 2021-06-24 RX ADMIN — MORPHINE SULFATE 2 MG: 2 INJECTION, SOLUTION INTRAMUSCULAR; INTRAVENOUS at 10:00

## 2021-06-24 RX ADMIN — LEVOFLOXACIN 750 MG: 5 INJECTION, SOLUTION INTRAVENOUS at 16:26

## 2021-06-24 RX ADMIN — SODIUM CHLORIDE 100 ML/HR: 900 INJECTION, SOLUTION INTRAVENOUS at 04:51

## 2021-06-24 RX ADMIN — MORPHINE SULFATE 2 MG: 2 INJECTION, SOLUTION INTRAMUSCULAR; INTRAVENOUS at 04:45

## 2021-06-24 RX ADMIN — Medication 10 ML: at 05:34

## 2021-06-24 RX ADMIN — INSULIN LISPRO 3 UNITS: 100 INJECTION, SOLUTION INTRAVENOUS; SUBCUTANEOUS at 16:33

## 2021-06-24 RX ADMIN — HYDRALAZINE HYDROCHLORIDE 25 MG: 25 TABLET, FILM COATED ORAL at 09:50

## 2021-06-24 RX ADMIN — PIPERACILLIN AND TAZOBACTAM 3.38 G: 3; .375 INJECTION, POWDER, LYOPHILIZED, FOR SOLUTION INTRAVENOUS at 04:48

## 2021-06-24 RX ADMIN — PIPERACILLIN AND TAZOBACTAM 3.38 G: 3; .375 INJECTION, POWDER, LYOPHILIZED, FOR SOLUTION INTRAVENOUS at 16:26

## 2021-06-24 RX ADMIN — Medication 10 ML: at 14:01

## 2021-06-24 RX ADMIN — PIPERACILLIN AND TAZOBACTAM 3.38 G: 3; .375 INJECTION, POWDER, LYOPHILIZED, FOR SOLUTION INTRAVENOUS at 12:03

## 2021-06-24 RX ADMIN — ENOXAPARIN SODIUM 40 MG: 40 INJECTION SUBCUTANEOUS at 09:50

## 2021-06-24 RX ADMIN — INSULIN LISPRO 6 UNITS: 100 INJECTION, SOLUTION INTRAVENOUS; SUBCUTANEOUS at 12:45

## 2021-06-24 RX ADMIN — OXYCODONE HYDROCHLORIDE AND ACETAMINOPHEN 2 TABLET: 5; 325 TABLET ORAL at 16:26

## 2021-06-24 NOTE — DIABETES MGMT
GLYCEMIC CONTROL PLAN OF CARE    Assessment:  History:  Admitting diagnosis of severe sepsis  Morning blood glucose:  102 mg/dl  IVF containing dextrose:  None   Steroids:  None   Diet/TF:  Regular, carb control  Blood glucose in target range this am following basal insulin increase yesterday. Pt having meal time excursions. Recommendations:  1. If meal time excursions continue, suggest adding  4 units scheduled meal time lispro TID (weight based). 2. Continue 25 units Lantus/day and corrective lispro, very insulin resistant dosing ACHS  . Most recent BG values:  6/24:  Lab - 88;  POC - 102, 234  6/23:  Lab -  150;  POC - 191, 184, 263, 172, 160          Within target range  mg/dl? No   Current A1C:  11.3% equivalent to an average blood glucose of 278 mg/dl over the past 2-3 months. Adequate glycemic control PTA? No   Current hospital diabetes medications:  Lantus 25 units daily  Lispro corrective insulin coverage, very insulin resistant dosing AC&HS  Previous days insulin requirements:  lantus 25 units  Lispro 21 units corrective insulin  Home diabetes medication:  patient unable to verbalize doses  Lantus 60 units daily  humalog 10 units with meals   Education:  __x see diabetes education record (6/21/2021)            ___ diabetes education not indicated at this time. Meal Intake:   Patient Vitals for the past 168 hrs:   % Diet Eaten   06/21/21 0939 76 - 100%   06/20/21 1734 26 - 50%   06/20/21 1312 76 - 100%   06/20/21 0919 26 - 50%     Supplement Intake:  No data found.   Cheryln Mcardle, RD, [employer]  Diabetes and Glycemic Control   Office:  [phone]  Pager:  [phone]

## 2021-06-24 NOTE — PROGRESS NOTES
Called Vamsi Singh of [facility] facilities and she stated [facility] can accept pt. She is checking on Ertapenem IV abx if facility can accommodate and she will call CM back. Indigo of Case Management assisting with obtaining pt's UAI/LTSS screening from [facility]. 1020: Per Dr. Mason Loja, since pt will be going to a nursing facility, they can do zosyn and levaquin until 8/5/21. Called Vamsi Singh and left a message. 1255: [place] multiple times and left messages. Called Ina of [facility] and she stated se can accept pt today. Informed of the IV abx. Updated pt that [facility] on [address]  [place] has accepted him and pt stated nobody is communicating with him. He stated we are making decisions for him and trying to push him out. Reminded him that CM met with him on the 22nd and yesterday and updated him. 1340: This writer and Dr. Nicholas Zhang met with pt to inform him he has acceptance to [address] [place] [address] and ready for discharge. Pt is agreeable to plan but stated we did not communicate with him. He stated CM told him yesterday that someone from a nursing facility was coming to meet with him but the person never showed up. 1410: Charles Hoover who was supposed to meet with pt yesterday and she stated a nurse was busy with pt and she did not want to interrupt and she left. Informed pt.       Trupti Lowe, DEBIN RN  Care Management  Pager: [phone]

## 2021-06-24 NOTE — PROGRESS NOTES
Call made to [facility] [phone], spoke with Deon Clarke, requested a copy of patient's UAI/LTSS screening to be faxed to DR. KUHN'S [facility].

## 2021-06-24 NOTE — PROGRESS NOTES
Transition of Care Plan to [address] [place] [address] Transition:  Patient has been accepted to [facility] and meets criteria for admission. Patient will transported by [facility] transport and expected to leave at    Communication to Patient/Family:  Met with patient (identified care giver) and are agreeable to the transition plan. Communication to SNF/Rehab:  Bedside RN, Nicky Vernon, has been notified to update the transition plan to the facility and call report (phone number [phone]). Discharge information has been updated on the AVS.     Nursing Please include all hard scripts for controlled substances, med rec and dc summary, and AVS in packet. Reviewed and confirmed with Ina of facility they can manage the patient care needs for the following:     SNF/Rehab Transition:    [facility] with (X) only those applicable:    Medication:  [x]  Medications will be available at the facility  [x]  IV Antibiotics zosyn and levaquin IV antibiotics until 8/5/21  []  Controlled Substance - hard copy to be sent with patient   []  Weekly Labs   Documents:  [] Hard RX  [] MAR  [] Kardex  [] AVS  [x]Transfer Summary  [x]Discharge   Equipment:  []  CPAP/BiPAP  []  Wound Vacuum  []  Kendrick or Urinary Device  []  PICC/Central Line  []  Nebulizer  []  Ventilator   Treatment:  [x]Isolation (for MRSA, VRE, etc.) MRSA  []Surgical Drain Management  []Tracheostomy Care  []Dressing Changes  []Dialysis with transportation and chair time   []PEG Care  []Oxygen  []Daily Weights for Heart Failure   Dietary:  []Any diet limitations  []Tube Feedings   []Total Parenteral Management (TPN)   Eligible for Medicaid Long Term Services and Supports  Yes:  [] Eligible for medical assistance or will become eligible within 180 days and UAI completed. [] Provider/Patient and/or support system has requested screening. [x] UAI copy provided to snf  [] UAI unavailable at discharge will send once processed to SNF provider.   [] UAI unavailable at discharged mailed to patient  No:   [] Private pay and is not financially eligible for Medicaid within the next 180 days. [] Reside out-of-state.   [] A residents of a state owned/operated facility that is licensed  by [address] [employer] [facility] or [facility]  [] Enrollment in [facility] services  [] [address] [place] [address]  [] Patient /Family declines to have screening completed or provide financial information for screening     Financial Resources:  Medicaid    [] Initiated and application pending   [x] Full coverage     Advanced Care Plan:  []Surrogate Decision Maker of Care  []POA  [x]Communicated Code Status  Full (DDNR\", \"Full\")        ZACHARY Claudio RN  Care Management  Pager: [phone]

## 2021-06-24 NOTE — PROGRESS NOTES
Requested Case Management specialist to assist with transportation to [address]. Address is [place] [facility] [address] and phone number is [phone]  Patient will require BLS transport. Pt requires Stretcher If stretcher, reason: MRSA isolation, foot surgery with not weight bearing  Patient is currently requiring oxygen No No  Height:5'11\"   Weight: 160 Ib  Pt is on isolation: Yes Isolation is for: MRSA  Is the pt ready now? yes  Requested time: Next Available  PCS Faxed: no  Insurance verified on face sheet: yes  Auth needed for transport: yes  CM completed PCS/ Envelope and placed on chart. Please see Raghavendra Perez of case management notes for Medicaid transport.       ZACHARY Trotter RN  Care Management  Pager: [phone]

## 2021-06-24 NOTE — ROUTINE PROCESS
Report given to Sr. Josefina RN, including SBAR, MAR, and Kardex. Patient alert and oriented, no apparent distress.

## 2021-06-24 NOTE — DISCHARGE INSTRUCTIONS
DISCHARGE SUMMARY from Nurse    PATIENT INSTRUCTIONS:    After general anesthesia or intravenous sedation, for 24 hours or while taking prescription Narcotics:  · Limit your activities  · Do not drive and operate hazardous machinery  · Do not make important personal or business decisions  · Do  not drink alcoholic beverages  · If you have not urinated within 8 hours after discharge, please contact your surgeon on call. Report the following to your surgeon:  · Excessive pain, swelling, redness or odor of or around the surgical area  · Temperature over 100.5  · Nausea and vomiting lasting longer than 4 hours or if unable to take medications  · Any signs of decreased circulation or nerve impairment to extremity: change in color, persistent  numbness, tingling, coldness or increase pain  · Any questions    What to do at Home:  Recommended activity: Activity as tolerated    If you experience any of the following symptoms chest pain, shortness of breath, nausea/vomiting, fever, or pain unrelieved by medication, please follow up with Cathlyn Severs, NP.    *  Please give a list of your current medications to your Primary Care Provider. *  Please update this list whenever your medications are discontinued, doses are      changed, or new medications (including over-the-counter products) are added. *  Please carry medication information at all times in case of emergency situations. These are general instructions for a healthy lifestyle:    No smoking/ No tobacco products/ Avoid exposure to second hand smoke  Surgeon General's Warning:  Quitting smoking now greatly reduces serious risk to your health.     Obesity, smoking, and sedentary lifestyle greatly increases your risk for illness    A healthy diet, regular physical exercise & weight monitoring are important for maintaining a healthy lifestyle    You may be retaining fluid if you have a history of heart failure or if you experience any of the following symptoms: Weight gain of 3 pounds or more overnight or 5 pounds in a week, increased swelling in our hands or feet or shortness of breath while lying flat in bed. Please call your doctor as soon as you notice any of these symptoms; do not wait until your next office visit. Patient armband removed and shredded  MyChart Activation    Thank you for requesting access to KBLE. Please follow the instructions below to securely access and download your online medical record. KBLE allows you to send messages to your doctor, view your test results, renew your prescriptions, schedule appointments, and more. How Do I Sign Up? 1. In your internet browser, go to [email or web address]  2. Click on the First Time User? Click Here link in the Sign In box. You will be redirect to the New Member Sign Up page. 3. Enter your KBLE Access Code exactly as it appears below. You will not need to use this code after youve completed the sign-up process. If you do not sign up before the expiration date, you must request a new code. KBLE Access Code: [email or web address]  Expires: 2021 12:55 PM (This is the date your KBLE access code will )    4. Enter the last four digits of your Social Security Number (xxxx) and Date of Birth (mm/dd/yyyy) as indicated and click Submit. You will be taken to the next sign-up page. 5. Create a KBLE ID. This will be your KBLE login ID and cannot be changed, so think of one that is secure and easy to remember. 6. Create a KBLE password. You can change your password at any time. 7. Enter your Password Reset Question and Answer. This can be used at a later time if you forget your password. 8. Enter your e-mail address. You will receive e-mail notification when new information is available in [address] 9. Click Sign Up. You can now view and download portions of your medical record.   10. Click the Download Summary menu link to download a portable copy of your medical information. Additional Information    If you have questions, please visit the Frequently Asked Questions section of the [employer] website at [email or web address] Remember, [employer] is NOT to be used for urgent needs. For medical emergencies, dial 911. The discharge information has been reviewed with the patient. The patient verbalized understanding. Discharge medications reviewed with the patient and appropriate educational materials and side effects teaching were provided.   ___________________________________________________________________________________________________________________________________

## 2021-06-24 NOTE — PROGRESS NOTES
Problem: Diabetes Self-Management  Goal: *Disease process and treatment process  Description: Define diabetes and identify own type of diabetes; list 3 options for treating diabetes. Outcome: Progressing Towards Goal  Goal: *Incorporating nutritional management into lifestyle  Description: Describe effect of type, amount and timing of food on blood glucose; list 3 methods for planning meals. Outcome: Progressing Towards Goal  Goal: *Incorporating physical activity into lifestyle  Description: State effect of exercise on blood glucose levels. Outcome: Progressing Towards Goal  Goal: *Developing strategies to promote health/change behavior  Description: Define the ABC's of diabetes; identify appropriate screenings, schedule and personal plan for screenings. Outcome: Progressing Towards Goal  Goal: *Using medications safely  Description: State effect of diabetes medications on diabetes; name diabetes medication taking, action and side effects. Outcome: Progressing Towards Goal  Goal: *Monitoring blood glucose, interpreting and using results  Description: Identify recommended blood glucose targets  and personal targets. Outcome: Progressing Towards Goal     Problem: General Wound Care  Goal: *Infected Wound: Prevention of further infection and promotion of healing  Description: Infection control procedures (eg: clean dressings, clean gloves, hand washing, precautions to isolate wound from contamination, sterile instruments used for wound debridement) should be implemented. Outcome: Progressing Towards Goal  Goal: Interventions  Outcome: Progressing Towards Goal     Problem: General Wound Care  Goal: *Infected Wound: Prevention of further infection and promotion of healing  Description: Infection control procedures (eg: clean dressings, clean gloves, hand washing, precautions to isolate wound from contamination, sterile instruments used for wound debridement) should be implemented.   Outcome: Progressing Towards Goal  Goal: Interventions  Outcome: Progressing Towards Goal

## 2021-06-24 NOTE — PROGRESS NOTES
Infectious Disease progress Note        Reason: Diabetic foot infection    Current abx Prior abx   Zosyn since 6/18/2021  vancomycin 6/18-6/23     Lines:       Assessment :    49 y.o. male with PMHx of uncontrolled type 2 diabetes -last hemoglobin A1c 11.3 on 6/19/2021, hepatitis C, MRSA (positive nasal screen April 2021 ), hypertension presented to [facility] on 6/18/2021 for left foot cellulitis with  ulcer on left great toe. Clinical presentation c/w acute Osteomyelitis of left great toe with diabetic foot ulcer, abscess plantar left foot Status post amputation left great toe with drainage of abscess on 6/21/2021    Wound culture 6/19-staph aureus, beta-hemolytic strep, morganella Proteus, mixed skin jazmin  Susceptibilities reviewed. High risk for evolving beta lactam resistance. Clinically better. Recommendations:    1. Continue piperacillin/tazobactam, add levofloxacin till 8/5/2021.  2.  Unable to switch to Carbapenem since will not be able to arrange this at the nursing home. Will add levofloxacin since Morganella cefoxitin resistant-high risk of evolving beta-lactam resistant with prolonged antibiotic use  3. May need amputation of entire left great toe if poor wound healing noted on future exam- d/w dr. Humble Sebastian. he will f/u patient as outpatient. Above plan was discussed in details with patient, dr. Skylar Forman. D/w . Please call me if any further questions or concerns. Will continue to participate in the care of this patient. HPI:    No new complaints      home Medication List    Details   hydrALAZINE (APRESOLINE) 25 mg tablet Take 1 Tab by mouth two (2) times a day. Qty: 90 Tab, Refills: 0      vancomycin 1.25 gram 1,250 mg, vial-mate 1 Device IVPB 1,250 mg by IntraVENous route every twelve (12) hours every twelve (12) hours. Qty: 10 Dose, Refills: 0    Comments: Vancomycin with pharmacy protocol for 6 weeks      DULoxetine (CYMBALTA) 60 mg capsule Take 1 Cap by mouth daily. Indications: diabetic complication causing injury to some body nerves, major depressive disorder  Qty: 30 Cap, Refills: 0      insulin glargine (LANTUS) 100 unit/mL injection Administer 60 units underneath the skin, every night after supper  Indications: type 2 diabetes mellitus  Qty: 1 Vial, Refills: 0      insulin lispro (HUMALOG) 100 unit/mL injection Administer 10 underneath the skin, three times a day before meals. Indications: type 2 diabetes mellitus  Qty: 1 Vial, Refills: 0      rosuvastatin (CRESTOR) 40 mg tablet Take 1 Tab by mouth nightly. Indications: high cholesterol  Qty: 30 Tab, Refills: 0      traZODone (DESYREL) 50 mg tablet Take 1 Tab by mouth nightly as needed for Sleep.  Indications: insomnia associated with depression  Qty: 30 Tab, Refills: 0             Current Facility-Administered Medications   Medication Dose Route Frequency    insulin glargine (LANTUS) injection 25 Units  25 Units SubCUTAneous DAILY    oxyCODONE-acetaminophen (PERCOCET) 5-325 mg per tablet 2 Tablet  2 Tablet Oral Q4H PRN    morphine injection 2 mg  2 mg IntraVENous Q2H PRN    hydrALAZINE (APRESOLINE) 20 mg/mL injection 10 mg  10 mg IntraVENous Q6H PRN    hydrALAZINE (APRESOLINE) tablet 25 mg  25 mg Oral BID    rosuvastatin (CRESTOR) tablet 40 mg  40 mg Oral QHS    traZODone (DESYREL) tablet 50 mg  50 mg Oral QHS PRN    sodium chloride (NS) flush 5-40 mL  5-40 mL IntraVENous Q8H    sodium chloride (NS) flush 5-40 mL  5-40 mL IntraVENous PRN    acetaminophen (TYLENOL) tablet 650 mg  650 mg Oral Q6H PRN    Or    acetaminophen (TYLENOL) suppository 650 mg  650 mg Rectal Q6H PRN    polyethylene glycol (MIRALAX) packet 17 g  17 g Oral DAILY PRN    ondansetron (ZOFRAN ODT) tablet 4 mg  4 mg Oral Q8H PRN    Or    ondansetron (ZOFRAN) injection 4 mg  4 mg IntraVENous Q6H PRN    enoxaparin (LOVENOX) injection 40 mg  40 mg SubCUTAneous DAILY    0.9% sodium chloride infusion  100 mL/hr IntraVENous CONTINUOUS    insulin lispro (HUMALOG) injection   SubCUTAneous AC&HS    glucose chewable tablet 16 g  16 g Oral PRN    glucagon (GLUCAGEN) injection 1 mg  1 mg IntraMUSCular PRN    dextrose (D50W) injection syrg 12.5-25 g  25-50 mL IntraVENous PRN    sodium chloride (NS) flush 5-10 mL  5-10 mL IntraVENous PRN    piperacillin-tazobactam (ZOSYN) 3.375 g in 0.9% sodium chloride (MBP/ADV) 100 mL MBP  3.375 g IntraVENous Q6H       Allergies: Patient has no known allergies. Temp (24hrs), Av °F (36.7 °C), Min:97.5 °F (36.4 °C), Max:98.2 °F (36.8 °C)    Visit Vitals  BP (!) 178/87 (BP 1 Location: Right upper arm, BP Patient Position: At rest)   Pulse 74   Temp 97.9 °F (36.6 °C)   Resp 18   Ht 5' 11\" (1.803 m)   Wt 72.6 kg (160 lb)   SpO2 98%   BMI 22.32 kg/m²       ROS: 12 point ROS obtained in details. Pertinent positives as mentioned in HPI,   otherwise negative    Physical Exam:    HENT:      Head: Normocephalic. Nose: Nose normal.      Mouth/Throat:      Mouth: Mucous membranes are moist.   Eyes:      Pupils: Pupils are equal, round, and reactive to light. Cardiovascular:      Rate and Rhythm: regular     Pulses: Normal pulses. Pulmonary:      Effort: Pulmonary effort is normal.   Abdominal:      General: Abdomen is flat. There is no distension. Tenderness: There is no abdominal tenderness. Genitourinary:     Comments: deferred  Musculoskeletal:         General: Normal range of motion. Cervical back: Normal range of motion. Skin:     Comments: left foot surgical dressing not removed  Neurological:      General: No focal deficit present. Mental Status: He is alert.    Psychiatric:         Mood and Affect: Mood normal.        Labs: Results:   Chemistry Recent Labs     21  0238 21  0222 21  0040   GLU 88 150* 172*    137 135*   K 3.1* 3.7 4.0    103 103   CO2 30 32 26   BUN 7 8 9   CREA 1.01 1.02 0.82   CA 8.7 8.6 8.5   AGAP 5 2* 6   BUCR 7* 8* 11*      CBC w/Diff No results for input(s): WBC, RBC, HGB, HCT, PLT, GRANS, LYMPH, EOS, HGBEXT, HCTEXT, PLTEXT, HGBEXT, HCTEXT, PLTEXT in the last 72 hours. Microbiology Recent Labs     06/21/21  1725   CULT SCANT STREPTOCOCCI, BETA HEMOLYTIC GROUP B Penicillin and ampicillin are drugs of choice for treatment of beta-hemolytic streptococcal infections. Susceptibility testing of penicillins and beta-lactams approved by the FDA for treatment of beta-hemolytic streptococcal infections need not be performed routinely, because nonsusceptible isolates are extremely rare. CLSI 2012*  SCANT STREPTOCOCCI, BETA HEMOLYTIC GROUP C Penicillin and ampicillin are drugs of choice for treatment of beta-hemolytic streptococcal infections. Susceptibility testing of penicillins and beta-lactams approved by the FDA for treatment of beta-hemolytic streptococcal infections need not be performed routinely, because nonsusceptible isolates are extremely rare. CLSI 2012*  RARE PROBABLE STAPHYLOCOCCUS AUREUS SENSITIVITY TO FOLLOW*  NO ANAEROBES ISOLATED          RADIOLOGY:    All available imaging studies/reports in [facility] for this admission were reviewed      Disclaimer: Sections of this note are dictated utilizing voice recognition software, which may have resulted in some phonetic based errors in grammar and contents. Even though attempts were made to correct all the mistakes, some may have been missed, and remained in the body of the document. If questions arise, please contact our department.     Dr. Dexter Min, Infectious Disease Specialist  [phone]  June 24, 2021  9:44 AM

## 2021-06-24 NOTE — PROGRESS NOTES
conducted a Follow up consultation and Spiritual Assessment for [facility], who is a 49 y.o.,male. The  provided the following Interventions:  Continued the relationship of care and support. Listened empathically. Offered prayer and assurance of continued prayer on patients behalf. Chart reviewed. The following outcomes were achieved:  Patient expressed gratitude for 's visit. Assessment:  There are no further spiritual or Gnosticism issues which require Spiritual Care Services interventions at this time. Plan:  Chaplains will continue to follow and will provide pastoral care on an as needed/requested basis.    recommends bedside caregivers princess Cowart [phone]

## 2021-06-24 NOTE — PROGRESS NOTES
In chart assisting co worker primary nurse. Percocet 2 tabs administered orally for complaint of foot pain.

## 2021-06-24 NOTE — DISCHARGE SUMMARY
Discharge Summary      Patient: Len Cordero MRN: [id number]  CSN: [id number]    YOB: 1965  Age: 49 y.o. Sex: male    DOA: 6/18/2021 LOS:  LOS: 4 days   Discharge Date: 6/24/21     Admission Diagnoses: Severe sepsis ([address] [A41.9, R65.20]  Hyperglycemia [R73.9]    Discharge Diagnoses:    1. Sepsis- POA, with fever, tachycardia, tachypnea  2. L great toe diabetic foot ulcer w/abscess status post partial amputation and decompression of abscess  3. Osteomyelitis of L great toe   4. Recurrent fever  5. Type II DM w/hyperglydemia- uncontrolled,  HbA1c 11.3%  6. Hyponatremia       Discharge Condition: Stable    PHYSICAL EXAM  Visit Vitals  BP (!) 143/82 (BP 1 Location: Left upper arm, BP Patient Position: Sitting)   Pulse 77   Temp 97.4 °F (36.3 °C)   Resp 18   Ht 5' 11\" (1.803 m)   Wt 72.6 kg (160 lb)   SpO2 96%   BMI 22.32 kg/m²     General:  Nontoxic-appearing. Cardiovascular:  RRR. Pulmonary:  Lungs clear bilaterally, no wheezes. GI:  Abdomen soft, NTTP. Extremities:  Warm, no edema or ischemia. Foot dressings intact. Neuro:  Awake and alert. Moves extremities spontaneously. Hospital Course:     Mr. Len Cordero is a 50 y/o male with a PMHx of Type II DM, HTN, Hep C and polysubstance abuse who presented to the ED with complaints of left great toe pain and wound. Patient complained of 1 week of progressive left toe pain and a worsening wound over the past 3 days with associated ankle and calf pain. In the ED he was febrile to 103 degrees and tachycardic with heart rate in the 110s. An x-ray of his left foot showed a plantar ulcer of the first toe without evidence of osteomyelitis. He was started on IV ABX and IVF and admitted for sepsis secondary to left foot first toe diabetic foot infection. Podiatry was consulted and made further recommendations.  MRI of his left foot was done which showed a moderate size ulcer of the first great toe with cellulitis and acute osteomyelitis in the first distal phalanx along with the other findings detailed below. Lower extremity arterial PVLs were done and showed normal bilateral ABIs. On 6/21/2021, he was taken to the OR where he had a partial amputation of his left great toe, and decompression of abscess performed. This was done under MAC with local anesthesia and there was an approximate 20 mL blood loss with no complications. Wound cultures and bone biopsies were taken and later returned positive for staph aureus, beta-hemolytic strep, Morganella Proteus, and mixed skin jazmin. Infectious disease was consulted and made further recommendations regarding antibiotics. On 6/24/2021, a PICC line was placed by interventional radiology. Regarding his discharge, Mr. Leslie Penaloza reported that he had recently become homeless. PT and OT evaluated him and made recommendations. Infectious disease made recommendations for discharge which included IV Zosyn every 4 hours and IV Levaquin daily until 8/5/2021. Case management was able to find him acceptance to [facility] and rehab. On 6/24/2021, he had remained hemodynamically stable. Return precautions were discussed and he was given instructions and prescriptions for the medications as below. He was also instructed to follow up with his PCP and was then discharged to [facility] and rehab [facility]. Consults:     Podiatry- RADHA KerrM  Infectious disease-Dr. Jessica Riley MD       Significant Diagnostic Studies:    XR left foot 6/20/2021:  1. Plantar ulcer of the first toe without evidence of osteomyelitis. If  continued clinical concern, consider MR for further evaluation. MRI left foot without contrast 6/21/2021:  1. Moderate size ulcer at the plantar medial aspect of the great toe. Great toe  cellulitis. No drainable abscess. 2.  Acute osteomyelitis in the first distal phalanx. 3.  T2 hyperintense signal within the first proximal phalanx without associated  T1 signal hypointensity.  This is most consistent with reactive osteitis although  early osteomyelitis not excluded. 4.  Minimal flexor hallucis longus tenosynovitis tracking to the level of the  first proximal phalangeal base, likely infectious. 5.  Additional dorsal midfoot and forefoot subcutaneous trace edema which may  reflect sequela of third spacing or cellulitis. 6.  Intrinsic foot musculature edema, likely denervation change. 7.  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 Trace  first-second intermetatarsal bursitis. Lower extremity arterial PVLs 6/22/2021:  The right resting JUWAN is normal. The left resting JUWAN is normal. The right common femoral artery, popliteal artery, posterior tibial artery and dorsalis pedis artery has triphasic waveforms. The left common femoral artery, popliteal artery, posterior tibial artery and dorsalis pedis artery has triphasic waveforms. Procedures Performed:     6/21/2021: Partial amputation left great toe, decompression of abscess  Dr. Priya Vera, DPM  Anesthesia: MAC with local  Estimated blood loss: 20 cc  Complications: None    0/96/2399: Right basilic upper extremity PICC line  Anesthesia: 1% lidocaine local  Estimated blood loss: Minimal  Complications: None        Discharge Medications:  Current Discharge Medication List      START taking these medications    Details   levoFLOXacin (LEVAQUIN) 750 mg/150 mL pgbk 150 mL by IntraVENous route every twenty-four (24) hours for 42 days. Qty: 150 mL, Refills: 0  Start date: 6/24/2021, End date: 8/5/2021      ondansetron (ZOFRAN ODT) 4 mg disintegrating tablet Take 2 Tablets by mouth every eight (8) hours as needed for Nausea or Vomiting. Qty: 30 Tablet, Refills: 0  Start date: 6/24/2021      oxyCODONE-acetaminophen (PERCOCET) 5-325 mg per tablet Take 2 Tablets by mouth every four (4) hours as needed for Pain for up to 3 days.  Max Daily Amount: 12 Tablets. Qty: 36 Tablet, Refills: 0  Start date: 6/24/2021, End date: 6/27/2021    Associated Diagnoses: Diabetic ulcer of toe of left foot associated with type 2 diabetes mellitus, with bone involvement without evidence of necrosis (HCC)      piperacillin-tazobactam 3.375 gram 3.375 g IVPB 3.375 g by IntraVENous route every six (6) hours for 42 days. Qty: 168 Dose, Refills: 0  Start date: 6/24/2021, End date: 8/5/2021         CONTINUE these medications which have CHANGED    Details   hydrALAZINE (APRESOLINE) 25 mg tablet Take 1 Tablet by mouth three (3) times daily. Qty: 90 Tablet, Refills: 0  Start date: 6/24/2021         CONTINUE these medications which have NOT CHANGED    Details   DULoxetine (CYMBALTA) 60 mg capsule Take 1 Cap by mouth daily. Indications: diabetic complication causing injury to some body nerves, major depressive disorder  Qty: 30 Cap, Refills: 0      insulin glargine (LANTUS) 100 unit/mL injection Administer 60 units underneath the skin, every night after supper  Indications: type 2 diabetes mellitus  Qty: 1 Vial, Refills: 0      insulin lispro (HUMALOG) 100 unit/mL injection Administer 10 underneath the skin, three times a day before meals. Indications: type 2 diabetes mellitus  Qty: 1 Vial, Refills: 0      rosuvastatin (CRESTOR) 40 mg tablet Take 1 Tab by mouth nightly. Indications: high cholesterol  Qty: 30 Tab, Refills: 0      traZODone (DESYREL) 50 mg tablet Take 1 Tab by mouth nightly as needed for Sleep.  Indications: insomnia associated with depression  Qty: 30 Tab, Refills: 0              Activity: NWB to left forefoot, only heel weightbearing with surgical shoe    Diet: Diabetic Diet    Wound Care: Keep wound clean and dry, dressing changes and wound care per podiatry-Dr. Deisi Sherwood       Follow-up Information     Follow up With Specialties Details Why Contact Info    [facility] Cardiology Schedule an appointment as soon as possible for a visit in 1 week F/U  [address]  [phone]             Minutes spent on discharge: >30 minutes spent coordinating this discharge (review instructions/follow-up, prescriptions, preparing report for sign off)          DARRIUS Roy DO   June 24, 2021       [facility] medical dictation software was used for portions of this report. Unintended errors may occur.

## 2021-06-24 NOTE — PROGRESS NOTES
OCCUPATIONAL THERAPY RE-EVALUATION/DISCHARGE    Patient: Gogo Aly [de-identified]49 y.o. male)  Date: 6/24/2021  Primary Diagnosis: Severe sepsis ([facility] [A41.9, R65.20]  Hyperglycemia [R73.9]  Procedure(s) (LRB):  AMPUTATION LEFT GREAT TOE (Left) 3 Days Post-Op   Precautions:   Contact (MRSA)  PLOF: independent w/ ADLs and functional mobility w/o AD    ASSESSMENT AND RECOMMENDATIONS:  Nursing/RN cleared for pt to participate in OT re-evaluation tx session. Patient underwent amputation of left great toe and now re-evaluated for OT skilled services. Patient presents seated edge of bed w/ (L) LE CAM boot intact, able to recall WB precautions with LLE, weightbearing through heel only with good return demonstration during toilet transfer using RW w/ Mod I, assist provided for IV pole mgmt, Mod I with toileting in stance at toilet using RW and washing hands in stance at sink using RW. Patient reports performed bathing and dressing tasks ad yumiko w/ Mod I in bathroom early this AM. Nursing notified of pt's c/o (L)LE pain 10/10. Patient sitting on edge of bed at end of tx session, call bell within reach & pt verbalized understanding and provided return demonstration to utilize for assist e.g.assist with IV pole during functional transfers in order to prevent falls. OT skilled intervention not indicated at this time d/t pt being close to baseline as PLOF, pt verbalized understanding-nursing notified. Skilled occupational therapy is not indicated at this time. Discharge Recommendations: None  Further Equipment Recommendations for Discharge: shower chair and rolling walker      SUBJECTIVE:   Patient stated I used to do gymnastics.     OBJECTIVE DATA SUMMARY:     Past Medical History:   Diagnosis Date    Diabetes ([facility] [address]     Hepatitis C infection     Hypertension     Psychiatric disorder    History reviewed. No pertinent surgical history.   Barriers to Learning/Limitations: None  Compensate with: visual, verbal, tactile, kinesthetic cues/model    Home Situation:   Home Situation  Home Environment: Other (comment) (homeless)  # Steps to Enter: 3  One/Two Story Residence: One story  Living Alone: No  Support Systems: Friends \ neighbors  Patient Expects to be Discharged toVF Cor[de-identified]ration  Current DME Used/Available at Home: None  Tub or Shower Type: Shower  []     Right hand dominant   []     Left hand dominant    Cognitive/Behavioral Status:  Neurologic State: Alert; Appropriate for age  Orientation Level: Oriented X4  Cognition: Follows commands  Safety/Judgement: Fall prevention    Skin: s/p L foot great toe amputation   Edema: none noted      Vision/Perceptual:  WFL          Coordination: BUE  Coordination: Within functional limits  Fine Motor Skills-Upper: Left Intact; Right Intact    Gross Motor Skills-Upper: Left Intact; Right Intact    Balance:  Sitting: Intact  Standing: Intact; With support    Strength: BUE  Strength: Within functional limits      Tone & Sensation: BUE  Tone: Normal  Sensation: Intact        Range of Motion: BUE  AROM: Within functional limits     Functional Mobility and Transfers for ADLs:  Bed Mobility:     Supine to Sit: Independent  Sit to Supine: Independent     Transfers:  Sit to Stand: Independent  Stand to Sit: Independent      Toilet Transfer : Modified independent      Bathroom Mobility: Modified independent    ADL Assessment:  Feeding: Independent    Oral Facial Hygiene/Grooming: Modified Independent    Bathing: Modified independent    Upper Body Dressing: Modified independent    Lower Body Dressing: Modified independent    Toileting: Modified independent    ADL Intervention:   Patient presents seated edge of bed w/ (L) LE CAM boot intact, able to recall WB precautions with LLE, weightbearing through heel only with good return demonstration during toilet transfer using RW w/ Mod I, assist provided for IV pole mgmt, Mod I with toileting in stance at toilet using RW and washing hands in stance at sink using RW.  Patient reports performed bathing and dressing tasks ad yumiko w/ Mod I in bathroom early this AM. Nursing notified of pt's c/o (L)LE pain 10/10. Patient sitting on edge of bed at end of tx session, call bell within reach & pt verbalized understanding and provided return demonstration to utilize for assist e.g.assist with IV pole during functional transfers in order to prevent falls. Cognitive Retraining  Safety/Judgement: Fall prevention    Pain:  Pain level pre-treatment: 10/10 (L)LE  Pain level post-treatment: 10/10 (L)LE  Pain Intervention(s): Medication (see MAR); Rest, Repositioning   Response to intervention: Nurse notified, See doc flow    Activity Tolerance:   good  Please refer to the flowsheet for vital signs taken during this treatment. After treatment:   []  Patient left in no apparent distress sitting up in chair  [x]  Patient left in no apparent distress in bed  [x]  Call bell left within reach  [x]  Nursing notified  []  Caregiver present  []  Bed alarm activated    COMMUNICATION/EDUCATION:   [x]      Role of Occupational Therapy in the acute care setting  [x]      Home safety education was provided and the patient/caregiver indicated understanding. [x]      Patient/family have participated as able and agree with findings and recommendations. []      Patient is unable to participate in plan of care at this time.     Thank you for this referral.  Kiya Hernández  Time Calculation: 12 mins      Problem: Self Care Deficits Care Plan (Adult)  Goal: *Acute Goals and Plan of Care (Insert Text)  6/24/2021 1456 by Bety Gupta  Outcome: Resolved/Met  6/24/2021 1455 by Bety Gupta  Reactivated

## 2021-06-24 NOTE — PROGRESS NOTES
Called report over to nursing facility.   Called and spk to [facility], LPN at [facility] report give no additional questions or concerns voiced at this time

## 2021-06-24 NOTE — PROGRESS NOTES
Per Arsenio Guardado (ENDY) called [employer] transportation at [phone] scheduled stretcher transport to [facility] ([address] [facility], [address] [place] [address]) with Isaiah Ayala and received confirmation number [id number]. Isaiah Ayala stated transport can span up to 3 hours.  will call nurse's station when in route to facility. Informed Arsenio Guardado of transportation conversation and arrangements.

## 2021-06-24 NOTE — PROGRESS NOTES
Problem: Diabetes Self-Management  Goal: *Disease process and treatment process  Description: Define diabetes and identify own type of diabetes; list 3 options for treating diabetes. Outcome: Resolved/Met     Problem: Diabetes Self-Management  Goal: *Incorporating nutritional management into lifestyle  Description: Describe effect of type, amount and timing of food on blood glucose; list 3 methods for planning meals.   Outcome: Resolved/Met

## 2021-06-25 LAB
AMPHETAMINE SCREEN, URINE: NORMAL
AMPHETAMINES UR QL SCN: NEGATIVE NG/ML
BACTERIA SPEC CULT: ABNORMAL
BARBITURATES UR QL SCN: NEGATIVE NG/ML
BENZODIAZ UR QL: NEGATIVE NG/ML
BZE UR CFM-MCNC: ABNORMAL NG/ML
BZE UR QL: NORMAL NG/ML
BZE UR QL: POSITIVE
CANNABINOIDS UR QL SCN: NEGATIVE NG/ML
ETHANOL UR-MCNC: NEGATIVE %
ETHANOL UR-MCNC: NORMAL %
GRAM STN SPEC: ABNORMAL
GRAM STN SPEC: ABNORMAL
OPIATES UR QL: NEGATIVE NG/ML
PCP UR QL: NEGATIVE NG/ML
SERVICE CMNT-IMP: ABNORMAL

## 2021-06-25 NOTE — PROGRESS NOTES
Progress Note    Patient: Leticia Newman MRN: [id number]  SSN: [id number]    YOB: 1965  Age: 49 y.o. Sex: male      Admit Date: 6/18/2021  3 Days Post-Op     Procedure:   Procedure(s):  AMPUTATION LEFT GREAT TOE    Subjective:     Patient seen resting quiet and comfortably and no apparent distress. Denies any pain to left foot. His clean margin is positive for acute osteomyelitis. ID has made IV recommendation. Denies N/V/C/F. Status post: osteomyelitis    Objective:     Visit Vitals  BP (!) 143/82 (BP 1 Location: Left upper arm, BP Patient Position: Sitting)   Pulse 77   Temp 97.4 °F (36.3 °C)   Resp 18   Ht 5' 11\" (1.803 m)   Wt 72.6 kg (160 lb)   SpO2 96%   BMI 22.32 kg/m²        Physical Exam:  Left great toe surgical site skin edges well approximated, laterally skin slough noted however well approximated. Skin flaps appear viable. Skin sutures intact. Labs/Radiology Review: images and reports reviewed    Assessment:     Hospital Problems  Never Reviewed        Codes Class Noted POA    * (Principal) Diabetic foot ulcer ([facility] ICD-10-CM: E11.621, L97.509  ICD-9-CM: 250.80, 707.15  6/22/2021 Unknown        Severe sepsis ([facility] ICD-10-CM: A41.9, R65.20  ICD-9-CM: 038.9, 995.92  6/19/2021 Unknown        Acute kidney failure ([facility] ICD-10-CM: N17.9  ICD-9-CM: 584.9  6/19/2021 Unknown        Tachycardia ICD-10-CM: R00.0  ICD-9-CM: 785.0  6/19/2021 Unknown        Hyperglycemia due to type 2 diabetes mellitus ([facility] ICD-10-CM: E11.65  ICD-9-CM: 250.00  6/19/2021 Unknown        Fever ICD-10-CM: R50.9  ICD-9-CM: 780.60  6/19/2021 Unknown        Hypertension ICD-10-CM: I10  ICD-9-CM: 401.9  6/19/2021 Unknown        Hyponatremia ICD-10-CM: E87.1  ICD-9-CM: 276.1  6/19/2021 Unknown        Hyperglycemia ICD-10-CM: R73.9  ICD-9-CM: 790.29  6/19/2021 Unknown              Plan/Recommendations/Medical Decision Making:     - post-op x-rays reviewed.  Stable post-op changes.   - OR pathology clean margin positive for osteomyelitis. ID recommended IV abxs. Patient is being discharged today to [facility] rehab. - Discussed that patient may need further resection at [facility] in future outpatient if continued infection with dehiscence occurs. - Will f/u outpatient in 1 week. - Remain NWB to left forefoot in CAM boot. - Dressings reinforced with betadine and dry gauze.

## 2021-06-26 LAB
BACTERIA SPEC CULT: NORMAL
SERVICE CMNT-IMP: NORMAL

## 2021-10-07 ENCOUNTER — APPOINTMENT (OUTPATIENT)
Dept: GENERAL RADIOLOGY | Age: 56
DRG: 710 | End: 2021-10-07
Attending: NURSE PRACTITIONER
Payer: MEDICAID

## 2021-10-07 ENCOUNTER — HOSPITAL ENCOUNTER (INPATIENT)
Age: 56
LOS: 22 days | Discharge: HOME | DRG: 710 | End: 2021-10-29
Attending: EMERGENCY MEDICINE | Admitting: HOSPITALIST
Payer: MEDICAID

## 2021-10-07 DIAGNOSIS — M86.9 OSTEOMYELITIS OF GREAT TOE OF RIGHT FOOT (HCC): Primary | ICD-10-CM

## 2021-10-07 PROBLEM — A41.9 ACUTE SEPSIS (HCC): Status: ACTIVE | Noted: 2021-10-07

## 2021-10-07 LAB
ALBUMIN SERPL-MCNC: 2.9 G/DL (ref 3.4–5)
ALBUMIN/GLOB SERPL: 0.6 {RATIO} (ref 0.8–1.7)
ALP SERPL-CCNC: 55 U/L (ref 45–117)
ALT SERPL-CCNC: 31 U/L (ref 16–61)
ANION GAP SERPL CALC-SCNC: 8 MMOL/L (ref 3–18)
AST SERPL-CCNC: 27 U/L (ref 10–38)
BASOPHILS # BLD: 0 K/UL (ref 0–0.1)
BASOPHILS NFR BLD: 0 % (ref 0–2)
BILIRUB SERPL-MCNC: 1.1 MG/DL (ref 0.2–1)
BUN SERPL-MCNC: 22 MG/DL (ref 7–18)
BUN/CREAT SERPL: 16 (ref 12–20)
CALCIUM SERPL-MCNC: 8.9 MG/DL (ref 8.5–10.1)
CHLORIDE SERPL-SCNC: 91 MMOL/L (ref 100–111)
CO2 SERPL-SCNC: 27 MMOL/L (ref 21–32)
CREAT SERPL-MCNC: 1.37 MG/DL (ref 0.6–1.3)
CRP SERPL-MCNC: 8.2 MG/DL (ref 0–0.3)
DIFFERENTIAL METHOD BLD: ABNORMAL
EOSINOPHIL # BLD: 0 K/UL (ref 0–0.4)
EOSINOPHIL NFR BLD: 0 % (ref 0–5)
ERYTHROCYTE [DISTWIDTH] IN BLOOD BY AUTOMATED COUNT: 11.2 % (ref 11.6–14.5)
ERYTHROCYTE [SEDIMENTATION RATE] IN BLOOD: 68 MM/HR (ref 0–20)
GLOBULIN SER CALC-MCNC: 4.9 G/DL (ref 2–4)
GLUCOSE SERPL-MCNC: 289 MG/DL (ref 74–99)
HCT VFR BLD AUTO: 41.8 % (ref 36–48)
HGB BLD-MCNC: 14.9 G/DL (ref 13–16)
LACTATE BLD-SCNC: 2.3 MMOL/L (ref 0.4–2)
LYMPHOCYTES # BLD: 0.6 K/UL (ref 0.9–3.6)
LYMPHOCYTES NFR BLD: 5 % (ref 21–52)
MCH RBC QN AUTO: 31 PG (ref 24–34)
MCHC RBC AUTO-ENTMCNC: 35.6 G/DL (ref 31–37)
MCV RBC AUTO: 87.1 FL (ref 78–100)
MONOCYTES # BLD: 1 K/UL (ref 0.05–1.2)
MONOCYTES NFR BLD: 8 % (ref 3–10)
NEUTS SEG # BLD: 11.1 K/UL (ref 1.8–8)
NEUTS SEG NFR BLD: 87 % (ref 40–73)
PLATELET # BLD AUTO: 139 K/UL (ref 135–420)
PMV BLD AUTO: 11.9 FL (ref 9.2–11.8)
POTASSIUM SERPL-SCNC: 4.1 MMOL/L (ref 3.5–5.5)
PROT SERPL-MCNC: 7.8 G/DL (ref 6.4–8.2)
RBC # BLD AUTO: 4.8 M/UL (ref 4.35–5.65)
SODIUM SERPL-SCNC: 126 MMOL/L (ref 136–145)
WBC # BLD AUTO: 12.8 K/UL (ref 4.6–13.2)

## 2021-10-07 PROCEDURE — 71045 X-RAY EXAM CHEST 1 VIEW: CPT

## 2021-10-07 PROCEDURE — 74011250636 HC RX REV CODE- 250/636: Performed by: EMERGENCY MEDICINE

## 2021-10-07 PROCEDURE — 80053 COMPREHEN METABOLIC PANEL: CPT

## 2021-10-07 PROCEDURE — 83036 HEMOGLOBIN GLYCOSYLATED A1C: CPT

## 2021-10-07 PROCEDURE — 93005 ELECTROCARDIOGRAM TRACING: CPT

## 2021-10-07 PROCEDURE — 87077 CULTURE AEROBIC IDENTIFY: CPT

## 2021-10-07 PROCEDURE — 86140 C-REACTIVE PROTEIN: CPT

## 2021-10-07 PROCEDURE — 87147 CULTURE TYPE IMMUNOLOGIC: CPT

## 2021-10-07 PROCEDURE — 74011250636 HC RX REV CODE- 250/636: Performed by: NURSE PRACTITIONER

## 2021-10-07 PROCEDURE — 74011000258 HC RX REV CODE- 258: Performed by: EMERGENCY MEDICINE

## 2021-10-07 PROCEDURE — 99223 1ST HOSP IP/OBS HIGH 75: CPT | Performed by: HOSPITALIST

## 2021-10-07 PROCEDURE — 65270000029 HC RM PRIVATE

## 2021-10-07 PROCEDURE — 83605 ASSAY OF LACTIC ACID: CPT

## 2021-10-07 PROCEDURE — 73630 X-RAY EXAM OF FOOT: CPT

## 2021-10-07 PROCEDURE — 74011250637 HC RX REV CODE- 250/637: Performed by: NURSE PRACTITIONER

## 2021-10-07 PROCEDURE — 99284 EMERGENCY DEPT VISIT MOD MDM: CPT

## 2021-10-07 PROCEDURE — 87186 SC STD MICRODIL/AGAR DIL: CPT

## 2021-10-07 PROCEDURE — 87040 BLOOD CULTURE FOR BACTERIA: CPT

## 2021-10-07 PROCEDURE — 85652 RBC SED RATE AUTOMATED: CPT

## 2021-10-07 PROCEDURE — 85025 COMPLETE CBC W/AUTO DIFF WBC: CPT

## 2021-10-07 RX ORDER — MAGNESIUM SULFATE 100 %
4 CRYSTALS MISCELLANEOUS AS NEEDED
Status: DISCONTINUED | OUTPATIENT
Start: 2021-10-07 | End: 2021-10-29 | Stop reason: HOSPADM

## 2021-10-07 RX ORDER — ACETAMINOPHEN 500 MG
1000 TABLET ORAL
Status: COMPLETED | OUTPATIENT
Start: 2021-10-07 | End: 2021-10-07

## 2021-10-07 RX ORDER — INSULIN LISPRO 100 [IU]/ML
INJECTION, SOLUTION INTRAVENOUS; SUBCUTANEOUS
Status: DISCONTINUED | OUTPATIENT
Start: 2021-10-08 | End: 2021-10-29 | Stop reason: HOSPADM

## 2021-10-07 RX ORDER — SODIUM CHLORIDE 0.9 % (FLUSH) 0.9 %
5-10 SYRINGE (ML) INJECTION AS NEEDED
Status: DISCONTINUED | OUTPATIENT
Start: 2021-10-07 | End: 2021-10-29 | Stop reason: HOSPADM

## 2021-10-07 RX ORDER — VANCOMYCIN/0.9 % SOD CHLORIDE 1.5G/250ML
1500 PLASTIC BAG, INJECTION (ML) INTRAVENOUS ONCE
Status: COMPLETED | OUTPATIENT
Start: 2021-10-07 | End: 2021-10-08

## 2021-10-07 RX ORDER — DEXTROSE 50 % IN WATER (D50W) INTRAVENOUS SYRINGE
25-50 AS NEEDED
Status: DISCONTINUED | OUTPATIENT
Start: 2021-10-07 | End: 2021-10-29 | Stop reason: HOSPADM

## 2021-10-07 RX ADMIN — SODIUM CHLORIDE 1000 ML: 900 INJECTION, SOLUTION INTRAVENOUS at 23:36

## 2021-10-07 RX ADMIN — PIPERACILLIN AND TAZOBACTAM 4.5 G: 4; .5 INJECTION, POWDER, FOR SOLUTION INTRAVENOUS at 23:36

## 2021-10-07 RX ADMIN — ACETAMINOPHEN 1000 MG: 500 TABLET ORAL at 20:38

## 2021-10-07 NOTE — Clinical Note
Status[de-identified] INPATIENT [101]   Type of Bed: Telemetry [19]   Cardiac Monitoring Required?: No   Inpatient Hospitalization Certified Necessary for the Following Reasons: 3.  Patient receiving treatment that can only be provided in an inpatient setting (further clarification in H&P documentation)   Admitting Diagnosis: Osteomyelitis [facility]) [[id number]]   Admitting Diagnosis: Acute sepsis [facility] [[id number]]   Admitting Physician: Placdio Doshi [[id number]]   Attending Physician: Placido Doshi [[id number]]   Estimated Length of Stay: 2 Midnights   Discharge Plan[de-identified] Home with Office Follow-up

## 2021-10-08 ENCOUNTER — ANESTHESIA EVENT (OUTPATIENT)
Dept: SURGERY | Age: 56
DRG: 710 | End: 2021-10-08
Payer: MEDICAID

## 2021-10-08 LAB
APPEARANCE UR: CLEAR
ATRIAL RATE: 111 BPM
BACTERIA URNS QL MICRO: ABNORMAL /HPF
BILIRUB UR QL: NEGATIVE
CALCULATED P AXIS, ECG09: 43 DEGREES
CALCULATED R AXIS, ECG10: 70 DEGREES
CALCULATED T AXIS, ECG11: 29 DEGREES
COLOR UR: YELLOW
COVID-19 RAPID TEST, COVR: NOT DETECTED
DIAGNOSIS, 93000: NORMAL
EPITH CASTS URNS QL MICRO: ABNORMAL /LPF (ref 0–5)
EST. AVERAGE GLUCOSE BLD GHB EST-MCNC: 212 MG/DL
GLUCOSE BLD STRIP.AUTO-MCNC: 212 MG/DL (ref 70–110)
GLUCOSE BLD STRIP.AUTO-MCNC: 269 MG/DL (ref 70–110)
GLUCOSE UR STRIP.AUTO-MCNC: 500 MG/DL
HBA1C MFR BLD: 9 % (ref 4.2–5.6)
HGB UR QL STRIP: ABNORMAL
KETONES UR QL STRIP.AUTO: 15 MG/DL
LACTATE SERPL-SCNC: 1.2 MMOL/L (ref 0.4–2)
LEUKOCYTE ESTERASE UR QL STRIP.AUTO: NEGATIVE
NITRITE UR QL STRIP.AUTO: NEGATIVE
P-R INTERVAL, ECG05: 130 MS
PH UR STRIP: 5 [PH] (ref 5–8)
PROT UR STRIP-MCNC: 100 MG/DL
Q-T INTERVAL, ECG07: 332 MS
QRS DURATION, ECG06: 78 MS
QTC CALCULATION (BEZET), ECG08: 451 MS
RBC #/AREA URNS HPF: NEGATIVE /HPF (ref 0–5)
SARS-COV-2, COV2: NORMAL
SOURCE, COVRS: NORMAL
SP GR UR REFRACTOMETRY: 1.01 (ref 1–1.03)
UROBILINOGEN UR QL STRIP.AUTO: 1 EU/DL (ref 0.2–1)
VENTRICULAR RATE, ECG03: 111 BPM
WBC URNS QL MICRO: ABNORMAL /HPF (ref 0–4)

## 2021-10-08 PROCEDURE — 36415 COLL VENOUS BLD VENIPUNCTURE: CPT

## 2021-10-08 PROCEDURE — 74011250636 HC RX REV CODE- 250/636: Performed by: HOSPITALIST

## 2021-10-08 PROCEDURE — 65660000000 HC RM CCU STEPDOWN

## 2021-10-08 PROCEDURE — 99233 SBSQ HOSP IP/OBS HIGH 50: CPT | Performed by: INTERNAL MEDICINE

## 2021-10-08 PROCEDURE — 87635 SARS-COV-2 COVID-19 AMP PRB: CPT

## 2021-10-08 PROCEDURE — 74011000258 HC RX REV CODE- 258: Performed by: HOSPITALIST

## 2021-10-08 PROCEDURE — 74011250636 HC RX REV CODE- 250/636: Performed by: EMERGENCY MEDICINE

## 2021-10-08 PROCEDURE — 74011250637 HC RX REV CODE- 250/637: Performed by: HOSPITALIST

## 2021-10-08 PROCEDURE — 74011636637 HC RX REV CODE- 636/637: Performed by: HOSPITALIST

## 2021-10-08 PROCEDURE — 82962 GLUCOSE BLOOD TEST: CPT

## 2021-10-08 PROCEDURE — 81001 URINALYSIS AUTO W/SCOPE: CPT

## 2021-10-08 PROCEDURE — 83605 ASSAY OF LACTIC ACID: CPT

## 2021-10-08 RX ORDER — ONDANSETRON 2 MG/ML
4 INJECTION INTRAMUSCULAR; INTRAVENOUS
Status: DISCONTINUED | OUTPATIENT
Start: 2021-10-08 | End: 2021-10-29 | Stop reason: HOSPADM

## 2021-10-08 RX ORDER — ROSUVASTATIN CALCIUM 20 MG/1
40 TABLET, COATED ORAL
Status: DISCONTINUED | OUTPATIENT
Start: 2021-10-08 | End: 2021-10-29 | Stop reason: HOSPADM

## 2021-10-08 RX ORDER — DULOXETIN HYDROCHLORIDE 60 MG/1
60 CAPSULE, DELAYED RELEASE ORAL DAILY
Status: DISCONTINUED | OUTPATIENT
Start: 2021-10-08 | End: 2021-10-29

## 2021-10-08 RX ORDER — ACETAMINOPHEN 325 MG/1
650 TABLET ORAL
Status: DISCONTINUED | OUTPATIENT
Start: 2021-10-08 | End: 2021-10-29 | Stop reason: HOSPADM

## 2021-10-08 RX ORDER — POLYETHYLENE GLYCOL 3350 17 G/17G
17 POWDER, FOR SOLUTION ORAL DAILY PRN
Status: DISCONTINUED | OUTPATIENT
Start: 2021-10-08 | End: 2021-10-29 | Stop reason: HOSPADM

## 2021-10-08 RX ORDER — ONDANSETRON 4 MG/1
4 TABLET, ORALLY DISINTEGRATING ORAL
Status: DISCONTINUED | OUTPATIENT
Start: 2021-10-08 | End: 2021-10-29 | Stop reason: HOSPADM

## 2021-10-08 RX ORDER — ACETAMINOPHEN 650 MG/1
650 SUPPOSITORY RECTAL
Status: DISCONTINUED | OUTPATIENT
Start: 2021-10-08 | End: 2021-10-29 | Stop reason: HOSPADM

## 2021-10-08 RX ORDER — SODIUM CHLORIDE 9 MG/ML
75 INJECTION, SOLUTION INTRAVENOUS CONTINUOUS
Status: DISCONTINUED | OUTPATIENT
Start: 2021-10-08 | End: 2021-10-16

## 2021-10-08 RX ORDER — TRAZODONE HYDROCHLORIDE 50 MG/1
50 TABLET ORAL
Status: DISCONTINUED | OUTPATIENT
Start: 2021-10-08 | End: 2021-10-29 | Stop reason: HOSPADM

## 2021-10-08 RX ADMIN — PIPERACILLIN AND TAZOBACTAM 4.5 G: 4; .5 INJECTION, POWDER, FOR SOLUTION INTRAVENOUS at 21:21

## 2021-10-08 RX ADMIN — PIPERACILLIN AND TAZOBACTAM 4.5 G: 4; .5 INJECTION, POWDER, FOR SOLUTION INTRAVENOUS at 18:24

## 2021-10-08 RX ADMIN — Medication 10 ML: at 21:21

## 2021-10-08 RX ADMIN — VANCOMYCIN HYDROCHLORIDE 1500 MG: 10 INJECTION, POWDER, LYOPHILIZED, FOR SOLUTION INTRAVENOUS at 00:19

## 2021-10-08 RX ADMIN — SODIUM CHLORIDE 75 ML/HR: 900 INJECTION, SOLUTION INTRAVENOUS at 06:19

## 2021-10-08 RX ADMIN — PIPERACILLIN AND TAZOBACTAM 4.5 G: 4; .5 INJECTION, POWDER, FOR SOLUTION INTRAVENOUS at 10:02

## 2021-10-08 RX ADMIN — DULOXETINE HYDROCHLORIDE 60 MG: 60 CAPSULE, DELAYED RELEASE ORAL at 10:02

## 2021-10-08 RX ADMIN — INSULIN LISPRO 6 UNITS: 100 INJECTION, SOLUTION INTRAVENOUS; SUBCUTANEOUS at 18:31

## 2021-10-08 RX ADMIN — INSULIN LISPRO 4 UNITS: 100 INJECTION, SOLUTION INTRAVENOUS; SUBCUTANEOUS at 21:28

## 2021-10-08 RX ADMIN — ROSUVASTATIN CALCIUM 40 MG: 20 TABLET, COATED ORAL at 21:21

## 2021-10-08 NOTE — ED PROVIDER NOTES
EMERGENCY DEPARTMENT HISTORY AND PHYSICAL EXAM  This was created with voice recognition software and transcription errors may be present. 9:02 PM  Date: 10/7/2021  Patient Name: Charlene Bocanegra    History of Presenting Illness     Chief Complaint:    History Provided By:     HPI: Charlene Bocanegra is a 49 y.o. male past medical history of diabetes hep C hypertension who presents with pain and swelling to his right great toe. Also noted to have fever and chills. Patient notes that he has a history of an amputation of his left great toe from June. He has had symptoms for 3 days. No aggravating alleviating factors no other associated symptoms    PCP: GOMEZ Huang      Past History     Past Medical History:  Past Medical History:   Diagnosis Date    Diabetes ([facility] [address]     Hepatitis C infection     Hypertension     Psychiatric disorder        Past Surgical History:  No past surgical history on file. Family History:  No family history on file. Social History:  Social History     Tobacco Use    Smoking status: Current Every Day Smoker     Packs/day: 1.00    Smokeless tobacco: Never Used   Substance Use Topics    Alcohol use: Yes     Comment: 2 times a week     Drug use: Yes     Types: Cocaine       Allergies:  No Known Allergies    Review of Systems     Review of Systems   Constitutional: Positive for chills and fever. HENT: Negative for congestion. Cardiovascular: Negative for chest pain. Gastrointestinal: Negative for abdominal distention. All other systems reviewed and are negative. 10 point review of systems otherwise negative unless noted in HPI. Physical Exam       Physical Exam  Constitutional:       Appearance: He is well-developed. HENT:      Head: Normocephalic and atraumatic. Eyes:      Pupils: Pupils are equal, round, and reactive to light. Cardiovascular:      Rate and Rhythm: Normal rate and regular rhythm. Heart sounds: Normal heart sounds. No murmur heard.    No friction rub. Pulmonary:      Effort: Pulmonary effort is normal. No respiratory distress. Breath sounds: Normal breath sounds. No wheezing. Abdominal:      General: There is no distension. Palpations: Abdomen is soft. Tenderness: There is no abdominal tenderness. There is no guarding or rebound. Musculoskeletal:         General: Normal range of motion. Cervical back: Normal range of motion and neck supple. Comments: Swelling and crusting to his right great toe   Skin:     General: Skin is warm and dry. Neurological:      Mental Status: He is alert and oriented to person, place, and time. Psychiatric:         Behavior: Behavior normal.         Thought Content: Thought content normal.         Diagnostic Study Results     Vital Signs  EKG: KG shows sinus tachycardia 111 with a normal axis and normal intervals there is no ST elevation or depression no hypertrophy  Labs:   Imaging: Chest x-ray is negative right toe x-ray shows osteo-    Medical Decision Making     ED Course: Progress Notes, Reevaluation, and Consults:    I will be the provider of record for this patient. Provider Notes (Medical Decision Making): This is a 73-year-old gentleman who presents with fever and diabetic foot infection. Will start broad-spectrum antibiotics and admit to the hospital service for likely amputation         Diagnosis     Clinical Impression: No diagnosis found. Disposition:        Patient's Medications   Start Taking    No medications on file   Continue Taking    DULOXETINE (CYMBALTA) 60 MG CAPSULE    Take 1 Cap by mouth daily. Indications: diabetic complication causing injury to some body nerves, major depressive disorder    HYDRALAZINE (APRESOLINE) 25 MG TABLET    Take 1 Tablet by mouth three (3) times daily.     INSULIN GLARGINE (LANTUS) 100 UNIT/ML INJECTION    Administer 60 units underneath the skin, every night after supper  Indications: type 2 diabetes mellitus    INSULIN LISPRO (HUMALOG) 100 UNIT/ML INJECTION    Administer 10 underneath the skin, three times a day before meals. Indications: type 2 diabetes mellitus    ONDANSETRON (ZOFRAN ODT) 4 MG DISINTEGRATING TABLET    Take 2 Tablets by mouth every eight (8) hours as needed for Nausea or Vomiting. ROSUVASTATIN (CRESTOR) 40 MG TABLET    Take 1 Tab by mouth nightly. Indications: high cholesterol    TRAZODONE (DESYREL) 50 MG TABLET    Take 1 Tab by mouth nightly as needed for Sleep.  Indications: insomnia associated with depression   These Medications have changed    No medications on file   Stop Taking    No medications on file

## 2021-10-08 NOTE — PROGRESS NOTES
Problem: Diabetes Self-Management  Goal: *Disease process and treatment process  Description: Define diabetes and identify own type of diabetes; list 3 options for treating diabetes. Outcome: Progressing Towards Goal  Goal: *Incorporating nutritional management into lifestyle  Description: Describe effect of type, amount and timing of food on blood glucose; list 3 methods for planning meals. Outcome: Progressing Towards Goal  Goal: *Incorporating physical activity into lifestyle  Description: State effect of exercise on blood glucose levels. Outcome: Progressing Towards Goal  Goal: *Developing strategies to promote health/change behavior  Description: Define the ABC's of diabetes; identify appropriate screenings, schedule and personal plan for screenings. Outcome: Progressing Towards Goal  Goal: *Using medications safely  Description: State effect of diabetes medications on diabetes; name diabetes medication taking, action and side effects. Outcome: Progressing Towards Goal  Goal: *Monitoring blood glucose, interpreting and using results  Description: Identify recommended blood glucose targets  and personal targets. Outcome: Progressing Towards Goal  Goal: *Prevention, detection, treatment of acute complications  Description: List symptoms of hyper- and hypoglycemia; describe how to treat low blood sugar and actions for lowering  high blood glucose level. Outcome: Progressing Towards Goal  Goal: *Prevention, detection and treatment of chronic complications  Description: Define the natural course of diabetes and describe the relationship of blood glucose levels to long term complications of diabetes.   Outcome: Progressing Towards Goal  Goal: *Developing strategies to address psychosocial issues  Description: Describe feelings about living with diabetes; identify support needed and support network  Outcome: Progressing Towards Goal  Goal: *Insulin pump training  Outcome: Progressing Towards Goal  Goal: *Sick day guidelines  Outcome: Progressing Towards Goal  Goal: *Patient Specific Goal (EDIT GOAL, INSERT TEXT)  Outcome: Progressing Towards Goal     Problem: Patient Education: Go to Patient Education Activity  Goal: Patient/Family Education  Outcome: Progressing Towards Goal     Problem: Falls - Risk of  Goal: *Absence of Falls  Description: Document Abhishek Bush Fall Risk and appropriate interventions in the flowsheet. Outcome: Progressing Towards Goal  Note: Fall Risk Interventions:            Medication Interventions: Bed/chair exit alarm, Evaluate medications/consider consulting pharmacy, Patient to call before getting OOB, Teach patient to arise slowly                   Problem: Patient Education: Go to Patient Education Activity  Goal: Patient/Family Education  Outcome: Progressing Towards Goal     Problem: Pain  Goal: *Control of Pain  Outcome: Progressing Towards Goal  Goal: *PALLIATIVE CARE:  Alleviation of Pain  Outcome: Progressing Towards Goal     Problem: Patient Education: Go to Patient Education Activity  Goal: Patient/Family Education  Outcome: Progressing Towards Goal     Problem: Lower Extremity Wound Care  Goal: *Non-infected wound: Improvement of existing wound, absence of infection, and maintenance of skin integrity  Outcome: Progressing Towards Goal  Goal: *Infected Wound: Prevention of further infection and promotion of healing  Description: Infection control procedures (eg: clean dressings, clean gloves, hand washing, precautions to isolate wound from contamination, sterile instruments used for wound debridement) should be implemented.   Outcome: Progressing Towards Goal  Goal: Interventions  Outcome: Progressing Towards Goal     Problem: Patient Education: Go to Patient Education Activity  Goal: Patient/Family Education  Outcome: Progressing Towards Goal

## 2021-10-08 NOTE — DIABETES MGMT
GLYCEMIC CONTROL PLAN OF CARE    Recommendations:  Admitted last night. Noted lispro corrective insulin coverage ordered as needed  Pt was recently hospitalized  6/2021. A1C improved since last admission. Assessment:  History:  T2DM  Problem List Items Addressed This Visit        Skeletal    Osteomyelitis of great toe of right foot ([address] - Primary        Morning blood glucose:  None yet noted for today. IVF containing dextrose:  None   Steroids:  None   Diet/TF:  DIET NPO  ADULT ORAL NUTRITION SUPPLEMENT PM Snack, HS Snack; Standard High Calorie/High Protein    Most recent BG values: Within target range  mg/dl? No   Current A1C:  9.0% equivalent to an average blood glucose of 212 mg/dl over the past 2-3 months. Adequate glycemic control PTA? No. Improved though since June 2021 (A1C 11.3%)  Current hospital diabetes medications:  Lispro AC&HS  Previous days insulin requirements:  None   Home diabetes medication:  Per pta med list  Lantus 60 units every bedtime  Humalog 10 units tid AC  Education:  ___ see diabetes education record            ___ diabetes education not indicated at this time. Pt tested for Covid. Attempted to call pts room to verify home medications. Line busy x 2.     [facility] RN CDE  Ext [phone]

## 2021-10-08 NOTE — H&P
HISTORY & PHYSICAL      Patient: Carlos Castelan MRN: [id number]  [facility]: [id number]    YOB: 1965  Age: 49 y.o. Sex: male    DOA: 10/7/2021 LOS:  LOS: 0 days        DOA: 10/7/2021        Assessment/Plan     Active Problems:    Osteomyelitis ([address] (4/2/2021)      Acute sepsis ([address] (10/7/2021)      Osteomyelitis of great toe of right foot ([address] (10/7/2021)        Plan:  1. Osteomyelitis of right great toe-IV antibiotics, podiatry consulted, n.p.o. after midnight for possible procedure in a.m.  2. History of type 2 diabetes-insulin sliding scale, monitor blood sugars  3. History of hypertension-resume home medications, monitor blood pressure  4. Hyponatremia-gentle hydration with IVF, monitor sodium levels  5. History of depression-resume chronic home medications  DVT prophylaxis-Heparin  Full code              HPI:     Carlos Castelan is a 49 y.o. male who has a known history of type 2 diabetes, poorly controlled, hypertension, depression presents to the emergency room with complaints of foul-smelling discharge from right foot great toe. Patient also mentions he had a fever, no cough, no shortness of breath. No known exposure to patients with COVID-19 infection. ER evaluation-patient noted to have osteomyelitis of the right big toe with foul-smelling discharge. Patient has been started on broad-spectrum IV antibiotics, podiatry consulted, patient will be n.p.o. after midnight for possible procedure in a.m. Patient is being admitted to the hospital for further evaluation. Past Medical History:   Diagnosis Date    [facility]     Hepatitis C infection     Hypertension     Psychiatric disorder        No past surgical history on file. No family history on file.     Social History     Socioeconomic History    Marital status: SINGLE     Spouse name: Not on file    Number of children: Not on file    Years of education: Not on file    Highest education level: Not on file   Tobacco Use    Smoking status: Current Every Day Smoker     Packs/day: 1.00    Smokeless tobacco: Never Used   Substance and Sexual Activity    Alcohol use: Yes     Comment: 2 times a week     Drug use: Yes     Types: Cocaine   Social History Narrative    ** Merged History Encounter **          Social Determinants of Health     Financial Resource Strain:     Difficulty of Paying Living Expenses:    Food Insecurity:     Worried About Running Out of Food in the Last Year:     Ran Out of Food in the Last Year:    Transportation Needs:     Lack of Transportation (Medical):  Lack of Transportation (Non-Medical):    Physical Activity:     Days of Exercise per Week:     Minutes of Exercise per Session:    Stress:     Feeling of Stress :    Social Connections:     Frequency of Communication with Friends and Family:     Frequency of Social Gatherings with Friends and Family:     Attends [employer] Services:     Active Member of Clubs or Organizations:     Attends Club or Organization Meetings:     Marital Status:        Prior to Admission medications    Medication Sig Start Date End Date Taking? Authorizing Provider   hydrALAZINE (APRESOLINE) 25 mg tablet Take 1 Tablet by mouth three (3) times daily. 6/24/21   Shira Landon DO   ondansetron (ZOFRAN ODT) 4 mg disintegrating tablet Take 2 Tablets by mouth every eight (8) hours as needed for Nausea or Vomiting. 6/24/21   Shira Landon, DO   DULoxetine (CYMBALTA) 60 mg capsule Take 1 Cap by mouth daily. Indications: diabetic complication causing injury to some body nerves, major depressive disorder 1/22/21   Jhon Bruner MD   insulin glargine (LANTUS) 100 unit/mL injection Administer 60 units underneath the skin, every night after supper  Indications: type 2 diabetes mellitus 1/21/21   Jhon Bruner MD   insulin lispro (HUMALOG) 100 unit/mL injection Administer 10 underneath the skin, three times a day before meals.   Indications: type 2 diabetes mellitus 1/21/21   Hosea Albarado MD   rosuvastatin (CRESTOR) 40 mg tablet Take 1 Tab by mouth nightly. Indications: high cholesterol 1/21/21   Hosea Albarado MD   traZODone (DESYREL) 50 mg tablet Take 1 Tab by mouth nightly as needed for Sleep. Indications: insomnia associated with depression 1/21/21   Hosea Albarado MD       No Known Allergies    Review of Systems:    Pertinent Positives noted in HPI. Rest all other ROS were noted to be negative. Physical Exam:      Visit Vitals  /85   Pulse (!) 113   Temp (!) 102.7 °F (39.3 °C)   Resp 18   Ht 5' 11\" (1.803 m)   Wt 72.6 kg (160 lb)   SpO2 100%   BMI 22.32 kg/m²       Physical Exam:    Gen: In general, this is a well nourished male in no acute distress  HEENT: Sclerae nonicteric. Oral mucous membranes moist. Dentition normal  Neck: Supple with midline trachea. CV: RRR without murmur or rub appreciated. Resp:Respirations are unlabored without use of accessory muscles. Lung fields B/L without wheezes or rhonchi. Abd: Soft, nontender, nondistended. Extrem: Extremities are warm, without cyanosis or clubbing. No pitting pretibial edema. Right foot 1st toe, purulent bloody discharge  Skin: Warm, no visible rashes. Neuro: Patient is alert, oriented, and cooperative. No obvious focal defects. Moves all 4 extremities. Labs Reviewed:    Recent Results (from the past 24 hour(s))   CBC WITH AUTOMATED DIFF    Collection Time: 10/07/21  8:40 PM   Result Value Ref Range    WBC 12.8 4.6 - 13.2 K/uL    RBC 4.80 4.35 - 5.65 M/uL    HGB 14.9 13.0 - 16.0 g/dL    HCT 41.8 36.0 - 48.0 %    MCV 87.1 78.0 - 100.0 FL    MCH 31.0 24.0 - 34.0 PG    MCHC 35.6 31.0 - 37.0 g/dL    RDW 11.2 (L) 11.6 - 14.5 %    PLATELET 879 276 - 506 K/uL    MPV 11.9 (H) 9.2 - 11.8 FL    NEUTROPHILS 87 (H) 40 - 73 %    LYMPHOCYTES 5 (L) 21 - 52 %    MONOCYTES 8 3 - 10 %    EOSINOPHILS 0 0 - 5 %    BASOPHILS 0 0 - 2 %    ABS. NEUTROPHILS 11.1 (H) 1.8 - 8.0 K/UL    ABS. LYMPHOCYTES 0.6 (L) 0.9 - 3.6 K/UL    ABS. MONOCYTES 1.0 0.05 - 1.2 K/UL    ABS. EOSINOPHILS 0.0 0.0 - 0.4 K/UL    ABS. BASOPHILS 0.0 0.0 - 0.1 K/UL    DF AUTOMATED     METABOLIC PANEL, COMPREHENSIVE    Collection Time: 10/07/21  8:40 PM   Result Value Ref Range    Sodium 126 (L) 136 - 145 mmol/L    Potassium 4.1 3.5 - 5.5 mmol/L    Chloride 91 (L) 100 - 111 mmol/L    CO2 27 21 - 32 mmol/L    Anion gap 8 3.0 - 18 mmol/L    Glucose 289 (H) 74 - 99 mg/dL    BUN 22 (H) 7.0 - 18 MG/DL    Creatinine 1.37 (H) 0.6 - 1.3 MG/DL    BUN/Creatinine ratio 16 12 - 20      GFR est AA >60 >60 ml/min/1.73m2    GFR est non-AA 54 (L) >60 ml/min/1.73m2    Calcium 8.9 8.5 - 10.1 MG/DL    Bilirubin, total 1.1 (H) 0.2 - 1.0 MG/DL    ALT (SGPT) 31 16 - 61 U/L    AST (SGOT) 27 10 - 38 U/L    Alk.  phosphatase 55 45 - 117 U/L    Protein, total 7.8 6.4 - 8.2 g/dL    Albumin 2.9 (L) 3.4 - 5.0 g/dL    Globulin 4.9 (H) 2.0 - 4.0 g/dL    A-G Ratio 0.6 (L) 0.8 - 1.7     SED RATE (ESR)    Collection Time: 10/07/21  8:40 PM   Result Value Ref Range    Sed rate, automated 68 (H) 0 - 20 mm/hr   C REACTIVE PROTEIN, QT    Collection Time: 10/07/21  8:40 PM   Result Value Ref Range    C-Reactive protein 8.2 (H) 0 - 0.3 mg/dL   EKG, 12 LEAD, INITIAL    Collection Time: 10/07/21  8:46 PM   Result Value Ref Range    Ventricular Rate 111 BPM    Atrial Rate 111 BPM    P-R Interval 130 ms    QRS Duration 78 ms    Q-T Interval 332 ms    QTC Calculation (Bezet) 451 ms    Calculated P Axis 43 degrees    Calculated R Axis 70 degrees    Calculated T Axis 29 degrees    Diagnosis       Sinus tachycardia  Minimal voltage criteria for LVH, may be normal variant ( Sokolow-Jackson )  Septal infarct (cited on or before 12-JAN-2021)  Abnormal ECG  When compared with ECG of 18-JUN-2021 22:30,  Questionable change in initial forces of Anterior leads  T wave inversion no longer evident in Lateral leads     POC LACTIC ACID    Collection Time: 10/07/21  8:56 PM   Result Value Ref Range    Lactic Acid (POC) 2.30 (HH) 0.40 - 2.00 mmol/L       Imaging Reviewed:    XR Results (most recent):  Results from Hospital Encounter encounter on 06/18/21    XR FOOT LT AP/LAT    Narrative  EXAM: XR FOOT LT AP/LAT    INDICATION: 49 years Male. post op. ADDITIONAL HISTORY: None. TECHNIQUE: AP and lateral views the left foot. COMPARISON: Left foot radiographs 6/20/2021    FINDINGS:    Interval amputation of the great toe at the level of the first proximal  phalangeal proximal diaphysis. Osteotomy margins are sharp. Several  radiodensities project over the overlying soft tissues, likely either bone  fragments or surgical material. There is expected soft tissue gas and swelling  in the surgical bed. No acute fracture or dislocation is detected. The Lisfranc interval appears  unremarkable however there is limited assessment of the Lisfranc interval and  alignment these nonweightbearing views. No aggressive osseous lesion  identified. Mild talonavicular degenerative change. Calcaneal enthesopathy. Arterial vascular calcifications are present. Impression  Baseline study status post great toe amputation without evidence of  complication. CT Results (most recent):  No results found for this or any previous visit. Berenice Knapp MD  10/7/2021, 11:18 PM        Disclaimer: Sections of this note are dictated using utilizing voice recognition software. Minor typographical errors may be present. If questions arise, please do not hesitate to contact me or call our department.

## 2021-10-08 NOTE — PROGRESS NOTES
Comprehensive Nutrition Assessment    Type and Reason for Visit: Initial, Positive nutrition screen    Nutrition Recommendations/Plan:   - Start diabetic diet & discontinue nutritional supplements. - Monitor and encourage po intake as tolerated. - IVF per MD.    Nutrition Assessment:  Admitted with osteomyelitis of right great toe. NPO this morning for possible intervention. Pt hungry and upset about NPO. Upon discussion with Dr. Rajiv Sterling no plan for surgery today, possibly tomorrow and okay for pt to have lunch today. Diet initiated & lunch meal provided, good appetite. Malnutrition Assessment:  Malnutrition Status:  No malnutrition (poor appetite & intake x 2-3 days PTA)      Nutrition History and Allergies: PMHx- DM, hepatitis C, HTN and psychiatric disorder. Pt reports wt loss x week PTA down to 145# from UBW of 160# but question accuracy as statement not consistent with chart review. Pt reporting poor intake x 2-3 days PTA with episode of nausea & vomiting. NKFA. Estimated Daily Nutrient Needs:  Energy (kcal): 7737-7462; Weight Used for Energy Requirements: Current (73 kg)  Protein (g): 58-88; Weight Used for Protein Requirements: Current (0.8-1.2)  Fluid (ml/day): 1394-6314; Method Used for Fluid Requirements: 1 ml/kcal    Nutrition Related Findings:  BM PTA. Meds: NS at 75 mL/hr, SSI & miralax prn. Wounds:    Diabetic ulcer       Current Nutrition Therapies:  ADULT ORAL NUTRITION SUPPLEMENT PM Snack, HS Snack; Standard High Calorie/High Protein  ADULT DIET Regular; 4 carb choices (60 gm/meal)    Anthropometric Measures:  · Height:  5' 11\" (180.3 cm)  · Current Body Wt:  72.6 kg (160 lb)   · Admission Body Wt:  160 lb    · Usual Body Wt:  72.6 kg (160 lb)     · Ideal Body Wt:  172 lbs:  93 %   · BMI Category:  Normal weight (BMI 18.5-24. 9)       Nutrition Diagnosis:   No nutrition diagnosis at this time    Nutrition Interventions:   Food and/or Nutrient Delivery: Start oral diet, Discontinue oral nutrition supplement  Nutrition Education and Counseling: No recommendations at this time, Education not indicated  Coordination of Nutrition Care: Continue to monitor while inpatient (telephone order from Dr. Leighann Cárdenas for diet initiation today)    Goals:  PO nutrition intake will meet >75% of patient estimated nutritional needs within the next 7 days       Nutrition Monitoring and Evaluation:   Behavioral-Environmental Outcomes: None identified  Food/Nutrient Intake Outcomes: Food and nutrient intake  Physical Signs/Symptoms Outcomes: Biochemical data, Meal time behavior, Nutrition focused physical findings    Discharge Planning:    Continue current diet     Electronically signed by Santosh Meehan RD on 10/8/2021 at 2:27 PM    Contact: [phone]

## 2021-10-08 NOTE — ED NOTES
I performed a brief evaluation, including history and physical, of the patient here in triage and I have determined that the patient will need further treatment and evaluation from the main side ER provider. I have placed initial orders to help in expediting patients care. Right great toe pain and wound with fever. Hx DM osteomyelitis with amputation.        October 07, 2021 at 8:33 PM - Jf Perea, FNP        Visit Vitals  /85   Pulse (!) 113   Temp (!) 102.7 °F (39.3 °C)   Resp 18   Ht 5' 11\" (1.803 m)   Wt 72.6 kg (160 lb)   SpO2 100%   BMI 22.32 kg/m²

## 2021-10-09 ENCOUNTER — ANESTHESIA (OUTPATIENT)
Dept: SURGERY | Age: 56
DRG: 710 | End: 2021-10-09
Payer: MEDICAID

## 2021-10-09 LAB
ALBUMIN SERPL-MCNC: 2.2 G/DL (ref 3.4–5)
ALBUMIN/GLOB SERPL: 0.5 {RATIO} (ref 0.8–1.7)
ALP SERPL-CCNC: 43 U/L (ref 45–117)
ALT SERPL-CCNC: 28 U/L (ref 16–61)
ANION GAP SERPL CALC-SCNC: 7 MMOL/L (ref 3–18)
AST SERPL-CCNC: 42 U/L (ref 10–38)
BASOPHILS # BLD: 0 K/UL (ref 0–0.1)
BASOPHILS NFR BLD: 0 % (ref 0–2)
BILIRUB SERPL-MCNC: 0.4 MG/DL (ref 0.2–1)
BUN SERPL-MCNC: 18 MG/DL (ref 7–18)
BUN/CREAT SERPL: 20 (ref 12–20)
CALCIUM SERPL-MCNC: 8.4 MG/DL (ref 8.5–10.1)
CHLORIDE SERPL-SCNC: 99 MMOL/L (ref 100–111)
CO2 SERPL-SCNC: 26 MMOL/L (ref 21–32)
CREAT SERPL-MCNC: 0.91 MG/DL (ref 0.6–1.3)
DIFFERENTIAL METHOD BLD: ABNORMAL
EOSINOPHIL # BLD: 0.1 K/UL (ref 0–0.4)
EOSINOPHIL NFR BLD: 1 % (ref 0–5)
ERYTHROCYTE [DISTWIDTH] IN BLOOD BY AUTOMATED COUNT: 11.2 % (ref 11.6–14.5)
GLOBULIN SER CALC-MCNC: 4.2 G/DL (ref 2–4)
GLUCOSE BLD STRIP.AUTO-MCNC: 191 MG/DL (ref 70–110)
GLUCOSE BLD STRIP.AUTO-MCNC: 210 MG/DL (ref 70–110)
GLUCOSE BLD STRIP.AUTO-MCNC: 236 MG/DL (ref 70–110)
GLUCOSE BLD STRIP.AUTO-MCNC: 306 MG/DL (ref 70–110)
GLUCOSE SERPL-MCNC: 196 MG/DL (ref 74–99)
HCT VFR BLD AUTO: 36.2 % (ref 36–48)
HGB BLD-MCNC: 12.9 G/DL (ref 13–16)
LYMPHOCYTES # BLD: 2.1 K/UL (ref 0.9–3.6)
LYMPHOCYTES NFR BLD: 23 % (ref 21–52)
MCH RBC QN AUTO: 31.5 PG (ref 24–34)
MCHC RBC AUTO-ENTMCNC: 35.6 G/DL (ref 31–37)
MCV RBC AUTO: 88.5 FL (ref 78–100)
MONOCYTES # BLD: 1 K/UL (ref 0.05–1.2)
MONOCYTES NFR BLD: 12 % (ref 3–10)
NEUTS SEG # BLD: 5.6 K/UL (ref 1.8–8)
NEUTS SEG NFR BLD: 63 % (ref 40–73)
PLATELET # BLD AUTO: 135 K/UL (ref 135–420)
PMV BLD AUTO: 12.1 FL (ref 9.2–11.8)
POTASSIUM SERPL-SCNC: 4 MMOL/L (ref 3.5–5.5)
PROT SERPL-MCNC: 6.4 G/DL (ref 6.4–8.2)
RBC # BLD AUTO: 4.09 M/UL (ref 4.35–5.65)
SODIUM SERPL-SCNC: 132 MMOL/L (ref 136–145)
VANCOMYCIN SERPL-MCNC: 1.8 UG/ML (ref 5–40)
WBC # BLD AUTO: 8.9 K/UL (ref 4.6–13.2)

## 2021-10-09 PROCEDURE — 74011250637 HC RX REV CODE- 250/637: Performed by: HOSPITALIST

## 2021-10-09 PROCEDURE — 74011000258 HC RX REV CODE- 258: Performed by: HOSPITALIST

## 2021-10-09 PROCEDURE — 74011250636 HC RX REV CODE- 250/636: Performed by: ANESTHESIOLOGY

## 2021-10-09 PROCEDURE — 87186 SC STD MICRODIL/AGAR DIL: CPT

## 2021-10-09 PROCEDURE — 88311 DECALCIFY TISSUE: CPT

## 2021-10-09 PROCEDURE — 87077 CULTURE AEROBIC IDENTIFY: CPT

## 2021-10-09 PROCEDURE — 74011636637 HC RX REV CODE- 636/637: Performed by: NURSE ANESTHETIST, CERTIFIED REGISTERED

## 2021-10-09 PROCEDURE — 76060000032 HC ANESTHESIA 0.5 TO 1 HR: Performed by: PODIATRIST

## 2021-10-09 PROCEDURE — 74011250636 HC RX REV CODE- 250/636: Performed by: NURSE ANESTHETIST, CERTIFIED REGISTERED

## 2021-10-09 PROCEDURE — 74011636637 HC RX REV CODE- 636/637: Performed by: HOSPITALIST

## 2021-10-09 PROCEDURE — 74011250636 HC RX REV CODE- 250/636: Performed by: HOSPITALIST

## 2021-10-09 PROCEDURE — 74011250636 HC RX REV CODE- 250/636: Performed by: INTERNAL MEDICINE

## 2021-10-09 PROCEDURE — 77030000032 HC CUF TRNQT ZIMM -B: Performed by: PODIATRIST

## 2021-10-09 PROCEDURE — 74011000250 HC RX REV CODE- 250: Performed by: PODIATRIST

## 2021-10-09 PROCEDURE — 87075 CULTR BACTERIA EXCEPT BLOOD: CPT

## 2021-10-09 PROCEDURE — 80202 ASSAY OF VANCOMYCIN: CPT

## 2021-10-09 PROCEDURE — 80053 COMPREHEN METABOLIC PANEL: CPT

## 2021-10-09 PROCEDURE — 74011000272 HC RX REV CODE- 272: Performed by: PODIATRIST

## 2021-10-09 PROCEDURE — 76010000138 HC OR TIME 0.5 TO 1 HR: Performed by: PODIATRIST

## 2021-10-09 PROCEDURE — 01480 ANES OPEN PX LOWER L/A/F NOS: CPT | Performed by: NURSE ANESTHETIST, CERTIFIED REGISTERED

## 2021-10-09 PROCEDURE — 74011000258 HC RX REV CODE- 258: Performed by: ANESTHESIOLOGY

## 2021-10-09 PROCEDURE — 01480 ANES OPEN PX LOWER L/A/F NOS: CPT | Performed by: ANESTHESIOLOGY

## 2021-10-09 PROCEDURE — 77030006773 HC BLD SAW OSC BRSM -A: Performed by: PODIATRIST

## 2021-10-09 PROCEDURE — 65660000000 HC RM CCU STEPDOWN

## 2021-10-09 PROCEDURE — 76210000006 HC OR PH I REC 0.5 TO 1 HR: Performed by: PODIATRIST

## 2021-10-09 PROCEDURE — 74011000250 HC RX REV CODE- 250: Performed by: ANESTHESIOLOGY

## 2021-10-09 PROCEDURE — 2709999900 HC NON-CHARGEABLE SUPPLY: Performed by: PODIATRIST

## 2021-10-09 PROCEDURE — 99233 SBSQ HOSP IP/OBS HIGH 50: CPT | Performed by: INTERNAL MEDICINE

## 2021-10-09 PROCEDURE — 0Y6P0Z1 DETACHMENT AT RIGHT 1ST TOE, HIGH, OPEN APPROACH: ICD-10-PCS | Performed by: PODIATRIST

## 2021-10-09 PROCEDURE — 87205 SMEAR GRAM STAIN: CPT

## 2021-10-09 PROCEDURE — 77030018836 HC SOL IRR NACL ICUM -A: Performed by: PODIATRIST

## 2021-10-09 PROCEDURE — 36415 COLL VENOUS BLD VENIPUNCTURE: CPT

## 2021-10-09 PROCEDURE — 85025 COMPLETE CBC W/AUTO DIFF WBC: CPT

## 2021-10-09 PROCEDURE — 77030013708 HC HNDPC SUC IRR PULS STRY –B: Performed by: PODIATRIST

## 2021-10-09 PROCEDURE — 77030011265 HC ELECTRD BLD HEX COVD -A: Performed by: PODIATRIST

## 2021-10-09 PROCEDURE — 82962 GLUCOSE BLOOD TEST: CPT

## 2021-10-09 PROCEDURE — 88305 TISSUE EXAM BY PATHOLOGIST: CPT

## 2021-10-09 RX ORDER — HYDROMORPHONE HYDROCHLORIDE 1 MG/ML
0.5 INJECTION, SOLUTION INTRAMUSCULAR; INTRAVENOUS; SUBCUTANEOUS
Status: DISCONTINUED | OUTPATIENT
Start: 2021-10-09 | End: 2021-10-09 | Stop reason: HOSPADM

## 2021-10-09 RX ORDER — SODIUM CHLORIDE, SODIUM LACTATE, POTASSIUM CHLORIDE, CALCIUM CHLORIDE 600; 310; 30; 20 MG/100ML; MG/100ML; MG/100ML; MG/100ML
125 INJECTION, SOLUTION INTRAVENOUS CONTINUOUS
Status: DISCONTINUED | OUTPATIENT
Start: 2021-10-09 | End: 2021-10-09 | Stop reason: HOSPADM

## 2021-10-09 RX ORDER — PROPOFOL 10 MG/ML
INJECTION, EMULSION INTRAVENOUS AS NEEDED
Status: DISCONTINUED | OUTPATIENT
Start: 2021-10-09 | End: 2021-10-09 | Stop reason: HOSPADM

## 2021-10-09 RX ORDER — FENTANYL CITRATE 50 UG/ML
50 INJECTION, SOLUTION INTRAMUSCULAR; INTRAVENOUS AS NEEDED
Status: DISCONTINUED | OUTPATIENT
Start: 2021-10-09 | End: 2021-10-09 | Stop reason: HOSPADM

## 2021-10-09 RX ORDER — OXYCODONE AND ACETAMINOPHEN 5; 325 MG/1; MG/1
1 TABLET ORAL ONCE
Status: DISCONTINUED | OUTPATIENT
Start: 2021-10-09 | End: 2021-10-09 | Stop reason: HOSPADM

## 2021-10-09 RX ORDER — MAGNESIUM SULFATE 100 %
4 CRYSTALS MISCELLANEOUS AS NEEDED
Status: DISCONTINUED | OUTPATIENT
Start: 2021-10-09 | End: 2021-10-09 | Stop reason: HOSPADM

## 2021-10-09 RX ORDER — SODIUM CHLORIDE 0.9 % (FLUSH) 0.9 %
5-40 SYRINGE (ML) INJECTION AS NEEDED
Status: DISCONTINUED | OUTPATIENT
Start: 2021-10-09 | End: 2021-10-09 | Stop reason: HOSPADM

## 2021-10-09 RX ORDER — INSULIN LISPRO 100 [IU]/ML
INJECTION, SOLUTION INTRAVENOUS; SUBCUTANEOUS ONCE
Status: COMPLETED | OUTPATIENT
Start: 2021-10-09 | End: 2021-10-09

## 2021-10-09 RX ORDER — NALOXONE HYDROCHLORIDE 0.4 MG/ML
0.04 INJECTION, SOLUTION INTRAMUSCULAR; INTRAVENOUS; SUBCUTANEOUS AS NEEDED
Status: DISCONTINUED | OUTPATIENT
Start: 2021-10-09 | End: 2021-10-09 | Stop reason: HOSPADM

## 2021-10-09 RX ORDER — SODIUM CHLORIDE 0.9 % (FLUSH) 0.9 %
5-40 SYRINGE (ML) INJECTION EVERY 8 HOURS
Status: DISCONTINUED | OUTPATIENT
Start: 2021-10-09 | End: 2021-10-09 | Stop reason: HOSPADM

## 2021-10-09 RX ORDER — DEXTROSE 50 % IN WATER (D50W) INTRAVENOUS SYRINGE
25-50 AS NEEDED
Status: DISCONTINUED | OUTPATIENT
Start: 2021-10-09 | End: 2021-10-09 | Stop reason: HOSPADM

## 2021-10-09 RX ORDER — MIDAZOLAM HYDROCHLORIDE 1 MG/ML
INJECTION, SOLUTION INTRAMUSCULAR; INTRAVENOUS AS NEEDED
Status: DISCONTINUED | OUTPATIENT
Start: 2021-10-09 | End: 2021-10-09 | Stop reason: HOSPADM

## 2021-10-09 RX ORDER — ONDANSETRON 2 MG/ML
4 INJECTION INTRAMUSCULAR; INTRAVENOUS ONCE
Status: DISCONTINUED | OUTPATIENT
Start: 2021-10-09 | End: 2021-10-09 | Stop reason: HOSPADM

## 2021-10-09 RX ORDER — PROPOFOL 10 MG/ML
VIAL (ML) INTRAVENOUS
Status: DISCONTINUED | OUTPATIENT
Start: 2021-10-09 | End: 2021-10-09 | Stop reason: HOSPADM

## 2021-10-09 RX ADMIN — VANCOMYCIN HYDROCHLORIDE 1000 MG: 1 INJECTION, POWDER, LYOPHILIZED, FOR SOLUTION INTRAVENOUS at 21:42

## 2021-10-09 RX ADMIN — VANCOMYCIN HYDROCHLORIDE 1000 MG: 1 INJECTION, POWDER, LYOPHILIZED, FOR SOLUTION INTRAVENOUS at 09:35

## 2021-10-09 RX ADMIN — ROSUVASTATIN CALCIUM 40 MG: 20 TABLET, COATED ORAL at 21:42

## 2021-10-09 RX ADMIN — PROPOFOL 50 MCG/KG/MIN: 10 INJECTION, EMULSION INTRAVENOUS at 12:29

## 2021-10-09 RX ADMIN — PROPOFOL 100 MG: 10 INJECTION, EMULSION INTRAVENOUS at 12:28

## 2021-10-09 RX ADMIN — INSULIN LISPRO 8 UNITS: 100 INJECTION, SOLUTION INTRAVENOUS; SUBCUTANEOUS at 21:48

## 2021-10-09 RX ADMIN — DEXMEDETOMIDINE HYDROCHLORIDE 0.4 MCG/KG/HR: 100 INJECTION, SOLUTION, CONCENTRATE INTRAVENOUS at 12:26

## 2021-10-09 RX ADMIN — PIPERACILLIN AND TAZOBACTAM 4.5 G: 4; .5 INJECTION, POWDER, FOR SOLUTION INTRAVENOUS at 18:39

## 2021-10-09 RX ADMIN — SODIUM CHLORIDE, SODIUM LACTATE, POTASSIUM CHLORIDE, AND CALCIUM CHLORIDE 125 ML/HR: 600; 310; 30; 20 INJECTION, SOLUTION INTRAVENOUS at 14:00

## 2021-10-09 RX ADMIN — PIPERACILLIN AND TAZOBACTAM 4.5 G: 4; .5 INJECTION, POWDER, FOR SOLUTION INTRAVENOUS at 09:35

## 2021-10-09 RX ADMIN — PIPERACILLIN AND TAZOBACTAM 4.5 G: 4; .5 INJECTION, POWDER, FOR SOLUTION INTRAVENOUS at 21:42

## 2021-10-09 RX ADMIN — Medication 10 ML: at 11:35

## 2021-10-09 RX ADMIN — INSULIN LISPRO 4 UNITS: 100 INJECTION, SOLUTION INTRAVENOUS; SUBCUTANEOUS at 09:51

## 2021-10-09 RX ADMIN — INSULIN LISPRO 2 UNITS: 100 INJECTION, SOLUTION INTRAVENOUS; SUBCUTANEOUS at 16:28

## 2021-10-09 RX ADMIN — PROPOFOL 50 MG: 10 INJECTION, EMULSION INTRAVENOUS at 12:38

## 2021-10-09 RX ADMIN — Medication 10 ML: at 21:42

## 2021-10-09 RX ADMIN — PIPERACILLIN AND TAZOBACTAM 4.5 G: 4; .5 INJECTION, POWDER, FOR SOLUTION INTRAVENOUS at 03:58

## 2021-10-09 RX ADMIN — PIPERACILLIN AND TAZOBACTAM 100 ML: 4; .5 INJECTION, POWDER, FOR SOLUTION INTRAVENOUS at 13:00

## 2021-10-09 RX ADMIN — INSULIN LISPRO 6 UNITS: 100 INJECTION, SOLUTION INTRAVENOUS; SUBCUTANEOUS at 13:28

## 2021-10-09 RX ADMIN — MIDAZOLAM HYDROCHLORIDE 2 MG: 2 INJECTION, SOLUTION INTRAMUSCULAR; INTRAVENOUS at 12:22

## 2021-10-09 NOTE — PERIOP NOTES
Assumed care of pt from OR via bed. Attached to monitor. VSS. OR, MAR and anesthesia report appreciated. Will monitor. 1342  TRANSFER - OUT REPORT:    Verbal report given to JUANCARLOS Escobar (name) on [place]  being transferred to [address] [place] [address] (unit) for routine post - op       Report consisted of patients Situation, Background, Assessment and   Recommendations(SBAR). Information from the following report(s) SBAR, OR Summary, MAR, Recent Results and Cardiac Rhythm sinus rhythm was reviewed with the receiving nurse. Lines:   Peripheral IV 10/07/21 Left Antecubital (Active)   Site Assessment Intact;Dry 10/09/21 1140   Phlebitis Assessment 0 10/09/21 1140   Infiltration Assessment 0 10/09/21 1140   Dressing Status Dry; Intact 10/09/21 1140   Dressing Type Transparent 10/09/21 1140   Hub Color/Line Status Flushed; Infusing;Pink 10/09/21 1140   Action Taken Dressing reinforced 10/09/21 1140   Alcohol Cap Used No 10/09/21 1140        Opportunity for questions and clarification was provided.       Patient transported with:   [employer]

## 2021-10-09 NOTE — PROGRESS NOTES
Admit Date: 10/7/2021  Date of Service: 10/9/2021    Reason for follow-up: toe infection      Assessment:         1. Osteomyelitis of right great toe-IV antibiotics, podiatry consulted, n.p.o. after midnight for possible procedure in a.m.  2. History of type 2 diabetes-insulin sliding scale, monitor blood sugars  3. History of hypertension-resume home medications, monitor blood pressure  4. Hyponatremia-gentle hydration with IVF, monitor sodium levels  5. History of depression-resume chronic home medications    Plan:   Local wound care  F/u intraoperative cultures and pathology  Resume diabetic diet  Continue Cymbalta, Crestor  Continue with vancomycin and Zosyn for now  Sliding scale insulin, Accu-Cheks    Current Antibtiocs:   Vancomycin 10/7- present  Zosyn 10/7 present    Lines:   Peripheral    Case discussed with:  []Patient  []Family  []Nursing  []Case Management  DVT Prophylaxis:  []Lovenox  []Hep SQ  []SCDs  []Coumadin   []On Heparin gtt    I have independently examined the patient and reviewed all lab studies and imgaing as well as review of nursing notes and physican notes from the past 24 hours. Shane Westbrook D.O. Pager [phone]      No Known Allergies        Subjective:      Pt seen and examined. Seen in PACU. Patient sleepy. Wondering when he can have lunch.   Pain is currently controlled    Objective:        Visit Vitals  BP (!) 159/83   Pulse 66   Temp 97.8 °F (36.6 °C)   Resp 17   Ht 5' 11\" (1.803 m)   Wt 67.1 kg (148 lb)   SpO2 100%   BMI 20.64 kg/m²     Temp (24hrs), Av.7 °F (37.1 °C), Min:97.8 °F (36.6 °C), Max:99.8 °F (37.7 °C)        General:   awake alert and oriented, non-toxic   Skin:   no rashes or skin lesions noted on limited exam, dry and warm   HEENT:  No scleral icterus or pallor; oral mucosa moist, lips moist   Lymph Nodes:   not assessed today   Lungs:   non, labored; bilaterally clear to aspiration- no crackles wheezes rales or rhonchi   Heart:  RRR, s1 and s2; no murmurs rubs or gallops; no edema, + pedal pulses   Abdomen:  soft, non-distended, active bowel sounds, non-tender   Genitourinary:  deferred   Extremities:   average muscle tone; no contractures, no joint effusions; surgical dressings in place, clean, not removed   Neurologic:  No gross focal motor abnormalities; diminished sensation to bilateral lower extremities from foot to ankle ; CN 2-12 intact; Follows commands. Psychiatric:   appropriate and interactive. Labs: Results:   Chemistry Recent Labs     10/09/21  0511 10/07/21  2040   * 289*   * 126*   K 4.0 4.1   CL 99* 91*   CO2 26 27   BUN 18 22*   CREA 0.91 1.37*   CA 8.4* 8.9   AGAP 7 8   BUCR 20 16   AP 43* 55   TP 6.4 7.8   ALB 2.2* 2.9*   GLOB 4.2* 4.9*   AGRAT 0.5* 0.6*      CBC w/Diff Recent Labs     10/09/21  0511 10/07/21  2040   WBC 8.9 12.8   RBC 4.09* 4.80   HGB 12.9* 14.9   HCT 36.2 41.8    139   GRANS 63 87*   LYMPH 23 5*   EOS 1 0        No results found for: SDES Lab Results   Component Value Date/Time    Culture result: (A) 10/07/2021 08:55 PM     POSSIBLE STAPHYLOCOCCUS AUREUS GROWING IN THE AEROBIC AND ANAEROBIC BOTTLES    Culture result: (A) 10/07/2021 08:40 PM     POSSIBLE STAPHYLOCOCCUS AUREUS GROWING IN THE AEROBIC AMD ANAEROBIC BOTTLES    Culture result: (A) 06/21/2021 05:25 PM     SCANT STREPTOCOCCI, BETA HEMOLYTIC GROUP B Penicillin and ampicillin are drugs of choice for treatment of beta-hemolytic streptococcal infections. Susceptibility testing of penicillins and beta-lactams approved by the FDA for treatment of beta-hemolytic streptococcal infections need not be performed routinely, because nonsusceptible isolates are extremely rare. CLSI 2012    Culture result: (A) 06/21/2021 05:25 PM     SCANT STREPTOCOCCI, BETA HEMOLYTIC GROUP C Penicillin and ampicillin are drugs of choice for treatment of beta-hemolytic streptococcal infections.  Susceptibility testing of penicillins and beta-lactams approved by the FDA for treatment of beta-hemolytic streptococcal infections need not be performed routinely, because nonsusceptible isolates are extremely rare. CLSI 2012    Culture result: RARE STAPHYLOCOCCUS AUREUS (A) 06/21/2021 05:25 PM    Culture result: NO ANAEROBES ISOLATED 06/21/2021 05:25 PM        Results     Procedure Component Value Units Date/Time    CULTURE, ANAEROBIC [[id number]] Collected: 10/09/21 1256    Order Status: Completed Specimen: Toe Updated: 10/09/21 1458    CULTURE, TISSUE Kevin Grand [[id number]] Collected: 10/09/21 1256    Order Status: Completed Updated: 10/09/21 1500    COVID-19 RAPID TEST [[id number]] Collected: 10/08/21 1254    Order Status: Completed Specimen: Nasopharyngeal Updated: 10/08/21 1324     Specimen source Nasopharyngeal        COVID-19 rapid test Not detected        Comment: Rapid Abbott ID Now       Rapid NAAT:  The specimen is NEGATIVE for SARS-CoV-2, the novel coronavirus associated with COVID-19. Negative results should be treated as presumptive and, if inconsistent with clinical signs and symptoms or necessary for patient management, should be tested with an alternative molecular assay. Negative results do not preclude SARS-CoV-2 infection and should not be used as the sole basis for patient management decisions. This test has been authorized by the FDA under an Emergency Use Authorization (EUA) for use by authorized laboratories.    Fact sheet for Healthcare Providers: ConventionUpdate.co.nz  Fact sheet for Patients: ConventionUpdate.co.nz       Methodology: Isothermal Nucleic Acid Amplification         CULTURE, BLOOD [[id number]]  (Abnormal) Collected: 10/07/21 2055    Order Status: Completed Specimen: Blood Updated: 10/09/21 1217     Special Requests: NO SPECIAL REQUESTS        GRAM STAIN       AEROBIC AND ANAEROBIC BOTTLES GRAM POSITIVE COCCI IN GROUPS                  SMEAR CALLED TO AND CORRECTLY REPEATED BY: [facility] 2[facility] AT 1040 BY KDA 10/8/21           Culture result:       POSSIBLE STAPHYLOCOCCUS AUREUS GROWING IN THE AEROBIC AND ANAEROBIC BOTTLES          CULTURE, BLOOD [[id number]]  (Abnormal) Collected: 10/07/21 2040    Order Status: Completed Specimen: Blood Updated: 10/09/21 1215     Special Requests: NO SPECIAL REQUESTS        GRAM STAIN       AEROBIC AND ANAEROBIC BOTTLES                  SMEAR CALLED TO AND CORRECTLY REPEATED BY: RN [facility] 2S AT 36 BY KDA 10/8/21           Culture result:       POSSIBLE STAPHYLOCOCCUS AUREUS GROWING IN THE AEROBIC AMD ANAEROBIC BOTTLES                Imaging:     XR FOOT RT MIN 3 V    Result Date: 10/8/2021  1. Subacute trauma with/without osteomyelitis at the distal phalanx of the first toe. DJD at first MTP joint. 2. Questionable linear foreign body along the dorsal soft tissue at the midfoot as seen on lateral view. Vascular calcification also present.     XR CHEST PORT    Result Date: 10/8/2021  Negative CXR

## 2021-10-09 NOTE — ANESTHESIA PREPROCEDURE EVALUATION
Relevant Problems   NEUROLOGY   (+) MDD (major depressive disorder), recurrent severe, without psychosis ([facility]   (+) Major depression with psychotic features ([facility]      CARDIOVASCULAR   (+) Hypertension      RENAL FAILURE   (+) Acute kidney failure (HCC)      HEMATOLOGY   (+) Osteomyelitis (HCC)   (+) Osteomyelitis of great toe of right foot (HCC)       Anesthetic History   No history of anesthetic complications            Review of Systems / Medical History  Patient summary reviewed and pertinent labs reviewed    Pulmonary          Smoker         Neuro/Psych         Psychiatric history    Comments: depression Cardiovascular    Hypertension              Exercise tolerance: >4 METS     GI/Hepatic/Renal       Hepatitis: type C    Liver disease     Endo/Other    Diabetes         Other Findings   Comments: Hx of Drug abuse           Physical Exam    Airway  Mallampati: II  TM Distance: > 6 cm  Neck ROM: normal range of motion   Mouth opening: Normal     Cardiovascular  Regular rate and rhythm,  S1 and S2 normal,  no murmur, click, rub, or gallop  Rhythm: regular  Rate: normal         Dental    Dentition: Poor dentition     Pulmonary  Breath sounds clear to auscultation               Abdominal  GI exam deferred       Other Findings            Anesthetic Plan    ASA: 3  Anesthesia type: MAC and general - backup          Induction: Intravenous  Anesthetic plan and risks discussed with: Patient

## 2021-10-09 NOTE — PERIOP NOTES
TRANSFER - IN REPORT:    Verbal report received from [facility]. on [place]  being received from [address] [place] [address] for routine progression of care      Report consisted of patients Situation, Background, Assessment and   Recommendations(SBAR). Information from the following report(s) Procedure Verification was reviewed with the receiving nurse. Opportunity for questions and clarification was provided. Assessment completed upon patients arrival to unit and care assumed.      Per floor nurse:  Covid is negative  Has working I.V.  Vital Signs stable enough for transport without a nurse present  A & O x4

## 2021-10-09 NOTE — CONSULTS
Podiatry History and Physical    Subjective:         Date of Consultation:  October 9, 2021    Patient is a 49 y.o. male who is being seen for right great toe tip ulcer with osteomyelitis of distal phalanx. Patient states that he noticed blister 2 weeks ago which he was treating with local dressing change. Unfortunately it got worse and turned into wound. Patient came to ER with spiking fever and had x-ray of foot confirming erosion of first distal phalanx. Patient started on broad spectrum IV antibiotics. He also had left partial great toe amputation which noted to have callused skin laterally. He denies any other pedal complaint. Patient Active Problem List    Diagnosis Date Noted    Acute sepsis ([address] 10/07/2021    Osteomyelitis of great toe of right foot ([facility] [address] 10/07/2021    Diabetic foot ulcer ([facility] [address] 06/22/2021    Severe sepsis ([address] 06/19/2021    Acute kidney failure ([address] 06/19/2021    Tachycardia 06/19/2021    Hyperglycemia due to type 2 diabetes mellitus ([address] 06/19/2021    Fever 06/19/2021    Hypertension 06/19/2021    Hyponatremia 06/19/2021    Hyperglycemia 06/19/2021    Osteomyelitis ([address] 04/02/2021    MDD (major depressive disorder), recurrent severe, without psychosis ([address] 01/12/2021    Major depression with psychotic features ([facility] [address] 01/11/2021     Past Medical History:   Diagnosis Date    Diabetes ([facility] [address]     Hepatitis C infection     Hypertension     Psychiatric disorder       No family history on file. Social History     Tobacco Use    Smoking status: Current Every Day Smoker     Packs/day: 1.00    Smokeless tobacco: Never Used   Substance Use Topics    Alcohol use: Yes     Comment: 2 times a week      No past surgical history on file. Prior to Admission medications    Medication Sig Start Date End Date Taking? Authorizing Provider   insulin lispro (HUMALOG) 100 unit/mL injection Administer 10 underneath the skin, three times a day before meals.   Indications: type 2 diabetes mellitus 1/21/21 Yes Isatu Leon MD   hydrALAZINE (APRESOLINE) 25 mg tablet Take 1 Tablet by mouth three (3) times daily. 21   Marbella Thompson DO   ondansetron (ZOFRAN ODT) 4 mg disintegrating tablet Take 2 Tablets by mouth every eight (8) hours as needed for Nausea or Vomiting. 21   Marbella Thompson DO   DULoxetine (CYMBALTA) 60 mg capsule Take 1 Cap by mouth daily. Indications: diabetic complication causing injury to some body nerves, major depressive disorder 21   Isatu Leon MD   insulin glargine (LANTUS) 100 unit/mL injection Administer 60 units underneath the skin, every night after supper  Indications: type 2 diabetes mellitus 21   Isatu Leon MD   rosuvastatin (CRESTOR) 40 mg tablet Take 1 Tab by mouth nightly. Indications: high cholesterol 21   Isatu Leon MD   traZODone (DESYREL) 50 mg tablet Take 1 Tab by mouth nightly as needed for Sleep. Indications: insomnia associated with depression 21   Isatu Leon MD     No Known Allergies     Review of Systems:  A comprehensive review of systems was negative except for that written in the HPI. Objective:     Patient Vitals for the past 8 hrs:   BP Temp Pulse Resp SpO2   10/08/21 2013 (!) 165/89 98.7 °F (37.1 °C) 99 18 99 %     Temp (24hrs), Av.8 °F (37.1 °C), Min:97.5 °F (36.4 °C), Max:100 °F (37.8 °C)    Bilateral JEANETTE:     Vascular: palpable 2/4 DP and PT pulses. Absent pedal hair growth noted. Neurological: diminished protective sensation to both feet. Paresthesia symptoms present to both feet. Achilles and patellar tendon reflexes WNL. Dermatological: Skin noted to have mild atrophy with xerosis. Right great toe tip 0.5 cm ulcer probing deep to bone. Localized edema noted to right great toe. Left partial great toe amputation site healed except on lateral corner where callused skin excised and underneath superficial wound noted without signs of abscess.      Musculoskeletal: Muscle strength 5/5 of all extrinsic muscle groups on bilateral feet. Left great toe partially amputated. Data Review:   Recent Results (from the past 24 hour(s))   URINALYSIS W/ RFLX MICROSCOPIC    Collection Time: 10/08/21  3:30 AM   Result Value Ref Range    Color YELLOW      Appearance CLEAR      Specific gravity 1.011 1.005 - 1.030      pH (UA) 5.0 5.0 - 8.0      Protein 100 (A) NEG mg/dL    Glucose 500 (A) NEG mg/dL    Ketone 15 (A) NEG mg/dL    Bilirubin Negative NEG      Blood TRACE (A) NEG      Urobilinogen 1.0 0.2 - 1.0 EU/dL    Nitrites Negative NEG      Leukocyte Esterase Negative NEG     URINE MICROSCOPIC ONLY    Collection Time: 10/08/21  3:30 AM   Result Value Ref Range    WBC 1 to 2 0 - 4 /hpf    RBC Negative 0 - 5 /hpf    Epithelial cells FEW 0 - 5 /lpf    Bacteria FEW (A) NEG /hpf   LACTIC ACID    Collection Time: 10/08/21 10:10 AM   Result Value Ref Range    Lactic acid 1.2 0.4 - 2.0 MMOL/L   SARS-COV-2    Collection Time: 10/08/21 12:54 PM   Result Value Ref Range    SARS-CoV-2 Please find results under separate order     COVID-19 RAPID TEST    Collection Time: 10/08/21 12:54 PM   Result Value Ref Range    Specimen source Nasopharyngeal      COVID-19 rapid test Not detected NOTD     GLUCOSE, POC    Collection Time: 10/08/21  1:54 PM   Result Value Ref Range    Glucose (POC) 269 (H) 70 - 110 mg/dL   GLUCOSE, POC    Collection Time: 10/08/21  9:25 PM   Result Value Ref Range    Glucose (POC) 212 (H) 70 - 110 mg/dL         Impression:     Right great toe diabetic ulcer with osteomyelitis of first distal phalanx. Recommendation:     - x-ray of right foot reviewed. Erosion at distal end of first distal phalanx consistent with osteomyelitis. - Patient will need partial right great toe amputation. Please keep him NPO after midnight. Plan for procedure tomorrow am.   - Remain NWB to right forefoot.   - Right great toe dressed with betadine and dry gauze.    - Callused skin debrided on left great toe and no deeper abscess noted underneath.   - Medical management per hospitalist.     - Thank you for allowing me the opportunity to participate in Mr. Dhillon's care.    -

## 2021-10-09 NOTE — PROGRESS NOTES
conducted an initial consultation and Spiritual Assessment for Melanie Santos, who is a 49 y.o.,male. Patients Primary Language is: [place]. According to the patients EMR Jew Affiliation is: Restorationism. The reason the Patient came to the hospital is:   Patient Active Problem List    Diagnosis Date Noted    Acute sepsis ([facility] [address] 10/07/2021    Osteomyelitis of great toe of right foot ([address] 10/07/2021    Diabetic foot ulcer ([address] 06/22/2021    Severe sepsis ([address] 06/19/2021    Acute kidney failure ([address] 06/19/2021    Tachycardia 06/19/2021    Hyperglycemia due to type 2 diabetes mellitus ([address] 06/19/2021    Fever 06/19/2021    Hypertension 06/19/2021    Hyponatremia 06/19/2021    Hyperglycemia 06/19/2021    Osteomyelitis ([facility] 04/02/2021    MDD (major depressive disorder), recurrent severe, without psychosis ([address] 01/12/2021    Major depression with psychotic features ([facility] 01/11/2021        The  provided the following Interventions:  Initiated a relationship of care and support. Patient seemed very tired to engage in long conversation. Provided quick information about Spiritual Care Services. Offered assurance of continued prayers on patient's behalf. Chart reviewed. The following outcomes where achieved:  Patient not able to share any information about both his medical narrative and spiritual journey/beliefs.  confirmed Patient's Restorationism Jew Affiliation. Patient did not vocalized processed feeling about current hospitalization. Patient expressed gratitude for 's visit. Assessment:  Patient does not have any Episcopalian/cultural needs that will affect patients preferences in health care. There are no spiritual or Episcopalian issues which require intervention at this time. Plan:  Chaplains will continue to follow and will provide pastoral care on an as needed/requested basis.    recommends bedside caregivers page  on duty if patient shows signs of acute spiritual or emotional distress.     Delmi Alaniz [phone]

## 2021-10-09 NOTE — ANESTHESIA POSTPROCEDURE EVALUATION
Procedure(s):  AMPUTATION  OF RIGHT GREAT TOE.    MAC, general - backup    Anesthesia Post Evaluation      Multimodal analgesia: multimodal analgesia used between 6 hours prior to anesthesia start to PACU discharge  Patient location during evaluation: bedside  Patient participation: complete - patient participated  Level of consciousness: awake  Pain management: adequate  Airway patency: patent  Anesthetic complications: no  Cardiovascular status: stable  Respiratory status: acceptable  Hydration status: acceptable  Post anesthesia nausea and vomiting:  controlled  Final Post Anesthesia Temperature Assessment:  Normothermia (36.0-37.5 degrees C)      INITIAL Post-op Vital signs:   Vitals Value Taken Time   /82 10/09/21 1343   Temp 36.6 °C (97.8 °F) 10/09/21 1318   Pulse 65 10/09/21 1348   Resp 17 10/09/21 1348   SpO2 100 % 10/09/21 1348   Vitals shown include unvalidated device data.

## 2021-10-10 LAB
ALBUMIN SERPL-MCNC: 2.3 G/DL (ref 3.4–5)
ALBUMIN/GLOB SERPL: 0.5 {RATIO} (ref 0.8–1.7)
ALP SERPL-CCNC: 61 U/L (ref 45–117)
ALT SERPL-CCNC: 36 U/L (ref 16–61)
ANION GAP SERPL CALC-SCNC: 8 MMOL/L (ref 3–18)
AST SERPL-CCNC: 41 U/L (ref 10–38)
BACTERIA SPEC CULT: ABNORMAL
BACTERIA SPEC CULT: ABNORMAL
BASOPHILS # BLD: 0 K/UL (ref 0–0.1)
BASOPHILS NFR BLD: 0 % (ref 0–2)
BILIRUB SERPL-MCNC: 0.5 MG/DL (ref 0.2–1)
BUN SERPL-MCNC: 17 MG/DL (ref 7–18)
BUN/CREAT SERPL: 15 (ref 12–20)
CALCIUM SERPL-MCNC: 8.5 MG/DL (ref 8.5–10.1)
CHLORIDE SERPL-SCNC: 99 MMOL/L (ref 100–111)
CO2 SERPL-SCNC: 28 MMOL/L (ref 21–32)
CREAT SERPL-MCNC: 1.1 MG/DL (ref 0.6–1.3)
DIFFERENTIAL METHOD BLD: ABNORMAL
EOSINOPHIL # BLD: 0 K/UL (ref 0–0.4)
EOSINOPHIL NFR BLD: 0 % (ref 0–5)
ERYTHROCYTE [DISTWIDTH] IN BLOOD BY AUTOMATED COUNT: 11.2 % (ref 11.6–14.5)
GLOBULIN SER CALC-MCNC: 4.4 G/DL (ref 2–4)
GLUCOSE BLD STRIP.AUTO-MCNC: 155 MG/DL (ref 70–110)
GLUCOSE BLD STRIP.AUTO-MCNC: 270 MG/DL (ref 70–110)
GLUCOSE BLD STRIP.AUTO-MCNC: 384 MG/DL (ref 70–110)
GLUCOSE BLD STRIP.AUTO-MCNC: 398 MG/DL (ref 70–110)
GLUCOSE SERPL-MCNC: 269 MG/DL (ref 74–99)
GRAM STN SPEC: ABNORMAL
HCT VFR BLD AUTO: 37.3 % (ref 36–48)
HGB BLD-MCNC: 13 G/DL (ref 13–16)
LYMPHOCYTES # BLD: 1.7 K/UL (ref 0.9–3.6)
LYMPHOCYTES NFR BLD: 17 % (ref 21–52)
MCH RBC QN AUTO: 31 PG (ref 24–34)
MCHC RBC AUTO-ENTMCNC: 34.9 G/DL (ref 31–37)
MCV RBC AUTO: 89 FL (ref 78–100)
MONOCYTES # BLD: 1 K/UL (ref 0.05–1.2)
MONOCYTES NFR BLD: 10 % (ref 3–10)
NEUTS SEG # BLD: 7 K/UL (ref 1.8–8)
NEUTS SEG NFR BLD: 71 % (ref 40–73)
PLATELET # BLD AUTO: 158 K/UL (ref 135–420)
PMV BLD AUTO: 12.1 FL (ref 9.2–11.8)
POTASSIUM SERPL-SCNC: 4 MMOL/L (ref 3.5–5.5)
PROT SERPL-MCNC: 6.7 G/DL (ref 6.4–8.2)
RBC # BLD AUTO: 4.19 M/UL (ref 4.35–5.65)
SERVICE CMNT-IMP: ABNORMAL
SERVICE CMNT-IMP: ABNORMAL
SODIUM SERPL-SCNC: 135 MMOL/L (ref 136–145)
VANCOMYCIN SERPL-MCNC: 10.4 UG/ML (ref 5–40)
WBC # BLD AUTO: 9.8 K/UL (ref 4.6–13.2)

## 2021-10-10 PROCEDURE — 99233 SBSQ HOSP IP/OBS HIGH 50: CPT | Performed by: INTERNAL MEDICINE

## 2021-10-10 PROCEDURE — 2709999900 HC NON-CHARGEABLE SUPPLY

## 2021-10-10 PROCEDURE — 74011636637 HC RX REV CODE- 636/637: Performed by: HOSPITALIST

## 2021-10-10 PROCEDURE — 74011250637 HC RX REV CODE- 250/637: Performed by: HOSPITALIST

## 2021-10-10 PROCEDURE — 74011250636 HC RX REV CODE- 250/636: Performed by: EMERGENCY MEDICINE

## 2021-10-10 PROCEDURE — 74011250636 HC RX REV CODE- 250/636: Performed by: HOSPITALIST

## 2021-10-10 PROCEDURE — 74011250637 HC RX REV CODE- 250/637: Performed by: EMERGENCY MEDICINE

## 2021-10-10 PROCEDURE — 85025 COMPLETE CBC W/AUTO DIFF WBC: CPT

## 2021-10-10 PROCEDURE — 80053 COMPREHEN METABOLIC PANEL: CPT

## 2021-10-10 PROCEDURE — 87040 BLOOD CULTURE FOR BACTERIA: CPT

## 2021-10-10 PROCEDURE — 80202 ASSAY OF VANCOMYCIN: CPT

## 2021-10-10 PROCEDURE — 74011250636 HC RX REV CODE- 250/636: Performed by: INTERNAL MEDICINE

## 2021-10-10 PROCEDURE — 74011000258 HC RX REV CODE- 258: Performed by: HOSPITALIST

## 2021-10-10 PROCEDURE — 82962 GLUCOSE BLOOD TEST: CPT

## 2021-10-10 PROCEDURE — 65660000000 HC RM CCU STEPDOWN

## 2021-10-10 PROCEDURE — 36415 COLL VENOUS BLD VENIPUNCTURE: CPT

## 2021-10-10 RX ORDER — MORPHINE SULFATE 2 MG/ML
2 INJECTION, SOLUTION INTRAMUSCULAR; INTRAVENOUS
Status: DISCONTINUED | OUTPATIENT
Start: 2021-10-10 | End: 2021-10-19

## 2021-10-10 RX ORDER — HYDRALAZINE HYDROCHLORIDE 10 MG/1
10 TABLET, FILM COATED ORAL
Status: DISCONTINUED | OUTPATIENT
Start: 2021-10-10 | End: 2021-10-29 | Stop reason: HOSPADM

## 2021-10-10 RX ORDER — KETOROLAC TROMETHAMINE 15 MG/ML
15 INJECTION, SOLUTION INTRAMUSCULAR; INTRAVENOUS
Status: DISPENSED | OUTPATIENT
Start: 2021-10-10 | End: 2021-10-15

## 2021-10-10 RX ADMIN — KETOROLAC TROMETHAMINE 15 MG: 15 INJECTION, SOLUTION INTRAMUSCULAR; INTRAVENOUS at 21:14

## 2021-10-10 RX ADMIN — MORPHINE SULFATE 2 MG: 2 INJECTION, SOLUTION INTRAMUSCULAR; INTRAVENOUS at 01:39

## 2021-10-10 RX ADMIN — DULOXETINE HYDROCHLORIDE 60 MG: 60 CAPSULE, DELAYED RELEASE ORAL at 10:39

## 2021-10-10 RX ADMIN — ACETAMINOPHEN 650 MG: 325 TABLET ORAL at 00:37

## 2021-10-10 RX ADMIN — Medication 10 ML: at 21:33

## 2021-10-10 RX ADMIN — PIPERACILLIN AND TAZOBACTAM 4.5 G: 4; .5 INJECTION, POWDER, FOR SOLUTION INTRAVENOUS at 10:10

## 2021-10-10 RX ADMIN — INSULIN LISPRO 2 UNITS: 100 INJECTION, SOLUTION INTRAVENOUS; SUBCUTANEOUS at 18:14

## 2021-10-10 RX ADMIN — HYDRALAZINE HYDROCHLORIDE 10 MG: 10 TABLET, FILM COATED ORAL at 01:39

## 2021-10-10 RX ADMIN — TRAZODONE HYDROCHLORIDE 50 MG: 50 TABLET ORAL at 21:14

## 2021-10-10 RX ADMIN — MORPHINE SULFATE 2 MG: 2 INJECTION, SOLUTION INTRAMUSCULAR; INTRAVENOUS at 10:41

## 2021-10-10 RX ADMIN — INSULIN LISPRO 6 UNITS: 100 INJECTION, SOLUTION INTRAVENOUS; SUBCUTANEOUS at 09:28

## 2021-10-10 RX ADMIN — PIPERACILLIN AND TAZOBACTAM 4.5 G: 4; .5 INJECTION, POWDER, FOR SOLUTION INTRAVENOUS at 04:58

## 2021-10-10 RX ADMIN — VANCOMYCIN HYDROCHLORIDE 1000 MG: 1 INJECTION, POWDER, LYOPHILIZED, FOR SOLUTION INTRAVENOUS at 21:04

## 2021-10-10 RX ADMIN — ROSUVASTATIN CALCIUM 40 MG: 20 TABLET, COATED ORAL at 21:32

## 2021-10-10 RX ADMIN — PIPERACILLIN AND TAZOBACTAM 4.5 G: 4; .5 INJECTION, POWDER, FOR SOLUTION INTRAVENOUS at 18:12

## 2021-10-10 RX ADMIN — INSULIN LISPRO 10 UNITS: 100 INJECTION, SOLUTION INTRAVENOUS; SUBCUTANEOUS at 13:39

## 2021-10-10 RX ADMIN — INSULIN LISPRO 10 UNITS: 100 INJECTION, SOLUTION INTRAVENOUS; SUBCUTANEOUS at 21:13

## 2021-10-10 RX ADMIN — KETOROLAC TROMETHAMINE 15 MG: 15 INJECTION, SOLUTION INTRAMUSCULAR; INTRAVENOUS at 13:38

## 2021-10-10 RX ADMIN — SODIUM CHLORIDE 75 ML/HR: 900 INJECTION, SOLUTION INTRAVENOUS at 04:58

## 2021-10-10 RX ADMIN — VANCOMYCIN HYDROCHLORIDE 1000 MG: 1 INJECTION, POWDER, LYOPHILIZED, FOR SOLUTION INTRAVENOUS at 10:47

## 2021-10-10 NOTE — PROGRESS NOTES
Received report on pt.from off going RN. Resting quietly in bed on rounds. Dressing to rt foot/toe dry and intact. denies c/o pain or SOB at this time. No acute distress noted. Will cont to monitor for any changes in status. 1041 medicated with morphine for c/o pain in rt foot/toe.     1150 still c/o pin in rt foot/toe. Requesting more pain med. Dr Shirlene Holland notified. Order received. 1200 sleeping at this time. No distress noted. 1338 medicated with toradol for pain in rt foot/toe. Podiatrist in and changed dressing to rt great toe incision site and painted a small spot on old amputated great toe site with betadine scrub. Pt tolerated well.     1500 sleeping without noted distress. Bedside and Verbal shift change report given to Beto Xie (oncoming nurse) by Mallorie Dooley RN (offgoing nurse). Report given with THIERRY, Destiny and MAR.

## 2021-10-10 NOTE — OP NOTES
Operative Report     Patient: Johnny Shepherd MRN: [id number]  SSN: [id number]    YOB: 1965  Age: 49 y.o. Sex: male       Indications: The patient was admitted to the hospital for right great toe wound with fevers. He had x-ray confirming erosion of right first distal phalanx. Patient previously had left partial great toe amputation. Patient lives in shelter and walks with ill fitted shoes causing blisters and wounds on his toes. He tried to treat it with local wound care but it got worse and he had to come to ER. He missed his appointments in office as well. Patient will need partial right great toe amputation to prevent worsening of infection as well as sepsis. All risks, benefits, complications of procedure discussed in detail with patient. Possible complications discussed including but not limited to delayed healing, non-healing, requirement of additional surgery, possible loss of limb or death. No guarantee made to outcome of procedure. Patient voiced understanding and signed consent. Date of Surgery: 10/9/2021     Preoperative Diagnosis:  OSTEOMYELITIS OF RIGHT GREAT TOE DISTAL PHALANX    Postoperative Diagnosis: OSTEOMYELITIS OF RIGHT GREAT TOE DISTAL PHALANX    Surgeon(s) and Role:     * Andie Contreras DPM - Primary      Surgical Assistants: Operating Room Staff    Anesthesia: MAC with Local    Procedure:  Procedure(s):  PARTIAL AMPUTATION  OF RIGHT GREAT TOE    Procedure in Detail:     The patient was brought to the operative suite and secured in the supine position on the operating room table. After IV sedation from department of anesthesia local block consisting of 20 cc of 1:1 mixture administered as hallux block. Right foot prepped and draped in usual sterile fashion. Attention directed to right great toe and fish mouth incision performed at IPJ with # 10 blade down to bone level.  Toe was disarticulated at IPJ by freeing up capsule/ligaments and placed on back table and sent to pathology as gross specimen. Localized purulence noted into IPJ. Surgical site irrigated with normal saline and  mixture. Bone sample obtained from proximal phalanx head using rongeur and sample sent to micro as well as pathology as clean margin. All devitalized, redundant tissue excised as necessary. Extensor and flexor tendons transected as proximal as possible. Skin edges approximated with 3-0 prolene in simple interrupted fashion. Surgical site dressed with betadine soaked adaptic, 4 x 4, kerlex, ace bandage. The patient tolerated the procedure and anesthesia well. The patient was sent to the recovery room in apparent satisfactory condition. Findings:  Purulence noted at level of IPJ however proximal phalanx bone noted firm and in good quality.     Estimated Blood Loss:  10 cc    Drains: none           Specimens:   ID Type Source Tests Collected by Time Destination   1 : Right Great Toe Distal Phalange DIRTY  Preservative Toe  Rox Swenson DPM 10/9/2021 1254 Pathology   2 : Right Great Toe Proximal Phalange Clean Margin  Preservative Toe  José INMAN DPM 10/9/2021 1255 Pathology   1 : Right Great Toe Proximal Phalange Clean Margin  Bone Toe AEROBIC/ANAEROBIC CULTURE J Luis Jerome DPM 10/9/2021 1256 Microbiology               Implants:  * No implants in log *           Complications:  None

## 2021-10-10 NOTE — PROGRESS NOTES
Progress Note    Patient: Jade Gutierrez MRN: [id number]  SSN: [id number]    YOB: 1965  Age: 49 y.o. Sex: male      Admit Date: 10/7/2021  1 Day Post-Op     Procedure:   Procedure(s):  AMPUTATION  OF RIGHT GREAT TOE    Subjective:     Patient seen resting quiet and comfortably and no apparent distress. Patient reports mild pain to right foot. He had void since surgery. Has intact dressings to right foot. Status post: osteomyelitis    Objective:     Visit Vitals  BP (!) 158/96 (BP 1 Location: Left upper arm, BP Patient Position: At rest)   Pulse 86   Temp 98.2 °F (36.8 °C)   Resp 18   Ht 5' 11\" (1.803 m)   Wt 67.1 kg (148 lb)   SpO2 99%   BMI 20.64 kg/m²        Physical Exam:  Right partial great toe amputation site stable, skin edges well approximated, skin sutures intact. Left great toe superficial wound at amputation site. No clinical signs of infection noted. Labs/Radiology Review: images and reports reviewed    Assessment:     Hospital Problems  Date Reviewed: 10/9/2021        Codes Class Noted POA    Acute sepsis [facility]) ICD-10-CM: A41.9  ICD-9-CM: 038.9, 995.91  10/7/2021 Unknown        Osteomyelitis of great toe of right foot [facility]) ICD-10-CM: M86.9  ICD-9-CM: 730.27  10/7/2021 Unknown        Osteomyelitis ([facility] ICD-10-CM: M86.9  ICD-9-CM: 730.20  4/2/2021 Unknown              Plan/Recommendations/Medical Decision Making:     - Dressings reinforced with betadine soaked gauze, kerlex, ace bandage.   - Remain NWB to right forefoot. - Antibiotic management per Dr. Chris Yu.    - Medical management per primary team.

## 2021-10-10 NOTE — PROGRESS NOTES
10/10/21 0023   Vitals   Temp (!) 103 °F (39.4 °C)   Temp Source Oral   Pulse (Heart Rate) 100   Heart Rate Source Monitor   Resp Rate 18   O2 Sat (%) 98 %   Level of Consciousness Alert (0)   BP (!) 176/95   MAP (Calculated) 122   BP 1 Location Right upper arm   BP 1 Method Automatic   BP Patient Position At rest   MEWS Score 3     Patient provided tylenol for fever. Md paged regarding elevated temperature, elevated blood pressure, pain and and chills. Orders received for blood cultures x 2, hydralazine prn 10mg, and Morphine 2mg.

## 2021-10-10 NOTE — PROGRESS NOTES
Reason for Admission:  Osteomyelitis ([facility] [M86.9]  Acute sepsis ([facility] [A41.9]  Osteomyelitis of great toe of right foot ([facility] [M86.9]                 RUR Score:    19%            Plan for utilizing home health:    Yes if ordered, [address] verbal consent given for Any Accepting [address] Likelihood of Readmission:   Moderate                         Do you (patient/family) have any concerns for transition/discharge?  no, not at this time. Transition of Care Plan:       Initial assessment completed with patient. Cognitive status of patient: oriented to time, place, person and situation. Face sheet information confirmed:  yes. The patient designates his friend Mat Zelaya [phone] to participate in his discharge plan and to receive any needed information. This patient lives in a [address] [place] [address], with 4 or 5 steps to enter. Patient is able to navigate steps as needed. Prior to hospitalization, patient was considered to be independent with ADLs/IADLS : yes . Patient has a current ACP document on file: no.      Healthcare Decision Maker:     Click here to complete [address] including selection of the Healthcare Decision Maker Relationship (ie \"Primary\")    A Medicaid Cab will need to be available to transport patient home upon discharge. The patient already has none reported,  medical equipment available in the home. Patient is not currently active with home health. Patient has stayed in a skilled nursing facility or rehab. Was  stay within last 60 days : no. This patient is on dialysis :no.      List of available Home Health agencies were provided and reviewed with the patient prior to discharge. [place] of choice verbal consent given: yes, for Any Accepting [address] Currently, the discharge plan is [address] .         The patient states that he can obtain his medications from the pharmacy, and take his medications as directed. Patient's current insurance is [facility]. Care Management Interventions  PCP Verified by CM: Yes  Mode of Transport at Discharge:  Other (see comment) (Medicaid Cab)  Transition of Care Consult (CM Consult): Discharge Planning  Discharge Durable Medical Equipment: No  Physical Therapy Consult: No  Occupational Therapy Consult: No  Speech Therapy Consult: No  Support Systems: Other (Comment) ([facility])  Confirm Follow Up Transport: Other (see comment) (Medicaid transport)  Discharge Location  Discharge Placement: Room and board        Michael Carlton RN  Case Management [phone]

## 2021-10-10 NOTE — PROGRESS NOTES
Admit Date: 10/7/2021  Date of Service: 10/10/2021    Reason for follow-up: toe infection      Assessment:         1. Staph aureus sepsis: 10/7 blood cultures 4/4 positive; repeat 10/10 blood cx pending  2. Osteomyelitis of right great toe-IV antibiotics, podiatry consulted, n.p.o. after midnight for possible procedure in a.m.  3. History of type 2 diabetes-insulin sliding scale, monitor blood sugars  4. History of hypertension-resume home medications, monitor blood pressure  5. Hyponatremia-gentle hydration with IVF, monitor sodium levels  6. History of depression-resume chronic home medications    Plan:   Local wound care  F/u intraoperative cultures and pathology  Resume diabetic diet  Continue Cymbalta, Crestor  Continue with vancomycin and Zosyn for now  Sliding scale insulin, Accu-Cheks  Awaiting sensitivities from 10 7 blood cultures  Follow-up 10/10 blood cultures  Echocardiogram to r/o endocarditis    Current Antibtiocs:   Vancomycin 10/7- present  Zosyn 10/7 present    Lines:   Peripheral    Case discussed with:  [x]Patient  []Family  [x]Nursing  []Case Management  DVT Prophylaxis:  []Lovenox  []Hep SQ  []SCDs  []Coumadin   []On Heparin gtt    I have independently examined the patient and reviewed all lab studies and imgaing as well as review of nursing notes and physican notes from the past 24 hours. Josesito Alonso D.O. Pager [phone]      No Known Allergies        Subjective:      Pt seen and examined. Doing okay today. Has been complaining of pain at the operative site. No fevers or chills. Podiatry has come in and change the dressing earlier today    Nursing at bedside during my visit.   Nursing reports patient has been requesting pain medication frequently    Objective:        Visit Vitals  BP (!) 159/86 (BP 1 Location: Right upper arm, BP Patient Position: At rest)   Pulse 87   Temp 98.5 °F (36.9 °C)   Resp 18   Ht 5' 11\" (1.803 m)   Wt 67.1 kg (148 lb)   SpO2 98%   BMI 20.64 kg/m²     Temp (24hrs), Av.8 °F (37.7 °C), Min:97.8 °F (36.6 °C), Max:103 °F (39.4 °C)        General:   awake alert and oriented, non-toxic   Skin:   no rashes or skin lesions noted on limited exam, dry and warm   HEENT:  No scleral icterus or pallor; oral mucosa moist, lips moist   Lymph Nodes:   not assessed today   Lungs:   non, labored; bilaterally clear to aspiration- no crackles wheezes rales or rhonchi   Heart:  RRR, s1 and s2; no murmurs rubs or gallops; no edema, + pedal pulses   Abdomen:  soft, non-distended, active bowel sounds, non-tender   Genitourinary:  deferred   Extremities:   average muscle tone; no contractures, no joint effusions; surgical dressings in place, clean, not removed   Neurologic:  No gross focal motor abnormalities; diminished sensation to bilateral lower extremities from foot to ankle ; CN 2-12 intact; Follows commands. Psychiatric:   appropriate and interactive.        Labs: Results:   Chemistry Recent Labs     10/10/21  0607 10/09/21  0511 10/07/21  2040   * 196* 289*   * 132* 126*   K 4.0 4.0 4.1   CL 99* 99* 91*   CO2 28 26 27   BUN 17 18 22*   CREA 1.10 0.91 1.37*   CA 8.5 8.4* 8.9   AGAP 8 7 8   BUCR 15 20 16   AP 61 43* 55   TP 6.7 6.4 7.8   ALB 2.3* 2.2* 2.9*   GLOB 4.4* 4.2* 4.9*   AGRAT 0.5* 0.5* 0.6*      CBC w/Diff Recent Labs     10/10/21  0607 10/09/21  0511 10/07/21  2040   WBC 9.8 8.9 12.8   RBC 4.19* 4.09* 4.80   HGB 13.0 12.9* 14.9   HCT 37.3 36.2 41.8    135 139   GRANS 71 63 87*   LYMPH 17* 23 5*   EOS 0 1 0        No results found for: SDES Lab Results   Component Value Date/Time    Culture result: CULTURE IN PROGRESS,FURTHER UPDATES TO FOLLOW 10/09/2021 12:56 PM    Culture result: (A) 10/07/2021 08:55 PM     POSSIBLE STAPHYLOCOCCUS AUREUS GROWING IN THE AEROBIC AND ANAEROBIC BOTTLES    Culture result: (A) 10/07/2021 08:40 PM     POSSIBLE STAPHYLOCOCCUS AUREUS GROWING IN THE AEROBIC AMD ANAEROBIC BOTTLES    Culture result: (A) 2021 05:25 PM SCANT STREPTOCOCCI, BETA HEMOLYTIC GROUP B Penicillin and ampicillin are drugs of choice for treatment of beta-hemolytic streptococcal infections. Susceptibility testing of penicillins and beta-lactams approved by the FDA for treatment of beta-hemolytic streptococcal infections need not be performed routinely, because nonsusceptible isolates are extremely rare. CLSI 2012    Culture result: (A) 06/21/2021 05:25 PM     SCANT STREPTOCOCCI, BETA HEMOLYTIC GROUP C Penicillin and ampicillin are drugs of choice for treatment of beta-hemolytic streptococcal infections. Susceptibility testing of penicillins and beta-lactams approved by the FDA for treatment of beta-hemolytic streptococcal infections need not be performed routinely, because nonsusceptible isolates are extremely rare.  CLSI 2012    Culture result: RARE STAPHYLOCOCCUS AUREUS (A) 06/21/2021 05:25 PM    Culture result: NO ANAEROBES ISOLATED 06/21/2021 05:25 PM        Results     Procedure Component Value Units Date/Time    CULTURE, BLOOD [[id number]] Collected: 10/10/21 8346    Order Status: Completed Specimen: Whole Blood Updated: 10/10/21 0839    CULTURE, BLOOD [[id number]] Collected: 10/10/21 0607    Order Status: Completed Specimen: Whole Blood Updated: 10/10/21 0840    CULTURE, ANAEROBIC [[id number]] Collected: 10/09/21 1256    Order Status: Completed Specimen: Toe Updated: 10/09/21 2352    CULTURE, TISSUE [address] [[id number]] Collected: 10/09/21 1256    Order Status: Completed Specimen: Toe Updated: 10/10/21 1353     Special Requests: RT GREAT TOE, PROXIMAL CLEAN MARGIN     GRAM STAIN RARE WBCS SEEN         NO DEFINITE ORGANISM SEEN        Culture result:       CULTURE IN [place] UPDATES TO FOLLOW          COVID-19 RAPID TEST [[id number]] Collected: 10/08/21 1254    Order Status: Completed Specimen: Nasopharyngeal Updated: 10/08/21 1324     Specimen source Nasopharyngeal        COVID-19 rapid test Not detected        Comment: Rapid Abbott ID Now Rapid NAAT:  The specimen is NEGATIVE for SARS-CoV-2, the novel coronavirus associated with COVID-19. Negative results should be treated as presumptive and, if inconsistent with clinical signs and symptoms or necessary for patient management, should be tested with an alternative molecular assay. Negative results do not preclude SARS-CoV-2 infection and should not be used as the sole basis for patient management decisions. This test has been authorized by the FDA under an Emergency Use Authorization (EUA) for use by authorized laboratories. Fact sheet for Healthcare Providers: Convention"[employer]"date.co.nz  Fact sheet for Patients: [employer] TEAMdate.co.nz       Methodology: Isothermal Nucleic Acid Amplification         CULTURE, BLOOD [[id number]]  (Abnormal) Collected: 10/07/21 2055    Order Status: Completed Specimen: Blood Updated: 10/09/21 1217     Special Requests: NO SPECIAL REQUESTS        GRAM STAIN       AEROBIC AND ANAEROBIC BOTTLES GRAM POSITIVE COCCI IN GROUPS                  SMEAR CALLED TO AND CORRECTLY REPEATED BY: [facility] AT 5026 BY KDA 10/8/21           Culture result:       POSSIBLE STAPHYLOCOCCUS AUREUS GROWING IN THE AEROBIC AND ANAEROBIC BOTTLES          CULTURE, BLOOD [[id number]]  (Abnormal) Collected: 10/07/21 2040    Order Status: Completed Specimen: Blood Updated: 10/09/21 1215     Special Requests: NO SPECIAL REQUESTS        GRAM STAIN       AEROBIC AND ANAEROBIC BOTTLES                  SMEAR CALLED TO AND CORRECTLY REPEATED BY: [facility] AT 36 BY KDA 10/8/21           Culture result:       POSSIBLE STAPHYLOCOCCUS AUREUS GROWING IN THE AEROBIC AMD ANAEROBIC BOTTLES                Imaging:     XR FOOT RT MIN 3 V    Result Date: 10/8/2021  1. Subacute trauma with/without osteomyelitis at the distal phalanx of the first toe. DJD at first MTP joint.  2. Questionable linear foreign body along the dorsal soft tissue at the midfoot as seen on lateral view. Vascular calcification also present.     XR CHEST PORT    Result Date: 10/8/2021  Negative CXR

## 2021-10-11 ENCOUNTER — APPOINTMENT (OUTPATIENT)
Dept: NON INVASIVE DIAGNOSTICS | Age: 56
DRG: 710 | End: 2021-10-11
Attending: INTERNAL MEDICINE
Payer: MEDICAID

## 2021-10-11 LAB
ALBUMIN SERPL-MCNC: 2.3 G/DL (ref 3.4–5)
ALBUMIN/GLOB SERPL: 0.5 {RATIO} (ref 0.8–1.7)
ALP SERPL-CCNC: 60 U/L (ref 45–117)
ALT SERPL-CCNC: 34 U/L (ref 16–61)
ANION GAP SERPL CALC-SCNC: 4 MMOL/L (ref 3–18)
AST SERPL-CCNC: 36 U/L (ref 10–38)
BASOPHILS # BLD: 0 K/UL (ref 0–0.1)
BASOPHILS NFR BLD: 0 % (ref 0–2)
BILIRUB SERPL-MCNC: 0.4 MG/DL (ref 0.2–1)
BUN SERPL-MCNC: 15 MG/DL (ref 7–18)
BUN/CREAT SERPL: 13 (ref 12–20)
CALCIUM SERPL-MCNC: 9.2 MG/DL (ref 8.5–10.1)
CHLORIDE SERPL-SCNC: 101 MMOL/L (ref 100–111)
CO2 SERPL-SCNC: 30 MMOL/L (ref 21–32)
CREAT SERPL-MCNC: 1.16 MG/DL (ref 0.6–1.3)
DIFFERENTIAL METHOD BLD: ABNORMAL
ECHO AO ROOT DIAM: 2.98 CM
ECHO LA AREA 4C: 17.99 CM2
ECHO LA VOL 2C: 56.2 ML (ref 18–58)
ECHO LA VOL 4C: 47.32 ML (ref 18–58)
ECHO LA VOL BP: 58.45 ML (ref 18–58)
ECHO LA VOL/BSA BIPLANE: 31.42 ML/M2 (ref 16–28)
ECHO LA VOLUME INDEX A2C: 30.22 ML/M2 (ref 16–28)
ECHO LA VOLUME INDEX A4C: 25.44 ML/M2 (ref 16–28)
ECHO LV INTERNAL DIMENSION DIASTOLIC: 3.94 CM (ref 4.2–5.9)
ECHO LV INTERNAL DIMENSION SYSTOLIC: 3.12 CM
ECHO LV IVSD: 1.72 CM (ref 0.6–1)
ECHO LV MASS 2D: 245 G (ref 88–224)
ECHO LV MASS INDEX 2D: 131.7 G/M2 (ref 49–115)
ECHO LV POSTERIOR WALL DIASTOLIC: 1.42 CM (ref 0.6–1)
ECHO LVOT DIAM: 2.02 CM
ECHO LVOT PEAK GRADIENT: 2.83 MMHG
ECHO LVOT PEAK VELOCITY: 84.04 CM/S
ECHO LVOT SV: 56.8 ML
ECHO LVOT VTI: 17.77 CM
ECHO MV A VELOCITY: 91.97 CM/S
ECHO MV E DECELERATION TIME (DT): 141.3 MS
ECHO MV E VELOCITY: 63.93 CM/S
ECHO MV E/A RATIO: 0.7
EOSINOPHIL # BLD: 0.2 K/UL (ref 0–0.4)
EOSINOPHIL NFR BLD: 2 % (ref 0–5)
ERYTHROCYTE [DISTWIDTH] IN BLOOD BY AUTOMATED COUNT: 11.2 % (ref 11.6–14.5)
GLOBULIN SER CALC-MCNC: 4.9 G/DL (ref 2–4)
GLUCOSE BLD STRIP.AUTO-MCNC: 222 MG/DL (ref 70–110)
GLUCOSE BLD STRIP.AUTO-MCNC: 241 MG/DL (ref 70–110)
GLUCOSE BLD STRIP.AUTO-MCNC: 270 MG/DL (ref 70–110)
GLUCOSE BLD STRIP.AUTO-MCNC: 365 MG/DL (ref 70–110)
GLUCOSE BLD STRIP.AUTO-MCNC: 404 MG/DL (ref 70–110)
GLUCOSE SERPL-MCNC: 227 MG/DL (ref 74–99)
HCT VFR BLD AUTO: 36.1 % (ref 36–48)
HGB BLD-MCNC: 12.6 G/DL (ref 13–16)
LVOT MG: 1.35 MMHG
LYMPHOCYTES # BLD: 2.3 K/UL (ref 0.9–3.6)
LYMPHOCYTES NFR BLD: 24 % (ref 21–52)
MCH RBC QN AUTO: 31 PG (ref 24–34)
MCHC RBC AUTO-ENTMCNC: 34.9 G/DL (ref 31–37)
MCV RBC AUTO: 88.9 FL (ref 78–100)
MONOCYTES # BLD: 1.3 K/UL (ref 0.05–1.2)
MONOCYTES NFR BLD: 13 % (ref 3–10)
NEUTS SEG # BLD: 5.9 K/UL (ref 1.8–8)
NEUTS SEG NFR BLD: 61 % (ref 40–73)
PLATELET # BLD AUTO: 165 K/UL (ref 135–420)
PMV BLD AUTO: 11.7 FL (ref 9.2–11.8)
POTASSIUM SERPL-SCNC: 3.9 MMOL/L (ref 3.5–5.5)
PROT SERPL-MCNC: 7.2 G/DL (ref 6.4–8.2)
RBC # BLD AUTO: 4.06 M/UL (ref 4.35–5.65)
SODIUM SERPL-SCNC: 135 MMOL/L (ref 136–145)
WBC # BLD AUTO: 9.7 K/UL (ref 4.6–13.2)

## 2021-10-11 PROCEDURE — 74011250636 HC RX REV CODE- 250/636: Performed by: HOSPITALIST

## 2021-10-11 PROCEDURE — 74011636637 HC RX REV CODE- 636/637: Performed by: HOSPITALIST

## 2021-10-11 PROCEDURE — 74011636637 HC RX REV CODE- 636/637: Performed by: INTERNAL MEDICINE

## 2021-10-11 PROCEDURE — 74011000258 HC RX REV CODE- 258: Performed by: HOSPITALIST

## 2021-10-11 PROCEDURE — 74011250637 HC RX REV CODE- 250/637: Performed by: HOSPITALIST

## 2021-10-11 PROCEDURE — 82962 GLUCOSE BLOOD TEST: CPT

## 2021-10-11 PROCEDURE — 85025 COMPLETE CBC W/AUTO DIFF WBC: CPT

## 2021-10-11 PROCEDURE — 36415 COLL VENOUS BLD VENIPUNCTURE: CPT

## 2021-10-11 PROCEDURE — 99233 SBSQ HOSP IP/OBS HIGH 50: CPT | Performed by: INTERNAL MEDICINE

## 2021-10-11 PROCEDURE — 80053 COMPREHEN METABOLIC PANEL: CPT

## 2021-10-11 PROCEDURE — 65660000000 HC RM CCU STEPDOWN

## 2021-10-11 PROCEDURE — 93306 TTE W/DOPPLER COMPLETE: CPT

## 2021-10-11 PROCEDURE — 74011250636 HC RX REV CODE- 250/636: Performed by: INTERNAL MEDICINE

## 2021-10-11 RX ORDER — INSULIN GLARGINE 100 [IU]/ML
15 INJECTION, SOLUTION SUBCUTANEOUS DAILY
Status: DISCONTINUED | OUTPATIENT
Start: 2021-10-11 | End: 2021-10-12

## 2021-10-11 RX ORDER — VANCOMYCIN HYDROCHLORIDE
1250 EVERY 12 HOURS
Status: DISCONTINUED | OUTPATIENT
Start: 2021-10-11 | End: 2021-10-29

## 2021-10-11 RX ADMIN — DULOXETINE HYDROCHLORIDE 60 MG: 60 CAPSULE, DELAYED RELEASE ORAL at 10:30

## 2021-10-11 RX ADMIN — VANCOMYCIN HYDROCHLORIDE 1000 MG: 1 INJECTION, POWDER, LYOPHILIZED, FOR SOLUTION INTRAVENOUS at 10:29

## 2021-10-11 RX ADMIN — ROSUVASTATIN CALCIUM 40 MG: 20 TABLET, COATED ORAL at 21:36

## 2021-10-11 RX ADMIN — TRAZODONE HYDROCHLORIDE 50 MG: 50 TABLET ORAL at 21:51

## 2021-10-11 RX ADMIN — SODIUM CHLORIDE 75 ML/HR: 900 INJECTION, SOLUTION INTRAVENOUS at 00:39

## 2021-10-11 RX ADMIN — SODIUM CHLORIDE 75 ML/HR: 900 INJECTION, SOLUTION INTRAVENOUS at 18:00

## 2021-10-11 RX ADMIN — INSULIN LISPRO 4 UNITS: 100 INJECTION, SOLUTION INTRAVENOUS; SUBCUTANEOUS at 07:30

## 2021-10-11 RX ADMIN — PIPERACILLIN AND TAZOBACTAM 4.5 G: 4; .5 INJECTION, POWDER, FOR SOLUTION INTRAVENOUS at 00:00

## 2021-10-11 RX ADMIN — INSULIN LISPRO 6 UNITS: 100 INJECTION, SOLUTION INTRAVENOUS; SUBCUTANEOUS at 21:47

## 2021-10-11 RX ADMIN — PIPERACILLIN AND TAZOBACTAM 4.5 G: 4; .5 INJECTION, POWDER, FOR SOLUTION INTRAVENOUS at 05:47

## 2021-10-11 RX ADMIN — INSULIN LISPRO 9 UNITS: 100 INJECTION, SOLUTION INTRAVENOUS; SUBCUTANEOUS at 16:30

## 2021-10-11 RX ADMIN — PIPERACILLIN AND TAZOBACTAM 4.5 G: 4; .5 INJECTION, POWDER, FOR SOLUTION INTRAVENOUS at 12:48

## 2021-10-11 RX ADMIN — KETOROLAC TROMETHAMINE 15 MG: 15 INJECTION, SOLUTION INTRAMUSCULAR; INTRAVENOUS at 21:51

## 2021-10-11 RX ADMIN — VANCOMYCIN HYDROCHLORIDE 1250 MG: 10 INJECTION, POWDER, LYOPHILIZED, FOR SOLUTION INTRAVENOUS at 21:51

## 2021-10-11 RX ADMIN — INSULIN GLARGINE 15 UNITS: 100 INJECTION, SOLUTION SUBCUTANEOUS at 15:41

## 2021-10-11 RX ADMIN — PIPERACILLIN AND TAZOBACTAM 4.5 G: 4; .5 INJECTION, POWDER, FOR SOLUTION INTRAVENOUS at 17:41

## 2021-10-11 RX ADMIN — INSULIN LISPRO 10 UNITS: 100 INJECTION, SOLUTION INTRAVENOUS; SUBCUTANEOUS at 12:49

## 2021-10-11 NOTE — DIABETES MGMT
GLYCEMIC CONTROL PLAN OF CARE    Recommendations:  Patient's Blood glucose above target range. Recommend adding Lantus 15 units daily  Continue corrective insulin coverage as needed. Very insulin resistant dosing already ordered. Will continue inpatient monitoring. Assessment:  History:  T2DM  Problem List Items Addressed This Visit        Skeletal    Osteomyelitis of great toe of right foot ([address] - Primary        Morning blood glucose:  None yet noted for today. IVF containing dextrose:  None   Steroids:  None   Diet/TF:  ADULT DIET Regular; 4 carb choices (60 gm/meal)    Most recent BG values:  Results for Pamella Elliott (MRN [id number]) as of 10/11/2021 15:09   Ref. Range 10/10/2021 16:11 10/10/2021 20:41 10/11/2021 08:02 10/11/2021 12:08 10/11/2021 12:11   GLUCOSE,FAST - POC Latest Ref Range: 70 - 110 mg/dL 155 (H) 384 (H) 241 (H) 404 (HH) 365 (H)     Within target range  mg/dl? No. Lantus insulin ordered  Current A1C:  9.0% equivalent to an average blood glucose of 212 mg/dl over the past 2-3 months. Adequate glycemic control PTA? No. Improved though since June 2021 (A1C 11.3%)  Current hospital diabetes medications:  Lispro AC&HS  Previous days insulin requirements:  Lispro 28 units corrective insulin  Home diabetes medication:  Per pta med list  Lantus 60 units every bedtime  Humalog 10 units tid AC  Education:  ___ see diabetes education record            ___ diabetes education not indicated at this time.       Andrey Aldrich RN CDE  Ext [phone]

## 2021-10-11 NOTE — PROGRESS NOTES
Admit Date: 10/7/2021  Date of Service: 10/11/2021    Reason for follow-up: toe infection      Assessment:         1. MSSA sepsis: 10/7 blood cultures 4/4 positive; repeat 10/10 blood cx pending  -10/11/2021 TTE negative for endocarditis  2. Osteomyelitis of right great toe-status post amputation 10/9/2021  -10/9/2021 surgical cultures with staph aureus  3. History of type 2 diabetes-insulin sliding scale, monitor blood sugars  4. History of hypertension-resume home medications, monitor blood pressure  5. Hyponatremia-gentle hydration with IVF, monitor sodium levels  6. History of depression-resume chronic home medications    Plan:   Local wound care  F/u intraoperative cultures and pathology  Discussed with diabetes educator. We will add Lantus   -Tight glucose control for optimal wound healing  Continue Cymbalta, Crestor  Continue with vancomycin and Zosyn for now   -Awaiting 10/9/2021 surgical cultures before transitioning antibiotics-hopeful to change to Ancef 1 g IV every 8 hours upon discharge   -Anticipate 2-week course of antibiotics for MSSA sepsis plus osteomyelitis status post amputation assuming clean margins on surgical pathology  Will need PICC line placement in a.m. if 1010 blood cultures remain negative-order placed for IR to place PICC tomorrow  Sliding scale insulin, Accu-Cheks  Follow-up 10/10 blood cultures-currently no growth to date    Current Antibtiocs:   Vancomycin 10/7- present  Zosyn 10/7 present    Lines:   Peripheral    Case discussed with:  [x]Patient  []Family  [x]Nursing  []Case Management  DVT Prophylaxis:  []Lovenox  []Hep SQ  []SCDs  []Coumadin   []On Heparin gtt    I have independently examined the patient and reviewed all lab studies and imgaing as well as review of nursing notes and physican notes from the past 24 hours. Obdulia Lowery D.O. Pager [phone]      No Known Allergies        Subjective:      Pt seen and examined. Resting comfortably.   Still having some pain on his toe. Blood sugars have been elevated over the last 24 hours. No fevers or chills. Tolerating antibiotics well. Objective:        Visit Vitals  BP (!) 174/93 (BP 1 Location: Left upper arm, BP Patient Position: At rest)   Pulse 84   Temp 98.2 °F (36.8 °C)   Resp 18   Ht 5' 11\" (1.803 m)   Wt 67.1 kg (148 lb)   SpO2 99%   BMI 20.64 kg/m²     Temp (24hrs), Av.9 °F (37.2 °C), Min:98.2 °F (36.8 °C), Max:99.5 °F (37.5 °C)        General:   awake alert and oriented, non-toxic   Skin:   no rashes or skin lesions noted on limited exam, dry and warm   HEENT:  No scleral icterus or pallor; oral mucosa moist, lips moist   Lymph Nodes:   not assessed today   Lungs:   non, labored; bilaterally clear to aspiration- no crackles wheezes rales or rhonchi   Heart:  RRR, s1 and s2; no murmurs rubs or gallops; no edema, + pedal pulses   Abdomen:  soft, non-distended, active bowel sounds, non-tender   Genitourinary:  deferred   Extremities:   average muscle tone; no contractures, no joint effusions; surgical dressings in place, clean, not removed   Neurologic:  No gross focal motor abnormalities; diminished sensation to bilateral lower extremities from foot to ankle ; CN 2-12 intact; Follows commands. Psychiatric:   appropriate and interactive.        Labs: Results:   Chemistry Recent Labs     10/11/21  0125 10/10/21  0607 10/09/21  0511   * 269* 196*   * 135* 132*   K 3.9 4.0 4.0    99* 99*   CO2 30 28 26   BUN 15 17 18   CREA 1.16 1.10 0.91   CA 9.2 8.5 8.4*   AGAP 4 8 7   BUCR 13 15 20   AP 60 61 43*   TP 7.2 6.7 6.4   ALB 2.3* 2.3* 2.2*   GLOB 4.9* 4.4* 4.2*   AGRAT 0.5* 0.5* 0.5*      CBC w/Diff Recent Labs     10/11/21  0125 10/10/21  0607 10/09/21  0511   WBC 9.7 9.8 8.9   RBC 4.06* 4.19* 4.09*   HGB 12.6* 13.0 12.9*   HCT 36.1 37.3 36.2    158 135   GRANS 61 71 63   LYMPH 24 17* 23   EOS 2 0 1        No results found for: SDES Lab Results   Component Value Date/Time    Culture result: NO GROWTH 1 DAY 10/10/2021 06:07 AM    Culture result: NO GROWTH 1 DAY 10/10/2021 06:07 AM    Culture result: CHECKING FOR POSSIBLE  ANAEROBIC GROWTH   10/09/2021 12:56 PM    Culture result: FEW PROBABLE STAPHYLOCOCCUS AUREUS (A) 10/09/2021 12:56 PM    Culture result: CHECKING FOR POSSIBLE  OTHER ORGANISMS   10/09/2021 12:56 PM        Results     Procedure Component Value Units Date/Time    CULTURE, BLOOD [[id number]] Collected: 10/10/21 4957    Order Status: Completed Specimen: Whole Blood Updated: 10/11/21 1108     Special Requests: NO SPECIAL REQUESTS        Culture result: NO GROWTH 1 DAY       CULTURE, BLOOD [[id number]] Collected: 10/10/21 4739    Order Status: Completed Specimen: Whole Blood Updated: 10/11/21 1108     Special Requests: NO SPECIAL REQUESTS        Culture result: NO GROWTH 1 DAY       CULTURE, ANAEROBIC [[id number]] Collected: 10/09/21 1256    Order Status: Completed Specimen: Tissue Updated: 10/11/21 1328     Special Requests: RT GREAT TOE, PROXIMAL CLEAN MARGIN     Culture result:       CHECKING FOR POSSIBLE  ANAEROBIC GROWTH      CULTURE, TISSUE [place] STAIN [[id number]]  (Abnormal) Collected: 10/09/21 1256    Order Status: Completed Specimen: Toe Updated: 10/11/21 1319     Special Requests: RT GREAT TOE, PROXIMAL CLEAN MARGIN     GRAM STAIN RARE WBCS SEEN         NO DEFINITE ORGANISM SEEN        Culture result:       FEW PROBABLE STAPHYLOCOCCUS AUREUS            CHECKING FOR POSSIBLE  OTHER ORGANISMS       COVID-19 RAPID TEST [[id number]] Collected: 10/08/21 1254    Order Status: Completed Specimen: Nasopharyngeal Updated: 10/08/21 1324     Specimen source Nasopharyngeal        COVID-19 rapid test Not detected        Comment: Rapid Abbott ID Now       Rapid NAAT:  The specimen is NEGATIVE for SARS-CoV-2, the novel coronavirus associated with COVID-19.        Negative results should be treated as presumptive and, if inconsistent with clinical signs and symptoms or necessary for patient management, should be tested with an alternative molecular assay. Negative results do not preclude SARS-CoV-2 infection and should not be used as the sole basis for patient management decisions. This test has been authorized by the FDA under an Emergency Use Authorization (EUA) for use by authorized laboratories.    Fact sheet for Healthcare Providers: [employer] bardsdate.co.nz  Fact sheet for Patients: [employer] bardsdate.co.nz       Methodology: Isothermal Nucleic Acid Amplification         CULTURE, BLOOD [[id number]]  (Abnormal) Collected: 10/07/21 2055    Order Status: Completed Specimen: Blood Updated: 10/10/21 1452     Special Requests: NO SPECIAL REQUESTS        GRAM STAIN       AEROBIC AND ANAEROBIC BOTTLES GRAM POSITIVE COCCI IN GROUPS                  SMEAR CALLED TO AND CORRECTLY REPEATED BY: [facility] AT 2850 BY KDA 10/8/21           Culture result:       STAPHYLOCOCCUS AUREUS GROWING IN THE AEROBIC AND ANAEROBIC BOTTLES REFER TO [id number] FOR SENSITIVITIES          CULTURE, BLOOD [[id number]]  (Abnormal)  (Susceptibility) Collected: 10/07/21 2040    Order Status: Completed Specimen: Blood Updated: 10/10/21 1452     Special Requests: NO SPECIAL REQUESTS        GRAM STAIN       AEROBIC AND ANAEROBIC BOTTLES                  SMEAR CALLED TO AND CORRECTLY REPEATED BY: [facility] AT 1130 BY KDA 10/8/21           Culture result:       STAPHYLOCOCCUS AUREUS GROWING IN THE AEROBIC AMD ANAEROBIC BOTTLES          Susceptibility      Staphylococcus aureus      GERMAN      Ciprofloxacin ($) Resistant      Clindamycin ($) Susceptible      Daptomycin ($$$$$) Susceptible      Doxycycline ($$) Susceptible      Erythromycin ($$$$) Resistant      Gentamicin ($) Susceptible      Levofloxacin ($) Resistant      Linezolid ($$$$$) Susceptible      Moxifloxacin ($$$$) Intermediate      Oxacillin Susceptible      Tetracycline Susceptible      Trimeth/Sulfa Susceptible      Vancomycin ($) Susceptible               Linear View                         Imaging:     XR FOOT RT MIN 3 V    Result Date: 10/8/2021  1. Subacute trauma with/without osteomyelitis at the distal phalanx of the first toe. DJD at first MTP joint. 2. Questionable linear foreign body along the dorsal soft tissue at the midfoot as seen on lateral view. Vascular calcification also present.     XR CHEST PORT    Result Date: 10/8/2021  Negative CXR

## 2021-10-11 NOTE — ROUTINE PROCESS
Bedside and Verbal shift change report given to Melinda Cerda (oncoming nurse) by Mirna Estrada (offgoing nurse). Report included the following information SBAR, Kardex, MAR, Recent Results and Cardiac Rhythm SR. Patient quietly resting, call light in reach. Patient quietly resting.

## 2021-10-12 ENCOUNTER — HOSPITAL ENCOUNTER (INPATIENT)
Dept: INTERVENTIONAL RADIOLOGY/VASCULAR | Age: 56
Discharge: HOME | DRG: 710 | End: 2021-10-12
Attending: INTERNAL MEDICINE
Payer: MEDICAID

## 2021-10-12 LAB
ALBUMIN SERPL-MCNC: 2.2 G/DL (ref 3.4–5)
ALBUMIN/GLOB SERPL: 0.4 {RATIO} (ref 0.8–1.7)
ALP SERPL-CCNC: 57 U/L (ref 45–117)
ALT SERPL-CCNC: 35 U/L (ref 16–61)
ANION GAP SERPL CALC-SCNC: 2 MMOL/L (ref 3–18)
AST SERPL-CCNC: 34 U/L (ref 10–38)
BACTERIA SPEC CULT: NORMAL
BASOPHILS # BLD: 0 K/UL (ref 0–0.1)
BASOPHILS NFR BLD: 0 % (ref 0–2)
BILIRUB SERPL-MCNC: 0.4 MG/DL (ref 0.2–1)
BUN SERPL-MCNC: 16 MG/DL (ref 7–18)
BUN/CREAT SERPL: 14 (ref 12–20)
CALCIUM SERPL-MCNC: 9 MG/DL (ref 8.5–10.1)
CHLORIDE SERPL-SCNC: 102 MMOL/L (ref 100–111)
CO2 SERPL-SCNC: 32 MMOL/L (ref 21–32)
CREAT SERPL-MCNC: 1.17 MG/DL (ref 0.6–1.3)
DIFFERENTIAL METHOD BLD: ABNORMAL
EOSINOPHIL # BLD: 0.2 K/UL (ref 0–0.4)
EOSINOPHIL NFR BLD: 2 % (ref 0–5)
ERYTHROCYTE [DISTWIDTH] IN BLOOD BY AUTOMATED COUNT: 11.2 % (ref 11.6–14.5)
GLOBULIN SER CALC-MCNC: 5.1 G/DL (ref 2–4)
GLUCOSE BLD STRIP.AUTO-MCNC: 108 MG/DL (ref 70–110)
GLUCOSE BLD STRIP.AUTO-MCNC: 320 MG/DL (ref 70–110)
GLUCOSE BLD STRIP.AUTO-MCNC: 379 MG/DL (ref 70–110)
GLUCOSE SERPL-MCNC: 193 MG/DL (ref 74–99)
HCT VFR BLD AUTO: 35.2 % (ref 36–48)
HGB BLD-MCNC: 12.5 G/DL (ref 13–16)
LYMPHOCYTES # BLD: 2.9 K/UL (ref 0.9–3.6)
LYMPHOCYTES NFR BLD: 31 % (ref 21–52)
MCH RBC QN AUTO: 31.3 PG (ref 24–34)
MCHC RBC AUTO-ENTMCNC: 35.5 G/DL (ref 31–37)
MCV RBC AUTO: 88.2 FL (ref 78–100)
MONOCYTES # BLD: 1.2 K/UL (ref 0.05–1.2)
MONOCYTES NFR BLD: 13 % (ref 3–10)
NEUTS SEG # BLD: 5.1 K/UL (ref 1.8–8)
NEUTS SEG NFR BLD: 54 % (ref 40–73)
PLATELET # BLD AUTO: 203 K/UL (ref 135–420)
PMV BLD AUTO: 11.2 FL (ref 9.2–11.8)
POTASSIUM SERPL-SCNC: 4.1 MMOL/L (ref 3.5–5.5)
PROT SERPL-MCNC: 7.3 G/DL (ref 6.4–8.2)
RBC # BLD AUTO: 3.99 M/UL (ref 4.35–5.65)
SERVICE CMNT-IMP: NORMAL
SODIUM SERPL-SCNC: 136 MMOL/L (ref 136–145)
WBC # BLD AUTO: 9.6 K/UL (ref 4.6–13.2)

## 2021-10-12 PROCEDURE — 77001 FLUOROGUIDE FOR VEIN DEVICE: CPT

## 2021-10-12 PROCEDURE — 74011250636 HC RX REV CODE- 250/636: Performed by: INTERNAL MEDICINE

## 2021-10-12 PROCEDURE — 65660000000 HC RM CCU STEPDOWN

## 2021-10-12 PROCEDURE — 74011636637 HC RX REV CODE- 636/637: Performed by: HOSPITALIST

## 2021-10-12 PROCEDURE — 02HV33Z INSERTION OF INFUSION DEVICE INTO SUPERIOR VENA CAVA, PERCUTANEOUS APPROACH: ICD-10-PCS | Performed by: PHYSICIAN ASSISTANT

## 2021-10-12 PROCEDURE — 90686 IIV4 VACC NO PRSV 0.5 ML IM: CPT | Performed by: INTERNAL MEDICINE

## 2021-10-12 PROCEDURE — 36415 COLL VENOUS BLD VENIPUNCTURE: CPT

## 2021-10-12 PROCEDURE — 74011636637 HC RX REV CODE- 636/637: Performed by: INTERNAL MEDICINE

## 2021-10-12 PROCEDURE — 82962 GLUCOSE BLOOD TEST: CPT

## 2021-10-12 PROCEDURE — 85025 COMPLETE CBC W/AUTO DIFF WBC: CPT

## 2021-10-12 PROCEDURE — 80053 COMPREHEN METABOLIC PANEL: CPT

## 2021-10-12 PROCEDURE — 74011250637 HC RX REV CODE- 250/637: Performed by: HOSPITALIST

## 2021-10-12 PROCEDURE — 74011000258 HC RX REV CODE- 258: Performed by: HOSPITALIST

## 2021-10-12 PROCEDURE — 99232 SBSQ HOSP IP/OBS MODERATE 35: CPT | Performed by: HOSPITALIST

## 2021-10-12 PROCEDURE — 74011250636 HC RX REV CODE- 250/636: Performed by: HOSPITALIST

## 2021-10-12 PROCEDURE — 90471 IMMUNIZATION ADMIN: CPT

## 2021-10-12 PROCEDURE — 2709999900 HC NON-CHARGEABLE SUPPLY

## 2021-10-12 RX ORDER — INSULIN GLARGINE 100 [IU]/ML
25 INJECTION, SOLUTION SUBCUTANEOUS DAILY
Status: DISCONTINUED | OUTPATIENT
Start: 2021-10-13 | End: 2021-10-13

## 2021-10-12 RX ORDER — LISINOPRIL 20 MG/1
40 TABLET ORAL DAILY
Status: DISCONTINUED | OUTPATIENT
Start: 2021-10-12 | End: 2021-10-12

## 2021-10-12 RX ORDER — LOSARTAN POTASSIUM 50 MG/1
100 TABLET ORAL DAILY
Status: DISCONTINUED | OUTPATIENT
Start: 2021-10-12 | End: 2021-10-29 | Stop reason: HOSPADM

## 2021-10-12 RX ORDER — INSULIN GLARGINE 100 [IU]/ML
10 INJECTION, SOLUTION SUBCUTANEOUS
Status: COMPLETED | OUTPATIENT
Start: 2021-10-12 | End: 2021-10-12

## 2021-10-12 RX ORDER — AMLODIPINE BESYLATE 10 MG/1
10 TABLET ORAL DAILY
Status: DISCONTINUED | OUTPATIENT
Start: 2021-10-12 | End: 2021-10-29 | Stop reason: HOSPADM

## 2021-10-12 RX ADMIN — INSULIN GLARGINE 10 UNITS: 100 INJECTION, SOLUTION SUBCUTANEOUS at 17:37

## 2021-10-12 RX ADMIN — Medication 10 ML: at 05:42

## 2021-10-12 RX ADMIN — INFLUENZA VIRUS VACCINE 0.5 ML: 15; 15; 15; 15 SUSPENSION INTRAMUSCULAR at 10:35

## 2021-10-12 RX ADMIN — KETOROLAC TROMETHAMINE 15 MG: 15 INJECTION, SOLUTION INTRAMUSCULAR; INTRAVENOUS at 10:20

## 2021-10-12 RX ADMIN — INSULIN GLARGINE 15 UNITS: 100 INJECTION, SOLUTION SUBCUTANEOUS at 10:20

## 2021-10-12 RX ADMIN — PIPERACILLIN AND TAZOBACTAM 4.5 G: 4; .5 INJECTION, POWDER, FOR SOLUTION INTRAVENOUS at 05:42

## 2021-10-12 RX ADMIN — INSULIN LISPRO 12 UNITS: 100 INJECTION, SOLUTION INTRAVENOUS; SUBCUTANEOUS at 18:39

## 2021-10-12 RX ADMIN — DULOXETINE HYDROCHLORIDE 60 MG: 60 CAPSULE, DELAYED RELEASE ORAL at 10:21

## 2021-10-12 RX ADMIN — AMLODIPINE BESYLATE 10 MG: 10 TABLET ORAL at 14:15

## 2021-10-12 RX ADMIN — POLYETHYLENE GLYCOL 3350 17 G: 17 POWDER, FOR SOLUTION ORAL at 10:22

## 2021-10-12 RX ADMIN — PIPERACILLIN AND TAZOBACTAM 4.5 G: 4; .5 INJECTION, POWDER, FOR SOLUTION INTRAVENOUS at 00:28

## 2021-10-12 RX ADMIN — INSULIN LISPRO 15 UNITS: 100 INJECTION, SOLUTION INTRAVENOUS; SUBCUTANEOUS at 14:23

## 2021-10-12 RX ADMIN — VANCOMYCIN HYDROCHLORIDE 1250 MG: 10 INJECTION, POWDER, LYOPHILIZED, FOR SOLUTION INTRAVENOUS at 23:12

## 2021-10-12 RX ADMIN — LOSARTAN POTASSIUM 100 MG: 50 TABLET, FILM COATED ORAL at 18:36

## 2021-10-12 RX ADMIN — Medication 10 ML: at 14:30

## 2021-10-12 RX ADMIN — PIPERACILLIN AND TAZOBACTAM 4.5 G: 4; .5 INJECTION, POWDER, FOR SOLUTION INTRAVENOUS at 18:36

## 2021-10-12 RX ADMIN — VANCOMYCIN HYDROCHLORIDE 1250 MG: 10 INJECTION, POWDER, LYOPHILIZED, FOR SOLUTION INTRAVENOUS at 10:19

## 2021-10-12 NOTE — DIABETES MGMT
GLYCEMIC CONTROL PLAN OF CARE    Recommendations:  Patient's Blood glucose remains elevated. Recommend increasing Lantus dose to 25 units daily  Continue corrective insulin coverage as needed. Very insulin resistant dosing already ordered. Will continue inpatient monitoring. Assessment:  History:  T2DM  Problem List Items Addressed This Visit        Skeletal    Osteomyelitis of great toe of right foot (HCC) - Primary        Morning lab glucose:  193  IVF containing dextrose:  None   Steroids:  None   Diet/TF:  ADULT DIET Regular; 4 carb choices (60 gm/meal)    Most recent BG values:      Results for Eva Redmond (MRN [id number]) as of 10/12/2021 11:56   Ref. Range 10/11/2021 08:02 10/11/2021 12:08 10/11/2021 12:11 10/11/2021 15:30 10/11/2021 21:46   GLUCOSE,FAST - POC Latest Ref Range: 70 - 110 mg/dL 241 (H) 404 (HH) 365 (H) 270 (H) 222 (H)     Within target range  mg/dl? No. Will recommend increase in lantus  Current A1C:  9.0% equivalent to an average blood glucose of 212 mg/dl over the past 2-3 months. Adequate glycemic control PTA? No. Improved though since June 2021 (A1C 11.3%)  Current hospital diabetes medications:  Lantus 15 units daily  Lispro AC&HS  Previous days insulin requirements:  Lantus 15 units   Lispro 29 units corrective insulin  Home diabetes medication:  Per pta med list  Lantus 60 units every bedtime  Humalog 10 units tid AC  Education:  ___ see diabetes education record            _x_ diabetes education not indicated at this time.       [facility] RN CDE  Ext [phone]

## 2021-10-12 NOTE — ROUTINE PROCESS
Bedside and Verbal shift change report given to [facility] RN (oncoming nurse) by Immanuel Yoon (offgoing nurse). Report included the following information SBAR, Kardex, MAR, Recent Results and Cardiac Rhythm SR. Patient quietly resting, call light in reach. Patient quietly resting.

## 2021-10-12 NOTE — PROGRESS NOTES
Received report on pt.from off going RN. Resting quietly in bed on rounds. Denies c/o pain or SOB at this time. No acute distress noted. Call bell at side. Will cont to monitor for any changes in status. 1415 pt refused to take new order of prinivil due to having taken it before and developing a cough for it . Will notify MD.     1500 Dr Kiesha Barrios paged . [phone] Lambert Lowe paged in regards to prinivil order. 1945 Bedside and Verbal shift change report given to Taco (oncoming nurse) by Antonio Wiggins RN (offgoing nurse). Report given with Destiny GUADARRAMA and MAR.

## 2021-10-12 NOTE — PROGRESS NOTES
Admit Date: 10/7/2021  Date of Service: 10/12/2021    Reason for follow-up: toe infection      Assessment:         1. MSSA sepsis: 10/7 blood cultures 4/4 positive; repeat 10/10 blood cx pending  -10/11/2021 TTE negative for endocarditis  2. Osteomyelitis of right great toe-status post amputation 10/9/2021  -10/9/2021 surgical cultures with staph aureus  3. History of type 2 diabetes-insulin sliding scale, monitor blood sugars  4. History of hypertension-resume home medications, monitor blood pressure  5. Hyponatremia-gentle hydration with IVF, monitor sodium levels  6. History of depression-resume chronic home medications    Plan:   Local wound care  F/u intraoperative cultures and pathology  Discussed with diabetes educator. We will add Lantus   -Tight glucose control for optimal wound healing  Continue Cymbalta, Crestor  Continue with vancomycin and Zosyn for now   -Awaiting 10/9/2021 surgical cultures show MSSA, change to Ancef 1 g IV every 8 hours upon discharge   -Anticipate 2-week course of antibiotics for MSSA sepsis plus osteomyelitis status post amputation assuming clean margins on surgical pathology  Will need PICC line placement in a.m. if 1010 blood cultures remain negative-order placed for IR to place PICC tomorrow  Sliding scale insulin, Accu-Cheks  Follow-up 10/10 blood cultures-currently no growth to date    Current Antibtiocs:   Vancomycin 10/7- present  Zosyn 10/7 present    Lines:   Peripheral    Case discussed with:  [x]Patient  []Family  [x]Nursing  []Case Management  DVT Prophylaxis:  []Lovenox  []Hep SQ  []SCDs  []Coumadin   []On Heparin gtt    I have independently examined the patient and reviewed all lab studies and imgaing as well as review of nursing notes and physican notes from the past 24 hours. Anticipate discharge home in a.m. No Known Allergies        Subjective:      Pt seen and examined. Lying in bed, no acute distress.   Patient noted to have elevated blood pressures today.    Objective:        Visit Vitals  BP (!) 195/104 (BP Patient Position: Sitting)   Pulse 77   Temp 97.8 °F (36.6 °C)   Resp 14   Ht 5' 11\" (1.803 m)   Wt 67.1 kg (148 lb)   SpO2 100%   BMI 20.64 kg/m²     Temp (24hrs), Av.2 °F (36.8 °C), Min:97.8 °F (36.6 °C), Max:98.9 °F (37.2 °C)        General:   awake alert and oriented, non-toxic   Skin:   no rashes or skin lesions noted on limited exam, dry and warm   HEENT:  No scleral icterus or pallor; oral mucosa moist, lips moist   Lymph Nodes:   not assessed today   Lungs:   non, labored; bilaterally clear to aspiration- no crackles wheezes rales or rhonchi   Heart:  RRR, s1 and s2; no murmurs rubs or gallops; no edema, + pedal pulses   Abdomen:  soft, non-distended, active bowel sounds, non-tender   Genitourinary:  deferred   Extremities:   average muscle tone; no contractures, no joint effusions; surgical dressings in place, clean, not removed   Neurologic:  No gross focal motor abnormalities; diminished sensation to bilateral lower extremities from foot to ankle ; CN 2-12 intact; Follows commands. Psychiatric:   appropriate and interactive.        Labs: Results:   Chemistry Recent Labs     10/12/21  0130 10/11/21  0125 10/10/21  0607   * 227* 269*    135* 135*   K 4.1 3.9 4.0    101 99*   CO2 32 30 28   BUN 16 15 17   CREA 1.17 1.16 1.10   CA 9.0 9.2 8.5   AGAP 2* 4 8   BUCR 14 13 15   AP 57 60 61   TP 7.3 7.2 6.7   ALB 2.2* 2.3* 2.3*   GLOB 5.1* 4.9* 4.4*   AGRAT 0.4* 0.5* 0.5*      CBC w/Diff Recent Labs     10/12/21  0130 10/11/21  0125 10/10/21  0607   WBC 9.6 9.7 9.8   RBC 3.99* 4.06* 4.19*   HGB 12.5* 12.6* 13.0   HCT 35.2* 36.1 37.3    165 158   GRANS 54 61 71   LYMPH 31 24 17*   EOS 2 2 0        No results found for: SDES Lab Results   Component Value Date/Time    Culture result: NO GROWTH 2 DAYS 10/10/2021 06:07 AM    Culture result: NO GROWTH 2 DAYS 10/10/2021 06:07 AM    Culture result: CHECKING FOR POSSIBLE  ANAEROBIC GROWTH   10/09/2021 12:56 PM    Culture result: FEW STAPHYLOCOCCUS AUREUS (A) 10/09/2021 12:56 PM    Culture result: CHECKING FOR POSSIBLE  OTHER ORGANISMS   10/09/2021 12:56 PM        Results     Procedure Component Value Units Date/Time    CULTURE, BLOOD [[id number]] Collected: 10/10/21 0607    Order Status: Completed Specimen: Whole Blood Updated: 10/12/21 0646     Special Requests: NO SPECIAL REQUESTS        Culture result: NO GROWTH 2 DAYS       CULTURE, BLOOD [[id number]] Collected: 10/10/21 0607    Order Status: Completed Specimen: Whole Blood Updated: 10/12/21 0646     Special Requests: NO SPECIAL REQUESTS        Culture result: NO GROWTH 2 DAYS       CULTURE, ANAEROBIC [[id number]] Collected: 10/09/21 1256    Order Status: Completed Specimen: Tissue Updated: 10/11/21 1328     Special Requests: RT GREAT TOE, PROXIMAL CLEAN MARGIN     Culture result:       CHECKING FOR POSSIBLE  ANAEROBIC GROWTH      CULTURE, TISSUE [address] [[id number]]  (Abnormal)  (Susceptibility) Collected: 10/09/21 1256    Order Status: Completed Specimen: Toe Updated: 10/12/21 1302     Special Requests: RT GREAT TOE, PROXIMAL CLEAN MARGIN     GRAM STAIN RARE WBCS SEEN         NO DEFINITE ORGANISM SEEN        Culture result: FEW STAPHYLOCOCCUS AUREUS         CHECKING FOR POSSIBLE  OTHER ORGANISMS       Susceptibility      Staphylococcus aureus      GERMAN (Preliminary)      Ciprofloxacin ($) Resistant      Clindamycin ($) Susceptible      Daptomycin ($$$$$) Susceptible      Doxycycline ($$) Susceptible      Erythromycin ($$$$) Resistant      Gentamicin ($) Susceptible      Levofloxacin ($) Resistant      Linezolid ($$$$$) Susceptible      Moxifloxacin ($$$$) Intermediate      Oxacillin Susceptible      Tetracycline Susceptible      Trimeth/Sulfa Susceptible      Vancomycin ($) Susceptible               Linear View                   COVID-19 RAPID TEST [[id number]] Collected: 10/08/21 1254    Order Status: Completed Specimen: Nasopharyngeal Updated: 10/08/21 1324     Specimen source Nasopharyngeal        COVID-19 rapid test Not detected        Comment: Rapid Abbott ID Now       Rapid NAAT:  The specimen is NEGATIVE for SARS-CoV-2, the novel coronavirus associated with COVID-19. Negative results should be treated as presumptive and, if inconsistent with clinical signs and symptoms or necessary for patient management, should be tested with an alternative molecular assay. Negative results do not preclude SARS-CoV-2 infection and should not be used as the sole basis for patient management decisions. This test has been authorized by the FDA under an Emergency Use Authorization (EUA) for use by authorized laboratories.    Fact sheet for Healthcare Providers: ConventionUpdate.co.nz  Fact sheet for Patients: ConventionVerisante Technologydate.co.nz       Methodology: Isothermal Nucleic Acid Amplification         CULTURE, BLOOD [[id number]]  (Abnormal) Collected: 10/07/21 2055    Order Status: Completed Specimen: Blood Updated: 10/10/21 1452     Special Requests: NO SPECIAL REQUESTS        GRAM STAIN       AEROBIC AND ANAEROBIC BOTTLES GRAM POSITIVE COCCI IN GROUPS                  SMEAR CALLED TO AND CORRECTLY REPEATED BY: [facility] AT 1930 BY KDA 10/8/21           Culture result:       STAPHYLOCOCCUS AUREUS GROWING IN THE AEROBIC AND ANAEROBIC BOTTLES REFER TO [id number] FOR SENSITIVITIES          CULTURE, BLOOD [[id number]]  (Abnormal)  (Susceptibility) Collected: 10/07/21 2040    Order Status: Completed Specimen: Blood Updated: 10/10/21 1452     Special Requests: NO SPECIAL REQUESTS        GRAM STAIN       AEROBIC AND ANAEROBIC BOTTLES                  SMEAR CALLED TO AND CORRECTLY REPEATED BY: [facility]ARL[facility] AT 36 BY KDA 10/8/21           Culture result:       STAPHYLOCOCCUS AUREUS GROWING IN THE AEROBIC AMD ANAEROBIC BOTTLES          Susceptibility      Staphylococcus aureus      GERMAN Ciprofloxacin ($) Resistant      Clindamycin ($) Susceptible      Daptomycin ($$$$$) Susceptible      Doxycycline ($$) Susceptible      Erythromycin ($$$$) Resistant      Gentamicin ($) Susceptible      Levofloxacin ($) Resistant      Linezolid ($$$$$) Susceptible      Moxifloxacin ($$$$) Intermediate      Oxacillin Susceptible      Tetracycline Susceptible      Trimeth/Sulfa Susceptible      Vancomycin ($) Susceptible               Linear View                         Imaging:     No results found.

## 2021-10-12 NOTE — PROCEDURES
INTERVENTIONAL RADIOLOGY POST PICC LINE NOTE     October 12, 2021       12:23 PM     Preoperative Diagnosis:   IV Access    Postoperative Diagnosis:  Same. :  Kaushal Seo PA-C    Assistant:  None. Attending Physician: Dr. Elizbeth Pallas    Type of Anesthesia:  1% Lidocaine local    Procedure/Description: right basilic  upper extremity PICC Line. Findings:  right basilic 5 Green[place]ic catheter placed. Tip at SVC/RA junction. OK to use. Length 38 cm. Estimated blood Loss: Minimal    Specimen Removed: None    Drains: None     Complications:  None. Condition:  Stable.     Discharge Plan:  continue present therapy

## 2021-10-12 NOTE — PROGRESS NOTES
Discharge/Transition Planning    Spoke with patient on discharge plan. Pt stated he was in a crisis stabilation home and last day Friday and has nowhere else to go. Pt stated he went to [facility] for a few months last time he was admitted and had another toe amputated with IV antibiotics and asked about going back. He is aware it is under Long Term.   Sent referral in [place] 0406 RN BSN  Outcomes Manager    Pager # [phone]

## 2021-10-13 LAB
ALBUMIN SERPL-MCNC: 2.5 G/DL (ref 3.4–5)
ALBUMIN/GLOB SERPL: 0.5 {RATIO} (ref 0.8–1.7)
ALP SERPL-CCNC: 64 U/L (ref 45–117)
ALT SERPL-CCNC: 42 U/L (ref 16–61)
ANION GAP SERPL CALC-SCNC: 5 MMOL/L (ref 3–18)
AST SERPL-CCNC: 41 U/L (ref 10–38)
BASOPHILS # BLD: 0 K/UL (ref 0–0.1)
BASOPHILS NFR BLD: 0 % (ref 0–2)
BILIRUB SERPL-MCNC: 0.4 MG/DL (ref 0.2–1)
BUN SERPL-MCNC: 13 MG/DL (ref 7–18)
BUN/CREAT SERPL: 14 (ref 12–20)
CALCIUM SERPL-MCNC: 9.1 MG/DL (ref 8.5–10.1)
CHLORIDE SERPL-SCNC: 100 MMOL/L (ref 100–111)
CO2 SERPL-SCNC: 29 MMOL/L (ref 21–32)
CREAT SERPL-MCNC: 0.95 MG/DL (ref 0.6–1.3)
DATE LAST DOSE: NORMAL
DIFFERENTIAL METHOD BLD: ABNORMAL
EOSINOPHIL # BLD: 0.2 K/UL (ref 0–0.4)
EOSINOPHIL NFR BLD: 2 % (ref 0–5)
ERYTHROCYTE [DISTWIDTH] IN BLOOD BY AUTOMATED COUNT: 11.3 % (ref 11.6–14.5)
GLOBULIN SER CALC-MCNC: 4.8 G/DL (ref 2–4)
GLUCOSE BLD STRIP.AUTO-MCNC: 139 MG/DL (ref 70–110)
GLUCOSE BLD STRIP.AUTO-MCNC: 218 MG/DL (ref 70–110)
GLUCOSE BLD STRIP.AUTO-MCNC: 317 MG/DL (ref 70–110)
GLUCOSE BLD STRIP.AUTO-MCNC: 378 MG/DL (ref 70–110)
GLUCOSE BLD STRIP.AUTO-MCNC: 383 MG/DL (ref 70–110)
GLUCOSE SERPL-MCNC: 212 MG/DL (ref 74–99)
HCT VFR BLD AUTO: 37 % (ref 36–48)
HGB BLD-MCNC: 13.3 G/DL (ref 13–16)
LYMPHOCYTES # BLD: 2 K/UL (ref 0.9–3.6)
LYMPHOCYTES NFR BLD: 28 % (ref 21–52)
MCH RBC QN AUTO: 31.2 PG (ref 24–34)
MCHC RBC AUTO-ENTMCNC: 35.9 G/DL (ref 31–37)
MCV RBC AUTO: 86.9 FL (ref 78–100)
MONOCYTES # BLD: 0.9 K/UL (ref 0.05–1.2)
MONOCYTES NFR BLD: 12 % (ref 3–10)
NEUTS SEG # BLD: 4.2 K/UL (ref 1.8–8)
NEUTS SEG NFR BLD: 57 % (ref 40–73)
PLATELET # BLD AUTO: 243 K/UL (ref 135–420)
PMV BLD AUTO: 10.9 FL (ref 9.2–11.8)
POTASSIUM SERPL-SCNC: 4.1 MMOL/L (ref 3.5–5.5)
PROT SERPL-MCNC: 7.3 G/DL (ref 6.4–8.2)
RBC # BLD AUTO: 4.26 M/UL (ref 4.35–5.65)
REPORTED DOSE,DOSE: NORMAL UNITS
REPORTED DOSE/TIME,TMG: 2200
SODIUM SERPL-SCNC: 134 MMOL/L (ref 136–145)
VANCOMYCIN TROUGH SERPL-MCNC: 15.7 UG/ML (ref 10–20)
WBC # BLD AUTO: 7.4 K/UL (ref 4.6–13.2)

## 2021-10-13 PROCEDURE — 74011250637 HC RX REV CODE- 250/637: Performed by: EMERGENCY MEDICINE

## 2021-10-13 PROCEDURE — 80053 COMPREHEN METABOLIC PANEL: CPT

## 2021-10-13 PROCEDURE — 82962 GLUCOSE BLOOD TEST: CPT

## 2021-10-13 PROCEDURE — 99232 SBSQ HOSP IP/OBS MODERATE 35: CPT | Performed by: HOSPITALIST

## 2021-10-13 PROCEDURE — 80202 ASSAY OF VANCOMYCIN: CPT

## 2021-10-13 PROCEDURE — 74011636637 HC RX REV CODE- 636/637: Performed by: HOSPITALIST

## 2021-10-13 PROCEDURE — 74011250636 HC RX REV CODE- 250/636: Performed by: HOSPITALIST

## 2021-10-13 PROCEDURE — 74011000258 HC RX REV CODE- 258: Performed by: HOSPITALIST

## 2021-10-13 PROCEDURE — 85025 COMPLETE CBC W/AUTO DIFF WBC: CPT

## 2021-10-13 PROCEDURE — 74011250636 HC RX REV CODE- 250/636: Performed by: INTERNAL MEDICINE

## 2021-10-13 PROCEDURE — 74011636637 HC RX REV CODE- 636/637: Performed by: INTERNAL MEDICINE

## 2021-10-13 PROCEDURE — 2709999900 HC NON-CHARGEABLE SUPPLY

## 2021-10-13 PROCEDURE — 36592 COLLECT BLOOD FROM PICC: CPT

## 2021-10-13 PROCEDURE — 65660000000 HC RM CCU STEPDOWN

## 2021-10-13 PROCEDURE — 74011250637 HC RX REV CODE- 250/637: Performed by: HOSPITALIST

## 2021-10-13 RX ORDER — INSULIN GLARGINE 100 [IU]/ML
35 INJECTION, SOLUTION SUBCUTANEOUS DAILY
Status: DISCONTINUED | OUTPATIENT
Start: 2021-10-14 | End: 2021-10-15

## 2021-10-13 RX ORDER — INSULIN GLARGINE 100 [IU]/ML
10 INJECTION, SOLUTION SUBCUTANEOUS
Status: COMPLETED | OUTPATIENT
Start: 2021-10-13 | End: 2021-10-13

## 2021-10-13 RX ADMIN — INSULIN GLARGINE 10 UNITS: 100 INJECTION, SOLUTION SUBCUTANEOUS at 15:30

## 2021-10-13 RX ADMIN — INSULIN LISPRO 6 UNITS: 100 INJECTION, SOLUTION INTRAVENOUS; SUBCUTANEOUS at 08:15

## 2021-10-13 RX ADMIN — INSULIN LISPRO 12 UNITS: 100 INJECTION, SOLUTION INTRAVENOUS; SUBCUTANEOUS at 17:17

## 2021-10-13 RX ADMIN — KETOROLAC TROMETHAMINE 15 MG: 15 INJECTION, SOLUTION INTRAMUSCULAR; INTRAVENOUS at 12:46

## 2021-10-13 RX ADMIN — PIPERACILLIN AND TAZOBACTAM 4.5 G: 4; .5 INJECTION, POWDER, FOR SOLUTION INTRAVENOUS at 07:02

## 2021-10-13 RX ADMIN — VANCOMYCIN HYDROCHLORIDE 1250 MG: 10 INJECTION, POWDER, LYOPHILIZED, FOR SOLUTION INTRAVENOUS at 11:13

## 2021-10-13 RX ADMIN — VANCOMYCIN HYDROCHLORIDE 1250 MG: 10 INJECTION, POWDER, LYOPHILIZED, FOR SOLUTION INTRAVENOUS at 21:41

## 2021-10-13 RX ADMIN — SODIUM CHLORIDE 75 ML/HR: 900 INJECTION, SOLUTION INTRAVENOUS at 11:14

## 2021-10-13 RX ADMIN — DULOXETINE HYDROCHLORIDE 60 MG: 60 CAPSULE, DELAYED RELEASE ORAL at 11:12

## 2021-10-13 RX ADMIN — PIPERACILLIN AND TAZOBACTAM 4.5 G: 4; .5 INJECTION, POWDER, FOR SOLUTION INTRAVENOUS at 12:46

## 2021-10-13 RX ADMIN — AMLODIPINE BESYLATE 10 MG: 10 TABLET ORAL at 11:12

## 2021-10-13 RX ADMIN — LOSARTAN POTASSIUM 100 MG: 50 TABLET, FILM COATED ORAL at 11:12

## 2021-10-13 RX ADMIN — HYDRALAZINE HYDROCHLORIDE 10 MG: 10 TABLET, FILM COATED ORAL at 12:46

## 2021-10-13 RX ADMIN — TRAZODONE HYDROCHLORIDE 50 MG: 50 TABLET ORAL at 21:51

## 2021-10-13 RX ADMIN — PIPERACILLIN AND TAZOBACTAM 4.5 G: 4; .5 INJECTION, POWDER, FOR SOLUTION INTRAVENOUS at 19:08

## 2021-10-13 RX ADMIN — PIPERACILLIN AND TAZOBACTAM 4.5 G: 4; .5 INJECTION, POWDER, FOR SOLUTION INTRAVENOUS at 00:00

## 2021-10-13 RX ADMIN — INSULIN LISPRO 15 UNITS: 100 INJECTION, SOLUTION INTRAVENOUS; SUBCUTANEOUS at 12:45

## 2021-10-13 RX ADMIN — ROSUVASTATIN CALCIUM 40 MG: 20 TABLET, COATED ORAL at 21:41

## 2021-10-13 RX ADMIN — INSULIN GLARGINE 25 UNITS: 100 INJECTION, SOLUTION SUBCUTANEOUS at 11:27

## 2021-10-13 RX ADMIN — Medication 10 ML: at 07:02

## 2021-10-13 NOTE — PROGRESS NOTES
Admit Date: 10/7/2021  Date of Service: 10/13/2021    Reason for follow-up: toe infection      Assessment:         1. MSSA sepsis: 10/7 blood cultures 4/4 positive; repeat 10/10 blood cx pending  -10/11/2021 TTE negative for endocarditis  2. Osteomyelitis of right great toe-status post amputation 10/9/2021  -10/9/2021 surgical cultures with staph aureus  3. History of type 2 diabetes-insulin sliding scale, monitor blood sugars  4. History of hypertension-resume home medications, monitor blood pressure  5. Hyponatremia-gentle hydration with IVF, monitor sodium levels  6. History of depression-resume chronic home medications    Plan:   Local wound care  F/u intraoperative cultures and pathology  Discussed with diabetes educator. We will add Lantus   -Tight glucose control for optimal wound healing  Continue Cymbalta, Crestor  Continue with vancomycin and Zosyn for now   -Awaiting 10/9/2021 surgical cultures show MSSA, change to Ancef 1 g IV every 8 hours upon discharge   -Anticipate 2-week course of antibiotics for MSSA sepsis plus osteomyelitis status post amputation assuming clean margins on surgical pathology  Will need PICC line placement in a.m. if 1010 blood cultures remain negative-order placed for IR to place PICC tomorrow  Sliding scale insulin, Accu-Cheks  Follow-up 10/10 blood cultures-currently no growth to date    Current Antibtiocs:   Vancomycin 10/7- present  Zosyn 10/7 present    Lines:   Peripheral    Case discussed with:  [x]Patient  []Family  [x]Nursing  []Case Management  DVT Prophylaxis:  []Lovenox  []Hep SQ  []SCDs  []Coumadin   []On Heparin gtt    I have independently examined the patient and reviewed all lab studies and imgaing as well as review of nursing notes and physican notes from the past 24 hours. Continue current IV antibiotics, awaiting bone culture results, report mentions that other organisms are growing at this time. Patient is also homeless and will need assistance.   Have reached out to case management for discharge planning. No Known Allergies        Subjective:      Pt seen and examined. Lying in bed, no acute distress. Patient noted to have elevated blood pressures today. Objective:        Visit Vitals  /78 (BP 1 Location: Left upper arm, BP Patient Position: Lying left side)   Pulse 92   Temp 98.6 °F (37 °C)   Resp 18   Ht 5' 11\" (1.803 m)   Wt 67.2 kg (148 lb 3.2 oz)   SpO2 98%   BMI 20.67 kg/m²     Temp (24hrs), Av.5 °F (36.9 °C), Min:97.8 °F (36.6 °C), Max:98.7 °F (37.1 °C)        General:   awake alert and oriented, non-toxic   Skin:   no rashes or skin lesions noted on limited exam, dry and warm   HEENT:  No scleral icterus or pallor; oral mucosa moist, lips moist   Lymph Nodes:   not assessed today   Lungs:   non, labored; bilaterally clear to aspiration- no crackles wheezes rales or rhonchi   Heart:  RRR, s1 and s2; no murmurs rubs or gallops; no edema, + pedal pulses   Abdomen:  soft, non-distended, active bowel sounds, non-tender   Genitourinary:  deferred   Extremities:   average muscle tone; no contractures, no joint effusions; surgical dressings in place, clean, not removed   Neurologic:  No gross focal motor abnormalities; diminished sensation to bilateral lower extremities from foot to ankle ; CN 2-12 intact; Follows commands. Psychiatric:   appropriate and interactive.        Labs: Results:   Chemistry Recent Labs     10/13/21  0431 10/12/21  0130 10/11/21  0125   * 193* 227*   * 136 135*   K 4.1 4.1 3.9    102 101   CO2 29 32 30   BUN 13 16 15   CREA 0.95 1.17 1.16   CA 9.1 9.0 9.2   AGAP 5 2* 4   BUCR 14 14 13   AP 64 57 60   TP 7.3 7.3 7.2   ALB 2.5* 2.2* 2.3*   GLOB 4.8* 5.1* 4.9*   AGRAT 0.5* 0.4* 0.5*      CBC w/Diff Recent Labs     10/13/21  0431 10/12/21  0130 10/11/21  0125   WBC 7.4 9.6 9.7   RBC 4.26* 3.99* 4.06*   HGB 13.3 12.5* 12.6*   HCT 37.0 35.2* 36.1    203 165   GRANS 57 54 61   LYMPH 28 31 24 EOS 2 2 2        No results found for: SDES Lab Results   Component Value Date/Time    Culture result: NO GROWTH 3 DAYS 10/10/2021 06:07 AM    Culture result: NO GROWTH 3 DAYS 10/10/2021 06:07 AM    Culture result: NO ANAEROBES ISOLATED 10/09/2021 12:56 PM    Culture result: FEW STAPHYLOCOCCUS AUREUS (A) 10/09/2021 12:56 PM    Culture result: CHECKING FOR POSSIBLE  OTHER ORGANISMS   10/09/2021 12:56 PM        Results     Procedure Component Value Units Date/Time    CULTURE, BLOOD [[id number]] Collected: 10/10/21 9838    Order Status: Completed Specimen: Whole Blood Updated: 10/13/21 0708     Special Requests: NO SPECIAL REQUESTS        Culture result: NO GROWTH 3 DAYS       CULTURE, BLOOD [[id number]] Collected: 10/10/21 1286    Order Status: Completed Specimen: Whole Blood Updated: 10/13/21 0708     Special Requests: NO SPECIAL REQUESTS        Culture result: NO GROWTH 3 DAYS       CULTURE, ANAEROBIC [[id number]] Collected: 10/09/21 1256    Order Status: Completed Specimen: Tissue Updated: 10/12/21 1645     Special Requests: RT GREAT TOE, PROXIMAL CLEAN MARGIN     Culture result: NO ANAEROBES ISOLATED       CULTURE, TISSUE Lulu Ly STAIN [[id number]]  (Abnormal)  (Susceptibility) Collected: 10/09/21 1256    Order Status: Completed Specimen: Toe Updated: 10/12/21 1302     Special Requests: RT GREAT TOE, PROXIMAL CLEAN MARGIN     GRAM STAIN RARE WBCS SEEN         NO DEFINITE ORGANISM SEEN        Culture result: FEW STAPHYLOCOCCUS AUREUS         CHECKING FOR POSSIBLE  OTHER ORGANISMS       Susceptibility      Staphylococcus aureus      GERMAN (Preliminary)      Ciprofloxacin ($) Resistant      Clindamycin ($) Susceptible      Daptomycin ($$$$$) Susceptible      Doxycycline ($$) Susceptible      Erythromycin ($$$$) Resistant      Gentamicin ($) Susceptible      Levofloxacin ($) Resistant      Linezolid ($$$$$) Susceptible      Moxifloxacin ($$$$) Intermediate      Oxacillin Susceptible      Tetracycline Susceptible Trimeth/Sulfa Susceptible      Vancomycin ($) Susceptible               Linear View                   COVID-19 RAPID TEST [[id number]] Collected: 10/08/21 1254    Order Status: Completed Specimen: Nasopharyngeal Updated: 10/08/21 1324     Specimen source Nasopharyngeal        COVID-19 rapid test Not detected        Comment: Rapid Abbott ID Now       Rapid NAAT:  The specimen is NEGATIVE for SARS-CoV-2, the novel coronavirus associated with COVID-19. Negative results should be treated as presumptive and, if inconsistent with clinical signs and symptoms or necessary for patient management, should be tested with an alternative molecular assay. Negative results do not preclude SARS-CoV-2 infection and should not be used as the sole basis for patient management decisions. This test has been authorized by the FDA under an Emergency Use Authorization (EUA) for use by authorized laboratories.    Fact sheet for Healthcare Providers: ConventionUpdate.co.nz  Fact sheet for Patients: ConventionUpdate.co.nz       Methodology: Isothermal Nucleic Acid Amplification         CULTURE, BLOOD [[id number]]  (Abnormal) Collected: 10/07/21 2055    Order Status: Completed Specimen: Blood Updated: 10/10/21 1452     Special Requests: NO SPECIAL REQUESTS        GRAM STAIN       AEROBIC AND ANAEROBIC BOTTLES GRAM POSITIVE COCCI IN GROUPS                  SMEAR CALLED TO AND CORRECTLY REPEATED BY: [facility] AT 8601 BY KDA 10/8/21           Culture result:       STAPHYLOCOCCUS AUREUS GROWING IN THE AEROBIC AND ANAEROBIC BOTTLES REFER TO [id number] FOR SENSITIVITIES          CULTURE, BLOOD [[id number]]  (Abnormal)  (Susceptibility) Collected: 10/07/21 2040    Order Status: Completed Specimen: Blood Updated: 10/10/21 1452     Special Requests: NO SPECIAL REQUESTS        GRAM STAIN       AEROBIC AND ANAEROBIC BOTTLES                  SMEAR CALLED TO AND CORRECTLY REPEATED BY: [facility] AT 1130 BY ADONAY 10/8/21           Culture result:       STAPHYLOCOCCUS AUREUS GROWING IN THE AEROBIC AMD ANAEROBIC BOTTLES          Susceptibility      Staphylococcus aureus      GERMAN      Ciprofloxacin ($) Resistant      Clindamycin ($) Susceptible      Daptomycin ($$$$$) Susceptible      Doxycycline ($$) Susceptible      Erythromycin ($$$$) Resistant      Gentamicin ($) Susceptible      Levofloxacin ($) Resistant      Linezolid ($$$$$) Susceptible      Moxifloxacin ($$$$) Intermediate      Oxacillin Susceptible      Tetracycline Susceptible      Trimeth/Sulfa Susceptible      Vancomycin ($) Susceptible               Linear View                         Imaging:     IR FLUORO GUIDE PICC INSERTION    Result Date: 10/12/2021  Successful placement dual lumen peripherally inserted central catheter. Okay for immediate use.

## 2021-10-13 NOTE — ROUTINE PROCESS
Bedside and Verbal shift change report given to [facility] RN (oncoming nurse) by Raffi Davis (offgoing nurse). Report included the following information SBAR, Kardex, MAR, Recent Results and Cardiac Rhythm SR. Patient quietly resting, call light in reach. Patient quietly resting.

## 2021-10-13 NOTE — PROGRESS NOTES
Discharge/Transition Planning    Sent message to Geeta Bills with [facility] and asked for pt to be reviewed and if candidate to go back to [facility]. Had been there in June for 3 months? LTC and wants to return. [facility] pt[facility] and he has made no other arrangements . Sent referral also to [address] [place] [address] for a LTC bed    1500: [facility] will not take pt. Pt apparently left AMA from facility and was continually coming and going as if was private residence. Geeta Bills stated can open up referral to more facility and see if anything available.   Pt is really not nursing home level of care at all and seems to be using this option for [facility]     Jacqueline Domínguez RN BSN  Outcomes Manager    Pager # [phone]

## 2021-10-13 NOTE — DIABETES MGMT
GLYCEMIC CONTROL PLAN OF CARE    Recommendations:  Patient's Blood glucose remains elevated. Recommend increasing Lantus dose to 35 units daily. Order obtained. Continue corrective insulin coverage as needed. Very insulin resistant dosing already ordered. Will continue inpatient monitoring. Assessment:  History:  T2DM  Problem List Items Addressed This Visit        Skeletal    Osteomyelitis of great toe of right foot (HCC) - Primary        Morning lab glucose:  218 mg/dl  IVF containing dextrose:  None   Steroids:  None   Diet/TF:  ADULT DIET Regular; 4 carb choices (60 gm/meal)    Most recent BG values:        Results for Martha Rdz (MRN [id number]) as of 10/13/2021 13:59   Ref. Range 10/12/2021 16:09 10/12/2021 22:59 10/13/2021 08:11 10/13/2021 12:07 10/13/2021 12:10   GLUCOSE,FAST - POC Latest Ref Range: 70 - 110 mg/dL 320 (H) 108 218 (H) 383 (H) 378 (H)     Within target range  mg/dl? No. Will recommend increase in lantus  Current A1C:  9.0% equivalent to an average blood glucose of 212 mg/dl over the past 2-3 months. Adequate glycemic control PTA? No. Improved though since June 2021 (A1C 11.3%)  Current hospital diabetes medications:  Lantus 25 units daily  Lispro AC&HS  Previous days insulin requirements:  Lantus 25 units   Lispro 27 units corrective insulin  Home diabetes medication:  Per pta med list  Lantus 60 units every bedtime  Humalog 10 units tid AC  Education:  ___ see diabetes education record            _x_ diabetes education not indicated at this time.       [facility] RN CDE  Ext [phone]

## 2021-10-14 LAB
ALBUMIN SERPL-MCNC: 2.2 G/DL (ref 3.4–5)
ALBUMIN/GLOB SERPL: 0.5 {RATIO} (ref 0.8–1.7)
ALP SERPL-CCNC: 81 U/L (ref 45–117)
ALT SERPL-CCNC: 44 U/L (ref 16–61)
ANION GAP SERPL CALC-SCNC: 2 MMOL/L (ref 3–18)
AST SERPL-CCNC: 47 U/L (ref 10–38)
BACTERIA SPEC CULT: ABNORMAL
BASOPHILS # BLD: 0 K/UL (ref 0–0.1)
BASOPHILS NFR BLD: 0 % (ref 0–2)
BILIRUB SERPL-MCNC: 0.3 MG/DL (ref 0.2–1)
BUN SERPL-MCNC: 14 MG/DL (ref 7–18)
BUN/CREAT SERPL: 13 (ref 12–20)
CALCIUM SERPL-MCNC: 8.8 MG/DL (ref 8.5–10.1)
CHLORIDE SERPL-SCNC: 105 MMOL/L (ref 100–111)
CO2 SERPL-SCNC: 31 MMOL/L (ref 21–32)
CREAT SERPL-MCNC: 1.06 MG/DL (ref 0.6–1.3)
DIFFERENTIAL METHOD BLD: ABNORMAL
EOSINOPHIL # BLD: 0.2 K/UL (ref 0–0.4)
EOSINOPHIL NFR BLD: 3 % (ref 0–5)
ERYTHROCYTE [DISTWIDTH] IN BLOOD BY AUTOMATED COUNT: 11.3 % (ref 11.6–14.5)
GLOBULIN SER CALC-MCNC: 4.7 G/DL (ref 2–4)
GLUCOSE BLD STRIP.AUTO-MCNC: 187 MG/DL (ref 70–110)
GLUCOSE BLD STRIP.AUTO-MCNC: 200 MG/DL (ref 70–110)
GLUCOSE BLD STRIP.AUTO-MCNC: 252 MG/DL (ref 70–110)
GLUCOSE BLD STRIP.AUTO-MCNC: 297 MG/DL (ref 70–110)
GLUCOSE SERPL-MCNC: 176 MG/DL (ref 74–99)
GRAM STN SPEC: ABNORMAL
GRAM STN SPEC: ABNORMAL
HCT VFR BLD AUTO: 35.1 % (ref 36–48)
HGB BLD-MCNC: 12.5 G/DL (ref 13–16)
LYMPHOCYTES # BLD: 2.6 K/UL (ref 0.9–3.6)
LYMPHOCYTES NFR BLD: 42 % (ref 21–52)
MCH RBC QN AUTO: 31.2 PG (ref 24–34)
MCHC RBC AUTO-ENTMCNC: 35.6 G/DL (ref 31–37)
MCV RBC AUTO: 87.5 FL (ref 78–100)
MONOCYTES # BLD: 0.8 K/UL (ref 0.05–1.2)
MONOCYTES NFR BLD: 13 % (ref 3–10)
NEUTS SEG # BLD: 2.6 K/UL (ref 1.8–8)
NEUTS SEG NFR BLD: 42 % (ref 40–73)
PLATELET # BLD AUTO: 256 K/UL (ref 135–420)
PMV BLD AUTO: 10.4 FL (ref 9.2–11.8)
POTASSIUM SERPL-SCNC: 4 MMOL/L (ref 3.5–5.5)
PROT SERPL-MCNC: 6.9 G/DL (ref 6.4–8.2)
RBC # BLD AUTO: 4.01 M/UL (ref 4.35–5.65)
SERVICE CMNT-IMP: ABNORMAL
SODIUM SERPL-SCNC: 138 MMOL/L (ref 136–145)
WBC # BLD AUTO: 6.3 K/UL (ref 4.6–13.2)

## 2021-10-14 PROCEDURE — 99232 SBSQ HOSP IP/OBS MODERATE 35: CPT | Performed by: HOSPITALIST

## 2021-10-14 PROCEDURE — 74011250636 HC RX REV CODE- 250/636: Performed by: HOSPITALIST

## 2021-10-14 PROCEDURE — 80053 COMPREHEN METABOLIC PANEL: CPT

## 2021-10-14 PROCEDURE — 74011250636 HC RX REV CODE- 250/636: Performed by: INTERNAL MEDICINE

## 2021-10-14 PROCEDURE — 85025 COMPLETE CBC W/AUTO DIFF WBC: CPT

## 2021-10-14 PROCEDURE — 74011000258 HC RX REV CODE- 258: Performed by: HOSPITALIST

## 2021-10-14 PROCEDURE — 82962 GLUCOSE BLOOD TEST: CPT

## 2021-10-14 PROCEDURE — 2709999900 HC NON-CHARGEABLE SUPPLY

## 2021-10-14 PROCEDURE — 74011636637 HC RX REV CODE- 636/637: Performed by: INTERNAL MEDICINE

## 2021-10-14 PROCEDURE — 74011636637 HC RX REV CODE- 636/637: Performed by: HOSPITALIST

## 2021-10-14 PROCEDURE — 65660000000 HC RM CCU STEPDOWN

## 2021-10-14 PROCEDURE — 36592 COLLECT BLOOD FROM PICC: CPT

## 2021-10-14 PROCEDURE — 74011250637 HC RX REV CODE- 250/637: Performed by: HOSPITALIST

## 2021-10-14 RX ADMIN — INSULIN LISPRO 6 UNITS: 100 INJECTION, SOLUTION INTRAVENOUS; SUBCUTANEOUS at 18:09

## 2021-10-14 RX ADMIN — PIPERACILLIN AND TAZOBACTAM 4.5 G: 4; .5 INJECTION, POWDER, FOR SOLUTION INTRAVENOUS at 05:26

## 2021-10-14 RX ADMIN — INSULIN GLARGINE 35 UNITS: 100 INJECTION, SOLUTION SUBCUTANEOUS at 09:27

## 2021-10-14 RX ADMIN — AMLODIPINE BESYLATE 10 MG: 10 TABLET ORAL at 09:33

## 2021-10-14 RX ADMIN — DULOXETINE HYDROCHLORIDE 60 MG: 60 CAPSULE, DELAYED RELEASE ORAL at 09:33

## 2021-10-14 RX ADMIN — VANCOMYCIN HYDROCHLORIDE 1250 MG: 10 INJECTION, POWDER, LYOPHILIZED, FOR SOLUTION INTRAVENOUS at 09:39

## 2021-10-14 RX ADMIN — TRAZODONE HYDROCHLORIDE 50 MG: 50 TABLET ORAL at 22:06

## 2021-10-14 RX ADMIN — LOSARTAN POTASSIUM 100 MG: 50 TABLET, FILM COATED ORAL at 09:33

## 2021-10-14 RX ADMIN — INSULIN LISPRO 3 UNITS: 100 INJECTION, SOLUTION INTRAVENOUS; SUBCUTANEOUS at 09:33

## 2021-10-14 RX ADMIN — SODIUM CHLORIDE 75 ML/HR: 900 INJECTION, SOLUTION INTRAVENOUS at 05:26

## 2021-10-14 RX ADMIN — KETOROLAC TROMETHAMINE 15 MG: 15 INJECTION, SOLUTION INTRAMUSCULAR; INTRAVENOUS at 22:06

## 2021-10-14 RX ADMIN — INSULIN LISPRO 6 UNITS: 100 INJECTION, SOLUTION INTRAVENOUS; SUBCUTANEOUS at 21:51

## 2021-10-14 RX ADMIN — SODIUM CHLORIDE 75 ML/HR: 900 INJECTION, SOLUTION INTRAVENOUS at 21:48

## 2021-10-14 RX ADMIN — PIPERACILLIN AND TAZOBACTAM 4.5 G: 4; .5 INJECTION, POWDER, FOR SOLUTION INTRAVENOUS at 00:15

## 2021-10-14 RX ADMIN — VANCOMYCIN HYDROCHLORIDE 1250 MG: 10 INJECTION, POWDER, LYOPHILIZED, FOR SOLUTION INTRAVENOUS at 22:08

## 2021-10-14 RX ADMIN — INSULIN LISPRO 9 UNITS: 100 INJECTION, SOLUTION INTRAVENOUS; SUBCUTANEOUS at 13:08

## 2021-10-14 NOTE — PROGRESS NOTES
Problem: Diabetes Self-Management  Goal: *Disease process and treatment process  Description: Define diabetes and identify own type of diabetes; list 3 options for treating diabetes. Outcome: Progressing Towards Goal  Goal: *Incorporating nutritional management into lifestyle  Description: Describe effect of type, amount and timing of food on blood glucose; list 3 methods for planning meals. Outcome: Progressing Towards Goal  Goal: *Incorporating physical activity into lifestyle  Description: State effect of exercise on blood glucose levels. Outcome: Progressing Towards Goal  Goal: *Developing strategies to promote health/change behavior  Description: Define the ABC's of diabetes; identify appropriate screenings, schedule and personal plan for screenings. Outcome: Progressing Towards Goal  Goal: *Using medications safely  Description: State effect of diabetes medications on diabetes; name diabetes medication taking, action and side effects. Outcome: Progressing Towards Goal  Goal: *Monitoring blood glucose, interpreting and using results  Description: Identify recommended blood glucose targets  and personal targets. Outcome: Progressing Towards Goal  Goal: *Prevention, detection, treatment of acute complications  Description: List symptoms of hyper- and hypoglycemia; describe how to treat low blood sugar and actions for lowering  high blood glucose level. Outcome: Progressing Towards Goal  Goal: *Prevention, detection and treatment of chronic complications  Description: Define the natural course of diabetes and describe the relationship of blood glucose levels to long term complications of diabetes.   Outcome: Progressing Towards Goal  Goal: *Developing strategies to address psychosocial issues  Description: Describe feelings about living with diabetes; identify support needed and support network  Outcome: Progressing Towards Goal  Goal: *Insulin pump training  Outcome: Progressing Towards Goal  Goal: *Sick day guidelines  Outcome: Progressing Towards Goal  Goal: *Patient Specific Goal (EDIT GOAL, INSERT TEXT)  Outcome: Progressing Towards Goal     Problem: Patient Education: Go to Patient Education Activity  Goal: Patient/Family Education  Outcome: Progressing Towards Goal     Problem: Falls - Risk of  Goal: *Absence of Falls  Description: Document Nadya Guzman Fall Risk and appropriate interventions in the flowsheet. Outcome: Progressing Towards Goal  Note: Fall Risk Interventions:  Mobility Interventions: Assess mobility with egress test         Medication Interventions: Teach patient to arise slowly    Elimination Interventions: Call light in reach              Problem: Patient Education: Go to Patient Education Activity  Goal: Patient/Family Education  Outcome: Progressing Towards Goal     Problem: Pain  Goal: *Control of Pain  Outcome: Progressing Towards Goal  Goal: *PALLIATIVE CARE:  Alleviation of Pain  Outcome: Progressing Towards Goal     Problem: Patient Education: Go to Patient Education Activity  Goal: Patient/Family Education  Outcome: Progressing Towards Goal     Problem: Lower Extremity Wound Care  Goal: *Non-infected wound: Improvement of existing wound, absence of infection, and maintenance of skin integrity  Outcome: Progressing Towards Goal  Goal: *Infected Wound: Prevention of further infection and promotion of healing  Description: Infection control procedures (eg: clean dressings, clean gloves, hand washing, precautions to isolate wound from contamination, sterile instruments used for wound debridement) should be implemented.   Outcome: Progressing Towards Goal  Goal: Interventions  Outcome: Progressing Towards Goal     Problem: Patient Education: Go to Patient Education Activity  Goal: Patient/Family Education  Outcome: Progressing Towards Goal     Problem: Nutrition Deficit  Goal: *Optimize nutritional status  Outcome: Progressing Towards Goal     Problem: Risk for Spread of Infection  Goal: Prevent transmission of infectious organism to others  Description: Prevent the transmission of infectious organisms to other patients, staff members, and visitors.   Outcome: Progressing Towards Goal     Problem: Patient Education:  Go to Education Activity  Goal: Patient/Family Education  Outcome: Progressing Towards Goal

## 2021-10-14 NOTE — PROGRESS NOTES
Admit Date: 10/7/2021  Date of Service: 10/14/2021    Reason for follow-up: toe infection      Assessment:         1. MSSA sepsis: 10/7 blood cultures 4/4 positive; repeat 10/10 blood cx pending  -10/11/2021 TTE negative for endocarditis  2. Osteomyelitis of right great toe-status post amputation 10/9/2021  -10/9/2021 surgical cultures with staph aureus  3. History of type 2 diabetes-insulin sliding scale, monitor blood sugars  4. History of hypertension-resume home medications, monitor blood pressure  5. Hyponatremia-gentle hydration with IVF, monitor sodium levels  6. History of depression-resume chronic home medications    Plan:   Local wound care  F/u intraoperative cultures and pathology  Discussed with diabetes educator. We will add Lantus   -Tight glucose control for optimal wound healing  Continue Cymbalta, Crestor  Continue with vancomycin-cultures growing 3 different bacteria, discussed with ID, sensitive to vancomycin, will continue IV vancomycin post discharge since patient has a PICC line. Continue till 10/25/2021. Sliding scale insulin, Accu-Cheks  Follow-up 10/10 blood cultures-currently no growth to date    Current Antibtiocs:   Vancomycin 10/7- present      Lines:   Peripheral    Case discussed with:  [x]Patient  []Family  [x]Nursing  []Case Management  DVT Prophylaxis:  []Lovenox  []Hep SQ  []SCDs  []Coumadin   []On Heparin gtt    I have independently examined the patient and reviewed all lab studies and imgaing as well as review of nursing notes and physican notes from the past 24 hours. Discussed with  regarding discharge planning. No Known Allergies        Subjective:      Pt seen and examined. Lying in bed, no acute distress.      Objective:        Visit Vitals  BP (!) 152/89 (BP 1 Location: Left upper arm, BP Patient Position: At rest)   Pulse 84   Temp 98.2 °F (36.8 °C)   Resp 20   Ht 5' 11\" (1.803 m)   Wt 67.2 kg (148 lb 3.2 oz)   SpO2 97%   BMI 20.67 kg/m²     Temp (24hrs), Av.2 °F (36.8 °C), Min:98 °F (36.7 °C), Max:98.5 °F (36.9 °C)        General:   awake alert and oriented, non-toxic   Skin:   no rashes or skin lesions noted on limited exam, dry and warm   HEENT:  No scleral icterus or pallor; oral mucosa moist, lips moist   Lymph Nodes:   not assessed today   Lungs:   non, labored; bilaterally clear to aspiration- no crackles wheezes rales or rhonchi   Heart:  RRR, s1 and s2; no murmurs rubs or gallops; no edema, + pedal pulses   Abdomen:  soft, non-distended, active bowel sounds, non-tender   Genitourinary:  deferred   Extremities:   average muscle tone; no contractures, no joint effusions; surgical dressings in place, clean, not removed   Neurologic:  No gross focal motor abnormalities; diminished sensation to bilateral lower extremities from foot to ankle ; CN 2-12 intact; Follows commands. Psychiatric:   appropriate and interactive.        Labs: Results:   Chemistry Recent Labs     10/14/21  0322 10/13/21  0431 10/12/21  0130   * 212* 193*    134* 136   K 4.0 4.1 4.1    100 102   CO2 31 29 32   BUN 14 13 16   CREA 1.06 0.95 1.17   CA 8.8 9.1 9.0   AGAP 2* 5 2*   BUCR 13 14 14   AP 81 64 57   TP 6.9 7.3 7.3   ALB 2.2* 2.5* 2.2*   GLOB 4.7* 4.8* 5.1*   AGRAT 0.5* 0.5* 0.4*      CBC w/Diff Recent Labs     10/14/21  0322 10/13/21  0431 10/12/21  0130   WBC 6.3 7.4 9.6   RBC 4.01* 4.26* 3.99*   HGB 12.5* 13.3 12.5*   HCT 35.1* 37.0 35.2*    243 203   GRANS 42 57 54   LYMPH 42 28 31   EOS 3 2 2        No results found for: SDES Lab Results   Component Value Date/Time    Culture result: NO GROWTH 4 DAYS 10/10/2021 06:07 AM    Culture result: NO GROWTH 4 DAYS 10/10/2021 06:07 AM    Culture result: NO ANAEROBES ISOLATED 10/09/2021 12:56 PM    Culture result: FEW STAPHYLOCOCCUS AUREUS (A) 10/09/2021 12:56 PM    Culture result: FEW STREPTOCOCCUS MITIS/ORALIS (A) 10/09/2021 12:56 PM    Culture result: FEW ENTEROCOCCUS RAFFINOSUS (A) 10/09/2021 12:56 PM        Results     Procedure Component Value Units Date/Time    CULTURE, BLOOD [[id number]] Collected: 10/10/21 0607    Order Status: Completed Specimen: Whole Blood Updated: 10/14/21 0647     Special Requests: NO SPECIAL REQUESTS        Culture result: NO GROWTH 4 DAYS       CULTURE, BLOOD [[id number]] Collected: 10/10/21 9041    Order Status: Completed Specimen: Whole Blood Updated: 10/14/21 0647     Special Requests: NO SPECIAL REQUESTS        Culture result: NO GROWTH 4 DAYS       CULTURE, ANAEROBIC [[id number]] Collected: 10/09/21 1256    Order Status: Completed Specimen: Tissue Updated: 10/12/21 1645     Special Requests: RT GREAT TOE, PROXIMAL CLEAN MARGIN     Culture result: NO ANAEROBES ISOLATED       CULTURE, TISSUE Edwyna Cande STAIN [[id number]]  (Abnormal)  (Susceptibility) Collected: 10/09/21 1256    Order Status: Completed Specimen: Toe Updated: 10/14/21 0755     Special Requests: RT GREAT TOE, PROXIMAL CLEAN MARGIN     GRAM STAIN RARE WBCS SEEN         NO DEFINITE ORGANISM SEEN        Culture result: FEW STAPHYLOCOCCUS AUREUS               FEW STREPTOCOCCUS MITIS/ORALIS                  FEW ENTEROCOCCUS RAFFINOSUS          Susceptibility      Staphylococcus aureus Streptococcus mitis/oralis      GERMAN GERMAN      Cefotaxime  Susceptible      Ceftriaxone ($)  Susceptible      Ciprofloxacin ($) Resistant       Clindamycin ($) Susceptible Susceptible      Daptomycin ($$$$$) Susceptible       Doxycycline ($$) Susceptible       Erythromycin ($$$$) Resistant Intermediate      Gentamicin ($) Susceptible       Levofloxacin ($) Resistant Susceptible      Linezolid ($$$$$) Susceptible Susceptible      Moxifloxacin ($$$$) Intermediate       Oxacillin Susceptible       Penicillin G ($$)  Intermediate      Tetracycline Susceptible       Trimeth/Sulfa Susceptible       Vancomycin ($) Susceptible Susceptible                 Linear View               Susceptibility      Enterococcus raffinosus      GERMAN      Ampicillin ($) Resistant      Daptomycin ($$$$$) Susceptible  [1]       Vancomycin ($) Susceptible              [1]  (SENSITIVITIES PERFORMED BY E-TEST)          Linear View                   COVID-19 RAPID TEST [[id number]] Collected: 10/08/21 1254    Order Status: Completed Specimen: Nasopharyngeal Updated: 10/08/21 1324     Specimen source Nasopharyngeal        COVID-19 rapid test Not detected        Comment: Rapid Abbott ID Now       Rapid NAAT:  The specimen is NEGATIVE for SARS-CoV-2, the novel coronavirus associated with COVID-19. Negative results should be treated as presumptive and, if inconsistent with clinical signs and symptoms or necessary for patient management, should be tested with an alternative molecular assay. Negative results do not preclude SARS-CoV-2 infection and should not be used as the sole basis for patient management decisions. This test has been authorized by the FDA under an Emergency Use Authorization (EUA) for use by authorized laboratories.    Fact sheet for Healthcare Providers: ConventionUpdate.co.nz  Fact sheet for Patients: ConventionUpdate.co.nz       Methodology: Isothermal Nucleic Acid Amplification         CULTURE, BLOOD [[id number]]  (Abnormal) Collected: 10/07/21 2055    Order Status: Completed Specimen: Blood Updated: 10/10/21 1452     Special Requests: NO SPECIAL REQUESTS        GRAM STAIN       AEROBIC AND ANAEROBIC BOTTLES GRAM POSITIVE COCCI IN GROUPS                  SMEAR CALLED TO AND CORRECTLY REPEATED BY: [facility] AT 5435 BY KDA 10/8/21           Culture result:       STAPHYLOCOCCUS AUREUS GROWING IN THE AEROBIC AND ANAEROBIC BOTTLES REFER TO [id number] FOR SENSITIVITIES          CULTURE, BLOOD [[id number]]  (Abnormal)  (Susceptibility) Collected: 10/07/21 2040    Order Status: Completed Specimen: Blood Updated: 10/10/21 1452     Special Requests: NO SPECIAL REQUESTS        GRAM STAIN       AEROBIC AND ANAEROBIC BOTTLES SMEAR CALLED TO AND CORRECTLY REPEATED BY: RN [facility] AT 1130 BY KDA 10/8/21           Culture result:       STAPHYLOCOCCUS AUREUS GROWING IN THE AEROBIC AMD ANAEROBIC BOTTLES          Susceptibility      Staphylococcus aureus      GERMAN      Ciprofloxacin ($) Resistant      Clindamycin ($) Susceptible      Daptomycin ($$$$$) Susceptible      Doxycycline ($$) Susceptible      Erythromycin ($$$$) Resistant      Gentamicin ($) Susceptible      Levofloxacin ($) Resistant      Linezolid ($$$$$) Susceptible      Moxifloxacin ($$$$) Intermediate      Oxacillin Susceptible      Tetracycline Susceptible      Trimeth/Sulfa Susceptible      Vancomycin ($) Susceptible               Linear View                         Imaging:     IR FLUORO GUIDE PICC INSERTION    Result Date: 10/12/2021  Successful placement dual lumen peripherally inserted central catheter. Okay for immediate use.

## 2021-10-14 NOTE — PROGRESS NOTES
Discharge/Transition Planning    1000: [address] care forms with clinical info to [facility] in [place]: [phone]. [facility] does temp housing for those with medical needs  And will assist with housing after. No long term care acceptance as of yet as doesn't really meet nursing home criteria and when pt was sent to [facility] previously was coming and going continuously   1210: Spoke with [facility] and fax came to them stating error. Discussed patient and they are willing to look at patient. Concerns: [address] [place] [address] may not be in network, if pt has not followed through with what he is supposed to do previously to help self with info provided he will be in same situation again, disposition after IV antibiotics complete as [facility] gives resources but pt has to follow through on own. Unclear if pt is vaccinated but needs to be for [facility] and if he leaves while there, will be discharged . Refaxed request form and clinicals to [facility]  1240: Spoke with Dr Kiesha Barrios and pt antibiotics changed to Vanco q 24hr. Pt only needs till 10/25. Notified Dr Kiesha Barrios this is not a LTC option and pt mobilizes well and not nursing home level.  Will discuss local homeless shelter and outpatient infusion with patient as option

## 2021-10-14 NOTE — PROGRESS NOTES
Progress Note    Patient: Tracee Noriega MRN: [id number]  SSN: [id number]    YOB: 1965  Age: 49 y.o. Sex: male      Admit Date: 10/7/2021  5 Days Post-Op     Procedure:   Procedure(s):  AMPUTATION  OF RIGHT GREAT TOE    Subjective:     Patient seen resting quiet and comfortably and no apparent distress. Patient reports no pain to surgical site. Patient had pathology results which were positive for acute osteomyelitis. Status post: osteomyelitis    Objective:     Visit Vitals  BP (!) 152/89 (BP 1 Location: Left upper arm, BP Patient Position: At rest)   Pulse 84   Temp 98.2 °F (36.8 °C)   Resp 20   Ht 5' 11\" (1.803 m)   Wt 67.2 kg (148 lb 3.2 oz)   SpO2 97%   BMI 20.67 kg/m²        Physical Exam:  Right great toe amputation site stable, skin edges well approximated, skin sutures intact. Mild macerated tissue noted to medial incision site. No erythema, edema noted. Left great toe amputation site superficial wound stable. Labs/Radiology Review: images and reports reviewed    Assessment:     Hospital Problems  Date Reviewed: 10/9/2021        Codes Class Noted POA    Acute sepsis [facility]) ICD-10-CM: A41.9  ICD-9-CM: 038.9, 995.91  10/7/2021 Unknown        Osteomyelitis of great toe of right foot ([facility] ICD-10-CM: M86.9  ICD-9-CM: 730.27  10/7/2021 Unknown        Osteomyelitis ([facility] ICD-10-CM: M86.9  ICD-9-CM: 730.20  4/2/2021 Unknown              Plan/Recommendations/Medical Decision Making:     - Pathology results reviewed with patient. Discussed that clean margin also returned positive. However patient will be discharged on IV antibiotics and will monitor closely. - Dressings changed with betadine and dry gauze. - Remain NWB to right forefoot in surgical shoe. - Antibiotic management per ID recommendation.   - Patient will need dressing change every 3 days with betadine and dry gauze. - Patient is stable to d/c from foot standpoint. Will f/u in office in 1 week.

## 2021-10-14 NOTE — DIABETES MGMT
GLYCEMIC CONTROL PLAN OF CARE    Recommendations:  Patient's Blood glucose remains elevated. Recommend increasing Lantus dose to 40 units daily  Continue corrective insulin coverage as needed. Very insulin resistant dosing already ordered. Will continue inpatient monitoring. Assessment:  History:  T2DM  Problem List Items Addressed This Visit        Skeletal    Osteomyelitis of great toe of right foot (HCC) - Primary        Morning lab glucose:  187 mg/dl  IVF containing dextrose:  None   Steroids:  None   Diet/TF:  ADULT DIET Regular; 4 carb choices (60 gm/meal)    Most recent BG values:        Results for Janette Patel (MRN [id number]) as of 10/14/2021 13:30   Ref. Range 10/13/2021 12:10 10/13/2021 16:56 10/13/2021 19:50 10/14/2021 07:19 10/14/2021 11:21   GLUCOSE,FAST - POC Latest Ref Range: 70 - 110 mg/dL 378 (H) 317 (H) 139 (H) 187 (H) 297 (H)     Within target range  mg/dl? No. Will recommend increase in lantus  Current A1C:  9.0% equivalent to an average blood glucose of 212 mg/dl over the past 2-3 months. Adequate glycemic control PTA? No. Improved though since June 2021 (A1C 11.3%)  Current hospital diabetes medications:  Lantus 35 units daily  Lispro AC&HS  Previous days insulin requirements:  Lantus 35 units   Lispro 33 units corrective insulin  Home diabetes medication:  Per pta med list  Lantus 60 units every bedtime  Humalog 10 units tid AC  Education:  ___ see diabetes education record            _x_ diabetes education not indicated at this time.       [facility] RN CDE  Ext [phone] NA

## 2021-10-14 NOTE — PROGRESS NOTES
Received report on pt.from off going RN. Resting quietly in bed on rounds. Denies c/o pain or SOB at this time. No acute distress noted. Call bell at side. Will cont to monitor for any changes in status. 1940 Bedside and Verbal shift change report given to [address] (oncoming nurse) by Marce Wolff RN (offgoing nurse). Report given with THIERRY, Destiny and MAR.

## 2021-10-15 LAB
ALBUMIN SERPL-MCNC: 2.4 G/DL (ref 3.4–5)
ALBUMIN/GLOB SERPL: 0.5 {RATIO} (ref 0.8–1.7)
ALP SERPL-CCNC: 82 U/L (ref 45–117)
ALT SERPL-CCNC: 51 U/L (ref 16–61)
ANION GAP SERPL CALC-SCNC: 4 MMOL/L (ref 3–18)
AST SERPL-CCNC: 57 U/L (ref 10–38)
BASOPHILS # BLD: 0 K/UL (ref 0–0.1)
BASOPHILS NFR BLD: 0 % (ref 0–2)
BILIRUB SERPL-MCNC: 0.2 MG/DL (ref 0.2–1)
BUN SERPL-MCNC: 11 MG/DL (ref 7–18)
BUN/CREAT SERPL: 13 (ref 12–20)
CALCIUM SERPL-MCNC: 9.4 MG/DL (ref 8.5–10.1)
CHLORIDE SERPL-SCNC: 106 MMOL/L (ref 100–111)
CO2 SERPL-SCNC: 28 MMOL/L (ref 21–32)
CREAT SERPL-MCNC: 0.86 MG/DL (ref 0.6–1.3)
DIFFERENTIAL METHOD BLD: ABNORMAL
EOSINOPHIL # BLD: 0.1 K/UL (ref 0–0.4)
EOSINOPHIL NFR BLD: 2 % (ref 0–5)
ERYTHROCYTE [DISTWIDTH] IN BLOOD BY AUTOMATED COUNT: 11.4 % (ref 11.6–14.5)
GLOBULIN SER CALC-MCNC: 5.2 G/DL (ref 2–4)
GLUCOSE BLD STRIP.AUTO-MCNC: 179 MG/DL (ref 70–110)
GLUCOSE BLD STRIP.AUTO-MCNC: 205 MG/DL (ref 70–110)
GLUCOSE BLD STRIP.AUTO-MCNC: 212 MG/DL (ref 70–110)
GLUCOSE BLD STRIP.AUTO-MCNC: 215 MG/DL (ref 70–110)
GLUCOSE SERPL-MCNC: 172 MG/DL (ref 74–99)
HCT VFR BLD AUTO: 37.1 % (ref 36–48)
HGB BLD-MCNC: 12.9 G/DL (ref 13–16)
LYMPHOCYTES # BLD: 2.9 K/UL (ref 0.9–3.6)
LYMPHOCYTES NFR BLD: 41 % (ref 21–52)
MCH RBC QN AUTO: 30.4 PG (ref 24–34)
MCHC RBC AUTO-ENTMCNC: 34.8 G/DL (ref 31–37)
MCV RBC AUTO: 87.3 FL (ref 78–100)
MONOCYTES # BLD: 0.8 K/UL (ref 0.05–1.2)
MONOCYTES NFR BLD: 12 % (ref 3–10)
NEUTS SEG # BLD: 3.2 K/UL (ref 1.8–8)
NEUTS SEG NFR BLD: 45 % (ref 40–73)
PLATELET # BLD AUTO: 261 K/UL (ref 135–420)
PMV BLD AUTO: 9.7 FL (ref 9.2–11.8)
POTASSIUM SERPL-SCNC: 3.7 MMOL/L (ref 3.5–5.5)
PROT SERPL-MCNC: 7.6 G/DL (ref 6.4–8.2)
RBC # BLD AUTO: 4.25 M/UL (ref 4.35–5.65)
SODIUM SERPL-SCNC: 138 MMOL/L (ref 136–145)
WBC # BLD AUTO: 7.1 K/UL (ref 4.6–13.2)

## 2021-10-15 PROCEDURE — 74011250636 HC RX REV CODE- 250/636: Performed by: HOSPITALIST

## 2021-10-15 PROCEDURE — 74011636637 HC RX REV CODE- 636/637: Performed by: INTERNAL MEDICINE

## 2021-10-15 PROCEDURE — 82962 GLUCOSE BLOOD TEST: CPT

## 2021-10-15 PROCEDURE — 99232 SBSQ HOSP IP/OBS MODERATE 35: CPT | Performed by: HOSPITALIST

## 2021-10-15 PROCEDURE — 2709999900 HC NON-CHARGEABLE SUPPLY

## 2021-10-15 PROCEDURE — 74011250636 HC RX REV CODE- 250/636: Performed by: INTERNAL MEDICINE

## 2021-10-15 PROCEDURE — 74011250637 HC RX REV CODE- 250/637: Performed by: EMERGENCY MEDICINE

## 2021-10-15 PROCEDURE — 65660000000 HC RM CCU STEPDOWN

## 2021-10-15 PROCEDURE — 74011636637 HC RX REV CODE- 636/637: Performed by: HOSPITALIST

## 2021-10-15 PROCEDURE — 80053 COMPREHEN METABOLIC PANEL: CPT

## 2021-10-15 PROCEDURE — 36592 COLLECT BLOOD FROM PICC: CPT

## 2021-10-15 PROCEDURE — 85025 COMPLETE CBC W/AUTO DIFF WBC: CPT

## 2021-10-15 PROCEDURE — 74011250637 HC RX REV CODE- 250/637: Performed by: HOSPITALIST

## 2021-10-15 RX ORDER — INSULIN GLARGINE 100 [IU]/ML
5 INJECTION, SOLUTION SUBCUTANEOUS
Status: COMPLETED | OUTPATIENT
Start: 2021-10-15 | End: 2021-10-15

## 2021-10-15 RX ORDER — INSULIN GLARGINE 100 [IU]/ML
40 INJECTION, SOLUTION SUBCUTANEOUS DAILY
Status: DISCONTINUED | OUTPATIENT
Start: 2021-10-16 | End: 2021-10-18

## 2021-10-15 RX ADMIN — HYDRALAZINE HYDROCHLORIDE 10 MG: 10 TABLET, FILM COATED ORAL at 04:27

## 2021-10-15 RX ADMIN — SODIUM CHLORIDE 75 ML/HR: 900 INJECTION, SOLUTION INTRAVENOUS at 15:09

## 2021-10-15 RX ADMIN — DULOXETINE HYDROCHLORIDE 60 MG: 60 CAPSULE, DELAYED RELEASE ORAL at 08:51

## 2021-10-15 RX ADMIN — VANCOMYCIN HYDROCHLORIDE 1250 MG: 10 INJECTION, POWDER, LYOPHILIZED, FOR SOLUTION INTRAVENOUS at 22:43

## 2021-10-15 RX ADMIN — VANCOMYCIN HYDROCHLORIDE 1250 MG: 10 INJECTION, POWDER, LYOPHILIZED, FOR SOLUTION INTRAVENOUS at 09:22

## 2021-10-15 RX ADMIN — INSULIN LISPRO 6 UNITS: 100 INJECTION, SOLUTION INTRAVENOUS; SUBCUTANEOUS at 18:36

## 2021-10-15 RX ADMIN — INSULIN GLARGINE 35 UNITS: 100 INJECTION, SOLUTION SUBCUTANEOUS at 08:50

## 2021-10-15 RX ADMIN — INSULIN LISPRO 6 UNITS: 100 INJECTION, SOLUTION INTRAVENOUS; SUBCUTANEOUS at 12:43

## 2021-10-15 RX ADMIN — LOSARTAN POTASSIUM 100 MG: 50 TABLET, FILM COATED ORAL at 08:49

## 2021-10-15 RX ADMIN — ROSUVASTATIN CALCIUM 40 MG: 20 TABLET, COATED ORAL at 22:39

## 2021-10-15 RX ADMIN — INSULIN LISPRO 6 UNITS: 100 INJECTION, SOLUTION INTRAVENOUS; SUBCUTANEOUS at 08:50

## 2021-10-15 RX ADMIN — AMLODIPINE BESYLATE 10 MG: 10 TABLET ORAL at 08:49

## 2021-10-15 RX ADMIN — INSULIN LISPRO 3 UNITS: 100 INJECTION, SOLUTION INTRAVENOUS; SUBCUTANEOUS at 22:39

## 2021-10-15 RX ADMIN — INSULIN GLARGINE 5 UNITS: 100 INJECTION, SOLUTION SUBCUTANEOUS at 12:42

## 2021-10-15 NOTE — DIABETES MGMT
GLYCEMIC CONTROL PLAN OF CARE    Recommendations:  Patient's Blood glucose remains elevated. Recommend increasing Lantus dose to 40 units daily  Continue corrective insulin coverage as needed. Very insulin resistant dosing already ordered. Will continue inpatient monitoring. Assessment:  History:  T2DM  Problem List Items Addressed This Visit        Skeletal    Osteomyelitis of great toe of right foot ([address] - Primary        Morning lab glucose:  215 mg/dl  IVF containing dextrose:  None   Steroids:  None   Diet/TF:  ADULT DIET Regular; 4 carb choices (60 gm/meal)    Most recent BG values:        Results for Hal Rodriguez (MRN [id number]) as of 10/15/2021 12:00   Ref. Range 10/14/2021 07:19 10/14/2021 11:21 10/14/2021 16:18 10/14/2021 21:51 10/15/2021 07:19   GLUCOSE,FAST - POC Latest Ref Range: 70 - 110 mg/dL 187 (H) 297 (H) 252 (H) 200 (H) 215 (H)     Within target range  mg/dl? No. Will recommend increase in lantus  Current A1C:  9.0% equivalent to an average blood glucose of 212 mg/dl over the past 2-3 months. Adequate glycemic control PTA? No. Improved though since June 2021 (A1C 11.3%)  Current hospital diabetes medications:  Lantus 35 units daily  Lispro AC&HS  Previous days insulin requirements:  Lantus 35 units   Lispro 24 units corrective insulin  Home diabetes medication:  Per pta med list  Lantus 60 units every bedtime  Humalog 10 units tid AC  Education:  ___ see diabetes education record            _x_ diabetes education not indicated at this time.       [facility] RN CDE  Ext [phone]

## 2021-10-15 NOTE — PROGRESS NOTES
Progress Note    Patient: Daiana Mendez MRN: [id number]  SSN: [id number]    YOB: 1965  Age: 49 y.o. Sex: male      Admit Date: 10/7/2021  6 Days Post-Op     Procedure:   Procedure(s):  AMPUTATION  OF RIGHT GREAT TOE    Subjective:     Patient seen resting quiet and comfortably and no apparent distress. Patient denies any pain to surgical site. He has no new pedal complaint. Status post: osteomyelitis    Objective:     Visit Vitals  BP (!) 158/87 (BP 1 Location: Left upper arm, BP Patient Position: At rest)   Pulse 86   Temp 97.6 °F (36.4 °C)   Resp 18   Ht 5' 11\" (1.803 m)   Wt 68.4 kg (150 lb 11.2 oz)   SpO2 99%   BMI 21.02 kg/m²        Physical Exam:  Right great toe partial amputation site stable, mild maceration noted to medial incision site, skin edges well approximated, skin sutures intact. Mild seroma noted which drained with manual compression. No erythema or edema noted. Labs/Radiology Review: images and reports reviewed    Assessment:     Hospital Problems  Date Reviewed: 10/9/2021        Codes Class Noted POA    Acute sepsis [facility]) ICD-10-CM: A41.9  ICD-9-CM: 038.9, 995.91  10/7/2021 Unknown        [facility]omyel[facility] great [facility] of right foot ([facility] ICD-10-CM: M86.9  ICD-9-CM: 730.27  10/7/2021 Unknown        Osteomyelitis ([facility] ICD-10-CM: M86.9  ICD-9-CM: 730.20  4/2/2021 Unknown              Plan/Recommendations/Medical Decision Making:     - Dressings changed with betadine and dry gauze. - Patient is stable to d/c from foot standpoint. ID already made recommendation.   - Remain NWB to right forefoot in surgical shoe. - Patient will need to have dressing change every 2 days with betadine and dry gauze.    - Medical management per hospitalist.

## 2021-10-15 NOTE — ROUTINE PROCESS
Bedside and Verbal shift change report given to [address]once (oncoming nurse) by Tatyana Baca (offgoing nurse). Report included the following information SBAR, Kardex, MAR, Recent Results and Cardiac Rhythm SR. Patient quietly resting, call light in reach. Patient quietly resting.

## 2021-10-15 NOTE — PROGRESS NOTES
Nutrition Assessment     Type and Reason for Visit: Reassess    Nutrition Recommendations/Plan: Continue current diet. Nutrition Assessment:  Tolerating diet with good meal intake. Glycemic control team following for BG management. No nutrition concerns at this time. Malnutrition Assessment:  Malnutrition Status: No malnutrition (poor appetite & intake x 2-3 days PTA)     Estimated Daily Nutrient Needs:  Energy (kcal):  1136-6738  Protein (g):  58-88       Fluid (ml/day):  0319-0074    Nutrition Related Findings:  BM 10/13. Meds: Lantus, 40 units (increased today), SSI. BG levels 172-215 mg/dL. Current Nutrition Therapies:  ADULT DIET Regular; 4 carb choices (60 gm/meal)    Anthropometric Measures:  · Height:  5' 11\" (180.3 cm)  · Current Body Wt:  72.6 kg (160 lb)  · BMI: 22.3    Nutrition Diagnosis:   No nutrition diagnosis at this time    Nutrition Intervention:  Food and/or Nutrient Delivery: Continue current diet  Nutrition Education and Counseling: No recommendations at this time  Coordination of Nutrition Care: Continue to monitor while inpatient    Goals:  PO nutrition intake will continue to meet >75% of patients estimated nutritional needs over the next 7 days.        Nutrition Monitoring and Evaluation:   Behavioral-Environmental Outcomes: None identified  Food/Nutrient Intake Outcomes: Food and nutrient intake  Physical Signs/Symptoms Outcomes: Biochemical data, Meal time behavior    Discharge Planning:    Continue current diet     Electronically signed by Devon Schmitz RD on 10/15/2021 at 1:08 PM    Contact Number: [phone]

## 2021-10-15 NOTE — PROGRESS NOTES
Physician Progress Note      Yinka Dong  [facility] #:                  [id number]  :                       1965  ADMIT DATE:       10/7/2021 8:44 PM  [address] [place] DATE:  RESPONDING  PROVIDER #:        Shandra Tilley MD [facility] MD          QUERY TEXT:    Pt admitted with MSSA sepsis with R great toe OM. Surgical culture and sensitivity was performed on 10/9 and results indicated resistance to  Ciprofloxacin, Erythromycin, Levofloxacin. Patient initially placed on  broad-spectrum IV antibiotics, R great toe amputation done, DC plan with PICC for IV abx. Please clarify if this patient had: The medical record reflects the following:  Risk Factors: MSSA sepsis, R great toe OM  Clinical Indicators:  H&P:  osteomyelitis of the right big toe with foul-smelling discharge. Patient has been started on broad-spectrum IV antibiotics  10/9 surgical cx:  Staphylococcus aureus with resistance to Ciprofloxacin, Erythromycin, Levofloxacin,  Enterococcus raffinosus with resistance to Ampicillin  Path:  acute osteomyelitis R great toe  10/11 ID:  MSSA sepsis  10/12 PICC placed  10/13 Pod:  patient will be discharged on IV antibiotics  Treatment: broad-spectrum IV antibiotics, R great toe amputation, PICC, plan for home IV abx  Thank you. [email or web address]  Options provided:  -- Ciprofloxacin, Erythromycin, Levofloxacin Resistance  -- No antibiotic resistance  -- Other - I will add my own diagnosis  -- Disagree - Not applicable / Not valid  -- Disagree - Clinically unable to determine / Unknown  -- Refer to Clinical Documentation Reviewer    PROVIDER RESPONSE TEXT:    This patient has resistance to Ciprofloxacin, Erythromycin, Levofloxacin. Query created by:  Mike Josue on 10/15/2021 12:57 PM      Electronically signed by:  Shandra Tilley MD [facility] MD 10/15/2021 5:53 PM

## 2021-10-15 NOTE — PROGRESS NOTES
Admit Date: 10/7/2021  Date of Service: 10/15/2021    Reason for follow-up: toe infection      Assessment:         1. MSSA sepsis: 10/7 blood cultures 4/4 positive; repeat 10/10 blood cx pending  -10/11/2021 TTE negative for endocarditis  2. Osteomyelitis of right great toe-status post amputation 10/9/2021  -10/9/2021 surgical cultures with staph aureus  3. History of type 2 diabetes-insulin sliding scale, monitor blood sugars  4. History of hypertension-resume home medications, monitor blood pressure  5. Hyponatremia-gentle hydration with IVF, monitor sodium levels  6. History of depression-resume chronic home medications    Plan:   Local wound care  F/u intraoperative cultures and pathology  Discussed with diabetes educator. We will add Lantus   -Tight glucose control for optimal wound healing  Continue Cymbalta, Crestor  Continue with vancomycin-cultures growing 3 different bacteria, discussed with ID, sensitive to vancomycin, will continue IV vancomycin post discharge since patient has a PICC line. Continue till 10/25/2021. Sliding scale insulin, Accu-Cheks  Follow-up 10/10 blood cultures-currently no growth to date    Current Antibtiocs:   Vancomycin 10/7- present      Lines:   Peripheral    Case discussed with:  [x]Patient  []Family  [x]Nursing  []Case Management  DVT Prophylaxis:  []Lovenox  []Hep SQ  []SCDs  []Coumadin   []On Heparin gtt    I have independently examined the patient and reviewed all lab studies and imgaing as well as review of nursing notes and physican notes from the past 24 hours. Discussed with  regarding discharge planning. Awaiting placement. No Known Allergies        Subjective:      Pt seen and examined. Lying in bed, no acute distress.      Objective:        Visit Vitals  BP (!) 158/87 (BP 1 Location: Left upper arm, BP Patient Position: At rest)   Pulse 86   Temp 97.6 °F (36.4 °C)   Resp 18   Ht 5' 11\" (1.803 m)   Wt 68.4 kg (150 lb 11.2 oz)   SpO2 99%   BMI 21.02 kg/m²     Temp (24hrs), Av.1 °F (36.7 °C), Min:97.6 °F (36.4 °C), Max:98.7 °F (37.1 °C)        General:   awake alert and oriented, non-toxic   Skin:   no rashes or skin lesions noted on limited exam, dry and warm   HEENT:  No scleral icterus or pallor; oral mucosa moist, lips moist   Lymph Nodes:   not assessed today   Lungs:   non, labored; bilaterally clear to aspiration- no crackles wheezes rales or rhonchi   Heart:  RRR, s1 and s2; no murmurs rubs or gallops; no edema, + pedal pulses   Abdomen:  soft, non-distended, active bowel sounds, non-tender   Genitourinary:  deferred   Extremities:   average muscle tone; no contractures, no joint effusions; surgical dressings in place, clean, not removed   Neurologic:  No gross focal motor abnormalities; diminished sensation to bilateral lower extremities from foot to ankle ; CN 2-12 intact; Follows commands. Psychiatric:   appropriate and interactive.        Labs: Results:   Chemistry Recent Labs     10/15/21  0436 10/14/21  0322 10/13/21  0431   * 176* 212*    138 134*   K 3.7 4.0 4.1    105 100   CO2 28 31 29   BUN 11 14 13   CREA 0.86 1.06 0.95   CA 9.4 8.8 9.1   AGAP 4 2* 5   BUCR 13 13 14   AP 82 81 64   TP 7.6 6.9 7.3   ALB 2.4* 2.2* 2.5*   GLOB 5.2* 4.7* 4.8*   AGRAT 0.5* 0.5* 0.5*      CBC w/Diff Recent Labs     10/15/21  0436 10/14/21  0322 10/13/21  0431   WBC 7.1 6.3 7.4   RBC 4.25* 4.01* 4.26*   HGB 12.9* 12.5* 13.3   HCT 37.1 35.1* 37.0    256 243   GRANS 45 42 57   LYMPH 41 42 28   EOS 2 3 2        No results found for: SDES Lab Results   Component Value Date/Time    Culture result: NO GROWTH 5 DAYS 10/10/2021 06:07 AM    Culture result: NO GROWTH 5 DAYS 10/10/2021 06:07 AM    Culture result: NO ANAEROBES ISOLATED 10/09/2021 12:56 PM    Culture result: FEW STAPHYLOCOCCUS AUREUS (A) 10/09/2021 12:56 PM    Culture result: FEW STREPTOCOCCUS MITIS/ORALIS (A) 10/09/2021 12:56 PM    Culture result: FEW ENTEROCOCCUS RAFFINOSUS (A) 10/09/2021 12:56 PM        Results     Procedure Component Value Units Date/Time    CULTURE, BLOOD [[id number]] Collected: 10/10/21 0607    Order Status: Completed Specimen: Whole Blood Updated: 10/15/21 0649     Special Requests: NO SPECIAL REQUESTS        Culture result: NO GROWTH 5 DAYS       CULTURE, BLOOD [[id number]] Collected: 10/10/21 4640    Order Status: Completed Specimen: Whole Blood Updated: 10/15/21 0649     Special Requests: NO SPECIAL REQUESTS        Culture result: NO GROWTH 5 DAYS       CULTURE, ANAEROBIC [[id number]] Collected: 10/09/21 1256    Order Status: Completed Specimen: Tissue Updated: 10/12/21 1645     Special Requests: RT GREAT TOE, PROXIMAL CLEAN MARGIN     Culture result: NO ANAEROBES ISOLATED       CULTURE, TISSUE Lavada Gambles STAIN [[id number]]  (Abnormal)  (Susceptibility) Collected: 10/09/21 1256    Order Status: Completed Specimen: Toe Updated: 10/14/21 0755     Special Requests: RT GREAT TOE, PROXIMAL CLEAN MARGIN     GRAM STAIN RARE WBCS SEEN         NO DEFINITE ORGANISM SEEN        Culture result: FEW STAPHYLOCOCCUS AUREUS               FEW STREPTOCOCCUS MITIS/ORALIS                  FEW ENTEROCOCCUS RAFFINOSUS          Susceptibility      Staphylococcus aureus Streptococcus mitis/oralis      GERMAN GERMAN      Cefotaxime  Susceptible      Ceftriaxone ($)  Susceptible      Ciprofloxacin ($) Resistant       Clindamycin ($) Susceptible Susceptible      Daptomycin ($$$$$) Susceptible       Doxycycline ($$) Susceptible       Erythromycin ($$$$) Resistant Intermediate      Gentamicin ($) Susceptible       Levofloxacin ($) Resistant Susceptible      Linezolid ($$$$$) Susceptible Susceptible      Moxifloxacin ($$$$) Intermediate       Oxacillin Susceptible       Penicillin G ($$)  Intermediate      Tetracycline Susceptible       Trimeth/Sulfa Susceptible       Vancomycin ($) Susceptible Susceptible                 Linear View               Susceptibility      Enterococcus raffinosus GERMAN      Ampicillin ($) Resistant      Daptomycin ($$$$$) Susceptible  [1]       Vancomycin ($) Susceptible              [1]  (SENSITIVITIES PERFORMED BY E-TEST)          Linear View                   COVID-19 RAPID TEST [[id number]] Collected: 10/08/21 1254    Order Status: Completed Specimen: Nasopharyngeal Updated: 10/08/21 1324     Specimen source Nasopharyngeal        COVID-19 rapid test Not detected        Comment: Rapid Abbott ID Now       Rapid NAAT:  The specimen is NEGATIVE for SARS-CoV-2, the novel coronavirus associated with COVID-19. Negative results should be treated as presumptive and, if inconsistent with clinical signs and symptoms or necessary for patient management, should be tested with an alternative molecular assay. Negative results do not preclude SARS-CoV-2 infection and should not be used as the sole basis for patient management decisions. This test has been authorized by the FDA under an Emergency Use Authorization (EUA) for use by authorized laboratories.    Fact sheet for Healthcare Providers: ConventionUpdate.co.nz  Fact sheet for Patients: ConventionUpdate.co.nz       Methodology: Isothermal Nucleic Acid Amplification         CULTURE, BLOOD [[id number]]  (Abnormal) Collected: 10/07/21 2055    Order Status: Completed Specimen: Blood Updated: 10/10/21 1452     Special Requests: NO SPECIAL REQUESTS        GRAM STAIN       AEROBIC AND ANAEROBIC BOTTLES GRAM POSITIVE COCCI IN GROUPS                  SMEAR CALLED TO AND CORRECTLY REPEATED BY: [facility] AT 7345 BY KDA 10/8/21           Culture result:       STAPHYLOCOCCUS AUREUS GROWING IN THE AEROBIC AND ANAEROBIC BOTTLES REFER TO [id number] FOR SENSITIVITIES          CULTURE, BLOOD [[id number]]  (Abnormal)  (Susceptibility) Collected: 10/07/21 2040    Order Status: Completed Specimen: Blood Updated: 10/10/21 1452     Special Requests: NO SPECIAL REQUESTS        GRAM STAIN       AEROBIC AND ANAEROBIC BOTTLES                  SMEAR CALLED TO AND CORRECTLY REPEATED BY: RN [facility] 2S AT 1130 BY KDA 10/8/21           Culture result:       STAPHYLOCOCCUS AUREUS GROWING IN THE AEROBIC AMD ANAEROBIC BOTTLES          Susceptibility      Staphylococcus aureus      GERMAN      Ciprofloxacin ($) Resistant      Clindamycin ($) Susceptible      Daptomycin ($$$$$) Susceptible      Doxycycline ($$) Susceptible      Erythromycin ($$$$) Resistant      Gentamicin ($) Susceptible      Levofloxacin ($) Resistant      Linezolid ($$$$$) Susceptible      Moxifloxacin ($$$$) Intermediate      Oxacillin Susceptible      Tetracycline Susceptible      Trimeth/Sulfa Susceptible      Vancomycin ($) Susceptible               Linear View                         Imaging:     IR FLUORO GUIDE PICC INSERTION    Result Date: 10/12/2021  Successful placement dual lumen peripherally inserted central catheter. Okay for immediate use.

## 2021-10-15 NOTE — PROGRESS NOTES
Called [facility] in [facility] and left a message    1311: Called [employer] and was transfered to the Clinical Director's voice mail. Left a message requesting for updates. 1626: Called [employer] multiple times had been transferred to the [address] [place] [address],[address] Director Manju Florence. Left messages. Sent text message to Dr. Quirino Morris.       DEBI StoutN RN  Care Management  Pager: [phone]

## 2021-10-16 LAB
ALBUMIN SERPL-MCNC: 2.3 G/DL (ref 3.4–5)
ALBUMIN/GLOB SERPL: 0.5 {RATIO} (ref 0.8–1.7)
ALP SERPL-CCNC: 64 U/L (ref 45–117)
ALT SERPL-CCNC: 60 U/L (ref 16–61)
ANION GAP SERPL CALC-SCNC: 2 MMOL/L (ref 3–18)
AST SERPL-CCNC: 70 U/L (ref 10–38)
BACTERIA SPEC CULT: NORMAL
BACTERIA SPEC CULT: NORMAL
BASOPHILS # BLD: 0 K/UL (ref 0–0.1)
BASOPHILS NFR BLD: 0 % (ref 0–2)
BILIRUB SERPL-MCNC: 0.3 MG/DL (ref 0.2–1)
BUN SERPL-MCNC: 14 MG/DL (ref 7–18)
BUN/CREAT SERPL: 16 (ref 12–20)
CALCIUM SERPL-MCNC: 8.9 MG/DL (ref 8.5–10.1)
CHLORIDE SERPL-SCNC: 105 MMOL/L (ref 100–111)
CO2 SERPL-SCNC: 29 MMOL/L (ref 21–32)
CREAT SERPL-MCNC: 0.89 MG/DL (ref 0.6–1.3)
DIFFERENTIAL METHOD BLD: ABNORMAL
EOSINOPHIL # BLD: 0.1 K/UL (ref 0–0.4)
EOSINOPHIL NFR BLD: 2 % (ref 0–5)
ERYTHROCYTE [DISTWIDTH] IN BLOOD BY AUTOMATED COUNT: 11.3 % (ref 11.6–14.5)
GLOBULIN SER CALC-MCNC: 4.6 G/DL (ref 2–4)
GLUCOSE BLD STRIP.AUTO-MCNC: 136 MG/DL (ref 70–110)
GLUCOSE BLD STRIP.AUTO-MCNC: 147 MG/DL (ref 70–110)
GLUCOSE BLD STRIP.AUTO-MCNC: 181 MG/DL (ref 70–110)
GLUCOSE BLD STRIP.AUTO-MCNC: 224 MG/DL (ref 70–110)
GLUCOSE SERPL-MCNC: 227 MG/DL (ref 74–99)
HCT VFR BLD AUTO: 34.4 % (ref 36–48)
HGB BLD-MCNC: 12 G/DL (ref 13–16)
LYMPHOCYTES # BLD: 2.8 K/UL (ref 0.9–3.6)
LYMPHOCYTES NFR BLD: 40 % (ref 21–52)
MCH RBC QN AUTO: 30.8 PG (ref 24–34)
MCHC RBC AUTO-ENTMCNC: 34.9 G/DL (ref 31–37)
MCV RBC AUTO: 88.4 FL (ref 78–100)
MONOCYTES # BLD: 0.7 K/UL (ref 0.05–1.2)
MONOCYTES NFR BLD: 10 % (ref 3–10)
NEUTS SEG # BLD: 3.3 K/UL (ref 1.8–8)
NEUTS SEG NFR BLD: 48 % (ref 40–73)
PLATELET # BLD AUTO: 252 K/UL (ref 135–420)
PMV BLD AUTO: 10.1 FL (ref 9.2–11.8)
POTASSIUM SERPL-SCNC: 4 MMOL/L (ref 3.5–5.5)
PROT SERPL-MCNC: 6.9 G/DL (ref 6.4–8.2)
RBC # BLD AUTO: 3.89 M/UL (ref 4.35–5.65)
SERVICE CMNT-IMP: NORMAL
SERVICE CMNT-IMP: NORMAL
SODIUM SERPL-SCNC: 136 MMOL/L (ref 136–145)
WBC # BLD AUTO: 6.9 K/UL (ref 4.6–13.2)

## 2021-10-16 PROCEDURE — 80053 COMPREHEN METABOLIC PANEL: CPT

## 2021-10-16 PROCEDURE — 82962 GLUCOSE BLOOD TEST: CPT

## 2021-10-16 PROCEDURE — 74011636637 HC RX REV CODE- 636/637: Performed by: HOSPITALIST

## 2021-10-16 PROCEDURE — 85025 COMPLETE CBC W/AUTO DIFF WBC: CPT

## 2021-10-16 PROCEDURE — 74011250636 HC RX REV CODE- 250/636: Performed by: HOSPITALIST

## 2021-10-16 PROCEDURE — 74011250637 HC RX REV CODE- 250/637: Performed by: HOSPITALIST

## 2021-10-16 PROCEDURE — 74011636637 HC RX REV CODE- 636/637: Performed by: INTERNAL MEDICINE

## 2021-10-16 PROCEDURE — 65660000000 HC RM CCU STEPDOWN

## 2021-10-16 PROCEDURE — 74011250636 HC RX REV CODE- 250/636: Performed by: EMERGENCY MEDICINE

## 2021-10-16 PROCEDURE — 2709999900 HC NON-CHARGEABLE SUPPLY

## 2021-10-16 PROCEDURE — 74011250636 HC RX REV CODE- 250/636: Performed by: INTERNAL MEDICINE

## 2021-10-16 PROCEDURE — 36415 COLL VENOUS BLD VENIPUNCTURE: CPT

## 2021-10-16 PROCEDURE — 99232 SBSQ HOSP IP/OBS MODERATE 35: CPT | Performed by: HOSPITALIST

## 2021-10-16 RX ADMIN — INSULIN GLARGINE 40 UNITS: 100 INJECTION, SOLUTION SUBCUTANEOUS at 09:00

## 2021-10-16 RX ADMIN — AMLODIPINE BESYLATE 10 MG: 10 TABLET ORAL at 09:00

## 2021-10-16 RX ADMIN — SODIUM CHLORIDE 75 ML/HR: 900 INJECTION, SOLUTION INTRAVENOUS at 06:22

## 2021-10-16 RX ADMIN — ROSUVASTATIN CALCIUM 40 MG: 20 TABLET, COATED ORAL at 21:16

## 2021-10-16 RX ADMIN — TRAZODONE HYDROCHLORIDE 50 MG: 50 TABLET ORAL at 21:16

## 2021-10-16 RX ADMIN — INSULIN LISPRO 3 UNITS: 100 INJECTION, SOLUTION INTRAVENOUS; SUBCUTANEOUS at 08:30

## 2021-10-16 RX ADMIN — INSULIN LISPRO 6 UNITS: 100 INJECTION, SOLUTION INTRAVENOUS; SUBCUTANEOUS at 12:24

## 2021-10-16 RX ADMIN — VANCOMYCIN HYDROCHLORIDE 1250 MG: 10 INJECTION, POWDER, LYOPHILIZED, FOR SOLUTION INTRAVENOUS at 21:16

## 2021-10-16 RX ADMIN — MORPHINE SULFATE 2 MG: 2 INJECTION, SOLUTION INTRAMUSCULAR; INTRAVENOUS at 12:27

## 2021-10-16 RX ADMIN — DULOXETINE HYDROCHLORIDE 60 MG: 60 CAPSULE, DELAYED RELEASE ORAL at 09:00

## 2021-10-16 RX ADMIN — VANCOMYCIN HYDROCHLORIDE 1250 MG: 10 INJECTION, POWDER, LYOPHILIZED, FOR SOLUTION INTRAVENOUS at 11:28

## 2021-10-16 RX ADMIN — LOSARTAN POTASSIUM 100 MG: 50 TABLET, FILM COATED ORAL at 09:00

## 2021-10-16 NOTE — PROGRESS NOTES
Bedside and Verbal shift change report given to [address] Frankie Cerda (oncoming nurse) by Abdon Stephen RN (offgoing nurse). Report included the following information SBAR, ED Summary, Intake/Output, MAR and Recent Results' Patient alert and resting quietly in bed.

## 2021-10-16 NOTE — PROGRESS NOTES
Problem: Diabetes Self-Management  Goal: *Disease process and treatment process  Description: Define diabetes and identify own type of diabetes; list 3 options for treating diabetes. Outcome: Progressing Towards Goal  Goal: *Incorporating nutritional management into lifestyle  Description: Describe effect of type, amount and timing of food on blood glucose; list 3 methods for planning meals. Outcome: Progressing Towards Goal  Goal: *Incorporating physical activity into lifestyle  Description: State effect of exercise on blood glucose levels. Outcome: Progressing Towards Goal  Goal: *Developing strategies to promote health/change behavior  Description: Define the ABC's of diabetes; identify appropriate screenings, schedule and personal plan for screenings. Outcome: Progressing Towards Goal  Goal: *Using medications safely  Description: State effect of diabetes medications on diabetes; name diabetes medication taking, action and side effects. Outcome: Progressing Towards Goal  Goal: *Monitoring blood glucose, interpreting and using results  Description: Identify recommended blood glucose targets  and personal targets. Outcome: Progressing Towards Goal  Goal: *Prevention, detection, treatment of acute complications  Description: List symptoms of hyper- and hypoglycemia; describe how to treat low blood sugar and actions for lowering  high blood glucose level. Outcome: Progressing Towards Goal  Goal: *Prevention, detection and treatment of chronic complications  Description: Define the natural course of diabetes and describe the relationship of blood glucose levels to long term complications of diabetes.   Outcome: Progressing Towards Goal  Goal: *Developing strategies to address psychosocial issues  Description: Describe feelings about living with diabetes; identify support needed and support network  Outcome: Progressing Towards Goal  Goal: *Insulin pump training  Outcome: Progressing Towards Goal  Goal: *Sick day guidelines  Outcome: Progressing Towards Goal  Goal: *Patient Specific Goal (EDIT GOAL, INSERT TEXT)  Outcome: Progressing Towards Goal     Problem: Patient Education: Go to Patient Education Activity  Goal: Patient/Family Education  Outcome: Progressing Towards Goal     Problem: Falls - Risk of  Goal: *Absence of Falls  Description: Document Khalif Cease Fall Risk and appropriate interventions in the flowsheet. Outcome: Progressing Towards Goal  Note: Fall Risk Interventions:  Mobility Interventions: Bed/chair exit alarm, Patient to call before getting OOB         Medication Interventions: Teach patient to arise slowly    Elimination Interventions: Call light in reach, Patient to call for help with toileting needs, Urinal in reach              Problem: Patient Education: Go to Patient Education Activity  Goal: Patient/Family Education  Outcome: Progressing Towards Goal     Problem: Pain  Goal: *Control of Pain  Outcome: Progressing Towards Goal  Goal: *PALLIATIVE CARE:  Alleviation of Pain  Outcome: Progressing Towards Goal     Problem: Patient Education: Go to Patient Education Activity  Goal: Patient/Family Education  Outcome: Progressing Towards Goal     Problem: Lower Extremity Wound Care  Goal: *Non-infected wound: Improvement of existing wound, absence of infection, and maintenance of skin integrity  Outcome: Progressing Towards Goal  Goal: *Infected Wound: Prevention of further infection and promotion of healing  Description: Infection control procedures (eg: clean dressings, clean gloves, hand washing, precautions to isolate wound from contamination, sterile instruments used for wound debridement) should be implemented.   Outcome: Progressing Towards Goal  Goal: Interventions  Outcome: Progressing Towards Goal     Problem: Patient Education: Go to Patient Education Activity  Goal: Patient/Family Education  Outcome: Progressing Towards Goal     Problem: Risk for Spread of Infection  Goal: Prevent transmission of infectious organism to others  Description: Prevent the transmission of infectious organisms to other patients, staff members, and visitors.   Outcome: Progressing Towards Goal     Problem: Patient Education:  Go to Education Activity  Goal: Patient/Family Education  Outcome: Progressing Towards Goal

## 2021-10-16 NOTE — PROGRESS NOTES
Admit Date: 10/7/2021  Date of Service: 10/16/2021    Reason for follow-up: toe infection      Assessment:         1. MSSA sepsis: 10/7 blood cultures 4/4 positive; repeat 10/10 blood cx pending  -10/11/2021 TTE negative for endocarditis  2. Osteomyelitis of right great toe-status post amputation 10/9/2021  -10/9/2021 surgical cultures with staph aureus  3. History of type 2 diabetes-insulin sliding scale, monitor blood sugars  4. History of hypertension-resume home medications, monitor blood pressure  5. Hyponatremia-gentle hydration with IVF, monitor sodium levels  6. History of depression-resume chronic home medications    Plan:   Local wound care  F/u intraoperative cultures and pathology  Discussed with diabetes educator. We will add Lantus   -Tight glucose control for optimal wound healing  Continue Cymbalta, Crestor  Continue with vancomycin-cultures growing 3 different bacteria, discussed with ID, sensitive to vancomycin, will continue IV vancomycin post discharge since patient has a PICC line. Continue till 10/25/2021. Sliding scale insulin, Accu-Cheks  Follow-up 10/10 blood cultures-currently no growth to date    Current Antibtiocs:   Vancomycin 10/7- present      Lines:   Peripheral    Case discussed with:  [x]Patient  []Family  [x]Nursing  []Case Management  DVT Prophylaxis:  []Lovenox  []Hep SQ  []SCDs  []Coumadin   []On Heparin gtt    I have independently examined the patient and reviewed all lab studies and imgaing as well as review of nursing notes and physican notes from the past 24 hours. Discussed with  regarding discharge planning. Awaiting placement. No Known Allergies        Subjective:      Pt seen and examined. Lying in bed, no acute distress.      Objective:        Visit Vitals  BP (!) 137/91 (BP 1 Location: Right lower arm, BP Patient Position: At rest)   Pulse 84   Temp 98.1 °F (36.7 °C)   Resp 18   Ht 5' 11\" (1.803 m)   Wt 68.4 kg (150 lb 11.2 oz)   SpO2 99%   BMI 21.02 kg/m²     Temp (24hrs), Av.1 °F (36.7 °C), Min:97.8 °F (36.6 °C), Max:98.2 °F (36.8 °C)        General:   awake alert and oriented, non-toxic   Skin:   no rashes or skin lesions noted on limited exam, dry and warm   HEENT:  No scleral icterus or pallor; oral mucosa moist, lips moist   Lymph Nodes:   not assessed today   Lungs:   non, labored; bilaterally clear to aspiration- no crackles wheezes rales or rhonchi   Heart:  RRR, s1 and s2; no murmurs rubs or gallops; no edema, + pedal pulses   Abdomen:  soft, non-distended, active bowel sounds, non-tender   Genitourinary:  deferred   Extremities:   average muscle tone; no contractures, no joint effusions; surgical dressings in place, clean, not removed   Neurologic:  No gross focal motor abnormalities; diminished sensation to bilateral lower extremities from foot to ankle ; CN 2-12 intact; Follows commands. Psychiatric:   appropriate and interactive.        Labs: Results:   Chemistry Recent Labs     10/16/21  0320 10/15/21  0436 10/14/21  0322   * 172* 176*    138 138   K 4.0 3.7 4.0    106 105   CO2 29 28 31   BUN 14 11 14   CREA 0.89 0.86 1.06   CA 8.9 9.4 8.8   AGAP 2* 4 2*   BUCR 16 13 13   AP 64 82 81   TP 6.9 7.6 6.9   ALB 2.3* 2.4* 2.2*   GLOB 4.6* 5.2* 4.7*   AGRAT 0.5* 0.5* 0.5*      CBC w/Diff Recent Labs     10/16/21  0320 10/15/21  0436 10/14/21  0322   WBC 6.9 7.1 6.3   RBC 3.89* 4.25* 4.01*   HGB 12.0* 12.9* 12.5*   HCT 34.4* 37.1 35.1*    261 256   GRANS 48 45 42   LYMPH 40 41 42   EOS 2 2 3        No results found for: SDES Lab Results   Component Value Date/Time    Culture result: NO GROWTH 6 DAYS 10/10/2021 06:07 AM    Culture result: NO GROWTH 6 DAYS 10/10/2021 06:07 AM    Culture result: NO ANAEROBES ISOLATED 10/09/2021 12:56 PM    Culture result: FEW STAPHYLOCOCCUS AUREUS (A) 10/09/2021 12:56 PM    Culture result: FEW STREPTOCOCCUS MITIS/ORALIS (A) 10/09/2021 12:56 PM    Culture result: FEW ENTEROCOCCUS RAFFINOSUS (A) 10/09/2021 12:56 PM        Results     Procedure Component Value Units Date/Time    CULTURE, BLOOD [[id number]] Collected: 10/10/21 0607    Order Status: Completed Specimen: Whole Blood Updated: 10/16/21 0709     Special Requests: NO SPECIAL REQUESTS        Culture result: NO GROWTH 6 DAYS       CULTURE, BLOOD [[id number]] Collected: 10/10/21 2218    Order Status: Completed Specimen: Whole Blood Updated: 10/16/21 0709     Special Requests: NO SPECIAL REQUESTS        Culture result: NO GROWTH 6 DAYS       CULTURE, ANAEROBIC [[id number]] Collected: 10/09/21 1256    Order Status: Completed Specimen: Tissue Updated: 10/12/21 1645     Special Requests: RT GREAT TOE, PROXIMAL CLEAN MARGIN     Culture result: NO ANAEROBES ISOLATED       CULTURE, TISSUE Martha Anish STAIN [[id number]]  (Abnormal)  (Susceptibility) Collected: 10/09/21 1256    Order Status: Completed Specimen: Toe Updated: 10/14/21 0755     Special Requests: RT GREAT TOE, PROXIMAL CLEAN MARGIN     GRAM STAIN RARE WBCS SEEN         NO DEFINITE ORGANISM SEEN        Culture result: FEW STAPHYLOCOCCUS AUREUS               FEW STREPTOCOCCUS MITIS/ORALIS                  FEW ENTEROCOCCUS RAFFINOSUS          Susceptibility      Staphylococcus aureus Streptococcus mitis/oralis      GERMAN GERMAN      Cefotaxime  Susceptible      Ceftriaxone ($)  Susceptible      Ciprofloxacin ($) Resistant       Clindamycin ($) Susceptible Susceptible      Daptomycin ($$$$$) Susceptible       Doxycycline ($$) Susceptible       Erythromycin ($$$$) Resistant Intermediate      Gentamicin ($) Susceptible       Levofloxacin ($) Resistant Susceptible      Linezolid ($$$$$) Susceptible Susceptible      Moxifloxacin ($$$$) Intermediate       Oxacillin Susceptible       Penicillin G ($$)  Intermediate      Tetracycline Susceptible       Trimeth/Sulfa Susceptible       Vancomycin ($) Susceptible Susceptible                 Linear View               Susceptibility      Enterococcus raffinosus GERMAN      Ampicillin ($) Resistant      Daptomycin ($$$$$) Susceptible  [1]       Vancomycin ($) Susceptible              [1]  (SENSITIVITIES PERFORMED BY E-TEST)          Linear View                   COVID-19 RAPID TEST [[id number]] Collected: 10/08/21 1254    Order Status: Completed Specimen: Nasopharyngeal Updated: 10/08/21 1324     Specimen source Nasopharyngeal        COVID-19 rapid test Not detected        Comment: Rapid Abbott ID Now       Rapid NAAT:  The specimen is NEGATIVE for SARS-CoV-2, the novel coronavirus associated with COVID-19. Negative results should be treated as presumptive and, if inconsistent with clinical signs and symptoms or necessary for patient management, should be tested with an alternative molecular assay. Negative results do not preclude SARS-CoV-2 infection and should not be used as the sole basis for patient management decisions. This test has been authorized by the FDA under an Emergency Use Authorization (EUA) for use by authorized laboratories.    Fact sheet for Healthcare Providers: ConventionUpdate.co.nz  Fact sheet for Patients: ConventionUpdate.co.nz       Methodology: Isothermal Nucleic Acid Amplification         CULTURE, BLOOD [[id number]]  (Abnormal) Collected: 10/07/21 2055    Order Status: Completed Specimen: Blood Updated: 10/10/21 1452     Special Requests: NO SPECIAL REQUESTS        GRAM STAIN       AEROBIC AND ANAEROBIC BOTTLES GRAM POSITIVE COCCI IN GROUPS                  SMEAR CALLED TO AND CORRECTLY REPEATED BY: [facility] AT 7803 BY KDA 10/8/21           Culture result:       STAPHYLOCOCCUS AUREUS GROWING IN THE AEROBIC AND ANAEROBIC BOTTLES REFER TO [id number] FOR SENSITIVITIES          CULTURE, BLOOD [[id number]]  (Abnormal)  (Susceptibility) Collected: 10/07/21 2040    Order Status: Completed Specimen: Blood Updated: 10/10/21 1452     Special Requests: NO SPECIAL REQUESTS        GRAM STAIN       AEROBIC AND ANAEROBIC BOTTLES                  SMEAR CALLED TO AND CORRECTLY REPEATED BY: RN FRUGARD [facility] 2[facility] AT 1130 BY KDA 10/8/21           Culture result:       STAPHYLOCOCCUS AUREUS GROWING IN THE AEROBIC AMD ANAEROBIC BOTTLES          Susceptibility      Staphylococcus aureus      GERMAN      Ciprofloxacin ($) Resistant      Clindamycin ($) Susceptible      Daptomycin ($$$$$) Susceptible      Doxycycline ($$) Susceptible      Erythromycin ($$$$) Resistant      Gentamicin ($) Susceptible      Levofloxacin ($) Resistant      Linezolid ($$$$$) Susceptible      Moxifloxacin ($$$$) Intermediate      Oxacillin Susceptible      Tetracycline Susceptible      Trimeth/Sulfa Susceptible      Vancomycin ($) Susceptible               Linear View                         Imaging:     IR FLUORO GUIDE PICC INSERTION    Result Date: 10/12/2021  Successful placement dual lumen peripherally inserted central catheter. Okay for immediate use.

## 2021-10-17 LAB
DATE LAST DOSE: NORMAL
GLUCOSE BLD STRIP.AUTO-MCNC: 101 MG/DL (ref 70–110)
GLUCOSE BLD STRIP.AUTO-MCNC: 116 MG/DL (ref 70–110)
GLUCOSE BLD STRIP.AUTO-MCNC: 170 MG/DL (ref 70–110)
GLUCOSE BLD STRIP.AUTO-MCNC: 212 MG/DL (ref 70–110)
REPORTED DOSE,DOSE: NORMAL UNITS
REPORTED DOSE/TIME,TMG: 2200
VANCOMYCIN TROUGH SERPL-MCNC: 16.7 UG/ML (ref 10–20)

## 2021-10-17 PROCEDURE — 99232 SBSQ HOSP IP/OBS MODERATE 35: CPT | Performed by: HOSPITALIST

## 2021-10-17 PROCEDURE — 74011250636 HC RX REV CODE- 250/636: Performed by: INTERNAL MEDICINE

## 2021-10-17 PROCEDURE — 80202 ASSAY OF VANCOMYCIN: CPT

## 2021-10-17 PROCEDURE — 74011250637 HC RX REV CODE- 250/637: Performed by: HOSPITALIST

## 2021-10-17 PROCEDURE — 65660000000 HC RM CCU STEPDOWN

## 2021-10-17 PROCEDURE — 74011636637 HC RX REV CODE- 636/637: Performed by: HOSPITALIST

## 2021-10-17 PROCEDURE — 82962 GLUCOSE BLOOD TEST: CPT

## 2021-10-17 PROCEDURE — 36415 COLL VENOUS BLD VENIPUNCTURE: CPT

## 2021-10-17 PROCEDURE — 74011636637 HC RX REV CODE- 636/637: Performed by: INTERNAL MEDICINE

## 2021-10-17 RX ADMIN — VANCOMYCIN HYDROCHLORIDE 1250 MG: 10 INJECTION, POWDER, LYOPHILIZED, FOR SOLUTION INTRAVENOUS at 14:46

## 2021-10-17 RX ADMIN — INSULIN GLARGINE 40 UNITS: 100 INJECTION, SOLUTION SUBCUTANEOUS at 08:45

## 2021-10-17 RX ADMIN — ACETAMINOPHEN 650 MG: 325 TABLET ORAL at 08:39

## 2021-10-17 RX ADMIN — AMLODIPINE BESYLATE 10 MG: 10 TABLET ORAL at 08:39

## 2021-10-17 RX ADMIN — INSULIN LISPRO 6 UNITS: 100 INJECTION, SOLUTION INTRAVENOUS; SUBCUTANEOUS at 21:26

## 2021-10-17 RX ADMIN — INSULIN LISPRO 3 UNITS: 100 INJECTION, SOLUTION INTRAVENOUS; SUBCUTANEOUS at 17:00

## 2021-10-17 RX ADMIN — LOSARTAN POTASSIUM 100 MG: 50 TABLET, FILM COATED ORAL at 08:39

## 2021-10-17 RX ADMIN — DULOXETINE HYDROCHLORIDE 60 MG: 60 CAPSULE, DELAYED RELEASE ORAL at 08:39

## 2021-10-17 RX ADMIN — ROSUVASTATIN CALCIUM 40 MG: 20 TABLET, COATED ORAL at 20:36

## 2021-10-17 RX ADMIN — TRAZODONE HYDROCHLORIDE 50 MG: 50 TABLET ORAL at 20:36

## 2021-10-17 NOTE — PROGRESS NOTES
Admit Date: 10/7/2021  Date of Service: 10/17/2021    Reason for follow-up: toe infection      Assessment:         1. MSSA sepsis: 10/7 blood cultures 4/4 positive; repeat 10/10 blood cx pending  -10/11/2021 TTE negative for endocarditis  2. Osteomyelitis of right great toe-status post amputation 10/9/2021  -10/9/2021 surgical cultures with staph aureus  3. History of type 2 diabetes-insulin sliding scale, monitor blood sugars  4. History of hypertension-resume home medications, monitor blood pressure  5. Hyponatremia-gentle hydration with IVF, monitor sodium levels  6. History of depression-resume chronic home medications    Plan:   Local wound care  F/u intraoperative cultures and pathology  Discussed with diabetes educator. We will add Lantus   -Tight glucose control for optimal wound healing  Continue Cymbalta, Crestor  Continue with vancomycin-cultures growing 3 different bacteria, discussed with ID, sensitive to vancomycin, will continue IV vancomycin post discharge since patient has a PICC line. Continue till 10/25/2021. Sliding scale insulin, Accu-Cheks  Follow-up 10/10 blood cultures-currently no growth to date    Current Antibtiocs:   Vancomycin 10/7- present      Lines:   Peripheral    Case discussed with:  [x]Patient  []Family  [x]Nursing  []Case Management  DVT Prophylaxis:  []Lovenox  []Hep SQ  []SCDs  []Coumadin   []On Heparin gtt    I have independently examined the patient and reviewed all lab studies and imgaing as well as review of nursing notes and physican notes from the past 24 hours. Discussed with  regarding discharge planning. Awaiting placement. No Known Allergies        Subjective:      Pt seen and examined. Lying in bed, no acute distress.      Objective:        Visit Vitals  BP (!) 135/95   Pulse 86   Temp 98.3 °F (36.8 °C)   Resp 16   Ht 5' 11\" (1.803 m)   Wt 69.3 kg (152 lb 11.2 oz)   SpO2 98%   BMI 21.30 kg/m²     Temp (24hrs), Av.1 °F (36.7 °C), Min:97.8 °F (36.6 °C), Max:98.3 °F (36.8 °C)        General:   awake alert and oriented, non-toxic   Skin:   no rashes or skin lesions noted on limited exam, dry and warm   HEENT:  No scleral icterus or pallor; oral mucosa moist, lips moist   Lymph Nodes:   not assessed today   Lungs:   non, labored; bilaterally clear to aspiration- no crackles wheezes rales or rhonchi   Heart:  RRR, s1 and s2; no murmurs rubs or gallops; no edema, + pedal pulses   Abdomen:  soft, non-distended, active bowel sounds, non-tender   Genitourinary:  deferred   Extremities:   average muscle tone; no contractures, no joint effusions; surgical dressings in place, clean, not removed   Neurologic:  No gross focal motor abnormalities; diminished sensation to bilateral lower extremities from foot to ankle ; CN 2-12 intact; Follows commands. Psychiatric:   appropriate and interactive.        Labs: Results:   Chemistry Recent Labs     10/16/21  0320 10/15/21  0436   * 172*    138   K 4.0 3.7    106   CO2 29 28   BUN 14 11   CREA 0.89 0.86   CA 8.9 9.4   AGAP 2* 4   BUCR 16 13   AP 64 82   TP 6.9 7.6   ALB 2.3* 2.4*   GLOB 4.6* 5.2*   AGRAT 0.5* 0.5*      CBC w/Diff Recent Labs     10/16/21  0320 10/15/21  0436   WBC 6.9 7.1   RBC 3.89* 4.25*   HGB 12.0* 12.9*   HCT 34.4* 37.1    261   GRANS 48 45   LYMPH 40 41   EOS 2 2        No results found for: SDES Lab Results   Component Value Date/Time    Culture result: NO GROWTH 6 DAYS 10/10/2021 06:07 AM    Culture result: NO GROWTH 6 DAYS 10/10/2021 06:07 AM    Culture result: NO ANAEROBES ISOLATED 10/09/2021 12:56 PM    Culture result: FEW STAPHYLOCOCCUS AUREUS (A) 10/09/2021 12:56 PM    Culture result: FEW STREPTOCOCCUS MITIS/ORALIS (A) 10/09/2021 12:56 PM    Culture result: FEW ENTEROCOCCUS RAFFINOSUS (A) 10/09/2021 12:56 PM        Results     Procedure Component Value Units Date/Time    CULTURE, BLOOD [[id number]] Collected: 10/10/21 2831    Order Status: Completed Specimen: Whole Blood Updated: 10/16/21 0709     Special Requests: NO SPECIAL REQUESTS        Culture result: NO GROWTH 6 DAYS       CULTURE, BLOOD [[id number]] Collected: 10/10/21 9337    Order Status: Completed Specimen: Whole Blood Updated: 10/16/21 0709     Special Requests: NO SPECIAL REQUESTS        Culture result: NO GROWTH 6 DAYS       CULTURE, ANAEROBIC [[id number]] Collected: 10/09/21 1256    Order Status: Completed Specimen: Tissue Updated: 10/12/21 1655     Special Requests: RT GREAT TOE, PROXIMAL CLEAN MARGIN     Culture result: NO ANAEROBES ISOLATED       CULTURE, TISSUE Sweta Chyle STAIN [[id number]]  (Abnormal)  (Susceptibility) Collected: 10/09/21 1256    Order Status: Completed Specimen: Toe Updated: 10/14/21 4066     Special Requests: RT GREAT TOE, PROXIMAL CLEAN MARGIN     GRAM STAIN RARE WBCS SEEN         NO DEFINITE ORGANISM SEEN        Culture result: FEW STAPHYLOCOCCUS AUREUS               FEW STREPTOCOCCUS MITIS/ORALIS                  FEW ENTEROCOCCUS RAFFINOSUS          Susceptibility      Staphylococcus aureus Streptococcus mitis/oralis      GERMAN GERMAN      Cefotaxime  Susceptible      Ceftriaxone ($)  Susceptible      Ciprofloxacin ($) Resistant       Clindamycin ($) Susceptible Susceptible      Daptomycin ($$$$$) Susceptible       Doxycycline ($$) Susceptible       Erythromycin ($$$$) Resistant Intermediate      Gentamicin ($) Susceptible       Levofloxacin ($) Resistant Susceptible      Linezolid ($$$$$) Susceptible Susceptible      Moxifloxacin ($$$$) Intermediate       Oxacillin Susceptible       Penicillin G ($$)  Intermediate      Tetracycline Susceptible       Trimeth/Sulfa Susceptible       Vancomycin ($) Susceptible Susceptible                 Linear View               Susceptibility      Enterococcus raffinosus      GERMAN      Ampicillin ($) Resistant      Daptomycin ($$$$$) Susceptible  [1]       Vancomycin ($) Susceptible              [1]  (SENSITIVITIES PERFORMED BY E-TEST)          Linear View COVID-19 RAPID TEST [[id number]] Collected: 10/08/21 1254    Order Status: Completed Specimen: Nasopharyngeal Updated: 10/08/21 1324     Specimen source Nasopharyngeal        COVID-19 rapid test Not detected        Comment: Rapid Abbott ID Now       Rapid NAAT:  The specimen is NEGATIVE for SARS-CoV-2, the novel coronavirus associated with COVID-19. Negative results should be treated as presumptive and, if inconsistent with clinical signs and symptoms or necessary for patient management, should be tested with an alternative molecular assay. Negative results do not preclude SARS-CoV-2 infection and should not be used as the sole basis for patient management decisions. This test has been authorized by the FDA under an Emergency Use Authorization (EUA) for use by authorized laboratories.    Fact sheet for Healthcare Providers: ConventionUpdate.co.nz  Fact sheet for Patients: ConventionUpdate.co.nz       Methodology: Isothermal Nucleic Acid Amplification         CULTURE, BLOOD [[id number]]  (Abnormal) Collected: 10/07/21 2055    Order Status: Completed Specimen: Blood Updated: 10/10/21 1452     Special Requests: NO SPECIAL REQUESTS        GRAM STAIN       AEROBIC AND ANAEROBIC BOTTLES GRAM POSITIVE COCCI IN GROUPS                  SMEAR CALLED TO AND CORRECTLY REPEATED BY: [facility] AT 7139 BY KDA 10/8/21           Culture result:       STAPHYLOCOCCUS AUREUS GROWING IN THE AEROBIC AND ANAEROBIC BOTTLES REFER TO [id number] FOR SENSITIVITIES          CULTURE, BLOOD [[id number]]  (Abnormal)  (Susceptibility) Collected: 10/07/21 2040    Order Status: Completed Specimen: Blood Updated: 10/10/21 1452     Special Requests: NO SPECIAL REQUESTS        GRAM STAIN       AEROBIC AND ANAEROBIC BOTTLES                  SMEAR CALLED TO AND CORRECTLY REPEATED BY: [facility] AT 36 BY KDA 10/8/21           Culture result:       STAPHYLOCOCCUS AUREUS GROWING IN THE AEROBIC AMD ANAEROBIC BOTTLES          Susceptibility      Staphylococcus aureus      GERMAN      Ciprofloxacin ($) Resistant      Clindamycin ($) Susceptible      Daptomycin ($$$$$) Susceptible      Doxycycline ($$) Susceptible      Erythromycin ($$$$) Resistant      Gentamicin ($) Susceptible      Levofloxacin ($) Resistant      Linezolid ($$$$$) Susceptible      Moxifloxacin ($$$$) Intermediate      Oxacillin Susceptible      Tetracycline Susceptible      Trimeth/Sulfa Susceptible      Vancomycin ($) Susceptible               Linear View                         Imaging:     IR FLUORO GUIDE PICC INSERTION    Result Date: 10/12/2021  Successful placement dual lumen peripherally inserted central catheter. Okay for immediate use.

## 2021-10-17 NOTE — PROGRESS NOTES
Patient trough due at 0900. Please see below details of lab actual collection time and received.  The details are shown if you click on lab details  Collected: 10/17/2021 10:55 AM    [id number]   Received: 10/17/2021 12:22 PM

## 2021-10-18 LAB
GLUCOSE BLD STRIP.AUTO-MCNC: 102 MG/DL (ref 70–110)
GLUCOSE BLD STRIP.AUTO-MCNC: 162 MG/DL (ref 70–110)
GLUCOSE BLD STRIP.AUTO-MCNC: 194 MG/DL (ref 70–110)
GLUCOSE BLD STRIP.AUTO-MCNC: 194 MG/DL (ref 70–110)

## 2021-10-18 PROCEDURE — 65660000000 HC RM CCU STEPDOWN

## 2021-10-18 PROCEDURE — 74011250636 HC RX REV CODE- 250/636: Performed by: INTERNAL MEDICINE

## 2021-10-18 PROCEDURE — 74011250637 HC RX REV CODE- 250/637: Performed by: HOSPITALIST

## 2021-10-18 PROCEDURE — 82962 GLUCOSE BLOOD TEST: CPT

## 2021-10-18 PROCEDURE — 74011636637 HC RX REV CODE- 636/637: Performed by: INTERNAL MEDICINE

## 2021-10-18 PROCEDURE — 2709999900 HC NON-CHARGEABLE SUPPLY

## 2021-10-18 PROCEDURE — 99232 SBSQ HOSP IP/OBS MODERATE 35: CPT | Performed by: HOSPITALIST

## 2021-10-18 RX ORDER — INSULIN GLARGINE 100 [IU]/ML
10 INJECTION, SOLUTION SUBCUTANEOUS DAILY
Status: DISCONTINUED | OUTPATIENT
Start: 2021-10-19 | End: 2021-10-29 | Stop reason: HOSPADM

## 2021-10-18 RX ADMIN — INSULIN LISPRO 3 UNITS: 100 INJECTION, SOLUTION INTRAVENOUS; SUBCUTANEOUS at 17:24

## 2021-10-18 RX ADMIN — DULOXETINE HYDROCHLORIDE 60 MG: 60 CAPSULE, DELAYED RELEASE ORAL at 10:13

## 2021-10-18 RX ADMIN — VANCOMYCIN HYDROCHLORIDE 1250 MG: 10 INJECTION, POWDER, LYOPHILIZED, FOR SOLUTION INTRAVENOUS at 17:27

## 2021-10-18 RX ADMIN — ROSUVASTATIN CALCIUM 40 MG: 20 TABLET, COATED ORAL at 21:38

## 2021-10-18 RX ADMIN — LOSARTAN POTASSIUM 100 MG: 50 TABLET, FILM COATED ORAL at 10:12

## 2021-10-18 RX ADMIN — TRAZODONE HYDROCHLORIDE 50 MG: 50 TABLET ORAL at 22:00

## 2021-10-18 RX ADMIN — VANCOMYCIN HYDROCHLORIDE 1250 MG: 10 INJECTION, POWDER, LYOPHILIZED, FOR SOLUTION INTRAVENOUS at 02:00

## 2021-10-18 RX ADMIN — AMLODIPINE BESYLATE 10 MG: 10 TABLET ORAL at 10:13

## 2021-10-18 RX ADMIN — INSULIN LISPRO 3 UNITS: 100 INJECTION, SOLUTION INTRAVENOUS; SUBCUTANEOUS at 21:55

## 2021-10-18 NOTE — DIABETES MGMT
Diabetes Plan of Care    Assessment:  Following for osteomyelitis of great toe of right foot status post amputation 10/9/2021   mg/dl   Lantus was held this am?  Known to our service and has received DM education in January and June 2021  Reviewed with patient his A1c, and he confirmed his home DM medications   He has a glucometer - does not monitor BG regularly   Will continue to follow for any d/c needs    Recommendations: please do not hold lantus. decreased dose ordered by MD    Most recent blood glucose values:         Within target range (non-ICU: <140; ICU<180):  Yes     No    Current A1c  Lab Results   Component Value Date/Time    Hemoglobin A1c 9.0 (H) 10/07/2021 08:40 PM   .  Adequate glycemic control PTA:    No     Current insulin regime:  Corrective humalog, very insulin resistant, 4 times daily  Lantus - decreased to 10 units daily     Diet - Adult regular, 4 carb choices     TDD previous day = 49 units  40 units lantus  9 units humalog    Home diabetes medications;  lantus 60 units daily - takes at night  humalog 10 units with meals     Goals: Blood glucose will be within target of 70 - 180 mg/dl by: 10/22/2021      Ric Sanchez MPH RN Jannette Garcia  Pager [phone]  Office [phone]

## 2021-10-18 NOTE — PROGRESS NOTES
Admit Date: 10/7/2021  Date of Service: 10/18/2021    Reason for follow-up: toe infection      Assessment:         1. MSSA sepsis: 10/7 blood cultures 4/4 positive; repeat 10/10 blood cx pending  -10/11/2021 TTE negative for endocarditis  2. Osteomyelitis of right great toe-status post amputation 10/9/2021  -10/9/2021 surgical cultures with staph aureus  3. History of type 2 diabetes-insulin sliding scale, monitor blood sugars  4. History of hypertension-resume home medications, monitor blood pressure  5. Hyponatremia-gentle hydration with IVF, monitor sodium levels  6. History of depression-resume chronic home medications    Plan:   Local wound care  F/u intraoperative cultures and pathology  Discussed with diabetes educator. We will add Lantus   -Tight glucose control for optimal wound healing  Continue Cymbalta, Crestor  Continue with vancomycin-cultures growing 3 different bacteria, discussed with ID, sensitive to vancomycin, will continue IV vancomycin post discharge since patient has a PICC line. Continue till 10/25/2021. Sliding scale insulin, Accu-Cheks  Follow-up 10/10 blood cultures-currently no growth to date    Current Antibtiocs:   Vancomycin 10/7- present      Lines:   Peripheral    Case discussed with:  [x]Patient  []Family  [x]Nursing  []Case Management  DVT Prophylaxis:  []Lovenox  []Hep SQ  []SCDs  []Coumadin   []On Heparin gtt    I have independently examined the patient and reviewed all lab studies and imgaing as well as review of nursing notes and physican notes from the past 24 hours. Discussed with  regarding discharge planning. Awaiting placement. No Known Allergies        Subjective:      Pt seen and examined. Lying in bed, no acute distress.      Objective:        Visit Vitals  BP (!) 146/89 (BP 1 Location: Left upper arm)   Pulse 91   Temp 98 °F (36.7 °C)   Resp 18   Ht 5' 11\" (1.803 m)   Wt 69.3 kg (152 lb 11.2 oz)   SpO2 98%   BMI 21.30 kg/m²     Temp (24hrs), Av.8 °F (36.6 °C), Min:97.1 °F (36.2 °C), Max:98.5 °F (36.9 °C)        General:   awake alert and oriented, non-toxic   Skin:   no rashes or skin lesions noted on limited exam, dry and warm   HEENT:  No scleral icterus or pallor; oral mucosa moist, lips moist   Lymph Nodes:   not assessed today   Lungs:   non, labored; bilaterally clear to aspiration- no crackles wheezes rales or rhonchi   Heart:  RRR, s1 and s2; no murmurs rubs or gallops; no edema, + pedal pulses   Abdomen:  soft, non-distended, active bowel sounds, non-tender   Genitourinary:  deferred   Extremities:   average muscle tone; no contractures, no joint effusions; surgical dressings in place, clean, not removed   Neurologic:  No gross focal motor abnormalities; diminished sensation to bilateral lower extremities from foot to ankle ; CN 2-12 intact; Follows commands. Psychiatric:   appropriate and interactive.        Labs: Results:   Chemistry Recent Labs     10/16/21  0320   *      K 4.0      CO2 29   BUN 14   CREA 0.89   CA 8.9   AGAP 2*   BUCR 16   AP 64   TP 6.9   ALB 2.3*   GLOB 4.6*   AGRAT 0.5*      CBC w/Diff Recent Labs     10/16/21  0320   WBC 6.9   RBC 3.89*   HGB 12.0*   HCT 34.4*      GRANS 48   LYMPH 40   EOS 2        No results found for: SDES Lab Results   Component Value Date/Time    Culture result: NO GROWTH 6 DAYS 10/10/2021 06:07 AM    Culture result: NO GROWTH 6 DAYS 10/10/2021 06:07 AM    Culture result: NO ANAEROBES ISOLATED 10/09/2021 12:56 PM    Culture result: FEW STAPHYLOCOCCUS AUREUS (A) 10/09/2021 12:56 PM    Culture result: FEW STREPTOCOCCUS MITIS/ORALIS (A) 10/09/2021 12:56 PM    Culture result: FEW ENTEROCOCCUS RAFFINOSUS (A) 10/09/2021 12:56 PM        Results     Procedure Component Value Units Date/Time    CULTURE, BLOOD [[id number]] Collected: 10/10/21 0607    Order Status: Completed Specimen: Whole Blood Updated: 10/16/21 0709     Special Requests: NO SPECIAL REQUESTS        Culture result: NO GROWTH 6 DAYS       CULTURE, BLOOD [[id number]] Collected: 10/10/21 0607    Order Status: Completed Specimen: Whole Blood Updated: 10/16/21 0709     Special Requests: NO SPECIAL REQUESTS        Culture result: NO GROWTH 6 DAYS       CULTURE, ANAEROBIC [[id number]] Collected: 10/09/21 1256    Order Status: Completed Specimen: Tissue Updated: 10/12/21 1645     Special Requests: RT GREAT TOE, PROXIMAL CLEAN MARGIN     Culture result: NO ANAEROBES ISOLATED       CULTURE, TISSUE Elicia Restorationism STAIN [[id number]]  (Abnormal)  (Susceptibility) Collected: 10/09/21 1256    Order Status: Completed Specimen: Toe Updated: 10/14/21 0755     Special Requests: RT GREAT TOE, PROXIMAL CLEAN MARGIN     GRAM STAIN RARE WBCS SEEN         NO DEFINITE ORGANISM SEEN        Culture result: FEW STAPHYLOCOCCUS AUREUS               FEW STREPTOCOCCUS MITIS/ORALIS                  FEW ENTEROCOCCUS RAFFINOSUS          Susceptibility      Staphylococcus aureus Streptococcus mitis/oralis      GERMAN GERMAN      Cefotaxime  Susceptible      Ceftriaxone ($)  Susceptible      Ciprofloxacin ($) Resistant       Clindamycin ($) Susceptible Susceptible      Daptomycin ($$$$$) Susceptible       Doxycycline ($$) Susceptible       Erythromycin ($$$$) Resistant Intermediate      Gentamicin ($) Susceptible       Levofloxacin ($) Resistant Susceptible      Linezolid ($$$$$) Susceptible Susceptible      Moxifloxacin ($$$$) Intermediate       Oxacillin Susceptible       Penicillin G ($$)  Intermediate      Tetracycline Susceptible       Trimeth/Sulfa Susceptible       Vancomycin ($) Susceptible Susceptible                 Linear View               Susceptibility      Enterococcus raffinosus      GERMAN      Ampicillin ($) Resistant      Daptomycin ($$$$$) Susceptible  [1]       Vancomycin ($) Susceptible              [1]  (SENSITIVITIES PERFORMED BY E-TEST)          Linear View                   COVID-19 RAPID TEST [[id number]] Collected: 10/08/21 1254    Order Status: Completed Specimen: Nasopharyngeal Updated: 10/08/21 1324     Specimen source Nasopharyngeal        COVID-19 rapid test Not detected        Comment: Rapid Abbott ID Now       Rapid NAAT:  The specimen is NEGATIVE for SARS-CoV-2, the novel coronavirus associated with COVID-19. Negative results should be treated as presumptive and, if inconsistent with clinical signs and symptoms or necessary for patient management, should be tested with an alternative molecular assay. Negative results do not preclude SARS-CoV-2 infection and should not be used as the sole basis for patient management decisions. This test has been authorized by the FDA under an Emergency Use Authorization (EUA) for use by authorized laboratories.    Fact sheet for Healthcare Providers: ConventionNuregodate.co.nz  Fact sheet for Patients: Clue[employer]SFOX.co.nz       Methodology: Isothermal Nucleic Acid Amplification         CULTURE, BLOOD [[id number]]  (Abnormal) Collected: 10/07/21 2055    Order Status: Completed Specimen: Blood Updated: 10/10/21 1452     Special Requests: NO SPECIAL REQUESTS        GRAM STAIN       AEROBIC AND ANAEROBIC BOTTLES GRAM POSITIVE COCCI IN GROUPS                  SMEAR CALLED TO AND CORRECTLY REPEATED BY: JUANCARLOS Padilla 2S AT 7947 BY KDA 10/8/21           Culture result:       STAPHYLOCOCCUS AUREUS GROWING IN THE AEROBIC AND ANAEROBIC BOTTLES REFER TO [id number] FOR SENSITIVITIES          CULTURE, BLOOD [[id number]]  (Abnormal)  (Susceptibility) Collected: 10/07/21 2040    Order Status: Completed Specimen: Blood Updated: 10/10/21 1452     Special Requests: NO SPECIAL REQUESTS        GRAM STAIN       AEROBIC AND ANAEROBIC BOTTLES                  SMEAR CALLED TO AND CORRECTLY REPEATED BY: JUANCARLOS Padilla 2S AT 36 BY KDA 10/8/21           Culture result:       STAPHYLOCOCCUS AUREUS GROWING IN THE AEROBIC AMD ANAEROBIC BOTTLES          Susceptibility      Staphylococcus aureus GERMAN      Ciprofloxacin ($) Resistant      Clindamycin ($) Susceptible      Daptomycin ($$$$$) Susceptible      Doxycycline ($$) Susceptible      Erythromycin ($$$$) Resistant      Gentamicin ($) Susceptible      Levofloxacin ($) Resistant      Linezolid ($$$$$) Susceptible      Moxifloxacin ($$$$) Intermediate      Oxacillin Susceptible      Tetracycline Susceptible      Trimeth/Sulfa Susceptible      Vancomycin ($) Susceptible               Linear View                         Imaging:     IR FLUORO GUIDE PICC INSERTION    Result Date: 10/12/2021  Successful placement dual lumen peripherally inserted central catheter. Okay for immediate use.

## 2021-10-18 NOTE — PROGRESS NOTES
Called Dr She Vaughan in reference to holding Lantus due to blood sugar being 102 and he advised holding Lantus.

## 2021-10-18 NOTE — PROGRESS NOTES
Continue with CPAP machine    Use albuterol when needed    Follow up in 6 months Verbal bedside shift report received from [facility].

## 2021-10-18 NOTE — PROGRESS NOTES
Left a message for [facility] for shelter and IV abx.      1500: Spoke with Silvia Camp at [facility]. He stated he will give the director Jagruti Burleson the message to return this call when he gets back to the office. Sent text messages to Dr. Alex Sandhu and David Sheikh, Case Management Director.         Niranjan Osullivan, BSN RN  Care Management  Pager: [phone]

## 2021-10-19 LAB
GLUCOSE BLD STRIP.AUTO-MCNC: 155 MG/DL (ref 70–110)
GLUCOSE BLD STRIP.AUTO-MCNC: 190 MG/DL (ref 70–110)
GLUCOSE BLD STRIP.AUTO-MCNC: 265 MG/DL (ref 70–110)

## 2021-10-19 PROCEDURE — 65660000000 HC RM CCU STEPDOWN

## 2021-10-19 PROCEDURE — 2709999900 HC NON-CHARGEABLE SUPPLY

## 2021-10-19 PROCEDURE — 82962 GLUCOSE BLOOD TEST: CPT

## 2021-10-19 PROCEDURE — 74011636637 HC RX REV CODE- 636/637: Performed by: INTERNAL MEDICINE

## 2021-10-19 PROCEDURE — 74011250637 HC RX REV CODE- 250/637: Performed by: HOSPITALIST

## 2021-10-19 PROCEDURE — 74011250636 HC RX REV CODE- 250/636: Performed by: INTERNAL MEDICINE

## 2021-10-19 PROCEDURE — 99232 SBSQ HOSP IP/OBS MODERATE 35: CPT | Performed by: HOSPITALIST

## 2021-10-19 PROCEDURE — 74011636637 HC RX REV CODE- 636/637: Performed by: HOSPITALIST

## 2021-10-19 RX ORDER — OXYCODONE AND ACETAMINOPHEN 5; 325 MG/1; MG/1
1 TABLET ORAL ONCE
Status: COMPLETED | OUTPATIENT
Start: 2021-10-19 | End: 2021-10-19

## 2021-10-19 RX ADMIN — POLYETHYLENE GLYCOL 3350 17 G: 17 POWDER, FOR SOLUTION ORAL at 09:14

## 2021-10-19 RX ADMIN — VANCOMYCIN HYDROCHLORIDE 1250 MG: 10 INJECTION, POWDER, LYOPHILIZED, FOR SOLUTION INTRAVENOUS at 13:32

## 2021-10-19 RX ADMIN — LOSARTAN POTASSIUM 100 MG: 50 TABLET, FILM COATED ORAL at 09:16

## 2021-10-19 RX ADMIN — ROSUVASTATIN CALCIUM 40 MG: 20 TABLET, COATED ORAL at 22:34

## 2021-10-19 RX ADMIN — DULOXETINE HYDROCHLORIDE 60 MG: 60 CAPSULE, DELAYED RELEASE ORAL at 09:16

## 2021-10-19 RX ADMIN — AMLODIPINE BESYLATE 10 MG: 10 TABLET ORAL at 09:16

## 2021-10-19 RX ADMIN — INSULIN LISPRO 9 UNITS: 100 INJECTION, SOLUTION INTRAVENOUS; SUBCUTANEOUS at 16:30

## 2021-10-19 RX ADMIN — OXYCODONE HYDROCHLORIDE AND ACETAMINOPHEN 1 TABLET: 5; 325 TABLET ORAL at 11:48

## 2021-10-19 RX ADMIN — VANCOMYCIN HYDROCHLORIDE 1250 MG: 10 INJECTION, POWDER, LYOPHILIZED, FOR SOLUTION INTRAVENOUS at 02:23

## 2021-10-19 RX ADMIN — INSULIN LISPRO 3 UNITS: 100 INJECTION, SOLUTION INTRAVENOUS; SUBCUTANEOUS at 09:18

## 2021-10-19 RX ADMIN — INSULIN LISPRO 3 UNITS: 100 INJECTION, SOLUTION INTRAVENOUS; SUBCUTANEOUS at 22:35

## 2021-10-19 RX ADMIN — INSULIN GLARGINE 10 UNITS: 100 INJECTION, SOLUTION SUBCUTANEOUS at 09:17

## 2021-10-19 NOTE — PROGRESS NOTES
[facility]

## 2021-10-19 NOTE — PROGRESS NOTES
Admit Date: 10/7/2021  Date of Service: 10/19/2021    Reason for follow-up: toe infection      Assessment:         1. MSSA sepsis: 10/7 blood cultures 4/4 positive; repeat 10/10 blood cx pending  -10/11/2021 TTE negative for endocarditis  2. Osteomyelitis of right great toe-status post amputation 10/9/2021  -10/9/2021 surgical cultures with staph aureus  3. History of type 2 diabetes-insulin sliding scale, monitor blood sugars  4. History of hypertension-resume home medications, monitor blood pressure  5. Hyponatremia-gentle hydration with IVF, monitor sodium levels  6. History of depression-resume chronic home medications    Plan:   Local wound care  F/u intraoperative cultures and pathology  Discussed with diabetes educator. We will add Lantus   -Tight glucose control for optimal wound healing  Continue Cymbalta, Crestor  Continue with vancomycin-cultures growing 3 different bacteria, discussed with ID, sensitive to vancomycin, will continue IV vancomycin post discharge since patient has a PICC line. Discussed with ID diet patient will need IV antibiotics for 6 weeks since he has osteomyelitis. Sliding scale insulin, Accu-Cheks  Follow-up 10/10 blood cultures-currently no growth to date    Current Antibtiocs:   Vancomycin 10/7- present      Lines:   Peripheral    Case discussed with:  [x]Patient  []Family  [x]Nursing  []Case Management  DVT Prophylaxis:  []Lovenox  []Hep SQ  []SCDs  []Coumadin   []On Heparin gtt    I have independently examined the patient and reviewed all lab studies and imgaing as well as review of nursing notes and physican notes from the past 24 hours. Discussed with  regarding discharge planning. Awaiting placement. No Known Allergies        Subjective:      Pt seen and examined. Lying in bed, no acute distress.      Objective:        Visit Vitals  /81 (BP 1 Location: Left upper arm, BP Patient Position: At rest)   Pulse 88   Temp 98.3 °F (36.8 °C)   Resp 16 Ht 5' 11\" (1.803 m)   Wt 69.3 kg (152 lb 11.2 oz)   SpO2 99%   BMI 21.30 kg/m²     Temp (24hrs), Av.2 °F (36.8 °C), Min:97.9 °F (36.6 °C), Max:98.4 °F (36.9 °C)        General:   awake alert and oriented, non-toxic   Skin:   no rashes or skin lesions noted on limited exam, dry and warm   HEENT:  No scleral icterus or pallor; oral mucosa moist, lips moist   Lymph Nodes:   not assessed today   Lungs:   non, labored; bilaterally clear to aspiration- no crackles wheezes rales or rhonchi   Heart:  RRR, s1 and s2; no murmurs rubs or gallops; no edema, + pedal pulses   Abdomen:  soft, non-distended, active bowel sounds, non-tender   Genitourinary:  deferred   Extremities:   average muscle tone; no contractures, no joint effusions; surgical dressings in place, clean, not removed   Neurologic:  No gross focal motor abnormalities; diminished sensation to bilateral lower extremities from foot to ankle ; CN 2-12 intact; Follows commands. Psychiatric:   appropriate and interactive. Labs: Results:   Chemistry No results for input(s): GLU, NA, K, CL, CO2, BUN, CREA, CA, AGAP, BUCR, TBIL, AP, TP, ALB, GLOB, AGRAT in the last 72 hours. No lab exists for component: GPT   CBC w/Diff No results for input(s): WBC, RBC, HGB, HCT, PLT, GRANS, LYMPH, EOS, HGBEXT, HCTEXT, PLTEXT, HGBEXT, HCTEXT, PLTEXT in the last 72 hours.      No results found for: SDES Lab Results   Component Value Date/Time    Culture result: NO GROWTH 6 DAYS 10/10/2021 06:07 AM    Culture result: NO GROWTH 6 DAYS 10/10/2021 06:07 AM    Culture result: NO ANAEROBES ISOLATED 10/09/2021 12:56 PM    Culture result: FEW STAPHYLOCOCCUS AUREUS (A) 10/09/2021 12:56 PM    Culture result: FEW STREPTOCOCCUS MITIS/ORALIS (A) 10/09/2021 12:56 PM    Culture result: FEW ENTEROCOCCUS RAFFINOSUS (A) 10/09/2021 12:56 PM        Results     Procedure Component Value Units Date/Time    CULTURE, BLOOD [[id number]] Collected: 10/10/21 7135    Order Status: Completed Specimen: Whole Blood Updated: 10/16/21 0709     Special Requests: NO SPECIAL REQUESTS        Culture result: NO GROWTH 6 DAYS       CULTURE, BLOOD [[id number]] Collected: 10/10/21 8402    Order Status: Completed Specimen: Whole Blood Updated: 10/16/21 0709     Special Requests: NO SPECIAL REQUESTS        Culture result: NO GROWTH 6 DAYS       CULTURE, ANAEROBIC [[id number]] Collected: 10/09/21 1256    Order Status: Completed Specimen: Tissue Updated: 10/12/21 1645     Special Requests: RT GREAT TOE, PROXIMAL CLEAN MARGIN     Culture result: NO ANAEROBES ISOLATED       CULTURE, TISSUE Myrle Loach STAIN [[id number]]  (Abnormal)  (Susceptibility) Collected: 10/09/21 1256    Order Status: Completed Specimen: Toe Updated: 10/14/21 0755     Special Requests: RT GREAT TOE, PROXIMAL CLEAN MARGIN     GRAM STAIN RARE WBCS SEEN         NO DEFINITE ORGANISM SEEN        Culture result: FEW STAPHYLOCOCCUS AUREUS               FEW STREPTOCOCCUS MITIS/ORALIS                  FEW ENTEROCOCCUS RAFFINOSUS          Susceptibility      Staphylococcus aureus Streptococcus mitis/oralis      GERMAN GERMAN      Cefotaxime  Susceptible      Ceftriaxone ($)  Susceptible      Ciprofloxacin ($) Resistant       Clindamycin ($) Susceptible Susceptible      Daptomycin ($$$$$) Susceptible       Doxycycline ($$) Susceptible       Erythromycin ($$$$) Resistant Intermediate      Gentamicin ($) Susceptible       Levofloxacin ($) Resistant Susceptible      Linezolid ($$$$$) Susceptible Susceptible      Moxifloxacin ($$$$) Intermediate       Oxacillin Susceptible       Penicillin G ($$)  Intermediate      Tetracycline Susceptible       Trimeth/Sulfa Susceptible       Vancomycin ($) Susceptible Susceptible                 Linear View               Susceptibility      Enterococcus raffinosus      GERMAN      Ampicillin ($) Resistant      Daptomycin ($$$$$) Susceptible  [1]       Vancomycin ($) Susceptible              [1]  (SENSITIVITIES PERFORMED BY E-TEST) Linear View                   COVID-19 RAPID TEST [[id number]] Collected: 10/08/21 1254    Order Status: Completed Specimen: Nasopharyngeal Updated: 10/08/21 1324     Specimen source Nasopharyngeal        COVID-19 rapid test Not detected        Comment: Rapid Abbott ID Now       Rapid NAAT:  The specimen is NEGATIVE for SARS-CoV-2, the novel coronavirus associated with COVID-19. Negative results should be treated as presumptive and, if inconsistent with clinical signs and symptoms or necessary for patient management, should be tested with an alternative molecular assay. Negative results do not preclude SARS-CoV-2 infection and should not be used as the sole basis for patient management decisions. This test has been authorized by the FDA under an Emergency Use Authorization (EUA) for use by authorized laboratories.    Fact sheet for Healthcare Providers: ConventionUpdate.co.nz  Fact sheet for Patients: ConventionUpdate.co.nz       Methodology: Isothermal Nucleic Acid Amplification         CULTURE, BLOOD [[id number]]  (Abnormal) Collected: 10/07/21 2055    Order Status: Completed Specimen: Blood Updated: 10/10/21 1452     Special Requests: NO SPECIAL REQUESTS        GRAM STAIN       AEROBIC AND ANAEROBIC BOTTLES GRAM POSITIVE COCCI IN GROUPS                  SMEAR CALLED TO AND CORRECTLY REPEATED BY: JUANCARLOS TAVARES [facility] 2S AT 3137 BY KDA 10/8/21           Culture result:       STAPHYLOCOCCUS AUREUS GROWING IN THE AEROBIC AND ANAEROBIC BOTTLES REFER TO [id number] FOR SENSITIVITIES          CULTURE, BLOOD [[id number]]  (Abnormal)  (Susceptibility) Collected: 10/07/21 2040    Order Status: Completed Specimen: Blood Updated: 10/10/21 1452     Special Requests: NO SPECIAL REQUESTS        GRAM STAIN       AEROBIC AND ANAEROBIC BOTTLES                  SMEAR CALLED TO AND CORRECTLY REPEATED BY: JUANCARLOS [facility] 2S AT [address] BY KDA 10/8/21           Culture result:       STAPHYLOCOCCUS AUREUS GROWING IN THE AEROBIC AMD ANAEROBIC BOTTLES          Susceptibility      Staphylococcus aureus      GERMAN      Ciprofloxacin ($) Resistant      Clindamycin ($) Susceptible      Daptomycin ($$$$$) Susceptible      Doxycycline ($$) Susceptible      Erythromycin ($$$$) Resistant      Gentamicin ($) Susceptible      Levofloxacin ($) Resistant      Linezolid ($$$$$) Susceptible      Moxifloxacin ($$$$) Intermediate      Oxacillin Susceptible      Tetracycline Susceptible      Trimeth/Sulfa Susceptible      Vancomycin ($) Susceptible               Linear View                         Imaging:     IR FLUORO GUIDE PICC INSERTION    Result Date: 10/12/2021  Successful placement dual lumen peripherally inserted central catheter. Okay for immediate use.

## 2021-10-19 NOTE — DIABETES MGMT
GLYCEMIC CONTROL PLAN OF CARE    Recommendations:  Blood glucose within target range. Noted Lantus 10 units started today. Continue corrective insulin coverage as needed. Very insulin resistant dosing already ordered. Will continue inpatient monitoring. Assessment:  History:  T2DM  Problem List Items Addressed This Visit        Skeletal    Osteomyelitis of great toe of right foot (HCC) - Primary        Morning lab glucose: 155  mg/dl  IVF containing dextrose:  None   Steroids:  None   Diet/TF:  ADULT DIET Regular; 4 carb choices (60 gm/meal)    Most recent BG values:      Results for Melquiades Duggan (MRN [id number]) as of 10/19/2021 13:47   Ref. Range 10/18/2021 07:17 10/18/2021 11:27 10/18/2021 16:30 10/18/2021 20:47 10/19/2021 09:13   GLUCOSE,FAST - POC Latest Ref Range: 70 - 110 mg/dL 102 162 (H) 194 (H) 194 (H) 155 (H)     Within target range  mg/dl? Progressing towards goal.  Current A1C:  9.0% equivalent to an average blood glucose of 212 mg/dl over the past 2-3 months. Adequate glycemic control PTA? No. Improved though since June 2021 (A1C 11.3%)  Current hospital diabetes medications:  Lantus 10 units daily  Lispro AC&HS  Previous days insulin requirements:  Lantus 0 units   Lispro 6 units corrective insulin  Home diabetes medication:  Per pta med list  Lantus 60 units every bedtime  Humalog 10 units tid AC  Education:  ___ see diabetes education record            _x_ diabetes education not indicated at this time.       [facility] RN CDE  Ext [phone]

## 2021-10-19 NOTE — PROGRESS NOTES
Discharge/Transition Planning    Have no disposition currently for pt to receive IV antibiotics. Last dose is 10/25    1500: Notified by Dr Quintin Lam pt needs 6 weeks of IV antibiotics till 11/25.  Need orders to be updated to state this so can look for options again  Donnell Grider RN BSN  Outcomes Manager    Pager # [phone]

## 2021-10-19 NOTE — PROGRESS NOTES
Progress Note    Patient: Huy Lopez MRN: [id number]  SSN: [id number]    YOB: 1965  Age: 49 y.o. Sex: male      Admit Date: 10/7/2021  10 Days Post-Op     Procedure:   Procedure(s):  AMPUTATION  OF RIGHT GREAT TOE    Subjective:     Patient seen resting comfortably and no apparent distress. Patient states that he changed dressings himself. Patient is awaiting discharge pending SNF. Denies any new pedal complaint. Status post: osteomyelitis    Objective:     Visit Vitals  /81 (BP 1 Location: Left upper arm, BP Patient Position: At rest)   Pulse 88   Temp 98.3 °F (36.8 °C)   Resp 16   Ht 5' 11\" (1.803 m)   Wt 69.3 kg (152 lb 11.2 oz)   SpO2 99%   BMI 21.30 kg/m²        Physical Exam:  Right partial great toe amputation site stable, flaps appear viable, mild maceration noted to medial incision site, skin sutures pulling out, no active drainage noted. No clinical signs of infection noted. Assessment:     Hospital Problems  Date Reviewed: 10/9/2021        Codes Class Noted POA    Acute sepsis [facility]) ICD-10-CM: A41.9  ICD-9-CM: 038.9, 995.91  10/7/2021 Unknown        Osteomyelitis of great toe of right foot ([facility] [address] ICD-10-CM: M86.9  ICD-9-CM: 730.27  10/7/2021 Unknown        [facility] ICD-10-CM: M86.9  ICD-9-CM: 730.20  4/2/2021 Unknown              Plan/Recommendations/Medical Decision Making:     - Continue to remain NWB to right forefoot. Patient is requesting CAM Boot. Will talk to therapy to dispense CAM Boot. - Dressings reinforced with betadine and dry gauze to right foot. - Continue IV antibiotics per ID recommendation.   - Patient is awaiting discharge pending on SNF. - Will see him outpatient in 1 week upon discharge.

## 2021-10-19 NOTE — ROUTINE PROCESS
Bedside and verbal report received from [facility] (offgoing nurse). Report included the following information; SBAR, MAR, LABS, Intake/output, Kardex, and summary of care. Patient alert and watching tv in bed. Call bell and urinal in reach. No complaints at this time.

## 2021-10-19 NOTE — ROUTINE PROCESS
Bedside and Verbal shift change report given to [address] (oncoming nurse) by [facility] CARE [facility] JUANCARLOS (offgoing nurse). Report included the following information SBAR, Kardex, Procedure Summary, Intake/Output and Recent Results. Patient alert and sitting up in bed. Call bell and urinal in reach.

## 2021-10-20 LAB
ANION GAP SERPL CALC-SCNC: 1 MMOL/L (ref 3–18)
BUN SERPL-MCNC: 15 MG/DL (ref 7–18)
BUN/CREAT SERPL: 16 (ref 12–20)
CALCIUM SERPL-MCNC: 9.6 MG/DL (ref 8.5–10.1)
CHLORIDE SERPL-SCNC: 103 MMOL/L (ref 100–111)
CO2 SERPL-SCNC: 31 MMOL/L (ref 21–32)
CREAT SERPL-MCNC: 0.93 MG/DL (ref 0.6–1.3)
GLUCOSE BLD STRIP.AUTO-MCNC: 155 MG/DL (ref 70–110)
GLUCOSE BLD STRIP.AUTO-MCNC: 168 MG/DL (ref 70–110)
GLUCOSE BLD STRIP.AUTO-MCNC: 177 MG/DL (ref 70–110)
GLUCOSE BLD STRIP.AUTO-MCNC: 255 MG/DL (ref 70–110)
GLUCOSE BLD STRIP.AUTO-MCNC: 272 MG/DL (ref 70–110)
GLUCOSE BLD STRIP.AUTO-MCNC: 49 MG/DL (ref 70–110)
GLUCOSE BLD STRIP.AUTO-MCNC: 59 MG/DL (ref 70–110)
GLUCOSE BLD STRIP.AUTO-MCNC: 62 MG/DL (ref 70–110)
GLUCOSE SERPL-MCNC: 144 MG/DL (ref 74–99)
POTASSIUM SERPL-SCNC: 3.6 MMOL/L (ref 3.5–5.5)
SODIUM SERPL-SCNC: 135 MMOL/L (ref 136–145)

## 2021-10-20 PROCEDURE — 99232 SBSQ HOSP IP/OBS MODERATE 35: CPT | Performed by: HOSPITALIST

## 2021-10-20 PROCEDURE — 74011636637 HC RX REV CODE- 636/637: Performed by: INTERNAL MEDICINE

## 2021-10-20 PROCEDURE — 65660000000 HC RM CCU STEPDOWN

## 2021-10-20 PROCEDURE — 2709999900 HC NON-CHARGEABLE SUPPLY

## 2021-10-20 PROCEDURE — 74011250636 HC RX REV CODE- 250/636: Performed by: INTERNAL MEDICINE

## 2021-10-20 PROCEDURE — 74011250637 HC RX REV CODE- 250/637: Performed by: HOSPITALIST

## 2021-10-20 PROCEDURE — 82962 GLUCOSE BLOOD TEST: CPT

## 2021-10-20 PROCEDURE — 80048 BASIC METABOLIC PNL TOTAL CA: CPT

## 2021-10-20 PROCEDURE — 74011000250 HC RX REV CODE- 250: Performed by: HOSPITALIST

## 2021-10-20 PROCEDURE — 74011636637 HC RX REV CODE- 636/637: Performed by: HOSPITALIST

## 2021-10-20 RX ADMIN — INSULIN LISPRO 4 UNITS: 100 INJECTION, SOLUTION INTRAVENOUS; SUBCUTANEOUS at 23:51

## 2021-10-20 RX ADMIN — ROSUVASTATIN CALCIUM 40 MG: 20 TABLET, COATED ORAL at 21:33

## 2021-10-20 RX ADMIN — AMLODIPINE BESYLATE 10 MG: 10 TABLET ORAL at 11:09

## 2021-10-20 RX ADMIN — DEXTROSE MONOHYDRATE 25 G: 25 INJECTION, SOLUTION INTRAVENOUS at 18:17

## 2021-10-20 RX ADMIN — INSULIN GLARGINE 10 UNITS: 100 INJECTION, SOLUTION SUBCUTANEOUS at 11:08

## 2021-10-20 RX ADMIN — Medication 10 ML: at 15:37

## 2021-10-20 RX ADMIN — INSULIN LISPRO 9 UNITS: 100 INJECTION, SOLUTION INTRAVENOUS; SUBCUTANEOUS at 13:05

## 2021-10-20 RX ADMIN — INSULIN LISPRO 3 UNITS: 100 INJECTION, SOLUTION INTRAVENOUS; SUBCUTANEOUS at 09:30

## 2021-10-20 RX ADMIN — VANCOMYCIN HYDROCHLORIDE 1250 MG: 10 INJECTION, POWDER, LYOPHILIZED, FOR SOLUTION INTRAVENOUS at 02:40

## 2021-10-20 RX ADMIN — DULOXETINE HYDROCHLORIDE 60 MG: 60 CAPSULE, DELAYED RELEASE ORAL at 11:09

## 2021-10-20 RX ADMIN — VANCOMYCIN HYDROCHLORIDE 1250 MG: 10 INJECTION, POWDER, LYOPHILIZED, FOR SOLUTION INTRAVENOUS at 16:01

## 2021-10-20 RX ADMIN — LOSARTAN POTASSIUM 100 MG: 50 TABLET, FILM COATED ORAL at 11:09

## 2021-10-20 NOTE — PROGRESS NOTES
Admit Date: 10/7/2021  Date of Service: 10/20/2021    Reason for follow-up: toe infection      Assessment:         1. MSSA sepsis: 10/7 blood cultures 4/4 positive; repeat 10/10 blood cx pending  -10/11/2021 TTE negative for endocarditis  2. Osteomyelitis of right great toe-status post amputation 10/9/2021  -10/9/2021 surgical cultures with staph aureus  3. History of type 2 diabetes-insulin sliding scale, monitor blood sugars  4. History of hypertension-resume home medications, monitor blood pressure  5. Hyponatremia-gentle hydration with IVF, monitor sodium levels  6. History of depression-resume chronic home medications    Plan:   Local wound care  F/u intraoperative cultures and pathology  Discussed with diabetes educator. We will add Lantus   -Tight glucose control for optimal wound healing  Continue Cymbalta, Crestor  Continue with vancomycin-cultures growing 3 different bacteria, discussed with ID, sensitive to vancomycin, will continue IV vancomycin post discharge since patient has a PICC line. Discussed with ID diet patient will need IV antibiotics for 6 weeks since he has osteomyelitis. Sliding scale insulin, Accu-Cheks  Follow-up 10/10 blood cultures-currently no growth to date    Current Antibtiocs:   Vancomycin 10/7- present      Lines:   Peripheral    Case discussed with:  [x]Patient  []Family  [x]Nursing  []Case Management  DVT Prophylaxis:  []Lovenox  []Hep SQ  []SCDs  []Coumadin   []On Heparin gtt    I have independently examined the patient and reviewed all lab studies and imgaing as well as review of nursing notes and physican notes from the past 24 hours. Discussed with  regarding discharge planning. Awaiting placement. No Known Allergies        Subjective:      Pt seen and examined. Lying in bed, no acute distress.      Objective:        Visit Vitals  BP (!) 156/89 (BP 1 Location: Left upper arm, BP Patient Position: At rest)   Pulse 85   Temp 98.1 °F (36.7 °C)   Resp 20 Ht 5' 11\" (1.803 m)   Wt 69.3 kg (152 lb 11.2 oz)   SpO2 99%   BMI 21.30 kg/m²     Temp (24hrs), Av.8 °F (36.6 °C), Min:97.4 °F (36.3 °C), Max:98.2 °F (36.8 °C)        General:   awake alert and oriented, non-toxic   Skin:   no rashes or skin lesions noted on limited exam, dry and warm   HEENT:  No scleral icterus or pallor; oral mucosa moist, lips moist   Lymph Nodes:   not assessed today   Lungs:   non, labored; bilaterally clear to aspiration- no crackles wheezes rales or rhonchi   Heart:  RRR, s1 and s2; no murmurs rubs or gallops; no edema, + pedal pulses   Abdomen:  soft, non-distended, active bowel sounds, non-tender   Genitourinary:  deferred   Extremities:   average muscle tone; no contractures, no joint effusions; surgical dressings in place, clean, not removed   Neurologic:  No gross focal motor abnormalities; diminished sensation to bilateral lower extremities from foot to ankle ; CN 2-12 intact; Follows commands. Psychiatric:   appropriate and interactive. Labs: Results:   Chemistry Recent Labs     10/20/21  0255   *   *   K 3.6      CO2 31   BUN 15   CREA 0.93   CA 9.6   AGAP 1*   BUCR 16      CBC w/Diff No results for input(s): WBC, RBC, HGB, HCT, PLT, GRANS, LYMPH, EOS, HGBEXT, HCTEXT, PLTEXT, HGBEXT, HCTEXT, PLTEXT in the last 72 hours.      No results found for: [facility] Lab Results   Component Value Date/Time    Culture result: NO GROWTH 6 DAYS 10/10/2021 06:07 AM    Culture result: NO GROWTH 6 DAYS 10/10/2021 06:07 AM    Culture result: NO ANAEROBES ISOLATED 10/09/2021 12:56 PM    Culture result: FEW STAPHYLOCOCCUS AUREUS (A) 10/09/2021 12:56 PM    Culture result: FEW STREPTOCOCCUS MITIS/ORALIS (A) 10/09/2021 12:56 PM    Culture result: FEW ENTEROCOCCUS RAFFINOSUS (A) 10/09/2021 12:56 PM        Results     Procedure Component Value Units Date/Time    CULTURE, BLOOD [[id number]] Collected: 10/10/21 0607    Order Status: Completed Specimen: Whole Blood Updated: 10/16/21 9148     Special Requests: NO SPECIAL REQUESTS        Culture result: NO GROWTH 6 DAYS       CULTURE, BLOOD [[id number]] Collected: 10/10/21 0607    Order Status: Completed Specimen: Whole Blood Updated: 10/16/21 0709     Special Requests: NO SPECIAL REQUESTS        Culture result: NO GROWTH 6 DAYS       CULTURE, ANAEROBIC [[id number]] Collected: 10/09/21 1256    Order Status: Completed Specimen: Tissue Updated: 10/12/21 1645     Special Requests: RT GREAT TOE, PROXIMAL CLEAN MARGIN     Culture result: NO ANAEROBES ISOLATED       CULTURE, TISSUE [place] STAIN [[id number]]  (Abnormal)  (Susceptibility) Collected: 10/09/21 1256    Order Status: Completed Specimen: Toe Updated: 10/14/21 0755     Special Requests: RT GREAT TOE, PROXIMAL CLEAN MARGIN     GRAM STAIN RARE WBCS SEEN         NO DEFINITE ORGANISM SEEN        Culture result: FEW STAPHYLOCOCCUS AUREUS               FEW STREPTOCOCCUS MITIS/ORALIS                  FEW ENTEROCOCCUS RAFFINOSUS          Susceptibility      Staphylococcus aureus Streptococcus mitis/oralis      GERMAN GERMAN      Cefotaxime  Susceptible      Ceftriaxone ($)  Susceptible      Ciprofloxacin ($) Resistant       Clindamycin ($) Susceptible Susceptible      Daptomycin ($$$$$) Susceptible       Doxycycline ($$) Susceptible       Erythromycin ($$$$) Resistant Intermediate      Gentamicin ($) Susceptible       Levofloxacin ($) Resistant Susceptible      Linezolid ($$$$$) Susceptible Susceptible      Moxifloxacin ($$$$) Intermediate       Oxacillin Susceptible       Penicillin G ($$)  Intermediate      Tetracycline Susceptible       Trimeth/Sulfa Susceptible       Vancomycin ($) Susceptible Susceptible                 Linear View               Susceptibility      Enterococcus raffinosus      GERMAN      Ampicillin ($) Resistant      Daptomycin ($$$$$) Susceptible  [1]       Vancomycin ($) Susceptible              [1]  (SENSITIVITIES PERFORMED BY E-TEST)          Linear View                   COVID-19 RAPID TEST [[id number]] Collected: 10/08/21 1254    Order Status: Completed Specimen: Nasopharyngeal Updated: 10/08/21 1324     Specimen source Nasopharyngeal        COVID-19 rapid test Not detected        Comment: Rapid Abbott ID Now       Rapid NAAT:  The specimen is NEGATIVE for SARS-CoV-2, the novel coronavirus associated with COVID-19. Negative results should be treated as presumptive and, if inconsistent with clinical signs and symptoms or necessary for patient management, should be tested with an alternative molecular assay. Negative results do not preclude SARS-CoV-2 infection and should not be used as the sole basis for patient management decisions. This test has been authorized by the FDA under an Emergency Use Authorization (EUA) for use by authorized laboratories.    Fact sheet for Healthcare Providers: ConventionUpdate.co.nz  Fact sheet for Patients: ConventionUpdate.co.nz       Methodology: Isothermal Nucleic Acid Amplification         CULTURE, BLOOD [[id number]]  (Abnormal) Collected: 10/07/21 2055    Order Status: Completed Specimen: Blood Updated: 10/10/21 1452     Special Requests: NO SPECIAL REQUESTS        GRAM STAIN       AEROBIC AND ANAEROBIC BOTTLES GRAM POSITIVE COCCI IN GROUPS                  SMEAR CALLED TO AND CORRECTLY REPEATED BY: J[facility]ARLOS TAV[facility] AT 9017 BY KDA 10/8/21           Culture result:       STAPHYLOCOCCUS AUREUS GROWING IN THE AEROBIC AND ANAEROBIC BOTTLES REFER TO [id number] FOR SENSITIVITIES          CULTURE, BLOOD [[id number]]  (Abnormal)  (Susceptibility) Collected: 10/07/21 2040    Order Status: Completed Specimen: Blood Updated: 10/10/21 1452     Special Requests: NO SPECIAL REQUESTS        GRAM STAIN       AEROBIC AND ANAEROBIC BOTTLES                  SMEAR CALLED TO AND CORRECTLY REPEATED BY: [facility] AT 36 BY KDA 10/8/21           Culture result:       STAPHYLOCOCCUS AUREUS GROWING IN THE AEROBIC AMD ANAEROBIC BOTTLES          Susceptibility      Staphylococcus aureus      GERMAN      Ciprofloxacin ($) Resistant      Clindamycin ($) Susceptible      Daptomycin ($$$$$) Susceptible      Doxycycline ($$) Susceptible      Erythromycin ($$$$) Resistant      Gentamicin ($) Susceptible      Levofloxacin ($) Resistant      Linezolid ($$$$$) Susceptible      Moxifloxacin ($$$$) Intermediate      Oxacillin Susceptible      Tetracycline Susceptible      Trimeth/Sulfa Susceptible      Vancomycin ($) Susceptible               Linear View                         Imaging:     IR FLUORO GUIDE PICC INSERTION    Result Date: 10/12/2021  Successful placement dual lumen peripherally inserted central catheter. Okay for immediate use.

## 2021-10-20 NOTE — PROGRESS NOTES
Spoke with pt and informed pt that bed search will be expanded outside of the [place] area. Pt agreeable. Bed search expanded in [place]. Call placed to [facility] ([phone], option #3) and left a voicemail requesting a return call.          Anabel Duvall, MSN, RN, ACM-RN     [phone]- pager  [phone]- main office

## 2021-10-20 NOTE — PROGRESS NOTES
Received report on pt.from off going RN. Resting quietly in bed on rounds. Denies c/o pain or SOB at this time. Call bell at side. No acute distress noted. Will cont to monitor for any changes in status. 1738 BS 59. Asymptomatic. Pt given 4 ounces of regular soda. 1755 BS 49. Pt remains asymptomatic. Dinner tray given to pt along with 4 ounces of regular soda. 1812. pt ate 100 percent of dinner tray and drank soda  And orange juice. Recheck BS was 62. Remains asymptomatic. 1/2 amp of D50 given due to pt not wanting to eat or drink anything else. 1839 . Pt denies distress of any type. 2000 Bedside and Verbal shift change report given to Ximena E Burton Ponce (oncoming nurse) by Stefan Oswald RN (offgoing nurse). Report given with Destiny GUADARRAMA and MAR.

## 2021-10-20 NOTE — ROUTINE PROCESS
Bedside and Verbal shift change report given to [facility] RN (oncoming nurse) by Dada Doyle RN (offgoing nurse). Report included the following information Intake/Output, MAR and Recent Results.

## 2021-10-21 LAB
GLUCOSE BLD STRIP.AUTO-MCNC: 157 MG/DL (ref 70–110)
GLUCOSE BLD STRIP.AUTO-MCNC: 170 MG/DL (ref 70–110)
GLUCOSE BLD STRIP.AUTO-MCNC: 239 MG/DL (ref 70–110)
GLUCOSE BLD STRIP.AUTO-MCNC: 247 MG/DL (ref 70–110)

## 2021-10-21 PROCEDURE — 74011636637 HC RX REV CODE- 636/637: Performed by: INTERNAL MEDICINE

## 2021-10-21 PROCEDURE — 74011636637 HC RX REV CODE- 636/637: Performed by: HOSPITALIST

## 2021-10-21 PROCEDURE — 74011250636 HC RX REV CODE- 250/636: Performed by: INTERNAL MEDICINE

## 2021-10-21 PROCEDURE — 82962 GLUCOSE BLOOD TEST: CPT

## 2021-10-21 PROCEDURE — 99232 SBSQ HOSP IP/OBS MODERATE 35: CPT | Performed by: HOSPITALIST

## 2021-10-21 PROCEDURE — 74011250637 HC RX REV CODE- 250/637: Performed by: HOSPITALIST

## 2021-10-21 PROCEDURE — 65660000000 HC RM CCU STEPDOWN

## 2021-10-21 PROCEDURE — 2709999900 HC NON-CHARGEABLE SUPPLY

## 2021-10-21 RX ADMIN — VANCOMYCIN HYDROCHLORIDE 1250 MG: 10 INJECTION, POWDER, LYOPHILIZED, FOR SOLUTION INTRAVENOUS at 02:44

## 2021-10-21 RX ADMIN — ROSUVASTATIN CALCIUM 40 MG: 20 TABLET, COATED ORAL at 22:06

## 2021-10-21 RX ADMIN — TRAZODONE HYDROCHLORIDE 50 MG: 50 TABLET ORAL at 02:59

## 2021-10-21 RX ADMIN — DULOXETINE HYDROCHLORIDE 60 MG: 60 CAPSULE, DELAYED RELEASE ORAL at 10:31

## 2021-10-21 RX ADMIN — INSULIN LISPRO 6 UNITS: 100 INJECTION, SOLUTION INTRAVENOUS; SUBCUTANEOUS at 12:15

## 2021-10-21 RX ADMIN — INSULIN LISPRO 3 UNITS: 100 INJECTION, SOLUTION INTRAVENOUS; SUBCUTANEOUS at 17:42

## 2021-10-21 RX ADMIN — INSULIN LISPRO 3 UNITS: 100 INJECTION, SOLUTION INTRAVENOUS; SUBCUTANEOUS at 10:31

## 2021-10-21 RX ADMIN — INSULIN LISPRO 6 UNITS: 100 INJECTION, SOLUTION INTRAVENOUS; SUBCUTANEOUS at 22:06

## 2021-10-21 RX ADMIN — VANCOMYCIN HYDROCHLORIDE 1250 MG: 10 INJECTION, POWDER, LYOPHILIZED, FOR SOLUTION INTRAVENOUS at 15:04

## 2021-10-21 RX ADMIN — INSULIN GLARGINE 10 UNITS: 100 INJECTION, SOLUTION SUBCUTANEOUS at 10:31

## 2021-10-21 RX ADMIN — AMLODIPINE BESYLATE 10 MG: 10 TABLET ORAL at 10:31

## 2021-10-21 RX ADMIN — LOSARTAN POTASSIUM 100 MG: 50 TABLET, FILM COATED ORAL at 10:31

## 2021-10-21 NOTE — DIABETES MGMT
Diabetes Plan of Care    10/21: Patient in [facility] unit. Noted that he was treated for episode of hypoglycemia on 10/20. DCP per CM notes: SNF placement. Noted patient was admitted on 10/07/2021 with report of right great toe infection x 3 days. Problem List Items Addressed This Visit        Skeletal    Osteomyelitis of great toe of right foot ([address] - Primary        MSSA sepsis  Status post right great toe-status post amputation on 10/9/2021    Glycemic Assessment:    History: T2DM  A1c of 9.0% (10/07/2021)  Home diabetes medications:   Lantus 60 units every bedtime  Humalog 10 units tid [facility]    Results for [place] Relic (MRN [id number]) as of 10/21/2021 13:05   Ref. Range 10/20/2021 07:27 10/20/2021 12:45 10/20/2021 16:28 10/20/2021 17:38 10/20/2021 17:55 10/20/2021 18:12 10/20/2021 18:39 10/20/2021 21:52   GLUCOSE,FAST - POC Latest Ref Range: 70 - 110 mg/dL 168 (H) 255 (H) 155 (H) 59 (L) 49 (LL) 62 (L) 177 (H) 272 (H)     10/20: Noted patient was give 25 grams of D50 6:17 PM    Results for [place] Relic (MRN [id number]) as of 10/21/2021 13:05   Ref. Range 10/21/2021 07:16 10/21/2021 11:20   GLUCOSE,FAST - POC Latest Ref Range: 70 - 110 mg/dL 170 (H) 239 (H)     IVF containing dextrose: None  Steroids: None  Diet: Regular; 4 carb choices    Recommendations:   1.) cont glycemic monitoring and current insulin orders. 2.) follow treatment protocol for hypoglycemia. Most recent blood glucose values: Within target range (non-ICU: <140; ICU<180):  No.    Current A1c  9.0% (10/07/2021) which is equivalent to an average blood glucose of 212 mg/dl over the past 2-3 months    Adequate glycemic control PTA:  No.    Current insulin regime:  Basal lantus insulin 10 units daily  Correctional lispro insulin ACHS.  Very resistant dose    TDD previous day 10/20:  Lantus: 10 units  Lispro: 16 units  TDD insulin: 26 units    Goals: Blood glucose will be within target of 70 - 180 mg/dl by: 10/24/2021    Education: _____ Refer to Diabetes Education Record                       _____ Education not indicated at this time     Nayeli Camara RN CCM  [phone]

## 2021-10-21 NOTE — PROGRESS NOTES
Call placed to [facility] to inquire about their ability to accept pt at once of their facilities. [place] requested for pt to be sent to [facility] for review. Call placed to Crispin Soto at [facility] to inquire about their ability to accept the pt. Confirmed with Crispin Soto that pt states that he has not been vaccinated and that he is agreeable to long term care with his income going to their facility. Per Crispin Soto, they are only able to accept him 14 days after being fully vaccinated, or they open up their \"warm unit. \". Second voicemail left to [facility] [address] in [place] requesting a return call. Case management will continue to follow for transition needs.          Rupa Emmanuel, MSN, RN, ACM-RN     [phone]- pager  [phone]- main office

## 2021-10-21 NOTE — PROGRESS NOTES
Admit Date: 10/7/2021  Date of Service: 10/21/2021    Reason for follow-up: toe infection      Assessment:         1. MSSA sepsis: 10/7 blood cultures 4/4 positive; repeat 10/10 blood cx pending  -10/11/2021 TTE negative for endocarditis  2. Osteomyelitis of right great toe-status post amputation 10/9/2021  -10/9/2021 surgical cultures with staph aureus  3. History of type 2 diabetes-insulin sliding scale, monitor blood sugars  4. History of hypertension-resume home medications, monitor blood pressure  5. Hyponatremia-gentle hydration with IVF, monitor sodium levels  6. History of depression-resume chronic home medications    Plan:   Local wound care  F/u intraoperative cultures and pathology  Discussed with diabetes educator. We will add Lantus   -Tight glucose control for optimal wound healing  Continue Cymbalta, Crestor  Continue with vancomycin-cultures growing 3 different bacteria, discussed with ID, sensitive to vancomycin, will continue IV vancomycin post discharge since patient has a PICC line. Discussed with ID diet patient will need IV antibiotics for 6 weeks since he has osteomyelitis. Sliding scale insulin, Accu-Cheks  Follow-up 10/10 blood cultures-currently no growth to date    Current Antibtiocs:   Vancomycin 10/7- present      Lines:   Peripheral    Case discussed with:  [x]Patient  []Family  [x]Nursing  []Case Management  DVT Prophylaxis:  []Lovenox  []Hep SQ  []SCDs  []Coumadin   []On Heparin gtt    I have independently examined the patient and reviewed all lab studies and imgaing as well as review of nursing notes and physican notes from the past 24 hours. Discussed with  regarding discharge planning. Awaiting placement. No Known Allergies        Subjective:      Pt seen and examined. Lying in bed, no acute distress.      Objective:        Visit Vitals  /76   Pulse 84   Temp 98.1 °F (36.7 °C)   Resp 18   Ht 5' 11\" (1.803 m)   Wt 69.3 kg (152 lb 11.2 oz)   SpO2 98% BMI 21.30 kg/m²     Temp (24hrs), Av.4 °F (36.9 °C), Min:98.1 °F (36.7 °C), Max:98.8 °F (37.1 °C)        General:   awake alert and oriented, non-toxic   Skin:   no rashes or skin lesions noted on limited exam, dry and warm   HEENT:  No scleral icterus or pallor; oral mucosa moist, lips moist   Lymph Nodes:   not assessed today   Lungs:   non, labored; bilaterally clear to aspiration- no crackles wheezes rales or rhonchi   Heart:  RRR, s1 and s2; no murmurs rubs or gallops; no edema, + pedal pulses   Abdomen:  soft, non-distended, active bowel sounds, non-tender   Genitourinary:  deferred   Extremities:   average muscle tone; no contractures, no joint effusions; surgical dressings in place, clean, not removed   Neurologic:  No gross focal motor abnormalities; diminished sensation to bilateral lower extremities from foot to ankle ; CN 2-12 intact; Follows commands. Psychiatric:   appropriate and interactive. Labs: Results:   Chemistry Recent Labs     10/20/21  0255   *   *   K 3.6      CO2 31   BUN 15   CREA 0.93   CA 9.6   AGAP 1*   BUCR 16      CBC w/Diff No results for input(s): WBC, RBC, HGB, HCT, PLT, GRANS, LYMPH, EOS, HGBEXT, HCTEXT, PLTEXT, HGBEXT, HCTEXT, PLTEXT in the last 72 hours.      No results found for: [facility] Lab Results   Component Value Date/Time    Culture result: NO GROWTH 6 DAYS 10/10/2021 06:07 AM    Culture result: NO GROWTH 6 DAYS 10/10/2021 06:07 AM    Culture result: NO ANAEROBES ISOLATED 10/09/2021 12:56 PM    Culture result: FEW STAPHYLOCOCCUS AUREUS (A) 10/09/2021 12:56 PM    Culture result: FEW STREPTOCOCCUS MITIS/ORALIS (A) 10/09/2021 12:56 PM    Culture result: FEW ENTEROCOCCUS RAFFINOSUS (A) 10/09/2021 12:56 PM        Results     Procedure Component Value Units Date/Time    CULTURE, BLOOD [[id number]] Collected: 10/10/21 0607    Order Status: Completed Specimen: Whole Blood Updated: 10/16/21 0709     Special Requests: NO SPECIAL REQUESTS        Culture result: NO GROWTH 6 DAYS       CULTURE, BLOOD [[id number]] Collected: 10/10/21 0607    Order Status: Completed Specimen: Whole Blood Updated: 10/16/21 0709     Special Requests: NO SPECIAL REQUESTS        Culture result: NO GROWTH 6 DAYS       CULTURE, ANAEROBIC [[id number]] Collected: 10/09/21 1256    Order Status: Completed Specimen: Tissue Updated: 10/12/21 1645     Special Requests: RT GREAT TOE, PROXIMAL CLEAN MARGIN     Culture result: NO ANAEROBES ISOLATED       CULTURE, TISSUE [place] STAIN [[id number]]  (Abnormal)  (Susceptibility) Collected: 10/09/21 1256    Order Status: Completed Specimen: Toe Updated: 10/14/21 0755     Special Requests: RT GREAT TOE, PROXIMAL CLEAN MARGIN     GRAM STAIN RARE WBCS SEEN         NO DEFINITE ORGANISM SEEN        Culture result: FEW STAPHYLOCOCCUS AUREUS               FEW STREPTOCOCCUS MITIS/ORALIS                  FEW ENTEROCOCCUS RAFFINOSUS          Susceptibility      Staphylococcus aureus Streptococcus mitis/oralis      GERMAN GERMAN      Cefotaxime  Susceptible      Ceftriaxone ($)  Susceptible      Ciprofloxacin ($) Resistant       Clindamycin ($) Susceptible Susceptible      Daptomycin ($$$$$) Susceptible       Doxycycline ($$) Susceptible       Erythromycin ($$$$) Resistant Intermediate      Gentamicin ($) Susceptible       Levofloxacin ($) Resistant Susceptible      Linezolid ($$$$$) Susceptible Susceptible      Moxifloxacin ($$$$) Intermediate       Oxacillin Susceptible       Penicillin G ($$)  Intermediate      Tetracycline Susceptible       Trimeth/Sulfa Susceptible       Vancomycin ($) Susceptible Susceptible                 Linear View               Susceptibility      Enterococcus raffinosus      GERMAN      Ampicillin ($) Resistant      Daptomycin ($$$$$) Susceptible  [1]       Vancomycin ($) Susceptible              [1]  (SENSITIVITIES PERFORMED BY E-TEST)          Linear View                   COVID-19 RAPID TEST [[id number]] Collected: 10/08/21 1254    Order Status: Completed Specimen: Nasopharyngeal Updated: 10/08/21 1324     Specimen source Nasopharyngeal        COVID-19 rapid test Not detected        Comment: Rapid Abbott ID Now       Rapid NAAT:  The specimen is NEGATIVE for SARS-CoV-2, the novel coronavirus associated with COVID-19. Negative results should be treated as presumptive and, if inconsistent with clinical signs and symptoms or necessary for patient management, should be tested with an alternative molecular assay. Negative results do not preclude SARS-CoV-2 infection and should not be used as the sole basis for patient management decisions. This test has been authorized by the FDA under an Emergency Use Authorization (EUA) for use by authorized laboratories.    Fact sheet for Healthcare Providers: ConventionFlyzikdate.co.nz  Fact sheet for Patients: ConventionFlyzikdate.co.nz       Methodology: Isothermal Nucleic Acid Amplification         CULTURE, BLOOD [[id number]]  (Abnormal) Collected: 10/07/21 2055    Order Status: Completed Specimen: Blood Updated: 10/10/21 1452     Special Requests: NO SPECIAL REQUESTS        GRAM STAIN       AEROBIC AND ANAEROBIC BOTTLES GRAM POSITIVE COCCI IN GROUPS                  SMEAR CALLED TO AND CORRECTLY REPEATED BY: [facility] AT 4378 BY KDA 10/8/21           Culture result:       STAPHYLOCOCCUS AUREUS GROWING IN THE AEROBIC AND ANAEROBIC BOTTLES REFER TO [id number] FOR SENSITIVITIES          CULTURE, BLOOD [[id number]]  (Abnormal)  (Susceptibility) Collected: 10/07/21 2040    Order Status: Completed Specimen: Blood Updated: 10/10/21 1452     Special Requests: NO SPECIAL REQUESTS        GRAM STAIN       AEROBIC AND ANAEROBIC BOTTLES                  SMEAR CALLED TO AND CORRECTLY REPEATED BY: [facility] AT 36 BY KDA 10/8/21           Culture result:       STAPHYLOCOCCUS AUREUS GROWING IN THE AEROBIC AMD ANAEROBIC BOTTLES          Susceptibility      Staphylococcus aureus GERMAN      Ciprofloxacin ($) Resistant      Clindamycin ($) Susceptible      Daptomycin ($$$$$) Susceptible      Doxycycline ($$) Susceptible      Erythromycin ($$$$) Resistant      Gentamicin ($) Susceptible      Levofloxacin ($) Resistant      Linezolid ($$$$$) Susceptible      Moxifloxacin ($$$$) Intermediate      Oxacillin Susceptible      Tetracycline Susceptible      Trimeth/Sulfa Susceptible      Vancomycin ($) Susceptible               Linear View                         Imaging:     IR FLUORO GUIDE PICC INSERTION    Result Date: 10/12/2021  Successful placement dual lumen peripherally inserted central catheter. Okay for immediate use.

## 2021-10-22 LAB
GLUCOSE BLD STRIP.AUTO-MCNC: 146 MG/DL (ref 70–110)
GLUCOSE BLD STRIP.AUTO-MCNC: 218 MG/DL (ref 70–110)
GLUCOSE BLD STRIP.AUTO-MCNC: 244 MG/DL (ref 70–110)
GLUCOSE BLD STRIP.AUTO-MCNC: 246 MG/DL (ref 70–110)

## 2021-10-22 PROCEDURE — 74011636637 HC RX REV CODE- 636/637: Performed by: INTERNAL MEDICINE

## 2021-10-22 PROCEDURE — 99239 HOSP IP/OBS DSCHRG MGMT >30: CPT | Performed by: HOSPITALIST

## 2021-10-22 PROCEDURE — 74011250636 HC RX REV CODE- 250/636: Performed by: INTERNAL MEDICINE

## 2021-10-22 PROCEDURE — 74011250637 HC RX REV CODE- 250/637: Performed by: HOSPITALIST

## 2021-10-22 PROCEDURE — 65660000000 HC RM CCU STEPDOWN

## 2021-10-22 PROCEDURE — 74011636637 HC RX REV CODE- 636/637: Performed by: HOSPITALIST

## 2021-10-22 PROCEDURE — 82962 GLUCOSE BLOOD TEST: CPT

## 2021-10-22 RX ORDER — LOSARTAN POTASSIUM 100 MG/1
100 TABLET ORAL DAILY
Qty: 30 TABLET | Refills: 0 | Status: SHIPPED
Start: 2021-10-23

## 2021-10-22 RX ORDER — AMLODIPINE BESYLATE 10 MG/1
10 TABLET ORAL DAILY
Qty: 30 TABLET | Refills: 0 | Status: SHIPPED
Start: 2021-10-23

## 2021-10-22 RX ORDER — VANCOMYCIN HYDROCHLORIDE
1250 EVERY 12 HOURS
Qty: 16000 ML | Refills: 0 | Status: SHIPPED
Start: 2021-10-22 | End: 2021-11-23

## 2021-10-22 RX ADMIN — VANCOMYCIN HYDROCHLORIDE 1250 MG: 10 INJECTION, POWDER, LYOPHILIZED, FOR SOLUTION INTRAVENOUS at 03:20

## 2021-10-22 RX ADMIN — AMLODIPINE BESYLATE 10 MG: 10 TABLET ORAL at 10:07

## 2021-10-22 RX ADMIN — INSULIN GLARGINE 10 UNITS: 100 INJECTION, SOLUTION SUBCUTANEOUS at 10:07

## 2021-10-22 RX ADMIN — DULOXETINE HYDROCHLORIDE 60 MG: 60 CAPSULE, DELAYED RELEASE ORAL at 10:07

## 2021-10-22 RX ADMIN — INSULIN LISPRO 6 UNITS: 100 INJECTION, SOLUTION INTRAVENOUS; SUBCUTANEOUS at 10:07

## 2021-10-22 RX ADMIN — LOSARTAN POTASSIUM 100 MG: 50 TABLET, FILM COATED ORAL at 10:07

## 2021-10-22 RX ADMIN — ROSUVASTATIN CALCIUM 40 MG: 20 TABLET, COATED ORAL at 22:23

## 2021-10-22 RX ADMIN — INSULIN LISPRO 6 UNITS: 100 INJECTION, SOLUTION INTRAVENOUS; SUBCUTANEOUS at 12:53

## 2021-10-22 RX ADMIN — VANCOMYCIN HYDROCHLORIDE 1250 MG: 10 INJECTION, POWDER, LYOPHILIZED, FOR SOLUTION INTRAVENOUS at 16:19

## 2021-10-22 RX ADMIN — INSULIN LISPRO 6 UNITS: 100 INJECTION, SOLUTION INTRAVENOUS; SUBCUTANEOUS at 22:24

## 2021-10-22 NOTE — DISCHARGE SUMMARY
Hospitalist Discharge Summary    Patient: Cynthia Cornejo MRN: [id number]  [facility]: [id number]    YOB: 1965  Age: 49 y.o. Sex: male    DOA: 10/7/2021 LOS:  LOS: 15 days   Discharge Date:     Admission Diagnoses: Osteomyelitis ([facility] [M86.9]  Acute sepsis ([facility] [A41.9]  Osteomyelitis of great toe of right foot ([facility] [M86.9]    Discharge Diagnoses:    1. Osteomyelitis right great toe status post amputation  2. History of type 2 diabetes  3. History of hypertension  4. History of depression  5. Homeless    Discharge Condition: [address]    Discharge To: SNF    PHYSICAL EXAM  Visit Vitals  BP (!) 164/97 (BP 1 Location: Left upper arm, BP Patient Position: At rest)   Pulse 89   Temp 98.3 °F (36.8 °C)   Resp 18   Ht 5' 11\" (1.803 m)   Wt 69.3 kg (152 lb 11.2 oz)   SpO2 99%   BMI 21.30 kg/m²       General: Alert, cooperative, no acute distress    HEENT: PERRLA, EOMI. Anicteric sclerae. Lungs:  CTA Bilaterally. No Wheezing/Rhonchi/Rales. Heart:  Regular rate and Rhythm. Abdomen: Soft, Non distended, Non tender. + Bowel sounds. Extremities: S/p Amputation R toe   Neurologic:  AA oriented X 3. Moves all extremities. HPI:  Cynthia Cornejo is a 49 y.o. male who has a known history of type 2 diabetes, poorly controlled, hypertension, depression presents to the emergency room with complaints of foul-smelling discharge from right foot great toe. Patient also mentions he had a fever, no cough, no shortness of breath. No known exposure to patients with COVID-19 infection. ER evaluation-patient noted to have osteomyelitis of the right big toe with foul-smelling discharge. Patient has been started on broad-spectrum IV antibiotics, podiatry consulted, patient will be n.p.o. after midnight for possible procedure in a.m. Patient is being admitted to the hospital for further evaluation.       Hospital Course:   61-year-old male with a history of type 2 diabetes poorly controlled, hypertension, depression presented to the emergency room with foul-smelling discharge from right great toe. Patient diagnosed with acute osteomyelitis of the right great toe, started on broad-spectrum IV antibiotics, ID consulted and patient underwent amputation by podiatry. Following procedure patient has done well however he has stayed in the hospital since he is homeless and case management has not been able to find him of facility since he requires IV antibiotics on discharge. Patient was on IV vancomycin while in the hospital however he was a difficult placement to skilled nursing facility. ID consulted and antibiotics changed to p.o. Zyvox x4 weeks. He will stop his Cymbalta while he is on Zyvox. Patient verbalized understanding. His wound culture grew out MSSA, Streptococcus mitis/oralis  and Enterococcus raffinosus. Follow up Care: With PCP and podiatry in 2 weeks    Consults: Infectious Disease and Podiatry    Significant Diagnostic Studies:     Imaging:  XR Results (most recent):  Results from Hospital Encounter encounter on 10/07/21    XR CHEST PORT    Narrative  Portable CXR:    HISTORY: Sepsis criteria. Comparison June 18, 2021    Normal cardiac silhouette and vascularity. Lungs and costophrenic angles are  clear. Impression  Negative CXR       CT Results (most recent):  No results found for this or any previous visit. 10/07/21    ECHO ADULT COMPLETE 10/11/2021 10/11/2021    Interpretation Summary  · LV: Estimated LVEF is 55 - 60%. Visually measured ejection fraction. Normal systolic function (ejection fraction normal). Small left ventricle. Moderate hypertrophy. Severe septal wall hypertrophy. Wall motion: normal. Age-appropriate left ventricular diastolic function. · AV: Aortic valve leaflet calcification present. There is no vegetation on the aortic valve. · There is no vegetation on the mitral valve. · There is no vegetation on the tricuspid valve.   · There is no vegetation on the pulmonic valve. Signed by: Cristo Otero MD on 10/11/2021 10:59 AM         Procedures:   Right great toe amputation    Discharge Medications:     Current Discharge Medication List      START taking these medications    Details   amLODIPine (NORVASC) 10 mg tablet Take 1 Tablet by mouth daily. Qty: 30 Tablet, Refills: 0      losartan (COZAAR) 100 mg tablet Take 1 Tablet by mouth daily. Qty: 30 Tablet, Refills: 0      Zyvox 600 mg 1 tablet twice daily x4 weeks          CONTINUE these medications which have NOT CHANGED    Details   insulin lispro (HUMALOG) 100 unit/mL injection Administer 10 underneath the skin, three times a day before meals. Indications: type 2 diabetes mellitus  Qty: 1 Vial, Refills: 0      hydrALAZINE (APRESOLINE) 25 mg tablet Take 1 Tablet by mouth three (3) times daily. Qty: 90 Tablet, Refills: 0             insulin glargine (LANTUS) 100 unit/mL injection Administer 60 units underneath the skin, every night after supper  Indications: type 2 diabetes mellitus  Qty: 1 Vial, Refills: 0      rosuvastatin (CRESTOR) 40 mg tablet Take 1 Tab by mouth nightly. Indications: high cholesterol  Qty: 30 Tab, Refills: 0      traZODone (DESYREL) 50 mg tablet Take 1 Tab by mouth nightly as needed for Sleep.  Indications: insomnia associated with depression  Qty: 30 Tab, Refills: 0         STOP taking these medications       ondansetron (ZOFRAN ODT) 4 mg disintegrating tablet Comments:   Reason for Stopping:               Current Facility-Administered Medications:     [START ON 10/23/2021] VANCOMYCIN, TROUGH, , , ONE TIME **AND** [START ON 10/23/2021] Vancomycin Trough PRIOR to 1400 Dose on 10/23, 1 Each, Other, ONCE, Adriana Dodson MD    insulin glargine (LANTUS) injection 10 Units, 10 Units, SubCUTAneous, DAILY, Adriana Dodson MD, 10 Units at 10/22/21 1007    amLODIPine (NORVASC) tablet 10 mg, 10 mg, Oral, DAILY, Adriana Dodson MD, 10 mg at 10/22/21 1007    losartan (COZAAR) tablet 100 mg, 100 mg, Oral, DAILY, Ellena Paget, MD, 100 mg at 10/22/21 1007    vancomycin (VANCOCIN) 1250 mg in  ml infusion, 1,250 mg, IntraVENous, Q12H, Natalie Nuñez MD, Last Rate: 166.7 mL/hr at 10/22/21 0320, 1,250 mg at 10/22/21 0320    hydrALAZINE (APRESOLINE) tablet 10 mg, 10 mg, Oral, Q6H PRN, Aubrey Vanessa MD, 10 mg at 10/15/21 0427    DULoxetine (CYMBALTA) capsule 60 mg, 60 mg, Oral, DAILY, Ellena Paget, MD, 60 mg at 10/22/21 1007    rosuvastatin (CRESTOR) tablet 40 mg, 40 mg, Oral, QHS, Ellena Paget, MD, 40 mg at 10/21/21 2206    traZODone (DESYREL) tablet 50 mg, 50 mg, Oral, QHS PRN, Ellena Paget, MD, 50 mg at 10/21/21 0259    acetaminophen (TYLENOL) tablet 650 mg, 650 mg, Oral, Q6H PRN, 650 mg at 10/17/21 0839 **OR** acetaminophen (TYLENOL) suppository 650 mg, 650 mg, Rectal, Q6H PRN, Ellena Paget, MD    polyethylene glycol (MIRALAX) packet 17 g, 17 g, Oral, DAILY PRN, Ellena Paget, MD, 17 g at 10/19/21 0914    ondansetron (ZOFRAN ODT) tablet 4 mg, 4 mg, Oral, Q8H PRN **OR** ondansetron (ZOFRAN) injection 4 mg, 4 mg, IntraVENous, Q6H PRN, Ellena Paget, MD    sodium chloride (NS) flush 5-10 mL, 5-10 mL, IntraVENous, PRN, Ellena Paget, MD, 10 mL at 10/20/21 1537    insulin lispro (HUMALOG) injection, , SubCUTAneous, AC&HS, Natalie Nuñez MD, 6 Units at 10/22/21 1007    glucose chewable tablet 16 g, 4 Tablet, Oral, PRN, Ellena Paget, MD    glucagon [facility]) injection 1 mg, 1 mg, IntraMUSCular, PRN, Ellena Paget, MD    dextrose (D50W) injection syrg 12.5-25 g, 25-50 mL, IntraVENous, PRN, Ellena Paget, MD, 25 g at 10/20/21 1817     Activity: Activity as tolerated    Diet: Cardiac Diet and Diabetic Diet    Wound Care: As directed      Court MD Dioni      Total time spent 40 mins  Disclaimer: Sections of this note are dictated using utilizing voice recognition software. Minor typographical errors may be present. If questions arise, please do not hesitate to contact me or call our department.

## 2021-10-22 NOTE — PROGRESS NOTES
Progress Note    Patient: Dimitri Downs MRN: [id number]  SSN: [id number]    YOB: 1965  Age: 49 y.o. Sex: male      Admit Date: 10/7/2021  13 Days Post-Op     Procedure:   Procedure(s):  AMPUTATION  OF RIGHT GREAT TOE    Subjective:     Patient seen resting quiet and comfortably and no apparent distress. Patient is currently on IV antibiotics per ID recommendation. Patient denies any other pedal complaint. Denies N/V/C/F. Status post: osteomyelitis    Objective:     Visit Vitals  BP (!) 164/97 (BP 1 Location: Left upper arm, BP Patient Position: At rest)   Pulse 89   Temp 98.3 °F (36.8 °C)   Resp 18   Ht 5' 11\" (1.803 m)   Wt 69.3 kg (152 lb 11.2 oz)   SpO2 99%   BMI 21.30 kg/m²        Physical Exam:  Right great toe amputation site mild dehiscence noted to medial side however skin flaps appear viable, skin sutures intact. No hematoma or seroma noted. Assessment:     Hospital Problems  Date Reviewed: 10/9/2021        Codes Class Noted POA    Acute sepsis [facility]) ICD-10-CM: A41.9  ICD-9-CM: 038.9, 995.91  10/7/2021 Unknown        Osteomyelitis of great toe of right foot ([facility] ICD-10-CM: M86.9  ICD-9-CM: 730.27  10/7/2021 Unknown        Osteomyelitis ([facility] ICD-10-CM: M86.9  ICD-9-CM: 730.20  4/2/2021 Unknown              Plan/Recommendations/Medical Decision Making:     - Dressings reinforced with betadine and dry gauze. Will need to change dressings every 3 days. - Continue antibiotics per ID recommendation.   - Remain NWB to right forefoot. - Will see him back in 1 week in office.

## 2021-10-22 NOTE — PROGRESS NOTES
Discharge planning    Spoke with Katherine Quinones, admission coordinator, with [facility]. Per Katherine Quinones, insurance authorization was not started. She will check to see if an authorization is needed; and  [facility] has a bed available.      DEBI GalanN, RN  Pager # [phone]  Care Manager

## 2021-10-22 NOTE — ROUTINE PROCESS
Bedside and Verbal shift change report given to [place] (oncoming nurse) by Mirna Estrada (offgoing nurse). Report included the following information SBAR, Kardex, MAR, Recent Results and Cardiac Rhythm SR. Patient quietly resting, call light in reach.

## 2021-10-22 NOTE — PROGRESS NOTES
Call made to [address] [place] [address] Case Management Dept, spoke with Tana, requested a copy of patient's UAI.

## 2021-10-23 LAB
ANION GAP SERPL CALC-SCNC: 5 MMOL/L (ref 3–18)
BUN SERPL-MCNC: 17 MG/DL (ref 7–18)
BUN/CREAT SERPL: 18 (ref 12–20)
CALCIUM SERPL-MCNC: 9.9 MG/DL (ref 8.5–10.1)
CHLORIDE SERPL-SCNC: 102 MMOL/L (ref 100–111)
CO2 SERPL-SCNC: 30 MMOL/L (ref 21–32)
CREAT SERPL-MCNC: 0.97 MG/DL (ref 0.6–1.3)
DATE LAST DOSE: NORMAL
GLUCOSE BLD STRIP.AUTO-MCNC: 151 MG/DL (ref 70–110)
GLUCOSE BLD STRIP.AUTO-MCNC: 183 MG/DL (ref 70–110)
GLUCOSE BLD STRIP.AUTO-MCNC: 219 MG/DL (ref 70–110)
GLUCOSE BLD STRIP.AUTO-MCNC: 249 MG/DL (ref 70–110)
GLUCOSE SERPL-MCNC: 228 MG/DL (ref 74–99)
POTASSIUM SERPL-SCNC: 4.1 MMOL/L (ref 3.5–5.5)
REPORTED DOSE,DOSE: NORMAL UNITS
REPORTED DOSE/TIME,TMG: 1400
SODIUM SERPL-SCNC: 137 MMOL/L (ref 136–145)
VANCOMYCIN TROUGH SERPL-MCNC: 18.4 UG/ML (ref 10–20)

## 2021-10-23 PROCEDURE — 65660000000 HC RM CCU STEPDOWN

## 2021-10-23 PROCEDURE — 82962 GLUCOSE BLOOD TEST: CPT

## 2021-10-23 PROCEDURE — 80202 ASSAY OF VANCOMYCIN: CPT

## 2021-10-23 PROCEDURE — 99231 SBSQ HOSP IP/OBS SF/LOW 25: CPT | Performed by: HOSPITALIST

## 2021-10-23 PROCEDURE — 74011636637 HC RX REV CODE- 636/637: Performed by: HOSPITALIST

## 2021-10-23 PROCEDURE — 94760 N-INVAS EAR/PLS OXIMETRY 1: CPT

## 2021-10-23 PROCEDURE — 74011636637 HC RX REV CODE- 636/637: Performed by: INTERNAL MEDICINE

## 2021-10-23 PROCEDURE — 74011250637 HC RX REV CODE- 250/637: Performed by: HOSPITALIST

## 2021-10-23 PROCEDURE — 74011250636 HC RX REV CODE- 250/636: Performed by: INTERNAL MEDICINE

## 2021-10-23 PROCEDURE — 80048 BASIC METABOLIC PNL TOTAL CA: CPT

## 2021-10-23 RX ADMIN — AMLODIPINE BESYLATE 10 MG: 10 TABLET ORAL at 10:25

## 2021-10-23 RX ADMIN — INSULIN LISPRO 3 UNITS: 100 INJECTION, SOLUTION INTRAVENOUS; SUBCUTANEOUS at 09:26

## 2021-10-23 RX ADMIN — VANCOMYCIN HYDROCHLORIDE 1250 MG: 10 INJECTION, POWDER, LYOPHILIZED, FOR SOLUTION INTRAVENOUS at 03:31

## 2021-10-23 RX ADMIN — INSULIN LISPRO 3 UNITS: 100 INJECTION, SOLUTION INTRAVENOUS; SUBCUTANEOUS at 18:11

## 2021-10-23 RX ADMIN — INSULIN GLARGINE 10 UNITS: 100 INJECTION, SOLUTION SUBCUTANEOUS at 00:26

## 2021-10-23 RX ADMIN — DULOXETINE HYDROCHLORIDE 60 MG: 60 CAPSULE, DELAYED RELEASE ORAL at 10:25

## 2021-10-23 RX ADMIN — VANCOMYCIN HYDROCHLORIDE 1250 MG: 10 INJECTION, POWDER, LYOPHILIZED, FOR SOLUTION INTRAVENOUS at 14:55

## 2021-10-23 RX ADMIN — INSULIN LISPRO 6 UNITS: 100 INJECTION, SOLUTION INTRAVENOUS; SUBCUTANEOUS at 13:18

## 2021-10-23 RX ADMIN — LOSARTAN POTASSIUM 100 MG: 50 TABLET, FILM COATED ORAL at 10:25

## 2021-10-23 NOTE — PROGRESS NOTES
Patient was discharged yesterday however case management was unable to find him a bed. We will continue current treatment plan, will follow.

## 2021-10-23 NOTE — PROGRESS NOTES
Admit Date: 10/7/2021  Date of Service: 10/23/2021    Reason for follow-up: toe infection      Assessment:         1. MSSA sepsis: 10/7 blood cultures 4/4 positive; repeat 10/10 blood cx negative  -10/11/2021 TTE negative for endocarditis  2. Osteomyelitis of right great toe-status post amputation 10/9/2021  -10/9/2021 surgical cultures with staph aureus  3. History of type 2 diabetes-insulin sliding scale, monitor blood sugars  4. History of hypertension-resume home medications, monitor blood pressure  5. Hyponatremia-gentle hydration with IVF, monitor sodium levels  6. History of depression-resume chronic home medications    Plan:   Local wound care  F/u intraoperative cultures and pathology  Discussed with diabetes educator. We will add Lantus   -Tight glucose control for optimal wound healing  Continue Cymbalta, Crestor  Continue with vancomycin-cultures growing 3 different bacteria, discussed with ID, sensitive to vancomycin, will continue IV vancomycin post discharge since patient has a PICC line. Discussed with ID diet patient will need IV antibiotics for 6 weeks since he has osteomyelitis. Sliding scale insulin, Accu-Cheks  Follow-up 10/10 blood cultures-currently no growth to date    Current Antibtiocs:   Vancomycin 10/7- present      Lines:   Peripheral    Case discussed with:  [x]Patient  []Family  [x]Nursing  []Case Management  DVT Prophylaxis:  []Lovenox  []Hep SQ  []SCDs  []Coumadin   []On Heparin gtt    I have independently examined the patient and reviewed all lab studies and imgaing as well as review of nursing notes and physican notes from the past 24 hours. Discussed with  regarding discharge planning. Awaiting placement. Anticipate discharge on Monday 10/25. No Known Allergies        Subjective:      Pt seen and examined. Lying in bed, no acute distress.      Objective:        Visit Vitals  /79 (BP 1 Location: Left upper arm, BP Patient Position: At rest)   Pulse 91   Temp 98.1 °F (36.7 °C)   Resp 18   Ht 5' 11\" (1.803 m)   Wt 69.3 kg (152 lb 11.2 oz)   SpO2 98%   BMI 21.30 kg/m²     Temp (24hrs), Av.3 °F (36.8 °C), Min:97.9 °F (36.6 °C), Max:98.8 °F (37.1 °C)        General:   awake alert and oriented, non-toxic   Skin:   no rashes or skin lesions noted on limited exam, dry and warm   HEENT:  No scleral icterus or pallor; oral mucosa moist, lips moist   Lymph Nodes:   not assessed today   Lungs:   non, labored; bilaterally clear to aspiration- no crackles wheezes rales or rhonchi   Heart:  RRR, s1 and s2; no murmurs rubs or gallops; no edema, + pedal pulses   Abdomen:  soft, non-distended, active bowel sounds, non-tender   Genitourinary:  deferred   Extremities:   average muscle tone; no contractures, no joint effusions; surgical dressings in place, clean, not removed   Neurologic:  No gross focal motor abnormalities; diminished sensation to bilateral lower extremities from foot to ankle ; CN 2-12 intact; Follows commands. Psychiatric:   appropriate and interactive. Labs: Results:   Chemistry Recent Labs     10/23/21  0340   *      K 4.1      CO2 30   BUN 17   CREA 0.97   CA 9.9   AGAP 5   BUCR 18      CBC w/Diff No results for input(s): WBC, RBC, HGB, HCT, PLT, GRANS, LYMPH, EOS, HGBEXT, HCTEXT, PLTEXT, HGBEXT, HCTEXT, PLTEXT in the last 72 hours.      No results found for: SDES Lab Results   Component Value Date/Time    Culture result: NO GROWTH 6 DAYS 10/10/2021 06:07 AM    Culture result: NO GROWTH 6 DAYS 10/10/2021 06:07 AM    Culture result: NO ANAEROBES ISOLATED 10/09/2021 12:56 PM    Culture result: FEW STAPHYLOCOCCUS AUREUS (A) 10/09/2021 12:56 PM    Culture result: FEW STREPTOCOCCUS MITIS/ORALIS (A) 10/09/2021 12:56 PM    Culture result: FEW ENTEROCOCCUS RAFFINOSUS (A) 10/09/2021 12:56 PM        Results     Procedure Component Value Units Date/Time    CULTURE, BLOOD [[id number]] Collected: 10/10/21 9207    Order Status: Completed Specimen: Whole Blood Updated: 10/16/21 0709     Special Requests: NO SPECIAL REQUESTS        Culture result: NO GROWTH 6 DAYS       CULTURE, BLOOD [[id number]] Collected: 10/10/21 3846    Order Status: Completed Specimen: Whole Blood Updated: 10/16/21 0709     Special Requests: NO SPECIAL REQUESTS        Culture result: NO GROWTH 6 DAYS       CULTURE, ANAEROBIC [[id number]] Collected: 10/09/21 1256    Order Status: Completed Specimen: Tissue Updated: 10/12/21 1645     Special Requests: RT GREAT TOE, PROXIMAL CLEAN MARGIN     Culture result: NO ANAEROBES ISOLATED       CULTURE, TISSUE Lorrayne Reveal STAIN [[id number]]  (Abnormal)  (Susceptibility) Collected: 10/09/21 1256    Order Status: Completed Specimen: Toe Updated: 10/14/21 0755     Special Requests: RT GREAT TOE, PROXIMAL CLEAN MARGIN     GRAM STAIN RARE WBCS SEEN         NO DEFINITE ORGANISM SEEN        Culture result: FEW STAPHYLOCOCCUS AUREUS               FEW STREPTOCOCCUS MITIS/ORALIS                  FEW ENTEROCOCCUS RAFFINOSUS          Susceptibility      Staphylococcus aureus Streptococcus mitis/oralis      GERMAN GERMAN      Cefotaxime  Susceptible      Ceftriaxone ($)  Susceptible      Ciprofloxacin ($) Resistant       Clindamycin ($) Susceptible Susceptible      Daptomycin ($$$$$) Susceptible       Doxycycline ($$) Susceptible       Erythromycin ($$$$) Resistant Intermediate      Gentamicin ($) Susceptible       Levofloxacin ($) Resistant Susceptible      Linezolid ($$$$$) Susceptible Susceptible      Moxifloxacin ($$$$) Intermediate       Oxacillin Susceptible       Penicillin G ($$)  Intermediate      Tetracycline Susceptible       Trimeth/Sulfa Susceptible       Vancomycin ($) Susceptible Susceptible                 Linear View               Susceptibility      Enterococcus raffinosus      GERMAN      Ampicillin ($) Resistant      Daptomycin ($$$$$) Susceptible  [1]       Vancomycin ($) Susceptible              [1]  (SENSITIVITIES PERFORMED BY E-TEST) Linear View                         Imaging:     IR FLUORO GUIDE PICC INSERTION    Result Date: 10/12/2021  Successful placement dual lumen peripherally inserted central catheter. Okay for immediate use.

## 2021-10-24 LAB
GLUCOSE BLD STRIP.AUTO-MCNC: 168 MG/DL (ref 70–110)
GLUCOSE BLD STRIP.AUTO-MCNC: 179 MG/DL (ref 70–110)
GLUCOSE BLD STRIP.AUTO-MCNC: 204 MG/DL (ref 70–110)
GLUCOSE BLD STRIP.AUTO-MCNC: 341 MG/DL (ref 70–110)

## 2021-10-24 PROCEDURE — 74011250636 HC RX REV CODE- 250/636: Performed by: INTERNAL MEDICINE

## 2021-10-24 PROCEDURE — 74011250637 HC RX REV CODE- 250/637: Performed by: HOSPITALIST

## 2021-10-24 PROCEDURE — 74011636637 HC RX REV CODE- 636/637: Performed by: INTERNAL MEDICINE

## 2021-10-24 PROCEDURE — 65660000000 HC RM CCU STEPDOWN

## 2021-10-24 PROCEDURE — 82962 GLUCOSE BLOOD TEST: CPT

## 2021-10-24 PROCEDURE — 74011636637 HC RX REV CODE- 636/637: Performed by: HOSPITALIST

## 2021-10-24 PROCEDURE — 99231 SBSQ HOSP IP/OBS SF/LOW 25: CPT | Performed by: HOSPITALIST

## 2021-10-24 RX ADMIN — AMLODIPINE BESYLATE 10 MG: 10 TABLET ORAL at 08:41

## 2021-10-24 RX ADMIN — ROSUVASTATIN CALCIUM 40 MG: 20 TABLET, COATED ORAL at 23:30

## 2021-10-24 RX ADMIN — INSULIN LISPRO 6 UNITS: 100 INJECTION, SOLUTION INTRAVENOUS; SUBCUTANEOUS at 00:14

## 2021-10-24 RX ADMIN — DULOXETINE HYDROCHLORIDE 60 MG: 60 CAPSULE, DELAYED RELEASE ORAL at 08:41

## 2021-10-24 RX ADMIN — INSULIN LISPRO 3 UNITS: 100 INJECTION, SOLUTION INTRAVENOUS; SUBCUTANEOUS at 08:43

## 2021-10-24 RX ADMIN — INSULIN LISPRO 12 UNITS: 100 INJECTION, SOLUTION INTRAVENOUS; SUBCUTANEOUS at 12:30

## 2021-10-24 RX ADMIN — LOSARTAN POTASSIUM 100 MG: 50 TABLET, FILM COATED ORAL at 08:41

## 2021-10-24 RX ADMIN — VANCOMYCIN HYDROCHLORIDE 1250 MG: 10 INJECTION, POWDER, LYOPHILIZED, FOR SOLUTION INTRAVENOUS at 15:25

## 2021-10-24 RX ADMIN — ROSUVASTATIN CALCIUM 40 MG: 20 TABLET, COATED ORAL at 00:13

## 2021-10-24 RX ADMIN — INSULIN GLARGINE 10 UNITS: 100 INJECTION, SOLUTION SUBCUTANEOUS at 08:42

## 2021-10-24 RX ADMIN — INSULIN LISPRO 3 UNITS: 100 INJECTION, SOLUTION INTRAVENOUS; SUBCUTANEOUS at 16:45

## 2021-10-24 RX ADMIN — VANCOMYCIN HYDROCHLORIDE 1250 MG: 10 INJECTION, POWDER, LYOPHILIZED, FOR SOLUTION INTRAVENOUS at 04:37

## 2021-10-24 RX ADMIN — INSULIN LISPRO 6 UNITS: 100 INJECTION, SOLUTION INTRAVENOUS; SUBCUTANEOUS at 23:31

## 2021-10-24 NOTE — PROGRESS NOTES
Received report on pt.from off going RN. Resting quietly in bed on rounds. Denies c/o pain or SOB at this time. No acute distress noted. Call bell at side. Will cont to monitor for any changes in status. 2040 Bedside and Verbal shift change report given to Tana BAUER (oncoming nurse) by Charley Huber RN (offgoing nurse). Report given with Destiny GUADARRAMA and MAR.

## 2021-10-24 NOTE — PROGRESS NOTES
Received report on pt.from off going RN. Resting quietly in bed on rounds. Denies c/o pain or SOB at this time. No acute distress noted. call bell at side. Will cont to monitor for any changes in status. 1940 Bedside and Verbal shift change report given to Tana BAUER (oncoming nurse) by Mukesh Rea RN (offgoing nurse). Report given with Destiny GUADARRAMA and MAR.

## 2021-10-24 NOTE — PROGRESS NOTES
Admit Date: 10/7/2021  Date of Service: 10/24/2021    Reason for follow-up: toe infection      Assessment:         1. MSSA sepsis: 10/7 blood cultures 4/4 positive; repeat 10/10 blood cx negative  -10/11/2021 TTE negative for endocarditis  2. Osteomyelitis of right great toe-status post amputation 10/9/2021  -10/9/2021 surgical cultures with staph aureus  3. History of type 2 diabetes-insulin sliding scale, monitor blood sugars  4. History of hypertension-resume home medications, monitor blood pressure  5. Hyponatremia-gentle hydration with IVF, monitor sodium levels  6. History of depression-resume chronic home medications    Plan:   Local wound care  F/u intraoperative cultures and pathology  Discussed with diabetes educator. We will add Lantus   -Tight glucose control for optimal wound healing  Continue Cymbalta, Crestor  Continue with vancomycin-cultures growing 3 different bacteria, discussed with ID, sensitive to vancomycin, will continue IV vancomycin post discharge since patient has a PICC line. Discussed with ID,  patient will need IV antibiotics for 6 weeks since he has osteomyelitis. Sliding scale insulin, Accu-Cheks    Awaiting placement since pt is homeless. Current Antibtiocs:   Vancomycin 10/7- present      Lines:   Peripheral    Case discussed with:  [x]Patient  []Family  [x]Nursing  []Case Management  DVT Prophylaxis:  []Lovenox  []Hep SQ  []SCDs  []Coumadin   []On Heparin gtt    I have independently examined the patient and reviewed all lab studies and imgaing as well as review of nursing notes and physican notes from the past 24 hours. Discussed with  regarding discharge planning. Awaiting placement. Anticipate discharge on Monday 10/25. No Known Allergies        Subjective:      Pt seen and examined. Lying in bed, no acute distress.      Objective:        Visit Vitals  /72 (BP 1 Location: Left upper arm)   Pulse 86   Temp 98 °F (36.7 °C)   Resp 18   Ht 5' 11\" (1.803 m)   Wt 69.3 kg (152 lb 11.2 oz)   SpO2 99%   BMI 21.30 kg/m²     Temp (24hrs), Av °F (36.7 °C), Min:97.7 °F (36.5 °C), Max:98.2 °F (36.8 °C)        General:   awake alert and oriented, non-toxic   Skin:   no rashes or skin lesions noted on limited exam, dry and warm   HEENT:  No scleral icterus or pallor; oral mucosa moist, lips moist   Lymph Nodes:   not assessed today   Lungs:   non, labored; bilaterally clear to aspiration- no crackles wheezes rales or rhonchi   Heart:  RRR, s1 and s2; no murmurs rubs or gallops; no edema, + pedal pulses   Abdomen:  soft, non-distended, active bowel sounds, non-tender   Genitourinary:  deferred   Extremities:   average muscle tone; no contractures, no joint effusions; surgical dressings in place, clean, not removed   Neurologic:  No gross focal motor abnormalities; diminished sensation to bilateral lower extremities from foot to ankle ; CN 2-12 intact; Follows commands. Psychiatric:   appropriate and interactive. Labs: Results:   Chemistry Recent Labs     10/23/21  0340   *      K 4.1      CO2 30   BUN 17   CREA 0.97   CA 9.9   AGAP 5   BUCR 18      CBC w/Diff No results for input(s): WBC, RBC, HGB, HCT, PLT, GRANS, LYMPH, EOS, HGBEXT, HCTEXT, PLTEXT, HGBEXT, HCTEXT, PLTEXT in the last 72 hours. No results found for: SDES Lab Results   Component Value Date/Time    Culture result: NO GROWTH 6 DAYS 10/10/2021 06:07 AM    Culture result: NO GROWTH 6 DAYS 10/10/2021 06:07 AM    Culture result: NO ANAEROBES ISOLATED 10/09/2021 12:56 PM    Culture result: FEW STAPHYLOCOCCUS AUREUS (A) 10/09/2021 12:56 PM    Culture result: FEW STREPTOCOCCUS MITIS/ORALIS (A) 10/09/2021 12:56 PM    Culture result: FEW ENTEROCOCCUS RAFFINOSUS (A) 10/09/2021 12:56 PM        Results     ** No results found for the last 336 hours.  **          Imaging:     IR FLUORO GUIDE PICC INSERTION    Result Date: 10/12/2021  Successful placement dual lumen peripherally inserted central catheter. Okay for immediate use.

## 2021-10-25 LAB
GLUCOSE BLD STRIP.AUTO-MCNC: 122 MG/DL (ref 70–110)
GLUCOSE BLD STRIP.AUTO-MCNC: 210 MG/DL (ref 70–110)
GLUCOSE BLD STRIP.AUTO-MCNC: 215 MG/DL (ref 70–110)

## 2021-10-25 PROCEDURE — 74011636637 HC RX REV CODE- 636/637: Performed by: INTERNAL MEDICINE

## 2021-10-25 PROCEDURE — 2709999900 HC NON-CHARGEABLE SUPPLY

## 2021-10-25 PROCEDURE — 74011250636 HC RX REV CODE- 250/636: Performed by: INTERNAL MEDICINE

## 2021-10-25 PROCEDURE — 74011250637 HC RX REV CODE- 250/637: Performed by: HOSPITALIST

## 2021-10-25 PROCEDURE — 74011636637 HC RX REV CODE- 636/637: Performed by: HOSPITALIST

## 2021-10-25 PROCEDURE — 82962 GLUCOSE BLOOD TEST: CPT

## 2021-10-25 PROCEDURE — 65660000000 HC RM CCU STEPDOWN

## 2021-10-25 RX ADMIN — DULOXETINE HYDROCHLORIDE 60 MG: 60 CAPSULE, DELAYED RELEASE ORAL at 09:31

## 2021-10-25 RX ADMIN — AMLODIPINE BESYLATE 10 MG: 10 TABLET ORAL at 09:32

## 2021-10-25 RX ADMIN — VANCOMYCIN HYDROCHLORIDE 1250 MG: 10 INJECTION, POWDER, LYOPHILIZED, FOR SOLUTION INTRAVENOUS at 04:54

## 2021-10-25 RX ADMIN — INSULIN GLARGINE 10 UNITS: 100 INJECTION, SOLUTION SUBCUTANEOUS at 09:32

## 2021-10-25 RX ADMIN — ROSUVASTATIN CALCIUM 40 MG: 20 TABLET, COATED ORAL at 21:25

## 2021-10-25 RX ADMIN — INSULIN LISPRO 6 UNITS: 100 INJECTION, SOLUTION INTRAVENOUS; SUBCUTANEOUS at 14:27

## 2021-10-25 RX ADMIN — INSULIN LISPRO 6 UNITS: 100 INJECTION, SOLUTION INTRAVENOUS; SUBCUTANEOUS at 21:25

## 2021-10-25 RX ADMIN — LOSARTAN POTASSIUM 100 MG: 50 TABLET, FILM COATED ORAL at 09:31

## 2021-10-25 RX ADMIN — VANCOMYCIN HYDROCHLORIDE 1250 MG: 10 INJECTION, POWDER, LYOPHILIZED, FOR SOLUTION INTRAVENOUS at 19:56

## 2021-10-25 NOTE — PROGRESS NOTES
Discharge planning    Spoke with Sarah Jaquez, admission coordinator, with [facility]. Sarah Jaquez stated \" I am on my way into the office now. I will check on the bed status and call you back. \"  Waiting for a return call.     DEBI BarahonaN, RN  Pager # [phone]  Care Manager

## 2021-10-25 NOTE — PROGRESS NOTES
Discharge planning    Spoke with Loreta Martin with [facility] concerning [facility] patient. Loreta Martin stated \" I did visit the patient and obtained the information I needed. He wants a facility that allows him to smoke. I will let you know by tomorrow what facility will accept him. \"    CM will continue to assist with transition of care needs.        ZACHARY Dixon, RN  Pager # [phone]  Care Manager

## 2021-10-25 NOTE — PROGRESS NOTES
Discharge planning    10:45 am Received a call from Providence Boast, admission coordinator, with [facility]. Providence Boast stated \" I am going come to [facility] to do a visual assessment of the patient. [facility]. I have to ask him some questions before it's final.\"    Spoke with patient to give update on LTC placement status.      Gabriela Mon, DEBIN, RN  Pager # [phone]  Care Manager

## 2021-10-25 NOTE — PROGRESS NOTES
Discharge planning    Received call from [facility] concerning accepting facility. Per Dino Peters will accept to facility tomorrow.      DEBI DupontN, RN  Pager # [phone]  Care Manager

## 2021-10-25 NOTE — DIABETES MGMT
Diabetes Plan of Care    10/25: Patient in 18 [facility] unit. Noted elevated BG post meal.    DCP per CM notes: SNF placement pending. Noted patient was admitted on 10/07/2021 with report of right great toe infection x 3 days. Problem List Items Addressed This Visit        Skeletal    Osteomyelitis of great toe of right foot ([address] - Primary    Relevant Medications    vancomycin/0.9 % sod chloride (vancomycin in 0.9% Sodium Cl) 1.25 gram/250 mL soln        MSSA sepsis  Status post right great toe-status post amputation on 10/9/2021    Glycemic Assessment:    History: T2DM  A1c of 9.0% (10/07/2021)  Home diabetes medications:   Lantus 60 units every bedtime  Humalog 10 units tid [facility]    Results for Adra Stabs (MRN [id number]) as of 10/25/2021 15:39   Ref. Range 10/24/2021 08:02 10/24/2021 12:28 10/24/2021 16:42 10/24/2021 20:40   GLUCOSE,FAST - POC Latest Ref Range: 70 - 110 mg/dL 168 (H) 341 (H) 179 (H) 204 (H)     Results for Adra Stabs (MRN [id number]) as of 10/25/2021 15:39   Ref. Range 10/25/2021 08:20 10/25/2021 11:50   GLUCOSE,FAST - POC Latest Ref Range: 70 - 110 mg/dL 122 (H) 215 (H)       IVF containing dextrose: None  Steroids: None  Diet: Regular; 4 carb choices    Recommendations: increase basal lantus insulin dose from 10 units to 12 units. Provider concern about hypoglycemia therefore no insulin dose adjustment. Patient is also being followed by nutritionist and noted report of poor appetite to consider meal time lispro. Most recent blood glucose values: Within target range (non-ICU: <140; ICU<180):  No.    Current A1c  9.0% (10/07/2021) which is equivalent to an average blood glucose of 212 mg/dl over the past 2-3 months    Adequate glycemic control PTA:  No.    Current insulin regime:  Basal lantus insulin 10 units daily  Correctional lispro insulin ACHS.  Very resistant dose    TDD previous day 10/24:  Lantus: 10 units  Lispro: 30 units  TDD insulin: 40 units    Goals: Blood glucose will be within target of 70 - 180 mg/dl by: 10/28/2021    Education:  _____ Refer to Diabetes Education Record                       _____ Education not indicated at this time     Sylwia Desouza RN [facility]  [phone]

## 2021-10-25 NOTE — ROUTINE PROCESS
Received patient in bed, sitting, awake, alert and oriented. PICC to right upper arm. No complaints made, will continue to monitor. Report  Given by Percell Rubinstein RN. 12:01 AM  Due medications given. Snacks given upon request.    1:08 AM  Bedside and Verbal shift change report given to [address] [place] [address] (oncoming nurse) by [address] Singh (offgoing nurse). Report included the following information Kardex, ED Summary and MAR.

## 2021-10-25 NOTE — PROGRESS NOTES
Nutrition Assessment     Type and Reason for Visit: Reassess    Nutrition Recommendations/Plan:  - Allow double protein portion with meals. Nutrition Assessment:  Pt with excellent appetite & po intake, c/o still being hungry after meals- agreeable to double protein portions. Denies diet intolerance. Malnutrition Assessment:  Malnutrition Status: No malnutrition (poor appetite & intake x 2-3 days PTA)     Estimated Daily Nutrient Needs:  Energy (kcal):  1727-3606  Protein (g):  58-88       Fluid (ml/day):  4856-2378    Nutrition Related Findings:  BM 10/24. Recent BG levels 122-341 mg/dL- 10 units lantus, SSI & glycemic control following. Current Nutrition Therapies:  ADULT DIET Regular; 4 carb choices (60 gm/meal)    Anthropometric Measures:  · Height:  5' 11\" (180.3 cm)  · Current Body Wt:  68.9 kg (152 lb)  · BMI: 21.2    Nutrition Diagnosis:   No nutrition diagnosis at this time    Nutrition Intervention:  Food and/or Nutrient Delivery: Modify current diet  Nutrition Education and Counseling: No recommendations at this time, Education not indicated  Coordination of Nutrition Care: Continue to monitor while inpatient    Goals:  PO nutrition intake will continue to meet >75% of patients estimated nutritional needs over the next 14 days.        Nutrition Monitoring and Evaluation:   Behavioral-Environmental Outcomes: None identified  Food/Nutrient Intake Outcomes: Food and nutrient intake  Physical Signs/Symptoms Outcomes: Biochemical data, Meal time behavior    Discharge Planning:    Continue current diet     Electronically signed by Haider Mathews RD on 10/25/2021 at 10:58 AM    Contact Number: [phone]

## 2021-10-26 LAB
GLUCOSE BLD STRIP.AUTO-MCNC: 197 MG/DL (ref 70–110)
GLUCOSE BLD STRIP.AUTO-MCNC: 236 MG/DL (ref 70–110)
GLUCOSE BLD STRIP.AUTO-MCNC: 252 MG/DL (ref 70–110)

## 2021-10-26 PROCEDURE — 82962 GLUCOSE BLOOD TEST: CPT

## 2021-10-26 PROCEDURE — 74011250636 HC RX REV CODE- 250/636: Performed by: INTERNAL MEDICINE

## 2021-10-26 PROCEDURE — 74011636637 HC RX REV CODE- 636/637: Performed by: HOSPITALIST

## 2021-10-26 PROCEDURE — 97161 PT EVAL LOW COMPLEX 20 MIN: CPT

## 2021-10-26 PROCEDURE — 99231 SBSQ HOSP IP/OBS SF/LOW 25: CPT | Performed by: HOSPITALIST

## 2021-10-26 PROCEDURE — 74011250637 HC RX REV CODE- 250/637: Performed by: HOSPITALIST

## 2021-10-26 PROCEDURE — 74011636637 HC RX REV CODE- 636/637: Performed by: INTERNAL MEDICINE

## 2021-10-26 PROCEDURE — 65660000000 HC RM CCU STEPDOWN

## 2021-10-26 RX ADMIN — ROSUVASTATIN CALCIUM 40 MG: 20 TABLET, COATED ORAL at 23:44

## 2021-10-26 RX ADMIN — VANCOMYCIN HYDROCHLORIDE 1250 MG: 10 INJECTION, POWDER, LYOPHILIZED, FOR SOLUTION INTRAVENOUS at 06:18

## 2021-10-26 RX ADMIN — Medication 10 ML: at 15:15

## 2021-10-26 RX ADMIN — INSULIN LISPRO 6 UNITS: 100 INJECTION, SOLUTION INTRAVENOUS; SUBCUTANEOUS at 17:06

## 2021-10-26 RX ADMIN — LOSARTAN POTASSIUM 100 MG: 50 TABLET, FILM COATED ORAL at 09:45

## 2021-10-26 RX ADMIN — DULOXETINE HYDROCHLORIDE 60 MG: 60 CAPSULE, DELAYED RELEASE ORAL at 09:45

## 2021-10-26 RX ADMIN — INSULIN GLARGINE 10 UNITS: 100 INJECTION, SOLUTION SUBCUTANEOUS at 09:45

## 2021-10-26 RX ADMIN — INSULIN LISPRO 9 UNITS: 100 INJECTION, SOLUTION INTRAVENOUS; SUBCUTANEOUS at 11:53

## 2021-10-26 RX ADMIN — VANCOMYCIN HYDROCHLORIDE 1250 MG: 10 INJECTION, POWDER, LYOPHILIZED, FOR SOLUTION INTRAVENOUS at 17:00

## 2021-10-26 RX ADMIN — AMLODIPINE BESYLATE 10 MG: 10 TABLET ORAL at 09:45

## 2021-10-26 RX ADMIN — INSULIN LISPRO 3 UNITS: 100 INJECTION, SOLUTION INTRAVENOUS; SUBCUTANEOUS at 23:57

## 2021-10-26 NOTE — PROGRESS NOTES
Progress Note    Patient: Alonso Barnard MRN: [id number]  SSN: [id number]    YOB: 1965  Age: 49 y.o. Sex: male      Admit Date: 10/7/2021  16 Days Post-Op     Procedure:   Procedure(s):  AMPUTATION  OF RIGHT GREAT TOE    Subjective:     Patient seen resting quiet and comfortably and no apparent distress. Status post: osteomyelitis    Objective:     Visit Vitals  /87 (BP 1 Location: Left upper arm, BP Patient Position: At rest;Sitting)   Pulse 92   Temp 98.3 °F (36.8 °C)   Resp 16   Ht 5' 11\" (1.803 m)   Wt 69.3 kg (152 lb 11.2 oz)   SpO2 100%   BMI 21.30 kg/m²        Physical Exam:  Right great toe amputation site healing well, dehiscence noted to medial incision. Skin sutures intact. No purulence noted. Assessment:     Hospital Problems  Date Reviewed: 10/9/2021        Codes Class Noted POA    Acute sepsis [facility]) ICD-10-CM: A41.9  ICD-9-CM: 038.9, 995.91  10/7/2021 Unknown        Osteomyelitis of great toe of right foot ([facility] ICD-10-CM: M86.9  ICD-9-CM: 730.27  10/7/2021 Unknown        Osteomyelitis ([facility] ICD-10-CM: M86.9  ICD-9-CM: 730.20  4/2/2021 Unknown              Plan/Recommendations/Medical Decision Making:     - Dressings reinforced with betadine and dry gauze. Please change dressings every 3 days.   - Ordered CAM Boot through physical therapy. - Antibiotic per ID recommendation.   - Will f/u outpatient in 1 week.

## 2021-10-26 NOTE — PROGRESS NOTES
Discharge planning    Spoke with Miah Clark with [address] [facility] facilities concerning accepting patient today to [facility]. Per Miah Clark, facility is unable to accept admissions today due to staffing issues. Notified Dr. Mariah Chan via perfectserve communication.     Lauro Trinh, BSN, RN  Pager # [phone]  Care Manager

## 2021-10-26 NOTE — PROGRESS NOTES
PHYSICAL THERAPY EVALUATION AND DISCHARGE    Patient: Jag Mas (55 y.o. male)  Date: 10/26/2021  Primary Diagnosis: Osteomyelitis ([facility] [M86.9]  Acute sepsis ([facility] [A41.9]  Osteomyelitis of great toe of right foot ([facility] [M86.9]  Procedure(s) (LRB):  AMPUTATION  OF RIGHT GREAT TOE (N/A) 17 Days Post-Op   Precautions:    (NWB right foefoot)    ASSESSMENT :  Based on the objective data described below, the patient is at/close to his baseline level of mobility. Educated patient on WB precautions and able to maintain during mobility. He is independent with all functional transfers and gait at this time. Dynamic balance intact. Patient does not require further skilled intervention at this level of care. PLAN :  Recommendations and Planned Interventions:   No formal PT needs identified at this time. Discharge Recommendations: None  Further Equipment Recommendations for Discharge: N/A     SUBJECTIVE:   Patient stated Are you giving me a boot.     OBJECTIVE DATA SUMMARY:     Past Medical History:   Diagnosis Date    Diabetes ([facility]     Hepatitis C infection     Hypertension     Psychiatric disorder    History reviewed. No pertinent surgical history. Barriers to Learning/Limitations: None  Compensate with: N/A  Home Situation:   Home Situation  Home Environment: Other (comment) (boarding house)  # Steps to Enter: 4  One/Two Story Residence: One story  Living Alone: Yes  Support Systems: Other (Comment) ([address] [place] [address])  Patient Expects to be Discharged to[de-identified] Unknown  Current DME Used/Available at Home: None  Critical Behavior:  Neurologic State: Alert  Orientation Level: Oriented X4  Cognition: Follows commands  Safety/Judgement: Home safety  Psychosocial  Patient Behaviors: Calm; Cooperative                 Strength:    Strength:  Within functional limits              Tone & Sensation:   Tone: Normal     Sensation: Intact           Range Of Motion:  AROM: Within functional limits               Functional Mobility:  Bed Mobility:     Supine to Sit: Independent  Sit to Supine: Independent     Transfers:  Sit to Stand: Independent  Stand to Sit: Independent          Balance:   Sitting: Intact  Standing: Intact    Ambulation/Gait Training:  Distance (ft): 20 Feet (ft)          Pain:  Pain level pre-treatment:5 /10   Pain level post-treatment: 5 /10  Pain Intervention(s): Medication (see MAR); Rest, Ice, Repositioning   Response to intervention: Nurse notified, See doc flow    Activity Tolerance:   Good  Please refer to the flowsheet for vital signs taken during this treatment. After treatment:   []         Patient left in no apparent distress sitting up in chair  [x]         Patient left in no apparent distress in bed  [x]         Call bell left within reach  [x]         Nursing notified  []         Caregiver present  []         Bed alarm activated  []         SCDs applied    COMMUNICATION/EDUCATION:   [x]         Role of Physical Therapy in the acute care setting. []         Fall prevention education was provided and the patient/caregiver indicated understanding. []         Patient/family have participated as able in goal setting and plan of care. []         Patient/family agree to work toward stated goals and plan of care. []         Patient understands intent and goals of therapy, but is neutral about his/her participation. []         Patient is unable to participate in goal setting/plan of care: ongoing with therapy staff.  []         Other:     Thank you for this referral.  Jimmy Elliott, PT   Time Calculation: 10 mins      Eval Complexity: History: LOW Complexity : Zero comorbidities / personal factors that will impact the outcome / POCExam:LOW Complexity : 1-2 Standardized tests and measures addressing body structure, function, activity limitation and / or participation in recreation  Presentation: LOW Complexity : Stable, uncomplicated  Clinical Decision Making:Low Complexity    Overall Complexity:LOW

## 2021-10-26 NOTE — PROGRESS NOTES
Admit Date: 10/7/2021  Date of Service: 10/26/2021    Reason for follow-up: toe infection      Assessment:         1. MSSA sepsis: 10/7 blood cultures 4/4 positive; repeat 10/10 blood cx negative  -10/11/2021 TTE negative for endocarditis  2. Osteomyelitis of right great toe-status post amputation 10/9/2021  -10/9/2021 surgical cultures with staph aureus  3. History of type 2 diabetes-insulin sliding scale, monitor blood sugars  4. History of hypertension-resume home medications, monitor blood pressure  5. Hyponatremia-gentle hydration with IVF, monitor sodium levels  6. History of depression-resume chronic home medications    Plan:   Local wound care  F/u intraoperative cultures and pathology  Discussed with diabetes educator. We will add Lantus   -Tight glucose control for optimal wound healing  Continue Cymbalta, Crestor  Continue with vancomycin-cultures growing 3 different bacteria, discussed with ID, sensitive to vancomycin, will continue IV vancomycin post discharge since patient has a PICC line. Discussed with ID,  patient will need IV antibiotics for 6 weeks since he has osteomyelitis. Sliding scale insulin, Accu-Cheks    10/26 no new changes in treatment plan. Awaiting placement since pt is homeless. Current Antibtiocs:   Vancomycin 10/7- present      Lines:   Peripheral    Case discussed with:  [x]Patient  []Family  [x]Nursing  []Case Management  DVT Prophylaxis:  []Lovenox  []Hep SQ  []SCDs  []Coumadin   []On Heparin gtt    I have independently examined the patient and reviewed all lab studies and imgaing as well as review of nursing notes and physican notes from the past 24 hours. Discussed with  regarding discharge planning. Awaiting placement. Anticipate discharge on Monday 10/25. No Known Allergies        Subjective:      Pt seen and examined. Lying in bed, no acute distress.      Objective:        Visit Vitals  /75 (BP 1 Location: Left upper arm, BP Patient Position: At rest;Lying)   Pulse 83   Temp 97.7 °F (36.5 °C)   Resp 14   Ht 5' 11\" (1.803 m)   Wt 69.3 kg (152 lb 11.2 oz)   SpO2 98%   BMI 21.30 kg/m²     Temp (24hrs), Av.1 °F (36.7 °C), Min:97.7 °F (36.5 °C), Max:98.3 °F (36.8 °C)        General:   awake alert and oriented, non-toxic   Skin:   no rashes or skin lesions noted on limited exam, dry and warm   HEENT:  No scleral icterus or pallor; oral mucosa moist, lips moist   Lymph Nodes:   not assessed today   Lungs:   non, labored; bilaterally clear to aspiration- no crackles wheezes rales or rhonchi   Heart:  RRR, s1 and s2; no murmurs rubs or gallops; no edema, + pedal pulses   Abdomen:  soft, non-distended, active bowel sounds, non-tender   Genitourinary:  deferred   Extremities:   average muscle tone; no contractures, no joint effusions; surgical dressings in place, clean, not removed   Neurologic:  No gross focal motor abnormalities; diminished sensation to bilateral lower extremities from foot to ankle ; CN 2-12 intact; Follows commands. Psychiatric:   appropriate and interactive. Labs: Results:   Chemistry No results for input(s): GLU, NA, K, CL, CO2, BUN, CREA, CA, AGAP, BUCR, TBIL, AP, TP, ALB, GLOB, AGRAT in the last 72 hours. No lab exists for component: GPT   CBC w/Diff No results for input(s): WBC, RBC, HGB, HCT, PLT, GRANS, LYMPH, EOS, HGBEXT, HCTEXT, PLTEXT, HGBEXT, HCTEXT, PLTEXT in the last 72 hours. No results found for: SDES Lab Results   Component Value Date/Time    Culture result: NO GROWTH 6 DAYS 10/10/2021 06:07 AM    Culture result: NO GROWTH 6 DAYS 10/10/2021 06:07 AM    Culture result: NO ANAEROBES ISOLATED 10/09/2021 12:56 PM    Culture result: FEW STAPHYLOCOCCUS AUREUS (A) 10/09/2021 12:56 PM    Culture result: FEW STREPTOCOCCUS MITIS/ORALIS (A) 10/09/2021 12:56 PM    Culture result: FEW ENTEROCOCCUS RAFFINOSUS (A) 10/09/2021 12:56 PM        Results     ** No results found for the last 336 hours.  ** Imaging:     IR FLUORO GUIDE PICC INSERTION    Result Date: 10/12/2021  Successful placement dual lumen peripherally inserted central catheter. Okay for immediate use.

## 2021-10-26 NOTE — DIABETES MGMT
Diabetes Plan of Care    10/26: Patient in 18 [facility] unit. Follow-up discussion with Dr. Sharon Fleming and no insulin dose titration per MD to help prevent hypoglycemia. (10/25: nutrition following and noted report patient with poor appetite). DCP per CM notes: SNF placement pending. Noted patient was admitted on 10/07/2021 with report of right great toe infection x 3 days. Problem List Items Addressed This Visit        Skeletal    Osteomyelitis of great toe of right foot ([address] - Primary    Relevant Medications    vancomycin/0.9 % sod chloride (vancomycin in 0.9% Sodium Cl) 1.25 gram/250 mL soln        MSSA sepsis  Status post right great toe-status post amputation on 10/9/2021    Glycemic Assessment:    History: T2DM  A1c of 9.0% (10/07/2021)  Home diabetes medications:   Lantus 60 units every bedtime  Humalog 10 units tid [facility]    Results for Taylor Kebede (MRN [id number]) as of 10/26/2021 13:20   Ref. Range 10/25/2021 08:20 10/25/2021 11:50 10/25/2021 20:01   GLUCOSE,FAST - POC Latest Ref Range: 70 - 110 mg/dL 122 (H) 215 (H) 210 (H)     Results for Taylor Kebede (MRN [id number]) as of 10/26/2021 13:20   Ref. Range 10/26/2021 11:50   GLUCOSE,FAST - POC Latest Ref Range: 70 - 110 mg/dL 252 (H)     IVF containing dextrose: None  Steroids: None  Diet: Regular; 4 carb choices    Recommendations: cont to monitor BG values on current daily lantus dose and correctional lispro and discuss with provider. Most recent blood glucose values: Within target range (non-ICU: <140; ICU<180):  No.    Current A1c  9.0% (10/07/2021) which is equivalent to an average blood glucose of 212 mg/dl over the past 2-3 months    Adequate glycemic control PTA:  No.    Current insulin regime:  Basal lantus insulin 10 units daily  Correctional lispro insulin ACHS.  Very resistant dose    TDD previous day 10/25:  Lantus: 10 units  Lispro: 12 units  TDD insulin: 22 units    Goals: Blood glucose will be within target of 70 - 180 mg/dl by: 10/29/2021    Education:  _____ Refer to Diabetes Education Record                       _____ Education not indicated at this time     Troy Perez RN [facility]  [phone]

## 2021-10-26 NOTE — PROGRESS NOTES
Per discussion with case management patient will not be discharged today since the accepting facility is unable to accept patient secondary to staffing issues. Anticipate discharge in a.m.

## 2021-10-27 LAB
BASOPHILS # BLD: 0.1 K/UL (ref 0–0.1)
BASOPHILS NFR BLD: 1 % (ref 0–2)
DIFFERENTIAL METHOD BLD: ABNORMAL
EOSINOPHIL # BLD: 0.3 K/UL (ref 0–0.4)
EOSINOPHIL NFR BLD: 4 % (ref 0–5)
ERYTHROCYTE [DISTWIDTH] IN BLOOD BY AUTOMATED COUNT: 11.6 % (ref 11.6–14.5)
GLUCOSE BLD STRIP.AUTO-MCNC: 165 MG/DL (ref 70–110)
GLUCOSE BLD STRIP.AUTO-MCNC: 276 MG/DL (ref 70–110)
GLUCOSE BLD STRIP.AUTO-MCNC: 300 MG/DL (ref 70–110)
GLUCOSE BLD STRIP.AUTO-MCNC: 304 MG/DL (ref 70–110)
HCT VFR BLD AUTO: 36.4 % (ref 36–48)
HGB BLD-MCNC: 12.6 G/DL (ref 13–16)
LYMPHOCYTES # BLD: 3.3 K/UL (ref 0.9–3.6)
LYMPHOCYTES NFR BLD: 48 % (ref 21–52)
MCH RBC QN AUTO: 30.4 PG (ref 24–34)
MCHC RBC AUTO-ENTMCNC: 34.6 G/DL (ref 31–37)
MCV RBC AUTO: 87.9 FL (ref 78–100)
MONOCYTES # BLD: 0.7 K/UL (ref 0.05–1.2)
MONOCYTES NFR BLD: 10 % (ref 3–10)
NEUTS SEG # BLD: 2.6 K/UL (ref 1.8–8)
NEUTS SEG NFR BLD: 38 % (ref 40–73)
PLATELET # BLD AUTO: 155 K/UL (ref 135–420)
PMV BLD AUTO: 10.3 FL (ref 9.2–11.8)
RBC # BLD AUTO: 4.14 M/UL (ref 4.35–5.65)
WBC # BLD AUTO: 6.8 K/UL (ref 4.6–13.2)

## 2021-10-27 PROCEDURE — 85025 COMPLETE CBC W/AUTO DIFF WBC: CPT

## 2021-10-27 PROCEDURE — 74011636637 HC RX REV CODE- 636/637: Performed by: INTERNAL MEDICINE

## 2021-10-27 PROCEDURE — 74011636637 HC RX REV CODE- 636/637: Performed by: HOSPITALIST

## 2021-10-27 PROCEDURE — 65660000000 HC RM CCU STEPDOWN

## 2021-10-27 PROCEDURE — 74011250637 HC RX REV CODE- 250/637: Performed by: HOSPITALIST

## 2021-10-27 PROCEDURE — 2709999900 HC NON-CHARGEABLE SUPPLY

## 2021-10-27 PROCEDURE — 74011250636 HC RX REV CODE- 250/636: Performed by: INTERNAL MEDICINE

## 2021-10-27 PROCEDURE — 82962 GLUCOSE BLOOD TEST: CPT

## 2021-10-27 RX ORDER — LINEZOLID 600 MG/1
600 TABLET, FILM COATED ORAL 2 TIMES DAILY
Qty: 56 TABLET | Refills: 0 | Status: SHIPPED | OUTPATIENT
Start: 2021-10-27 | End: 2021-11-24

## 2021-10-27 RX ADMIN — LOSARTAN POTASSIUM 100 MG: 50 TABLET, FILM COATED ORAL at 08:32

## 2021-10-27 RX ADMIN — INSULIN LISPRO 12 UNITS: 100 INJECTION, SOLUTION INTRAVENOUS; SUBCUTANEOUS at 22:40

## 2021-10-27 RX ADMIN — VANCOMYCIN HYDROCHLORIDE 1250 MG: 10 INJECTION, POWDER, LYOPHILIZED, FOR SOLUTION INTRAVENOUS at 16:52

## 2021-10-27 RX ADMIN — DULOXETINE HYDROCHLORIDE 60 MG: 60 CAPSULE, DELAYED RELEASE ORAL at 08:32

## 2021-10-27 RX ADMIN — VANCOMYCIN HYDROCHLORIDE 1250 MG: 10 INJECTION, POWDER, LYOPHILIZED, FOR SOLUTION INTRAVENOUS at 06:20

## 2021-10-27 RX ADMIN — AMLODIPINE BESYLATE 10 MG: 10 TABLET ORAL at 08:33

## 2021-10-27 RX ADMIN — Medication 10 ML: at 06:20

## 2021-10-27 RX ADMIN — INSULIN LISPRO 12 UNITS: 100 INJECTION, SOLUTION INTRAVENOUS; SUBCUTANEOUS at 16:49

## 2021-10-27 RX ADMIN — ROSUVASTATIN CALCIUM 40 MG: 20 TABLET, COATED ORAL at 22:42

## 2021-10-27 RX ADMIN — INSULIN LISPRO 9 UNITS: 100 INJECTION, SOLUTION INTRAVENOUS; SUBCUTANEOUS at 08:31

## 2021-10-27 RX ADMIN — INSULIN GLARGINE 10 UNITS: 100 INJECTION, SOLUTION SUBCUTANEOUS at 09:35

## 2021-10-27 RX ADMIN — INSULIN LISPRO 3 UNITS: 100 INJECTION, SOLUTION INTRAVENOUS; SUBCUTANEOUS at 12:46

## 2021-10-27 NOTE — PROGRESS NOTES
Spoke with patient and provided updated on discharge status. Patient verbalized understanding.     DEBI MeltonN, RN  Pager # [phone]  Care Manager

## 2021-10-27 NOTE — CONSULTS
Infectious Disease Consultation Note        Reason:right foot infection    Current abx Prior abx   Vancomycin since 10/7/21        Lines:       Assessment :    49 y.o. male who has a known history of uncontrolled type 2 diabetes- (last hgbA1C 9.0), poorly controlled, hypertension, depression presented to the emergency room on 10/27/21 with complaints of foul-smelling discharge from right foot great toe. Clinical presentation c/w acute osteomyelitis right great toe distal phalanx. S/p partial amputation of right great toe on 10/9/21. Intra op findings d/w dr. Troy Abdi noted at level of IPJ however proximal phalanx bone noted firm and in good quality. intra op cultures 10/9- MSSA, strep mitis/oralis, enterococcus raffinosus. D/w podiatrist. Some wound dehiscence noted on today's exam.  No purulent drainage. Recommendations:    1. Continue intravenous vancomycin for now  2. Recommend switch to p.o. linezolid for 4 weeks to complete treatment of right great toe osteomyelitis. Linezolid prescription in chart  Recommend to use oral linezolid since Enterococcus resistant to ampicillin no other oral antibiotic options available. Also positive MRSA screen 4/2/21 - risk of evolution of MRSA while on treatment if heteroresistant colonies. 3.  Continue local wound care  4. Will need to hold Cymbalta while on linezolid-discussed with patient. Requesting alternative antidepressant  5. Follow podiatry recommendations regarding wound care  6. Needs better glycemic control to aid wound healing, prevent future infectious complications      Thank you for consultation request. Above plan was discussed in details with patient, dr. Trey Augustine and dr Lilliam Bruce. Please call me if any further questions or concerns. Will continue to participate in the care of this patient.   HPI:    49 y.o. male who has a known history of uncontrolled type 2 diabetes- (last hgbA1C 9.0), poorly controlled, hypertension, depression presented to the emergency room on 10/27/21 with complaints of foul-smelling discharge from right foot great toe. ER evaluation-patient noted to have osteomyelitis of right big toe with foul-smelling discharge. Patient was started on broad-spectrum IV antibiotics, podiatry consulted. S/p partial amputation of right great toe on 10/9/21. intra op cultures 10/9- MSSA, strep mitis/oralis, enterococcus raffinosus. Patient was switched to iv vancomycin. Initial plan was discharge patient on IV abx. Difficulty placement. I have been consulted for further recommendations, oral antibiotic options. Patient denies any subjective fever or chills at this time. He denies any increasing pain right foot. Past Medical History:   Diagnosis Date    Diabetes ([facility] [address]     Hepatitis C infection     Hypertension     Psychiatric disorder        History reviewed. No pertinent surgical history. home Medication List    Details   amLODIPine (NORVASC) 10 mg tablet Take 1 Tablet by mouth daily. Qty: 30 Tablet, Refills: 0      losartan (COZAAR) 100 mg tablet Take 1 Tablet by mouth daily. Qty: 30 Tablet, Refills: 0      vancomycin/0.9 % sod chloride (vancomycin in 0.9% Sodium Cl) 1.25 gram/250 mL soln 250 mL by IntraVENous route every twelve (12) hours every twelve (12) hours for 32 days. Qty: 44457 mL, Refills: 0       Details   insulin lispro (HUMALOG) 100 unit/mL injection Administer 10 underneath the skin, three times a day before meals. Indications: type 2 diabetes mellitus  Qty: 1 Vial, Refills: 0      hydrALAZINE (APRESOLINE) 25 mg tablet Take 1 Tablet by mouth three (3) times daily. Qty: 90 Tablet, Refills: 0      DULoxetine (CYMBALTA) 60 mg capsule Take 1 Cap by mouth daily.  Indications: diabetic complication causing injury to some body nerves, major depressive disorder  Qty: 30 Cap, Refills: 0      insulin glargine (LANTUS) 100 unit/mL injection Administer 60 units underneath the skin, every night after supper Indications: type 2 diabetes mellitus  Qty: 1 Vial, Refills: 0      rosuvastatin (CRESTOR) 40 mg tablet Take 1 Tab by mouth nightly. Indications: high cholesterol  Qty: 30 Tab, Refills: 0      traZODone (DESYREL) 50 mg tablet Take 1 Tab by mouth nightly as needed for Sleep. Indications: insomnia associated with depression  Qty: 30 Tab, Refills: 0          Current Facility-Administered Medications   Medication Dose Route Frequency    [START ON 10/28/2021] vancomycin trough due 10/28 at 0430 (draw before dose)  1 Each Other ONCE    insulin glargine (LANTUS) injection 10 Units  10 Units SubCUTAneous DAILY    amLODIPine (NORVASC) tablet 10 mg  10 mg Oral DAILY    losartan (COZAAR) tablet 100 mg  100 mg Oral DAILY    vancomycin (VANCOCIN) 1250 mg in  ml infusion  1,250 mg IntraVENous Q12H    hydrALAZINE (APRESOLINE) tablet 10 mg  10 mg Oral Q6H PRN    DULoxetine (CYMBALTA) capsule 60 mg  60 mg Oral DAILY    rosuvastatin (CRESTOR) tablet 40 mg  40 mg Oral QHS    traZODone (DESYREL) tablet 50 mg  50 mg Oral QHS PRN    acetaminophen (TYLENOL) tablet 650 mg  650 mg Oral Q6H PRN    Or    acetaminophen (TYLENOL) suppository 650 mg  650 mg Rectal Q6H PRN    polyethylene glycol (MIRALAX) packet 17 g  17 g Oral DAILY PRN    ondansetron (ZOFRAN ODT) tablet 4 mg  4 mg Oral Q8H PRN    Or    ondansetron (ZOFRAN) injection 4 mg  4 mg IntraVENous Q6H PRN    sodium chloride (NS) flush 5-10 mL  5-10 mL IntraVENous PRN    insulin lispro (HUMALOG) injection   SubCUTAneous AC&HS    glucose chewable tablet 16 g  4 Tablet Oral PRN    glucagon (GLUCAGEN) injection 1 mg  1 mg IntraMUSCular PRN    dextrose (D50W) injection syrg 12.5-25 g  25-50 mL IntraVENous PRN       Allergies: Patient has no known allergies. History reviewed. No pertinent family history.   Social History     Socioeconomic History    Marital status: SINGLE     Spouse name: Not on file    Number of children: Not on file    Years of education: Not on file    Highest education level: Not on file   Occupational History    Not on file   Tobacco Use    Smoking status: Current Every Day Smoker     Packs/day: 1.00    Smokeless tobacco: Never Used   Substance and Sexual Activity    Alcohol use: Yes     Comment: 2 times a week     Drug use: Yes     Types: Cocaine    Sexual activity: Not on file   Other Topics Concern    Not on file   Social History Narrative    ** Merged History Encounter **          Social Determinants of Health     Financial Resource Strain:     Difficulty of Paying Living Expenses:    Food Insecurity:     Worried About Running Out of Food in the Last Year:     Ran Out of Food in the Last Year:    Transportation Needs:     Lack of Transportation (Medical):  Lack of Transportation (Non-Medical):    Physical Activity:     Days of Exercise per Week:     Minutes of Exercise per Session:    Stress:     Feeling of Stress :    Social Connections:     Frequency of Communication with Friends and Family:     Frequency of Social Gatherings with Friends and Family:     Attends Jehovah's witness Services:     Active Member of Clubs or Organizations:     Attends Club or Organization Meetings:     Marital Status:    Intimate Partner Violence:     Fear of Current or Ex-Partner:     Emotionally Abused:     Physically Abused:     Sexually Abused:      Social History     Tobacco Use   Smoking Status Current Every Day Smoker    Packs/day: 1.00   Smokeless Tobacco Never Used        Temp (24hrs), Av.9 °F (36.6 °C), Min:97.3 °F (36.3 °C), Max:98.2 °F (36.8 °C)    Visit Vitals  /75 (BP 1 Location: Left upper arm, BP Patient Position: Lying)   Pulse 91   Temp 98.2 °F (36.8 °C)   Resp 18   Ht 5' 11\" (1.803 m)   Wt 69.3 kg (152 lb 11.2 oz)   SpO2 99%   BMI 21.30 kg/m²       ROS: 12 point ROS obtained in details.  Pertinent positives as mentioned in HPI,   otherwise negative    Physical Exam:    Gen: In general, this is a well nourished male in no acute distress  HEENT: Sclerae nonicteric. Oral mucous membranes moist. Dentition normal  Neck: Supple with midline trachea. CV: RRR without murmur or rub appreciated. Resp:Respirations are unlabored without use of accessory muscles. Lung fields B/L without wheezes or rhonchi. Abd: Soft, nontender, nondistended. Extrem: Extremities are warm, without cyanosis or clubbing. No pitting pretibial edema. Right foot great toe amputation site with wound dehiscence-some slough noted. Bloody drainage. No erythema/swelling of other toes right foot  Skin: Warm, no visible rashes. Neuro: Patient is alert, oriented, and cooperative. No obvious focal defects. Moves all 4 extremities.     Labs: Results:   Chemistry No results for input(s): GLU, NA, K, CL, CO2, BUN, CREA, CA, AGAP, BUCR, TBIL, AP, TP, ALB, GLOB, AGRAT in the last 72 hours. No lab exists for component: GPT   CBC w/Diff Recent Labs     10/27/21  0400   WBC 6.8   RBC 4.14*   HGB 12.6*   HCT 36.4      GRANS 38*   LYMPH 48   EOS 4      Microbiology No results for input(s): CULT in the last 72 hours. RADIOLOGY:    All available imaging studies/reports in [facility] for this admission were reviewed      Disclaimer: Sections of this note are dictated utilizing voice recognition software, which may have resulted in some phonetic based errors in grammar and contents. Even though attempts were made to correct all the mistakes, some may have been missed, and remained in the body of the document. If questions arise, please contact our department.     Dr. Carli Estrada, Infectious Disease Specialist  [phone]  October 27, 2021  4:58 PM

## 2021-10-27 NOTE — PROGRESS NOTES
Discharge planning    Called Armando June with [facility] concerning dscharge to [address]. Left a voice message and waiting for return call.     DEBI WardN, RN  Pager # [phone]  Care Manager

## 2021-10-27 NOTE — ROUTINE PROCESS
Bedside and Verbal shift change report given to Lisa (oncoming nurse) by Benita Hernandez RN (offgoing nurse). Report included the following information SBAR, Kardex, MAR, Recent Results and Cardiac Rhythm SR. Patient awake on rounds, call light in reach.

## 2021-10-27 NOTE — PROGRESS NOTES
Radha Linton with [facility] facilities request new LTSS screening due to patient will need LTC for over 6 months. LTSS screening submitted for processing. Form ID # :[id number]    ZACHARY Montes, RN  Pager # [phone]  Care Manager

## 2021-10-27 NOTE — PROGRESS NOTES
Case management note reviewed, patient will not be accepted to skilled nursing facility. Will reach out to ID to see if patient can be switched to oral antibiotics and be discharged. We will continue to monitor.

## 2021-10-27 NOTE — PROGRESS NOTES
Discharge planning    Received call from Jenna Cat concerning accepting patient to [facility]. Per Jenna Cat, facility will be unable to accept due to hx of psychiatric disorder but will try another facility.     DEBI GreerN, RN  Pager # [phone]  Care Manager

## 2021-10-27 NOTE — PROGRESS NOTES
Discharge planning    Called Joel Lee with [facility] facilities concerning status for discharging to [address]. Unable to leave a message due voice mailbox is full.     Tiffanie Conway, DEBIN, RN  Pager # [phone]  Care Manager

## 2021-10-28 LAB
ANION GAP SERPL CALC-SCNC: 4 MMOL/L (ref 3–18)
BUN SERPL-MCNC: 19 MG/DL (ref 7–18)
BUN/CREAT SERPL: 19 (ref 12–20)
CALCIUM SERPL-MCNC: 9.2 MG/DL (ref 8.5–10.1)
CHLORIDE SERPL-SCNC: 105 MMOL/L (ref 100–111)
CO2 SERPL-SCNC: 29 MMOL/L (ref 21–32)
CREAT SERPL-MCNC: 0.98 MG/DL (ref 0.6–1.3)
DATE LAST DOSE: ABNORMAL
GLUCOSE BLD STRIP.AUTO-MCNC: 205 MG/DL (ref 70–110)
GLUCOSE BLD STRIP.AUTO-MCNC: 216 MG/DL (ref 70–110)
GLUCOSE BLD STRIP.AUTO-MCNC: 218 MG/DL (ref 70–110)
GLUCOSE BLD STRIP.AUTO-MCNC: 234 MG/DL (ref 70–110)
GLUCOSE SERPL-MCNC: 164 MG/DL (ref 74–99)
POTASSIUM SERPL-SCNC: 3.8 MMOL/L (ref 3.5–5.5)
REPORTED DOSE,DOSE: ABNORMAL UNITS
REPORTED DOSE/TIME,TMG: 1700
SODIUM SERPL-SCNC: 138 MMOL/L (ref 136–145)
VANCOMYCIN TROUGH SERPL-MCNC: 27 UG/ML (ref 10–20)

## 2021-10-28 PROCEDURE — 36592 COLLECT BLOOD FROM PICC: CPT

## 2021-10-28 PROCEDURE — 74011250636 HC RX REV CODE- 250/636: Performed by: HOSPITALIST

## 2021-10-28 PROCEDURE — 36415 COLL VENOUS BLD VENIPUNCTURE: CPT

## 2021-10-28 PROCEDURE — 74011636637 HC RX REV CODE- 636/637: Performed by: HOSPITALIST

## 2021-10-28 PROCEDURE — 2709999900 HC NON-CHARGEABLE SUPPLY

## 2021-10-28 PROCEDURE — 80048 BASIC METABOLIC PNL TOTAL CA: CPT

## 2021-10-28 PROCEDURE — 80202 ASSAY OF VANCOMYCIN: CPT

## 2021-10-28 PROCEDURE — 65660000000 HC RM CCU STEPDOWN

## 2021-10-28 PROCEDURE — 82962 GLUCOSE BLOOD TEST: CPT

## 2021-10-28 PROCEDURE — 99232 SBSQ HOSP IP/OBS MODERATE 35: CPT | Performed by: HOSPITALIST

## 2021-10-28 PROCEDURE — 74011636637 HC RX REV CODE- 636/637: Performed by: INTERNAL MEDICINE

## 2021-10-28 PROCEDURE — 74011250637 HC RX REV CODE- 250/637: Performed by: HOSPITALIST

## 2021-10-28 RX ADMIN — LOSARTAN POTASSIUM 100 MG: 50 TABLET, FILM COATED ORAL at 08:55

## 2021-10-28 RX ADMIN — INSULIN GLARGINE 10 UNITS: 100 INJECTION, SOLUTION SUBCUTANEOUS at 08:56

## 2021-10-28 RX ADMIN — VANCOMYCIN HYDROCHLORIDE 1000 MG: 1 INJECTION, POWDER, LYOPHILIZED, FOR SOLUTION INTRAVENOUS at 13:37

## 2021-10-28 RX ADMIN — INSULIN LISPRO 6 UNITS: 100 INJECTION, SOLUTION INTRAVENOUS; SUBCUTANEOUS at 17:31

## 2021-10-28 RX ADMIN — TRAZODONE HYDROCHLORIDE 50 MG: 50 TABLET ORAL at 23:34

## 2021-10-28 RX ADMIN — VANCOMYCIN HYDROCHLORIDE 1000 MG: 1 INJECTION, POWDER, LYOPHILIZED, FOR SOLUTION INTRAVENOUS at 23:34

## 2021-10-28 RX ADMIN — INSULIN LISPRO 6 UNITS: 100 INJECTION, SOLUTION INTRAVENOUS; SUBCUTANEOUS at 11:30

## 2021-10-28 RX ADMIN — DULOXETINE HYDROCHLORIDE 60 MG: 60 CAPSULE, DELAYED RELEASE ORAL at 08:55

## 2021-10-28 RX ADMIN — Medication 10 ML: at 23:35

## 2021-10-28 RX ADMIN — ROSUVASTATIN CALCIUM 40 MG: 20 TABLET, COATED ORAL at 23:34

## 2021-10-28 RX ADMIN — INSULIN LISPRO 6 UNITS: 100 INJECTION, SOLUTION INTRAVENOUS; SUBCUTANEOUS at 08:57

## 2021-10-28 RX ADMIN — INSULIN LISPRO 6 UNITS: 100 INJECTION, SOLUTION INTRAVENOUS; SUBCUTANEOUS at 23:33

## 2021-10-28 RX ADMIN — AMLODIPINE BESYLATE 10 MG: 10 TABLET ORAL at 08:55

## 2021-10-28 NOTE — PROGRESS NOTES
Discharge/Transition Planning    Spoke with pt mental skill builder supervisor, Rupal Oliveira [phone] and she will look into finding place for pt to go ASAP      Georgina Marion RN BSN  Outcomes Manager    Pager # [phone]

## 2021-10-28 NOTE — PROGRESS NOTES
Discharge/Transition Planning    3892: Faxed Zyvox prescription to [address] and CM waiting to see if prior auth needed with insurance  0945: Pt does need prior auth. Pharmacy stated MD can call and do over phone [phone] and doesn't have to do paper option. Notified Dr Beka Barlow. Angelito Aguilar of phone number for prior auth and printed paper auth also. 1110: Faxed completed prior auth forms filled out by Dr Beka Barlow. Angelito Aguilar to [facility] [phone]  1640: Have had no response from prior auth.

## 2021-10-28 NOTE — ROUTINE PROCESS
Bedside and Verbal shift change report given to Melinda Cerda (oncoming nurse) by Mirna Estrada (offgoing nurse). Report included the following information SBAR, Kardex, MAR, Recent Results and Cardiac Rhythm SR. Patient awake on rounds, call light in reach.

## 2021-10-28 NOTE — PROGRESS NOTES
Infectious Disease progress Note        Reason:right foot infection    Current abx Prior abx   Vancomycin since 10/7/21        Lines:       Assessment :    49 y.o. male who has a known history of uncontrolled type 2 diabetes- (last hgbA1C 9.0), poorly controlled, hypertension, depression presented to the emergency room on 10/27/21 with complaints of foul-smelling discharge from right foot great toe. Clinical presentation c/w acute osteomyelitis right great toe distal phalanx. S/p partial amputation of right great toe on 10/9/21. Intra op findings d/w dr. Troy Abdi noted at level of IPJ however proximal phalanx bone noted firm and in good quality. intra op cultures 10/9- MSSA, strep mitis/oralis, enterococcus raffinosus. Some wound dehiscence noted on recent exam.  No purulent drainage. Discussed with . Needs prior authorization for linezolid    Recommendations:    1. Continue intravenous vancomycin for now  2. Recommend switch to p.o. linezolid for 4 weeks to complete treatment of right great toe osteomyelitis. recommend to use oral linezolid since Enterococcus resistant to ampicillin no other oral antibiotic options available. Also positive MRSA screen 4/2/21 - risk of evolution of MRSA while on treatment if heteroresistant colonies. I completed prior authorization form for linezolid and give it to . 3.  Continue local wound care  4. Will need to hold Cymbalta while on linezolid-discussed with patient. Requesting alternative antidepressant  5. Follow podiatry recommendations regarding wound care  6. Needs better glycemic control to aid wound healing, prevent future infectious complications    Above plan was discussed in details with patient, dr. Trey Augustine and dr Lilliam Bruce. Please call me if any further questions or concerns. Will continue to participate in the care of this patient. HPI:      Patient denies any subjective fever or chills at this time.   He denies any increasing pain right foot.    home Medication List    Details   amLODIPine (NORVASC) 10 mg tablet Take 1 Tablet by mouth daily. Qty: 30 Tablet, Refills: 0      losartan (COZAAR) 100 mg tablet Take 1 Tablet by mouth daily. Qty: 30 Tablet, Refills: 0      vancomycin/0.9 % sod chloride (vancomycin in 0.9% Sodium Cl) 1.25 gram/250 mL soln 250 mL by IntraVENous route every twelve (12) hours every twelve (12) hours for 32 days. Qty: 79288 mL, Refills: 0       Details   insulin lispro (HUMALOG) 100 unit/mL injection Administer 10 underneath the skin, three times a day before meals. Indications: type 2 diabetes mellitus  Qty: 1 Vial, Refills: 0      hydrALAZINE (APRESOLINE) 25 mg tablet Take 1 Tablet by mouth three (3) times daily. Qty: 90 Tablet, Refills: 0      DULoxetine (CYMBALTA) 60 mg capsule Take 1 Cap by mouth daily. Indications: diabetic complication causing injury to some body nerves, major depressive disorder  Qty: 30 Cap, Refills: 0      insulin glargine (LANTUS) 100 unit/mL injection Administer 60 units underneath the skin, every night after supper  Indications: type 2 diabetes mellitus  Qty: 1 Vial, Refills: 0      rosuvastatin (CRESTOR) 40 mg tablet Take 1 Tab by mouth nightly. Indications: high cholesterol  Qty: 30 Tab, Refills: 0      traZODone (DESYREL) 50 mg tablet Take 1 Tab by mouth nightly as needed for Sleep.  Indications: insomnia associated with depression  Qty: 30 Tab, Refills: 0          Current Facility-Administered Medications   Medication Dose Route Frequency    insulin glargine (LANTUS) injection 10 Units  10 Units SubCUTAneous DAILY    amLODIPine (NORVASC) tablet 10 mg  10 mg Oral DAILY    losartan (COZAAR) tablet 100 mg  100 mg Oral DAILY    [Held by provider] vancomycin (VANCOCIN) 1250 mg in  ml infusion  1,250 mg IntraVENous Q12H    hydrALAZINE (APRESOLINE) tablet 10 mg  10 mg Oral Q6H PRN    DULoxetine (CYMBALTA) capsule 60 mg  60 mg Oral DAILY    rosuvastatin (CRESTOR) tablet 40 mg  40 mg Oral QHS    traZODone (DESYREL) tablet 50 mg  50 mg Oral QHS PRN    acetaminophen (TYLENOL) tablet 650 mg  650 mg Oral Q6H PRN    Or    acetaminophen (TYLENOL) suppository 650 mg  650 mg Rectal Q6H PRN    polyethylene glycol (MIRALAX) packet 17 g  17 g Oral DAILY PRN    ondansetron (ZOFRAN ODT) tablet 4 mg  4 mg Oral Q8H PRN    Or    ondansetron (ZOFRAN) injection 4 mg  4 mg IntraVENous Q6H PRN    sodium chloride (NS) flush 5-10 mL  5-10 mL IntraVENous PRN    insulin lispro (HUMALOG) injection   SubCUTAneous AC&HS    glucose chewable tablet 16 g  4 Tablet Oral PRN    glucagon (GLUCAGEN) injection 1 mg  1 mg IntraMUSCular PRN    dextrose (D50W) injection syrg 12.5-25 g  25-50 mL IntraVENous PRN       Allergies: Patient has no known allergies. History reviewed. No pertinent family history.   Social History     Socioeconomic History    Marital status: SINGLE     Spouse name: Not on file    Number of children: Not on file    Years of education: Not on file    Highest education level: Not on file   Occupational History    Not on file   Tobacco Use    Smoking status: Current Every Day Smoker     Packs/day: 1.00    Smokeless tobacco: Never Used   Substance and Sexual Activity    Alcohol use: Yes     Comment: 2 times a week     Drug use: Yes     Types: Cocaine    Sexual activity: Not on file   Other Topics Concern    Not on file   Social History Narrative    ** Merged History Encounter **          Social Determinants of Health     Financial Resource Strain:     Difficulty of Paying Living Expenses:    Food Insecurity:     Worried About [address] in the Last Year:     [address]  in the Last Year:    Transportation Needs:     Lack of Transportation (Medical):     Lack of Transportation (Non-Medical):    Physical Activity:     Days of Exercise per Week:     Minutes of Exercise per Session:    Stress:     Feeling of Stress :    Social Connections:     Frequency of Communication with Friends and Family:     Frequency of Social Gatherings with Friends and Family:     Attends Holiness Services: Active Member of Clubs or Organizations:     Attends Club or Organization Meetings:     Marital Status:    Intimate Partner Violence:     Fear of Current or Ex-Partner:     Emotionally Abused:     Physically Abused:     Sexually Abused:      Social History     Tobacco Use   Smoking Status Current Every Day Smoker    Packs/day: 1.00   Smokeless Tobacco Never Used        Temp (24hrs), Av.1 °F (36.7 °C), Min:97.3 °F (36.3 °C), Max:98.6 °F (37 °C)    Visit Vitals  /68 (BP 1 Location: Left upper arm, BP Patient Position: At rest)   Pulse 95   Temp 98.2 °F (36.8 °C)   Resp 18   Ht 5' 11\" (1.803 m)   Wt 69.3 kg (152 lb 11.2 oz)   SpO2 99%   BMI 21.30 kg/m²       ROS: 12 point ROS obtained in details. Pertinent positives as mentioned in HPI,   otherwise negative    Physical Exam:    Gen: In general, this is a well nourished male in no acute distress  HEENT: Sclerae nonicteric. Oral mucous membranes moist. Dentition normal  Neck: Supple with midline trachea. CV: RRR without murmur or rub appreciated. Resp:Respirations are unlabored without use of accessory muscles. Lung fields B/L without wheezes or rhonchi. Abd: Soft, nontender, nondistended. Extrem: Extremities are warm, without cyanosis or clubbing. No pitting pretibial edema. Right foot great toe amputation site with wound dehiscence-some slough noted. Bloody drainage. No erythema/swelling of other toes right foot  Skin: Warm, no visible rashes. Neuro: Patient is alert, oriented, and cooperative. No obvious focal defects. Moves all 4 extremities.      Labs: Results:   Chemistry Recent Labs     10/28/21  0226   *      K 3.8      CO2 29   BUN 19*   CREA 0.98   CA 9.2   AGAP 4   BUCR 19      CBC w/Diff Recent Labs     10/27/21  0400   WBC 6.8   RBC 4.14*   HGB 12.6*   HCT 36.4      GRANS 38*   LYMPH 48   EOS 4      Microbiology No results for input(s): CULT in the last 72 hours. RADIOLOGY:    All available imaging studies/reports in [facility] for this admission were reviewed      Disclaimer: Sections of this note are dictated utilizing voice recognition software, which may have resulted in some phonetic based errors in grammar and contents. Even though attempts were made to correct all the mistakes, some may have been missed, and remained in the body of the document. If questions arise, please contact our department.     Dr. Ghislaine Lyn, Infectious Disease Specialist  [phone]  October 28, 2021  4:58 PM

## 2021-10-29 VITALS
RESPIRATION RATE: 18 BRPM | HEIGHT: 71 IN | HEART RATE: 96 BPM | WEIGHT: 164 LBS | SYSTOLIC BLOOD PRESSURE: 121 MMHG | TEMPERATURE: 98.3 F | OXYGEN SATURATION: 98 % | BODY MASS INDEX: 22.96 KG/M2 | DIASTOLIC BLOOD PRESSURE: 85 MMHG

## 2021-10-29 LAB
GLUCOSE BLD STRIP.AUTO-MCNC: 187 MG/DL (ref 70–110)
GLUCOSE BLD STRIP.AUTO-MCNC: 285 MG/DL (ref 70–110)

## 2021-10-29 PROCEDURE — 74011636637 HC RX REV CODE- 636/637: Performed by: HOSPITALIST

## 2021-10-29 PROCEDURE — 74011250636 HC RX REV CODE- 250/636: Performed by: HOSPITALIST

## 2021-10-29 PROCEDURE — 74011636637 HC RX REV CODE- 636/637: Performed by: INTERNAL MEDICINE

## 2021-10-29 PROCEDURE — 2709999900 HC NON-CHARGEABLE SUPPLY

## 2021-10-29 PROCEDURE — 82962 GLUCOSE BLOOD TEST: CPT

## 2021-10-29 PROCEDURE — 74011250637 HC RX REV CODE- 250/637: Performed by: HOSPITALIST

## 2021-10-29 RX ADMIN — INSULIN GLARGINE 10 UNITS: 100 INJECTION, SOLUTION SUBCUTANEOUS at 09:37

## 2021-10-29 RX ADMIN — VANCOMYCIN HYDROCHLORIDE 1000 MG: 1 INJECTION, POWDER, LYOPHILIZED, FOR SOLUTION INTRAVENOUS at 13:11

## 2021-10-29 RX ADMIN — LOSARTAN POTASSIUM 100 MG: 50 TABLET, FILM COATED ORAL at 10:42

## 2021-10-29 RX ADMIN — AMLODIPINE BESYLATE 10 MG: 10 TABLET ORAL at 10:42

## 2021-10-29 RX ADMIN — INSULIN LISPRO 2 UNITS: 100 INJECTION, SOLUTION INTRAVENOUS; SUBCUTANEOUS at 10:43

## 2021-10-29 RX ADMIN — INSULIN LISPRO 9 UNITS: 100 INJECTION, SOLUTION INTRAVENOUS; SUBCUTANEOUS at 12:58

## 2021-10-29 RX ADMIN — DULOXETINE HYDROCHLORIDE 60 MG: 60 CAPSULE, DELAYED RELEASE ORAL at 10:42

## 2021-10-29 NOTE — PROGRESS NOTES
Patient Zyvox has been approved. He cannot be on Cymbalta while he is on Zyvox, I explained this to the patient who verbalized understanding. He mentioned that he has a prescription for Xanax and will use it as needed. I also mentioned that in case his depression gets worse without the Cymbalta to please come back to the emergency room for further evaluation. Patient verbalized understanding. We will remove PICC line prior to discharge.

## 2021-10-29 NOTE — PROGRESS NOTES
Admit Date: 10/7/2021  Date of Service: 10/28/2021    Reason for follow-up: toe infection      Assessment:         1. MSSA sepsis: 10/7 blood cultures 4/4 positive; repeat 10/10 blood cx negative  -10/11/2021 TTE negative for endocarditis  2. Osteomyelitis of right great toe-status post amputation 10/9/2021  -10/9/2021 surgical cultures with staph aureus  3. History of type 2 diabetes-insulin sliding scale, monitor blood sugars  4. History of hypertension-resume home medications, monitor blood pressure  5. Hyponatremia-gentle hydration with IVF, monitor sodium levels  6. History of depression-resume chronic home medications    Plan:   Local wound care  F/u intraoperative cultures and pathology  Discussed with diabetes educator. We will add Lantus   -Tight glucose control for optimal wound healing  Continue Cymbalta, Crestor  Continue with vancomycin-cultures growing 3 different bacteria, discussed with ID, sensitive to vancomycin, will continue IV vancomycin post discharge since patient has a PICC line. Discussed with ID,  patient will need IV antibiotics for 6 weeks since he has osteomyelitis. Sliding scale insulin, Accu-Cheks    10/26 no new changes in treatment plan. Awaiting placement since pt is homeless. Current Antibtiocs:   Vancomycin 10/7- present      Lines:   Peripheral    Case discussed with:  [x]Patient  []Family  [x]Nursing  []Case Management  DVT Prophylaxis:  []Lovenox  []Hep SQ  []SCDs  []Coumadin   []On Heparin gtt    I have independently examined the patient and reviewed all lab studies and imgaing as well as review of nursing notes and physican notes from the past 24 hours. Discussed with  regarding discharge planning. Awaiting placement. No Known Allergies        Subjective:      Pt seen and examined. Lying in bed, no acute distress.      Objective:        Visit Vitals  /85 (BP 1 Location: Left upper arm)   Pulse 96   Temp 98.3 °F (36.8 °C)   Resp 18   Ht 5' 11\" (1.803 m)   Wt 74.4 kg (164 lb)   SpO2 98%   BMI 22.87 kg/m²     Temp (24hrs), Av.9 °F (36.6 °C), Min:97.5 °F (36.4 °C), Max:98.3 °F (36.8 °C)        General:   awake alert and oriented, non-toxic   Skin:   no rashes or skin lesions noted on limited exam, dry and warm   HEENT:  No scleral icterus or pallor; oral mucosa moist, lips moist   Lymph Nodes:   not assessed today   Lungs:   non, labored; bilaterally clear to aspiration- no crackles wheezes rales or rhonchi   Heart:  RRR, s1 and s2; no murmurs rubs or gallops; no edema, + pedal pulses   Abdomen:  soft, non-distended, active bowel sounds, non-tender   Genitourinary:  deferred   Extremities:   average muscle tone; no contractures, no joint effusions; surgical dressings in place, clean, not removed   Neurologic:  No gross focal motor abnormalities; diminished sensation to bilateral lower extremities from foot to ankle ; CN 2-12 intact; Follows commands. Psychiatric:   appropriate and interactive. Labs: Results:   Chemistry Recent Labs     10/28/21  0226   *      K 3.8      CO2 29   BUN 19*   CREA 0.98   CA 9.2   AGAP 4   BUCR 19      CBC w/Diff Recent Labs     10/27/21  0400   WBC 6.8   RBC 4.14*   HGB 12.6*   HCT 36.4      GRANS 38*   LYMPH 48   EOS 4        No results found for: SDES Lab Results   Component Value Date/Time    Culture result: NO GROWTH 6 DAYS 10/10/2021 06:07 AM    Culture result: NO GROWTH 6 DAYS 10/10/2021 06:07 AM    Culture result: NO ANAEROBES ISOLATED 10/09/2021 12:56 PM    Culture result: FEW STAPHYLOCOCCUS AUREUS (A) 10/09/2021 12:56 PM    Culture result: FEW STREPTOCOCCUS MITIS/ORALIS (A) 10/09/2021 12:56 PM    Culture result: FEW ENTEROCOCCUS RAFFINOSUS (A) 10/09/2021 12:56 PM        Results     ** No results found for the last 336 hours.  **          Imaging:     IR FLUORO GUIDE PICC INSERTION    Result Date: 10/12/2021  Successful placement dual lumen peripherally inserted central catheter. Okay for immediate use.

## 2021-10-29 NOTE — PROGRESS NOTES
Discharge/Transition Planning    1125: Called [employer] and [employer] approved . Sent message to pharmacy to fill.  Patient can discharge on own accord and has community [employer] to help with housing       Vipul Ibanez # [phone]

## 2021-10-29 NOTE — PROGRESS NOTES
Progress Note    Patient: Kwan Elliott MRN: [id number]  SSN: [id number]    YOB: 1965  Age: 49 y.o. Sex: male      Admit Date: 10/7/2021  19 Days Post-Op     Procedure:   Procedure(s):  AMPUTATION  OF RIGHT GREAT TOE    Subjective:     Patient seen resting quiet and comfortably and no apparent distress. Patient denies any pain to right foot. Patient denies any other pedal complaint. His antibiotics changed to PO linezolid by ID. Status post: osteomyelitis    Objective:     Visit Vitals  /88 (BP 1 Location: Left upper arm, BP Patient Position: Sitting)   Pulse 100   Temp 98.2 °F (36.8 °C)   Resp 18   Ht 5' 11\" (1.803 m)   Wt 74.4 kg (164 lb)   SpO2 98%   BMI 22.87 kg/m²        Physical Exam:  Right great toe partial amputation site stable with mild dehiscence. Skin edges otherwise well approximated. Skin sutures intact. Small blister noted to dorsal midfoot area. Assessment:     Hospital Problems  Date Reviewed: 10/9/2021        Codes Class Noted POA    Acute sepsis [facility]) ICD-10-CM: A41.9  ICD-9-CM: 038.9, 995.91  10/7/2021 Unknown        Osteomyelitis of great toe of right foot ([facility] [address] ICD-10-CM: M86.9  ICD-9-CM: 730.27  10/7/2021 Unknown        [facility]el[facility] ICD-10-CM: M86.9  ICD-9-CM: 730.20  4/2/2021 Unknown              Plan/Recommendations/Medical Decision Making:     - Dressings applied to right great toe. Dressings only applied to right forefoot. - Patient still hasn't received CAM Boot. Will contact [address] [place] [address] [employer] tomorrow. - Patient doesn't have a place to stay.  SW is working on finding a place.   - Antibiotic management per ID recommendation.   - Medical management per hospitalist.

## 2021-10-29 NOTE — DISCHARGE INSTRUCTIONS
Patient Education        6 Ways to Prevent Sepsis in the Hospital  Sepsis is a life-threatening reaction to an infection. Sepsis can develop very quickly. It can damage tissue and organs. Most of the time, sepsis is caused by a bacterial infection. An illness, an injury, or a reaction to surgery can also cause it. Here are some things you can do to avoid sepsis while you're in the hospital.   Pay attention to how you feel. Sepsis can cause breathing problems, a fast heartbeat, chills, cool and clammy skin, skin rashes, and shaking. Other symptoms may include fever, low body temperature, confusion, and low blood pressure. Sepsis often causes a combination of these symptoms. Tell someone on your care team if you are concerned about sepsis. If you have some of these symptoms and think something is wrong, tell a doctor or nurse. Tell them \"I'm concerned about sepsis. \"    Help prevent infections. Wash your hands often while you are in the hospital. Keep any cuts, scrapes, and stitches clean. Your doctor or nurse will help with this. Ask family or friends NOT to visit you if they're sick. It's best to avoid people who have colds and flu while you're in the hospital.    Make sure you've gotten recommended vaccines. If you've had vaccines to prevent pneumonia, flu, and other infections in the past, ask your doctor if you need another dose. Do not smoke or use other tobacco products. When you quit smoking, you are less likely to get a cold, flu, or infection. Now can be a good time to think about quitting for good. If you need help quitting, talk to your doctor about stop-smoking programs and medicines. Current as of: February 11, 2021               Content Version: 13.0  © 0004-6009 [employer]. Care instructions adapted under license by [employer] (which disclaims liability or warranty for this information).  If you have questions about a medical condition or this instruction, always ask your healthcare professional. [place] [address] any warranty or liability for your use of this information. Patient Education        Sepsis: Care Instructions  Overview     Sepsis is an intense reaction to an infection. It can cause damage to the body and lead to dangerously low blood pressure. You may have inflammation across large areas of your body. It can damage tissue and even go deep into your organs. Infections that can lead to sepsis include:  · A skin infection such as from a cut. · A lung infection like pneumonia. · A kidney infection. · A gut infection such as E. coli. Sepsis is treated with antibiotics. Your doctor will try to find the infection that led to sepsis. Sriram Ramirez also get fluids through a vein (IV). Machines will track your vital signs, including temperature, blood pressure, breathing rate, and pulse rate. The physical and mental effects of sepsis may not be seen for several weeks after treatment. And they may last long after the infection is gone. Physical problems may include:  · Feeling weak and tired. · Feeling out of breath. · Aches and pains. · Problems with getting around. · Trouble falling asleep or staying asleep. · Dry and itchy skin, brittle nails, and hair loss. Some of these effects can lead to problems with your organs or your feet, legs, hands, or arms. Sepsis can also affect your mind and emotions. Problems may include:  · Self-doubt. · Anxiety. · Nightmares. · Depression and mood problems. · Wanting to avoid other people. · Confusion. · Flashbacks and bad memories of your illness. It's important to care for yourself and try to avoid infections. This may lower your risk of getting sepsis again. Follow-up care is a key part of your treatment and safety. Be sure to make and go to all appointments, and call your doctor if you are having problems.  It's also a good idea to know your test results and keep a list of the medicines you take. How can you care for yourself at home? · Be safe with medicines. Take your medicines exactly as prescribed. Call your doctor if you think you are having a problem with your medicine. · If your doctor prescribed antibiotics, take them as directed. Do not stop taking them just because you feel better. You need to take the full course of antibiotics. · Help prevent infections that could again lead to sepsis. ? Try to avoid colds and flu. If you must be around people who have a cold or the flu, wash your hands often. And get a flu vaccine every year. ? Ask your doctor if you need a pneumococcal vaccine (to prevent pneumonia, meningitis, and other infections). If you have had one before, ask your doctor if you need another dose. ? Clean any wounds or scrapes. · Do not smoke or use other tobacco products. When you quit smoking, you are less likely to get a cold, the flu, bronchitis, and pneumonia. If you need help quitting, talk to your doctor about stop-smoking programs and medicines. These can increase your chances of quitting for good. · Drink plenty of fluids to prevent dehydration. Choose water and other clear liquids until you feel better. If you have kidney, heart, or liver disease and have to limit fluids, talk with your doctor before you increase the amount of fluids you drink. · Eat a healthy diet. Include fruits, vegetables, and whole grains in your diet every day. · If your doctor recommends it, try doing some physical activity. Walking is a good choice. Bit by bit, increase the amount you walk every day. · Talk with your family and friends about your challenges. Ask for help if you need it. · Keep a journal. Writing down your thoughts and feelings can help reduce your stress. · Ask family members to fill in gaps in your memory. · Set small goals for yourself that you can reach. Reward yourself for success. When should you call for help?    Call 911  anytime you think you may need emergency care. For example, call if:    · You passed out (lost consciousness). Call your doctor now or seek immediate medical care if:    · You have symptoms such as:  ? Shortness of breath. ? Feeling very sick. ? Severe pain. ? A fast heart rate. ? Cool, pale, or clammy skin. ? Feeling confused. ? Feeling very sleepy, or you are hard to wake up.     · You are dizzy or lightheaded, or you feel like you may faint.     · You have a fever or chills. Watch closely for changes in your health, and be sure to contact your doctor if:    · You do not get better as expected. Where can you learn more? Go to http://www.gray.com/  Enter T383 in the search box to learn more about \"Sepsis: Care Instructions. \"  Current as of: July 1, 2021               Content Version: 13.0  © 8273-5583 [employer]. Care instructions adapted under license by [employer] (which disclaims liability or warranty for this information). If you have questions about a medical condition or this instruction, always ask your healthcare professional. [place] [address] any warranty or liability for your use of this information. Patient Education        6 Ways to Prevent Sepsis in the Hospital  Sepsis is a life-threatening reaction to an infection. Sepsis can develop very quickly. It can damage tissue and organs. Most of the time, sepsis is caused by a bacterial infection. An illness, an injury, or a reaction to surgery can also cause it. Here are some things you can do to avoid sepsis while you're in the hospital.   Pay attention to how you feel. Sepsis can cause breathing problems, a fast heartbeat, chills, cool and clammy skin, skin rashes, and shaking. Other symptoms may include fever, low body temperature, confusion, and low blood pressure. Sepsis often causes a combination of these symptoms. Tell someone on your care team if you are concerned about sepsis.   If you have some of these symptoms and think something is wrong, tell a doctor or nurse. Tell them \"I'm concerned about sepsis. \"    Help prevent infections. Wash your hands often while you are in the hospital. Keep any cuts, scrapes, and stitches clean. Your doctor or nurse will help with this. Ask family or friends NOT to visit you if they're sick. It's best to avoid people who have colds and flu while you're in the hospital.    Make sure you've gotten recommended vaccines. If you've had vaccines to prevent pneumonia, flu, and other infections in the past, ask your doctor if you need another dose. Do not smoke or use other tobacco products. When you quit smoking, you are less likely to get a cold, flu, or infection. Now can be a good time to think about quitting for good. If you need help quitting, talk to your doctor about stop-smoking programs and medicines. Current as of: February 11, 2021               Content Version: 13.0  © 2006-2021 [employer]. Care instructions adapted under license by [employer] (which disclaims liability or warranty for this information). If you have questions about a medical condition or this instruction, always ask your healthcare professional. [place] [address] any warranty or liability for your use of this information.

## 2021-10-29 NOTE — PROGRESS NOTES
Infectious Disease progress Note        Reason:right foot infection    Current abx Prior abx   Vancomycin since 10/7/21        Lines:       Assessment :    49 y.o. male who has a known history of uncontrolled type 2 diabetes- (last hgbA1C 9.0), poorly controlled, hypertension, depression presented to the emergency room on 10/27/21 with complaints of foul-smelling discharge from right foot great toe. Clinical presentation c/w acute osteomyelitis right great toe distal phalanx. S/p partial amputation of right great toe on 10/9/21. Intra op findings d/w dr. Giuseppe Troncoso noted at level of IPJ however proximal phalanx bone noted firm and in good quality. intra op cultures 10/9- MSSA, strep mitis/oralis, enterococcus raffinosus. Some wound dehiscence noted on recent exam.  No purulent drainage. Discussed with . Needs prior authorization for linezolid    Recommendations:    1. Continue intravenous vancomycin for now  2. Recommend switch to p.o. linezolid for 4 weeks to complete treatment of right great toe osteomyelitis. recommend to use oral linezolid since Enterococcus resistant to ampicillin no other oral antibiotic options available. Also positive MRSA screen 4/2/21 - risk of evolution of MRSA while on treatment if heteroresistant colonies. I completed prior authorization form for linezolid and gave it to . 3.  Continue local wound care  4. Will need to hold Cymbalta while on linezolid-discussed with patient. Requesting alternative antidepressant  5. Follow podiatry recommendations regarding wound care  6. Needs better glycemic control to aid wound healing, prevent future infectious complications    Above plan was discussed in details with patient, dr. Wilma Myers and dr Seferino Mark. Please call me if any further questions or concerns. Will continue to participate in the care of this patient. HPI:      Patient denies any subjective fever or chills at this time.   He denies any increasing pain right foot.    home Medication List    Details   amLODIPine (NORVASC) 10 mg tablet Take 1 Tablet by mouth daily. Qty: 30 Tablet, Refills: 0      losartan (COZAAR) 100 mg tablet Take 1 Tablet by mouth daily. Qty: 30 Tablet, Refills: 0      vancomycin/0.9 % sod chloride (vancomycin in 0.9% Sodium Cl) 1.25 gram/250 mL soln 250 mL by IntraVENous route every twelve (12) hours every twelve (12) hours for 32 days. Qty: 96591 mL, Refills: 0       Details   insulin lispro (HUMALOG) 100 unit/mL injection Administer 10 underneath the skin, three times a day before meals. Indications: type 2 diabetes mellitus  Qty: 1 Vial, Refills: 0      hydrALAZINE (APRESOLINE) 25 mg tablet Take 1 Tablet by mouth three (3) times daily. Qty: 90 Tablet, Refills: 0      DULoxetine (CYMBALTA) 60 mg capsule Take 1 Cap by mouth daily. Indications: diabetic complication causing injury to some body nerves, major depressive disorder  Qty: 30 Cap, Refills: 0      insulin glargine (LANTUS) 100 unit/mL injection Administer 60 units underneath the skin, every night after supper  Indications: type 2 diabetes mellitus  Qty: 1 Vial, Refills: 0      rosuvastatin (CRESTOR) 40 mg tablet Take 1 Tab by mouth nightly. Indications: high cholesterol  Qty: 30 Tab, Refills: 0      traZODone (DESYREL) 50 mg tablet Take 1 Tab by mouth nightly as needed for Sleep.  Indications: insomnia associated with depression  Qty: 30 Tab, Refills: 0          Current Facility-Administered Medications   Medication Dose Route Frequency    vancomycin (VANCOCIN) 1,000 mg in 0.9% sodium chloride 250 mL (VIAL-MATE)  1,000 mg IntraVENous Q12H    insulin glargine (LANTUS) injection 10 Units  10 Units SubCUTAneous DAILY    amLODIPine (NORVASC) tablet 10 mg  10 mg Oral DAILY    losartan (COZAAR) tablet 100 mg  100 mg Oral DAILY    [Held by provider] vancomycin (VANCOCIN) 1250 mg in  ml infusion  1,250 mg IntraVENous Q12H    hydrALAZINE (APRESOLINE) tablet 10 mg  10 mg Oral Q6H PRN    DULoxetine (CYMBALTA) capsule 60 mg  60 mg Oral DAILY    rosuvastatin (CRESTOR) tablet 40 mg  40 mg Oral QHS    traZODone (DESYREL) tablet 50 mg  50 mg Oral QHS PRN    acetaminophen (TYLENOL) tablet 650 mg  650 mg Oral Q6H PRN    Or    acetaminophen (TYLENOL) suppository 650 mg  650 mg Rectal Q6H PRN    polyethylene glycol (MIRALAX) packet 17 g  17 g Oral DAILY PRN    ondansetron (ZOFRAN ODT) tablet 4 mg  4 mg Oral Q8H PRN    Or    ondansetron (ZOFRAN) injection 4 mg  4 mg IntraVENous Q6H PRN    sodium chloride (NS) flush 5-10 mL  5-10 mL IntraVENous PRN    insulin lispro (HUMALOG) injection   SubCUTAneous AC&HS    glucose chewable tablet 16 g  4 Tablet Oral PRN    glucagon (GLUCAGEN) injection 1 mg  1 mg IntraMUSCular PRN    dextrose (D50W) injection syrg 12.5-25 g  25-50 mL IntraVENous PRN       Allergies: Patient has no known allergies. History reviewed. No pertinent family history.   Social History     Socioeconomic History    Marital status: SINGLE     Spouse name: Not on file    Number of children: Not on file    Years of education: Not on file    Highest education level: Not on file   Occupational History    Not on file   Tobacco Use    Smoking status: Current Every Day Smoker     Packs/day: 1.00    Smokeless tobacco: Never Used   Substance and Sexual Activity    Alcohol use: Yes     Comment: 2 times a week     Drug use: Yes     Types: Cocaine    Sexual activity: Not on file   Other Topics Concern    Not on file   Social History Narrative    ** Merged History Encounter **          Social Determinants of Health     Financial Resource Strain:     Difficulty of Paying Living Expenses:    Food Insecurity:     Worried About [address] [employer] in the Last Year:     [address] [facility] [address] in the Last Year:    Transportation Needs:     Lack of Transportation (Medical):     Lack of Transportation (Non-Medical):    Physical Activity:     Days of Exercise per Week:     Minutes of Exercise per Session:    Stress:     Feeling of Stress :    Social Connections:     Frequency of Communication with Friends and Family:     Frequency of Social Gatherings with Friends and Family:     Attends [employer] Services: Active Member of Clubs or Organizations:     Attends Club or Organization Meetings:     Marital Status:    Intimate Partner Violence:     Fear of Current or Ex-Partner:     Emotionally Abused:     Physically Abused:     Sexually Abused:      Social History     Tobacco Use   Smoking Status Current Every Day Smoker    Packs/day: 1.00   Smokeless Tobacco Never Used        Temp (24hrs), Av.9 °F (36.6 °C), Min:97.5 °F (36.4 °C), Max:98.2 °F (36.8 °C)    Visit Vitals  /62 (BP 1 Location: Left upper arm, BP Patient Position: At rest;Lying right side)   Pulse 74   Temp 97.5 °F (36.4 °C)   Resp 14   Ht 5' 11\" (1.803 m)   Wt 74.4 kg (164 lb)   SpO2 98%   BMI 22.87 kg/m²       ROS: 12 point ROS obtained in details. Pertinent positives as mentioned in HPI,   otherwise negative    Physical Exam:    Gen: In general, this is a well nourished male in no acute distress  HEENT: Sclerae nonicteric. Oral mucous membranes moist. Dentition normal  Neck: Supple with midline trachea. CV: RRR without murmur or rub appreciated. Resp:Respirations are unlabored without use of accessory muscles. Lung fields B/L without wheezes or rhonchi. Abd: Soft, nontender, nondistended. Extrem: Extremities are warm, without cyanosis or clubbing. No pitting pretibial edema. Right foot great toe amputation site with wound dehiscence-some slough noted. Bloody drainage. No erythema/swelling of other toes right foot  Skin: Warm, no visible rashes. Neuro: Patient is alert, oriented, and cooperative. No obvious focal defects. Moves all 4 extremities.      Labs: Results:   Chemistry Recent Labs     10/28/21  0226   *      K 3.8      CO2 29   BUN 19*   CREA 0.98   CA 9.2   AGAP 4   BUCR 19      CBC w/Diff Recent Labs     10/27/21  0400   WBC 6.8   RBC 4.14*   HGB 12.6*   HCT 36.4      GRANS 38*   LYMPH 48   EOS 4      Microbiology No results for input(s): CULT in the last 72 hours. RADIOLOGY:    All available imaging studies/reports in [facility] for this admission were reviewed      Disclaimer: Sections of this note are dictated utilizing voice recognition software, which may have resulted in some phonetic based errors in grammar and contents. Even though attempts were made to correct all the mistakes, some may have been missed, and remained in the body of the document. If questions arise, please contact our department.     Dr. Paolo Trevino, Infectious Disease Specialist  [phone]  October 29, 2021  4:58 PM

## 2021-10-29 NOTE — PROGRESS NOTES
Progress Note    Patient: Jag Mas MRN: [id number]  SSN: [id number]    YOB: 1965  Age: 49 y.o. Sex: male      Admit Date: 10/7/2021  20 Days Post-Op     Procedure:   Procedure(s):  AMPUTATION  OF RIGHT GREAT TOE    Subjective:     I contacted  Prosthetics and left multiple messages regarding his walking boot. Patient is stable to discharge from hospital in surgical shoe. Will arrange for boot outpatient. Thank you.

## 2021-11-01 NOTE — PROGRESS NOTES
LTSS screening successfully processed. Form ID # :[id number]      Copy faxed to [address] [place] [address].      ZACHARY Echeverria, RN  Pager # [phone]  Care Manager

## 2022-01-18 NOTE — PROGRESS NOTES
Problem: Diabetes Self-Management  Goal: *Disease process and treatment process  Description: Define diabetes and identify own type of diabetes; list 3 options for treating diabetes. Outcome: Progressing Towards Goal  Goal: *Incorporating nutritional management into lifestyle  Description: Describe effect of type, amount and timing of food on blood glucose; list 3 methods for planning meals. Outcome: Progressing Towards Goal  Goal: *Incorporating physical activity into lifestyle  Description: State effect of exercise on blood glucose levels. Outcome: Progressing Towards Goal  Goal: *Developing strategies to promote health/change behavior  Description: Define the ABC's of diabetes; identify appropriate screenings, schedule and personal plan for screenings. Outcome: Progressing Towards Goal  Goal: *Using medications safely  Description: State effect of diabetes medications on diabetes; name diabetes medication taking, action and side effects. Outcome: Progressing Towards Goal  Goal: *Monitoring blood glucose, interpreting and using results  Description: Identify recommended blood glucose targets  and personal targets. Outcome: Progressing Towards Goal  Goal: *Prevention, detection, treatment of acute complications  Description: List symptoms of hyper- and hypoglycemia; describe how to treat low blood sugar and actions for lowering  high blood glucose level. Outcome: Progressing Towards Goal  Goal: *Prevention, detection and treatment of chronic complications  Description: Define the natural course of diabetes and describe the relationship of blood glucose levels to long term complications of diabetes.   Outcome: Progressing Towards Goal  Goal: *Developing strategies to address psychosocial issues  Description: Describe feelings about living with diabetes; identify support needed and support network  Outcome: Progressing Towards Goal  Goal: *Insulin pump training  Outcome: Progressing Towards Goal  Goal: *Sick day guidelines  Outcome: Progressing Towards Goal  Goal: *Patient Specific Goal (EDIT GOAL, INSERT TEXT)  Outcome: Progressing Towards Goal     Problem: Falls - Risk of  Goal: *Absence of Falls  Description: Document Nicolás Fall Risk and appropriate interventions in the flowsheet. Outcome: Progressing Towards Goal  Note: Fall Risk Interventions:  Mobility Interventions: Patient to call before getting OOB, PT Consult for mobility concerns         Medication Interventions: Patient to call before getting OOB, Teach patient to arise slowly, Evaluate medications/consider consulting pharmacy                   Problem: Sepsis: Day 2  Goal: Off Pathway (Use only if patient is Off Pathway)  Outcome: Progressing Towards Goal  Goal: *Central Venous Pressure maintained at 8-12 mm Hg  Outcome: Progressing Towards Goal  Goal: *Hemodynamically stable  Outcome: Progressing Towards Goal  Goal: *Tolerating diet  Outcome: Progressing Towards Goal  Goal: Activity/Safety  Outcome: Progressing Towards Goal  Goal: Consults, if ordered  Outcome: Progressing Towards Goal  Goal: Diagnostic Test/Procedures  Outcome: Progressing Towards Goal  Goal: Nutrition/Diet  Outcome: Progressing Towards Goal  Goal: Discharge Planning  Outcome: Progressing Towards Goal  Goal: Medications  Outcome: Progressing Towards Goal  Goal: Respiratory  Outcome: Progressing Towards Goal  Goal: Treatments/Interventions/Procedures  Outcome: Progressing Towards Goal  Goal: Psychosocial  Outcome: Progressing Towards Goal     Problem: General Wound Care  Goal: *Infected Wound: Prevention of further infection and promotion of healing  Description: Infection control procedures (eg: clean dressings, clean gloves, hand washing, precautions to isolate wound from contamination, sterile instruments used for wound debridement) should be implemented.   Outcome: Progressing Towards Goal  Goal: Interventions  Outcome: Progressing Towards Goal     Problem: Pain  Goal: *Control of Pain  Outcome: Progressing Towards Goal Render Risk Assessment In Note?: yes Detail Level: Simple Additional Notes: She has \"spots\" on the skin related to sun exposure in the past- reviewed with her.  Reassured no concerns however she needs to sun protect they will stay lighter when she does.  Reviewed how to use and frequency again- sun protective clothing also helps.

## 2022-03-18 PROBLEM — M86.9 OSTEOMYELITIS OF GREAT TOE OF RIGHT FOOT (HCC): Status: ACTIVE | Noted: 2021-10-07

## 2022-03-18 PROBLEM — M86.9 OSTEOMYELITIS (HCC): Status: ACTIVE | Noted: 2021-04-02

## 2022-03-18 PROBLEM — E87.1 HYPONATREMIA: Status: ACTIVE | Noted: 2021-06-19

## 2022-03-18 PROBLEM — L97.509 DIABETIC FOOT ULCER (HCC): Status: ACTIVE | Noted: 2021-06-22

## 2022-03-18 PROBLEM — F33.2 MDD (MAJOR DEPRESSIVE DISORDER), RECURRENT SEVERE, WITHOUT PSYCHOSIS (HCC): Status: ACTIVE | Noted: 2021-01-12

## 2022-03-18 PROBLEM — R00.0 TACHYCARDIA: Status: ACTIVE | Noted: 2021-06-19

## 2022-03-18 PROBLEM — E11.621 DIABETIC FOOT ULCER (HCC): Status: ACTIVE | Noted: 2021-06-22

## 2022-03-19 PROBLEM — A41.9 SEVERE SEPSIS (HCC): Status: ACTIVE | Noted: 2021-06-19

## 2022-03-19 PROBLEM — R50.9 FEVER: Status: ACTIVE | Noted: 2021-06-19

## 2022-03-19 PROBLEM — E11.65 HYPERGLYCEMIA DUE TO TYPE 2 DIABETES MELLITUS (HCC): Status: ACTIVE | Noted: 2021-06-19

## 2022-03-19 PROBLEM — R65.20 SEVERE SEPSIS (HCC): Status: ACTIVE | Noted: 2021-06-19

## 2022-03-19 PROBLEM — R73.9 HYPERGLYCEMIA: Status: ACTIVE | Noted: 2021-06-19

## 2022-03-19 PROBLEM — F32.3 MAJOR DEPRESSION WITH PSYCHOTIC FEATURES (HCC): Status: ACTIVE | Noted: 2021-01-11

## 2022-03-20 PROBLEM — A41.9 ACUTE SEPSIS (HCC): Status: ACTIVE | Noted: 2021-10-07

## 2022-03-20 PROBLEM — I10 HYPERTENSION: Status: ACTIVE | Noted: 2021-06-19

## 2022-03-20 PROBLEM — N17.9 ACUTE KIDNEY FAILURE (HCC): Status: ACTIVE | Noted: 2021-06-19

## 2022-09-12 ENCOUNTER — HOSPITAL ENCOUNTER (EMERGENCY)
Age: 57
Discharge: HOME | End: 2022-09-12
Attending: STUDENT IN AN ORGANIZED HEALTH CARE EDUCATION/TRAINING PROGRAM | Admitting: STUDENT IN AN ORGANIZED HEALTH CARE EDUCATION/TRAINING PROGRAM
Payer: MEDICAID

## 2022-09-12 VITALS
BODY MASS INDEX: 23.1 KG/M2 | HEIGHT: 71 IN | TEMPERATURE: 98.2 F | RESPIRATION RATE: 16 BRPM | DIASTOLIC BLOOD PRESSURE: 89 MMHG | HEART RATE: 84 BPM | SYSTOLIC BLOOD PRESSURE: 153 MMHG | OXYGEN SATURATION: 97 % | WEIGHT: 165 LBS

## 2022-09-12 DIAGNOSIS — R73.9 HYPERGLYCEMIA: Primary | ICD-10-CM

## 2022-09-12 DIAGNOSIS — I10 HYPERTENSION, UNSPECIFIED TYPE: ICD-10-CM

## 2022-09-12 LAB
ALBUMIN SERPL-MCNC: 3.2 G/DL (ref 3.5–5)
ALBUMIN/GLOB SERPL: 0.8 {RATIO} (ref 1.1–2.2)
ALP SERPL-CCNC: 136 U/L (ref 45–117)
ALT SERPL-CCNC: 83 U/L (ref 12–78)
ANION GAP SERPL CALC-SCNC: 6 MMOL/L (ref 5–15)
APPEARANCE UR: CLEAR
AST SERPL W P-5'-P-CCNC: 56 U/L (ref 15–37)
BACTERIA URNS QL MICRO: NEGATIVE /HPF
BASE EXCESS BLDV CALC-SCNC: 3.5 MMOL/L (ref 0–3)
BASOPHILS # BLD: 0 K/UL (ref 0–0.1)
BASOPHILS NFR BLD: 0 % (ref 0–1)
BDY SITE: ABNORMAL
BILIRUB SERPL-MCNC: 0.3 MG/DL (ref 0.2–1)
BILIRUB UR QL: NEGATIVE
BODY TEMPERATURE: 98.2
BUN SERPL-MCNC: 18 MG/DL (ref 6–20)
BUN/CREAT SERPL: 12 (ref 12–20)
CA-I BLD-MCNC: 9.2 MG/DL (ref 8.5–10.1)
CHLORIDE SERPL-SCNC: 97 MMOL/L (ref 97–108)
CO2 SERPL-SCNC: 31 MMOL/L (ref 21–32)
COLOR UR: ABNORMAL
CREAT SERPL-MCNC: 1.46 MG/DL (ref 0.7–1.3)
DIFFERENTIAL METHOD BLD: ABNORMAL
EOSINOPHIL # BLD: 0.2 K/UL (ref 0–0.4)
EOSINOPHIL NFR BLD: 3 % (ref 0–7)
ERYTHROCYTE [DISTWIDTH] IN BLOOD BY AUTOMATED COUNT: 11 % (ref 11.5–14.5)
FIO2 ON VENT: 21 %
GLOBULIN SER CALC-MCNC: 3.9 G/DL (ref 2–4)
GLUCOSE BLD STRIP.AUTO-MCNC: 187 MG/DL (ref 65–100)
GLUCOSE BLD STRIP.AUTO-MCNC: 220 MG/DL (ref 65–100)
GLUCOSE BLD STRIP.AUTO-MCNC: 445 MG/DL (ref 65–100)
GLUCOSE SERPL-MCNC: 407 MG/DL (ref 65–100)
GLUCOSE UR STRIP.AUTO-MCNC: >1000 MG/DL
HCO3 BLDV-SCNC: 31 MMOL/L (ref 24–25)
HCT VFR BLD AUTO: 36.7 % (ref 36.6–50.3)
HGB BLD-MCNC: 12.9 G/DL (ref 12.1–17)
HGB UR QL STRIP: NEGATIVE
IMM GRANULOCYTES # BLD AUTO: 0 K/UL (ref 0–0.04)
IMM GRANULOCYTES NFR BLD AUTO: 0 % (ref 0–0.5)
KETONES UR QL STRIP.AUTO: NEGATIVE MG/DL
LEUKOCYTE ESTERASE UR QL STRIP.AUTO: NEGATIVE
LYMPHOCYTES # BLD: 2.4 K/UL (ref 0.8–3.5)
LYMPHOCYTES NFR BLD: 43 % (ref 12–49)
MCH RBC QN AUTO: 31.8 PG (ref 26–34)
MCHC RBC AUTO-ENTMCNC: 35.1 G/DL (ref 30–36.5)
MCV RBC AUTO: 90.4 FL (ref 80–99)
MONOCYTES # BLD: 0.8 K/UL (ref 0–1)
MONOCYTES NFR BLD: 14 % (ref 5–13)
NEUTS SEG # BLD: 2.3 K/UL (ref 1.8–8)
NEUTS SEG NFR BLD: 40 % (ref 32–75)
NITRITE UR QL STRIP.AUTO: NEGATIVE
NRBC # BLD: 0 K/UL (ref 0–0.01)
NRBC BLD-RTO: 0 PER 100 WBC
PCO2 BLDV: 59.9 MMHG (ref 41–51)
PERFORMED BY, TECHID: ABNORMAL
PH BLDV: 7.33 [PH] (ref 7.32–7.42)
PH UR STRIP: 7 [PH]
PLATELET # BLD AUTO: 144 K/UL (ref 150–400)
PMV BLD AUTO: 11.9 FL (ref 8.9–12.9)
PO2 BLDV: 46 MMHG (ref 25–40)
POTASSIUM SERPL-SCNC: 4.4 MMOL/L (ref 3.5–5.1)
PROT SERPL-MCNC: 7.1 G/DL (ref 6.4–8.2)
PROT UR STRIP-MCNC: NEGATIVE MG/DL
RBC # BLD AUTO: 4.06 M/UL (ref 4.1–5.7)
RBC #/AREA URNS HPF: ABNORMAL /HPF (ref 0–5)
SAO2% DEVICE SAO2% SENSOR NAME: ABNORMAL
SODIUM SERPL-SCNC: 134 MMOL/L (ref 136–145)
SP GR UR REFRACTOMETRY: 1 (ref 1–1.03)
SPECIMEN SITE: ABNORMAL
UA: UC IF INDICATED,UAUC: ABNORMAL
UROBILINOGEN UR QL STRIP.AUTO: 0.1 EU/DL (ref 0.2–1)
WBC # BLD AUTO: 5.7 K/UL (ref 4.1–11.1)
WBC URNS QL MICRO: ABNORMAL /HPF (ref 0–4)

## 2022-09-12 PROCEDURE — 85025 COMPLETE CBC W/AUTO DIFF WBC: CPT

## 2022-09-12 PROCEDURE — 74011250636 HC RX REV CODE- 250/636: Performed by: STUDENT IN AN ORGANIZED HEALTH CARE EDUCATION/TRAINING PROGRAM

## 2022-09-12 PROCEDURE — 81001 URINALYSIS AUTO W/SCOPE: CPT

## 2022-09-12 PROCEDURE — 74011636637 HC RX REV CODE- 636/637: Performed by: STUDENT IN AN ORGANIZED HEALTH CARE EDUCATION/TRAINING PROGRAM

## 2022-09-12 PROCEDURE — 80053 COMPREHEN METABOLIC PANEL: CPT

## 2022-09-12 PROCEDURE — 99284 EMERGENCY DEPT VISIT MOD MDM: CPT

## 2022-09-12 PROCEDURE — 82803 BLOOD GASES ANY COMBINATION: CPT

## 2022-09-12 PROCEDURE — 82962 GLUCOSE BLOOD TEST: CPT

## 2022-09-12 PROCEDURE — 36415 COLL VENOUS BLD VENIPUNCTURE: CPT

## 2022-09-12 RX ORDER — INSULIN GLARGINE 100 [IU]/ML
INJECTION, SOLUTION SUBCUTANEOUS
Qty: 1 ML | Refills: 0 | Status: SHIPPED | OUTPATIENT
Start: 2022-09-12

## 2022-09-12 RX ORDER — INSULIN LISPRO 100 [IU]/ML
INJECTION, SOLUTION INTRAVENOUS; SUBCUTANEOUS
Qty: 1 PEN | Refills: 0 | Status: SHIPPED | OUTPATIENT
Start: 2022-09-12

## 2022-09-12 RX ADMIN — SODIUM CHLORIDE 1000 ML: 9 INJECTION, SOLUTION INTRAVENOUS at 17:05

## 2022-09-12 RX ADMIN — INSULIN HUMAN 10 UNITS: 100 INJECTION, SOLUTION PARENTERAL at 19:40

## 2022-09-12 NOTE — ED PROVIDER NOTES
[address]  EMERGENCY DEPARTMENT ENCOUNTER NOTE        Date: 9/12/2022  Patient Name: Negrito Gagnon      History of Presenting Illness     Chief Complaint   Patient presents with    High Blood Sugar       History Provided By: Patient    HPI: Negrito Gagnon, 56 y.o. male with PMH of DM, HTN, and psychiatric disorder who recently moved to this area and does not have access to healthcare presenting with hyperglycemia. He is reporting increased urinary frequency and blurry vision which is common for him when he has his hyperglycemia. He is denying any abdominal pain, chest pain, shortness of breath, vomiting, changes in his bowel or dietary habits. Is getting insulin for management of diabetes and is not on oral agents. Patient is denying any fever, chills or sweats. There are no other complaints, changes, or physical findings at this time. PCP: Kerry Arce MD    Current Outpatient Medications   Medication Sig Dispense Refill    insulin lispro (HUMALOG) 100 unit/mL kwikpen Take 6 units 3 times a day with meals 1 Pen 0    insulin glargine (Lantus U-100 Insulin) 100 unit/mL injection Take 20 units at bedtime 1 mL 0    amLODIPine (NORVASC) 10 mg tablet Take 1 Tablet by mouth daily. 30 Tablet 0    losartan (COZAAR) 100 mg tablet Take 1 Tablet by mouth daily. 30 Tablet 0    hydrALAZINE (APRESOLINE) 25 mg tablet Take 1 Tablet by mouth three (3) times daily. 90 Tablet 0    DULoxetine (CYMBALTA) 60 mg capsule Take 1 Cap by mouth daily. Indications: diabetic complication causing injury to some body nerves, major depressive disorder 30 Cap 0    rosuvastatin (CRESTOR) 40 mg tablet Take 1 Tab by mouth nightly. Indications: high cholesterol 30 Tab 0    traZODone (DESYREL) 50 mg tablet Take 1 Tab by mouth nightly as needed for Sleep.  Indications: insomnia associated with depression 30 Tab 0       Past History     Past Medical History:  Past Medical History:   Diagnosis Date Diabetes ([facility] [address]     Hepatitis C infection     Hypertension     Psychiatric disorder        Past Surgical History:  No past surgical history on file. Family History:  History reviewed. No pertinent family history. Social History:  Social History     Tobacco Use    Smoking status: Every Day     Packs/day: 1.00     Types: Cigarettes    Smokeless tobacco: Never   Substance Use Topics    Alcohol use: Yes     Comment: 2 times a week     Drug use: Yes     Types: Cocaine       Allergies:  No Known Allergies      Review of Systems     Review of Systems    A 10 point review of system was performed and was negative except as noted above in HPI    Physical Exam     Physical Exam  Vitals and nursing note reviewed. Constitutional:       General: He is not in acute distress. Appearance: He is not ill-appearing, toxic-appearing or diaphoretic. HENT:      Head: Normocephalic and atraumatic. Cardiovascular:      Rate and Rhythm: Normal rate and regular rhythm. Heart sounds: Normal heart sounds. Pulmonary:      Effort: Pulmonary effort is normal.      Breath sounds: Normal breath sounds. Abdominal:      Palpations: Abdomen is soft. Tenderness: There is no abdominal tenderness. Musculoskeletal:      Cervical back: Normal range of motion and neck supple. Right lower leg: No tenderness. No edema. Left lower leg: No tenderness. No edema. Skin:     General: Skin is warm and dry. Neurological:      Mental Status: He is alert and oriented to person, place, and time.        Lab and Diagnostic Study Results     Labs -     Recent Results (from the past 12 hour(s))   CBC WITH AUTOMATED DIFF    Collection Time: 09/12/22  5:06 PM   Result Value Ref Range    WBC 5.7 4.1 - 11.1 K/uL    RBC 4.06 (L) 4.10 - 5.70 M/uL    HGB 12.9 12.1 - 17.0 g/dL    HCT 36.7 36.6 - 50.3 %    MCV 90.4 80.0 - 99.0 FL    MCH 31.8 26.0 - 34.0 PG    MCHC 35.1 30.0 - 36.5 g/dL    RDW 11.0 (L) 11.5 - 14.5 %    PLATELET 424 (L) 946 - 400 K/uL    MPV 11.9 8.9 - 12.9 FL    NRBC 0.0 0.0  WBC    ABSOLUTE NRBC 0.00 0.00 - 0.01 K/uL    NEUTROPHILS 40 32 - 75 %    LYMPHOCYTES 43 12 - 49 %    MONOCYTES 14 (H) 5 - 13 %    EOSINOPHILS 3 0 - 7 %    BASOPHILS 0 0 - 1 %    IMMATURE GRANULOCYTES 0 0 - 0.5 %    ABS. NEUTROPHILS 2.3 1.8 - 8.0 K/UL    ABS. LYMPHOCYTES 2.4 0.8 - 3.5 K/UL    ABS. MONOCYTES 0.8 0.0 - 1.0 K/UL    ABS. EOSINOPHILS 0.2 0.0 - 0.4 K/UL    ABS. BASOPHILS 0.0 0.0 - 0.1 K/UL    ABS. IMM. GRANS. 0.0 0.00 - 0.04 K/UL    DF AUTOMATED     METABOLIC PANEL, COMPREHENSIVE    Collection Time: 09/12/22  5:06 PM   Result Value Ref Range    Sodium 134 (L) 136 - 145 mmol/L    Potassium 4.4 3.5 - 5.1 mmol/L    Chloride 97 97 - 108 mmol/L    CO2 31 21 - 32 mmol/L    Anion gap 6 5 - 15 mmol/L    Glucose 407 (H) 65 - 100 mg/dL    BUN 18 6 - 20 mg/dL    Creatinine 1.46 (H) 0.70 - 1.30 mg/dL    BUN/Creatinine ratio 12 12 - 20      GFR est AA >60 >60 ml/min/1.73m2    GFR est non-AA 50 (L) >60 ml/min/1.73m2    Calcium 9.2 8.5 - 10.1 mg/dL    Bilirubin, total 0.3 0.2 - 1.0 mg/dL    AST (SGOT) 56 (H) 15 - 37 U/L    ALT (SGPT) 83 (H) 12 - 78 U/L    Alk.  phosphatase 136 (H) 45 - 117 U/L    Protein, total 7.1 6.4 - 8.2 g/dL    Albumin 3.2 (L) 3.5 - 5.0 g/dL    Globulin 3.9 2.0 - 4.0 g/dL    A-G Ratio 0.8 (L) 1.1 - 2.2     BLOOD GAS, VENOUS    Collection Time: 09/12/22  5:19 PM   Result Value Ref Range    VENOUS PH 7.332 7.32 - 7.42      VENOUS PCO2 59.9 (H) 41 - 51 mmHg    VENOUS PO2 46 (H) 25 - 40 mmHg    VENOUS BICARBONATE 31 (H) 24 - 25 mmol/L    VENOUS BASE EXCESS 3.5 (H) 0 - 3 mmol/L    O2 METHOD Room air      FIO2 21.0 %    Sample source Venous      SITE Right Radial      Performed by Dennise Beth     TEMPERATURE 98.2     GLUCOSE, POC    Collection Time: 09/12/22  7:23 PM   Result Value Ref Range    Glucose (POC) 445 (H) 65 - 100 mg/dL    Performed by [address] [place] [address]    Collection Time: 09/12/22  7:56 PM    Specimen: Urine   Result Value Ref Range    Color Yellow/Straw      Appearance Clear Clear      Specific gravity 1.005 1.003 - 1.030      pH (UA) 7.0      Protein Negative Negative mg/dL    Glucose >1,000 (A) Negative mg/dL    Ketone Negative Negative mg/dL    Bilirubin Negative Negative      Blood Negative Negative      Urobilinogen 0.1 (L) 0.2 - 1.0 EU/dL    Nitrites Negative Negative      Leukocyte Esterase Negative Negative      WBC 0-4 0 - 4 /hpf    RBC 0-5 0 - 5 /hpf    Bacteria Negative Negative /hpf    UA:UC IF INDICATED Culture not indicated by UA result Culture not indicated by UA result     GLUCOSE, POC    Collection Time: 09/12/22  8:23 PM   Result Value Ref Range    Glucose (POC) 220 (H) 65 - 100 mg/dL    Performed by Beltran, POC    Collection Time: 09/12/22  9:07 PM   Result Value Ref Range    Glucose (POC) 187 (H) 65 - 100 mg/dL    Performed by Neema Reynoso        Radiologic Studies -   [unfilled]  CT Results  (Last 48 hours)      None          CXR Results  (Last 48 hours)      None            Medical Decision Making and ED Course   - I am the first and primary provider for this patient AND AM THE PRIMARY PROVIDER OF RECORD. - I reviewed the vital signs, available nursing notes, past medical history, past surgical history, family history and social history. - Initial assessment performed. The patients presenting problems have been discussed, and the staff are in agreement with the care plan formulated and outlined with them. I have encouraged them to ask questions as they arise throughout their visit. Vital Signs-Reviewed the patient's vital signs.     Patient Vitals for the past 24 hrs:   Temp Pulse Resp BP SpO2   09/12/22 2059 -- 84 16 (!) 153/89 --   09/12/22 2039 -- (!) 106 17 (!) 171/93 --   09/12/22 2009 -- 86 13 (!) 158/96 --   09/12/22 1845 -- 98 15 (!) 170/104 97 %   09/12/22 1807 -- (!) 103 16 (!) 189/109 98 %   09/12/22 1752 -- 86 25 (!) 167/94 97 %   09/12/22 1737 -- 87 22 (!) 164/93 --   09/12/22 1722 -- 94 15 (!) 157/101 100 %   09/12/22 1707 98.2 °F (36.8 °C) 84 16 (!) 152/92 100 %       Records Reviewed: Nursing Notes    Provider Notes (Medical Decision Making):     Patient presents to the ED with hyperglycemia due to medication noncompliance. His examination showed mild signs of dehydration but otherwise there are no concerning features on his evaluation. He recently moved from around the [address] [place] [address] and does not have a primary care doctor here. He is denying any infectious symptoms. No symptoms suggestive of URI, UTI, or abdominal infection. Additionally, he does not have any chest pain, shortness of breath, dizziness or lightheadedness. At this point, clearly his hyperglycemia due to medication issues as he ran out of his medications. On his evaluation there are no symptoms concerning for infectious nor ischemic findings. Labs were obtained which showed mild acute kidney injury. Patient did receive 2 L of normal saline and a dose of intravenous insulin with significant improvement of his hemodynamics as well as hyperglycemia. In consultation with the hospitalist, Dr. [address], patient was reinitiated on insulin. He is given resource establish primary care. I did note that the patient blood pressure is high. He reports that his not compliant this medication. He was counseled on the importance of adherence to medications. ED evaluation, work-up, clinical impression disposition were discussed with the patient and he verbalized understanding. He will be able to reconsider follow-up. Stressed on the portal come back to the ED if symptoms worsen or there are new concerns.       Procedures and Critical Care       CRITICAL CARE NOTE :  5:01 PM  Amount of Critical Care Time: 32 (minutes)    IMPENDING DETERIORATION -Metabolic and Renal  ASSOCIATED RISK FACTORS - Metabolic changes and Dehydration  MANAGEMENT- Bedside Assessment and Supervision of Care  INTERPRETATION -  Blood Pressure  INTERVENTIONS - hemodynamic mngmt and Metobolic interventions  CASE REVIEW - Nursing  TREATMENT RESPONSE -Improved  PERFORMED BY - Self    NOTES   :  I have spent critical care time involved in lab review, consultations with specialist, family decision- making, bedside attention and documentation. This time excludes time spent in any separate billed procedures. During this entire length of time I was immediately available to the patient . Mac Martin MD      Diagnosis     Clinical Impression:   1. Hyperglycemia    2. Hypertension, unspecified type          Disposition     Disposition: Condition improved  DC- Adult Discharges: All of the diagnostic tests were reviewed and questions answered. Diagnosis, care plan and treatment options were discussed. The patient understands the instructions and will follow up as directed. The patients results have been reviewed with them. They have been counseled regarding their diagnosis. The patient verbally convey understanding and agreement of the signs, symptoms, diagnosis, treatment and prognosis and additionally agrees to follow up as recommended with their PCP in 24 - 48 hours. They also agree with the care-plan and convey that all of their questions have been answered. I have also put together some discharge instructions for them that include: 1) educational information regarding their diagnosis, 2) how to care for their diagnosis at home, as well a 3) list of reasons why they would want to return to the ED prior to their follow-up appointment, should their condition change. Discharged      DISCHARGE PLAN:  1.    Follow-up Information       Follow up With Specialties Details Why [address] EMERGENCY DEPT Emergency Medicine Go to  As needed, If symptoms worsen [address] [place] [address] Dhruv Omalley MD Internal Medicine Physician Schedule an appointment as soon as possible for a visit in 3 days To establish primary care, Discuss your visit to the ER, For reevaluation [address] [place] [address] [phone]            2.  Return to ED if worse   3. Discharge Medication List as of 9/12/2022  9:30 PM        START taking these medications    Details   insulin lispro (HUMALOG) 100 unit/mL kwikpen Take 6 units 3 times a day with meals, Normal, Disp-1 Pen, R-0      insulin glargine (Lantus U-100 Insulin) 100 unit/mL injection Take 20 units at bedtime, Normal, Disp-1 mL, R-0           CONTINUE these medications which have NOT CHANGED    Details   amLODIPine (NORVASC) 10 mg tablet Take 1 Tablet by mouth daily. , No Print, Disp-30 Tablet, R-0      losartan (COZAAR) 100 mg tablet Take 1 Tablet by mouth daily. , No Print, Disp-30 Tablet, R-0      hydrALAZINE (APRESOLINE) 25 mg tablet Take 1 Tablet by mouth three (3) times daily. , Normal, Disp-90 Tablet, R-0      DULoxetine (CYMBALTA) 60 mg capsule Take 1 Cap by mouth daily. Indications: diabetic complication causing injury to some body nerves, major depressive disorder, Print, Disp-30 Cap, R-0      rosuvastatin (CRESTOR) 40 mg tablet Take 1 Tab by mouth nightly. Indications: high cholesterol, Print, Disp-30 Tab, R-0      traZODone (DESYREL) 50 mg tablet Take 1 Tab by mouth nightly as needed for Sleep. Indications: insomnia associated with depression, Print, Disp-30 Tab, R-0               Attestations: Concepción Newman MD    Please note that this dictation was completed with [employer], the [employer] voice recognition software. Quite often unanticipated grammatical, syntax, homophones, and other interpretive errors are inadvertently transcribed by the computer software. Please disregard these errors. Please excuse any errors that have escaped final proofreading. Thank you.

## 2022-09-12 NOTE — ED NOTES
Pt presented to the ER with complaint of moved to area and does not have PCP. Pt stated he is currently working on PCP. Pt stated he ran out of his insulin and he got some dry eyes which is common for his hypoglcemia and came in for evaluation. oriented to room and call bell,, cardiac monitoring initiated , spO2 Monitoring initiated , IV  initiated , call bell within reach, side rails up x2, resting comfortable but easily arousable, no signs of acute distress. , bed in lowest position, will continue to monitor      Airway: Patent, Managing oral secretions, and No obstruction    Respiratory: Normal Rate , Normal Depth, No acute Distress, and Speaking in full sentences    Cardiac: WNL    Neuro: AVPU alert and A&O4/4    GI: Soft and Non-tender    : WNL    Muscle/Skeletal/Skin: WNL

## 2022-09-12 NOTE — PROGRESS NOTES
[facility] Hospitalists  Progress Note    Patient: Etienne Peterson Age: 49 y.o. : 1965 MR#: [id number] SSN: [id number]  Date: 2021     Subjective/24-hour events:     Seen in room today   Comfortable in bed, NAD   Complains of severe pain to L foot   Denies other significant complaints     Has a place at [facility] for discharge   Weighing whether he wants to go there     Assessment:     Mr. Etienne Peterson is a 48 y/o male with a PMHx of Type II DM, HTN and Hep C who is admitted for sepsis 2/2 L great toe diabetic foot infection w/abscess and osteomyelitis. 1. Sepsis- POA   2. L great toe diabetic foot ulcer w/abscess  3. Osteomyelitis of L great toe   4. Recurrent fever  5. Type  DM w/hyperglydemia- uncontrolled,  HbA1c 11.3%  6. Hyponatremia       Plan:     Podiatry and ID following   S/p partial amputation of L great toe, decompression of abscess on   BLE arterial duplex wnl   Cont abx per ID- Zosyn and Vancomycin   F/u wound cx's, bone biopsy to determine need for outpatient IV vs PO  DM management following   Cont lantus and SSI w/accuchecks    Increase percocet and add IV morphine for pain control   PT,OT- surgical shoe and heel weightbearing     Case discussed with:  [x]Patient  []Family  [x]Nursing  [x]Case Management  DVT prophylaxis: Lovenox   Diet: Diabetic   Contact: Zach Sotomayor (friend)      [phone]  Code Status: FULL    Disposition: Cont current care; discharge to [facility] vs LTC pending determination of outpatient abx       DARRIUS Ferguson, DO   2021         Objective:   VS:   Visit Vitals  BP (!) 158/88   Pulse 89   Temp (!) 101.4 °F (38.6 °C)   Resp 18   Ht 5' 11\" (1.803 m)   Wt 72.6 kg (160 lb)   SpO2 98%   BMI 22.32 kg/m²      Tmax/24hrs: Temp (24hrs), Av.1 °F (37.3 °C), Min:97.9 °F (36.6 °C), Max:101.4 °F (38.6 °C)      Intake/Output Summary (Last 24 hours) at 2021 7726  Last data filed at 2021 2046  Gross per 24 hour Intake 100 ml   Output 450 ml   Net -350 ml       General:  Nontoxic-appearing. Cardiovascular:  RRR. Pulmonary:  Lungs clear bilaterally, no wheezes. GI:  Abdomen soft, NTTP. Extremities:  Warm, no edema or ischemia. Foot dressings intact. Neuro:  Awake and alert. Moves extremities spontaneously.     Labs:    Recent Results (from the past 24 hour(s))   METABOLIC PANEL, BASIC    Collection Time: 06/22/21 12:40 AM   Result Value Ref Range    Sodium 135 (L) 136 - 145 mmol/L    Potassium 4.0 3.5 - 5.5 mmol/L    Chloride 103 100 - 111 mmol/L    CO2 26 21 - 32 mmol/L    Anion gap 6 3.0 - 18 mmol/L    Glucose 172 (H) 74 - 99 mg/dL    BUN 9 7.0 - 18 MG/DL    Creatinine 0.82 0.6 - 1.3 MG/DL    BUN/Creatinine ratio 11 (L) 12 - 20      GFR est AA >60 >60 ml/min/1.73m2    GFR est non-AA >60 >60 ml/min/1.73m2    Calcium 8.5 8.5 - 10.1 MG/DL   VANCOMYCIN, TROUGH    Collection Time: 06/22/21  3:40 AM   Result Value Ref Range    Vancomycin,trough 11.1 10.0 - 20.0 ug/mL    Reported dose date [id number]      Reported dose time: 1600      Reported dose: 1250 MG UNITS   GLUCOSE, POC    Collection Time: 06/22/21  7:45 AM   Result Value Ref Range    Glucose (POC) 211 (H) 70 - 110 mg/dL   LOWER EXT ART PVR MULT LEVEL SEG PRESSURES    Collection Time: 06/22/21 10:21 AM   Result Value Ref Range    Left arm  mmHg    Left calf pressure 177 mmHg    Left posterior tibial 145 mmHg    Left anterior tibial 144 mmHg    Right arm  mmHg    Right calf pressure 176 mmHg    Right posterior tibial 158 mmHg    Right anterior tibial 145 mmHg    Left JUWAN 1.03     Right JUWAN 1.12    GLUCOSE, POC    Collection Time: 06/22/21 11:36 AM   Result Value Ref Range    Glucose (POC) 339 (H) 70 - 110 mg/dL   GLUCOSE, POC    Collection Time: 06/22/21  3:34 PM   Result Value Ref Range    Glucose (POC) 239 (H) 70 - 110 mg/dL Mucosal Advancement Flap Text: Given the location of the defect, shape of the defect and the proximity to free margins a mucosal advancement flap was deemed most appropriate. Incisions were made with a 15 blade scalpel in the appropriate fashion along the cutaneous vermilion border and the mucosal lip. The remaining actinically damaged mucosal tissue was excised.  The mucosal advancement flap was then elevated to the gingival sulcus with care taken to preserve the neurovascular structures and advanced into the primary defect. Care was taken to ensure that precise realignment of the vermilion border was achieved.

## 2022-09-12 NOTE — ED TRIAGE NOTES
Hyperglycemia. Accucheck 508. Pt recently moved to area - out of insulin. Reports eye dryness only. States eye dryness is common with hyperglycemia.

## 2022-09-13 NOTE — DISCHARGE INSTRUCTIONS
Thank you! Thank you for allowing me to care for you in the emergency department. It is my goal to provide you with excellent care. If you have not received excellent quality care, please ask to speak to the nurse manager. Please fill out the survey that will come to you by mail or email since we listen to your feedback! Below you will find a list of your tests from today's visit. Should you have any questions, please do not hesitate to call the emergency department. Labs  Recent Results (from the past 12 hour(s))   CBC WITH AUTOMATED DIFF    Collection Time: 09/12/22  5:06 PM   Result Value Ref Range    WBC 5.7 4.1 - 11.1 K/uL    RBC 4.06 (L) 4.10 - 5.70 M/uL    HGB 12.9 12.1 - 17.0 g/dL    HCT 36.7 36.6 - 50.3 %    MCV 90.4 80.0 - 99.0 FL    MCH 31.8 26.0 - 34.0 PG    MCHC 35.1 30.0 - 36.5 g/dL    RDW 11.0 (L) 11.5 - 14.5 %    PLATELET 822 (L) 172 - 400 K/uL    MPV 11.9 8.9 - 12.9 FL    NRBC 0.0 0.0  WBC    ABSOLUTE NRBC 0.00 0.00 - 0.01 K/uL    NEUTROPHILS 40 32 - 75 %    LYMPHOCYTES 43 12 - 49 %    MONOCYTES 14 (H) 5 - 13 %    EOSINOPHILS 3 0 - 7 %    BASOPHILS 0 0 - 1 %    IMMATURE GRANULOCYTES 0 0 - 0.5 %    ABS. NEUTROPHILS 2.3 1.8 - 8.0 K/UL    ABS. LYMPHOCYTES 2.4 0.8 - 3.5 K/UL    ABS. MONOCYTES 0.8 0.0 - 1.0 K/UL    ABS. EOSINOPHILS 0.2 0.0 - 0.4 K/UL    ABS. BASOPHILS 0.0 0.0 - 0.1 K/UL    ABS. IMM.  GRANS. 0.0 0.00 - 0.04 K/UL    DF AUTOMATED     METABOLIC PANEL, COMPREHENSIVE    Collection Time: 09/12/22  5:06 PM   Result Value Ref Range    Sodium 134 (L) 136 - 145 mmol/L    Potassium 4.4 3.5 - 5.1 mmol/L    Chloride 97 97 - 108 mmol/L    CO2 31 21 - 32 mmol/L    Anion gap 6 5 - 15 mmol/L    Glucose 407 (H) 65 - 100 mg/dL    BUN 18 6 - 20 mg/dL    Creatinine 1.46 (H) 0.70 - 1.30 mg/dL    BUN/Creatinine ratio 12 12 - 20      GFR est AA >60 >60 ml/min/1.73m2    GFR est non-AA 50 (L) >60 ml/min/1.73m2    Calcium 9.2 8.5 - 10.1 mg/dL    Bilirubin, total 0.3 0.2 - 1.0 mg/dL    AST (SGOT) 56 (H) 15 - 37 U/L    ALT (SGPT) 83 (H) 12 - 78 U/L    Alk. phosphatase 136 (H) 45 - 117 U/L    Protein, total 7.1 6.4 - 8.2 g/dL    Albumin 3.2 (L) 3.5 - 5.0 g/dL    Globulin 3.9 2.0 - 4.0 g/dL    A-G Ratio 0.8 (L) 1.1 - 2.2     BLOOD GAS, VENOUS    Collection Time: 09/12/22  5:19 PM   Result Value Ref Range    VENOUS PH 7.332 7.32 - 7.42      VENOUS PCO2 59.9 (H) 41 - 51 mmHg    VENOUS PO2 46 (H) 25 - 40 mmHg    VENOUS BICARBONATE 31 (H) 24 - 25 mmol/L    VENOUS BASE EXCESS 3.5 (H) 0 - 3 mmol/L    O2 METHOD Room air      FIO2 21.0 %    Sample source Venous      SITE Right Radial      Performed by Shahrzad Gilbert     TEMPERATURE 98.2     GLUCOSE, POC    Collection Time: 09/12/22  7:23 PM   Result Value Ref Range    Glucose (POC) 445 (H) 65 - 100 mg/dL    Performed by Obie Goodrich        Radiologic Studies  No orders to display     CT Results  (Last 48 hours)      None          CXR Results  (Last 48 hours)      None          ------------------------------------------------------------------------------------------------------------  The exam and treatment you received in the Emergency Department were for an urgent problem and are not intended as complete care. It is important that you follow-up with a doctor, nurse practitioner, or physician assistant to:  (1) confirm your diagnosis,  (2) re-evaluation of changes in your illness and treatment, and  (3) for ongoing care. Please take your discharge instructions with you when you go to your follow-up appointment. If you have any problem arranging a follow-up appointment, contact the Emergency Department. If your symptoms become worse or you do not improve as expected and you are unable to reach your health care provider, please return to the Emergency Department. We are available 24 hours a day. If a prescription has been provided, please have it filled as soon as possible to prevent a delay in treatment.  If you have any questions or reservations about taking the medication due to side effects or interactions with other medications, please call your primary care provider or contact the ER.

## 2022-09-21 ENCOUNTER — HOSPITAL ENCOUNTER (EMERGENCY)
Age: 57
Discharge: HOME | End: 2022-09-21
Attending: STUDENT IN AN ORGANIZED HEALTH CARE EDUCATION/TRAINING PROGRAM
Payer: MEDICAID

## 2022-09-21 VITALS
RESPIRATION RATE: 13 BRPM | OXYGEN SATURATION: 100 % | SYSTOLIC BLOOD PRESSURE: 154 MMHG | HEIGHT: 71 IN | HEART RATE: 90 BPM | TEMPERATURE: 97.6 F | BODY MASS INDEX: 25.9 KG/M2 | WEIGHT: 185 LBS | DIASTOLIC BLOOD PRESSURE: 98 MMHG

## 2022-09-21 DIAGNOSIS — T40.601A OPIATE OVERDOSE, ACCIDENTAL OR UNINTENTIONAL, INITIAL ENCOUNTER (HCC): Primary | ICD-10-CM

## 2022-09-21 DIAGNOSIS — I10 HYPERTENSION, UNSPECIFIED TYPE: ICD-10-CM

## 2022-09-21 PROCEDURE — 96374 THER/PROPH/DIAG INJ IV PUSH: CPT

## 2022-09-21 PROCEDURE — 93005 ELECTROCARDIOGRAM TRACING: CPT

## 2022-09-21 PROCEDURE — 74011250637 HC RX REV CODE- 250/637: Performed by: STUDENT IN AN ORGANIZED HEALTH CARE EDUCATION/TRAINING PROGRAM

## 2022-09-21 PROCEDURE — 99284 EMERGENCY DEPT VISIT MOD MDM: CPT

## 2022-09-21 PROCEDURE — 74011250636 HC RX REV CODE- 250/636: Performed by: STUDENT IN AN ORGANIZED HEALTH CARE EDUCATION/TRAINING PROGRAM

## 2022-09-21 RX ORDER — ONDANSETRON 4 MG/1
4 TABLET, ORALLY DISINTEGRATING ORAL
Status: COMPLETED | OUTPATIENT
Start: 2022-09-21 | End: 2022-09-21

## 2022-09-21 RX ORDER — NALOXONE HYDROCHLORIDE 2 MG/.4ML
2 INJECTION, SOLUTION INTRAMUSCULAR; SUBCUTANEOUS
Qty: 1 EACH | Refills: 0 | Status: SHIPPED | OUTPATIENT
Start: 2022-09-21 | End: 2022-09-21

## 2022-09-21 RX ORDER — ONDANSETRON 2 MG/ML
4 INJECTION INTRAMUSCULAR; INTRAVENOUS
Status: COMPLETED | OUTPATIENT
Start: 2022-09-21 | End: 2022-09-21

## 2022-09-21 RX ADMIN — ONDANSETRON 4 MG: 4 TABLET, ORALLY DISINTEGRATING ORAL at 06:39

## 2022-09-21 RX ADMIN — ONDANSETRON 4 MG: 2 INJECTION INTRAMUSCULAR; INTRAVENOUS at 06:54

## 2022-09-21 NOTE — ED PROVIDER NOTES
[address]  EMERGENCY DEPARTMENT ENCOUNTER NOTE        Date: 9/21/2022  Patient Name: Negrito Gagnon      History of Presenting Illness     Chief Complaint   Patient presents with    Drug Overdose       History Provided By: Patient    HPI: Negrito Gagnon, 56 y.o. male with PMH of DM, HTN, psychiatric disorder and substance abuse disorder who presents to the ED after overdosing on heroin and cocaine. Found unresponsive in which EMS administered Narcan with improvement of his mental status. Patient is denying any symptoms currently but appears somnolent. His maintaining his airway. Presenting issue is of moderate severity, no known aggravating relieving factors without/additional symptoms. There are no other complaints, changes, or physical findings at this time. PCP: Kerry Arce MD    Current Outpatient Medications   Medication Sig Dispense Refill    naloxone (EVZIO) 2 mg/0.4 mL auto-injector 0.4 mL by IntraMUSCular route once as needed for Overdose for up to 1 dose. 1 Each 0    insulin lispro (HUMALOG) 100 unit/mL kwikpen Take 6 units 3 times a day with meals 1 Pen 0    insulin glargine (Lantus U-100 Insulin) 100 unit/mL injection Take 20 units at bedtime 1 mL 0    amLODIPine (NORVASC) 10 mg tablet Take 1 Tablet by mouth daily. 30 Tablet 0    losartan (COZAAR) 100 mg tablet Take 1 Tablet by mouth daily. 30 Tablet 0    hydrALAZINE (APRESOLINE) 25 mg tablet Take 1 Tablet by mouth three (3) times daily. 90 Tablet 0    DULoxetine (CYMBALTA) 60 mg capsule Take 1 Cap by mouth daily. Indications: diabetic complication causing injury to some body nerves, major depressive disorder 30 Cap 0    rosuvastatin (CRESTOR) 40 mg tablet Take 1 Tab by mouth nightly. Indications: high cholesterol 30 Tab 0    traZODone (DESYREL) 50 mg tablet Take 1 Tab by mouth nightly as needed for Sleep.  Indications: insomnia associated with depression 30 Tab 0       Past History     Past Medical History:  Past Medical History:   Diagnosis Date    Diabetes ([facility] [address]     Hepatitis C infection     Hypertension     Psychiatric disorder        Past Surgical History:  No past surgical history on file. Family History:  No family history on file. Social History:  Social History     Tobacco Use    Smoking status: Every Day     Packs/day: 1.00     Types: Cigarettes    Smokeless tobacco: Never   Substance Use Topics    Alcohol use: Yes     Comment: 2 times a week     Drug use: Yes     Frequency: 7.0 times per week     Types: Cocaine     Comment: everyday for over one month       Allergies:  No Known Allergies      Review of Systems     Review of Systems    A 10 point review of system was performed and was negative except as noted above in HPI    Physical Exam     Physical Exam  Vitals and nursing note reviewed. Constitutional:       General: He is not in acute distress. Appearance: He is not ill-appearing, toxic-appearing or diaphoretic. HENT:      Head: Normocephalic and atraumatic. Mouth/Throat:      Mouth: Mucous membranes are dry. Cardiovascular:      Rate and Rhythm: Regular rhythm. Tachycardia present. Heart sounds: Normal heart sounds. Pulmonary:      Effort: Pulmonary effort is normal.      Breath sounds: Normal breath sounds. Abdominal:      Palpations: Abdomen is soft. Tenderness: There is no abdominal tenderness. Musculoskeletal:      Cervical back: Normal range of motion and neck supple. Right lower leg: No tenderness. No edema. Left lower leg: No tenderness. No edema. Skin:     General: Skin is warm and dry. Neurological:      Mental Status: He is alert and oriented to person, place, and time. Lab and Diagnostic Study Results     Labs -   No results found for this or any previous visit (from the past 12 hour(s)).     Radiologic Studies -   [unfilled]  CT Results  (Last 48 hours)      None          CXR Results  (Last 48 hours)      None Medical Decision Making and ED Course   - I am the first and primary provider for this patient AND AM THE PRIMARY PROVIDER OF RECORD. - I reviewed the vital signs, available nursing notes, past medical history, past surgical history, family history and social history. - Initial assessment performed. The patients presenting problems have been discussed, and the staff are in agreement with the care plan formulated and outlined with them. I have encouraged them to ask questions as they arise throughout their visit. Vital Signs-Reviewed the patient's vital signs. Patient Vitals for the past 24 hrs:   Temp Pulse Resp BP SpO2   09/21/22 0545 -- 90 13 (!) 154/98 100 %   09/21/22 0544 -- -- -- -- 100 %   09/21/22 0537 -- 91 -- (!) 154/98 --   09/21/22 0522 -- 92 -- (!) 144/94 97 %   09/21/22 0507 -- 95 -- (!) 140/94 98 %   09/21/22 0459 -- 96 18 (!) 147/99 98 %   09/21/22 0437 97.6 °F (36.4 °C) (!) 102 13 (!) 146/104 100 %       Records Reviewed: Nursing Notes    Provider Notes (Medical Decision Making):     Patient is a well-appearing 61-year-old male with past medical history as above presents to the ED after having an accidental overdose. He reports that he was using cocaine and heroin and accidentally overdose. Found to be unresponsive in which EMS administered Narcan with improvement of his mental status. Upon arrival to the ED, patient is somnolent but is answering questions appropriately. He is denying any other complaints right now. Patient is noted to be tachycardic but is not complaining of any chest pain or shortness of breath. Patient was started on IV fluids and connected to end-tidal CO2. He was observed for approximately 4 hours without any recurrence of his overdose symptoms. He does not require any further intervention aside from Zofran for nausea secondary to withdrawal.  After observation, patient was able to ambulate in the ED. Reevaluation showed stable vital signs.   Will discharge close and outpatient. We will give him also a prescription of Narcan given his overdose. ED evaluation, work-up, clinical impression, and disposition was discussed with the patient. Patient is agreeable. Patient verbalized understanding and will be able to arrange follow-up. Anticipatory guidance and return precautions discussed with the patient. At the time of discharge, all concerns have been addressed and patient had no further questions. Patient is hemodynamically stable and appropriate for discharge. Diagnosis     Clinical Impression:   1. Opiate overdose, accidental or unintentional, initial encounter ([facility] [address]    2. Hypertension, unspecified type          Disposition     Disposition: Condition improved and resolved  DC- Adult Discharges: All of the diagnostic tests were reviewed and questions answered. Diagnosis, care plan and treatment options were discussed. The patient understands the instructions and will follow up as directed. The patients results have been reviewed with them. They have been counseled regarding their diagnosis. The patient verbally convey understanding and agreement of the signs, symptoms, diagnosis, treatment and prognosis and additionally agrees to follow up as recommended with their PCP in 24 - 48 hours. They also agree with the care-plan and convey that all of their questions have been answered. I have also put together some discharge instructions for them that include: 1) educational information regarding their diagnosis, 2) how to care for their diagnosis at home, as well a 3) list of reasons why they would want to return to the ED prior to their follow-up appointment, should their condition change. Discharged      DISCHARGE PLAN:  1.    Follow-up Information       Follow up With Specialties Details Why [address] [facility] EMERGENCY DEPT Emergency Medicine Go to  As needed, If symptoms worsen [address] [address]  [phone] Ashlyn Acevedo MD Internal Medicine Physician Schedule an appointment as soon as possible for a visit in 2 days For reevaluation, Discuss your visit to the ER [facility], [place] [id number] [phone]            2. Return to ED if worse   3. Current Discharge Medication List        START taking these medications    Details   naloxone (EVZIO) 2 mg/0.4 mL auto-injector 0.4 mL by IntraMUSCular route once as needed for Overdose for up to 1 dose. Qty: 1 Each, Refills: 0  Start date: 9/21/2022, End date: 9/21/2022               Attestations: Reynaldo Hines MD    Please note that this dictation was completed with IIIMOBI, the computer voice recognition software. Quite often unanticipated grammatical, syntax, homophones, and other interpretive errors are inadvertently transcribed by the computer software. Please disregard these errors. Please excuse any errors that have escaped final proofreading. Thank you.

## 2022-09-23 LAB
ATRIAL RATE: 103 BPM
CALCULATED P AXIS, ECG09: 45 DEGREES
CALCULATED R AXIS, ECG10: 20 DEGREES
CALCULATED T AXIS, ECG11: 71 DEGREES
DIAGNOSIS, 93000: NORMAL
P-R INTERVAL, ECG05: 166 MS
Q-T INTERVAL, ECG07: 372 MS
QRS DURATION, ECG06: 92 MS
QTC CALCULATION (BEZET), ECG08: 487 MS
VENTRICULAR RATE, ECG03: 103 BPM

## 2022-11-02 ENCOUNTER — HOSPITAL ENCOUNTER (EMERGENCY)
Age: 57
Discharge: HOME | End: 2022-11-02
Attending: EMERGENCY MEDICINE
Payer: MEDICAID

## 2022-11-02 ENCOUNTER — APPOINTMENT (OUTPATIENT)
Dept: CT IMAGING | Age: 57
End: 2022-11-02
Attending: EMERGENCY MEDICINE
Payer: MEDICAID

## 2022-11-02 VITALS
HEART RATE: 106 BPM | TEMPERATURE: 98 F | HEIGHT: 71 IN | RESPIRATION RATE: 15 BRPM | BODY MASS INDEX: 22.4 KG/M2 | OXYGEN SATURATION: 98 % | SYSTOLIC BLOOD PRESSURE: 126 MMHG | WEIGHT: 160 LBS | DIASTOLIC BLOOD PRESSURE: 68 MMHG

## 2022-11-02 DIAGNOSIS — F39 MOOD DISORDER (HCC): ICD-10-CM

## 2022-11-02 DIAGNOSIS — M62.838 MUSCLE SPASM: Primary | ICD-10-CM

## 2022-11-02 LAB
ANION GAP SERPL CALC-SCNC: 9 MMOL/L (ref 5–15)
BASOPHILS # BLD: 0 K/UL (ref 0–0.1)
BASOPHILS NFR BLD: 0 % (ref 0–1)
BUN SERPL-MCNC: 15 MG/DL (ref 6–20)
BUN/CREAT SERPL: 14 (ref 12–20)
CA-I BLD-MCNC: 9 MG/DL (ref 8.5–10.1)
CHLORIDE SERPL-SCNC: 97 MMOL/L (ref 97–108)
CO2 SERPL-SCNC: 26 MMOL/L (ref 21–32)
CREAT SERPL-MCNC: 1.09 MG/DL (ref 0.7–1.3)
DIFFERENTIAL METHOD BLD: ABNORMAL
EOSINOPHIL # BLD: 0.1 K/UL (ref 0–0.4)
EOSINOPHIL NFR BLD: 3 % (ref 0–7)
ERYTHROCYTE [DISTWIDTH] IN BLOOD BY AUTOMATED COUNT: 11.9 % (ref 11.5–14.5)
GLUCOSE SERPL-MCNC: 396 MG/DL (ref 65–100)
HCT VFR BLD AUTO: 34.7 % (ref 36.6–50.3)
HGB BLD-MCNC: 12.7 G/DL (ref 12.1–17)
IMM GRANULOCYTES # BLD AUTO: 0 K/UL (ref 0–0.04)
IMM GRANULOCYTES NFR BLD AUTO: 0 % (ref 0–0.5)
LYMPHOCYTES # BLD: 2.3 K/UL (ref 0.8–3.5)
LYMPHOCYTES NFR BLD: 48 % (ref 12–49)
MCH RBC QN AUTO: 32.8 PG (ref 26–34)
MCHC RBC AUTO-ENTMCNC: 36.6 G/DL (ref 30–36.5)
MCV RBC AUTO: 89.7 FL (ref 80–99)
MONOCYTES # BLD: 0.6 K/UL (ref 0–1)
MONOCYTES NFR BLD: 13 % (ref 5–13)
NEUTS SEG # BLD: 1.7 K/UL (ref 1.8–8)
NEUTS SEG NFR BLD: 36 % (ref 32–75)
NRBC # BLD: 0 K/UL (ref 0–0.01)
NRBC BLD-RTO: 0 PER 100 WBC
PLATELET # BLD AUTO: 131 K/UL (ref 150–400)
PMV BLD AUTO: 12.2 FL (ref 8.9–12.9)
POTASSIUM SERPL-SCNC: 3.9 MMOL/L (ref 3.5–5.1)
RBC # BLD AUTO: 3.87 M/UL (ref 4.1–5.7)
SODIUM SERPL-SCNC: 132 MMOL/L (ref 136–145)
WBC # BLD AUTO: 4.8 K/UL (ref 4.1–11.1)

## 2022-11-02 PROCEDURE — 99284 EMERGENCY DEPT VISIT MOD MDM: CPT

## 2022-11-02 PROCEDURE — 74011250636 HC RX REV CODE- 250/636: Performed by: EMERGENCY MEDICINE

## 2022-11-02 PROCEDURE — 96374 THER/PROPH/DIAG INJ IV PUSH: CPT

## 2022-11-02 PROCEDURE — 36415 COLL VENOUS BLD VENIPUNCTURE: CPT

## 2022-11-02 PROCEDURE — 85025 COMPLETE CBC W/AUTO DIFF WBC: CPT

## 2022-11-02 PROCEDURE — 70450 CT HEAD/BRAIN W/O DYE: CPT

## 2022-11-02 PROCEDURE — 80048 BASIC METABOLIC PNL TOTAL CA: CPT

## 2022-11-02 RX ORDER — KETOROLAC TROMETHAMINE 10 MG/1
10 TABLET, FILM COATED ORAL
Qty: 20 TABLET | Refills: 0 | Status: SHIPPED | OUTPATIENT
Start: 2022-11-02 | End: 2022-11-07

## 2022-11-02 RX ORDER — METHOCARBAMOL 750 MG/1
750 TABLET, FILM COATED ORAL
Qty: 20 TABLET | Refills: 0 | Status: SHIPPED | OUTPATIENT
Start: 2022-11-02

## 2022-11-02 RX ORDER — KETOROLAC TROMETHAMINE 30 MG/ML
15 INJECTION, SOLUTION INTRAMUSCULAR; INTRAVENOUS
Status: COMPLETED | OUTPATIENT
Start: 2022-11-02 | End: 2022-11-02

## 2022-11-02 RX ADMIN — KETOROLAC TROMETHAMINE 15 MG: 30 INJECTION, SOLUTION INTRAMUSCULAR at 11:57

## 2022-11-02 NOTE — ED PROVIDER NOTES
EMERGENCY DEPARTMENT HISTORY AND PHYSICAL EXAM      Date: 11/2/2022  Patient Name: Stefany Thompson    History of Presenting Illness     Chief Complaint   Patient presents with    Spasms       History Provided By: Patient    HPI: Stefany Thompson, 56 y.o. male presents to the emergency department complaint of 2 weeks history of right-sided muscle spasms and tingling. Patient reports over the last 2 days he has noticed the same symptoms to all extremities. Patient reports the severity is moderate, intermittent without noted aggravating relieving factors. Patient reports they are cramping in character. There are no other complaints, changes, or physical findings at this time. PCP: Ines Neumann MD    Current Outpatient Medications   Medication Sig Dispense Refill    ketorolac (TORADOL) 10 mg tablet Take 1 Tablet by mouth every six (6) hours as needed for Pain for up to 5 days. 20 Tablet 0    methocarbamoL (Robaxin-750) 750 mg tablet Take 1 Tablet by mouth four (4) times daily as needed for Muscle Spasm(s). 20 Tablet 0    insulin lispro (HUMALOG) 100 unit/mL kwikpen Take 6 units 3 times a day with meals 1 Pen 0    insulin glargine (Lantus U-100 Insulin) 100 unit/mL injection Take 20 units at bedtime 1 mL 0    amLODIPine (NORVASC) 10 mg tablet Take 1 Tablet by mouth daily. 30 Tablet 0    losartan (COZAAR) 100 mg tablet Take 1 Tablet by mouth daily. 30 Tablet 0    hydrALAZINE (APRESOLINE) 25 mg tablet Take 1 Tablet by mouth three (3) times daily. 90 Tablet 0    DULoxetine (CYMBALTA) 60 mg capsule Take 1 Cap by mouth daily. Indications: diabetic complication causing injury to some body nerves, major depressive disorder 30 Cap 0    rosuvastatin (CRESTOR) 40 mg tablet Take 1 Tab by mouth nightly. Indications: high cholesterol 30 Tab 0    traZODone (DESYREL) 50 mg tablet Take 1 Tab by mouth nightly as needed for Sleep.  Indications: insomnia associated with depression 30 Tab 0       Past History   Past Medical History:  Past Medical History:   Diagnosis Date    Diabetes ([facility] [address]     Hepatitis C infection     Hypertension     Psychiatric disorder        Past Surgical History:  No past surgical history on file. Family History:  No family history on file. Social History:  Social History     Tobacco Use    Smoking status: Every Day     Packs/day: 1.00     Types: Cigarettes    Smokeless tobacco: Never   Substance Use Topics    Alcohol use: Yes     Comment: 2 times a week     Drug use: Yes     Frequency: 7.0 times per week     Types: Cocaine     Comment: everyday for over one month       Allergies:  No Known Allergies  Review of Systems   Review of Systems  Review of Systems   Constitutional: Negative for chills and fever. HENT: Negative for sinus pressure and sinus pain. Eyes: Negative for photophobia and redness. Respiratory: Negative for shortness of breath and wheezing. Cardiovascular: Negative for chest pain and palpitations. Gastrointestinal: Negative for abdominal pain and nausea. Genitourinary: Negative for flank pain and hematuria. Musculoskeletal: Negative for arthralgias and gait problem. Skin: Negative for color change and pallor. Neurological: Negative for dizziness and weakness. Physical Exam   Physical Exam  Physical Exam  Constitutional:       General: No acute distress. Appearance: Normal appearance. Not toxic-appearing. HENT:      Head: Normocephalic and atraumatic. Nose: Nose normal.      Mouth/Throat:      Mouth: Mucous membranes are moist.   Eyes:      Extraocular Movements: Extraocular movements intact. Pupils: Pupils are equal, round, and reactive to light. Cardiovascular:      Rate and Rhythm: Normal rate. Pulses: Normal pulses. Pulmonary:      Effort: Pulmonary effort is normal.      Breath sounds: No stridor. Abdominal:      General: Abdomen is flat. There is no distension. Musculoskeletal:         General: Normal range of motion.       Cervical back: Normal range of motion and neck supple. Skin:     General: Skin is warm and dry. Capillary Refill: Capillary refill takes less than 2 seconds. Neurological:      General: No focal deficit present. Mental Status: Aert and oriented to person, place, and time. Psychiatric:         Mood and Affect: Mood normal.         Behavior: Behavior normal.     Lab and Diagnostic Study Results   Labs -     Recent Results (from the past 12 hour(s))   CBC WITH AUTOMATED DIFF    Collection Time: 11/02/22 11:55 AM   Result Value Ref Range    WBC 4.8 4.1 - 11.1 K/uL    RBC 3.87 (L) 4.10 - 5.70 M/uL    HGB 12.7 12.1 - 17.0 g/dL    HCT 34.7 (L) 36.6 - 50.3 %    MCV 89.7 80.0 - 99.0 FL    MCH 32.8 26.0 - 34.0 PG    MCHC 36.6 (H) 30.0 - 36.5 g/dL    RDW 11.9 11.5 - 14.5 %    PLATELET 654 (L) 659 - 400 K/uL    MPV 12.2 8.9 - 12.9 FL    NRBC 0.0 0.0  WBC    ABSOLUTE NRBC 0.00 0.00 - 0.01 K/uL    NEUTROPHILS 36 32 - 75 %    LYMPHOCYTES 48 12 - 49 %    MONOCYTES 13 5 - 13 %    EOSINOPHILS 3 0 - 7 %    BASOPHILS 0 0 - 1 %    IMMATURE GRANULOCYTES 0 0 - 0.5 %    ABS. NEUTROPHILS 1.7 (L) 1.8 - 8.0 K/UL    ABS. LYMPHOCYTES 2.3 0.8 - 3.5 K/UL    ABS. MONOCYTES 0.6 0.0 - 1.0 K/UL    ABS. EOSINOPHILS 0.1 0.0 - 0.4 K/UL    ABS. BASOPHILS 0.0 0.0 - 0.1 K/UL    ABS. IMM. GRANS. 0.0 0.00 - 0.04 K/UL    DF AUTOMATED     METABOLIC PANEL, BASIC    Collection Time: 11/02/22 11:55 AM   Result Value Ref Range    Sodium 132 (L) 136 - 145 mmol/L    Potassium 3.9 3.5 - 5.1 mmol/L    Chloride 97 97 - 108 mmol/L    CO2 26 21 - 32 mmol/L    Anion gap 9 5 - 15 mmol/L    Glucose 396 (H) 65 - 100 mg/dL    BUN 15 6 - 20 mg/dL    Creatinine 1.09 0.70 - 1.30 mg/dL    BUN/Creatinine ratio 14 12 - 20      eGFR >60 >60 ml/min/1.73m2    Calcium 9.0 8.5 - 10.1 mg/dL       Radiologic Studies -   [unfilled]  CT Results  (Last 48 hours)                 11/02/22 1112  CT HEAD WO CONT Final result    Impression:  No acute intracranial finding. Narrative:  INDICATION: 2 weeks history of right-sided numbness/weakness       EXAM: CT HEAD without contrast.       TECHNIQUE: Unenhanced CT Head is performed. CT dose reduction was achieved   through use of a standardized protocol tailored for this examination and   automatic exposure control for dose modulation. FINDINGS:   No acute infarct is seen. There is no apparent mass on unenhanced imaging. There   is no bleed, shift, obstructive hydrocephalus or significant extra-axial fluid   collection. Bone windows are unremarkable. CXR Results  (Last 48 hours)      None            Medical Decision Making and ED Course   Differential Diagnosis & Medical Decision Making Provider Note:       - I am the first and primary provider for this patient. I reviewed the vital signs, available nursing notes, past medical history, past surgical history, family history and social history. The patient's presenting problems have been discussed, and the staff are in agreement with the care plan formulated and outlined with them. I have encouraged them to ask questions as they arise throughout their visit. Vital Signs-Reviewed the patient's vital signs. Patient Vitals for the past 12 hrs:   Temp Pulse Resp BP SpO2   11/02/22 1042 98 °F (36.7 °C) (!) 106 15 126/68 98 %         Disposition   Disposition: DC- Adult Discharges: All of the diagnostic tests were reviewed and questions answered. Diagnosis, care plan and treatment options were discussed. The patient understands the instructions and will follow up as directed. The patients results have been reviewed with them. They have been counseled regarding their diagnosis. The patient verbally convey understanding and agreement of the signs, symptoms, diagnosis, treatment and prognosis and additionally agrees to follow up as recommended with their PCP in 24 - 48 hours.   They also agree with the care-plan and convey that all of their questions have been answered. I have also put together some discharge instructions for them that include: 1) educational information regarding their diagnosis, 2) how to care for their diagnosis at home, as well a 3) list of reasons why they would want to return to the ED prior to their follow-up appointment, should their condition change. DISCHARGE PLAN:  1. Current Discharge Medication List        CONTINUE these medications which have NOT CHANGED    Details   insulin lispro (HUMALOG) 100 unit/mL kwikpen Take 6 units 3 times a day with meals  Qty: 1 Pen, Refills: 0      insulin glargine (Lantus U-100 Insulin) 100 unit/mL injection Take 20 units at bedtime  Qty: 1 mL, Refills: 0      amLODIPine (NORVASC) 10 mg tablet Take 1 Tablet by mouth daily. Qty: 30 Tablet, Refills: 0      losartan (COZAAR) 100 mg tablet Take 1 Tablet by mouth daily. Qty: 30 Tablet, Refills: 0      hydrALAZINE (APRESOLINE) 25 mg tablet Take 1 Tablet by mouth three (3) times daily. Qty: 90 Tablet, Refills: 0      DULoxetine (CYMBALTA) 60 mg capsule Take 1 Cap by mouth daily. Indications: diabetic complication causing injury to some body nerves, major depressive disorder  Qty: 30 Cap, Refills: 0      rosuvastatin (CRESTOR) 40 mg tablet Take 1 Tab by mouth nightly. Indications: high cholesterol  Qty: 30 Tab, Refills: 0      traZODone (DESYREL) 50 mg tablet Take 1 Tab by mouth nightly as needed for Sleep. Indications: insomnia associated with depression  Qty: 30 Tab, Refills: 0           2. Follow-up Information       Follow up With Specialties Details Why Contact Info    Juan Sifuentes MD Neurology  As needed [address] [facility] [place]   [phone]            3. Return to ED if worse   4. Discharge Medication List as of 11/2/2022  2:47 PM        START taking these medications    Details   ketorolac (TORADOL) 10 mg tablet Take 1 Tablet by mouth every six (6) hours as needed for Pain for up to 5 days. , Normal, Disp-20 Tablet, R-0      methocarbamoL (Robaxin-750) 750 mg tablet Take 1 Tablet by mouth four (4) times daily as needed for Muscle Spasm(s). , Normal, Disp-20 Tablet, R-0           CONTINUE these medications which have NOT CHANGED    Details   insulin lispro (HUMALOG) 100 unit/mL kwikpen Take 6 units 3 times a day with meals, Normal, Disp-1 Pen, R-0      insulin glargine (Lantus U-100 Insulin) 100 unit/mL injection Take 20 units at bedtime, Normal, Disp-1 mL, R-0      amLODIPine (NORVASC) 10 mg tablet Take 1 Tablet by mouth daily. , No Print, Disp-30 Tablet, R-0      losartan (COZAAR) 100 mg tablet Take 1 Tablet by mouth daily. , No Print, Disp-30 Tablet, R-0      hydrALAZINE (APRESOLINE) 25 mg tablet Take 1 Tablet by mouth three (3) times daily. , Normal, Disp-90 Tablet, R-0      DULoxetine (CYMBALTA) 60 mg capsule Take 1 Cap by mouth daily. Indications: diabetic complication causing injury to some body nerves, major depressive disorder, Print, Disp-30 Cap, R-0      rosuvastatin (CRESTOR) 40 mg tablet Take 1 Tab by mouth nightly. Indications: high cholesterol, Print, Disp-30 Tab, R-0      traZODone (DESYREL) 50 mg tablet Take 1 Tab by mouth nightly as needed for Sleep. Indications: insomnia associated with depression, Print, Disp-30 Tab, R-0               Diagnosis/Clinical Impression     Clinical Impression:   1. Muscle spasm    2. Mood disorder [facility])        Attestations: Margaux León MD, am the primary clinician of record. Please note that this dictation was completed with [employer], the [employer] voice recognition software. Quite often unanticipated grammatical, syntax, homophones, and other interpretive errors are inadvertently transcribed by the computer software. Please disregard these errors. Please excuse any errors that have escaped final proofreading. Thank you.

## 2022-11-02 NOTE — ED NOTES
Patient up at this time getting his briefcase and fell at this time, landing on buttocks. Patient states that his feet and legs were weak. Examined by Dr. Lorraine Allison and was OK'd to go home.

## 2022-11-02 NOTE — Clinical Note
[facility] EMERGENCY DEPT  [address] [place] [id number]  [phone]    Work/School Note    Date: 11/2/2022    To Whom It May concern:    Ga Pinzon was seen and treated today in the emergency room by the following provider(s):  Attending Provider: Alyse Joseph MD.      Ga Pinzon is excused from work/school on 11/02/22 and 11/03/22. He is medically clear to return to work/school on 11/4/2022.        Sincerely,          Michelle Leach RN

## 2022-11-02 NOTE — Clinical Note
[facility] EMERGENCY DEPT  [facility] [place] [address]  [phone]    Work/School Note    Date: 11/2/2022    To Whom It May concern:    Linda Rivera was seen and treated today in the emergency room by the following provider(s):  Attending Provider: Lavinia Sanchez MD.      Linda Rivera is excused from work/school on 11/02/22 and 11/03/22. He is medically clear to return to work/school on 11/4/2022.        Sincerely,          Sonia Morataya MD

## 2023-01-23 ENCOUNTER — HOSPITAL ENCOUNTER (INPATIENT)
Age: 58
LOS: 2 days | Discharge: HOME | DRG: 420 | End: 2023-01-25
Attending: STUDENT IN AN ORGANIZED HEALTH CARE EDUCATION/TRAINING PROGRAM | Admitting: INTERNAL MEDICINE
Payer: MEDICAID

## 2023-01-23 DIAGNOSIS — K85.00 IDIOPATHIC ACUTE PANCREATITIS, UNSPECIFIED COMPLICATION STATUS: Primary | ICD-10-CM

## 2023-01-23 DIAGNOSIS — R73.9 HYPERGLYCEMIA: ICD-10-CM

## 2023-01-23 PROBLEM — E11.00 HYPEROSMOLAR HYPERGLYCEMIC STATE (HHS) (HCC): Status: ACTIVE | Noted: 2023-01-23

## 2023-01-23 LAB
ADMINISTERED INITIALS, ADMINIT: NORMAL
ADMINISTERED INITIALS, ADMINIT: NORMAL
ALBUMIN SERPL-MCNC: 3.1 G/DL (ref 3.5–5)
ALBUMIN/GLOB SERPL: 0.8 (ref 1.1–2.2)
ALP SERPL-CCNC: 414 U/L (ref 45–117)
ALT SERPL-CCNC: 88 U/L (ref 12–78)
ANION GAP SERPL CALC-SCNC: 8 MMOL/L (ref 5–15)
AST SERPL W P-5'-P-CCNC: 74 U/L (ref 15–37)
BASOPHILS # BLD: 0 K/UL (ref 0–0.1)
BASOPHILS NFR BLD: 0 % (ref 0–1)
BILIRUB SERPL-MCNC: 0.5 MG/DL (ref 0.2–1)
BUN SERPL-MCNC: 19 MG/DL (ref 6–20)
BUN/CREAT SERPL: 13 (ref 12–20)
CA-I BLD-MCNC: 8.4 MG/DL (ref 8.5–10.1)
CHLORIDE SERPL-SCNC: 91 MMOL/L (ref 97–108)
CO2 SERPL-SCNC: 25 MMOL/L (ref 21–32)
CREAT SERPL-MCNC: 1.47 MG/DL (ref 0.7–1.3)
D50 ADMINISTERED, D50ADM: 0 ML
D50 ADMINISTERED, D50ADM: 0 ML
D50 ORDER, D50ORD: 0 ML
D50 ORDER, D50ORD: 0 ML
DIFFERENTIAL METHOD BLD: ABNORMAL
EOSINOPHIL # BLD: 0 K/UL (ref 0–0.4)
EOSINOPHIL NFR BLD: 1 % (ref 0–7)
ERYTHROCYTE [DISTWIDTH] IN BLOOD BY AUTOMATED COUNT: 11.4 % (ref 11.5–14.5)
GLOBULIN SER CALC-MCNC: 4 G/DL (ref 2–4)
GLUCOSE BLD STRIP.AUTO-MCNC: >600 MG/DL (ref 65–100)
GLUCOSE SERPL-MCNC: 981 MG/DL (ref 65–100)
GLUCOSE, GLC: 600 MG/DL
GLUCOSE, GLC: 601 MG/DL
HCT VFR BLD AUTO: 37.6 % (ref 36.6–50.3)
HGB BLD-MCNC: 13.2 G/DL (ref 12.1–17)
HIGH TARGET, HITG: 200 MG/DL
HIGH TARGET, HITG: 200 MG/DL
IMM GRANULOCYTES # BLD AUTO: 0 K/UL (ref 0–0.04)
IMM GRANULOCYTES NFR BLD AUTO: 1 % (ref 0–0.5)
INSULIN ADMINSTERED, INSADM: 10.8 UNITS/HOUR
INSULIN ADMINSTERED, INSADM: 16.2 UNITS/HOUR
INSULIN ORDER, INSORD: 10.8 UNITS/HOUR
INSULIN ORDER, INSORD: 16.2 UNITS/HOUR
LIPASE SERPL-CCNC: 1153 U/L (ref 73–393)
LOW TARGET, LOT: 150 MG/DL
LOW TARGET, LOT: 150 MG/DL
LYMPHOCYTES # BLD: 0.9 K/UL (ref 0.8–3.5)
LYMPHOCYTES NFR BLD: 21 % (ref 12–49)
MCH RBC QN AUTO: 32.9 PG (ref 26–34)
MCHC RBC AUTO-ENTMCNC: 35.1 G/DL (ref 30–36.5)
MCV RBC AUTO: 93.8 FL (ref 80–99)
MINUTES UNTIL NEXT BG, NBG: 60 MIN
MINUTES UNTIL NEXT BG, NBG: 60 MIN
MONOCYTES # BLD: 0.5 K/UL (ref 0–1)
MONOCYTES NFR BLD: 12 % (ref 5–13)
MULTIPLIER, MUL: 0.02
MULTIPLIER, MUL: 0.03
NEUTS SEG # BLD: 2.6 K/UL (ref 1.8–8)
NEUTS SEG NFR BLD: 65 % (ref 32–75)
NRBC # BLD: 0 K/UL (ref 0–0.01)
NRBC BLD-RTO: 0 PER 100 WBC
ORDER INITIALS, ORDINIT: NORMAL
ORDER INITIALS, ORDINIT: NORMAL
PERFORMED BY, TECHID: ABNORMAL
PHOSPHATE SERPL-MCNC: 1.7 MG/DL (ref 2.6–4.7)
PLATELET # BLD AUTO: 158 K/UL (ref 150–400)
PMV BLD AUTO: 11.4 FL (ref 8.9–12.9)
POTASSIUM SERPL-SCNC: 4.7 MMOL/L (ref 3.5–5.1)
PROT SERPL-MCNC: 7.1 G/DL (ref 6.4–8.2)
RBC # BLD AUTO: 4.01 M/UL (ref 4.1–5.7)
SODIUM SERPL-SCNC: 124 MMOL/L (ref 136–145)
WBC # BLD AUTO: 4 K/UL (ref 4.1–11.1)

## 2023-01-23 PROCEDURE — 74011000258 HC RX REV CODE- 258: Performed by: INTERNAL MEDICINE

## 2023-01-23 PROCEDURE — 83690 ASSAY OF LIPASE: CPT

## 2023-01-23 PROCEDURE — 99285 EMERGENCY DEPT VISIT HI MDM: CPT

## 2023-01-23 PROCEDURE — 82962 GLUCOSE BLOOD TEST: CPT

## 2023-01-23 PROCEDURE — 65610000006 HC RM INTENSIVE CARE

## 2023-01-23 PROCEDURE — 83735 ASSAY OF MAGNESIUM: CPT

## 2023-01-23 PROCEDURE — 74011250637 HC RX REV CODE- 250/637: Performed by: STUDENT IN AN ORGANIZED HEALTH CARE EDUCATION/TRAINING PROGRAM

## 2023-01-23 PROCEDURE — 74011250636 HC RX REV CODE- 250/636: Performed by: INTERNAL MEDICINE

## 2023-01-23 PROCEDURE — 74011250636 HC RX REV CODE- 250/636: Performed by: STUDENT IN AN ORGANIZED HEALTH CARE EDUCATION/TRAINING PROGRAM

## 2023-01-23 PROCEDURE — 83036 HEMOGLOBIN GLYCOSYLATED A1C: CPT

## 2023-01-23 PROCEDURE — 80053 COMPREHEN METABOLIC PANEL: CPT

## 2023-01-23 PROCEDURE — 84100 ASSAY OF PHOSPHORUS: CPT

## 2023-01-23 PROCEDURE — 36415 COLL VENOUS BLD VENIPUNCTURE: CPT

## 2023-01-23 PROCEDURE — 74011000250 HC RX REV CODE- 250: Performed by: INTERNAL MEDICINE

## 2023-01-23 PROCEDURE — 85025 COMPLETE CBC W/AUTO DIFF WBC: CPT

## 2023-01-23 PROCEDURE — 74011636637 HC RX REV CODE- 636/637: Performed by: INTERNAL MEDICINE

## 2023-01-23 RX ORDER — DEXTROSE MONOHYDRATE 100 MG/ML
0-250 INJECTION, SOLUTION INTRAVENOUS AS NEEDED
Status: DISCONTINUED | OUTPATIENT
Start: 2023-01-23 | End: 2023-01-24

## 2023-01-23 RX ORDER — ACETAMINOPHEN 650 MG/1
650 SUPPOSITORY RECTAL
Status: DISCONTINUED | OUTPATIENT
Start: 2023-01-23 | End: 2023-01-25 | Stop reason: HOSPADM

## 2023-01-23 RX ORDER — IBUPROFEN 200 MG
4 TABLET ORAL AS NEEDED
Status: DISCONTINUED | OUTPATIENT
Start: 2023-01-23 | End: 2023-01-24

## 2023-01-23 RX ORDER — ONDANSETRON 4 MG/1
4 TABLET, ORALLY DISINTEGRATING ORAL
Status: DISCONTINUED | OUTPATIENT
Start: 2023-01-23 | End: 2023-01-25 | Stop reason: HOSPADM

## 2023-01-23 RX ORDER — ONDANSETRON 4 MG/1
4 TABLET, ORALLY DISINTEGRATING ORAL
Status: COMPLETED | OUTPATIENT
Start: 2023-01-23 | End: 2023-01-23

## 2023-01-23 RX ORDER — TRAZODONE HYDROCHLORIDE 50 MG/1
50 TABLET ORAL
Status: DISCONTINUED | OUTPATIENT
Start: 2023-01-23 | End: 2023-01-24

## 2023-01-23 RX ORDER — LOSARTAN POTASSIUM 50 MG/1
100 TABLET ORAL DAILY
Status: DISCONTINUED | OUTPATIENT
Start: 2023-01-24 | End: 2023-01-23

## 2023-01-23 RX ORDER — ACETAMINOPHEN 325 MG/1
650 TABLET ORAL
Status: DISCONTINUED | OUTPATIENT
Start: 2023-01-23 | End: 2023-01-25 | Stop reason: HOSPADM

## 2023-01-23 RX ORDER — ONDANSETRON 4 MG/1
4 TABLET, ORALLY DISINTEGRATING ORAL
Qty: 9 TABLET | Refills: 0 | OUTPATIENT
Start: 2023-01-23 | End: 2023-01-26

## 2023-01-23 RX ORDER — DULOXETIN HYDROCHLORIDE 30 MG/1
60 CAPSULE, DELAYED RELEASE ORAL DAILY
Status: DISCONTINUED | OUTPATIENT
Start: 2023-01-24 | End: 2023-01-24

## 2023-01-23 RX ORDER — ENOXAPARIN SODIUM 100 MG/ML
40 INJECTION SUBCUTANEOUS DAILY
Status: DISCONTINUED | OUTPATIENT
Start: 2023-01-24 | End: 2023-01-25 | Stop reason: HOSPADM

## 2023-01-23 RX ORDER — HYDRALAZINE HYDROCHLORIDE 25 MG/1
25 TABLET, FILM COATED ORAL 3 TIMES DAILY
Status: DISCONTINUED | OUTPATIENT
Start: 2023-01-23 | End: 2023-01-25 | Stop reason: HOSPADM

## 2023-01-23 RX ORDER — BISMUTH SUBSALICYLATE 262 MG/15ML
30 LIQUID ORAL
Status: COMPLETED | OUTPATIENT
Start: 2023-01-23 | End: 2023-01-23

## 2023-01-23 RX ORDER — SODIUM CHLORIDE 0.9 % (FLUSH) 0.9 %
5-40 SYRINGE (ML) INJECTION AS NEEDED
Status: DISCONTINUED | OUTPATIENT
Start: 2023-01-23 | End: 2023-01-25 | Stop reason: HOSPADM

## 2023-01-23 RX ORDER — ROSUVASTATIN CALCIUM 5 MG/1
40 TABLET, COATED ORAL
Status: DISCONTINUED | OUTPATIENT
Start: 2023-01-23 | End: 2023-01-23

## 2023-01-23 RX ORDER — SODIUM CHLORIDE 9 MG/ML
100 INJECTION, SOLUTION INTRAVENOUS CONTINUOUS
Status: DISPENSED | OUTPATIENT
Start: 2023-01-23 | End: 2023-01-24

## 2023-01-23 RX ORDER — ONDANSETRON 2 MG/ML
4 INJECTION INTRAMUSCULAR; INTRAVENOUS
Status: DISCONTINUED | OUTPATIENT
Start: 2023-01-23 | End: 2023-01-25 | Stop reason: HOSPADM

## 2023-01-23 RX ORDER — POLYETHYLENE GLYCOL 3350 17 G/17G
17 POWDER, FOR SOLUTION ORAL DAILY PRN
Status: DISCONTINUED | OUTPATIENT
Start: 2023-01-23 | End: 2023-01-25 | Stop reason: HOSPADM

## 2023-01-23 RX ORDER — LABETALOL HCL 20 MG/4 ML
10 SYRINGE (ML) INTRAVENOUS
Status: DISCONTINUED | OUTPATIENT
Start: 2023-01-23 | End: 2023-01-25 | Stop reason: HOSPADM

## 2023-01-23 RX ORDER — AMLODIPINE BESYLATE 5 MG/1
10 TABLET ORAL DAILY
Status: DISCONTINUED | OUTPATIENT
Start: 2023-01-24 | End: 2023-01-25 | Stop reason: HOSPADM

## 2023-01-23 RX ORDER — SODIUM CHLORIDE 0.9 % (FLUSH) 0.9 %
5-40 SYRINGE (ML) INJECTION EVERY 8 HOURS
Status: DISCONTINUED | OUTPATIENT
Start: 2023-01-23 | End: 2023-01-25 | Stop reason: HOSPADM

## 2023-01-23 RX ORDER — INSULIN GLARGINE 100 [IU]/ML
20 INJECTION, SOLUTION SUBCUTANEOUS DAILY
Status: DISCONTINUED | OUTPATIENT
Start: 2023-01-24 | End: 2023-01-25 | Stop reason: HOSPADM

## 2023-01-23 RX ADMIN — SODIUM CHLORIDE 1000 ML: 9 INJECTION, SOLUTION INTRAVENOUS at 22:46

## 2023-01-23 RX ADMIN — SODIUM CHLORIDE, PRESERVATIVE FREE 10 ML: 5 INJECTION INTRAVENOUS at 22:46

## 2023-01-23 RX ADMIN — SODIUM CHLORIDE 10.8 UNITS/HR: 0.9 INJECTION INTRAVENOUS at 22:44

## 2023-01-23 RX ADMIN — ONDANSETRON 4 MG: 4 TABLET, ORALLY DISINTEGRATING ORAL at 19:39

## 2023-01-23 RX ADMIN — INSULIN HUMAN 10 UNITS: 100 INJECTION, SOLUTION PARENTERAL at 22:51

## 2023-01-23 RX ADMIN — Medication 30 ML: at 20:22

## 2023-01-24 LAB
ADMINISTERED INITIALS, ADMINIT: NORMAL
ANION GAP SERPL CALC-SCNC: 7 MMOL/L (ref 5–15)
BUN SERPL-MCNC: 13 MG/DL (ref 6–20)
BUN/CREAT SERPL: 12 (ref 12–20)
CA-I BLD-MCNC: 8.3 MG/DL (ref 8.5–10.1)
CHLORIDE SERPL-SCNC: 102 MMOL/L (ref 97–108)
CO2 SERPL-SCNC: 28 MMOL/L (ref 21–32)
CREAT SERPL-MCNC: 1.05 MG/DL (ref 0.7–1.3)
D50 ADMINISTERED, D50ADM: 0 ML
D50 ADMINISTERED, D50ADM: 16 ML
D50 ORDER, D50ORD: 0 ML
D50 ORDER, D50ORD: 16 ML
EST. AVERAGE GLUCOSE BLD GHB EST-MCNC: ABNORMAL MG/DL
GLUCOSE BLD STRIP.AUTO-MCNC: 176 MG/DL (ref 65–100)
GLUCOSE BLD STRIP.AUTO-MCNC: 177 MG/DL (ref 65–100)
GLUCOSE BLD STRIP.AUTO-MCNC: 210 MG/DL (ref 65–100)
GLUCOSE BLD STRIP.AUTO-MCNC: 218 MG/DL (ref 65–100)
GLUCOSE BLD STRIP.AUTO-MCNC: 227 MG/DL (ref 65–100)
GLUCOSE BLD STRIP.AUTO-MCNC: 369 MG/DL (ref 65–100)
GLUCOSE BLD STRIP.AUTO-MCNC: 389 MG/DL (ref 65–100)
GLUCOSE BLD STRIP.AUTO-MCNC: 403 MG/DL (ref 65–100)
GLUCOSE BLD STRIP.AUTO-MCNC: 441 MG/DL (ref 65–100)
GLUCOSE BLD STRIP.AUTO-MCNC: 445 MG/DL (ref 65–100)
GLUCOSE BLD STRIP.AUTO-MCNC: 59 MG/DL (ref 65–100)
GLUCOSE BLD STRIP.AUTO-MCNC: 82 MG/DL (ref 65–100)
GLUCOSE SERPL-MCNC: 103 MG/DL (ref 65–100)
GLUCOSE, GLC: 176 MG/DL
GLUCOSE, GLC: 177 MG/DL
GLUCOSE, GLC: 218 MG/DL
GLUCOSE, GLC: 227 MG/DL
GLUCOSE, GLC: 403 MG/DL
GLUCOSE, GLC: 441 MG/DL
GLUCOSE, GLC: 59 MG/DL
GLUCOSE, GLC: 82 MG/DL
HBA1C MFR BLD: >14 % (ref 4–5.6)
HIGH TARGET, HITG: 200 MG/DL
INSULIN ADMINSTERED, INSADM: 0 UNITS/HOUR
INSULIN ADMINSTERED, INSADM: 0.2 UNITS/HOUR
INSULIN ADMINSTERED, INSADM: 11.4 UNITS/HOUR
INSULIN ADMINSTERED, INSADM: 13.7 UNITS/HOUR
INSULIN ADMINSTERED, INSADM: 3.3 UNITS/HOUR
INSULIN ADMINSTERED, INSADM: 3.5 UNITS/HOUR
INSULIN ADMINSTERED, INSADM: 4.6 UNITS/HOUR
INSULIN ADMINSTERED, INSADM: 4.7 UNITS/HOUR
INSULIN ORDER, INSORD: 0 UNITS/HOUR
INSULIN ORDER, INSORD: 0.2 UNITS/HOUR
INSULIN ORDER, INSORD: 11.4 UNITS/HOUR
INSULIN ORDER, INSORD: 13.7 UNITS/HOUR
INSULIN ORDER, INSORD: 3.3 UNITS/HOUR
INSULIN ORDER, INSORD: 3.5 UNITS/HOUR
INSULIN ORDER, INSORD: 4.6 UNITS/HOUR
INSULIN ORDER, INSORD: 4.7 UNITS/HOUR
LOW TARGET, LOT: 150 MG/DL
MAGNESIUM SERPL-MCNC: 2.3 MG/DL (ref 1.6–2.4)
MINUTES UNTIL NEXT BG, NBG: 15 MIN
MINUTES UNTIL NEXT BG, NBG: 60 MIN
MRSA DNA SPEC QL NAA+PROBE: NOT DETECTED
MULTIPLIER, MUL: 0.01
MULTIPLIER, MUL: 0.02
MULTIPLIER, MUL: 0.02
MULTIPLIER, MUL: 0.03
MULTIPLIER, MUL: 0.04
MULTIPLIER, MUL: 0.04
ORDER INITIALS, ORDINIT: NORMAL
PERFORMED BY, TECHID: ABNORMAL
PERFORMED BY, TECHID: NORMAL
POTASSIUM SERPL-SCNC: ABNORMAL MMOL/L (ref 3.5–5.1)
SODIUM SERPL-SCNC: 137 MMOL/L (ref 136–145)

## 2023-01-24 PROCEDURE — 74011250636 HC RX REV CODE- 250/636: Performed by: INTERNAL MEDICINE

## 2023-01-24 PROCEDURE — 36415 COLL VENOUS BLD VENIPUNCTURE: CPT

## 2023-01-24 PROCEDURE — 74011000250 HC RX REV CODE- 250: Performed by: INTERNAL MEDICINE

## 2023-01-24 PROCEDURE — 65270000029 HC RM PRIVATE

## 2023-01-24 PROCEDURE — 82962 GLUCOSE BLOOD TEST: CPT

## 2023-01-24 PROCEDURE — 74011250637 HC RX REV CODE- 250/637: Performed by: INTERNAL MEDICINE

## 2023-01-24 PROCEDURE — 80048 BASIC METABOLIC PNL TOTAL CA: CPT

## 2023-01-24 PROCEDURE — 74011636637 HC RX REV CODE- 636/637: Performed by: INTERNAL MEDICINE

## 2023-01-24 PROCEDURE — 74011000258 HC RX REV CODE- 258: Performed by: INTERNAL MEDICINE

## 2023-01-24 PROCEDURE — 87641 MR-STAPH DNA AMP PROBE: CPT

## 2023-01-24 RX ORDER — INSULIN LISPRO 100 [IU]/ML
INJECTION, SOLUTION INTRAVENOUS; SUBCUTANEOUS EVERY 6 HOURS
Status: DISCONTINUED | OUTPATIENT
Start: 2023-01-24 | End: 2023-01-25 | Stop reason: HOSPADM

## 2023-01-24 RX ORDER — IBUPROFEN 200 MG
4 TABLET ORAL AS NEEDED
Status: DISCONTINUED | OUTPATIENT
Start: 2023-01-24 | End: 2023-01-25 | Stop reason: HOSPADM

## 2023-01-24 RX ORDER — DEXTROSE MONOHYDRATE 100 MG/ML
0-250 INJECTION, SOLUTION INTRAVENOUS AS NEEDED
Status: DISCONTINUED | OUTPATIENT
Start: 2023-01-24 | End: 2023-01-25 | Stop reason: HOSPADM

## 2023-01-24 RX ORDER — SODIUM CHLORIDE 9 MG/ML
100 INJECTION, SOLUTION INTRAVENOUS CONTINUOUS
Status: DISPENSED | OUTPATIENT
Start: 2023-01-24 | End: 2023-01-24

## 2023-01-24 RX ADMIN — SODIUM CHLORIDE 100 ML/HR: 9 INJECTION, SOLUTION INTRAVENOUS at 04:39

## 2023-01-24 RX ADMIN — SODIUM CHLORIDE 0.2 UNITS/HR: 0.9 INJECTION INTRAVENOUS at 05:37

## 2023-01-24 RX ADMIN — SODIUM CHLORIDE, PRESERVATIVE FREE 10 ML: 5 INJECTION INTRAVENOUS at 13:23

## 2023-01-24 RX ADMIN — SODIUM CHLORIDE, PRESERVATIVE FREE 10 ML: 5 INJECTION INTRAVENOUS at 21:09

## 2023-01-24 RX ADMIN — HYDRALAZINE HYDROCHLORIDE 25 MG: 25 TABLET, FILM COATED ORAL at 01:50

## 2023-01-24 RX ADMIN — ENOXAPARIN SODIUM 40 MG: 100 INJECTION SUBCUTANEOUS at 09:12

## 2023-01-24 RX ADMIN — DULOXETINE HYDROCHLORIDE 60 MG: 30 CAPSULE, DELAYED RELEASE ORAL at 09:12

## 2023-01-24 RX ADMIN — INSULIN LISPRO 15 UNITS: 100 INJECTION, SOLUTION INTRAVENOUS; SUBCUTANEOUS at 11:52

## 2023-01-24 RX ADMIN — SODIUM CHLORIDE 100 ML/HR: 9 INJECTION, SOLUTION INTRAVENOUS at 09:12

## 2023-01-24 RX ADMIN — HYDRALAZINE HYDROCHLORIDE 25 MG: 25 TABLET, FILM COATED ORAL at 21:08

## 2023-01-24 RX ADMIN — HYDRALAZINE HYDROCHLORIDE 25 MG: 25 TABLET, FILM COATED ORAL at 09:12

## 2023-01-24 RX ADMIN — SODIUM CHLORIDE 4.6 UNITS/HR: 0.9 INJECTION INTRAVENOUS at 03:13

## 2023-01-24 RX ADMIN — DEXTROSE MONOHYDRATE 100 ML: 100 INJECTION, SOLUTION INTRAVENOUS at 04:38

## 2023-01-24 RX ADMIN — SODIUM CHLORIDE 3.3 UNITS/HR: 0.9 INJECTION INTRAVENOUS at 06:44

## 2023-01-24 RX ADMIN — SODIUM CHLORIDE, PRESERVATIVE FREE 10 ML: 5 INJECTION INTRAVENOUS at 05:39

## 2023-01-24 RX ADMIN — HYDRALAZINE HYDROCHLORIDE 25 MG: 25 TABLET, FILM COATED ORAL at 16:09

## 2023-01-24 RX ADMIN — INSULIN GLARGINE 20 UNITS: 100 INJECTION, SOLUTION SUBCUTANEOUS at 11:51

## 2023-01-24 NOTE — PROGRESS NOTES
Hospitalist Progress Note    Daily Progress Note: 1/24/2023 3:00 PM    Assessment/Plan with MDM & Differential Considerations     Mai Valnecia is a 56 y.o. male with PMH of diabetes, hypertension, hyperlipidemia and psychiatric disorders. Poor compliance to medications. He presented to the ED today with chief complaint of nausea and vomiting started today afternoon. Also has watery diarrhea. Associated with chills without fever and generalized abdominal pain. Patient reports symptoms started since eating at [employer] and concerns for food poisoning. In the ED, noted tachycardic and hypertensive. No leukocytosis. However lab work revealed hyponatremia, hyperglycemia and JORGE. No metabolic acidosis or anion gap.  ------------------------------------------------    # HHS --> resolved. DM; non-compliance (A1C > 14%)  - S/p insulin gtt, IVF  - SSI  - Resume Lantus     # Pseudo-Hyponatremia    # JORGE; resolved    # Pancreatitis by Lipase  - Abd soft, NT  - Diet     # Essential hypertension   - Continue home medications: amlodipine, hydralazine.  - IV labetalol as needed    #Psychiatric disorder  - Continue home medications: PO duloxetine and trazodone as needed     # Social Determents of health: Low health literacy   - Living in hotel? DVT Prophylaxis: On AC  Code Status: Full Code  Care Plan discussed with: Nursing    Disposition: Transfer to floor. DC ~24-48  ----------------------------------------    Subjective: Feels ok. No active pain, n/v, sob.     Current Facility-Administered Medications   Medication Dose Route Frequency    0.9% sodium chloride infusion  100 mL/hr IntraVENous CONTINUOUS    insulin lispro (HUMALOG) injection   SubCUTAneous Q6H    glucose chewable tablet 16 g  4 Tablet Oral PRN    glucagon (GLUCAGEN) injection 1 mg  1 mg IntraMUSCular PRN    dextrose 10% infusion 0-250 mL  0-250 mL IntraVENous PRN    amLODIPine (NORVASC) tablet 10 mg  10 mg Oral DAILY    DULoxetine (CYMBALTA) capsule 60 mg  60 mg Oral DAILY    hydrALAZINE (APRESOLINE) tablet 25 mg  25 mg Oral TID    insulin glargine (LANTUS) injection 20 Units  20 Units SubCUTAneous DAILY    traZODone (DESYREL) tablet 50 mg  50 mg Oral QHS PRN    labetaloL (NORMODYNE;TRANDATE) 20 mg/4 mL (5 mg/mL) injection 10 mg  10 mg IntraVENous Q4H PRN    sodium chloride (NS) flush 5-40 mL  5-40 mL IntraVENous Q8H    sodium chloride (NS) flush 5-40 mL  5-40 mL IntraVENous PRN    acetaminophen (TYLENOL) tablet 650 mg  650 mg Oral Q6H PRN    Or    acetaminophen (TYLENOL) suppository 650 mg  650 mg Rectal Q6H PRN    polyethylene glycol (MIRALAX) packet 17 g  17 g Oral DAILY PRN    ondansetron (ZOFRAN ODT) tablet 4 mg  4 mg Oral Q8H PRN    Or    ondansetron (ZOFRAN) injection 4 mg  4 mg IntraVENous Q6H PRN    enoxaparin (LOVENOX) injection 40 mg  40 mg SubCUTAneous DAILY       Objective     Visit Vitals  BP (!) 154/94 (BP 1 Location: Right upper arm, BP Patient Position: At rest)   Pulse (!) 103   Temp 99.4 °F (37.4 °C)   Resp 17   Ht 5' 11\" (1.803 m)   Wt 72.6 kg (160 lb 0.9 oz)   SpO2 99%   BMI 22.32 kg/m²      O2 Device: None (Room air)    Temp (24hrs), Av.3 °F (36.8 °C), Min:97.6 °F (36.4 °C), Max:99.4 °F (37.4 °C)      701 - 1900  In: 1263.8 [P.O.:720; I.V.:543.8]  Out: 800 [Urine:800]  1901 -  0700  In: 1198.8 [I.V.:1198.8]  Out: -       PERTINENT PHYSICAL EXAM    Constitutional: NAD, Awake, conversant, comfortable    Neck: Supple     Cardiovascular: S1S2, no murmur; Tele: sinus    Respiratory:  CTA ant bilat; vesicular breath sounds, no overt wheeze     GI: Soft, NT; no guarding/rigidity; + bowel sounds    Musculoskeletal: Moving all extremities spontaneously; Neurological:  AxOx3; No new focal weakness; No overt tremors    Psychiatric: Appropriate mood; oriented, coherent/cogent    Skin: No new rash or significant discoloration     Vascular: Palpable pulses;  No edema      Data Review    Recent Results (from the past 24 hour(s))   CBC WITH AUTOMATED DIFF    Collection Time: 01/23/23  8:35 PM   Result Value Ref Range    WBC 4.0 (L) 4.1 - 11.1 K/uL    RBC 4.01 (L) 4.10 - 5.70 M/uL    HGB 13.2 12.1 - 17.0 g/dL    HCT 37.6 36.6 - 50.3 %    MCV 93.8 80.0 - 99.0 FL    MCH 32.9 26.0 - 34.0 PG    MCHC 35.1 30.0 - 36.5 g/dL    RDW 11.4 (L) 11.5 - 14.5 %    PLATELET 548 832 - 909 K/uL    MPV 11.4 8.9 - 12.9 FL    NRBC 0.0 0.0  WBC    ABSOLUTE NRBC 0.00 0.00 - 0.01 K/uL    NEUTROPHILS 65 32 - 75 %    LYMPHOCYTES 21 12 - 49 %    MONOCYTES 12 5 - 13 %    EOSINOPHILS 1 0 - 7 %    BASOPHILS 0 0 - 1 %    IMMATURE GRANULOCYTES 1 (H) 0 - 0.5 %    ABS. NEUTROPHILS 2.6 1.8 - 8.0 K/UL    ABS. LYMPHOCYTES 0.9 0.8 - 3.5 K/UL    ABS. MONOCYTES 0.5 0.0 - 1.0 K/UL    ABS. EOSINOPHILS 0.0 0.0 - 0.4 K/UL    ABS. BASOPHILS 0.0 0.0 - 0.1 K/UL    ABS. IMM. GRANS. 0.0 0.00 - 0.04 K/UL    DF AUTOMATED     METABOLIC PANEL, COMPREHENSIVE    Collection Time: 01/23/23  8:35 PM   Result Value Ref Range    Sodium 124 (L) 136 - 145 mmol/L    Potassium 4.7 3.5 - 5.1 mmol/L    Chloride 91 (L) 97 - 108 mmol/L    CO2 25 21 - 32 mmol/L    Anion gap 8 5 - 15 mmol/L    Glucose 981 (HH) 65 - 100 mg/dL    BUN 19 6 - 20 mg/dL    Creatinine 1.47 (H) 0.70 - 1.30 mg/dL    BUN/Creatinine ratio 13 12 - 20      eGFR 56 (L) >60 ml/min/1.73m2    Calcium 8.4 (L) 8.5 - 10.1 mg/dL    Bilirubin, total 0.5 0.2 - 1.0 mg/dL    AST (SGOT) 74 (H) 15 - 37 U/L    ALT (SGPT) 88 (H) 12 - 78 U/L    Alk.  phosphatase 414 (H) 45 - 117 U/L    Protein, total 7.1 6.4 - 8.2 g/dL    Albumin 3.1 (L) 3.5 - 5.0 g/dL    Globulin 4.0 2.0 - 4.0 g/dL    A-G Ratio 0.8 (L) 1.1 - 2.2     LIPASE    Collection Time: 01/23/23  8:35 PM   Result Value Ref Range    Lipase 1,153 (H) 73 - 393 U/L   PHOSPHORUS    Collection Time: 01/23/23  8:35 PM   Result Value Ref Range    Phosphorus 1.7 (L) 2.6 - 4.7 mg/dL   HEMOGLOBIN A1C WITH EAG    Collection Time: 01/23/23  8:35 PM   Result Value Ref Range    Hemoglobin A1c >14.0 (H) 4.0 - 5.6 %    Est. average glucose Cannot be calculated mg/dL   MAGNESIUM    Collection Time: 01/23/23  8:35 PM   Result Value Ref Range    Magnesium 2.3 1.6 - 2.4 mg/dL   GLUCOSTABILIZER    Collection Time: 01/23/23 10:42 PM   Result Value Ref Range    Glucose 600 mg/dL    Insulin order 10.8 units/hour    Insulin adminstered 10.8 units/hour    Multiplier 0.020     Low target 150 mg/dL    High target 200 mg/dL    D50 order 0.0 ml    D50 administered 0.00 ml    Minutes until next BG 60 min    Order initials DD     Administered initials DD    GLUCOSE, POC    Collection Time: 01/23/23 11:54 PM   Result Value Ref Range    Glucose (POC) >600 (HH) 65 - 100 mg/dL    Performed by Payton Cera    Collection Time: 01/23/23 11:54 PM   Result Value Ref Range    Glucose 601 mg/dL    Insulin order 16.2 units/hour    Insulin adminstered 16.2 units/hour    Multiplier 0.030     Low target 150 mg/dL    High target 200 mg/dL    D50 order 0.0 ml    D50 administered 0.00 ml    Minutes until next BG 60 min    Order initials dd     Administered initials dd    GLUCOSE, POC    Collection Time: 01/24/23 12:50 AM   Result Value Ref Range    Glucose (POC) 441 (H) 65 - 100 mg/dL    Performed by Payton Cera    Collection Time: 01/24/23 12:50 AM   Result Value Ref Range    Glucose 441 mg/dL    Insulin order 11.4 units/hour    Insulin adminstered 11.4 units/hour    Multiplier 0.030     Low target 150 mg/dL    High target 200 mg/dL    D50 order 0.0 ml    D50 administered 0.00 ml    Minutes until next BG 60 min    Order initials mg     Administered initials dd    GLUCOSE, POC    Collection Time: 01/24/23  1:54 AM   Result Value Ref Range    Glucose (POC) 403 (H) 65 - 100 mg/dL    Performed by Payton Cera    Collection Time: 01/24/23  1:54 AM   Result Value Ref Range    Glucose 403 mg/dL    Insulin order 13.7 units/hour    Insulin adminstered 13.7 units/hour    Multiplier 0.040     Low target 150 mg/dL    High target 200 mg/dL    D50 order 0.0 ml    D50 administered 0.00 ml    Minutes until next BG 60 min    Order initials DD     Administered initials DD    GLUCOSE, POC    Collection Time: 01/24/23  3:09 AM   Result Value Ref Range    Glucose (POC) 176 (H) 65 - 100 mg/dL    Performed by Jennings Ganser    Collection Time: 01/24/23  3:12 AM   Result Value Ref Range    Glucose 176 mg/dL    Insulin order 4.6 units/hour    Insulin adminstered 4.6 units/hour    Multiplier 0.040     Low target 150 mg/dL    High target 200 mg/dL    D50 order 0.0 ml    D50 administered 0.00 ml    Minutes until next BG 60 min    Order initials [address]     Administered initials [address]    METABOLIC PANEL, BASIC    Collection Time: 01/24/23  3:45 AM   Result Value Ref Range    Sodium 137 136 - 145 mmol/L    Potassium Hemolyzed, recollect requested 3.5 - 5.1 mmol/L    Chloride 102 97 - 108 mmol/L    CO2 28 21 - 32 mmol/L    Anion gap 7 5 - 15 mmol/L    Glucose 103 (H) 65 - 100 mg/dL    BUN 13 6 - 20 mg/dL    Creatinine 1.05 0.70 - 1.30 mg/dL    BUN/Creatinine ratio 12 12 - 20      eGFR >60 >60 ml/min/1.73m2    Calcium 8.3 (L) 8.5 - 10.1 mg/dL   MRSA SCREEN - PCR (NASAL)    Collection Time: 01/24/23  3:45 AM   Result Value Ref Range    MRSA by PCR, Nasal Not Detected Not Detected     GLUCOSE, POC    Collection Time: 01/24/23  4:24 AM   Result Value Ref Range    Glucose (POC) 59 (L) 65 - 100 mg/dL    Performed by Anjelica Gaston    Collection Time: 01/24/23  4:26 AM   Result Value Ref Range    Glucose 59 mg/dL    Insulin order 0.0 units/hour    Insulin adminstered 0.0 units/hour    Multiplier 0.020     Low target 150 mg/dL    High target 200 mg/dL    D50 order 16.0 ml    D50 administered 16.00 ml    Minutes until next BG 15 min    Order initials TA     Administered initials TA    GLUCOSE, POC    Collection Time: 01/24/23  5:34 AM   Result Value Ref Range    Glucose (POC) 82 65 - 100 mg/dL    Performed by Verna Tena    Collection Time: 01/24/23  5:36 AM   Result Value Ref Range    Glucose 82 mg/dL    Insulin order 0.2 units/hour    Insulin adminstered 0.2 units/hour    Multiplier 0.010     Low target 150 mg/dL    High target 200 mg/dL    D50 order 0.0 ml    D50 administered 0.00 ml    Minutes until next BG 60 min    Order initials TA     Administered initials TA    GLUCOSE, POC    Collection Time: 01/24/23  6:42 AM   Result Value Ref Range    Glucose (POC) 227 (H) 65 - 100 mg/dL    Performed by Verna Tena    Collection Time: 01/24/23  6:43 AM   Result Value Ref Range    Glucose 227 mg/dL    Insulin order 3.3 units/hour    Insulin adminstered 3.3 units/hour    Multiplier 0.020     Low target 150 mg/dL    High target 200 mg/dL    D50 order 0.0 ml    D50 administered 0.00 ml    Minutes until next BG 60 min    Order initials TA     Administered initials TA    GLUCOSE, POC    Collection Time: 01/24/23  7:46 AM   Result Value Ref Range    Glucose (POC) 218 (H) 65 - 100 mg/dL    Performed by Christianne Martinez    Collection Time: 01/24/23  7:47 AM   Result Value Ref Range    Glucose 218 mg/dL    Insulin order 4.7 units/hour    Insulin adminstered 4.7 units/hour    Multiplier 0.030     Low target 150 mg/dL    High target 200 mg/dL    D50 order 0.0 ml    D50 administered 0.00 ml    Minutes until next BG 60 min    Order initials LL     Administered initials LL    GLUCOSE, POC    Collection Time: 01/24/23  8:50 AM   Result Value Ref Range    Glucose (POC) 177 (H) 65 - 100 mg/dL    Performed by Marueen Reynolds    Collection Time: 01/24/23  9:02 AM   Result Value Ref Range    Glucose 177 mg/dL    Insulin order 3.5 units/hour    Insulin adminstered 3.5 units/hour    Multiplier 0.030     Low target 150 mg/dL    High target 200 mg/dL    D50 order 0.0 ml    D50 administered 0.00 ml    Minutes until next BG 60 min    Order initials LL Administered initials LL    GLUCOSE, POC    Collection Time: 01/24/23 11:20 AM   Result Value Ref Range    Glucose (POC) 369 (H) 65 - 100 mg/dL    Performed by Waldemar Rodriguez      ________________________________________  Dot Headings, MD

## 2023-01-24 NOTE — WOUND CARE
WCN consulted for \"admit with wounds\". Patient has healed surgical incisions r/t partial amputations of left and right 1st toes. Calluses noted on both plantar. Callus on left plantar at 5th metatarsal head is slightly tender. May warrant examination by Podiatry considering patient's history of osteomyelitis. Requested and received order from Dr. Jose Payne to consult Dr. Pino Estrada to examine plantar calluses and rule out possible wound formation. Order entered and informed Dr. Pino Estrada of consult via [employer]. No other wound or skin care needs noted at this time. Reconsult WCN if skin condition changes.

## 2023-01-24 NOTE — ED NOTES
TRANSFER - OUT REPORT:    Verbal report given to ICU RN  (name) on Camelia Hines  being transferred to ICU (unit) for routine progression of care       Report consisted of patients Situation, Background, Assessment and   Recommendations(SBAR). Information from the following report(s) SBAR, Kardex, ED Summary, and MAR was reviewed with the receiving nurse. Lines:   Peripheral IV 01/23/23 Distal;Left;Posterior Forearm (Active)       Peripheral IV 01/23/23 Right Antecubital (Active)        Opportunity for questions and clarification was provided.       Patient transported with:   Registered Nurse

## 2023-01-24 NOTE — ED NOTES
RN unable to get IV and lab work at this time due to patient not sitting still and yelling at nurse.

## 2023-01-24 NOTE — PROGRESS NOTES
Problem: Discharge Planning  Goal: *Discharge to safe environment  Outcome: Progressing Towards Goal  Goal: *Knowledge of medication management  Outcome: Progressing Towards Goal  Goal: *Knowledge of discharge instructions  Outcome: Progressing Towards Goal     Problem: Patient Education: Go to Patient Education Activity  Goal: Patient/Family Education  Outcome: Progressing Towards Goal     Problem: Patient Education: Go to Patient Education Activity  Goal: Patient/Family Education  Outcome: Progressing Towards Goal     Problem: HHS: Day 1  Goal: Off Pathway (Use only if patient is Off Pathway)  Outcome: Progressing Towards Goal  Goal: Activity/Safety  Outcome: Progressing Towards Goal  Goal: Consults, if ordered  Outcome: Progressing Towards Goal  Goal: Diagnostic Test/Procedures  Outcome: Progressing Towards Goal  Goal: Nutrition/Diet  Outcome: Progressing Towards Goal  Goal: Discharge Planning  Outcome: Progressing Towards Goal  Goal: Medications  Outcome: Progressing Towards Goal  Goal: Respiratory  Outcome: Progressing Towards Goal  Goal: Treatments/Interventions/Procedures  Outcome: Progressing Towards Goal  Goal: Psychosocial  Outcome: Progressing Towards Goal  Goal: *Blood glucose decreasing  Outcome: Progressing Towards Goal  Goal: *Potassium normalizing  Outcome: Progressing Towards Goal  Goal: *Adequate urinary output (equal to or greater than 30 milliliters/hour)  Outcome: Progressing Towards Goal  Goal: *Stable cardiac rhythm  Outcome: Progressing Towards Goal     Problem: HHS: Day 2  Goal: Off Pathway (Use only if patient is Off Pathway)  Outcome: Progressing Towards Goal  Goal: Activity/Safety  Outcome: Progressing Towards Goal  Goal: Diagnostic Test/Procedures  Outcome: Progressing Towards Goal  Goal: Nutrition/Diet  Outcome: Progressing Towards Goal  Goal: Discharge Planning  Outcome: Progressing Towards Goal  Goal: Medications  Outcome: Progressing Towards Goal  Goal: Respiratory  Outcome: Progressing Towards Goal  Goal: Treatments/Interventions/Procedures  Outcome: Progressing Towards Goal  Goal: Psychosocial  Outcome: Progressing Towards Goal  Goal: *Blood glucose 80 to 180 mg/dl  Outcome: Progressing Towards Goal  Goal: *Electrolytes within normal limits (dehydration resolved)  Outcome: Progressing Towards Goal  Goal: *Demonstrates progressive activity  Outcome: Progressing Towards Goal  Goal: *Tolerating diet  Outcome: Progressing Towards Goal     Problem: HHS: Day 3  Goal: Off Pathway (Use only if patient is Off Pathway)  Outcome: Progressing Towards Goal  Goal: Activity/Safety  Outcome: Progressing Towards Goal  Goal: Diagnostic Test/Procedures  Outcome: Progressing Towards Goal  Goal: Nutrition/Diet  Outcome: Progressing Towards Goal  Goal: Discharge Planning  Outcome: Progressing Towards Goal  Goal: Medications  Outcome: Progressing Towards Goal  Goal: Treatments/Interventions/Procedures  Outcome: Progressing Towards Goal  Goal: Psychosocial  Outcome: Progressing Towards Goal     Problem: HHS: Discharge Outcomes  Goal: *Demonstrates progressive activity  Outcome: Progressing Towards Goal  Goal: *Describes follow-up/return visits to physicians  Outcome: Progressing Towards Goal  Goal: *Blood glucose at patient's target range  Outcome: Progressing Towards Goal  Goal: *Tolerating diet  Outcome: Progressing Towards Goal  Goal: *Verbalizes understanding and describes prescribed diet  Outcome: Progressing Towards Goal  Goal: *Verbalizes name, dosage, time, side effects, and number of days to continue medications  Outcome: Progressing Towards Goal  Goal: *Describes blood glucose goals, monitoring, sick day rules, hypo/hyperglycemia  Outcome: Progressing Towards Goal  Goal: *Describes available resources and support systems  Outcome: Progressing Towards Goal  Goal: *Demonstrates ability to self-administer insulin  Outcome: Progressing Towards Goal     Problem: Pressure Injury - Risk of  Goal: *Prevention of pressure injury  Description: Document Jae Scale and appropriate interventions in the flowsheet. Outcome: Progressing Towards Goal  Note: Pressure Injury Interventions:  Sensory Interventions: Assess changes in LOC, Minimize linen layers         Activity Interventions: Pressure redistribution bed/mattress(bed type)    Mobility Interventions: HOB 30 degrees or less, Pressure redistribution bed/mattress (bed type)    Nutrition Interventions: Document food/fluid/supplement intake    Friction and Shear Interventions: Minimize layers, Apply protective barrier, creams and emollients                Problem: Patient Education:  Go to Education Activity  Goal: Patient/Family Education  Outcome: Progressing Towards Goal     Problem: Risk for Spread of Infection  Goal: Prevent transmission of infectious organism to others  Description: Prevent the transmission of infectious organisms to other patients, staff members, and visitors.   Outcome: Progressing Towards Goal     Problem: Patient Education: Go to Patient Education Activity  Goal: Patient/Family Education  Outcome: Progressing Towards Goal

## 2023-01-24 NOTE — H&P
History and Physical    Patient: Tracee Noriega MRN: [id number]  SSN: [id number]    YOB: 1965  Age: 56 y.o. Sex: male      Subjective:      Tracee Noriega is a 56 y.o. male with PMH of diabetes, hypertension, hyperlipidemia and psychiatric disorders. Poor compliance to medications. He presented to the ED today with chief complaint of nausea and vomiting started today afternoon. Also has watery diarrhea. Associated with chills without fever and generalized abdominal pain. Patient reports symptoms started since eating at [employer] and concerns for food poisoning. In the ED, noted tachycardic and hypertensive. No leukocytosis. However lab work revealed hyponatremia, hyperglycemia and JORGE. No metabolic acidosis or anion gap. Chart review: none    Past Medical History:   Diagnosis Date    Diabetes ([facility] [address]     Hepatitis C infection     Hypertension     Psychiatric disorder      History reviewed. No pertinent family history. Social History     Tobacco Use    Smoking status: Every Day     Packs/day: 1.00     Types: Cigarettes    Smokeless tobacco: Never   Substance Use Topics    Alcohol use: Yes     Comment: 2 times a week         Objective:     Physical Exam:   General: Fatigued looking, cooperative, no distress  Eye: conjunctivae/corneas clear. PERRL, EOM's intact. Throat and Neck: normal and no erythema or exudates noted. No mass   Lung: clear to auscultation bilaterally  Heart: regular rate and rhythm,   Abdomen: soft, non-tender. Bowel sounds normal. No masses,  Extremities:  able to move all extremities normal, atraumatic  Skin: Normal.  Neurologic: AOx3. Motor function and sensation grossly intact. Psychiatric: non focal    Most recent lab work and imaging results reviewed in EMR. Assessment and plan:   # HHS  - IV Insulin ggt per protocol  - Fluid: NS @ 100 cc/h.   - BNP q4h and POC glucose every hour.   - Potassium 4.7, continue to monitor.  - Possible infection: Gastroenteritis  - NPO for now. Consider advance diet after BS controlled. # Hyponatremia  - Na 138 after corrected with hyperglycemia  - IVF infusion and monitor Na. # JORGE  -Likely secondary to above  -IVF resuscitation, continue to monitor     # Essential hypertension   - Continue home medications. PO amlodipine, hydralazine.  - IV labetalol as needed    # Diabetes  - Hold home oral medications. - Resume home dose lantus 20u qHS. Has been noncompliant at home  - POC + correctional insulin   - Check HbA1c     #Psychiatric disorder  - Continue home medications. PO duloxetine and trazodone as needed    # Social Determents of health: Low health literacy   -Diabetes education to ensure compliance to insulin at home. # Full code by default, need further clarification    # Medication reconciliation: Medication list reviewed on Epic and/or outside documentation. Not reviewed with patient. However, medication reconciliation incomplete, appreciate assistance from pharmacy or nursing staff.     Signed By: Valerio Negron MD     January 23, 2023

## 2023-01-24 NOTE — PROGRESS NOTES
Problem: Discharge Planning  Goal: *Discharge to safe environment  Outcome: Progressing Towards Goal  Goal: *Knowledge of medication management  Outcome: Progressing Towards Goal  Goal: *Knowledge of discharge instructions  Outcome: Progressing Towards Goal     Problem: Patient Education: Go to Patient Education Activity  Goal: Patient/Family Education  Outcome: Progressing Towards Goal     Problem: Patient Education: Go to Patient Education Activity  Goal: Patient/Family Education  Outcome: Progressing Towards Goal     Problem: HHS: Day 1  Goal: Off Pathway (Use only if patient is Off Pathway)  Outcome: Progressing Towards Goal  Goal: Activity/Safety  Outcome: Progressing Towards Goal  Goal: Consults, if ordered  Outcome: Progressing Towards Goal  Goal: Diagnostic Test/Procedures  Outcome: Progressing Towards Goal  Goal: Nutrition/Diet  Outcome: Progressing Towards Goal  Goal: Discharge Planning  Outcome: Progressing Towards Goal  Goal: Medications  Outcome: Progressing Towards Goal  Goal: Respiratory  Outcome: Progressing Towards Goal  Goal: Treatments/Interventions/Procedures  Outcome: Progressing Towards Goal  Goal: Psychosocial  Outcome: Progressing Towards Goal  Goal: *Blood glucose decreasing  Outcome: Progressing Towards Goal  Goal: *Potassium normalizing  Outcome: Progressing Towards Goal  Goal: *Adequate urinary output (equal to or greater than 30 milliliters/hour)  Outcome: Progressing Towards Goal  Goal: *Stable cardiac rhythm  Outcome: Progressing Towards Goal     Problem: HHS: Day 2  Goal: Off Pathway (Use only if patient is Off Pathway)  Outcome: Progressing Towards Goal  Goal: Activity/Safety  Outcome: Progressing Towards Goal  Goal: Diagnostic Test/Procedures  Outcome: Progressing Towards Goal  Goal: Nutrition/Diet  Outcome: Progressing Towards Goal  Goal: Discharge Planning  Outcome: Progressing Towards Goal  Goal: Medications  Outcome: Progressing Towards Goal  Goal: Respiratory  Outcome: Progressing Towards Goal  Goal: Treatments/Interventions/Procedures  Outcome: Progressing Towards Goal  Goal: Psychosocial  Outcome: Progressing Towards Goal  Goal: *Blood glucose 80 to 180 mg/dl  Outcome: Progressing Towards Goal  Goal: *Electrolytes within normal limits (dehydration resolved)  Outcome: Progressing Towards Goal  Goal: *Demonstrates progressive activity  Outcome: Progressing Towards Goal  Goal: *Tolerating diet  Outcome: Progressing Towards Goal     Problem: HHS: Day 3  Goal: Off Pathway (Use only if patient is Off Pathway)  Outcome: Progressing Towards Goal  Goal: Activity/Safety  Outcome: Progressing Towards Goal  Goal: Diagnostic Test/Procedures  Outcome: Progressing Towards Goal  Goal: Nutrition/Diet  Outcome: Progressing Towards Goal  Goal: Discharge Planning  Outcome: Progressing Towards Goal  Goal: Medications  Outcome: Progressing Towards Goal  Goal: Treatments/Interventions/Procedures  Outcome: Progressing Towards Goal  Goal: Psychosocial  Outcome: Progressing Towards Goal     Problem: HHS: Discharge Outcomes  Goal: *Demonstrates progressive activity  Outcome: Progressing Towards Goal  Goal: *Describes follow-up/return visits to physicians  Outcome: Progressing Towards Goal  Goal: *Blood glucose at patient's target range  Outcome: Progressing Towards Goal  Goal: *Tolerating diet  Outcome: Progressing Towards Goal  Goal: *Verbalizes understanding and describes prescribed diet  Outcome: Progressing Towards Goal  Goal: *Verbalizes name, dosage, time, side effects, and number of days to continue medications  Outcome: Progressing Towards Goal  Goal: *Describes blood glucose goals, monitoring, sick day rules, hypo/hyperglycemia  Outcome: Progressing Towards Goal  Goal: *Describes available resources and support systems  Outcome: Progressing Towards Goal  Goal: *Demonstrates ability to self-administer insulin  Outcome: Progressing Towards Goal     Problem: HHS: Day 1  Goal: Off Pathway (Use only if patient is Off Pathway)  Outcome: Progressing Towards Goal     Problem: HHS: Day 1  Goal: *Potassium normalizing  Outcome: Progressing Towards Goal     Problem: HHS: Day 2  Goal: Discharge Planning  Outcome: Progressing Towards Goal     Problem: HHS: Day 3  Goal: Nutrition/Diet  Outcome: Progressing Towards Goal

## 2023-01-24 NOTE — PROGRESS NOTES
Reason for Admission:  Hyperosmolar Hyperglycemic State                     RUR Score:   11%                  Plan for utilizing home health:  Not at this time        PCP: First and Last name:  None     Name of Practice:    Are you a current patient: Yes/No:    Approximate date of last visit:    Can you participate in a virtual visit with your PCP:                     Current Advanced Directive/Advance Care Plan: Full Code      Healthcare Decision Maker:   Click here to complete [address] including selection of the [address] Relationship (ie \"Primary\")      Kunal Knight, friend, [phone], Pt stated that giovanny can make decision for him should he not be able to. Transition of Care Plan:       Met f/f with Pt, he confirmed that the information on the face sheet is correct. Pt stated that he lives by himself. Pt stated no HH, no DME and independent with  ADL    Pt stated that he uses [address] stated that he will need a Medicaid Transport home when he is D/C. CM dispo: home with no needs as of this time.

## 2023-01-24 NOTE — ED PROVIDER NOTES
[facility] EMERGENCY DEPT  EMERGENCY DEPARTMENT HISTORY AND PHYSICAL EXAM      Date: 1/23/2023  Patient Name: Darin Favre  MRN: [id number]  [place] 1965  Date of evaluation: 1/23/2023  Provider: Adriel Lipscomb MD   Note Started: 9:43 PM 1/23/23    HISTORY OF PRESENT ILLNESS     Chief Complaint   Patient presents with    Vomiting       History Provided By: Patient    HPI: Darin Favre, 56 y.o. male presents for evaluation of vomiting. Symptoms started after eating at [facility].  He notes that others at meals the earlier today were also sick. Is also developed diarrhea. Slight epigastric abdominal pain associated with vomiting. Patient also reports he feels very thirsty. He has been relatively well recently with no recent sick symptoms, no changes to his medications, no doses most of his insulin. No fevers or chills, no chest pain or dyspnea    PAST MEDICAL HISTORY   Past Medical History:  Past Medical History:   Diagnosis Date    [facility]     Hepatitis C infection     Hypertension     Psychiatric disorder        Past Surgical History:  No past surgical history on file. Family History:  History reviewed. No pertinent family history. Social History:  Social History     Tobacco Use    Smoking status: Every Day     Packs/day: 1.00     Types: Cigarettes    Smokeless tobacco: Never   Substance Use Topics    Alcohol use: Yes     Comment: 2 times a week     Drug use: Yes     Frequency: 7.0 times per week     Types: Cocaine     Comment: everyday for over one month       Allergies:  No Known Allergies    PCP: None    Current Meds:   Previous Medications    AMLODIPINE (NORVASC) 10 MG TABLET    Take 1 Tablet by mouth daily. DULOXETINE (CYMBALTA) 60 MG CAPSULE    Take 1 Cap by mouth daily. Indications: diabetic complication causing injury to some body nerves, major depressive disorder    HYDRALAZINE (APRESOLINE) 25 MG TABLET    Take 1 Tablet by mouth three (3) times daily.     INSULIN GLARGINE (LANTUS U-100 INSULIN) 100 UNIT/ML INJECTION    Take 20 units at bedtime    INSULIN LISPRO (HUMALOG) 100 UNIT/ML KWIKPEN    Take 6 units 3 times a day with meals    LOSARTAN (COZAAR) 100 MG TABLET    Take 1 Tablet by mouth daily. METHOCARBAMOL (ROBAXIN-750) 750 MG TABLET    Take 1 Tablet by mouth four (4) times daily as needed for Muscle Spasm(s). ROSUVASTATIN (CRESTOR) 40 MG TABLET    Take 1 Tab by mouth nightly. Indications: high cholesterol    TRAZODONE (DESYREL) 50 MG TABLET    Take 1 Tab by mouth nightly as needed for Sleep. Indications: insomnia associated with depression       REVIEW OF SYSTEMS   Review of Systems   Constitutional:  Negative for fever. HENT:  Negative for congestion. Respiratory:  Negative for cough and shortness of breath. Cardiovascular:  Negative for chest pain. Gastrointestinal:  Positive for diarrhea, nausea and vomiting. Negative for abdominal pain and constipation. Genitourinary:  Negative for dysuria. Musculoskeletal:  Negative for arthralgias and myalgias. Skin:  Negative for rash. Allergic/Immunologic: Negative for immunocompromised state. Neurological:  Negative for syncope. Psychiatric/Behavioral:  Negative for confusion. Positives and Pertinent negatives as per HPI. PHYSICAL EXAM     ED Triage Vitals [01/23/23 1936]   ED Encounter Vitals Group      BP (!) 166/94      Pulse (Heart Rate) (!) 111      Resp Rate 16      Temp 97.9 °F (36.6 °C)      Temp src       O2 Sat (%) 99 %      Weight 160 lb 0.9 oz      Height 5' 11\"     Physical Exam  Vitals reviewed. Constitutional:       General: He is not in acute distress. Appearance: He is not toxic-appearing. HENT:      Head: Normocephalic and atraumatic. Eyes:      Extraocular Movements: Extraocular movements intact. Pupils: Pupils are equal, round, and reactive to light. Cardiovascular:      Rate and Rhythm: Normal rate and regular rhythm. Heart sounds: Normal heart sounds.    Pulmonary: Effort: Pulmonary effort is normal.      Breath sounds: Normal breath sounds. Abdominal:      Palpations: Abdomen is soft. Tenderness: There is no abdominal tenderness. Musculoskeletal:      Right lower leg: No edema. Left lower leg: No edema. Skin:     General: Skin is warm and dry. Neurological:      General: No focal deficit present. Mental Status: He is alert. Psychiatric:         Mood and Affect: Mood normal.         Behavior: Behavior normal.          SCREENINGS               No data recorded        LAB, EKG AND DIAGNOSTIC RESULTS   Labs:  Recent Results (from the past 12 hour(s))   CBC WITH AUTOMATED DIFF    Collection Time: 01/23/23  8:35 PM   Result Value Ref Range    WBC 4.0 (L) 4.1 - 11.1 K/uL    RBC 4.01 (L) 4.10 - 5.70 M/uL    HGB 13.2 12.1 - 17.0 g/dL    HCT 37.6 36.6 - 50.3 %    MCV 93.8 80.0 - 99.0 FL    MCH 32.9 26.0 - 34.0 PG    MCHC 35.1 30.0 - 36.5 g/dL    RDW 11.4 (L) 11.5 - 14.5 %    PLATELET 218 369 - 716 K/uL    MPV 11.4 8.9 - 12.9 FL    NRBC 0.0 0.0  WBC    ABSOLUTE NRBC 0.00 0.00 - 0.01 K/uL    NEUTROPHILS 65 32 - 75 %    LYMPHOCYTES 21 12 - 49 %    MONOCYTES 12 5 - 13 %    EOSINOPHILS 1 0 - 7 %    BASOPHILS 0 0 - 1 %    IMMATURE GRANULOCYTES 1 (H) 0 - 0.5 %    ABS. NEUTROPHILS 2.6 1.8 - 8.0 K/UL    ABS. LYMPHOCYTES 0.9 0.8 - 3.5 K/UL    ABS. MONOCYTES 0.5 0.0 - 1.0 K/UL    ABS. EOSINOPHILS 0.0 0.0 - 0.4 K/UL    ABS. BASOPHILS 0.0 0.0 - 0.1 K/UL    ABS. IMM.  GRANS. 0.0 0.00 - 0.04 K/UL    DF AUTOMATED     METABOLIC PANEL, COMPREHENSIVE    Collection Time: 01/23/23  8:35 PM   Result Value Ref Range    Sodium 124 (L) 136 - 145 mmol/L    Potassium 4.7 3.5 - 5.1 mmol/L    Chloride 91 (L) 97 - 108 mmol/L    CO2 25 21 - 32 mmol/L    Anion gap 8 5 - 15 mmol/L    Glucose 981 (HH) 65 - 100 mg/dL    BUN 19 6 - 20 mg/dL    Creatinine 1.47 (H) 0.70 - 1.30 mg/dL    BUN/Creatinine ratio 13 12 - 20      eGFR 56 (L) >60 ml/min/1.73m2    Calcium 8.4 (L) 8.5 - 10.1 mg/dL Bilirubin, total 0.5 0.2 - 1.0 mg/dL    AST (SGOT) 74 (H) 15 - 37 U/L    ALT (SGPT) 88 (H) 12 - 78 U/L    Alk. phosphatase 414 (H) 45 - 117 U/L    Protein, total 7.1 6.4 - 8.2 g/dL    Albumin 3.1 (L) 3.5 - 5.0 g/dL    Globulin 4.0 2.0 - 4.0 g/dL    A-G Ratio 0.8 (L) 1.1 - 2.2     LIPASE    Collection Time: 01/23/23  8:35 PM   Result Value Ref Range    Lipase 1,153 (H) 73 - 393 U/L       Radiologic Studies:  Interpretation per the Radiologist below, if available at the time of this note:  No results found. PROCEDURES   Unless otherwise noted below, none  Performed by: Sera Mcnamara MD   Procedures        EMERGENCY DEPARTMENT COURSE and DIFFERENTIAL DIAGNOSIS/MDM   Vitals:    Vitals:    01/23/23 1936   BP: (!) 166/94   Pulse: (!) 111   Resp: 16   Temp: 97.9 °F (36.6 °C)   SpO2: 99%   Weight: 72.6 kg (160 lb 0.9 oz)   Height: 5' 11\" (1.803 m)        Patient was given the following medications:  Medications   sodium chloride 0.9 % bolus infusion 1,000 mL (has no administration in time range)   sodium chloride 0.9 % bolus infusion 1,000 mL (has no administration in time range)   insulin regular (NOVOLIN R, HUMULIN R) injection 10 Units (has no administration in time range)   ondansetron (ZOFRAN ODT) tablet 4 mg (4 mg Oral Given 1/23/23 1939)   bismuth subsalicylate (PEPTO-BISMOL) oral suspension 30 mL (30 mL Oral Given 1/23/23 2022)       CC/HPI Summary, DDx, ED Course, and Reassessment:   24-year-old male presents for evaluation of vomiting and diarrhea. Given symptom onset after recent male, suspect foodborne illness causing his gastroenteritis. Further differential includes diabetic emergency and will evaluate with CMP. Lipase to evaluate for pancreatitis. Abdomen is soft on exam so do not suspect acute surgical intra-abdominal emergency    ED Course as of 01/23/23 2143   Mon Jan 23, 2023   2141 Labs notable for hyperglycemia, normal CO2 do not suspect DKA. Lipase is also elevated.   Concern for pancreatitis with likely moderate to severe dehydration in the setting of hyperglycemia. We will treat with 2 L of IV fluid bolus and insulin. Admitting to medicine service. Serum awesome was sent to evaluate for possible HHS [BQ]      ED Course User Index  [BQ] Santosh Duvall MD           ED FINAL IMPRESSION     1. Idiopathic acute pancreatitis, unspecified complication status    2. Hyperglycemia          DISPOSITION/PLAN       Admit Note: Pt is being admitted by Dr. Rafi Whitlock. The results of their tests and reason(s) for their admission have been discussed with pt and/or available family. They convey agreement and understanding for the need to be admitted and for the admission diagnosis. I am the Primary Clinician of Record. Roxana Bustos MD (electronically signed)    (Please note that parts of this dictation were completed with voice recognition software. Quite often unanticipated grammatical, syntax, homophones, and other interpretive errors are inadvertently transcribed by the computer software. Please disregards these errors.  Please excuse any errors that have escaped final proofreading.)

## 2023-01-25 VITALS
RESPIRATION RATE: 19 BRPM | DIASTOLIC BLOOD PRESSURE: 101 MMHG | SYSTOLIC BLOOD PRESSURE: 171 MMHG | BODY MASS INDEX: 22.41 KG/M2 | WEIGHT: 160.05 LBS | HEART RATE: 99 BPM | TEMPERATURE: 98.6 F | HEIGHT: 71 IN | OXYGEN SATURATION: 100 %

## 2023-01-25 LAB
GLUCOSE BLD STRIP.AUTO-MCNC: 198 MG/DL (ref 65–100)
GLUCOSE BLD STRIP.AUTO-MCNC: 224 MG/DL (ref 65–100)
GLUCOSE BLD STRIP.AUTO-MCNC: 99 MG/DL (ref 65–100)
PERFORMED BY, TECHID: ABNORMAL
PERFORMED BY, TECHID: ABNORMAL
PERFORMED BY, TECHID: NORMAL

## 2023-01-25 PROCEDURE — 74011000250 HC RX REV CODE- 250: Performed by: INTERNAL MEDICINE

## 2023-01-25 PROCEDURE — 74011636637 HC RX REV CODE- 636/637: Performed by: INTERNAL MEDICINE

## 2023-01-25 PROCEDURE — 74011250636 HC RX REV CODE- 250/636: Performed by: INTERNAL MEDICINE

## 2023-01-25 PROCEDURE — 0HBNXZZ EXCISION OF LEFT FOOT SKIN, EXTERNAL APPROACH: ICD-10-PCS | Performed by: PODIATRIST

## 2023-01-25 PROCEDURE — 99222 1ST HOSP IP/OBS MODERATE 55: CPT | Performed by: PODIATRIST

## 2023-01-25 PROCEDURE — 11055 PARING/CUTG B9 HYPRKER LES 1: CPT | Performed by: PODIATRIST

## 2023-01-25 PROCEDURE — 74011250637 HC RX REV CODE- 250/637: Performed by: INTERNAL MEDICINE

## 2023-01-25 PROCEDURE — 82962 GLUCOSE BLOOD TEST: CPT

## 2023-01-25 RX ORDER — INSULIN LISPRO 100 [IU]/ML
INJECTION, SOLUTION INTRAVENOUS; SUBCUTANEOUS
Qty: 1 ML | Refills: 1 | Status: SHIPPED
Start: 2023-01-25

## 2023-01-25 RX ORDER — HYDRALAZINE HYDROCHLORIDE 25 MG/1
25 TABLET, FILM COATED ORAL 3 TIMES DAILY
Qty: 90 TABLET | Refills: 0 | Status: SHIPPED | OUTPATIENT
Start: 2023-01-25

## 2023-01-25 RX ORDER — SERTRALINE HYDROCHLORIDE 25 MG/1
25 TABLET, FILM COATED ORAL DAILY
Status: DISCONTINUED | OUTPATIENT
Start: 2023-01-25 | End: 2023-01-25 | Stop reason: HOSPADM

## 2023-01-25 RX ORDER — QUETIAPINE FUMARATE 25 MG/1
50 TABLET, FILM COATED ORAL
Status: DISCONTINUED | OUTPATIENT
Start: 2023-01-25 | End: 2023-01-25 | Stop reason: HOSPADM

## 2023-01-25 RX ORDER — INSULIN GLARGINE 100 [IU]/ML
20 INJECTION, SOLUTION SUBCUTANEOUS
Qty: 1 ML | Refills: 0 | Status: SHIPPED | OUTPATIENT
Start: 2023-01-25

## 2023-01-25 RX ORDER — AMLODIPINE BESYLATE 10 MG/1
10 TABLET ORAL DAILY
Qty: 30 TABLET | Refills: 0 | Status: SHIPPED | OUTPATIENT
Start: 2023-01-26

## 2023-01-25 RX ADMIN — ENOXAPARIN SODIUM 40 MG: 100 INJECTION SUBCUTANEOUS at 08:31

## 2023-01-25 RX ADMIN — INSULIN LISPRO 3 UNITS: 100 INJECTION, SOLUTION INTRAVENOUS; SUBCUTANEOUS at 08:31

## 2023-01-25 RX ADMIN — INSULIN GLARGINE 20 UNITS: 100 INJECTION, SOLUTION SUBCUTANEOUS at 08:31

## 2023-01-25 RX ADMIN — LABETALOL HYDROCHLORIDE 10 MG: 5 INJECTION, SOLUTION INTRAVENOUS at 08:34

## 2023-01-25 RX ADMIN — AMLODIPINE BESYLATE 10 MG: 5 TABLET ORAL at 08:32

## 2023-01-25 RX ADMIN — HYDRALAZINE HYDROCHLORIDE 25 MG: 25 TABLET, FILM COATED ORAL at 08:32

## 2023-01-25 RX ADMIN — INSULIN LISPRO 6 UNITS: 100 INJECTION, SOLUTION INTRAVENOUS; SUBCUTANEOUS at 11:57

## 2023-01-25 RX ADMIN — INSULIN LISPRO 15 UNITS: 100 INJECTION, SOLUTION INTRAVENOUS; SUBCUTANEOUS at 00:14

## 2023-01-25 RX ADMIN — SODIUM CHLORIDE, PRESERVATIVE FREE 10 ML: 5 INJECTION INTRAVENOUS at 05:22

## 2023-01-25 NOTE — PROGRESS NOTES
VSS. Patient given discharge instructions. Medications and follow-up appointments reviewed. IV and Telemetry removed. Case management, provider, and primary nurse aware of discharge. Discharge plan of care/case management plan validated with provider discharge order. Patient walked out with staff to bus stop.

## 2023-01-25 NOTE — DISCHARGE SUMMARY
Hospitalist Discharge Summary     Patient ID:    Osvaldo Lamas  [id number]  11 y.o.  1965    Admit date: 1/23/2023    Discharge date : 1/25/2023      Final Diagnoses & Acute Mgmt:  Osvaldo Lamas is a 56 y.o. male with PMH of diabetes, hypertension, hyperlipidemia and psychiatric disorders. Poor compliance to medications. He presented to the ED today with chief complaint of nausea and vomiting started today afternoon. Also has watery diarrhea. Associated with chills without fever and generalized abdominal pain. Patient reports symptoms started since eating at [employer] and concerns for food poisoning. In the ED, noted tachycardic and hypertensive. No leukocytosis. However lab work revealed hyponatremia, hyperglycemia and JORGE. No metabolic acidosis or anion gap.  ------------------------------------------------     # HHS --> resolved. DM; non-compliance (A1C > 14%)  - S/p insulin gtt, IVF  - SSI  - Resume Lantus & Humalog sliding scale     # Pseudo-Hyponatremia; resolved    # JORGE; resolved     # Pancreatitis by Lipase; resolved  - Abd soft, NT  - Diet     # Essential hypertension   - Continue home medications: amlodipine, hydralazine. #Psychiatric disorder: self-reported Bipolar Depression, PTSD  - Consult Psych; Dr. Raffaele Sanchez  - patient hasn't followed up with his therapist for a while     # Social Determents of health: Low health literacy   - Living in hotel     Disposition: DC. Advised need to follow-up with a PCP  ----------------------------------------     Subjective: Feels ok. No active pain, n/v, sob. Coherent. Lucid.     Discharge Medications    Current Discharge Medication List        START taking these medications    Details   insulin lispro (HUMALOG) 100 unit/mL injection INITIATE INSULIN CORRECTIVE PROTOCOL:  Very Insulin Resistant  For Blood Sugar (mg/dL) of:              Less than 150 =   0 units  150 -199 =   3 units  200 -249 =   6 units  250 -299 =   9 units  300 -349 =   12 units  350 and above =   15 units and Call Physician  Initiate Hypoglycemic protocol if blood glucose is <70 mg/dL. Fast Acting - Administer Immediately - or within 15 minutes of start of meal, if mealtime coverage. Qty: 1 mL, Refills: 1  Start date: 1/25/2023      ondansetron (ZOFRAN ODT) 4 mg disintegrating tablet Take 1 Tablet by mouth every eight (8) hours as needed for Nausea or Vomiting for up to 3 days. Qty: 9 Tablet, Refills: 0  Start date: 1/23/2023, End date: 1/26/2023           CONTINUE these medications which have CHANGED    Details   amLODIPine (NORVASC) 10 mg tablet Take 1 Tablet by mouth daily. Qty: 30 Tablet, Refills: 0  Start date: 1/26/2023      hydrALAZINE (APRESOLINE) 25 mg tablet Take 1 Tablet by mouth three (3) times daily. Qty: 90 Tablet, Refills: 0  Start date: 1/25/2023      insulin glargine (Lantus U-100 Insulin) 100 unit/mL injection 20 Units by SubCUTAneous route nightly. Qty: 1 mL, Refills: 0  Start date: 1/25/2023           STOP taking these medications       methocarbamoL (Robaxin-750) 750 mg tablet Comments:   Reason for Stopping:         insulin lispro (HUMALOG) 100 unit/mL kwikpen Comments:   Reason for Stopping:         losartan (COZAAR) 100 mg tablet Comments:   Reason for Stopping:         DULoxetine (CYMBALTA) 60 mg capsule Comments:   Reason for Stopping:         rosuvastatin (CRESTOR) 40 mg tablet Comments:   Reason for Stopping:         traZODone (DESYREL) 50 mg tablet Comments:   Reason for Stopping: Follow up Care    1. Needs PCP  2. Psych, Dr. Roxie Colon    Patient Follow Up Instructions: Activity: Activity as tolerated  Diet:  Diabetic Diet    Condition at Discharge:  Stable    Disposition  Home or Self Care    Code Status:  Full Code    Discharge Exam:  Patient seen and examined by me on discharge day.     Constitutional: NAD, Awake, conversant, comfortable    Neck: Supple     Cardiovascular: S1S2    Respiratory:  CTA ant bilat; vesicular breath sounds, no overt wheeze     GI: Soft, NT; no guarding/rigidity; + bowel sounds    Neurological:  AxOx3; No new focal weakness; No overt tremors    Psychiatric: Appropriate mood; oriented, coherent/cogent    Skin: No new rash or significant discoloration     Vascular: Palpable pulses;  No edema      CONSULTATIONS: Psychiatry    Significant Diagnostic Studies:   Recent Results (from the past 24 hour(s))   GLUCOSE, POC    Collection Time: 01/24/23  3:06 PM   Result Value Ref Range    Glucose (POC) 210 (H) 65 - 100 mg/dL    Performed by Kylah Marc, POC    Collection Time: 01/24/23  8:50 PM   Result Value Ref Range    Glucose (POC) 445 (H) 65 - 100 mg/dL    Performed by [address] [place] [address], POC    Collection Time: 01/24/23 11:50 PM   Result Value Ref Range    Glucose (POC) 389 (H) 65 - 100 mg/dL    Performed by [address] [place] [address], POC    Collection Time: 01/25/23  5:28 AM   Result Value Ref Range    Glucose (POC) 99 65 - 100 mg/dL    Performed by [address] [place] [address], POC    Collection Time: 01/25/23  8:01 AM   Result Value Ref Range    Glucose (POC) 198 (H) 65 - 100 mg/dL    Performed by Jessica Resendez, POC    Collection Time: 01/25/23 11:28 AM   Result Value Ref Range    Glucose (POC) 224 (H) 65 - 100 mg/dL    Performed by Neo Sotomayor      No orders to display       Total DC Time and Coordination of Care: 35 mins    Signed:  Tor Vital MD  1/25/2023  2:22 PM

## 2023-01-25 NOTE — CONSULTS
[facility] CONSULT NOTE    Subjective:         Date of Consultation: January 25, 2023     Referring Physician: Lana Irwin MD    Patient is a 56 y.o. male who is being seen for possible wound to the left foot. 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 Patient states that he is currently admitted following extreme hyperglycemia and resulting nausea and vomiting. He states he does not have follow-up with a podiatrist as outpatient. He denies any trauma. He denies of breaks skin. He denies any local or systemic signs of infection to his feet. He states he has suffered bilateral great toe amputations due to diabetic infections. He denies any other lower extremity complaints      Patient Active Problem List    Diagnosis Date Noted    Hyperosmolar hyperglycemic state (HHS) ([address] 01/23/2023    Acute sepsis ([address] 10/07/2021    Osteomyelitis of great toe of right foot ([address] 10/07/2021    Diabetic foot ulcer ([address] 06/22/2021    Severe sepsis ([address] 06/19/2021    Acute kidney failure ([address] 06/19/2021    Tachycardia 06/19/2021    Hyperglycemia due to type 2 diabetes mellitus ([address] 06/19/2021    Fever 06/19/2021    Hypertension 06/19/2021    Hyponatremia 06/19/2021    Hyperglycemia 06/19/2021    Osteomyelitis ([address] 04/02/2021    MDD (major depressive disorder), recurrent severe, without psychosis ([address] 01/12/2021    Major depression with psychotic features ([address] 01/11/2021     Past Medical History:   Diagnosis Date    Diabetes ([address]     Hepatitis C infection     Hypertension     Psychiatric disorder       No past surgical history on file. History reviewed. No pertinent family history.    Social History     Tobacco Use    Smoking status: Every Day     Packs/day: 1.00     Types: Cigarettes    Smokeless tobacco: Never   Substance Use Topics    Alcohol use: Yes     Comment: 2 times a week      Current Facility-Administered Medications   Medication Dose Route Frequency Provider Last Rate Last Admin    sertraline (ZOLOFT) tablet 25 mg  25 mg Oral DAILY Jose Guadalupe Friedman MD        QUEtiapine (SEROquel) tablet 50 mg  50 mg Oral QHS Jose Guadalupe Anaya MD        insulin lispro (HUMALOG) injection   SubCUTAneous Q6H Tad Given, MD   6 Units at 01/25/23 1157    glucose chewable tablet 16 g  4 Tablet Oral PRN Tad Given, MD        glucagon (GLUCAGEN) injection 1 mg  1 mg IntraMUSCular PRN Tad Given, MD        dextrose 10% infusion 0-250 mL  0-250 mL IntraVENous PRN Tad Given, MD        amLODIPine (NORVASC) tablet 10 mg  10 mg Oral DAILY Thad Portillo MD   10 mg at 01/25/23 2137    hydrALAZINE (APRESOLINE) tablet 25 mg  25 mg Oral TID Marielle Sturat MD   25 mg at 01/25/23 4411    insulin glargine (LANTUS) injection 20 Units  20 Units SubCUTAneous DAILY Marielle Stuart MD   20 Units at 01/25/23 0831    labetaloL (NORMODYNE;TRANDATE) 20 mg/4 mL (5 mg/mL) injection 10 mg  10 mg IntraVENous Q4H PRN Marielle Stuart MD   10 mg at 01/25/23 0834    sodium chloride (NS) flush 5-40 mL  5-40 mL IntraVENous Q8H Thad Portillo MD   10 mL at 01/25/23 0522    sodium chloride (NS) flush 5-40 mL  5-40 mL IntraVENous PRN Thad Portillo MD        acetaminophen (TYLENOL) tablet 650 mg  650 mg Oral Q6H PRN Thad Portillo MD        Or    acetaminophen (TYLENOL) suppository 650 mg  650 mg Rectal Q6H PRN Thad Portillo MD        polyethylene glycol (MIRALAX) packet 17 g  17 g Oral DAILY PRN Thad Portillo MD        ondansetron (ZOFRAN ODT) tablet 4 mg  4 mg Oral Q8H PRN Thad Portillo MD        Or    ondansetron (ZOFRAN) injection 4 mg  4 mg IntraVENous Q6H PRN Thad Portillo MD        enoxaparin (LOVENOX) injection 40 mg  40 mg SubCUTAneous DAILY Thad Portillo MD   40 mg at 01/25/23 0831      No Known Allergies     Review of Systems:    Constitutional: No fever, No chills, No sweats, No weakness, No fatigue  Respiratory: No shortness of breath, No cough, No sputum production, No hemoptysis, No wheezing, No cyanosis. Cardiovascular: No chest pain, No palpitations, No bradycardia, No tachycardia, No peripheral edema, No syncope. Gastrointestinal: No nausea, No vomiting, No diarrhea, No constipation, No heartburn, No abdominal pain. Endocrine: No excessive thirst, No polyuria, No cold intolerance, No heat intolerance, No excessive hunger. Immunologic: + immunocompromised, No recurrent fevers, + recurrent infections. Musculoskeletal: No back pain, No neck pain, No joint pain, No muscle pain, No claudication, + decreased range of motion, No trauma. Integumentary: No rash, No pruritus, No abrasions. Neurologic: Alert and oriented X4, No abnormal balance, No headache, No confusion, + numbness, No tingling. Psychiatric: + anxiety, + depression, + radha. Objective:     Patient Vitals for the past 8 hrs:   BP Temp Pulse Resp SpO2   23 0758 (!) 171/101 98.6 °F (37 °C) 99 19 100 %     Temp (24hrs), Av °F (37.2 °C), Min:98.2 °F (36.8 °C), Max:100.3 °F (37.9 °C)      Physical Exam:    GEN: Pt is AAOx4 and in NAD. No dressing noted to B/L LE. Girlfriend noted at [address] [place] [address] Avenue: Hyperkeratotic lesion noted sub left 5th met head. No breaks in skin. No proximal lymphatic streaking. No malodor or drainage noted  VASC: Pedal pulses (DP/PT) palpable to B/L LE. CFT<3sec to all digits of B/L LE. No pedal hair growth noted to the level of the digits for B/L LE. Skin temp is warm to warm from proximal to distal for B/L LE. Neg homans/mariama signs to B/L LE. No varicosities or telangectasias noted to B/L LE.  NEURO: Protective and epicritic sensations grossly absent to B/L LE  MSK: (-) POP. Previous bilateral great toe amputations noted.  Good muscle tone and bulk noted to B/L LE.  PSYCH: Cooperative with normal mood and affect    Lab Review:   Recent Results (from the past 24 hour(s))   GLUCOSE, POC    Collection Time: 23  3:06 PM Result Value Ref Range    Glucose (POC) 210 (H) 65 - 100 mg/dL    Performed by Kylah Marc, POC    Collection Time: 01/24/23  8:50 PM   Result Value Ref Range    Glucose (POC) 445 (H) 65 - 100 mg/dL    Performed by [address] [place] [address], POC    Collection Time: 01/24/23 11:50 PM   Result Value Ref Range    Glucose (POC) 389 (H) 65 - 100 mg/dL    Performed by [address] [place] [address], POC    Collection Time: 01/25/23  5:28 AM   Result Value Ref Range    Glucose (POC) 99 65 - 100 mg/dL    Performed by [address] [place] [address], POC    Collection Time: 01/25/23  8:01 AM   Result Value Ref Range    Glucose (POC) 198 (H) 65 - 100 mg/dL    Performed by Jessica Resendez, POC    Collection Time: 01/25/23 11:28 AM   Result Value Ref Range    Glucose (POC) 224 (H) 65 - 100 mg/dL    Performed by Neo Sotomayor        Impression:     Pre-ulcerative callus, left foot  Uncontrolled DM T2 with neuropathy  History of bilateral great toe amputations    Recommendation:     Patient seen and evaluated at bedside  - Current labs personally reviewed and findings dicussed with patient  - A sharp, excisional debridement was performed to a single hyperkeratotic lesion noted to the plantar aspect of the left foot with a 15 blade down to level of normal epidermal tissue. Patient tolerated well no dressing was needed  - Cont current pain/medical management  - Pt stable for DC from podiatry standpoint with outpatient follow up in our office  - We we will sign off at this time    Thank you for the consult! John Adams, [facility], [facility]  [address] [facility]  Grant Novak:  [phone]  F:  [phone]    Bonnie Espinoza available 24/7

## 2023-01-25 NOTE — PROGRESS NOTES
Problem: Discharge Planning  Goal: *Discharge to safe environment  Outcome: Progressing Towards Goal  Goal: *Knowledge of medication management  Outcome: Progressing Towards Goal  Goal: *Knowledge of discharge instructions  Outcome: Progressing Towards Goal     Problem: Patient Education: Go to Patient Education Activity  Goal: Patient/Family Education  Outcome: Progressing Towards Goal     Problem: Patient Education: Go to Patient Education Activity  Goal: Patient/Family Education  Outcome: Progressing Towards Goal     Problem: Pressure Injury - Risk of  Goal: *Prevention of pressure injury  Description: Document Jae Scale and appropriate interventions in the flowsheet.   Outcome: Progressing Towards Goal  Note: Pressure Injury Interventions:  Sensory Interventions: Assess changes in LOC, Minimize linen layers         Activity Interventions: Increase time out of bed    Mobility Interventions: Float heels, HOB 30 degrees or less    Nutrition Interventions: Document food/fluid/supplement intake    Friction and Shear Interventions: HOB 30 degrees or less

## 2023-01-25 NOTE — ROUTINE PROCESS
Bedside and Verbal shift change report given to Kem Honeycutt RN (oncoming nurse) by Amparo Victoria (offgoing nurse). Report included the following information SBAR, Kardex, and MAR.

## 2023-01-25 NOTE — PROGRESS NOTES
Patient will discharge home self care today after seen by psych. He lives at the [facility]. Patient will require medicaid cab at discharge. CM set up PCP appt with Dr. Joy Herrera 2/7/2023 @ 12noon. Patient is aware. Primary nurse aware. Discharge plan of care/case management plan validated with provider discharge order.
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
## 2023-02-24 NOTE — GROUP NOTE
Chronic health problem.  Has gotten knee injections in the past.  Tries to manage with lifestyle measures.  Weight is in obese category and likely contributing to pain.   [facility] GROUP DOCUMENTATION INDIVIDUAL Group Therapy Note Date: 1/14/2021 Group Start Time: 2000 Group End Time: 2030 Group Topic: Nursing [facility] 1 ADULT CHEM DEP Fátima Yoon RN 
 
[facility] GROUP DOCUMENTATION GROUP Group Therapy Note Attendees: 9 Attendance: Attended Patient's Goal:  Understanding medication and reflection Interventions/techniques: Informed, Validated, Provide feedback and Reinforced Follows Directions: Followed directions Interactions: Interacted appropriately Mental Status: Calm Behavior/appearance: Cooperative Goals Achieved: Able to reflect/comment on own behavior and Able to receive feedback Daniella Laurent RN

## 2023-05-15 ENCOUNTER — HOSPITAL ENCOUNTER (INPATIENT)
Facility: HOSPITAL | Age: 58
LOS: 6 days | Discharge: HOME HEALTH | DRG: 314 | End: 2023-05-21
Attending: EMERGENCY MEDICINE | Admitting: FAMILY MEDICINE
Payer: MEDICAID

## 2023-05-15 ENCOUNTER — APPOINTMENT (OUTPATIENT)
Facility: HOSPITAL | Age: 58
DRG: 314 | End: 2023-05-15
Payer: MEDICAID

## 2023-05-15 DIAGNOSIS — S98.132A AMPUTATION OF TOE OF LEFT FOOT (HCC): ICD-10-CM

## 2023-05-15 DIAGNOSIS — E10.621 DIABETIC ULCER OF TOE OF RIGHT FOOT ASSOCIATED WITH TYPE 1 DIABETES MELLITUS, UNSPECIFIED ULCER STAGE (HCC): Primary | ICD-10-CM

## 2023-05-15 DIAGNOSIS — L97.519 DIABETIC ULCER OF TOE OF RIGHT FOOT ASSOCIATED WITH TYPE 1 DIABETES MELLITUS, UNSPECIFIED ULCER STAGE (HCC): Primary | ICD-10-CM

## 2023-05-15 PROBLEM — E11.8 DIABETIC FOOT (HCC): Status: ACTIVE | Noted: 2023-05-15

## 2023-05-15 PROBLEM — L97.509 TYPE 1 DIABETES MELLITUS WITH DIABETIC TOE ULCER (HCC): Status: ACTIVE | Noted: 2023-05-15

## 2023-05-15 LAB
ALBUMIN SERPL-MCNC: 3.2 G/DL (ref 3.5–5)
ALBUMIN/GLOB SERPL: 0.6 (ref 1.1–2.2)
ALP SERPL-CCNC: 133 U/L (ref 45–117)
ALT SERPL-CCNC: 43 U/L (ref 12–78)
ANION GAP SERPL CALC-SCNC: 8 MMOL/L (ref 5–15)
AST SERPL W P-5'-P-CCNC: 42 U/L (ref 15–37)
BASOPHILS # BLD: 0 K/UL (ref 0–0.1)
BASOPHILS NFR BLD: 0 % (ref 0–1)
BILIRUB SERPL-MCNC: 0.7 MG/DL (ref 0.2–1)
BUN SERPL-MCNC: 21 MG/DL (ref 6–20)
BUN/CREAT SERPL: 17 (ref 12–20)
CA-I BLD-MCNC: 9.5 MG/DL (ref 8.5–10.1)
CHLORIDE SERPL-SCNC: 93 MMOL/L (ref 97–108)
CO2 SERPL-SCNC: 27 MMOL/L (ref 21–32)
CREAT SERPL-MCNC: 1.27 MG/DL (ref 0.7–1.3)
DIFFERENTIAL METHOD BLD: NORMAL
EOSINOPHIL # BLD: 0 K/UL (ref 0–0.4)
EOSINOPHIL NFR BLD: 0 % (ref 0–7)
ERYTHROCYTE [DISTWIDTH] IN BLOOD BY AUTOMATED COUNT: 11.5 % (ref 11.5–14.5)
GLOBULIN SER CALC-MCNC: 5 G/DL (ref 2–4)
GLUCOSE BLD STRIP.AUTO-MCNC: 436 MG/DL (ref 65–100)
GLUCOSE SERPL-MCNC: 480 MG/DL (ref 65–100)
HCT VFR BLD AUTO: 38.9 % (ref 36.6–50.3)
HGB BLD-MCNC: 13.6 G/DL (ref 12.1–17)
IMM GRANULOCYTES # BLD AUTO: 0 K/UL (ref 0–0.04)
IMM GRANULOCYTES NFR BLD AUTO: 0 % (ref 0–0.5)
LYMPHOCYTES # BLD: 2.1 K/UL (ref 0.8–3.5)
LYMPHOCYTES NFR BLD: 31 % (ref 12–49)
MCH RBC QN AUTO: 31.7 PG (ref 26–34)
MCHC RBC AUTO-ENTMCNC: 35 G/DL (ref 30–36.5)
MCV RBC AUTO: 90.7 FL (ref 80–99)
MONOCYTES # BLD: 0.8 K/UL (ref 0–1)
MONOCYTES NFR BLD: 11 % (ref 5–13)
NEUTS SEG # BLD: 4 K/UL (ref 1.8–8)
NEUTS SEG NFR BLD: 58 % (ref 32–75)
PERFORMED BY:: ABNORMAL
PLATELET # BLD AUTO: 217 K/UL (ref 150–400)
PMV BLD AUTO: 10.6 FL (ref 8.9–12.9)
POTASSIUM SERPL-SCNC: 4.7 MMOL/L (ref 3.5–5.1)
PROT SERPL-MCNC: 8.2 G/DL (ref 6.4–8.2)
RBC # BLD AUTO: 4.29 M/UL (ref 4.1–5.7)
SODIUM SERPL-SCNC: 128 MMOL/L (ref 136–145)
WBC # BLD AUTO: 6.9 K/UL (ref 4.1–11.1)

## 2023-05-15 PROCEDURE — 73660 X-RAY EXAM OF TOE(S): CPT

## 2023-05-15 PROCEDURE — 82962 GLUCOSE BLOOD TEST: CPT

## 2023-05-15 PROCEDURE — 83036 HEMOGLOBIN GLYCOSYLATED A1C: CPT

## 2023-05-15 PROCEDURE — 36415 COLL VENOUS BLD VENIPUNCTURE: CPT

## 2023-05-15 PROCEDURE — 2580000003 HC RX 258: Performed by: EMERGENCY MEDICINE

## 2023-05-15 PROCEDURE — 2500000003 HC RX 250 WO HCPCS: Performed by: EMERGENCY MEDICINE

## 2023-05-15 PROCEDURE — 2580000003 HC RX 258: Performed by: FAMILY MEDICINE

## 2023-05-15 PROCEDURE — 6370000000 HC RX 637 (ALT 250 FOR IP): Performed by: EMERGENCY MEDICINE

## 2023-05-15 PROCEDURE — 6370000000 HC RX 637 (ALT 250 FOR IP): Performed by: FAMILY MEDICINE

## 2023-05-15 PROCEDURE — 87040 BLOOD CULTURE FOR BACTERIA: CPT

## 2023-05-15 PROCEDURE — 6360000002 HC RX W HCPCS: Performed by: FAMILY MEDICINE

## 2023-05-15 PROCEDURE — 80053 COMPREHEN METABOLIC PANEL: CPT

## 2023-05-15 PROCEDURE — 87150 DNA/RNA AMPLIFIED PROBE: CPT

## 2023-05-15 PROCEDURE — 1100000000 HC RM PRIVATE

## 2023-05-15 PROCEDURE — 85025 COMPLETE CBC W/AUTO DIFF WBC: CPT

## 2023-05-15 PROCEDURE — 6360000002 HC RX W HCPCS: Performed by: EMERGENCY MEDICINE

## 2023-05-15 PROCEDURE — 99285 EMERGENCY DEPT VISIT HI MDM: CPT

## 2023-05-15 RX ORDER — INSULIN LISPRO 100 [IU]/ML
0-4 INJECTION, SOLUTION INTRAVENOUS; SUBCUTANEOUS NIGHTLY
Status: DISCONTINUED | OUTPATIENT
Start: 2023-05-15 | End: 2023-05-21

## 2023-05-15 RX ORDER — ACETAMINOPHEN 325 MG/1
650 TABLET ORAL EVERY 6 HOURS PRN
Status: DISCONTINUED | OUTPATIENT
Start: 2023-05-15 | End: 2023-05-21 | Stop reason: HOSPADM

## 2023-05-15 RX ORDER — ENOXAPARIN SODIUM 100 MG/ML
40 INJECTION SUBCUTANEOUS DAILY
Status: DISCONTINUED | OUTPATIENT
Start: 2023-05-15 | End: 2023-05-21 | Stop reason: HOSPADM

## 2023-05-15 RX ORDER — SODIUM CHLORIDE 9 MG/ML
INJECTION, SOLUTION INTRAVENOUS PRN
Status: DISCONTINUED | OUTPATIENT
Start: 2023-05-15 | End: 2023-05-21 | Stop reason: HOSPADM

## 2023-05-15 RX ORDER — ONDANSETRON 2 MG/ML
4 INJECTION INTRAMUSCULAR; INTRAVENOUS EVERY 6 HOURS PRN
Status: DISCONTINUED | OUTPATIENT
Start: 2023-05-15 | End: 2023-05-21 | Stop reason: HOSPADM

## 2023-05-15 RX ORDER — ONDANSETRON 4 MG/1
4 TABLET, ORALLY DISINTEGRATING ORAL EVERY 8 HOURS PRN
Status: DISCONTINUED | OUTPATIENT
Start: 2023-05-15 | End: 2023-05-21 | Stop reason: HOSPADM

## 2023-05-15 RX ORDER — AMLODIPINE BESYLATE 5 MG/1
10 TABLET ORAL
Status: COMPLETED | OUTPATIENT
Start: 2023-05-15 | End: 2023-05-15

## 2023-05-15 RX ORDER — LABETALOL HYDROCHLORIDE 5 MG/ML
20 INJECTION, SOLUTION INTRAVENOUS
Status: COMPLETED | OUTPATIENT
Start: 2023-05-15 | End: 2023-05-15

## 2023-05-15 RX ORDER — SODIUM CHLORIDE 0.9 % (FLUSH) 0.9 %
5-40 SYRINGE (ML) INJECTION EVERY 12 HOURS SCHEDULED
Status: DISCONTINUED | OUTPATIENT
Start: 2023-05-15 | End: 2023-05-19

## 2023-05-15 RX ORDER — SODIUM CHLORIDE 0.9 % (FLUSH) 0.9 %
5-40 SYRINGE (ML) INJECTION PRN
Status: DISCONTINUED | OUTPATIENT
Start: 2023-05-15 | End: 2023-05-21 | Stop reason: HOSPADM

## 2023-05-15 RX ORDER — ACETAMINOPHEN 650 MG/1
650 SUPPOSITORY RECTAL EVERY 6 HOURS PRN
Status: DISCONTINUED | OUTPATIENT
Start: 2023-05-15 | End: 2023-05-21 | Stop reason: HOSPADM

## 2023-05-15 RX ORDER — HYDRALAZINE HYDROCHLORIDE 25 MG/1
25 TABLET, FILM COATED ORAL
Status: COMPLETED | OUTPATIENT
Start: 2023-05-15 | End: 2023-05-15

## 2023-05-15 RX ORDER — SODIUM CHLORIDE 9 MG/ML
INJECTION, SOLUTION INTRAVENOUS CONTINUOUS
Status: DISCONTINUED | OUTPATIENT
Start: 2023-05-15 | End: 2023-05-21 | Stop reason: HOSPADM

## 2023-05-15 RX ORDER — INSULIN LISPRO 100 [IU]/ML
0-16 INJECTION, SOLUTION INTRAVENOUS; SUBCUTANEOUS
Status: DISCONTINUED | OUTPATIENT
Start: 2023-05-16 | End: 2023-05-21

## 2023-05-15 RX ORDER — DEXTROSE MONOHYDRATE 100 MG/ML
INJECTION, SOLUTION INTRAVENOUS CONTINUOUS PRN
Status: DISCONTINUED | OUTPATIENT
Start: 2023-05-15 | End: 2023-05-21 | Stop reason: HOSPADM

## 2023-05-15 RX ORDER — INSULIN GLARGINE 100 [IU]/ML
20 INJECTION, SOLUTION SUBCUTANEOUS EVERY MORNING
Status: DISCONTINUED | OUTPATIENT
Start: 2023-05-16 | End: 2023-05-16

## 2023-05-15 RX ORDER — INSULIN LISPRO 100 [IU]/ML
10 INJECTION, SOLUTION INTRAVENOUS; SUBCUTANEOUS ONCE
Status: COMPLETED | OUTPATIENT
Start: 2023-05-15 | End: 2023-05-15

## 2023-05-15 RX ORDER — AMLODIPINE BESYLATE 5 MG/1
10 TABLET ORAL DAILY
Status: DISCONTINUED | OUTPATIENT
Start: 2023-05-15 | End: 2023-05-21 | Stop reason: HOSPADM

## 2023-05-15 RX ORDER — POLYETHYLENE GLYCOL 3350 17 G/17G
17 POWDER, FOR SOLUTION ORAL DAILY PRN
Status: DISCONTINUED | OUTPATIENT
Start: 2023-05-15 | End: 2023-05-21 | Stop reason: HOSPADM

## 2023-05-15 RX ORDER — HYDRALAZINE HYDROCHLORIDE 25 MG/1
25 TABLET, FILM COATED ORAL 3 TIMES DAILY
Status: DISCONTINUED | OUTPATIENT
Start: 2023-05-15 | End: 2023-05-21 | Stop reason: HOSPADM

## 2023-05-15 RX ADMIN — LABETALOL HYDROCHLORIDE 20 MG: 5 INJECTION INTRAVENOUS at 10:57

## 2023-05-15 RX ADMIN — VANCOMYCIN HYDROCHLORIDE 1000 MG: 1 INJECTION, POWDER, LYOPHILIZED, FOR SOLUTION INTRAVENOUS at 11:28

## 2023-05-15 RX ADMIN — SODIUM CHLORIDE, PRESERVATIVE FREE 10 ML: 5 INJECTION INTRAVENOUS at 21:19

## 2023-05-15 RX ADMIN — AMLODIPINE BESYLATE 10 MG: 5 TABLET ORAL at 10:04

## 2023-05-15 RX ADMIN — INSULIN LISPRO 10 UNITS: 100 INJECTION, SOLUTION INTRAVENOUS; SUBCUTANEOUS at 21:17

## 2023-05-15 RX ADMIN — HYDRALAZINE HYDROCHLORIDE 25 MG: 25 TABLET, FILM COATED ORAL at 10:04

## 2023-05-15 RX ADMIN — HYDRALAZINE HYDROCHLORIDE 25 MG: 25 TABLET, FILM COATED ORAL at 20:55

## 2023-05-15 RX ADMIN — SODIUM CHLORIDE: 9 INJECTION, SOLUTION INTRAVENOUS at 20:55

## 2023-05-15 RX ADMIN — PIPERACILLIN AND TAZOBACTAM 4500 MG: 4; .5 INJECTION, POWDER, LYOPHILIZED, FOR SOLUTION INTRAVENOUS at 10:48

## 2023-05-15 RX ADMIN — ENOXAPARIN SODIUM 40 MG: 100 INJECTION SUBCUTANEOUS at 20:55

## 2023-05-15 ASSESSMENT — PAIN - FUNCTIONAL ASSESSMENT: PAIN_FUNCTIONAL_ASSESSMENT: 0-10

## 2023-05-15 ASSESSMENT — LIFESTYLE VARIABLES
HOW OFTEN DO YOU HAVE A DRINK CONTAINING ALCOHOL: NEVER
HOW MANY STANDARD DRINKS CONTAINING ALCOHOL DO YOU HAVE ON A TYPICAL DAY: PATIENT DOES NOT DRINK

## 2023-05-15 ASSESSMENT — PAIN SCALES - GENERAL: PAINLEVEL_OUTOF10: 10

## 2023-05-15 ASSESSMENT — PAIN DESCRIPTION - LOCATION: LOCATION: TOE (COMMENT WHICH ONE)

## 2023-05-16 LAB
ALBUMIN SERPL-MCNC: 2.7 G/DL (ref 3.5–5)
ALBUMIN/GLOB SERPL: 0.6 (ref 1.1–2.2)
ALP SERPL-CCNC: 97 U/L (ref 45–117)
ALT SERPL-CCNC: 37 U/L (ref 12–78)
ANION GAP SERPL CALC-SCNC: 2 MMOL/L (ref 5–15)
AST SERPL W P-5'-P-CCNC: 28 U/L (ref 15–37)
BASOPHILS # BLD: 0 K/UL (ref 0–0.1)
BASOPHILS NFR BLD: 0 % (ref 0–1)
BILIRUB SERPL-MCNC: 0.4 MG/DL (ref 0.2–1)
BUN SERPL-MCNC: 21 MG/DL (ref 6–20)
BUN/CREAT SERPL: 17 (ref 12–20)
CA-I BLD-MCNC: 8.8 MG/DL (ref 8.5–10.1)
CHLORIDE SERPL-SCNC: 103 MMOL/L (ref 97–108)
CO2 SERPL-SCNC: 26 MMOL/L (ref 21–32)
CREAT SERPL-MCNC: 1.23 MG/DL (ref 0.7–1.3)
CRP SERPL-MCNC: 0.42 MG/DL (ref 0–0.6)
DIFFERENTIAL METHOD BLD: ABNORMAL
EOSINOPHIL # BLD: 0.1 K/UL (ref 0–0.4)
EOSINOPHIL NFR BLD: 1 % (ref 0–7)
ERYTHROCYTE [DISTWIDTH] IN BLOOD BY AUTOMATED COUNT: 11.3 % (ref 11.5–14.5)
ERYTHROCYTE [SEDIMENTATION RATE] IN BLOOD: 53 MM/HR (ref 0–20)
EST. AVERAGE GLUCOSE BLD GHB EST-MCNC: 338 MG/DL
GLOBULIN SER CALC-MCNC: 4.7 G/DL (ref 2–4)
GLUCOSE BLD STRIP.AUTO-MCNC: 185 MG/DL (ref 65–100)
GLUCOSE BLD STRIP.AUTO-MCNC: 272 MG/DL (ref 65–100)
GLUCOSE BLD STRIP.AUTO-MCNC: 280 MG/DL (ref 65–100)
GLUCOSE BLD STRIP.AUTO-MCNC: 347 MG/DL (ref 65–100)
GLUCOSE BLD STRIP.AUTO-MCNC: 383 MG/DL (ref 65–100)
GLUCOSE SERPL-MCNC: 291 MG/DL (ref 65–100)
HBA1C MFR BLD: 13.4 % (ref 4–5.6)
HCT VFR BLD AUTO: 36.1 % (ref 36.6–50.3)
HGB BLD-MCNC: 12.4 G/DL (ref 12.1–17)
IMM GRANULOCYTES # BLD AUTO: 0 K/UL (ref 0–0.04)
IMM GRANULOCYTES NFR BLD AUTO: 0 % (ref 0–0.5)
LYMPHOCYTES # BLD: 2.2 K/UL (ref 0.8–3.5)
LYMPHOCYTES NFR BLD: 28 % (ref 12–49)
MCH RBC QN AUTO: 31.6 PG (ref 26–34)
MCHC RBC AUTO-ENTMCNC: 34.3 G/DL (ref 30–36.5)
MCV RBC AUTO: 91.9 FL (ref 80–99)
MONOCYTES # BLD: 0.8 K/UL (ref 0–1)
MONOCYTES NFR BLD: 10 % (ref 5–13)
NEUTS SEG # BLD: 4.7 K/UL (ref 1.8–8)
NEUTS SEG NFR BLD: 61 % (ref 32–75)
NRBC # BLD: 0 K/UL (ref 0–0.01)
NRBC BLD-RTO: 0 PER 100 WBC
PERFORMED BY:: ABNORMAL
PLATELET # BLD AUTO: 227 K/UL (ref 150–400)
PMV BLD AUTO: 10.7 FL (ref 8.9–12.9)
POTASSIUM SERPL-SCNC: 4 MMOL/L (ref 3.5–5.1)
PROCALCITONIN SERPL-MCNC: 0.2 NG/ML
PROT SERPL-MCNC: 7.4 G/DL (ref 6.4–8.2)
RBC # BLD AUTO: 3.93 M/UL (ref 4.1–5.7)
SODIUM SERPL-SCNC: 131 MMOL/L (ref 136–145)
WBC # BLD AUTO: 7.8 K/UL (ref 4.1–11.1)

## 2023-05-16 PROCEDURE — 87070 CULTURE OTHR SPECIMN AEROBIC: CPT

## 2023-05-16 PROCEDURE — 80053 COMPREHEN METABOLIC PANEL: CPT

## 2023-05-16 PROCEDURE — 2580000003 HC RX 258: Performed by: FAMILY MEDICINE

## 2023-05-16 PROCEDURE — 6360000002 HC RX W HCPCS: Performed by: FAMILY MEDICINE

## 2023-05-16 PROCEDURE — 82962 GLUCOSE BLOOD TEST: CPT

## 2023-05-16 PROCEDURE — 85652 RBC SED RATE AUTOMATED: CPT

## 2023-05-16 PROCEDURE — 84145 PROCALCITONIN (PCT): CPT

## 2023-05-16 PROCEDURE — 6360000002 HC RX W HCPCS: Performed by: INTERNAL MEDICINE

## 2023-05-16 PROCEDURE — 85025 COMPLETE CBC W/AUTO DIFF WBC: CPT

## 2023-05-16 PROCEDURE — 36415 COLL VENOUS BLD VENIPUNCTURE: CPT

## 2023-05-16 PROCEDURE — 99223 1ST HOSP IP/OBS HIGH 75: CPT | Performed by: INTERNAL MEDICINE

## 2023-05-16 PROCEDURE — 87205 SMEAR GRAM STAIN: CPT

## 2023-05-16 PROCEDURE — 2580000003 HC RX 258: Performed by: INTERNAL MEDICINE

## 2023-05-16 PROCEDURE — 86140 C-REACTIVE PROTEIN: CPT

## 2023-05-16 PROCEDURE — 1100000000 HC RM PRIVATE

## 2023-05-16 PROCEDURE — 87077 CULTURE AEROBIC IDENTIFY: CPT

## 2023-05-16 PROCEDURE — 6370000000 HC RX 637 (ALT 250 FOR IP): Performed by: FAMILY MEDICINE

## 2023-05-16 PROCEDURE — 87186 SC STD MICRODIL/AGAR DIL: CPT

## 2023-05-16 RX ORDER — INSULIN GLARGINE 100 [IU]/ML
20 INJECTION, SOLUTION SUBCUTANEOUS 2 TIMES DAILY
Status: DISCONTINUED | OUTPATIENT
Start: 2023-05-16 | End: 2023-05-20

## 2023-05-16 RX ADMIN — INSULIN LISPRO 16 UNITS: 100 INJECTION, SOLUTION INTRAVENOUS; SUBCUTANEOUS at 08:28

## 2023-05-16 RX ADMIN — INSULIN GLARGINE 20 UNITS: 100 INJECTION, SOLUTION SUBCUTANEOUS at 22:07

## 2023-05-16 RX ADMIN — PIPERACILLIN AND TAZOBACTAM 4500 MG: 4; .5 INJECTION, POWDER, LYOPHILIZED, FOR SOLUTION INTRAVENOUS at 15:18

## 2023-05-16 RX ADMIN — HYDRALAZINE HYDROCHLORIDE 25 MG: 25 TABLET, FILM COATED ORAL at 08:28

## 2023-05-16 RX ADMIN — VANCOMYCIN HYDROCHLORIDE 2000 MG: 1 INJECTION, POWDER, LYOPHILIZED, FOR SOLUTION INTRAVENOUS at 18:07

## 2023-05-16 RX ADMIN — SODIUM CHLORIDE: 9 INJECTION, SOLUTION INTRAVENOUS at 12:30

## 2023-05-16 RX ADMIN — INSULIN GLARGINE 20 UNITS: 100 INJECTION, SOLUTION SUBCUTANEOUS at 08:27

## 2023-05-16 RX ADMIN — SODIUM CHLORIDE, PRESERVATIVE FREE 10 ML: 5 INJECTION INTRAVENOUS at 22:16

## 2023-05-16 RX ADMIN — INSULIN LISPRO 8 UNITS: 100 INJECTION, SOLUTION INTRAVENOUS; SUBCUTANEOUS at 12:27

## 2023-05-16 RX ADMIN — AMLODIPINE BESYLATE 10 MG: 5 TABLET ORAL at 08:28

## 2023-05-16 RX ADMIN — PIPERACILLIN SODIUM AND TAZOBACTAM SODIUM 3375 MG: 3; .375 INJECTION, POWDER, LYOPHILIZED, FOR SOLUTION INTRAVENOUS at 22:16

## 2023-05-16 RX ADMIN — ENOXAPARIN SODIUM 40 MG: 100 INJECTION SUBCUTANEOUS at 08:28

## 2023-05-16 ASSESSMENT — ENCOUNTER SYMPTOMS
EYES NEGATIVE: 1
ALLERGIC/IMMUNOLOGIC NEGATIVE: 1
GASTROINTESTINAL NEGATIVE: 1
RESPIRATORY NEGATIVE: 1

## 2023-05-16 ASSESSMENT — PAIN SCALES - GENERAL: PAINLEVEL_OUTOF10: 0

## 2023-05-17 LAB
ACCESSION NUMBER, LLC1M: ABNORMAL
ACINETOBACTER CALCOAC BAUMANNII COMPLEX BY PCR: NOT DETECTED
BACTEROIDES FRAGILIS BY PCR: NOT DETECTED
BIOFIRE TEST COMMENT: ABNORMAL
CANDIDA ALBICANS BY PCR: NOT DETECTED
CANDIDA AURIS BY PCR: NOT DETECTED
CANDIDA GLABRATA: NOT DETECTED
CANDIDA KRUSEI BY PCR: NOT DETECTED
CANDIDA PARAPSILOSIS BY PCR: NOT DETECTED
CANDIDA TROPICALIS BY PCR: NOT DETECTED
CRYPTOCOCCUS NEOFORMANS/GATTII BY PCR: NOT DETECTED
ENTEROBACTER CLOACAE COMPLEX BY PCR: NOT DETECTED
ENTEROBACTERALES BY PCR: NOT DETECTED
ENTEROCOCCUS FAECALIS BY PCR: NOT DETECTED
ENTEROCOCCUS FAECIUM BY PCR: NOT DETECTED
ERYTHROCYTE [SEDIMENTATION RATE] IN BLOOD: 60 MM/HR (ref 0–20)
ESCHERICHIA COLI: NOT DETECTED
GLUCOSE BLD STRIP.AUTO-MCNC: 110 MG/DL (ref 65–100)
GLUCOSE BLD STRIP.AUTO-MCNC: 218 MG/DL (ref 65–100)
GLUCOSE BLD STRIP.AUTO-MCNC: 259 MG/DL (ref 65–100)
GLUCOSE BLD STRIP.AUTO-MCNC: 269 MG/DL (ref 65–100)
GLUCOSE BLD STRIP.AUTO-MCNC: 410 MG/DL (ref 65–100)
HAEMOPHILUS INFLUENZAE BY PCR: NOT DETECTED
KLEBSIELLA AEROGENES BY PCR: NOT DETECTED
KLEBSIELLA OXYTOCA BY PCR: NOT DETECTED
KLEBSIELLA PNEUMONIAE GROUP BY PCR: NOT DETECTED
LISTERIA MONOCYTOGENES BY PCR: NOT DETECTED
METHICILLIN RESISTANCE MECA/C  BY PCR: NOT DETECTED
NEISSERIA MENINGITIDIS BY PCR: NOT DETECTED
PERFORMED BY:: ABNORMAL
PROCALCITONIN SERPL-MCNC: 0.16 NG/ML
PROTEUS BY PCR: NOT DETECTED
PSEUDOMONAS AERUGINOSA, PSAEP: NOT DETECTED
RESISTANT GENE TARGETS: ABNORMAL
SALMONELLA SPECIES BY PCR: NOT DETECTED
SERRATIA MARCESCENS BY PCR: NOT DETECTED
STAPHYLOCOCCUS AUREUS: NOT DETECTED
STAPHYLOCOCCUS EPIDERMIDIS BY PCR: DETECTED
STAPHYLOCOCCUS LUGDUNENSIS BY PCR: NOT DETECTED
STAPHYLOCOCCUS: DETECTED
STENOTROPHOMONAS MALTOPHILIA BY PCR: NOT DETECTED
STREPTOCOCCUS AGALACTIAE (GROUP B): NOT DETECTED
STREPTOCOCCUS PNEUMONIAE , SPNP: NOT DETECTED
STREPTOCOCCUS PYOGENES (GROUP A), SPYOP: NOT DETECTED
STREPTOCOCCUS: NOT DETECTED
VANCOMYCIN SERPL-MCNC: 9.1 UG/ML

## 2023-05-17 PROCEDURE — 6360000002 HC RX W HCPCS: Performed by: FAMILY MEDICINE

## 2023-05-17 PROCEDURE — 6360000002 HC RX W HCPCS: Performed by: INTERNAL MEDICINE

## 2023-05-17 PROCEDURE — 87205 SMEAR GRAM STAIN: CPT

## 2023-05-17 PROCEDURE — 99222 1ST HOSP IP/OBS MODERATE 55: CPT | Performed by: PODIATRIST

## 2023-05-17 PROCEDURE — 87077 CULTURE AEROBIC IDENTIFY: CPT

## 2023-05-17 PROCEDURE — 85652 RBC SED RATE AUTOMATED: CPT

## 2023-05-17 PROCEDURE — 87070 CULTURE OTHR SPECIMN AEROBIC: CPT

## 2023-05-17 PROCEDURE — 6370000000 HC RX 637 (ALT 250 FOR IP): Performed by: FAMILY MEDICINE

## 2023-05-17 PROCEDURE — 82962 GLUCOSE BLOOD TEST: CPT

## 2023-05-17 PROCEDURE — 84145 PROCALCITONIN (PCT): CPT

## 2023-05-17 PROCEDURE — 1100000000 HC RM PRIVATE

## 2023-05-17 PROCEDURE — 80202 ASSAY OF VANCOMYCIN: CPT

## 2023-05-17 PROCEDURE — 87147 CULTURE TYPE IMMUNOLOGIC: CPT

## 2023-05-17 PROCEDURE — 2580000003 HC RX 258: Performed by: FAMILY MEDICINE

## 2023-05-17 PROCEDURE — 2580000003 HC RX 258: Performed by: INTERNAL MEDICINE

## 2023-05-17 RX ORDER — LOPERAMIDE HYDROCHLORIDE 2 MG/1
2 CAPSULE ORAL EVERY 6 HOURS PRN
Status: DISCONTINUED | OUTPATIENT
Start: 2023-05-17 | End: 2023-05-21 | Stop reason: HOSPADM

## 2023-05-17 RX ADMIN — HYDRALAZINE HYDROCHLORIDE 25 MG: 25 TABLET, FILM COATED ORAL at 08:30

## 2023-05-17 RX ADMIN — INSULIN LISPRO 4 UNITS: 100 INJECTION, SOLUTION INTRAVENOUS; SUBCUTANEOUS at 17:01

## 2023-05-17 RX ADMIN — INSULIN LISPRO 16 UNITS: 100 INJECTION, SOLUTION INTRAVENOUS; SUBCUTANEOUS at 08:28

## 2023-05-17 RX ADMIN — SODIUM CHLORIDE, PRESERVATIVE FREE 10 ML: 5 INJECTION INTRAVENOUS at 08:30

## 2023-05-17 RX ADMIN — HYDRALAZINE HYDROCHLORIDE 25 MG: 25 TABLET, FILM COATED ORAL at 21:16

## 2023-05-17 RX ADMIN — ENOXAPARIN SODIUM 40 MG: 100 INJECTION SUBCUTANEOUS at 08:30

## 2023-05-17 RX ADMIN — Medication 1500 MG: at 18:36

## 2023-05-17 RX ADMIN — AMLODIPINE BESYLATE 10 MG: 5 TABLET ORAL at 08:30

## 2023-05-17 RX ADMIN — HYDRALAZINE HYDROCHLORIDE 25 MG: 25 TABLET, FILM COATED ORAL at 15:30

## 2023-05-17 RX ADMIN — SODIUM CHLORIDE, PRESERVATIVE FREE 10 ML: 5 INJECTION INTRAVENOUS at 21:15

## 2023-05-17 RX ADMIN — INSULIN GLARGINE 20 UNITS: 100 INJECTION, SOLUTION SUBCUTANEOUS at 08:29

## 2023-05-17 RX ADMIN — PIPERACILLIN SODIUM AND TAZOBACTAM SODIUM 3375 MG: 3; .375 INJECTION, POWDER, LYOPHILIZED, FOR SOLUTION INTRAVENOUS at 23:53

## 2023-05-17 RX ADMIN — INSULIN GLARGINE 20 UNITS: 100 INJECTION, SOLUTION SUBCUTANEOUS at 21:16

## 2023-05-17 RX ADMIN — PIPERACILLIN SODIUM AND TAZOBACTAM SODIUM 3375 MG: 3; .375 INJECTION, POWDER, LYOPHILIZED, FOR SOLUTION INTRAVENOUS at 08:52

## 2023-05-17 RX ADMIN — PIPERACILLIN SODIUM AND TAZOBACTAM SODIUM 3375 MG: 3; .375 INJECTION, POWDER, LYOPHILIZED, FOR SOLUTION INTRAVENOUS at 17:07

## 2023-05-18 ENCOUNTER — APPOINTMENT (OUTPATIENT)
Facility: HOSPITAL | Age: 58
DRG: 314 | End: 2023-05-18
Payer: MEDICAID

## 2023-05-18 PROBLEM — L08.9 DIABETIC INFECTION OF LEFT FOOT (HCC): Status: ACTIVE | Noted: 2023-05-18

## 2023-05-18 PROBLEM — E11.649 UNCONTROLLED DIABETES MELLITUS WITH HYPOGLYCEMIA (HCC): Status: ACTIVE | Noted: 2023-05-18

## 2023-05-18 PROBLEM — R79.89 ELEVATED PROCALCITONIN: Status: ACTIVE | Noted: 2023-05-18

## 2023-05-18 PROBLEM — E11.628 DIABETIC INFECTION OF LEFT FOOT (HCC): Status: ACTIVE | Noted: 2023-05-18

## 2023-05-18 LAB
BACTERIA SPEC CULT: ABNORMAL
BACTERIA SPEC CULT: ABNORMAL
ERYTHROCYTE [SEDIMENTATION RATE] IN BLOOD: 56 MM/HR (ref 0–20)
GLUCOSE BLD STRIP.AUTO-MCNC: 160 MG/DL (ref 65–100)
GLUCOSE BLD STRIP.AUTO-MCNC: 294 MG/DL (ref 65–100)
GLUCOSE BLD STRIP.AUTO-MCNC: 348 MG/DL (ref 65–100)
GLUCOSE BLD STRIP.AUTO-MCNC: 90 MG/DL (ref 65–100)
Lab: ABNORMAL
PERFORMED BY:: ABNORMAL
PERFORMED BY:: NORMAL
PROCALCITONIN SERPL-MCNC: 0.18 NG/ML

## 2023-05-18 PROCEDURE — 36415 COLL VENOUS BLD VENIPUNCTURE: CPT

## 2023-05-18 PROCEDURE — 73718 MRI LOWER EXTREMITY W/O DYE: CPT

## 2023-05-18 PROCEDURE — 6360000002 HC RX W HCPCS: Performed by: FAMILY MEDICINE

## 2023-05-18 PROCEDURE — 2580000003 HC RX 258: Performed by: INTERNAL MEDICINE

## 2023-05-18 PROCEDURE — 85652 RBC SED RATE AUTOMATED: CPT

## 2023-05-18 PROCEDURE — 99232 SBSQ HOSP IP/OBS MODERATE 35: CPT | Performed by: INTERNAL MEDICINE

## 2023-05-18 PROCEDURE — 82962 GLUCOSE BLOOD TEST: CPT

## 2023-05-18 PROCEDURE — 2580000003 HC RX 258: Performed by: FAMILY MEDICINE

## 2023-05-18 PROCEDURE — 6370000000 HC RX 637 (ALT 250 FOR IP): Performed by: FAMILY MEDICINE

## 2023-05-18 PROCEDURE — 99233 SBSQ HOSP IP/OBS HIGH 50: CPT | Performed by: PODIATRIST

## 2023-05-18 PROCEDURE — 6360000002 HC RX W HCPCS: Performed by: INTERNAL MEDICINE

## 2023-05-18 PROCEDURE — 84145 PROCALCITONIN (PCT): CPT

## 2023-05-18 PROCEDURE — 1100000000 HC RM PRIVATE

## 2023-05-18 RX ORDER — LOPERAMIDE HYDROCHLORIDE 2 MG/1
2 CAPSULE ORAL 4 TIMES DAILY PRN
Status: DISCONTINUED | OUTPATIENT
Start: 2023-05-18 | End: 2023-05-21 | Stop reason: HOSPADM

## 2023-05-18 RX ORDER — SIMETHICONE 80 MG
80 TABLET,CHEWABLE ORAL 3 TIMES DAILY
Status: DISCONTINUED | OUTPATIENT
Start: 2023-05-18 | End: 2023-05-21 | Stop reason: HOSPADM

## 2023-05-18 RX ADMIN — INSULIN GLARGINE 20 UNITS: 100 INJECTION, SOLUTION SUBCUTANEOUS at 09:00

## 2023-05-18 RX ADMIN — INSULIN GLARGINE 20 UNITS: 100 INJECTION, SOLUTION SUBCUTANEOUS at 21:03

## 2023-05-18 RX ADMIN — HYDRALAZINE HYDROCHLORIDE 25 MG: 25 TABLET, FILM COATED ORAL at 09:01

## 2023-05-18 RX ADMIN — SODIUM CHLORIDE, PRESERVATIVE FREE 10 ML: 5 INJECTION INTRAVENOUS at 09:01

## 2023-05-18 RX ADMIN — SIMETHICONE 80 MG: 80 TABLET, CHEWABLE ORAL at 15:23

## 2023-05-18 RX ADMIN — INSULIN LISPRO 12 UNITS: 100 INJECTION, SOLUTION INTRAVENOUS; SUBCUTANEOUS at 08:59

## 2023-05-18 RX ADMIN — HYDRALAZINE HYDROCHLORIDE 25 MG: 25 TABLET, FILM COATED ORAL at 15:23

## 2023-05-18 RX ADMIN — SODIUM CHLORIDE: 9 INJECTION, SOLUTION INTRAVENOUS at 05:31

## 2023-05-18 RX ADMIN — PIPERACILLIN SODIUM AND TAZOBACTAM SODIUM 3375 MG: 3; .375 INJECTION, POWDER, LYOPHILIZED, FOR SOLUTION INTRAVENOUS at 09:00

## 2023-05-18 RX ADMIN — SODIUM CHLORIDE, PRESERVATIVE FREE 10 ML: 5 INJECTION INTRAVENOUS at 21:04

## 2023-05-18 RX ADMIN — AMLODIPINE BESYLATE 10 MG: 5 TABLET ORAL at 09:01

## 2023-05-18 RX ADMIN — SIMETHICONE 80 MG: 80 TABLET, CHEWABLE ORAL at 21:03

## 2023-05-18 RX ADMIN — PIPERACILLIN SODIUM AND TAZOBACTAM SODIUM 3375 MG: 3; .375 INJECTION, POWDER, LYOPHILIZED, FOR SOLUTION INTRAVENOUS at 15:40

## 2023-05-18 RX ADMIN — HYDRALAZINE HYDROCHLORIDE 25 MG: 25 TABLET, FILM COATED ORAL at 21:03

## 2023-05-18 RX ADMIN — ENOXAPARIN SODIUM 40 MG: 100 INJECTION SUBCUTANEOUS at 09:01

## 2023-05-18 RX ADMIN — VANCOMYCIN HYDROCHLORIDE 1500 MG: 750 INJECTION, POWDER, LYOPHILIZED, FOR SOLUTION INTRAVENOUS at 22:29

## 2023-05-18 RX ADMIN — SIMETHICONE 80 MG: 80 TABLET, CHEWABLE ORAL at 11:09

## 2023-05-18 RX ADMIN — INSULIN LISPRO 8 UNITS: 100 INJECTION, SOLUTION INTRAVENOUS; SUBCUTANEOUS at 17:20

## 2023-05-19 ENCOUNTER — ANESTHESIA EVENT (OUTPATIENT)
Facility: HOSPITAL | Age: 58
DRG: 314 | End: 2023-05-19
Payer: MEDICAID

## 2023-05-19 ENCOUNTER — APPOINTMENT (OUTPATIENT)
Facility: HOSPITAL | Age: 58
DRG: 314 | End: 2023-05-19
Payer: MEDICAID

## 2023-05-19 ENCOUNTER — ANESTHESIA (OUTPATIENT)
Facility: HOSPITAL | Age: 58
DRG: 314 | End: 2023-05-19
Payer: MEDICAID

## 2023-05-19 LAB
ALBUMIN SERPL-MCNC: 2.5 G/DL (ref 3.5–5)
ALBUMIN/GLOB SERPL: 0.5 (ref 1.1–2.2)
ALP SERPL-CCNC: 176 U/L (ref 45–117)
ALT SERPL-CCNC: 47 U/L (ref 12–78)
ANION GAP SERPL CALC-SCNC: 5 MMOL/L (ref 5–15)
AST SERPL W P-5'-P-CCNC: 53 U/L (ref 15–37)
BILIRUB SERPL-MCNC: 0.2 MG/DL (ref 0.2–1)
BUN SERPL-MCNC: 27 MG/DL (ref 6–20)
BUN/CREAT SERPL: 23 (ref 12–20)
CA-I BLD-MCNC: 8.9 MG/DL (ref 8.5–10.1)
CHLORIDE SERPL-SCNC: 106 MMOL/L (ref 97–108)
CO2 SERPL-SCNC: 25 MMOL/L (ref 21–32)
CREAT SERPL-MCNC: 1.18 MG/DL (ref 0.7–1.3)
ERYTHROCYTE [DISTWIDTH] IN BLOOD BY AUTOMATED COUNT: 11.6 % (ref 11.5–14.5)
ERYTHROCYTE [SEDIMENTATION RATE] IN BLOOD: 51 MM/HR (ref 0–20)
GLOBULIN SER CALC-MCNC: 4.8 G/DL (ref 2–4)
GLUCOSE BLD STRIP.AUTO-MCNC: 114 MG/DL (ref 65–100)
GLUCOSE BLD STRIP.AUTO-MCNC: 210 MG/DL (ref 65–100)
GLUCOSE BLD STRIP.AUTO-MCNC: 218 MG/DL (ref 65–100)
GLUCOSE BLD STRIP.AUTO-MCNC: 268 MG/DL (ref 65–100)
GLUCOSE BLD STRIP.AUTO-MCNC: 312 MG/DL (ref 65–100)
GLUCOSE BLD STRIP.AUTO-MCNC: 445 MG/DL (ref 65–100)
GLUCOSE BLD STRIP.AUTO-MCNC: 83 MG/DL (ref 65–100)
GLUCOSE SERPL-MCNC: 364 MG/DL (ref 65–100)
HCT VFR BLD AUTO: 35.1 % (ref 36.6–50.3)
HGB BLD-MCNC: 11.9 G/DL (ref 12.1–17)
MCH RBC QN AUTO: 31.3 PG (ref 26–34)
MCHC RBC AUTO-ENTMCNC: 33.9 G/DL (ref 30–36.5)
MCV RBC AUTO: 92.4 FL (ref 80–99)
NRBC # BLD: 0 K/UL (ref 0–0.01)
NRBC BLD-RTO: 0 PER 100 WBC
PERFORMED BY:: ABNORMAL
PERFORMED BY:: NORMAL
PLATELET # BLD AUTO: 226 K/UL (ref 150–400)
PMV BLD AUTO: 10.5 FL (ref 8.9–12.9)
POTASSIUM SERPL-SCNC: 4 MMOL/L (ref 3.5–5.1)
PROCALCITONIN SERPL-MCNC: 0.22 NG/ML
PROT SERPL-MCNC: 7.3 G/DL (ref 6.4–8.2)
RBC # BLD AUTO: 3.8 M/UL (ref 4.1–5.7)
SODIUM SERPL-SCNC: 136 MMOL/L (ref 136–145)
VANCOMYCIN SERPL-MCNC: 8.6 UG/ML
WBC # BLD AUTO: 5.4 K/UL (ref 4.1–11.1)

## 2023-05-19 PROCEDURE — 28005 TREAT FOOT BONE LESION: CPT | Performed by: PODIATRIST

## 2023-05-19 PROCEDURE — 82962 GLUCOSE BLOOD TEST: CPT

## 2023-05-19 PROCEDURE — 2500000003 HC RX 250 WO HCPCS: Performed by: PODIATRIST

## 2023-05-19 PROCEDURE — 73630 X-RAY EXAM OF FOOT: CPT

## 2023-05-19 PROCEDURE — 85652 RBC SED RATE AUTOMATED: CPT

## 2023-05-19 PROCEDURE — 73620 X-RAY EXAM OF FOOT: CPT

## 2023-05-19 PROCEDURE — 2580000003 HC RX 258: Performed by: INTERNAL MEDICINE

## 2023-05-19 PROCEDURE — 7100000001 HC PACU RECOVERY - ADDTL 15 MIN: Performed by: PODIATRIST

## 2023-05-19 PROCEDURE — 6360000002 HC RX W HCPCS: Performed by: INTERNAL MEDICINE

## 2023-05-19 PROCEDURE — 7100000000 HC PACU RECOVERY - FIRST 15 MIN: Performed by: PODIATRIST

## 2023-05-19 PROCEDURE — 80053 COMPREHEN METABOLIC PANEL: CPT

## 2023-05-19 PROCEDURE — 3600000002 HC SURGERY LEVEL 2 BASE: Performed by: PODIATRIST

## 2023-05-19 PROCEDURE — 87070 CULTURE OTHR SPECIMN AEROBIC: CPT

## 2023-05-19 PROCEDURE — 85027 COMPLETE CBC AUTOMATED: CPT

## 2023-05-19 PROCEDURE — 6360000002 HC RX W HCPCS: Performed by: NURSE ANESTHETIST, CERTIFIED REGISTERED

## 2023-05-19 PROCEDURE — 3600000012 HC SURGERY LEVEL 2 ADDTL 15MIN: Performed by: PODIATRIST

## 2023-05-19 PROCEDURE — 87186 SC STD MICRODIL/AGAR DIL: CPT

## 2023-05-19 PROCEDURE — 1100000000 HC RM PRIVATE

## 2023-05-19 PROCEDURE — 99232 SBSQ HOSP IP/OBS MODERATE 35: CPT | Performed by: INTERNAL MEDICINE

## 2023-05-19 PROCEDURE — 6370000000 HC RX 637 (ALT 250 FOR IP): Performed by: FAMILY MEDICINE

## 2023-05-19 PROCEDURE — 36415 COLL VENOUS BLD VENIPUNCTURE: CPT

## 2023-05-19 PROCEDURE — 80202 ASSAY OF VANCOMYCIN: CPT

## 2023-05-19 PROCEDURE — 3700000000 HC ANESTHESIA ATTENDED CARE: Performed by: PODIATRIST

## 2023-05-19 PROCEDURE — 6370000000 HC RX 637 (ALT 250 FOR IP): Performed by: PODIATRIST

## 2023-05-19 PROCEDURE — 87205 SMEAR GRAM STAIN: CPT

## 2023-05-19 PROCEDURE — 6370000000 HC RX 637 (ALT 250 FOR IP): Performed by: INTERNAL MEDICINE

## 2023-05-19 PROCEDURE — 3700000001 HC ADD 15 MINUTES (ANESTHESIA): Performed by: PODIATRIST

## 2023-05-19 PROCEDURE — 84145 PROCALCITONIN (PCT): CPT

## 2023-05-19 PROCEDURE — 87077 CULTURE AEROBIC IDENTIFY: CPT

## 2023-05-19 PROCEDURE — 2709999900 HC NON-CHARGEABLE SUPPLY: Performed by: PODIATRIST

## 2023-05-19 PROCEDURE — 87147 CULTURE TYPE IMMUNOLOGIC: CPT

## 2023-05-19 PROCEDURE — 0Y6U0Z1 DETACHMENT AT LEFT 3RD TOE, HIGH, OPEN APPROACH: ICD-10-PCS | Performed by: PODIATRIST

## 2023-05-19 PROCEDURE — 2580000003 HC RX 258: Performed by: FAMILY MEDICINE

## 2023-05-19 PROCEDURE — 2580000003 HC RX 258: Performed by: NURSE ANESTHETIST, CERTIFIED REGISTERED

## 2023-05-19 PROCEDURE — 6360000002 HC RX W HCPCS: Performed by: FAMILY MEDICINE

## 2023-05-19 RX ORDER — DIPHENHYDRAMINE HYDROCHLORIDE 50 MG/ML
12.5 INJECTION INTRAMUSCULAR; INTRAVENOUS
Status: CANCELLED | OUTPATIENT
Start: 2023-05-19 | End: 2023-05-20

## 2023-05-19 RX ORDER — POVIDONE-IODINE 10 MG/G
OINTMENT TOPICAL PRN
Status: DISCONTINUED | OUTPATIENT
Start: 2023-05-19 | End: 2023-05-19 | Stop reason: ALTCHOICE

## 2023-05-19 RX ORDER — HYDROMORPHONE HYDROCHLORIDE 1 MG/ML
0.5 INJECTION, SOLUTION INTRAMUSCULAR; INTRAVENOUS; SUBCUTANEOUS EVERY 5 MIN PRN
Status: CANCELLED | OUTPATIENT
Start: 2023-05-19

## 2023-05-19 RX ORDER — MEPERIDINE HYDROCHLORIDE 25 MG/ML
12.5 INJECTION INTRAMUSCULAR; INTRAVENOUS; SUBCUTANEOUS EVERY 5 MIN PRN
Status: CANCELLED | OUTPATIENT
Start: 2023-05-19

## 2023-05-19 RX ORDER — ONDANSETRON 2 MG/ML
4 INJECTION INTRAMUSCULAR; INTRAVENOUS
Status: CANCELLED | OUTPATIENT
Start: 2023-05-19 | End: 2023-05-20

## 2023-05-19 RX ORDER — SODIUM CHLORIDE 0.9 % (FLUSH) 0.9 %
5-40 SYRINGE (ML) INJECTION EVERY 12 HOURS SCHEDULED
Status: CANCELLED | OUTPATIENT
Start: 2023-05-19

## 2023-05-19 RX ORDER — SODIUM CHLORIDE, SODIUM LACTATE, POTASSIUM CHLORIDE, CALCIUM CHLORIDE 600; 310; 30; 20 MG/100ML; MG/100ML; MG/100ML; MG/100ML
INJECTION, SOLUTION INTRAVENOUS CONTINUOUS PRN
Status: DISCONTINUED | OUTPATIENT
Start: 2023-05-19 | End: 2023-05-19 | Stop reason: SDUPTHER

## 2023-05-19 RX ORDER — IPRATROPIUM BROMIDE AND ALBUTEROL SULFATE 2.5; .5 MG/3ML; MG/3ML
1 SOLUTION RESPIRATORY (INHALATION)
Status: CANCELLED | OUTPATIENT
Start: 2023-05-19 | End: 2023-05-20

## 2023-05-19 RX ORDER — METOPROLOL TARTRATE 5 MG/5ML
5 INJECTION INTRAVENOUS EVERY 10 MIN PRN
Status: CANCELLED | OUTPATIENT
Start: 2023-05-19

## 2023-05-19 RX ORDER — LIDOCAINE HYDROCHLORIDE 20 MG/ML
INJECTION, SOLUTION INTRAVENOUS PRN
Status: DISCONTINUED | OUTPATIENT
Start: 2023-05-19 | End: 2023-05-19 | Stop reason: SDUPTHER

## 2023-05-19 RX ORDER — LABETALOL HYDROCHLORIDE 5 MG/ML
10 INJECTION, SOLUTION INTRAVENOUS EVERY 10 MIN PRN
Status: CANCELLED | OUTPATIENT
Start: 2023-05-19

## 2023-05-19 RX ORDER — LIDOCAINE HYDROCHLORIDE 10 MG/ML
INJECTION, SOLUTION INFILTRATION; PERINEURAL PRN
Status: DISCONTINUED | OUTPATIENT
Start: 2023-05-19 | End: 2023-05-19 | Stop reason: ALTCHOICE

## 2023-05-19 RX ORDER — LABETALOL HYDROCHLORIDE 5 MG/ML
10 INJECTION, SOLUTION INTRAVENOUS
Status: CANCELLED | OUTPATIENT
Start: 2023-05-19

## 2023-05-19 RX ORDER — OXYCODONE HYDROCHLORIDE 5 MG/1
5 TABLET ORAL PRN
Status: CANCELLED | OUTPATIENT
Start: 2023-05-19 | End: 2023-05-19

## 2023-05-19 RX ORDER — INSULIN LISPRO 100 [IU]/ML
10 INJECTION, SOLUTION INTRAVENOUS; SUBCUTANEOUS ONCE
Status: COMPLETED | OUTPATIENT
Start: 2023-05-19 | End: 2023-05-19

## 2023-05-19 RX ORDER — SODIUM CHLORIDE 9 MG/ML
INJECTION, SOLUTION INTRAVENOUS PRN
Status: CANCELLED | OUTPATIENT
Start: 2023-05-19

## 2023-05-19 RX ORDER — LEVOFLOXACIN 5 MG/ML
750 INJECTION, SOLUTION INTRAVENOUS EVERY 24 HOURS
Status: DISCONTINUED | OUTPATIENT
Start: 2023-05-20 | End: 2023-05-21

## 2023-05-19 RX ORDER — PROPOFOL 10 MG/ML
INJECTION, EMULSION INTRAVENOUS PRN
Status: DISCONTINUED | OUTPATIENT
Start: 2023-05-19 | End: 2023-05-19 | Stop reason: SDUPTHER

## 2023-05-19 RX ORDER — HYDRALAZINE HYDROCHLORIDE 20 MG/ML
10 INJECTION INTRAMUSCULAR; INTRAVENOUS EVERY 10 MIN PRN
Status: CANCELLED | OUTPATIENT
Start: 2023-05-19

## 2023-05-19 RX ORDER — SODIUM CHLORIDE 0.9 % (FLUSH) 0.9 %
5-40 SYRINGE (ML) INJECTION PRN
Status: CANCELLED | OUTPATIENT
Start: 2023-05-19

## 2023-05-19 RX ORDER — LORAZEPAM 2 MG/ML
0.5 INJECTION INTRAMUSCULAR
Status: CANCELLED | OUTPATIENT
Start: 2023-05-19 | End: 2023-05-20

## 2023-05-19 RX ORDER — FENTANYL CITRATE 50 UG/ML
50 INJECTION, SOLUTION INTRAMUSCULAR; INTRAVENOUS EVERY 5 MIN PRN
Status: CANCELLED | OUTPATIENT
Start: 2023-05-19

## 2023-05-19 RX ORDER — METOCLOPRAMIDE HYDROCHLORIDE 5 MG/ML
10 INJECTION INTRAMUSCULAR; INTRAVENOUS
Status: CANCELLED | OUTPATIENT
Start: 2023-05-19 | End: 2023-05-20

## 2023-05-19 RX ORDER — OXYCODONE HYDROCHLORIDE 5 MG/1
10 TABLET ORAL PRN
Status: CANCELLED | OUTPATIENT
Start: 2023-05-19 | End: 2023-05-19

## 2023-05-19 RX ORDER — SODIUM CHLORIDE, SODIUM LACTATE, POTASSIUM CHLORIDE, CALCIUM CHLORIDE 600; 310; 30; 20 MG/100ML; MG/100ML; MG/100ML; MG/100ML
INJECTION, SOLUTION INTRAVENOUS CONTINUOUS
Status: CANCELLED | OUTPATIENT
Start: 2023-05-19

## 2023-05-19 RX ORDER — HYDRALAZINE HYDROCHLORIDE 20 MG/ML
10 INJECTION INTRAMUSCULAR; INTRAVENOUS
Status: CANCELLED | OUTPATIENT
Start: 2023-05-19

## 2023-05-19 RX ADMIN — HYDRALAZINE HYDROCHLORIDE 25 MG: 25 TABLET, FILM COATED ORAL at 21:47

## 2023-05-19 RX ADMIN — HYDRALAZINE HYDROCHLORIDE 25 MG: 25 TABLET, FILM COATED ORAL at 16:20

## 2023-05-19 RX ADMIN — PIPERACILLIN SODIUM AND TAZOBACTAM SODIUM 3375 MG: 3; .375 INJECTION, POWDER, LYOPHILIZED, FOR SOLUTION INTRAVENOUS at 14:46

## 2023-05-19 RX ADMIN — AMLODIPINE BESYLATE 10 MG: 5 TABLET ORAL at 10:44

## 2023-05-19 RX ADMIN — SODIUM CHLORIDE: 9 INJECTION, SOLUTION INTRAVENOUS at 16:30

## 2023-05-19 RX ADMIN — INSULIN LISPRO 8 UNITS: 100 INJECTION, SOLUTION INTRAVENOUS; SUBCUTANEOUS at 10:47

## 2023-05-19 RX ADMIN — INSULIN LISPRO 10 UNITS: 100 INJECTION, SOLUTION INTRAVENOUS; SUBCUTANEOUS at 22:40

## 2023-05-19 RX ADMIN — VANCOMYCIN HYDROCHLORIDE 1000 MG: 1 INJECTION, POWDER, LYOPHILIZED, FOR SOLUTION INTRAVENOUS at 20:36

## 2023-05-19 RX ADMIN — SIMETHICONE 80 MG: 80 TABLET, CHEWABLE ORAL at 21:47

## 2023-05-19 RX ADMIN — PIPERACILLIN SODIUM AND TAZOBACTAM SODIUM 3375 MG: 3; .375 INJECTION, POWDER, LYOPHILIZED, FOR SOLUTION INTRAVENOUS at 01:07

## 2023-05-19 RX ADMIN — SODIUM CHLORIDE, PRESERVATIVE FREE 10 ML: 5 INJECTION INTRAVENOUS at 22:41

## 2023-05-19 RX ADMIN — LIDOCAINE HYDROCHLORIDE 100 MG: 20 INJECTION, SOLUTION INTRAVENOUS at 14:53

## 2023-05-19 RX ADMIN — PIPERACILLIN SODIUM AND TAZOBACTAM SODIUM 3375 MG: 3; .375 INJECTION, POWDER, LYOPHILIZED, FOR SOLUTION INTRAVENOUS at 23:22

## 2023-05-19 RX ADMIN — PROPOFOL 100 MG: 10 INJECTION, EMULSION INTRAVENOUS at 14:53

## 2023-05-19 RX ADMIN — SIMETHICONE 80 MG: 80 TABLET, CHEWABLE ORAL at 16:20

## 2023-05-19 RX ADMIN — SODIUM CHLORIDE, POTASSIUM CHLORIDE, SODIUM LACTATE AND CALCIUM CHLORIDE: 600; 310; 30; 20 INJECTION, SOLUTION INTRAVENOUS at 14:46

## 2023-05-19 RX ADMIN — INSULIN GLARGINE 20 UNITS: 100 INJECTION, SOLUTION SUBCUTANEOUS at 21:47

## 2023-05-19 RX ADMIN — HYDRALAZINE HYDROCHLORIDE 25 MG: 25 TABLET, FILM COATED ORAL at 10:44

## 2023-05-19 RX ADMIN — INSULIN LISPRO 4 UNITS: 100 INJECTION, SOLUTION INTRAVENOUS; SUBCUTANEOUS at 17:35

## 2023-05-19 RX ADMIN — SIMETHICONE 80 MG: 80 TABLET, CHEWABLE ORAL at 10:44

## 2023-05-19 RX ADMIN — PROPOFOL 120 MCG/KG/MIN: 10 INJECTION, EMULSION INTRAVENOUS at 14:57

## 2023-05-19 RX ADMIN — ACETAMINOPHEN 650 MG: 325 TABLET ORAL at 21:47

## 2023-05-19 ASSESSMENT — PAIN - FUNCTIONAL ASSESSMENT: PAIN_FUNCTIONAL_ASSESSMENT: 0-10

## 2023-05-20 LAB
ALBUMIN SERPL-MCNC: 2.6 G/DL (ref 3.5–5)
ALBUMIN/GLOB SERPL: 0.5 (ref 1.1–2.2)
ALP SERPL-CCNC: 170 U/L (ref 45–117)
ALT SERPL-CCNC: 63 U/L (ref 12–78)
ANION GAP SERPL CALC-SCNC: 4 MMOL/L (ref 5–15)
AST SERPL W P-5'-P-CCNC: 74 U/L (ref 15–37)
BILIRUB SERPL-MCNC: 0.2 MG/DL (ref 0.2–1)
BUN SERPL-MCNC: 26 MG/DL (ref 6–20)
BUN/CREAT SERPL: 19 (ref 12–20)
CA-I BLD-MCNC: 9 MG/DL (ref 8.5–10.1)
CHLORIDE SERPL-SCNC: 103 MMOL/L (ref 97–108)
CO2 SERPL-SCNC: 27 MMOL/L (ref 21–32)
CREAT SERPL-MCNC: 1.36 MG/DL (ref 0.7–1.3)
ERYTHROCYTE [DISTWIDTH] IN BLOOD BY AUTOMATED COUNT: 11.5 % (ref 11.5–14.5)
GLOBULIN SER CALC-MCNC: 4.9 G/DL (ref 2–4)
GLUCOSE BLD STRIP.AUTO-MCNC: 228 MG/DL (ref 65–100)
GLUCOSE BLD STRIP.AUTO-MCNC: 303 MG/DL (ref 65–100)
GLUCOSE BLD STRIP.AUTO-MCNC: 331 MG/DL (ref 65–100)
GLUCOSE BLD STRIP.AUTO-MCNC: 375 MG/DL (ref 65–100)
GLUCOSE BLD STRIP.AUTO-MCNC: 414 MG/DL (ref 65–100)
GLUCOSE SERPL-MCNC: 394 MG/DL (ref 65–100)
HCT VFR BLD AUTO: 34.4 % (ref 36.6–50.3)
HGB BLD-MCNC: 11.9 G/DL (ref 12.1–17)
MCH RBC QN AUTO: 31.5 PG (ref 26–34)
MCHC RBC AUTO-ENTMCNC: 34.6 G/DL (ref 30–36.5)
MCV RBC AUTO: 91 FL (ref 80–99)
NRBC # BLD: 0 K/UL (ref 0–0.01)
NRBC BLD-RTO: 0 PER 100 WBC
PERFORMED BY:: ABNORMAL
PLATELET # BLD AUTO: 225 K/UL (ref 150–400)
PMV BLD AUTO: 10.5 FL (ref 8.9–12.9)
POTASSIUM SERPL-SCNC: 3.6 MMOL/L (ref 3.5–5.1)
PROT SERPL-MCNC: 7.5 G/DL (ref 6.4–8.2)
RBC # BLD AUTO: 3.78 M/UL (ref 4.1–5.7)
SODIUM SERPL-SCNC: 134 MMOL/L (ref 136–145)
WBC # BLD AUTO: 5.7 K/UL (ref 4.1–11.1)

## 2023-05-20 PROCEDURE — 36415 COLL VENOUS BLD VENIPUNCTURE: CPT

## 2023-05-20 PROCEDURE — 2580000003 HC RX 258: Performed by: INTERNAL MEDICINE

## 2023-05-20 PROCEDURE — 80053 COMPREHEN METABOLIC PANEL: CPT

## 2023-05-20 PROCEDURE — 6360000002 HC RX W HCPCS: Performed by: INTERNAL MEDICINE

## 2023-05-20 PROCEDURE — 2580000003 HC RX 258: Performed by: FAMILY MEDICINE

## 2023-05-20 PROCEDURE — 85027 COMPLETE CBC AUTOMATED: CPT

## 2023-05-20 PROCEDURE — 6360000002 HC RX W HCPCS: Performed by: FAMILY MEDICINE

## 2023-05-20 PROCEDURE — 1100000000 HC RM PRIVATE

## 2023-05-20 PROCEDURE — 6370000000 HC RX 637 (ALT 250 FOR IP): Performed by: FAMILY MEDICINE

## 2023-05-20 PROCEDURE — 6370000000 HC RX 637 (ALT 250 FOR IP): Performed by: INTERNAL MEDICINE

## 2023-05-20 PROCEDURE — 82962 GLUCOSE BLOOD TEST: CPT

## 2023-05-20 RX ORDER — INSULIN GLARGINE 100 [IU]/ML
30 INJECTION, SOLUTION SUBCUTANEOUS EVERY MORNING
Status: DISCONTINUED | OUTPATIENT
Start: 2023-05-20 | End: 2023-05-20

## 2023-05-20 RX ORDER — INSULIN LISPRO 100 [IU]/ML
10 INJECTION, SOLUTION INTRAVENOUS; SUBCUTANEOUS ONCE
Status: COMPLETED | OUTPATIENT
Start: 2023-05-20 | End: 2023-05-20

## 2023-05-20 RX ORDER — INSULIN GLARGINE 100 [IU]/ML
40 INJECTION, SOLUTION SUBCUTANEOUS EVERY MORNING
Status: DISCONTINUED | OUTPATIENT
Start: 2023-05-21 | End: 2023-05-21

## 2023-05-20 RX ORDER — INSULIN GLARGINE 100 [IU]/ML
10 INJECTION, SOLUTION SUBCUTANEOUS
Status: COMPLETED | OUTPATIENT
Start: 2023-05-20 | End: 2023-05-20

## 2023-05-20 RX ADMIN — ENOXAPARIN SODIUM 40 MG: 100 INJECTION SUBCUTANEOUS at 08:35

## 2023-05-20 RX ADMIN — PIPERACILLIN SODIUM AND TAZOBACTAM SODIUM 3375 MG: 3; .375 INJECTION, POWDER, LYOPHILIZED, FOR SOLUTION INTRAVENOUS at 08:36

## 2023-05-20 RX ADMIN — HYDRALAZINE HYDROCHLORIDE 25 MG: 25 TABLET, FILM COATED ORAL at 08:35

## 2023-05-20 RX ADMIN — INSULIN LISPRO 10 UNITS: 100 INJECTION, SOLUTION INTRAVENOUS; SUBCUTANEOUS at 05:08

## 2023-05-20 RX ADMIN — INSULIN GLARGINE 10 UNITS: 100 INJECTION, SOLUTION SUBCUTANEOUS at 05:09

## 2023-05-20 RX ADMIN — HYDRALAZINE HYDROCHLORIDE 25 MG: 25 TABLET, FILM COATED ORAL at 14:30

## 2023-05-20 RX ADMIN — PIPERACILLIN SODIUM AND TAZOBACTAM SODIUM 3375 MG: 3; .375 INJECTION, POWDER, LYOPHILIZED, FOR SOLUTION INTRAVENOUS at 17:25

## 2023-05-20 RX ADMIN — AMLODIPINE BESYLATE 10 MG: 5 TABLET ORAL at 08:35

## 2023-05-20 RX ADMIN — SIMETHICONE 80 MG: 80 TABLET, CHEWABLE ORAL at 14:30

## 2023-05-20 RX ADMIN — SIMETHICONE 80 MG: 80 TABLET, CHEWABLE ORAL at 08:35

## 2023-05-20 RX ADMIN — INSULIN LISPRO 10 UNITS: 100 INJECTION, SOLUTION INTRAVENOUS; SUBCUTANEOUS at 21:18

## 2023-05-20 RX ADMIN — INSULIN LISPRO 16 UNITS: 100 INJECTION, SOLUTION INTRAVENOUS; SUBCUTANEOUS at 17:26

## 2023-05-20 RX ADMIN — SIMETHICONE 80 MG: 80 TABLET, CHEWABLE ORAL at 21:18

## 2023-05-20 RX ADMIN — INSULIN GLARGINE 30 UNITS: 100 INJECTION, SOLUTION SUBCUTANEOUS at 08:35

## 2023-05-20 RX ADMIN — LEVOFLOXACIN 750 MG: 5 INJECTION, SOLUTION INTRAVENOUS at 02:38

## 2023-05-20 RX ADMIN — INSULIN LISPRO 4 UNITS: 100 INJECTION, SOLUTION INTRAVENOUS; SUBCUTANEOUS at 12:05

## 2023-05-20 RX ADMIN — SODIUM CHLORIDE: 9 INJECTION, SOLUTION INTRAVENOUS at 12:04

## 2023-05-20 RX ADMIN — HYDRALAZINE HYDROCHLORIDE 25 MG: 25 TABLET, FILM COATED ORAL at 21:19

## 2023-05-20 RX ADMIN — INSULIN LISPRO 12 UNITS: 100 INJECTION, SOLUTION INTRAVENOUS; SUBCUTANEOUS at 08:36

## 2023-05-20 RX ADMIN — ACETAMINOPHEN 650 MG: 325 TABLET ORAL at 21:19

## 2023-05-20 ASSESSMENT — PAIN SCALES - GENERAL: PAINLEVEL_OUTOF10: 8

## 2023-05-21 VITALS
RESPIRATION RATE: 18 BRPM | DIASTOLIC BLOOD PRESSURE: 97 MMHG | BODY MASS INDEX: 25.2 KG/M2 | WEIGHT: 180 LBS | HEART RATE: 104 BPM | HEIGHT: 71 IN | TEMPERATURE: 98 F | SYSTOLIC BLOOD PRESSURE: 168 MMHG | OXYGEN SATURATION: 99 %

## 2023-05-21 LAB
BACTERIA SPEC CULT: ABNORMAL
BACTERIA SPEC CULT: NORMAL
GLUCOSE BLD STRIP.AUTO-MCNC: 362 MG/DL (ref 65–100)
GLUCOSE BLD STRIP.AUTO-MCNC: 367 MG/DL (ref 65–100)
GRAM STN SPEC: ABNORMAL
Lab: ABNORMAL
Lab: NORMAL
PERFORMED BY:: ABNORMAL
PERFORMED BY:: ABNORMAL

## 2023-05-21 PROCEDURE — 6360000002 HC RX W HCPCS: Performed by: INTERNAL MEDICINE

## 2023-05-21 PROCEDURE — 6360000002 HC RX W HCPCS: Performed by: FAMILY MEDICINE

## 2023-05-21 PROCEDURE — 82962 GLUCOSE BLOOD TEST: CPT

## 2023-05-21 PROCEDURE — 6370000000 HC RX 637 (ALT 250 FOR IP): Performed by: FAMILY MEDICINE

## 2023-05-21 PROCEDURE — 6370000000 HC RX 637 (ALT 250 FOR IP): Performed by: INTERNAL MEDICINE

## 2023-05-21 PROCEDURE — 2580000003 HC RX 258: Performed by: INTERNAL MEDICINE

## 2023-05-21 RX ORDER — INSULIN LISPRO 100 [IU]/ML
5 INJECTION, SOLUTION INTRAVENOUS; SUBCUTANEOUS
Status: DISCONTINUED | OUTPATIENT
Start: 2023-05-21 | End: 2023-05-21 | Stop reason: HOSPADM

## 2023-05-21 RX ORDER — INSULIN LISPRO 100 [IU]/ML
10 INJECTION, SOLUTION INTRAVENOUS; SUBCUTANEOUS
Status: DISCONTINUED | OUTPATIENT
Start: 2023-05-21 | End: 2023-05-21

## 2023-05-21 RX ORDER — POLYETHYLENE GLYCOL 3350 17 G/17G
17 POWDER, FOR SOLUTION ORAL DAILY PRN
Qty: 527 G | Refills: 1 | Status: SHIPPED | OUTPATIENT
Start: 2023-05-21 | End: 2023-06-20

## 2023-05-21 RX ORDER — LEVOFLOXACIN 750 MG/1
750 TABLET ORAL DAILY
Qty: 21 TABLET | Refills: 0 | Status: SHIPPED | OUTPATIENT
Start: 2023-05-21 | End: 2023-06-11

## 2023-05-21 RX ORDER — INSULIN GLARGINE 100 [IU]/ML
45 INJECTION, SOLUTION SUBCUTANEOUS EVERY MORNING
Qty: 10 ML | Refills: 3 | Status: SHIPPED | OUTPATIENT
Start: 2023-05-22

## 2023-05-21 RX ORDER — INSULIN GLARGINE 100 [IU]/ML
45 INJECTION, SOLUTION SUBCUTANEOUS EVERY MORNING
Status: DISCONTINUED | OUTPATIENT
Start: 2023-05-22 | End: 2023-05-21 | Stop reason: HOSPADM

## 2023-05-21 RX ORDER — INSULIN LISPRO 100 [IU]/ML
5 INJECTION, SOLUTION INTRAVENOUS; SUBCUTANEOUS
Qty: 10 ML | Refills: 0 | Status: SHIPPED | OUTPATIENT
Start: 2023-05-21

## 2023-05-21 RX ADMIN — AMLODIPINE BESYLATE 10 MG: 5 TABLET ORAL at 08:03

## 2023-05-21 RX ADMIN — INSULIN LISPRO 16 UNITS: 100 INJECTION, SOLUTION INTRAVENOUS; SUBCUTANEOUS at 08:03

## 2023-05-21 RX ADMIN — LEVOFLOXACIN 750 MG: 5 INJECTION, SOLUTION INTRAVENOUS at 08:03

## 2023-05-21 RX ADMIN — SIMETHICONE 80 MG: 80 TABLET, CHEWABLE ORAL at 08:03

## 2023-05-21 RX ADMIN — HYDRALAZINE HYDROCHLORIDE 25 MG: 25 TABLET, FILM COATED ORAL at 08:03

## 2023-05-21 RX ADMIN — ENOXAPARIN SODIUM 40 MG: 100 INJECTION SUBCUTANEOUS at 08:04

## 2023-05-21 RX ADMIN — INSULIN LISPRO 16 UNITS: 100 INJECTION, SOLUTION INTRAVENOUS; SUBCUTANEOUS at 11:48

## 2023-05-21 RX ADMIN — INSULIN GLARGINE 40 UNITS: 100 INJECTION, SOLUTION SUBCUTANEOUS at 08:03

## 2023-05-21 RX ADMIN — PIPERACILLIN SODIUM AND TAZOBACTAM SODIUM 3375 MG: 3; .375 INJECTION, POWDER, LYOPHILIZED, FOR SOLUTION INTRAVENOUS at 09:11

## 2023-05-21 RX ADMIN — PIPERACILLIN SODIUM AND TAZOBACTAM SODIUM 3375 MG: 3; .375 INJECTION, POWDER, LYOPHILIZED, FOR SOLUTION INTRAVENOUS at 01:13

## 2023-05-21 ASSESSMENT — PAIN SCALES - GENERAL: PAINLEVEL_OUTOF10: 0

## 2023-05-22 LAB
BACTERIA SPEC CULT: ABNORMAL
BACTERIA SPEC CULT: NORMAL
GRAM STN SPEC: ABNORMAL
Lab: ABNORMAL
Lab: NORMAL

## 2023-05-23 NOTE — RESULT ENCOUNTER NOTE
Patient with diabetic foot infection with repeat left third toe now growing MSSA, Klebsiella, Alcaligenes and Stenotrophomonas. All susceptible to Levaquin, which he was discharged on, except  for Stenotrophomas.

## 2023-05-23 NOTE — RESULT ENCOUNTER NOTE
Patient with diabetic foot infection involving Klebsiella and Alcaligenes. He was discharged on Levaquin to which both organisms are sensitive.

## 2023-05-24 LAB
BACTERIA SPEC CULT: ABNORMAL
GRAM STN SPEC: ABNORMAL
GRAM STN SPEC: ABNORMAL
Lab: ABNORMAL

## 2023-05-24 NOTE — ANESTHESIA POSTPROCEDURE EVALUATION
Department of Anesthesiology  Postprocedure Note    Patient: Jose Raul Mccullough  MRN: [id number]  YOB: 1965      Procedure Summary     Date: 05/19/23 Room / Location: [facility] MAIN OR 06 / [facility] MAIN OR    Anesthesia Start: 6871 Anesthesia Stop: 4335    Procedure: PARTIAL LEFT THIRD TOE AMPUTATION (Left: Foot) Diagnosis:       Amputation of toe of left foot ([address]      (Amputation of toe of left foot ([address] [Z08.163S])    Surgeons: Marine Saravia DPM Responsible Provider: Carson Crisostomo MD    Anesthesia Type: general ASA Status: 3          Anesthesia Type: No value filed.     Cliff Phase I: Cliff Score: 10    Cliff Phase II:        Anesthesia Post Evaluation    Patient location during evaluation: PACU  Patient participation: complete - patient cannot participate  Level of consciousness: awake  Pain score: 0  Airway patency: patent  Nausea & Vomiting: no nausea and no vomiting  Complications: no  Cardiovascular status: hemodynamically stable  Respiratory status: acceptable  Hydration status: stable  Multimodal analgesia pain management approach

## 2023-05-25 LAB
BACTERIA SPEC CULT: NORMAL
Lab: NORMAL

## 2023-07-17 ENCOUNTER — HOSPITAL ENCOUNTER (EMERGENCY)
Facility: HOSPITAL | Age: 58
Discharge: HOME | End: 2023-07-17
Attending: EMERGENCY MEDICINE
Payer: MEDICAID

## 2023-07-17 VITALS
HEART RATE: 100 BPM | TEMPERATURE: 98.6 F | WEIGHT: 142 LBS | DIASTOLIC BLOOD PRESSURE: 77 MMHG | SYSTOLIC BLOOD PRESSURE: 140 MMHG | RESPIRATION RATE: 19 BRPM | HEIGHT: 71 IN | OXYGEN SATURATION: 100 % | BODY MASS INDEX: 19.88 KG/M2

## 2023-07-17 DIAGNOSIS — L02.91 ABSCESS: Primary | ICD-10-CM

## 2023-07-17 LAB
ALBUMIN SERPL-MCNC: 2.8 G/DL (ref 3.5–5)
ALBUMIN SERPL-MCNC: 2.8 G/DL (ref 3.5–5)
ALBUMIN/GLOB SERPL: 0.7 (ref 1.1–2.2)
ALBUMIN/GLOB SERPL: 0.8 (ref 1.1–2.2)
ALP SERPL-CCNC: 114 U/L (ref 45–117)
ALP SERPL-CCNC: 116 U/L (ref 45–117)
ALT SERPL-CCNC: 54 U/L (ref 12–78)
ALT SERPL-CCNC: 54 U/L (ref 12–78)
AMPHET UR QL SCN: POSITIVE
ANION GAP SERPL CALC-SCNC: 5 MMOL/L (ref 5–15)
ANION GAP SERPL CALC-SCNC: 6 MMOL/L (ref 5–15)
APAP SERPL-MCNC: <10 UG/ML (ref 10–30)
APPEARANCE UR: CLEAR
AST SERPL W P-5'-P-CCNC: 34 U/L (ref 15–37)
AST SERPL W P-5'-P-CCNC: 36 U/L (ref 15–37)
BACTERIA URNS QL MICRO: NEGATIVE /HPF
BARBITURATES UR QL SCN: NEGATIVE
BASE DEFICIT BLD-SCNC: 0.9 MMOL/L
BASOPHILS # BLD: 0 K/UL (ref 0–0.1)
BASOPHILS NFR BLD: 0 % (ref 0–1)
BENZODIAZ UR QL: NEGATIVE
BILIRUB SERPL-MCNC: 0.4 MG/DL (ref 0.2–1)
BILIRUB SERPL-MCNC: 0.4 MG/DL (ref 0.2–1)
BILIRUB UR QL: NEGATIVE
BUN SERPL-MCNC: 25 MG/DL (ref 6–20)
BUN SERPL-MCNC: 26 MG/DL (ref 6–20)
BUN/CREAT SERPL: 24 (ref 12–20)
BUN/CREAT SERPL: 24 (ref 12–20)
CA-I BLD-MCNC: 1.23 MMOL/L (ref 1.12–1.32)
CA-I BLD-MCNC: 8.5 MG/DL (ref 8.5–10.1)
CA-I BLD-MCNC: 8.5 MG/DL (ref 8.5–10.1)
CANNABINOIDS UR QL SCN: NEGATIVE
CHLORIDE BLD-SCNC: 102 MMOL/L (ref 98–107)
CHLORIDE SERPL-SCNC: 106 MMOL/L (ref 97–108)
CHLORIDE SERPL-SCNC: 106 MMOL/L (ref 97–108)
CO2 BLD-SCNC: 23 MMOL/L
CO2 SERPL-SCNC: 25 MMOL/L (ref 21–32)
CO2 SERPL-SCNC: 26 MMOL/L (ref 21–32)
COCAINE UR QL SCN: POSITIVE
COLOR UR: ABNORMAL
CREAT SERPL-MCNC: 1.06 MG/DL (ref 0.7–1.3)
CREAT SERPL-MCNC: 1.08 MG/DL (ref 0.7–1.3)
CREAT UR-MCNC: 0.85 MG/DL (ref 0.6–1.3)
DIFFERENTIAL METHOD BLD: ABNORMAL
EOSINOPHIL # BLD: 0.2 K/UL (ref 0–0.4)
EOSINOPHIL NFR BLD: 3 % (ref 0–7)
EPITH CASTS URNS QL MICRO: ABNORMAL /LPF
ERYTHROCYTE [DISTWIDTH] IN BLOOD BY AUTOMATED COUNT: 11.6 % (ref 11.5–14.5)
ETHANOL SERPL-MCNC: <10 MG/DL (ref 0–0.08)
GLOBULIN SER CALC-MCNC: 3.6 G/DL (ref 2–4)
GLOBULIN SER CALC-MCNC: 3.8 G/DL (ref 2–4)
GLUCOSE BLD STRIP.AUTO-MCNC: 378 MG/DL (ref 65–100)
GLUCOSE BLD STRIP.AUTO-MCNC: 476 MG/DL (ref 65–100)
GLUCOSE SERPL-MCNC: 463 MG/DL (ref 65–100)
GLUCOSE SERPL-MCNC: 469 MG/DL (ref 65–100)
GLUCOSE UR STRIP.AUTO-MCNC: >300 MG/DL
HCO3 BLD-SCNC: 23.5 MMOL/L (ref 19–28)
HCT VFR BLD AUTO: 29.8 % (ref 36.6–50.3)
HGB BLD-MCNC: 10.5 G/DL (ref 12.1–17)
HGB UR QL STRIP: ABNORMAL
IMM GRANULOCYTES # BLD AUTO: 0 K/UL (ref 0–0.04)
IMM GRANULOCYTES NFR BLD AUTO: 0 % (ref 0–0.5)
KETONES UR QL STRIP.AUTO: NEGATIVE MG/DL
LACTATE BLD-SCNC: 0.77 MMOL/L (ref 0.4–2)
LEUKOCYTE ESTERASE UR QL STRIP.AUTO: NEGATIVE
LYMPHOCYTES # BLD: 1.9 K/UL (ref 0.8–3.5)
LYMPHOCYTES NFR BLD: 26 % (ref 12–49)
Lab: ABNORMAL
MCH RBC QN AUTO: 31.5 PG (ref 26–34)
MCHC RBC AUTO-ENTMCNC: 35.2 G/DL (ref 30–36.5)
MCV RBC AUTO: 89.5 FL (ref 80–99)
METHADONE UR QL: NEGATIVE
MONOCYTES # BLD: 0.6 K/UL (ref 0–1)
MONOCYTES NFR BLD: 8 % (ref 5–13)
NEUTS SEG # BLD: 4.6 K/UL (ref 1.8–8)
NEUTS SEG NFR BLD: 63 % (ref 32–75)
NITRITE UR QL STRIP.AUTO: NEGATIVE
NRBC # BLD: 0 K/UL (ref 0–0.01)
NRBC BLD-RTO: 0 PER 100 WBC
OPIATES UR QL: NEGATIVE
PCO2 BLD: 37.4 MMHG (ref 35–45)
PCP UR QL: NEGATIVE
PERFORMED BY:: ABNORMAL
PERFORMED BY:: ABNORMAL
PH BLD: 7.41 (ref 7.35–7.45)
PH UR STRIP: 5 (ref 5–8)
PLATELET # BLD AUTO: 171 K/UL (ref 150–400)
PMV BLD AUTO: 10.2 FL (ref 8.9–12.9)
PO2 BLD: 67 MMHG (ref 75–100)
POTASSIUM BLD-SCNC: 4.1 MMOL/L (ref 3.5–5.5)
POTASSIUM SERPL-SCNC: 4 MMOL/L (ref 3.5–5.1)
POTASSIUM SERPL-SCNC: 4.1 MMOL/L (ref 3.5–5.1)
PROT SERPL-MCNC: 6.4 G/DL (ref 6.4–8.2)
PROT SERPL-MCNC: 6.6 G/DL (ref 6.4–8.2)
PROT UR STRIP-MCNC: NEGATIVE MG/DL
RBC # BLD AUTO: 3.33 M/UL (ref 4.1–5.7)
RBC #/AREA URNS HPF: ABNORMAL /HPF (ref 0–5)
SALICYLATES SERPL-MCNC: <1.7 MG/DL (ref 2.8–20)
SAO2 % BLD: 93 %
SODIUM BLD-SCNC: 135 MMOL/L (ref 136–145)
SODIUM SERPL-SCNC: 137 MMOL/L (ref 136–145)
SODIUM SERPL-SCNC: 137 MMOL/L (ref 136–145)
SP GR UR REFRACTOMETRY: 1.02 (ref 1–1.03)
SPECIMEN SITE: ABNORMAL
URINE CULTURE IF INDICATED: ABNORMAL
UROBILINOGEN UR QL STRIP.AUTO: 2 EU/DL (ref 0.1–1)
WBC # BLD AUTO: 7.4 K/UL (ref 4.1–11.1)
WBC URNS QL MICRO: ABNORMAL /HPF (ref 0–4)

## 2023-07-17 PROCEDURE — 82947 ASSAY GLUCOSE BLOOD QUANT: CPT

## 2023-07-17 PROCEDURE — 81001 URINALYSIS AUTO W/SCOPE: CPT

## 2023-07-17 PROCEDURE — 99284 EMERGENCY DEPT VISIT MOD MDM: CPT

## 2023-07-17 PROCEDURE — 6360000002 HC RX W HCPCS

## 2023-07-17 PROCEDURE — 2580000003 HC RX 258

## 2023-07-17 PROCEDURE — 80143 DRUG ASSAY ACETAMINOPHEN: CPT

## 2023-07-17 PROCEDURE — 82077 ASSAY SPEC XCP UR&BREATH IA: CPT

## 2023-07-17 PROCEDURE — 80053 COMPREHEN METABOLIC PANEL: CPT

## 2023-07-17 PROCEDURE — 10060 I&D ABSCESS SIMPLE/SINGLE: CPT

## 2023-07-17 PROCEDURE — 36415 COLL VENOUS BLD VENIPUNCTURE: CPT

## 2023-07-17 PROCEDURE — 84295 ASSAY OF SERUM SODIUM: CPT

## 2023-07-17 PROCEDURE — 80179 DRUG ASSAY SALICYLATE: CPT

## 2023-07-17 PROCEDURE — 84132 ASSAY OF SERUM POTASSIUM: CPT

## 2023-07-17 PROCEDURE — 80307 DRUG TEST PRSMV CHEM ANLYZR: CPT

## 2023-07-17 PROCEDURE — 2580000003 HC RX 258: Performed by: EMERGENCY MEDICINE

## 2023-07-17 PROCEDURE — 96374 THER/PROPH/DIAG INJ IV PUSH: CPT

## 2023-07-17 PROCEDURE — 82962 GLUCOSE BLOOD TEST: CPT

## 2023-07-17 PROCEDURE — 82803 BLOOD GASES ANY COMBINATION: CPT

## 2023-07-17 PROCEDURE — 6370000000 HC RX 637 (ALT 250 FOR IP)

## 2023-07-17 PROCEDURE — 83605 ASSAY OF LACTIC ACID: CPT

## 2023-07-17 PROCEDURE — 85025 COMPLETE CBC W/AUTO DIFF WBC: CPT

## 2023-07-17 PROCEDURE — 96375 TX/PRO/DX INJ NEW DRUG ADDON: CPT

## 2023-07-17 PROCEDURE — 2500000003 HC RX 250 WO HCPCS

## 2023-07-17 PROCEDURE — 82330 ASSAY OF CALCIUM: CPT

## 2023-07-17 RX ORDER — KETOROLAC TROMETHAMINE 30 MG/ML
30 INJECTION, SOLUTION INTRAMUSCULAR; INTRAVENOUS
Status: COMPLETED | OUTPATIENT
Start: 2023-07-17 | End: 2023-07-17

## 2023-07-17 RX ORDER — METOCLOPRAMIDE HYDROCHLORIDE 5 MG/ML
10 INJECTION INTRAMUSCULAR; INTRAVENOUS EVERY 6 HOURS
Status: DISCONTINUED | OUTPATIENT
Start: 2023-07-17 | End: 2023-07-17 | Stop reason: HOSPADM

## 2023-07-17 RX ORDER — 0.9 % SODIUM CHLORIDE 0.9 %
1000 INTRAVENOUS SOLUTION INTRAVENOUS ONCE
Status: COMPLETED | OUTPATIENT
Start: 2023-07-17 | End: 2023-07-17

## 2023-07-17 RX ORDER — DEXAMETHASONE SODIUM PHOSPHATE 4 MG/ML
4 INJECTION, SOLUTION INTRA-ARTICULAR; INTRALESIONAL; INTRAMUSCULAR; INTRAVENOUS; SOFT TISSUE ONCE
Status: COMPLETED | OUTPATIENT
Start: 2023-07-17 | End: 2023-07-17

## 2023-07-17 RX ORDER — LIDOCAINE HYDROCHLORIDE AND EPINEPHRINE 10; 10 MG/ML; UG/ML
20 INJECTION, SOLUTION INFILTRATION; PERINEURAL ONCE
Status: COMPLETED | OUTPATIENT
Start: 2023-07-17 | End: 2023-07-17

## 2023-07-17 RX ADMIN — LIDOCAINE HYDROCHLORIDE AND EPINEPHRINE 20 ML: 10; 10 INJECTION, SOLUTION INFILTRATION; PERINEURAL at 14:22

## 2023-07-17 RX ADMIN — INSULIN HUMAN 10 UNITS: 100 INJECTION, SOLUTION PARENTERAL at 14:14

## 2023-07-17 RX ADMIN — KETOROLAC TROMETHAMINE 30 MG: 30 INJECTION, SOLUTION INTRAMUSCULAR; INTRAVENOUS at 14:15

## 2023-07-17 RX ADMIN — DEXAMETHASONE SODIUM PHOSPHATE 4 MG: 4 INJECTION INTRA-ARTICULAR; INTRALESIONAL; INTRAMUSCULAR; INTRAVENOUS; SOFT TISSUE at 14:15

## 2023-07-17 RX ADMIN — METOCLOPRAMIDE 10 MG: 5 INJECTION, SOLUTION INTRAMUSCULAR; INTRAVENOUS at 14:15

## 2023-07-17 RX ADMIN — SODIUM CHLORIDE 1000 ML: 9 INJECTION, SOLUTION INTRAVENOUS at 12:40

## 2023-07-17 RX ADMIN — SODIUM CHLORIDE 1000 ML: 9 INJECTION, SOLUTION INTRAVENOUS at 14:22

## 2023-07-17 ASSESSMENT — LIFESTYLE VARIABLES
HOW MANY STANDARD DRINKS CONTAINING ALCOHOL DO YOU HAVE ON A TYPICAL DAY: 3 OR 4
HOW OFTEN DO YOU HAVE A DRINK CONTAINING ALCOHOL: 2-4 TIMES A MONTH

## 2023-07-17 ASSESSMENT — PAIN - FUNCTIONAL ASSESSMENT: PAIN_FUNCTIONAL_ASSESSMENT: 0-10

## 2023-07-17 ASSESSMENT — PAIN SCALES - GENERAL: PAINLEVEL_OUTOF10: 0

## 2023-07-17 NOTE — ED PROVIDER NOTES
[facility]  EMERGENCY DEPARTMENT HISTORY AND PHYSICAL EXAM      Date: 7/17/2023  Patient Name: Salome Singh  MRN: [id number]  [address] [facility] [place] 1965  Date of evaluation: 7/17/2023  Provider: Renée Metz PA-C   Note Started: 12:40 PM EDT 7/17/23    HISTORY OF PRESENT ILLNESS     Chief Complaint   Patient presents with    Hyperglycemia    Abscess       History Provided By: Patient    HPI: Salome Singh is a 66 y.o. male past medical history of diabetes, otitis C, hypertension, psychiatric disorder, substance abuse presents emergency department with chief complaint of abscess under his left arm and asymptomatic hyperglycemia. Reports that he was recently incarcerated and was discharged a few weeks ago but noticed a mass under his left arm from a \"spider bite\" while he was incarcerated. Patient also complaining of a headache/migraine described as a pressure sensation over his head with photophobia, additionally patient reports cocaine use within the last 24 hours. PAST MEDICAL HISTORY   Past Medical History:  Past Medical History:   Diagnosis Date    Diabetes ([address])     Hepatitis C infection     Hypertension     Psychiatric disorder        Past Surgical History:  Past Surgical History:   Procedure Laterality Date    TOE AMPUTATION Left 5/19/2023    PARTIAL LEFT THIRD TOE AMPUTATION performed by José Miguel Bello DPM at [facility]       Family History:  No family history on file. Social History:  Social History     Tobacco Use    Smoking status: Every Day     Packs/day: 1.00     Types: Cigarettes    Smokeless tobacco: Never   Substance Use Topics    Alcohol use: Not Currently    Drug use: Yes     Frequency: 7.0 times per week     Types: Cocaine       Allergies:  No Known Allergies    PCP: Katalina Andrea MD    Current Meds:   No current facility-administered medications for this encounter.      Current Outpatient Medications   Medication Sig Dispense Refill    sulfamethoxazole-trimethoprim

## 2023-07-17 NOTE — ED NOTES
Provider at bedside for dispo and follow up. Discharge plan reviewed and paperwork signed, teaching completed including treatment received, medications and follow up plan of care, pain level within manageable comfortable limits, IV removed, ambulatory to exit, gait steady, safety maintained.        Tor Orr RN  07/17/23 1456

## 2023-07-17 NOTE — ED TRIAGE NOTES
Per ems patient complains of high blood sugar and abscess under left arm.  Admits to recent alcohol and cocaine use <24hrs

## 2023-07-18 RX ORDER — CEPHALEXIN 500 MG/1
500 CAPSULE ORAL 3 TIMES DAILY
Qty: 21 CAPSULE | Refills: 0 | Status: SHIPPED | OUTPATIENT
Start: 2023-07-18 | End: 2023-07-25

## 2023-07-18 RX ORDER — SULFAMETHOXAZOLE AND TRIMETHOPRIM 800; 160 MG/1; MG/1
1 TABLET ORAL 2 TIMES DAILY
Qty: 14 TABLET | Refills: 0 | Status: SHIPPED | OUTPATIENT
Start: 2023-07-18 | End: 2023-07-25

## 2024-01-04 ENCOUNTER — HOSPITAL ENCOUNTER (INPATIENT)
Facility: HOSPITAL | Age: 59
LOS: 11 days | Discharge: HOME | DRG: 751 | End: 2024-01-15
Attending: EMERGENCY MEDICINE | Admitting: PSYCHIATRY & NEUROLOGY
Payer: MEDICAID

## 2024-01-04 DIAGNOSIS — R45.851 SUICIDAL IDEATION: Primary | ICD-10-CM

## 2024-01-04 PROBLEM — F33.0 MDD (MAJOR DEPRESSIVE DISORDER), RECURRENT EPISODE, MILD (HCC): Status: ACTIVE | Noted: 2024-01-04

## 2024-01-04 LAB
ALBUMIN SERPL-MCNC: 3 G/DL (ref 3.5–5)
ALBUMIN SERPL-MCNC: 3.7 G/DL (ref 3.5–5)
ALBUMIN/GLOB SERPL: 0.8 (ref 1.1–2.2)
ALBUMIN/GLOB SERPL: 0.8 (ref 1.1–2.2)
ALP SERPL-CCNC: 68 U/L (ref 45–117)
ALP SERPL-CCNC: 77 U/L (ref 45–117)
ALT SERPL-CCNC: 105 U/L (ref 12–78)
ALT SERPL-CCNC: 82 U/L (ref 12–78)
AMPHET UR QL SCN: POSITIVE
ANION GAP SERPL CALC-SCNC: 9 MMOL/L (ref 5–15)
ANION GAP SERPL CALC-SCNC: 9 MMOL/L (ref 5–15)
APAP SERPL-MCNC: <2 UG/ML (ref 10–30)
APPEARANCE UR: CLEAR
AST SERPL W P-5'-P-CCNC: 55 U/L (ref 15–37)
AST SERPL W P-5'-P-CCNC: ABNORMAL U/L (ref 15–37)
BACTERIA URNS QL MICRO: NEGATIVE /HPF
BARBITURATES UR QL SCN: NEGATIVE
BASOPHILS # BLD: 0 K/UL (ref 0–0.1)
BASOPHILS NFR BLD: 0 % (ref 0–1)
BENZODIAZ UR QL: NEGATIVE
BILIRUB SERPL-MCNC: 0.6 MG/DL (ref 0.2–1)
BILIRUB SERPL-MCNC: 0.8 MG/DL (ref 0.2–1)
BILIRUB UR QL: NEGATIVE
BUN SERPL-MCNC: 26 MG/DL (ref 6–20)
BUN SERPL-MCNC: 32 MG/DL (ref 6–20)
BUN/CREAT SERPL: 19 (ref 12–20)
BUN/CREAT SERPL: 22 (ref 12–20)
CA-I BLD-MCNC: 10.3 MG/DL (ref 8.5–10.1)
CA-I BLD-MCNC: 9.1 MG/DL (ref 8.5–10.1)
CANNABINOIDS UR QL SCN: NEGATIVE
CHLORIDE SERPL-SCNC: 101 MMOL/L (ref 97–108)
CHLORIDE SERPL-SCNC: 95 MMOL/L (ref 97–108)
CO2 SERPL-SCNC: 23 MMOL/L (ref 21–32)
CO2 SERPL-SCNC: 24 MMOL/L (ref 21–32)
COCAINE UR QL SCN: POSITIVE
COLOR UR: ABNORMAL
CREAT SERPL-MCNC: 1.2 MG/DL (ref 0.7–1.3)
CREAT SERPL-MCNC: 1.66 MG/DL (ref 0.7–1.3)
DIFFERENTIAL METHOD BLD: ABNORMAL
EKG ATRIAL RATE: 102 BPM
EKG DIAGNOSIS: NORMAL
EKG P AXIS: 43 DEGREES
EKG P-R INTERVAL: 162 MS
EKG Q-T INTERVAL: 392 MS
EKG QRS DURATION: 136 MS
EKG QTC CALCULATION (BAZETT): 510 MS
EKG R AXIS: 17 DEGREES
EKG T AXIS: 177 DEGREES
EKG VENTRICULAR RATE: 102 BPM
EOSINOPHIL # BLD: 0.1 K/UL (ref 0–0.4)
EOSINOPHIL NFR BLD: 1 % (ref 0–7)
EPITH CASTS URNS QL MICRO: ABNORMAL /LPF
ERYTHROCYTE [DISTWIDTH] IN BLOOD BY AUTOMATED COUNT: 12.2 % (ref 11.5–14.5)
ETHANOL SERPL-MCNC: <10 MG/DL (ref 0–0.08)
FLUAV RNA SPEC QL NAA+PROBE: NOT DETECTED
FLUBV RNA SPEC QL NAA+PROBE: NOT DETECTED
GLOBULIN SER CALC-MCNC: 3.8 G/DL (ref 2–4)
GLOBULIN SER CALC-MCNC: 4.5 G/DL (ref 2–4)
GLUCOSE BLD STRIP.AUTO-MCNC: 354 MG/DL (ref 65–100)
GLUCOSE SERPL-MCNC: 246 MG/DL (ref 65–100)
GLUCOSE SERPL-MCNC: 328 MG/DL (ref 65–100)
GLUCOSE UR STRIP.AUTO-MCNC: >500 MG/DL
HCT VFR BLD AUTO: 38.5 % (ref 36.6–50.3)
HGB BLD-MCNC: 13.9 G/DL (ref 12.1–17)
HGB UR QL STRIP: ABNORMAL
IMM GRANULOCYTES # BLD AUTO: 0 K/UL (ref 0–0.04)
IMM GRANULOCYTES NFR BLD AUTO: 0 % (ref 0–0.5)
KETONES UR QL STRIP.AUTO: 20 MG/DL
LEUKOCYTE ESTERASE UR QL STRIP.AUTO: NEGATIVE
LYMPHOCYTES # BLD: 2.8 K/UL (ref 0.8–3.5)
LYMPHOCYTES NFR BLD: 36 % (ref 12–49)
Lab: ABNORMAL
MAGNESIUM SERPL-MCNC: NORMAL MG/DL (ref 1.6–2.4)
MCH RBC QN AUTO: 32.1 PG (ref 26–34)
MCHC RBC AUTO-ENTMCNC: 36.1 G/DL (ref 30–36.5)
MCV RBC AUTO: 88.9 FL (ref 80–99)
METHADONE UR QL: NEGATIVE
MONOCYTES # BLD: 1.2 K/UL (ref 0–1)
MONOCYTES NFR BLD: 15 % (ref 5–13)
MUCOUS THREADS URNS QL MICRO: ABNORMAL /LPF
NEUTS SEG # BLD: 3.7 K/UL (ref 1.8–8)
NEUTS SEG NFR BLD: 48 % (ref 32–75)
NITRITE UR QL STRIP.AUTO: NEGATIVE
NRBC # BLD: 0 K/UL (ref 0–0.01)
NRBC BLD-RTO: 0 PER 100 WBC
OPIATES UR QL: NEGATIVE
PCP UR QL: NEGATIVE
PERFORMED BY:: ABNORMAL
PH UR STRIP: 5 (ref 5–8)
PLATELET # BLD AUTO: 102 K/UL (ref 150–400)
PMV BLD AUTO: 12.3 FL (ref 8.9–12.9)
POTASSIUM SERPL-SCNC: 3.9 MMOL/L (ref 3.5–5.1)
POTASSIUM SERPL-SCNC: 4.2 MMOL/L (ref 3.5–5.1)
POTASSIUM SERPL-SCNC: ABNORMAL MMOL/L (ref 3.5–5.1)
PROT SERPL-MCNC: 6.8 G/DL (ref 6.4–8.2)
PROT SERPL-MCNC: 8.2 G/DL (ref 6.4–8.2)
PROT UR STRIP-MCNC: 100 MG/DL
RBC # BLD AUTO: 4.33 M/UL (ref 4.1–5.7)
RBC #/AREA URNS HPF: ABNORMAL /HPF (ref 0–5)
SALICYLATES SERPL-MCNC: 1.9 MG/DL (ref 2.8–20)
SARS-COV-2 RNA RESP QL NAA+PROBE: NOT DETECTED
SODIUM SERPL-SCNC: 127 MMOL/L (ref 136–145)
SODIUM SERPL-SCNC: 134 MMOL/L (ref 136–145)
SP GR UR REFRACTOMETRY: 1.02 (ref 1–1.03)
URINE CULTURE IF INDICATED: ABNORMAL
UROBILINOGEN UR QL STRIP.AUTO: 0.1 EU/DL (ref 0.1–1)
WBC # BLD AUTO: 7.9 K/UL (ref 4.1–11.1)
WBC URNS QL MICRO: ABNORMAL /HPF (ref 0–4)

## 2024-01-04 PROCEDURE — 1240000000 HC EMOTIONAL WELLNESS R&B

## 2024-01-04 PROCEDURE — 80307 DRUG TEST PRSMV CHEM ANLYZR: CPT

## 2024-01-04 PROCEDURE — 81001 URINALYSIS AUTO W/SCOPE: CPT

## 2024-01-04 PROCEDURE — 6370000000 HC RX 637 (ALT 250 FOR IP): Performed by: PSYCHIATRY & NEUROLOGY

## 2024-01-04 PROCEDURE — 84132 ASSAY OF SERUM POTASSIUM: CPT

## 2024-01-04 PROCEDURE — 93005 ELECTROCARDIOGRAM TRACING: CPT | Performed by: EMERGENCY MEDICINE

## 2024-01-04 PROCEDURE — 80053 COMPREHEN METABOLIC PANEL: CPT

## 2024-01-04 PROCEDURE — 87636 SARSCOV2 & INF A&B AMP PRB: CPT

## 2024-01-04 PROCEDURE — 80179 DRUG ASSAY SALICYLATE: CPT

## 2024-01-04 PROCEDURE — 82077 ASSAY SPEC XCP UR&BREATH IA: CPT

## 2024-01-04 PROCEDURE — 80143 DRUG ASSAY ACETAMINOPHEN: CPT

## 2024-01-04 PROCEDURE — 99285 EMERGENCY DEPT VISIT HI MDM: CPT

## 2024-01-04 PROCEDURE — 83735 ASSAY OF MAGNESIUM: CPT

## 2024-01-04 PROCEDURE — 85025 COMPLETE CBC W/AUTO DIFF WBC: CPT

## 2024-01-04 PROCEDURE — 82962 GLUCOSE BLOOD TEST: CPT

## 2024-01-04 PROCEDURE — 36415 COLL VENOUS BLD VENIPUNCTURE: CPT

## 2024-01-04 RX ORDER — INSULIN LISPRO 100 [IU]/ML
0-4 INJECTION, SOLUTION INTRAVENOUS; SUBCUTANEOUS
Status: DISCONTINUED | OUTPATIENT
Start: 2024-01-05 | End: 2024-01-07

## 2024-01-04 RX ORDER — QUETIAPINE FUMARATE 100 MG/1
100 TABLET, FILM COATED ORAL NIGHTLY
Status: DISCONTINUED | OUTPATIENT
Start: 2024-01-04 | End: 2024-01-07

## 2024-01-04 RX ORDER — HYDROXYZINE 50 MG/1
50 TABLET, FILM COATED ORAL 3 TIMES DAILY PRN
Status: DISCONTINUED | OUTPATIENT
Start: 2024-01-04 | End: 2024-01-15 | Stop reason: HOSPADM

## 2024-01-04 RX ORDER — ACETAMINOPHEN 325 MG/1
650 TABLET ORAL EVERY 4 HOURS PRN
Status: DISCONTINUED | OUTPATIENT
Start: 2024-01-04 | End: 2024-01-15 | Stop reason: HOSPADM

## 2024-01-04 RX ORDER — LITHIUM CARBONATE 300 MG
300 TABLET ORAL 2 TIMES DAILY
Status: DISCONTINUED | OUTPATIENT
Start: 2024-01-04 | End: 2024-01-07

## 2024-01-04 RX ORDER — TRAZODONE HYDROCHLORIDE 50 MG/1
100 TABLET ORAL NIGHTLY PRN
Status: DISCONTINUED | OUTPATIENT
Start: 2024-01-04 | End: 2024-01-08

## 2024-01-04 RX ORDER — OLANZAPINE 5 MG/1
5 TABLET ORAL 2 TIMES DAILY
Status: DISCONTINUED | OUTPATIENT
Start: 2024-01-04 | End: 2024-01-07

## 2024-01-04 RX ORDER — INSULIN LISPRO 100 [IU]/ML
0-4 INJECTION, SOLUTION INTRAVENOUS; SUBCUTANEOUS NIGHTLY
Status: DISCONTINUED | OUTPATIENT
Start: 2024-01-04 | End: 2024-01-07

## 2024-01-04 RX ORDER — AMLODIPINE BESYLATE 5 MG/1
5 TABLET ORAL DAILY
Status: DISCONTINUED | OUTPATIENT
Start: 2024-01-05 | End: 2024-01-15 | Stop reason: HOSPADM

## 2024-01-04 RX ORDER — FLUOXETINE HYDROCHLORIDE 20 MG/1
20 CAPSULE ORAL DAILY
Status: DISCONTINUED | OUTPATIENT
Start: 2024-01-05 | End: 2024-01-15 | Stop reason: HOSPADM

## 2024-01-04 RX ADMIN — QUETIAPINE FUMARATE 100 MG: 100 TABLET ORAL at 20:52

## 2024-01-04 RX ADMIN — LITHIUM CARBONATE 300 MG: 300 TABLET ORAL at 20:52

## 2024-01-04 RX ADMIN — OLANZAPINE 5 MG: 5 TABLET, FILM COATED ORAL at 20:52

## 2024-01-04 RX ADMIN — INSULIN LISPRO 4 UNITS: 100 INJECTION, SOLUTION INTRAVENOUS; SUBCUTANEOUS at 20:52

## 2024-01-04 RX ADMIN — BUSPIRONE HYDROCHLORIDE 15 MG: 10 TABLET ORAL at 20:52

## 2024-01-04 ASSESSMENT — SLEEP AND FATIGUE QUESTIONNAIRES
DO YOU USE A SLEEP AID: NO
DO YOU HAVE DIFFICULTY SLEEPING: NO
AVERAGE NUMBER OF SLEEP HOURS: 6

## 2024-01-04 ASSESSMENT — PAIN SCALES - GENERAL: PAINLEVEL_OUTOF10: 0

## 2024-01-04 ASSESSMENT — PAIN - FUNCTIONAL ASSESSMENT: PAIN_FUNCTIONAL_ASSESSMENT: 0-10

## 2024-01-04 NOTE — ED PROVIDER NOTES
[facility] EMERGENCY DEPT  EMERGENCY DEPARTMENT HISTORY AND PHYSICAL EXAM      Date: 1/4/2024  Patient Name: Jordyn Smith  MRN: [id number]  Birthdate 1965  Date of evaluation: 1/4/2024  Provider: Deisy Wilson MD   Note Started: 9:52 AM EST 1/4/24    HISTORY OF PRESENT ILLNESS     Chief Complaint   Patient presents with    Mental Health Problem       History Provided By: Patient    HPI: Jordyn Smith is a 58 y.o. male with PMH of DM, hypertension who presents for SI.  Reports he took fentanyl PTA in an attempt to hurt himself. Still feeling suicidal now. No HI, AVH. No medical complaints.  Did not get altered after taking the fentanyl. Did not require narcan.    PAST MEDICAL HISTORY   Past Medical History:  Past Medical History:   Diagnosis Date    Diabetes (HCC)     Hepatitis C infection     Hypertension     Psychiatric disorder        Past Surgical History:  Past Surgical History:   Procedure Laterality Date    TOE AMPUTATION Left 5/19/2023    PARTIAL LEFT THIRD TOE AMPUTATION performed by Teto Leblanc DPM at [facility] MAIN OR       Family History:  No family history on file.    Social History:  Social History     Tobacco Use    Smoking status: Every Day     Current packs/day: 1.00     Types: Cigarettes    Smokeless tobacco: Never   Substance Use Topics    Alcohol use: Not Currently    Drug use: Yes     Frequency: 7.0 times per week     Types: Cocaine       Allergies:  No Known Allergies    PCP: Segundo aCmpo MD    Current Meds:   Current Facility-Administered Medications   Medication Dose Route Frequency Provider Last Rate Last Admin    acetaminophen (TYLENOL) tablet 650 mg  650 mg Oral Q4H PRN Lissett Munoz MD        hydrOXYzine HCl (ATARAX) tablet 50 mg  50 mg Oral TID PRN Lissett Munoz MD        traZODone (DESYREL) tablet 100 mg  100 mg Oral Nightly PRN Lissett Munoz MD        magnesium hydroxide (MILK OF MAGNESIA) 400 MG/5ML suspension 30 mL  30 mL Oral Daily PRN Lissett Munoz MD        amLODIPine
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
## 2024-01-04 NOTE — ED TRIAGE NOTES
Pt came in indorsing SI thoughts. Pt did a line of fenytal in attempt to OD, then had second thoughts. No HI.     Ems placed a 22g in left AC, pt received 300mL of fluids    BG= 353    Per Steve PULIDO complete the other 700 mL of fluids
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
## 2024-01-04 NOTE — PLAN OF CARE
Problem: Anxiety  Goal: Will report anxiety at manageable levels  Description: INTERVENTIONS:  1. Administer medication as ordered  2. Teach and rehearse alternative coping skills  3. Provide emotional support with 1:1 interaction with staff  Outcome: Progressing     Problem: Behavior  Goal: Pt/Family maintain appropriate behavior and adhere to behavioral management agreement, if implemented  Description: INTERVENTIONS:  1. Assess patient/family's coping skills and  non-compliant behavior (including use of illegal substances)  2. Notify security of behavior or suspected illegal substances which indicate the need for search of the family and/or belongings  3. Encourage verbalization of thoughts and concerns in a socially appropriate manner  4. Utilize positive, consistent limit setting strategies supporting safety of patient, staff and others  5. Encourage participation in the decision making process about the behavioral management agreement  6. If a visitor's behavior poses a threat to safety call refer to organization policy.  7. Initiate consult with , Psychosocial CNS, Spiritual Care as appropriate  Outcome: Progressing

## 2024-01-04 NOTE — BH NOTE
1755   Patient arrived on unit escorted by security and ED staff.   Admission status: voluntary.   Q15 minute checks initiated at this time.       Dr. Munoz contacted for admission and medication orders. Plan of care ongoing.

## 2024-01-05 LAB
GLUCOSE BLD STRIP.AUTO-MCNC: 178 MG/DL (ref 65–100)
GLUCOSE BLD STRIP.AUTO-MCNC: 191 MG/DL (ref 65–100)
GLUCOSE BLD STRIP.AUTO-MCNC: 255 MG/DL (ref 65–100)
GLUCOSE BLD STRIP.AUTO-MCNC: 277 MG/DL (ref 65–100)
PERFORMED BY:: ABNORMAL

## 2024-01-05 PROCEDURE — 6370000000 HC RX 637 (ALT 250 FOR IP): Performed by: PSYCHIATRY & NEUROLOGY

## 2024-01-05 PROCEDURE — 1240000000 HC EMOTIONAL WELLNESS R&B

## 2024-01-05 PROCEDURE — 82962 GLUCOSE BLOOD TEST: CPT

## 2024-01-05 RX ADMIN — INSULIN LISPRO 2 UNITS: 100 INJECTION, SOLUTION INTRAVENOUS; SUBCUTANEOUS at 11:54

## 2024-01-05 RX ADMIN — OLANZAPINE 5 MG: 5 TABLET, FILM COATED ORAL at 20:41

## 2024-01-05 RX ADMIN — TRAZODONE HYDROCHLORIDE 100 MG: 50 TABLET ORAL at 20:41

## 2024-01-05 RX ADMIN — QUETIAPINE FUMARATE 100 MG: 100 TABLET ORAL at 20:41

## 2024-01-05 RX ADMIN — LITHIUM CARBONATE 300 MG: 300 TABLET ORAL at 20:41

## 2024-01-05 RX ADMIN — ACETAMINOPHEN 650 MG: 325 TABLET ORAL at 20:41

## 2024-01-05 RX ADMIN — AMLODIPINE BESYLATE 5 MG: 5 TABLET ORAL at 08:38

## 2024-01-05 RX ADMIN — BUSPIRONE HYDROCHLORIDE 15 MG: 10 TABLET ORAL at 20:41

## 2024-01-05 ASSESSMENT — PAIN SCALES - GENERAL: PAINLEVEL_OUTOF10: 0

## 2024-01-05 ASSESSMENT — PAIN SCALES - WONG BAKER: WONGBAKER_NUMERICALRESPONSE: 0

## 2024-01-05 NOTE — BH NOTE
PSYCHOSOCIAL ASSESSMENT  :Patient identifying info:   Jordyn Smith is a 58 y.o., male admitted 1/4/2024  7:20 AM     Presenting problem and precipitating factors: Pt voluntarily admitted himself due to substance use with an OD on fentanyl and alcohol. Pt reported that his girlfriend kicked him out of her home and would not allow him to get his belongings. Pt shared that he was hospitalized 4-5 months ago with  and mental health. He shared that he was in a substance use program in [place] and would not be returning there. He would like to relocate to [facility] where his brother lives.     Mental status assessment: Pt presented with a tired, lethargic affect and congruent mood. Pt denied any SI/HI/aVH at the time and was orientated x3. Pt was laying in bed and reported having \"body aches and feeling weak\". Pts thoughts were clear and organized. He appeared to blame his relapse on his ex girlfriend. Pts speech was garbled and soft. He appeared to have some insight and judgement. Pt did not appear to have cognitive or memory impairment.     Strengths/Recreation/Coping Skills:Pt likes to write, draw and do art    Collateral information: Pt denied     Current psychiatric /substance abuse providers and contact info: Pt does nto have a current treatment team    Previous psychiatric/substance abuse providers and response to treatment: Pt was hospitalized 4-5 months ago    Family history of mental illness or substance abuse: Pt stated \"I don't really know the hx\"    Substance abuse history:    Social History     Tobacco Use    Smoking status: Every Day     Current packs/day: 1.00     Types: Cigarettes    Smokeless tobacco: Never   Substance Use Topics    Alcohol use: Not Currently       History of biomedical complications associated with substance abuse: Pt denied but reported body aches and feeling weak    Patient's current acceptance of treatment or motivation for change: Pt has some insight and wants to be

## 2024-01-05 NOTE — BH NOTE
INITIAL PSYCHIATRIC EVALUATION            IDENTIFICATION:    Patient Name  Jordyn Smith   Date of Birth 1965   [facility] [id number]   Medical Record Number  [id number]      Age  58 y.o.   PCP Segundo Campo MD   Admit date:  1/4/2024    Room Number  245/01  @ [facility]   Date of Service  1/5/2024            HISTORY         REASON FOR HOSPITALIZATION:    CC: Suicidal attempt by reported overdose of fentanyl       HISTORY OF PRESENT ILLNESS:     The patient, Jordyn Smith, is a 58 y.o. male admitted to the behavioral health floor after patient presents to the emergency [place] with reported overdose on fentanyl.  Patient expresses that he was recently kicked out by his girlfriend and was not out to get his staff.  Patient expresses that he has been hopeless and feeling helpless.  Patient expresses that he had recently left wrist rehab in [place] after 6 months.  Patient expresses he got into a relationship which led to being kicked out and that he lost everything including his self-esteem and his goals for his life starting to fall apart.  Patient patient is currently unemployed, homeless, poor primary support system, comorbid substance abuse.  Patient expresses poor sleep and poor appetite, hopelessness and helplessness.  He has also stopped taking his medications.  Denies any auditory or visual hallucinations.  No reported paranoia.  No command hallucinations.    PAST PSYCHIATRIC HISTORY:   Previous hospitalizations 6 months ago.        TRAUMA HISTORY:   Not been a victim of physical or sexual abuse.         ALLERGIES: No Known Allergies   MEDICATIONS PRIOR TO ADMISSION:   Prior to Admission Medications   Prescriptions Last Dose Informant Patient Reported? Taking?   amLODIPine (NORVASC) 10 MG tablet   Yes No   Sig: Take 1 tablet by mouth daily   glucose 4 g chewable tablet   No No   Sig: Take 4 tablets by mouth as needed for Low blood sugar   hydrALAZINE (APRESOLINE) 25 MG tablet   Yes

## 2024-01-05 NOTE — BH NOTE
Alert and oriented x 4. Affect and mood are flat and depressed. Denies SI/HI/AVH at this time. Refused all psych meds this morning, stating he is waiting to see the doctor as he feels they need to be adjusted. Meal compliant. Isolative to his room. Encouraged to attend groups and increase interaction with peers.

## 2024-01-05 NOTE — BH NOTE
PSA PART II ADDITIONAL INFORMATION        Access To Fire Arms: No    Substance Use: Yes    Last Use: 1-4-24    Type of Substance: alcohol and cocaine    Frequency of Use: Pt was sober for 6 months and used once    Request to See : No    If yes, notified : No    Guardian:No    Guardian Contact:Pt is his own legal guardian     Release of Information Signed: No    Release of Information Signed For: Pt denied    Goal: \"to get luz elena and life my life\"

## 2024-01-05 NOTE — BH NOTE
Patient remains on close observation.  Patient has been alert, oriented, cooperative and pleasant.   Patient has been in bed all shift.  Patient has not voiced depression, anxiety, SI or HI this shift.  Patient presents as being under not distress.  Patient's blood sugar has been high so the RN called Dr. Alexander MD for orders.  Patient was placed on a low dose sliding scale insulin.  Patient's accuchek was then 354 and patient was given 4 units of insulin after consultation with the physician.  Continue to assess.

## 2024-01-05 NOTE — CARE COORDINATION
01/05/24 1607   ITP   Date of Plan 01/05/24   Date of Next Review 01/12/24   Primary Diagnosis Code MDD (major depressive disorder), recurrent episode, mild (HCC)  Suicidal ideations   Barriers to Treatment Other (comment);Psychiatric symptom (comment);Need for psychoeducation  (Pt was not feeling well, substance use, lack of support)   Strengths Incorporated in Plan Acknowledging need for assistance;Verbal;Seeking interactions   Plan of Care   Long Term Goal (LTG) Stated in patient/guardian terms \"to get luz elena and life my life\"   Short Term Goal 1   Short Term Goal 1 Client will learn and demonstrate effective coping skills   Baseline Functioning Pt has a difficult time coping with stress. Pt relapsed due to high levels of stress   Target Pt will learn 3 healthy coping skills   Objectives Client will participate in individual therapy;Client will participate in group therapy   Intervention 1 Acknowledge client strengths;Indvidual therapy   Frequency daily   Measured by Self report;Behavioral data;Staff observation   Staff Responsible [facility] staff;Clinical staff   Intervention 2 Monitor medications   Frequency daily   Measured by Behavioral data;Self report   Staff Responsible [facility] staff;Clinical staff   Intervention 3 Group therapy   Frequency daily   Measured by Behavioral data;Self report   Staff Responsible [facility] staff;Clinical staff   STG Goal 1 Status: Patient Appears to be  Progressing toward treatment plan goal   Short Term Goal 2   Short Term Goal 2 Client will learn and demonstrate improved self management skills   Baseline Functioning Pt reported wanting to \"get back to me\"   Target Pt will learn and utilize improved self   Objectives Client will participate in individual therapy;Client will participate in group therapy   Intervention 1 Acknowledge client strengths;Indvidual therapy   Frequency daily   Measured by Behavioral data;Self report;Staff observation   Staff Responsible [facility] staff;Clinical staff

## 2024-01-05 NOTE — GROUP NOTE
Group Therapy Note    Date: 1/5/2024    Group Start Time: 1120  Group End Time: 1200  Group Topic: Process Group - Inpatient    SSR 2 [facility]    Mouna Nicholas        Group Therapy Note  Process group was focused on how to set goals for the self care needs they discovered in the previous group. Pts interacted well with other another providing positive feedback and insight on how to set goals. All pts agreed that sleep is one of the goals they all wanted to work on. Writer and pts provided new ways for sleep hygiene.   Attendees: 5-6         Notes:  Pt was encouraged to attend and chose not to       Signature:  Mouna Nicholas

## 2024-01-05 NOTE — GROUP NOTE
Group Therapy Note    Date: 1/5/2024    Group Start Time: 1335  Group End Time: 1420  Group Topic: Recreational    SSR 2 BH NON ACUTE    Elvira Douglas        Group Therapy Note    Facilitated leisure skills group to reinforce positive coping and to manage mood through music, social interaction, group activities and art task       Attendees: 3/6      Notes:  Encouraged but did not attend    Discipline Responsible: Recreational Therapist      Signature:  JOEY Gentile

## 2024-01-05 NOTE — BH NOTE
Pt c/o becoming lightheaded when sitting up. /67, HR 97. . Ate 100% lunch, water pitcher filled, pt encouraged to increase fluid intake and sit on side of bed before rising slowly when getting out of bed. Continue to monitor.

## 2024-01-05 NOTE — GROUP NOTE
Group Therapy Note    Date: 1/5/2024    Group Start Time: 0935  Group End Time: 1025  Group Topic: Education Group - Inpatient    SSR 2  NON ACUTE    Elvira Douglas        Group Therapy Note    Setting Goals/Facilitated discussion focused on “stepping up to a better you” by taking steps to improve in their well-being and identifying goals that would help to ensure follow-up       Attendees: 4/6       Notes:  Encouraged but did not attend    Discipline Responsible: Recreational Therapist      Signature:  JOEY Gentile

## 2024-01-06 LAB
GLUCOSE BLD STRIP.AUTO-MCNC: 163 MG/DL (ref 65–100)
GLUCOSE BLD STRIP.AUTO-MCNC: 263 MG/DL (ref 65–100)
GLUCOSE BLD STRIP.AUTO-MCNC: 294 MG/DL (ref 65–100)
GLUCOSE BLD STRIP.AUTO-MCNC: 366 MG/DL (ref 65–100)
GLUCOSE BLD STRIP.AUTO-MCNC: 501 MG/DL (ref 65–100)
PERFORMED BY:: ABNORMAL

## 2024-01-06 PROCEDURE — 82962 GLUCOSE BLOOD TEST: CPT

## 2024-01-06 PROCEDURE — 6370000000 HC RX 637 (ALT 250 FOR IP): Performed by: PSYCHIATRY & NEUROLOGY

## 2024-01-06 PROCEDURE — 6370000000 HC RX 637 (ALT 250 FOR IP): Performed by: INTERNAL MEDICINE

## 2024-01-06 PROCEDURE — 1240000000 HC EMOTIONAL WELLNESS R&B

## 2024-01-06 RX ORDER — INSULIN GLARGINE 100 [IU]/ML
20 INJECTION, SOLUTION SUBCUTANEOUS ONCE
Status: COMPLETED | OUTPATIENT
Start: 2024-01-06 | End: 2024-01-06

## 2024-01-06 RX ADMIN — QUETIAPINE FUMARATE 100 MG: 100 TABLET ORAL at 20:21

## 2024-01-06 RX ADMIN — INSULIN LISPRO 2 UNITS: 100 INJECTION, SOLUTION INTRAVENOUS; SUBCUTANEOUS at 08:25

## 2024-01-06 RX ADMIN — LITHIUM CARBONATE 300 MG: 300 TABLET ORAL at 08:25

## 2024-01-06 RX ADMIN — INSULIN HUMAN 15 UNITS: 100 INJECTION, SOLUTION PARENTERAL at 21:49

## 2024-01-06 RX ADMIN — INSULIN LISPRO 4 UNITS: 100 INJECTION, SOLUTION INTRAVENOUS; SUBCUTANEOUS at 21:06

## 2024-01-06 RX ADMIN — OLANZAPINE 5 MG: 5 TABLET, FILM COATED ORAL at 20:21

## 2024-01-06 RX ADMIN — OLANZAPINE 5 MG: 5 TABLET, FILM COATED ORAL at 08:25

## 2024-01-06 RX ADMIN — FLUOXETINE HYDROCHLORIDE 20 MG: 20 CAPSULE ORAL at 08:25

## 2024-01-06 RX ADMIN — AMLODIPINE BESYLATE 5 MG: 5 TABLET ORAL at 08:25

## 2024-01-06 RX ADMIN — LITHIUM CARBONATE 300 MG: 300 TABLET ORAL at 20:21

## 2024-01-06 RX ADMIN — INSULIN LISPRO 2 UNITS: 100 INJECTION, SOLUTION INTRAVENOUS; SUBCUTANEOUS at 12:13

## 2024-01-06 RX ADMIN — BUSPIRONE HYDROCHLORIDE 15 MG: 10 TABLET ORAL at 08:25

## 2024-01-06 RX ADMIN — BUSPIRONE HYDROCHLORIDE 15 MG: 10 TABLET ORAL at 20:21

## 2024-01-06 RX ADMIN — INSULIN GLARGINE 20 UNITS: 100 INJECTION, SOLUTION SUBCUTANEOUS at 21:45

## 2024-01-06 ASSESSMENT — PAIN SCALES - GENERAL: PAINLEVEL_OUTOF10: 0

## 2024-01-06 NOTE — BH NOTE
Pt.is up and visible at times, isolates to self, denies feeling suicidal, mood is depressed, low energy level, complains of feeling dizzy, encouraged to drink more fluids, insight and judgement is limited, denies cravings for ETOH or drugs at present.denies hallucinations, no other concerns voiced.remains on close observation.

## 2024-01-06 NOTE — BH NOTE
57yo admitted 1/4/24.    Hx SI, MDD, diabetes, hepatiits C, HTN, foot ulcer, toe amputation, CTS, homeless.    P99. CIWA 0, COWS 2. Patient noted in bed, laying down, resting with eyes closed. Presents flat, sad, isolative, guarded, poor judgment, c/o generalized pain. Compliant with bedtime medications including prn tylenol & trazodone. Received bedtime snack.     Close observation checks q15m.

## 2024-01-06 NOTE — GROUP NOTE
Group Therapy Note    Date: 1/6/2024    Group Start Time: 1315  Group End Time: 1400  Group Topic: Recreational    SSR 2 BH NON ACUTE    Flory Holliday        Group Therapy Note    Attendees: 2/3     Recreational Therapist facilitated structured leisure skills group to introduce healthy leisure skills as positive way to cope and manage mood.             Notes:  Did not attend despite encouragement    Discipline Responsible: Recreational Therapist      Signature:  JOEY Jarquin

## 2024-01-06 NOTE — GROUP NOTE
Group Therapy Note    Date: 1/6/2024    Group Start Time: 0930  Group End Time: 1015  Group Topic: Education - Inpatient    SSR 2 BH NON ACUTE    Flory Holliday    Facilitated structured group to identify triggers, impact of triggers on mental health, and positive ways to manage triggers.          Notes:  Did not attend group despite encouragement      Discipline Responsible: Recreational Therapist      Signature:  JOEY Jarquin

## 2024-01-06 NOTE — PLAN OF CARE
Problem: Chronic Conditions and Co-morbidities  Goal: Patient's chronic conditions and co-morbidity symptoms are monitored and maintained or improved  Recent Flowsheet Documentation  Taken 1/5/2024 1906 by Adelina Low RN  Care Plan - Patient's Chronic Conditions and Co-Morbidity Symptoms are Monitored and Maintained or Improved: Monitor and assess patient's chronic conditions and comorbid symptoms for stability, deterioration, or improvement     Problem: Discharge Planning  Goal: Discharge to home or other facility with appropriate resources  Recent Flowsheet Documentation  Taken 1/5/2024 1906 by Adelina Low RN  Discharge to home or other facility with appropriate resources:   Identify barriers to discharge with patient and caregiver   Identify discharge learning needs (meds, wound care, etc)   Refer to discharge planning if patient needs post-hospital services based on physician order or complex needs related to functional status, cognitive ability or social support system   Arrange for needed discharge resources and transportation as appropriate     Problem: Pain  Goal: Verbalizes/displays adequate comfort level or baseline comfort level  Recent Flowsheet Documentation  Taken 1/5/2024 2111 by Adelina Low RN  Verbalizes/displays adequate comfort level or baseline comfort level:   Assess pain using appropriate pain scale   Administer analgesics based on type and severity of pain and evaluate response   Implement non-pharmacological measures as appropriate and evaluate response     Problem: Coping  Goal: Pt/Family able to verbalize concerns and demonstrate effective coping strategies  Description: INTERVENTIONS:  1. Assist patient/family to identify coping skills, available support systems and cultural and spiritual values  2. Provide emotional support, including active listening and acknowledgement of concerns of patient and caregivers  3. Reduce environmental stimuli, as able  4. Instruct

## 2024-01-07 LAB
GLUCOSE BLD STRIP.AUTO-MCNC: 122 MG/DL (ref 65–100)
GLUCOSE BLD STRIP.AUTO-MCNC: 163 MG/DL (ref 65–100)
GLUCOSE BLD STRIP.AUTO-MCNC: 196 MG/DL (ref 65–100)
GLUCOSE BLD STRIP.AUTO-MCNC: 453 MG/DL (ref 65–100)
GLUCOSE BLD STRIP.AUTO-MCNC: 536 MG/DL (ref 65–100)
PERFORMED BY:: ABNORMAL

## 2024-01-07 PROCEDURE — 1240000000 HC EMOTIONAL WELLNESS R&B

## 2024-01-07 PROCEDURE — 82962 GLUCOSE BLOOD TEST: CPT

## 2024-01-07 PROCEDURE — 6370000000 HC RX 637 (ALT 250 FOR IP): Performed by: PSYCHIATRY & NEUROLOGY

## 2024-01-07 PROCEDURE — 6370000000 HC RX 637 (ALT 250 FOR IP): Performed by: INTERNAL MEDICINE

## 2024-01-07 RX ORDER — INSULIN LISPRO 100 [IU]/ML
0-16 INJECTION, SOLUTION INTRAVENOUS; SUBCUTANEOUS
Status: DISCONTINUED | OUTPATIENT
Start: 2024-01-07 | End: 2024-01-15 | Stop reason: HOSPADM

## 2024-01-07 RX ORDER — INSULIN GLARGINE 100 [IU]/ML
20 INJECTION, SOLUTION SUBCUTANEOUS 2 TIMES DAILY
Status: DISCONTINUED | OUTPATIENT
Start: 2024-01-07 | End: 2024-01-13

## 2024-01-07 RX ORDER — QUETIAPINE FUMARATE 25 MG/1
50 TABLET, FILM COATED ORAL NIGHTLY
Status: DISCONTINUED | OUTPATIENT
Start: 2024-01-07 | End: 2024-01-15 | Stop reason: HOSPADM

## 2024-01-07 RX ORDER — DEXTROSE MONOHYDRATE 100 MG/ML
INJECTION, SOLUTION INTRAVENOUS CONTINUOUS PRN
Status: DISCONTINUED | OUTPATIENT
Start: 2024-01-07 | End: 2024-01-15 | Stop reason: HOSPADM

## 2024-01-07 RX ORDER — BUSPIRONE HYDROCHLORIDE 10 MG/1
10 TABLET ORAL 2 TIMES DAILY
Status: DISCONTINUED | OUTPATIENT
Start: 2024-01-07 | End: 2024-01-07

## 2024-01-07 RX ORDER — INSULIN LISPRO 100 [IU]/ML
0-4 INJECTION, SOLUTION INTRAVENOUS; SUBCUTANEOUS NIGHTLY
Status: DISCONTINUED | OUTPATIENT
Start: 2024-01-07 | End: 2024-01-15 | Stop reason: HOSPADM

## 2024-01-07 RX ADMIN — INSULIN GLARGINE 20 UNITS: 100 INJECTION, SOLUTION SUBCUTANEOUS at 12:25

## 2024-01-07 RX ADMIN — INSULIN GLARGINE 20 UNITS: 100 INJECTION, SOLUTION SUBCUTANEOUS at 20:53

## 2024-01-07 RX ADMIN — AMLODIPINE BESYLATE 5 MG: 5 TABLET ORAL at 08:27

## 2024-01-07 RX ADMIN — INSULIN LISPRO 4 UNITS: 100 INJECTION, SOLUTION INTRAVENOUS; SUBCUTANEOUS at 11:49

## 2024-01-07 RX ADMIN — INSULIN LISPRO 16 UNITS: 100 INJECTION, SOLUTION INTRAVENOUS; SUBCUTANEOUS at 16:56

## 2024-01-07 NOTE — GROUP NOTE
Group Therapy Note    Date: 1/7/2024    Group Start Time: 1315  Group End Time: 1400  Group Topic: Recreational    SSR 2 BH NON ACUTE    Flory Holliday        Group Therapy Note    Attendees: 3/6    Recreational Therapist facilitated structured leisure skills group to introduce healthy leisure skills as positive way to cope and manage mood.               Notes:  Did not attend group despite encouragement      Discipline Responsible: Recreational Therapist      Signature:  JOEY Jarquin

## 2024-01-07 NOTE — BH NOTE
59yo admitted 1/4/24.     Hx SI, MDD, diabetes, hepatiits C, HTN, foot ulcer, toe amputation, CTS, homeless.     VSS. CIWA 0, COWS 1. Presents laying in bed with eyes open, c/o dizziness, compliant with water intake, flat, isolative, circumstantial, generalized discomfort, pleasant. Compliant with bedtime medications. Although states that he does not want to take the scheduled medications anymore until he follows up with the doctor d/t dizziness, states that he hasn't taken them in a while & that's why he stopped. Patient did not receive bedtime snack. HS BS = 501. States that he had a late lunch then when dinner came ate that as well.  &  notified, new insulin orders obtained. BS recheck = 366, 122. AM BS = 196.    Close observation checks q15m.

## 2024-01-07 NOTE — BH NOTE
Pt.up for breakfast, accepted in dayroom, no interaction with peers,returned to room after he ate and refused all meds except Norvasc, stating it made him feel dizzy,insight and judgement is limited, no signs of hyper or hypoglycemic reactions,mood remains depressed,flat, denies cravings for SA. No concerns voiced,remains on close observation.

## 2024-01-07 NOTE — GROUP NOTE
Group Therapy Note    Date: 1/7/2024    Group Start Time: 0930  Group End Time: 1015  Group Topic: Education Group - Inpatient    SSR 2 BH NON ACUTE    Flory Holliday        Group Therapy Note    Attendees: 2/4    Facilitated structured group discussion to identify protective factors that have been valuable and protective factors that could be improved in managing stressors.           Notes:  Did not attend despite encouragement    Discipline Responsible: Recreational Therapist      Signature:  JOEY Jarquin

## 2024-01-07 NOTE — PLAN OF CARE
Problem: Discharge Planning  Goal: Discharge to home or other facility with appropriate resources  Outcome: Progressing     Problem: Pain  Goal: Verbalizes/displays adequate comfort level or baseline comfort level  Outcome: Progressing     Problem: Anxiety  Goal: Will report anxiety at manageable levels  Description: INTERVENTIONS:  1. Administer medication as ordered  2. Teach and rehearse alternative coping skills  3. Provide emotional support with 1:1 interaction with staff  Outcome: Progressing     Problem: Decision Making  Goal: Pt/Family able to effectively weigh alternatives and participate in decision making related to treatment and care  Description: INTERVENTIONS:  1. Determine when there are differences between patient's view, family's view, and healthcare provider's view of condition  2. Facilitate patient and family articulation of goals for care  3. Help patient and family identify pros/cons of alternative solutions  4. Provide information as requested by patient/family  5. Respect patient/family right to receive or not to receive information  6. Serve as a liaison between patient and family and health care team  7. Initiate Consults from Ethics, Palliative Care or initiate Family Care Conference as is appropriate  Outcome: Progressing     Problem: Depression/Self Harm  Goal: Effect of psychiatric condition will be minimized and patient will be protected from self harm  Description: INTERVENTIONS:  1. Assess impact of patient's symptoms on level of functioning, self care needs and offer support as indicated  2. Assess patient/family knowledge of depression, impact on illness and need for teaching  3. Provide emotional support, presence and reassurance  4. Assess for possible suicidal thoughts or ideation. If patient expresses suicidal thoughts or statements do not leave alone, initiate Suicide Precautions, move to a room close to the nursing station and obtain sitter  5. Initiate consults as

## 2024-01-08 LAB
ANION GAP SERPL CALC-SCNC: 5 MMOL/L (ref 5–15)
BUN SERPL-MCNC: 19 MG/DL (ref 6–20)
BUN/CREAT SERPL: 17 (ref 12–20)
CA-I BLD-MCNC: 9.5 MG/DL (ref 8.5–10.1)
CHLORIDE SERPL-SCNC: 103 MMOL/L (ref 97–108)
CO2 SERPL-SCNC: 28 MMOL/L (ref 21–32)
CREAT SERPL-MCNC: 1.14 MG/DL (ref 0.7–1.3)
EST. AVERAGE GLUCOSE BLD GHB EST-MCNC: 266 MG/DL
GLUCOSE BLD STRIP.AUTO-MCNC: 271 MG/DL (ref 65–100)
GLUCOSE BLD STRIP.AUTO-MCNC: 273 MG/DL (ref 65–100)
GLUCOSE BLD STRIP.AUTO-MCNC: 296 MG/DL (ref 65–100)
GLUCOSE BLD STRIP.AUTO-MCNC: 320 MG/DL (ref 65–100)
GLUCOSE BLD STRIP.AUTO-MCNC: 422 MG/DL (ref 65–100)
GLUCOSE BLD STRIP.AUTO-MCNC: 496 MG/DL (ref 65–100)
GLUCOSE SERPL-MCNC: 262 MG/DL (ref 65–100)
HBA1C MFR BLD: 10.9 % (ref 4–5.6)
PERFORMED BY:: ABNORMAL
POTASSIUM SERPL-SCNC: 4 MMOL/L (ref 3.5–5.1)
SODIUM SERPL-SCNC: 136 MMOL/L (ref 136–145)

## 2024-01-08 PROCEDURE — 6370000000 HC RX 637 (ALT 250 FOR IP): Performed by: INTERNAL MEDICINE

## 2024-01-08 PROCEDURE — 6370000000 HC RX 637 (ALT 250 FOR IP): Performed by: PSYCHIATRY & NEUROLOGY

## 2024-01-08 PROCEDURE — 83036 HEMOGLOBIN GLYCOSYLATED A1C: CPT

## 2024-01-08 PROCEDURE — 82962 GLUCOSE BLOOD TEST: CPT

## 2024-01-08 PROCEDURE — 80048 BASIC METABOLIC PNL TOTAL CA: CPT

## 2024-01-08 PROCEDURE — 36415 COLL VENOUS BLD VENIPUNCTURE: CPT

## 2024-01-08 PROCEDURE — 1240000000 HC EMOTIONAL WELLNESS R&B

## 2024-01-08 RX ORDER — TRAZODONE HYDROCHLORIDE 50 MG/1
50 TABLET ORAL NIGHTLY PRN
Status: DISCONTINUED | OUTPATIENT
Start: 2024-01-08 | End: 2024-01-15 | Stop reason: HOSPADM

## 2024-01-08 RX ADMIN — QUETIAPINE FUMARATE 50 MG: 25 TABLET ORAL at 20:54

## 2024-01-08 RX ADMIN — INSULIN GLARGINE 20 UNITS: 100 INJECTION, SOLUTION SUBCUTANEOUS at 08:42

## 2024-01-08 RX ADMIN — INSULIN LISPRO 8 UNITS: 100 INJECTION, SOLUTION INTRAVENOUS; SUBCUTANEOUS at 17:43

## 2024-01-08 RX ADMIN — INSULIN LISPRO 8 UNITS: 100 INJECTION, SOLUTION INTRAVENOUS; SUBCUTANEOUS at 08:39

## 2024-01-08 RX ADMIN — INSULIN GLARGINE 20 UNITS: 100 INJECTION, SOLUTION SUBCUTANEOUS at 20:58

## 2024-01-08 RX ADMIN — INSULIN LISPRO 4 UNITS: 100 INJECTION, SOLUTION INTRAVENOUS; SUBCUTANEOUS at 20:57

## 2024-01-08 RX ADMIN — INSULIN LISPRO 8 UNITS: 100 INJECTION, SOLUTION INTRAVENOUS; SUBCUTANEOUS at 12:22

## 2024-01-08 RX ADMIN — AMLODIPINE BESYLATE 5 MG: 5 TABLET ORAL at 08:41

## 2024-01-08 NOTE — BH NOTE
The pt has been resting quietly in bed throughout the evening. He was up for snacks and something to drink. His BS this evening was 163 prior to getting a snack. The pt accepted a sandwich, 2 pks of olivia crackers, and a fig talbert. The pt did not received any sliding scale insulin due to parameters. He received 20 units of Lantus as ordered at 2053. The pt refused the scheduled Seroquel and prn Trazodone this evening - medication wasted. Pt stated that he rather talk to the psychiatrist prior to taking the medication due to how it makes him feel. The pt has a flat / sad affect. He isolates to the room. He has been calm and pleasant. He rated his depression a 6/10. The pt seems withdrawn and unmotivated. He denies having any A/SI/HI or A/VH. He remains A+O and ambulates independently.      CIWA is zero. No BAL noted. Pt was positive for cocaine. CIWA listed under assessment check list.     0113: The pt has been resting quietly in bed with his eyes closed. Spot check on BS due to pt getting 20 units of Lantus at 1225 and 20 units of Lantus at 2053. BS at this time is 320.The pt will have his BS rechecked in the early am.    0643: BS this am is 271    The pt has been resting quietly in bed without any distress. Respirations are quiet and unlabored. Pt remains on close observation for safety with every 15 minute rounding.    Approx. Hrs of sleep: 7 hrs. Broken sleep.

## 2024-01-08 NOTE — BH NOTE
Behavioral Health Treatment Team Note     Patient goal(s) for today: \"to call my brother\"  Treatment team focus/goals: continue medication management, group therapy, maintain Adls and provide a safe discharge    Progress note: Pt presented with a lethargic, depressed affect and congruent mood. Pt denied any SI/HI/aVh at the time and was orientated x4. Pt reported that he has not been feeling physically ok and that the medications are still being adjusted. Pt shared that he has not been able to call his brother but that it was his goal today. Pt shared that he has been thinking about going back into another substance use program but in the [facility] to be around his family support. Writer will contact substance use programs and send out clinicals. An inpatient level of care is needed to further stabilize the pt     LOS:  4  Expected LOS: 5-7 days     Insurance info/prescription coverage:  [employer]   Date of last family contact:  none as of today  Family requesting physician contact today:  No  Discharge plan:  to stabilize the pt  Guns in the home:  No   Outpatient provider(s):  will be coordinated prior to discharge    Participating treatment team members: Jordyn Smith, * (assigned SW), Mouna Nicholas LMSW

## 2024-01-08 NOTE — BH NOTE
Affect remains flat. Pt cont to spend the majority of x, in bed. Consumed 100% of meals, in bed. Pt refused scheduled Prozac, for depression; stated, \"I don't want any psych meds. If I take a combination of too many meds, they will make me sick.\" No eye to eye contact. Soft spoken. Pt did not attend groups, although encouraged. Pt did not get up and attend to his hygiene, this shift. No attempts to self harm. Q 15 mins checks maintained, for safety.

## 2024-01-08 NOTE — PLAN OF CARE
Problem: Pain  Goal: Verbalizes/displays adequate comfort level or baseline comfort level  Outcome: Progressing     Problem: Anxiety  Goal: Will report anxiety at manageable levels  Description: INTERVENTIONS:  1. Administer medication as ordered  2. Teach and rehearse alternative coping skills  3. Provide emotional support with 1:1 interaction with staff  Outcome: Progressing     Problem: Depression/Self Harm  Goal: Effect of psychiatric condition will be minimized and patient will be protected from self harm  Description: INTERVENTIONS:  1. Assess impact of patient's symptoms on level of functioning, self care needs and offer support as indicated  2. Assess patient/family knowledge of depression, impact on illness and need for teaching  3. Provide emotional support, presence and reassurance  4. Assess for possible suicidal thoughts or ideation. If patient expresses suicidal thoughts or statements do not leave alone, initiate Suicide Precautions, move to a room close to the nursing station and obtain sitter  5. Initiate consults as appropriate with Mental Health Professional, Spiritual Care, Psychosocial CNS, and consider a recommendation to the LIP for a Psychiatric Consultation  Outcome: Progressing     Problem: Behavior  Goal: Pt/Family maintain appropriate behavior and adhere to behavioral management agreement, if implemented  Description: INTERVENTIONS:  1. Assess patient/family's coping skills and  non-compliant behavior (including use of illegal substances)  2. Notify security of behavior or suspected illegal substances which indicate the need for search of the family and/or belongings  3. Encourage verbalization of thoughts and concerns in a socially appropriate manner  4. Utilize positive, consistent limit setting strategies supporting safety of patient, staff and others  5. Encourage participation in the decision making process about the behavioral management agreement  6. If a visitor's behavior poses

## 2024-01-08 NOTE — GROUP NOTE
Group Therapy Note    Date: 1/8/2024    Group Start Time: 1115  Group End Time: 1200  Group Topic: Process Group - Inpatient    SSR 2 BE[facility] ACUTE    Marah Wyatt MSW        Group Therapy Note: Facilitator encouraged the group to discuss symptoms of depression their needs and adaptive coping strategies.     Attendees: 5       Patient's Goal:  Pt was encouraged to attend but declined.       Signature:  STACY WHITMORE

## 2024-01-08 NOTE — GROUP NOTE
Group Therapy Note    Date: 1/8/2024    Group Start Time: 0945  Group End Time: 1038  Group Topic: Education Group - Inpatient    SSR 2  NON ACUTE    Elvira Douglas        Group Therapy Note    Facilitated discussion focused on defining and sharing examples of different types of automatic negative thoughts and how they affect moods and behaviors       Attendees: 4/7      Notes:  Encouraged but did not attend    Discipline Responsible: Recreational Therapist      Signature:  JOEY Gentile

## 2024-01-08 NOTE — GROUP NOTE
Group Therapy Note    Date: 1/8/2024    Group Start Time: 1335  Group End Time: 1420  Group Topic: Recreational    SSR 2 BH NON ACUTE    Elvira Douglas        Group Therapy Note    Facilitated leisure skills group to reinforce positive coping and to manage mood through music, social interaction, group activities and art task       Attendees: 5/7      Notes:  Encouraged but did not attend    Discipline Responsible: Recreational Therapist      Signature:  JOEY Gentile

## 2024-01-09 LAB
GLUCOSE BLD STRIP.AUTO-MCNC: 147 MG/DL (ref 65–100)
GLUCOSE BLD STRIP.AUTO-MCNC: 191 MG/DL (ref 65–100)
GLUCOSE BLD STRIP.AUTO-MCNC: 306 MG/DL (ref 65–100)
GLUCOSE BLD STRIP.AUTO-MCNC: 307 MG/DL (ref 65–100)
GLUCOSE BLD STRIP.AUTO-MCNC: 487 MG/DL (ref 65–100)
PERFORMED BY:: ABNORMAL

## 2024-01-09 PROCEDURE — 6370000000 HC RX 637 (ALT 250 FOR IP): Performed by: PSYCHIATRY & NEUROLOGY

## 2024-01-09 PROCEDURE — 6370000000 HC RX 637 (ALT 250 FOR IP): Performed by: INTERNAL MEDICINE

## 2024-01-09 PROCEDURE — 82962 GLUCOSE BLOOD TEST: CPT

## 2024-01-09 PROCEDURE — 1240000000 HC EMOTIONAL WELLNESS R&B

## 2024-01-09 RX ADMIN — INSULIN GLARGINE 20 UNITS: 100 INJECTION, SOLUTION SUBCUTANEOUS at 08:07

## 2024-01-09 RX ADMIN — INSULIN LISPRO 12 UNITS: 100 INJECTION, SOLUTION INTRAVENOUS; SUBCUTANEOUS at 16:35

## 2024-01-09 RX ADMIN — INSULIN GLARGINE 20 UNITS: 100 INJECTION, SOLUTION SUBCUTANEOUS at 20:52

## 2024-01-09 RX ADMIN — QUETIAPINE FUMARATE 50 MG: 25 TABLET ORAL at 20:53

## 2024-01-09 RX ADMIN — AMLODIPINE BESYLATE 5 MG: 5 TABLET ORAL at 08:06

## 2024-01-09 RX ADMIN — INSULIN LISPRO 12 UNITS: 100 INJECTION, SOLUTION INTRAVENOUS; SUBCUTANEOUS at 08:06

## 2024-01-09 NOTE — BH NOTE
Nurse Note:    Patient observed in the dayroom watching TV and is isolative to self. Denies SI, HI, A/V hallucinations. Observed with flat affect with report of depression; denies anxiety. CIWA =0. BS is 422 and given 4 units of Humalog and Lantus 20 units. Will recheck BS to reassess. Received patient teaching on diet and appropriate food choices for meals and snacks. Patient reports nasal stuffiness and continues to report \"some\" dizziness. /94. Patient states, \"When I have some sinus problems or stuffiness, I usually do not take anything, because I don't like to take too many medications\". No c/o pain. Patient is medication compliant and is currently resting quietly with eyes closed. Will continue to monitor BS and safety.

## 2024-01-09 NOTE — BH NOTE
Client is pleasant and cooperative.Alert and oriented x3.Appearance is semi-tidy.Client has a good appetite and reports sleeping well.He only took his insulin and blood pressure medications.No verbal complaints.Remains on close observation for safety.

## 2024-01-09 NOTE — GROUP NOTE
Group Therapy Note    Date: 1/9/2024    Group Start Time: 1115  Group End Time: 1200  Group Topic: Process Group - Inpatient    SSR 2 [facility]    Mouna Nicholas        Group Therapy Note  Process group was focused on trauma. Writer provided the pts with the worksheets on the Trauma tree and went over each part of the tree. Pts interacted well with one another and provided feedback on how their childhood and trauma has formed them.   Attendees: 4-6         Notes:  Pt was encouraged to attend and chose not to           Signature:  Mouna Nicholas

## 2024-01-09 NOTE — BH NOTE
Behavioral Health Treatment Team Note     Patient goal(s) for today: \"to feel better\"  Treatment team focus/goals: continue medication management, group therapy, maintain ADLs and provide a safe discharge    Progress note: Pt continues to not feel well. Pt reported feeling \"a little better\" but continues to feel \"weak and dizzy\". Pt acknowledged that the doctor was monitoring his lab work and medications. Pt denied any SI/HI/aVh at the time and reported that he will be ready to get into a program closer to his brother in [place]. Pt reported that he feels better in the afternoon than in the mornings. Pt continues to require an inpatient level of care to monitor pt and medication management.     Writer will continue to send updated clinicals     LOS:  5  Expected LOS: 5-7 days    [facility]pres[facility]  Date of last family contact:  none as of today  Family requesting physician contact today:  No  Discharge plan:  to stabilize pt and step down to substance use treatment  Guns in the home:  No   Outpatient provider(s):  will be coordinated prior to discharge    Participating treatment team members: Jordyn Smith, * (assigned SW), Mouna Nicholas LMSW

## 2024-01-10 LAB
GLUCOSE BLD STRIP.AUTO-MCNC: 226 MG/DL (ref 65–100)
GLUCOSE BLD STRIP.AUTO-MCNC: 239 MG/DL (ref 65–100)
GLUCOSE BLD STRIP.AUTO-MCNC: 270 MG/DL (ref 65–100)
GLUCOSE BLD STRIP.AUTO-MCNC: 338 MG/DL (ref 65–100)
PERFORMED BY:: ABNORMAL

## 2024-01-10 PROCEDURE — 1240000000 HC EMOTIONAL WELLNESS R&B

## 2024-01-10 PROCEDURE — 6370000000 HC RX 637 (ALT 250 FOR IP): Performed by: INTERNAL MEDICINE

## 2024-01-10 PROCEDURE — 6370000000 HC RX 637 (ALT 250 FOR IP): Performed by: PSYCHIATRY & NEUROLOGY

## 2024-01-10 PROCEDURE — 82962 GLUCOSE BLOOD TEST: CPT

## 2024-01-10 RX ADMIN — INSULIN GLARGINE 20 UNITS: 100 INJECTION, SOLUTION SUBCUTANEOUS at 09:17

## 2024-01-10 RX ADMIN — QUETIAPINE FUMARATE 50 MG: 25 TABLET ORAL at 20:55

## 2024-01-10 RX ADMIN — INSULIN LISPRO 8 UNITS: 100 INJECTION, SOLUTION INTRAVENOUS; SUBCUTANEOUS at 11:56

## 2024-01-10 RX ADMIN — INSULIN LISPRO 4 UNITS: 100 INJECTION, SOLUTION INTRAVENOUS; SUBCUTANEOUS at 09:18

## 2024-01-10 RX ADMIN — INSULIN GLARGINE 20 UNITS: 100 INJECTION, SOLUTION SUBCUTANEOUS at 20:55

## 2024-01-10 RX ADMIN — INSULIN LISPRO 12 UNITS: 100 INJECTION, SOLUTION INTRAVENOUS; SUBCUTANEOUS at 17:32

## 2024-01-10 RX ADMIN — FLUOXETINE HYDROCHLORIDE 20 MG: 20 CAPSULE ORAL at 09:16

## 2024-01-10 NOTE — BH NOTE
Behavioral Health Treatment Team Note     Patient goal(s) for today: \"to read\"  Treatment team focus/goals: continue medication management, group therapy, maintain ADLs and provide a safe discharge    Progress note: Pt presented with a brighter affect and \"better\" mood. Pt denied any SI/HI/Avh at the time and was orientated x4. Pt was well groomed and pleasant to speak with. Pts thoughts were clear, organized and goal orientated. Pt reported that he is not dizzy anymore and that he feels stronger. He spoke more with the writer about the cycle of relapse. He shared that he has struggled with substances for the past 8 years and that he starts doing well and then gets into a relationship. Pt reported that he is starting to learn how to put himself first and would like to work on building self-love and better relationships. Writer provided the pt with reading material on self-love. Pt shared that he would prefer [employer] if possible. Writer will send updated clinicals to [place]. An inpatient level of care is needed to further monitor the pt and coordinate a safe discharge    Medical consult will be placed for diabetes     LOS:  6  Expected LOS: 7-10 days    Insurance info/prescription coverage:  [employer]  Date of last family contact:  Pt denied   Family requesting physician contact today:  No  Discharge plan:  to stabilize and step down with substance use program   Guns in the home:  No   Outpatient provider(s):  will be coordinated prior to discharge    Participating treatment team members: Jordyn Smith, * (assigned SW), Mouna Nicholas LMSW

## 2024-01-10 NOTE — GROUP NOTE
Group Therapy Note    Date: 1/10/2024    Group Start Time: 1130  Group End Time: 1155  Group Topic: Process Group - Inpatient    SSR 2 [facility] ACUTE    Marah Wyatt MSW        Group Therapy Note: Facilitator provided self care handouts to each pt.     Attendees: 5       Patient's Goal:  To attend groups    Notes:  Pt was receptive to self care tips handout    Signature:  STACY WHITMORE

## 2024-01-10 NOTE — GROUP NOTE
Group Therapy Note    Date: 1/9/2024    Group Start Time: 1845  Group End Time: 1930  Group Topic: Recreational    SSR 2 BH NON ACUTE    Flory Holliday        Group Therapy Note    Attendees: 2/7    Recreational Therapist facilitated structured leisure skills group to introduce healthy leisure skills as positive way to cope and manage mood.           Notes:  Did not attend group despite encouragement      Discipline Responsible: Recreational Therapist      Signature:  JOEY Jarquin

## 2024-01-10 NOTE — BH NOTE
Patient remains on close observation.  Patient has been alert, oriented, cooperative and pleasant.  Patient has been briefly up on the unit.  Mostly he has been laying in bed.  He was medication compliant.  He has not voiced depression, anxiety, SI or HI.  Continue to assess.

## 2024-01-11 LAB
GLUCOSE BLD STRIP.AUTO-MCNC: 229 MG/DL (ref 65–100)
GLUCOSE BLD STRIP.AUTO-MCNC: 243 MG/DL (ref 65–100)
GLUCOSE BLD STRIP.AUTO-MCNC: 272 MG/DL (ref 65–100)
PERFORMED BY:: ABNORMAL

## 2024-01-11 PROCEDURE — 6370000000 HC RX 637 (ALT 250 FOR IP): Performed by: PSYCHIATRY & NEUROLOGY

## 2024-01-11 PROCEDURE — 6370000000 HC RX 637 (ALT 250 FOR IP): Performed by: INTERNAL MEDICINE

## 2024-01-11 PROCEDURE — 1240000000 HC EMOTIONAL WELLNESS R&B

## 2024-01-11 PROCEDURE — 82962 GLUCOSE BLOOD TEST: CPT

## 2024-01-11 RX ADMIN — INSULIN GLARGINE 20 UNITS: 100 INJECTION, SOLUTION SUBCUTANEOUS at 21:31

## 2024-01-11 RX ADMIN — INSULIN LISPRO 8 UNITS: 100 INJECTION, SOLUTION INTRAVENOUS; SUBCUTANEOUS at 16:36

## 2024-01-11 RX ADMIN — QUETIAPINE FUMARATE 50 MG: 25 TABLET ORAL at 21:31

## 2024-01-11 RX ADMIN — INSULIN LISPRO 4 UNITS: 100 INJECTION, SOLUTION INTRAVENOUS; SUBCUTANEOUS at 09:55

## 2024-01-11 RX ADMIN — INSULIN GLARGINE 20 UNITS: 100 INJECTION, SOLUTION SUBCUTANEOUS at 09:55

## 2024-01-11 NOTE — GROUP NOTE
Group Therapy Note    Date: 1/10/2024    Group Start Time: 1845  Group End Time: 1930  Group Topic: Recreational    SSR 2 BH NON ACUTE    Flory Holliday        Group Therapy Note    Attendees: 3/5       Recreational Therapist facilitated structured leisure skills group to introduce healthy leisure skills as positive way to cope and manage mood.      Discipline Responsible: Recreational Therapist      Signature:  JOEY Jarquin

## 2024-01-11 NOTE — CONSULTS
Consult    Subjective:     Patient is a 58 y.o. year old male with a pmh of DM Type II, Hypercholesteremia, HTN, nicotine abuse, and crack cocaine use. Patient is seen for elevated sugars while being in psych unit. Sugars are now in the mid 200s but have been reported in the 400s. We will continue to monitor the sugars and adjust insulin if needed. Patient states he uses lantus and humalog and has a desire to receive an insulin pump outpatient. Patient smokes a PPD of tobacco. He has last used crack cocaine several weeks ago. Patient denies any chest pain, SOB, major visual changes, leg swelling, or abdominal pain.     Past Medical History:   Diagnosis Date    Diabetes (HCC)     Hepatitis C infection     Hypertension     Psychiatric disorder       Past Surgical History:   Procedure Laterality Date    TOE AMPUTATION Left 5/19/2023    PARTIAL LEFT THIRD TOE AMPUTATION performed by Teto Leblanc DPM at [facility] MAIN OR     No family history on file.   Social History     Tobacco Use    Smoking status: Every Day     Current packs/day: 1.00     Types: Cigarettes    Smokeless tobacco: Never   Substance Use Topics    Alcohol use: Not Currently       Current Facility-Administered Medications   Medication Dose Route Frequency Provider Last Rate Last Admin    traZODone (DESYREL) tablet 50 mg  50 mg Oral Nightly PRN Lissett Munoz MD        insulin glargine (LANTUS) injection vial 20 Units  20 Units SubCUTAneous BID Joshua Shore MD   20 Units at 01/11/24 0955    insulin lispro (HUMALOG) injection vial 0-16 Units  0-16 Units SubCUTAneous TID WC Joshua Shore MD   4 Units at 01/11/24 0955    insulin lispro (HUMALOG) injection vial 0-4 Units  0-4 Units SubCUTAneous Nightly Joshua Shore MD   4 Units at 01/08/24 2057    QUEtiapine (SEROQUEL) tablet 50 mg  50 mg Oral Nightly Lissett Munoz MD   50 mg at 01/10/24 2055    glucose chewable tablet 16 g  4 tablet Oral PRN Joshua Shore MD        dextrose bolus 10% 125 mL  125 
   Collection Time: 01/10/24  4:30 PM   Result Value Ref Range    POC Glucose 338 (H) 65 - 100 mg/dL    Performed by: Lonny Dennis    POCT Glucose    Collection Time: 01/10/24  7:45 PM   Result Value Ref Range    POC Glucose 226 (H) 65 - 100 mg/dL    Performed by: [facility]    POCT Glucose    Collection Time: 01/11/24  6:45 AM   Result Value Ref Range    POC Glucose 243 (H) 65 - 100 mg/dL    Performed by: [facility]        No orders to display        Assessment:     Major Depression   HTN  DM Type II  Hypercholesteremia  Nicotine abuse   Crack cocaine use           Plan:    SSI adjust if needed    Follow Psychiatric recommendations

## 2024-01-11 NOTE — PLAN OF CARE
Problem: Pain  Goal: Verbalizes/displays adequate comfort level or baseline comfort level  Outcome: Adequate for Discharge     Problem: Anxiety  Goal: Will report anxiety at manageable levels  Description: INTERVENTIONS:  1. Administer medication as ordered  2. Teach and rehearse alternative coping skills  3. Provide emotional support with 1:1 interaction with staff  Recent Flowsheet Documentation  Taken 1/11/2024 1537 by Whitney Hill RN  Will report anxiety at manageable levels:   Administer medication as ordered   Teach and rehearse alternative coping skills     Problem: Coping  Goal: Pt/Family able to verbalize concerns and demonstrate effective coping strategies  Description: INTERVENTIONS:  1. Assist patient/family to identify coping skills, available support systems and cultural and spiritual values  2. Provide emotional support, including active listening and acknowledgement of concerns of patient and caregivers  3. Reduce environmental stimuli, as able  4. Instruct patient/family in relaxation techniques, as appropriate  5. Assess for spiritual pain/suffering and initiate Spiritual Care, Psychosocial Clinical Specialist consults as needed  Outcome: Progressing  Flowsheets (Taken 1/11/2024 1537)  Patient/family able to verbalize anxieties, fears, and concerns, and demonstrate effective coping: Assist patient/family to identify coping skills, available support systems and cultural and spiritual values     Problem: Decision Making  Goal: Pt/Family able to effectively weigh alternatives and participate in decision making related to treatment and care  Description: INTERVENTIONS:  1. Determine when there are differences between patient's view, family's view, and healthcare provider's view of condition  2. Facilitate patient and family articulation of goals for care  3. Help patient and family identify pros/cons of alternative solutions  4. Provide information as requested by patient/family  5.

## 2024-01-11 NOTE — BH NOTE
Behavioral Health Treatment Team Note     Patient goal(s) for today: \"talk to my brother at some point today\"  Treatment team focus/goals: medication management, discharge planning, group therapy    Progress note: Pt was seen in his room as he was resting. Pt woke up easily and was pleasant and polite upon approach. Pt presented as calm, cooperative, with a flat affect. No agitation noted. Pt voiced concerns that he could not sleep well last night and his depression and anxiety were a 6 on a scale of 1-10 due to not knowing how his future will turn out. Pt able to voice understanding after this writer provided some reassurance. Pt denied current si/hi/ah/vh. Pt did not voice any further concerns/complaints. Pt cont to meet criteria for inpt stay for further stabilization through meds management.     LOS:  7  Expected LOS: 7-10 days     Insurance info/prescription coverage:  [employer] Medicaid   Date of last family contact:   Pt denied    Family requesting physician contact today:  No  Discharge plan:   to stabilize and step down with substance use program   Guns in the home:  No   Outpatient provider(s):  will be coordinated prior to discharge     Participating treatment team members: Jordyn Smith, * (assigned SW), Eloy Wylie, MS

## 2024-01-11 NOTE — GROUP NOTE
Group Therapy Note    Date: 1/11/2024    Group Start Time: 1540  Group End Time: 1630  Group Topic: Recreational    SSR 2 BH NON ACUTE    Elvira Douglas        Group Therapy Note    Facilitated leisure skills group to reinforce positive coping and to manage mood through music, social interaction, group activities and art task       Attendees: 8/10      Notes:  Pt was transferred to back unit due to patient census. Encouraged but did not attend    Discipline Responsible: Recreational Therapist      Signature:  JOSE ANTONIO GentileS

## 2024-01-11 NOTE — BH NOTE
The pt has an affect that is WNLs. He attended group and accepted snacks / drink. His BS this evening was 226 and no SS insulin was needed. Pt received 20 units of scheduled Lantus. He is med compliant and no prn medication was requested. Pt rated his depression a 6/10 which is related to not knowing where he is going for rehab. He denies having any A/SI/HI or A/VH. The pt sat in the milieu, until 2100, watching T.V. by himself. No c/o pain or discomfort. He remains A+O and ambulates independently.    The pt has been resting quietly in bed with his eyes closed. No distress noted or voiced. Respirations are quiet and unlabored. Pt remains on close observation for safety.

## 2024-01-11 NOTE — GROUP NOTE
Group Therapy Note    Date: 1/11/2024    Group Start Time: 1115  Group End Time: 1145  Group Topic: Process Group - Inpatient    SSR 2 [facility] ACUTE    Eloy Wylie        Group Therapy Note: This writer provided each individual with a handout on \"daily and weekly mood chart\" in order to record and document their own changes in mood, behaviors associated with it and to gain insight into their own patterns and how to manage their mood/behaviors in a healthy manner.     Attendees: 5       Patient's Goal:  to attend groups.    Notes:    This writer provided each individual with a handout on \"daily and weekly mood chart\" in order to record and document their own changes in mood, behaviors associated with it and to gain insight into their own patterns and how to manage their mood/behaviors in a healthy manner.     Attendees: 5      Signature:  Eloy Wylie

## 2024-01-11 NOTE — GROUP NOTE
Group Therapy Note    Date: 1/11/2024    Group Start Time: 1335  Group End Time: 1415  Group Topic: Recreational    SSR 2 BH NON ACUTE    Elvira Douglas        Group Therapy Note    Facilitated leisure skills group to reinforce positive coping and to manage mood through music, social interaction, group activities and art task       Attendees: 3/4      Notes:  Encouraged but did not attend    Discipline Responsible: Recreational Therapist      Signature:  JOEY Gentile

## 2024-01-11 NOTE — PLAN OF CARE
Problem: Pain  Goal: Verbalizes/displays adequate comfort level or baseline comfort level  Outcome: Progressing     Problem: Anxiety  Goal: Will report anxiety at manageable levels  Description: INTERVENTIONS:  1. Administer medication as ordered  2. Teach and rehearse alternative coping skills  3. Provide emotional support with 1:1 interaction with staff  Outcome: Progressing     Problem: Depression/Self Harm  Goal: Effect of psychiatric condition will be minimized and patient will be protected from self harm  Description: INTERVENTIONS:  1. Assess impact of patient's symptoms on level of functioning, self care needs and offer support as indicated  2. Assess patient/family knowledge of depression, impact on illness and need for teaching  3. Provide emotional support, presence and reassurance  4. Assess for possible suicidal thoughts or ideation. If patient expresses suicidal thoughts or statements do not leave alone, initiate Suicide Precautions, move to a room close to the nursing station and obtain sitter  5. Initiate consults as appropriate with Mental Health Professional, Spiritual Care, Psychosocial CNS, and consider a recommendation to the LIP for a Psychiatric Consultation  Outcome: Progressing     Problem: Safety - Adult  Goal: Free from fall injury  Outcome: Progressing    -no c/o pain or discomfort.  -pt anxiety is at a manageable level; he has been watching T.V.   -pt denies having any suicidal thoughts; no self harm reported or noted.  -pt ambulates independently; no self injurious behavior noted or reported.

## 2024-01-12 LAB
GLUCOSE BLD STRIP.AUTO-MCNC: 187 MG/DL (ref 65–100)
GLUCOSE BLD STRIP.AUTO-MCNC: 265 MG/DL (ref 65–100)
GLUCOSE BLD STRIP.AUTO-MCNC: 297 MG/DL (ref 65–100)
GLUCOSE BLD STRIP.AUTO-MCNC: 416 MG/DL (ref 65–100)
GLUCOSE BLD STRIP.AUTO-MCNC: 465 MG/DL (ref 65–100)
PERFORMED BY:: ABNORMAL

## 2024-01-12 PROCEDURE — 1240000000 HC EMOTIONAL WELLNESS R&B

## 2024-01-12 PROCEDURE — 6370000000 HC RX 637 (ALT 250 FOR IP): Performed by: PSYCHIATRY & NEUROLOGY

## 2024-01-12 PROCEDURE — 6370000000 HC RX 637 (ALT 250 FOR IP): Performed by: INTERNAL MEDICINE

## 2024-01-12 PROCEDURE — 82962 GLUCOSE BLOOD TEST: CPT

## 2024-01-12 RX ORDER — QUETIAPINE FUMARATE 50 MG/1
50 TABLET, FILM COATED ORAL NIGHTLY
Qty: 60 TABLET | Refills: 1 | Status: SHIPPED | OUTPATIENT
Start: 2024-01-12

## 2024-01-12 RX ORDER — AMLODIPINE BESYLATE 5 MG/1
5 TABLET ORAL DAILY
Qty: 30 TABLET | Refills: 1 | Status: SHIPPED | OUTPATIENT
Start: 2024-01-12

## 2024-01-12 RX ORDER — INSULIN GLARGINE 100 [IU]/ML
20 INJECTION, SOLUTION SUBCUTANEOUS 2 TIMES DAILY
Qty: 10 ML | Refills: 1 | Status: SHIPPED | OUTPATIENT
Start: 2024-01-12

## 2024-01-12 RX ORDER — LOPERAMIDE HYDROCHLORIDE 2 MG/1
2 CAPSULE ORAL 2 TIMES DAILY PRN
Status: DISCONTINUED | OUTPATIENT
Start: 2024-01-12 | End: 2024-01-15 | Stop reason: HOSPADM

## 2024-01-12 RX ORDER — TRAZODONE HYDROCHLORIDE 50 MG/1
50 TABLET ORAL NIGHTLY PRN
Qty: 30 TABLET | Refills: 1 | Status: SHIPPED | OUTPATIENT
Start: 2024-01-12

## 2024-01-12 RX ORDER — FLUOXETINE HYDROCHLORIDE 20 MG/1
20 CAPSULE ORAL DAILY
Qty: 30 CAPSULE | Refills: 1 | Status: SHIPPED | OUTPATIENT
Start: 2024-01-13

## 2024-01-12 RX ADMIN — QUETIAPINE FUMARATE 50 MG: 25 TABLET ORAL at 21:15

## 2024-01-12 RX ADMIN — INSULIN LISPRO 16 UNITS: 100 INJECTION, SOLUTION INTRAVENOUS; SUBCUTANEOUS at 21:03

## 2024-01-12 RX ADMIN — LOPERAMIDE HYDROCHLORIDE 2 MG: 2 CAPSULE ORAL at 09:06

## 2024-01-12 RX ADMIN — INSULIN LISPRO 8 UNITS: 100 INJECTION, SOLUTION INTRAVENOUS; SUBCUTANEOUS at 16:47

## 2024-01-12 RX ADMIN — INSULIN LISPRO 8 UNITS: 100 INJECTION, SOLUTION INTRAVENOUS; SUBCUTANEOUS at 13:47

## 2024-01-12 RX ADMIN — INSULIN GLARGINE 20 UNITS: 100 INJECTION, SOLUTION SUBCUTANEOUS at 08:45

## 2024-01-12 NOTE — BH NOTE
DISCHARGE SUMMARY    NAME:Jordyn Smith  : 1965  MRN: [id number]    The patient Jordyn Smith exhibits the ability to control behavior in a less restrictive environment.  Patient's level of functioning is improving.  No assaultive/destructive behavior has been observed for the past 24 hours.  No suicidal/homicidal threat or behavior has been observed for the past 24 hours.  There is no evidence of serious medication side effects.  Patient has not been in physical or protective restraints for at least the past 24 hours.    If weapons involved, how are they secured? N/a    Is patient aware of and in agreement with discharge plan? yes    Arrangements for medication:  Prescriptions will be sent to [facility]     Copy of discharge instructions to provider?:  yes    Arrangements for transportation home:  Pt will take a medicaid cab to [facility]     Keep all follow up appointments as scheduled, continue to take prescribed medications per physician instructions.  Mental health crisis number:  911 or your local mental health crisis line number at [phone]      Mental Health Emergency WARM LINE      6-216-914-MH ([phone])      M-F: 9am to 9pm      Sat & Sun: 5pm - 9pm  National suicide prevention lines:                             6-656-XUYIFDV (8-815-520-1772)       3-184-970-TALK (1-634.660.5404)    Crisis Text Line:  Text HOME to 305593

## 2024-01-12 NOTE — PLAN OF CARE
Problem: Chronic Conditions and Co-morbidities  Goal: Patient's chronic conditions and co-morbidity symptoms are monitored and maintained or improved  Outcome: Progressing     Problem: Pain  Goal: Verbalizes/displays adequate comfort level or baseline comfort level  1/12/2024 0535 by Kina Jarrett RN  Outcome: Progressing  1/11/2024 1544 by Whitney Hill RN  Outcome: Adequate for Discharge     Problem: Anxiety  Goal: Will report anxiety at manageable levels  Description: INTERVENTIONS:  1. Administer medication as ordered  2. Teach and rehearse alternative coping skills  3. Provide emotional support with 1:1 interaction with staff  Outcome: Progressing  Flowsheets (Taken 1/11/2024 1537 by Whitney Hill RN)  Will report anxiety at manageable levels:   Administer medication as ordered   Teach and rehearse alternative coping skills     Problem: Coping  Goal: Pt/Family able to verbalize concerns and demonstrate effective coping strategies  Description: INTERVENTIONS:  1. Assist patient/family to identify coping skills, available support systems and cultural and spiritual values  2. Provide emotional support, including active listening and acknowledgement of concerns of patient and caregivers  3. Reduce environmental stimuli, as able  4. Instruct patient/family in relaxation techniques, as appropriate  5. Assess for spiritual pain/suffering and initiate Spiritual Care, Psychosocial Clinical Specialist consults as needed  1/12/2024 0535 by Kina Jarrett RN  Outcome: Progressing  1/11/2024 1602 by Whitney Hill RN  Outcome: Progressing  Flowsheets (Taken 1/11/2024 1537)  Patient/family able to verbalize anxieties, fears, and concerns, and demonstrate effective coping: Assist patient/family to identify coping skills, available support systems and cultural and spiritual values     Problem: Decision Making  Goal: Pt/Family able to effectively weigh alternatives and participate in decision

## 2024-01-12 NOTE — PLAN OF CARE
Problem: Chronic Conditions and Co-morbidities  Goal: Patient's chronic conditions and co-morbidity symptoms are monitored and maintained or improved  1/12/2024 0535 by Kina Jarrett RN  Outcome: Progressing     Problem: Pain  Goal: Verbalizes/displays adequate comfort level or baseline comfort level  1/12/2024 0535 by Kina Jarrett RN  Outcome: Progressing     Problem: Anxiety  Goal: Will report anxiety at manageable levels  Description: INTERVENTIONS:  1. Administer medication as ordered  2. Teach and rehearse alternative coping skills  3. Provide emotional support with 1:1 interaction with staff  1/12/2024 1127 by Whitney Hill RN  Outcome: Adequate for Discharge  Flowsheets (Taken 1/12/2024 1126)  Will report anxiety at manageable levels:   Administer medication as ordered   Teach and rehearse alternative coping skills  1/12/2024 0535 by Kina Jarrett RN  Outcome: Progressing  Flowsheets (Taken 1/11/2024 1537 by Whitney Hill RN)  Will report anxiety at manageable levels:   Administer medication as ordered   Teach and rehearse alternative coping skills     Problem: Coping  Goal: Pt/Family able to verbalize concerns and demonstrate effective coping strategies  Description: INTERVENTIONS:  1. Assist patient/family to identify coping skills, available support systems and cultural and spiritual values  2. Provide emotional support, including active listening and acknowledgement of concerns of patient and caregivers  3. Reduce environmental stimuli, as able  4. Instruct patient/family in relaxation techniques, as appropriate  5. Assess for spiritual pain/suffering and initiate Spiritual Care, Psychosocial Clinical Specialist consults as needed  1/12/2024 1127 by Whitney Hill RN  Outcome: Adequate for Discharge  Flowsheets (Taken 1/12/2024 1126)  Patient/family able to verbalize anxieties, fears, and concerns, and demonstrate effective coping: Reduce environmental stimuli, as

## 2024-01-12 NOTE — BH NOTE
Patient complains of diarrhea X3, orders received from Dr. Munoz, see MAR. Patient refused scheduled prozac.

## 2024-01-12 NOTE — BH NOTE
Patient visible on unit , with minimal peer interaction; observed watching TV in dayroom until bedtime. HS , no SS insulin needed. Denies SI/HI/AVH  and rates depression 5/10. Medication compliant, received no prn medications and also received scheduled 20 units of Lantus.Denies pain or withdrawal symptoms.Resting quietly in bed with eyes closed when observed during Q15MIN safety checks.

## 2024-01-12 NOTE — BH NOTE
Behavioral Health Treatment Team Note     Patient goal(s) for today: \"to get some rest\"  Treatment team focus/goals: continue medication management, group therapy, maintain ADLs and provide a safe discharge    Progress note: Pt presented with a tired affect and congruent mood. Pt denied any SI/HI/Avh at the time and was orientated x4. Pt was laying in bed and reported that he did not sleep well because of noise and the light in the hallway. Pt shared that he was feeling better physically and is motivated to go to the next program and work on himself. Pt reported diarrhea and having an upset stomach. Writer spoke with [facility] and pt was excepted by the program. Writer will coordinated and provide a safe discharge on Monday. An inpatient level of care is needed to coordinate a safe discharge    LOS:  8  Expected LOS: 1-15-24    Insurance info/prescription coverage:  Medicaid   Date of last family contact:  pt denied   Family requesting physician contact today:  No  Discharge plan:  [facility]   Guns in the home:  No   Outpatient provider(s):  [facility]     Participating treatment team members: Jordyn Smith, * (assigned SW), Mouna Nicholas LMSW

## 2024-01-12 NOTE — BH NOTE
Medicaid transportation [facility] #[id number]    [employer] [id number]    Mbr will be picked up on Monday 1/15/24 at 0930    Instructions repeated back to me.

## 2024-01-12 NOTE — BH NOTE
Patient has consumed 75% of meals. He has no additional physical complaints. He is future focused and goal oriented. Plans for discharge Monday. Q15 minute checks maintained.

## 2024-01-12 NOTE — BH NOTE
Behavioral Health Transition Record to Provider    Patient Name: Jordyn Smith  YOB: 1965  Medical Record Number: [id number]  Date of Admission: 1/4/2024  Date of Discharge: 1-15-24    Attending Provider: Lissett Munoz MD  Discharging Provider: Dr. Munoz   To contact this individual call [phone] and ask the  to page.  If unavailable, ask to be transferred to Behavioral Health Provider on call.  A Behavioral Health Provider will be available on call 24/7 and during holidays.    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 Pt reported that his girlfriend kicked him out of her home and would not allow him to get his belongings. Pt shared that he was hospitalized 4-5 months ago with  and mental health. He shared that he was in a substance use program in [place] and would not be returning there. He would like to relocate to [facility] where his brother lives.      Admission Diagnosis: MDD (major depressive disorder), recurrent episode, mild (HCC) [F33.0]  Suicidal ideations [R45.851]    * No surgery found *    Results for orders placed or performed during the hospital encounter of 01/04/24   COVID-19 & Influenza Combo    Specimen: Nasopharyngeal   Result Value Ref Range    SARS-CoV-2, PCR Not Detected Not Detected      Rapid Influenza A By PCR Not Detected Not Detected      Rapid Influenza B By PCR Not Detected Not Detected     CBC with Auto Differential   Result Value Ref Range    WBC 7.9 4.1 - 11.1 K/uL    RBC 4.33 4.10 - 5.70 M/uL    Hemoglobin 13.9 12.1 - 17.0 g/dL    Hematocrit 38.5 36.6 - 50.3 %    MCV 88.9 80.0 - 99.0 FL    MCH 32.1 26.0 - 34.0 PG    MCHC 36.1 30.0 - 36.5 g/dL    RDW 12.2 11.5 - 14.5 %    Platelets 102 (L) 150 - 400 K/uL    MPV 12.3 8.9 - 12.9 FL    Nucleated RBCs 0.0 0.0  WBC    nRBC 0.00 0.00 - 0.01 K/uL

## 2024-01-12 NOTE — GROUP NOTE
Group Therapy Note    Date: 1/12/2024    Group Start Time: 1115  Group End Time: 1200  Group Topic: Psychoeducation    [facility]R 2 [facility] ACUTE    Mouna Nicholas        Group Therapy Note  Writer held a psych-education group due to a majority of the pts being discharged. Writer provided the pts with a worksheet and went over each topic. As a icebreaker writer used the Zone chart and asked each pt what zone they are in according to their mood. Pts were quiet but would share their coping skills and warning signs when prompted. Pts appeared to lack family and social support. Many of the pts spoke about using substances and how to \"break\" the cycle  Attendees: 7-11       Notes:  Pt was encouraged to attend and chose not to        Signature:  Mouna Nicholas

## 2024-01-12 NOTE — GROUP NOTE
Group Therapy Note    Date: 1/12/2024    Group Start Time: 1325  Group End Time: 1415  Group Topic: Recreational    SSR 2 BH NON ACUTE    Elvira Douglas        Group Therapy Note    Facilitated leisure skills group to reinforce positive coping and to manage mood through music, social interaction, group activities and art task       Attendees: 5/11      Notes:  Encouraged but did not attend    Discipline Responsible: Recreational Therapist      Signature:  JOEY Gentile

## 2024-01-13 LAB
GLUCOSE BLD STRIP.AUTO-MCNC: 127 MG/DL (ref 65–100)
GLUCOSE BLD STRIP.AUTO-MCNC: 208 MG/DL (ref 65–100)
GLUCOSE BLD STRIP.AUTO-MCNC: 252 MG/DL (ref 65–100)
GLUCOSE BLD STRIP.AUTO-MCNC: 332 MG/DL (ref 65–100)
GLUCOSE BLD STRIP.AUTO-MCNC: 378 MG/DL (ref 65–100)
PERFORMED BY:: ABNORMAL

## 2024-01-13 PROCEDURE — 6370000000 HC RX 637 (ALT 250 FOR IP): Performed by: INTERNAL MEDICINE

## 2024-01-13 PROCEDURE — 82962 GLUCOSE BLOOD TEST: CPT

## 2024-01-13 PROCEDURE — 6370000000 HC RX 637 (ALT 250 FOR IP): Performed by: PSYCHIATRY & NEUROLOGY

## 2024-01-13 PROCEDURE — 1240000000 HC EMOTIONAL WELLNESS R&B

## 2024-01-13 PROCEDURE — 6370000000 HC RX 637 (ALT 250 FOR IP): Performed by: FAMILY MEDICINE

## 2024-01-13 RX ORDER — INSULIN GLARGINE 100 [IU]/ML
25 INJECTION, SOLUTION SUBCUTANEOUS 2 TIMES DAILY
Status: DISCONTINUED | OUTPATIENT
Start: 2024-01-13 | End: 2024-01-15 | Stop reason: HOSPADM

## 2024-01-13 RX ORDER — LAMOTRIGINE 25 MG/1
25 TABLET ORAL 2 TIMES DAILY
Status: DISCONTINUED | OUTPATIENT
Start: 2024-01-14 | End: 2024-01-15 | Stop reason: HOSPADM

## 2024-01-13 RX ADMIN — INSULIN LISPRO 16 UNITS: 100 INJECTION, SOLUTION INTRAVENOUS; SUBCUTANEOUS at 16:57

## 2024-01-13 RX ADMIN — INSULIN LISPRO 4 UNITS: 100 INJECTION, SOLUTION INTRAVENOUS; SUBCUTANEOUS at 21:25

## 2024-01-13 RX ADMIN — INSULIN GLARGINE 20 UNITS: 100 INJECTION, SOLUTION SUBCUTANEOUS at 08:36

## 2024-01-13 RX ADMIN — INSULIN GLARGINE 25 UNITS: 100 INJECTION, SOLUTION SUBCUTANEOUS at 21:25

## 2024-01-13 RX ADMIN — INSULIN LISPRO 4 UNITS: 100 INJECTION, SOLUTION INTRAVENOUS; SUBCUTANEOUS at 07:50

## 2024-01-13 RX ADMIN — QUETIAPINE FUMARATE 50 MG: 25 TABLET ORAL at 21:21

## 2024-01-13 RX ADMIN — INSULIN LISPRO 8 UNITS: 100 INJECTION, SOLUTION INTRAVENOUS; SUBCUTANEOUS at 12:14

## 2024-01-13 RX ADMIN — LOPERAMIDE HYDROCHLORIDE 2 MG: 2 CAPSULE ORAL at 22:39

## 2024-01-13 NOTE — BH NOTE
Patient  was transferred from [facility] well has been in the dayroom watching tv .Snacks had to held due to blood glucose being high was accepted well. Patient anxious about going home on Monday. Patient affect is flat mood depressed. Compliant with medications *** denies AH/VH .,SI/HI  at this time. Patient resting quietly during q15 minutes checks.

## 2024-01-13 NOTE — BH NOTE
DAY SHIFT    Pt up ad jason on unit. Pt calm and cooperative. Pt refuses prozac as he states \"it just doesn't make me feel right gives me brain fog\" pt taking all other mediations without difficulty. Pt seen in day room socializing and watching TV, but does not attend groups. Pt resting thought out day Pt reports 5/10 depression and anxiety. Pt denies SI/HI. Pt denies AVH. Close observations continued to ensure pt safety.     *See below note about blood sugars*

## 2024-01-13 NOTE — GROUP NOTE
Group Therapy Note    Date: 1/13/2024    Group Start Time: 1320  Group End Time: 1415  Group Topic: Recreational    SSR 2 BH NON ACUTE    Elvira Douglas        Group Therapy Note    Facilitated leisure skills group to reinforce positive coping and to manage mood through music, social interaction, group activities and art task       Attendees: 4/6      Notes:  Encouraged but did not attend    Discipline Responsible: Recreational Therapist      Signature:  JOEY Gentile

## 2024-01-13 NOTE — BH NOTE
Patient evening blood glucose iwas 465 call placed to Danae who stated give 16 units and in one hour take it again . Patient did eat half a sandwhich and a diet gingerale ..

## 2024-01-13 NOTE — BH NOTE
Patient  had a repeat glucose it was 416 after 16 units. Asymptomatic.In 2hours blood glucose repeated it was 127.

## 2024-01-13 NOTE — BH NOTE
Dr Fink rounding on unit, notified of 1640 blood sugar of 378 orders given to increase lantus and give sliding scale 16 units.

## 2024-01-13 NOTE — GROUP NOTE
Group Therapy Note    Date: 1/13/2024    Group Start Time: 0935  Group End Time: 1025  Group Topic: Education Group - Inpatient    SSR 2  NON ACUTE    Elvira Douglas        Group Therapy Note    Facilitated group to discuss the definition and traits of assertive communication and to present practical assertiveness tips and examples of assertive communication       Attendees: 4/6       Notes:  Encouraged but did not attend    Discipline Responsible: Recreational Therapist      Signature:  JOEY Gentile

## 2024-01-14 LAB
GLUCOSE BLD STRIP.AUTO-MCNC: 221 MG/DL (ref 65–100)
GLUCOSE BLD STRIP.AUTO-MCNC: 229 MG/DL (ref 65–100)
GLUCOSE BLD STRIP.AUTO-MCNC: 306 MG/DL (ref 65–100)
GLUCOSE BLD STRIP.AUTO-MCNC: 331 MG/DL (ref 65–100)
PERFORMED BY:: ABNORMAL

## 2024-01-14 PROCEDURE — 6370000000 HC RX 637 (ALT 250 FOR IP): Performed by: INTERNAL MEDICINE

## 2024-01-14 PROCEDURE — 6370000000 HC RX 637 (ALT 250 FOR IP): Performed by: PSYCHIATRY & NEUROLOGY

## 2024-01-14 PROCEDURE — 82962 GLUCOSE BLOOD TEST: CPT

## 2024-01-14 PROCEDURE — 6370000000 HC RX 637 (ALT 250 FOR IP): Performed by: FAMILY MEDICINE

## 2024-01-14 PROCEDURE — 1240000000 HC EMOTIONAL WELLNESS R&B

## 2024-01-14 RX ADMIN — QUETIAPINE FUMARATE 50 MG: 25 TABLET ORAL at 20:49

## 2024-01-14 RX ADMIN — LOPERAMIDE HYDROCHLORIDE 2 MG: 2 CAPSULE ORAL at 17:46

## 2024-01-14 RX ADMIN — INSULIN LISPRO 4 UNITS: 100 INJECTION, SOLUTION INTRAVENOUS; SUBCUTANEOUS at 20:53

## 2024-01-14 RX ADMIN — INSULIN GLARGINE 25 UNITS: 100 INJECTION, SOLUTION SUBCUTANEOUS at 20:53

## 2024-01-14 RX ADMIN — INSULIN LISPRO 12 UNITS: 100 INJECTION, SOLUTION INTRAVENOUS; SUBCUTANEOUS at 16:59

## 2024-01-14 RX ADMIN — INSULIN GLARGINE 25 UNITS: 100 INJECTION, SOLUTION SUBCUTANEOUS at 08:22

## 2024-01-14 RX ADMIN — LAMOTRIGINE 25 MG: 25 TABLET ORAL at 10:05

## 2024-01-14 RX ADMIN — LAMOTRIGINE 25 MG: 25 TABLET ORAL at 20:49

## 2024-01-14 RX ADMIN — INSULIN LISPRO 4 UNITS: 100 INJECTION, SOLUTION INTRAVENOUS; SUBCUTANEOUS at 08:22

## 2024-01-14 RX ADMIN — INSULIN LISPRO 4 UNITS: 100 INJECTION, SOLUTION INTRAVENOUS; SUBCUTANEOUS at 11:49

## 2024-01-14 NOTE — BH NOTE
DAY SHIFT    Pt up ad jason on unit. Pt denies depression and anxiety. Pt denies SI/HI. Pt denies AVH. Pt out in day room but not attending groups. Pt refusing prozac but takes all other medications without difficulty. Pt expecting to be discharged Monday. Close observations continued to ensure pt safety.

## 2024-01-14 NOTE — PLAN OF CARE
Problem: Chronic Conditions and Co-morbidities  Goal: Patient's chronic conditions and co-morbidity symptoms are monitored and maintained or improved  Outcome: Progressing     Problem: Discharge Planning  Goal: Discharge to home or other facility with appropriate resources  Outcome: Progressing     Problem: Pain  Goal: Verbalizes/displays adequate comfort level or baseline comfort level  Outcome: Progressing     Problem: Anxiety  Goal: Will report anxiety at manageable levels  Description: INTERVENTIONS:  1. Administer medication as ordered  2. Teach and rehearse alternative coping skills  3. Provide emotional support with 1:1 interaction with staff  Outcome: Progressing     Problem: Coping  Goal: Pt/Family able to verbalize concerns and demonstrate effective coping strategies  Description: INTERVENTIONS:  1. Assist patient/family to identify coping skills, available support systems and cultural and spiritual values  2. Provide emotional support, including active listening and acknowledgement of concerns of patient and caregivers  3. Reduce environmental stimuli, as able  4. Instruct patient/family in relaxation techniques, as appropriate  5. Assess for spiritual pain/suffering and initiate Spiritual Care, Psychosocial Clinical Specialist consults as needed  Outcome: Progressing     Problem: Decision Making  Goal: Pt/Family able to effectively weigh alternatives and participate in decision making related to treatment and care  Description: INTERVENTIONS:  1. Determine when there are differences between patient's view, family's view, and healthcare provider's view of condition  2. Facilitate patient and family articulation of goals for care  3. Help patient and family identify pros/cons of alternative solutions  4. Provide information as requested by patient/family  5. Respect patient/family right to receive or not to receive information  6. Serve as a liaison between patient and family and health care team  7.

## 2024-01-14 NOTE — PROGRESS NOTES
Nursing report of hypoglycemia about 500  Vitals:    01/07/24 0842   BP: 119/76   Pulse: 87   Resp:    Temp:    SpO2:        Current Facility-Administered Medications:     insulin glargine (LANTUS) injection vial 20 Units, 20 Units, SubCUTAneous, BID, Joshua Shore MD, 20 Units at 01/07/24 1225    insulin lispro (HUMALOG) injection vial 0-16 Units, 0-16 Units, SubCUTAneous, TID WC, Joshua Shore MD    insulin lispro (HUMALOG) injection vial 0-4 Units, 0-4 Units, SubCUTAneous, Nightly, Joshua Shore MD    QUEtiapine (SEROQUEL) tablet 50 mg, 50 mg, Oral, Nightly, Lissett Munoz MD    glucose chewable tablet 16 g, 4 tablet, Oral, PRN, Joshua Shore MD    dextrose bolus 10% 125 mL, 125 mL, IntraVENous, PRN **OR** dextrose bolus 10% 250 mL, 250 mL, IntraVENous, PRN, Joshua Shore MD    glucagon injection 1 mg, 1 mg, SubCUTAneous, PRN, Joshua Shore MD    dextrose 10 % infusion, , IntraVENous, Continuous PRN, Joshua Shore MD    acetaminophen (TYLENOL) tablet 650 mg, 650 mg, Oral, Q4H PRN, Lissett Munoz MD, 650 mg at 01/05/24 2041    hydrOXYzine HCl (ATARAX) tablet 50 mg, 50 mg, Oral, TID PRN, Lissett Munoz MD    traZODone (DESYREL) tablet 100 mg, 100 mg, Oral, Nightly PRN, Lissett Munoz MD, 100 mg at 01/05/24 2041    magnesium hydroxide (MILK OF MAGNESIA) 400 MG/5ML suspension 30 mL, 30 mL, Oral, Daily PRN, Lissett Munoz MD    amLODIPine (NORVASC) tablet 5 mg, 5 mg, Oral, Daily, Lissett Munoz MD, 5 mg at 01/07/24 0827    FLUoxetine (PROZAC) capsule 20 mg, 20 mg, Oral, Daily, Lissett Munoz MD, 20 mg at 01/06/24 0825  Patient Active Problem List   Diagnosis    Osteomyelitis of great toe of right foot (HCC)    MDD (major depressive disorder), recurrent severe, 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 
PSYCHIATRIC PROGRESS NOTE         Patient Name  Jordyn Smith   Date of Birth 1965   [facility] [id number]   Medical Record Number  [id number]      Age  58 y.o.   PCP Segundo Campo MD   Admit date:  1/4/2024    Room Number  232/01   [facility]   Date of Service  1/14/2024            HISTORY OF PRESENT ILLNESS/INTERVAL HISTORY:              MENTAL STATUS EXAM & VITALS                    VITALS:     Patient Vitals for the past 24 hrs:   Temp Pulse Resp BP SpO2   01/14/24 0715 97.6 °F (36.4 °C) 77 18 113/69 99 %   01/13/24 2020 -- 88 -- 119/82 --   01/13/24 1916 98.2 °F (36.8 °C) 88 18 119/82 --     Wt Readings from Last 3 Encounters:   01/04/24 72.6 kg (160 lb)   07/17/23 64.4 kg (142 lb)   05/15/23 81.6 kg (180 lb)     Temp Readings from Last 3 Encounters:   01/14/24 97.6 °F (36.4 °C) (Oral)   07/17/23 98.6 °F (37 °C)   05/21/23 98 °F (36.7 °C) (Oral)     BP Readings from Last 3 Encounters:   01/14/24 113/69   07/17/23 (!) 140/77   05/21/23 (!) 168/97     Pulse Readings from Last 3 Encounters:   01/14/24 77   07/17/23 100   05/21/23 (!) 104            DATA     LABORATORY DATA:(reviewed/updated 1/14/2024)  Recent Results (from the past 24 hour(s))   POCT Glucose    Collection Time: 01/13/24  4:34 PM   Result Value Ref Range    POC Glucose 378 (H) 65 - 100 mg/dL    Performed by: Chetan Olivares    POCT Glucose    Collection Time: 01/13/24  8:07 PM   Result Value Ref Range    POC Glucose 332 (H) 65 - 100 mg/dL    Performed by: BELEN VIRAMONTES    POCT Glucose    Collection Time: 01/14/24  6:26 AM   Result Value Ref Range    POC Glucose 221 (H) 65 - 100 mg/dL    Performed by: [employer]    POCT Glucose    Collection Time: 01/14/24 11:29 AM   Result Value Ref Range    POC Glucose 229 (H) 65 - 100 mg/dL    Performed by: Chetan Olivares       No results found for: \"VALAC\", \"VALP\", \"CARB2\"  No results found for: \"LITHM\"   RADIOLOGY REPORTS:(reviewed/updated 1/14/2024)  No results found. 
Patient alert and orientedx4 denies si/avh/hi calm cooperative and polite.  Patient can be isolative at times he has a flat affect and remains medication and meal compliant.   Patient denies anxiety, however, he rates depression-5.  Laurenzbigniew appears sad at times.  Will continue to monitor patient.  
Progress Note  Date:1/10/2024       Room:[facility]  Patient Name:Jordyn Smith     YOB: 1965     Age:58 y.o.        Subjective    Subjective   Patient is attentive and has more energy today. He had just finished lunch when we spoke today. He expresses he is feeling less depressed today and was able to engage with Counselor yesterday. No dizziness reported. Blood glucose stills remains elevated. Expresses interest in staying with [facility] after discharge and being closer to his brother. Denies SI/HI/auditory/visual hallucinations.     Review of Systems  Objective         Vitals Last 24 Hours:  TEMPERATURE:  Temp  Av.8 °F (37.1 °C)  Min: 98.8 °F (37.1 °C)  Max: 98.8 °F (37.1 °C)  RESPIRATIONS RANGE: Resp  Av  Min: 18  Max: 18  PULSE OXIMETRY RANGE: No data recorded  PULSE RANGE: Pulse  Av  Min: 98  Max: 98  BLOOD PRESSURE RANGE: Systolic (24hrs), Av , Min:97 , Max:97   ; Diastolic (24hrs), Av, Min:71, Max:71    I/O (24Hr):  No intake or output data in the 24 hours ending 01/10/24 1257  Objective  Labs/Imaging/Diagnostics    Labs:  CBC:No results for input(s): \"WBC\", \"RBC\", \"HGB\", \"HCT\", \"MCV\", \"RDW\", \"PLT\" in the last 72 hours.  CHEMISTRIES:  Recent Labs     24  0758      K 4.0      CO2 28   BUN 19   CREATININE 1.14   GLUCOSE 262*     PT/INR:No results for input(s): \"PROTIME\", \"INR\" in the last 72 hours.  APTT:No results for input(s): \"APTT\" in the last 72 hours.  LIVER PROFILE:No results for input(s): \"AST\", \"ALT\", \"BILIDIR\", \"BILITOT\", \"ALKPHOS\" in the last 72 hours.    Imaging Last 24 Hours:  No results found.  Assessment//Plan           Hospital Problems             Last Modified POA    * (Principal) MDD (major depressive disorder), recurrent episode, mild (HCC) 2024 Yes    Suicidal ideations 2024 Yes     Assessment & Plan  Continue Prozac 20mg PO QD  Continue Seroquel 50 mg PO nightly   Continue increased meal time Humolog treatment   Work with 
Progress Note  Date:2024       Room:[facility]  Patient Name:Jordyn Smith     YOB: 1965     Age:58 y.o.        Subjective    Subjective   Patient is sleeping in bed and wakes to converse this morning. He is alert and oriented x3 and feeling better physically today. He reports feeling sad today and denies SI/HI/auditory/visual hallucinations. He was able to walked round this morning after eating breakfast. Blood glucose levels have remained high. He is agreeable to look for substance abuse programs following discharge. As per RN, he only took blood pressure and diabetic medications; Prozac was not taken this morning.     Review of Systems  Objective         Vitals Last 24 Hours:  TEMPERATURE:  Temp  Av.5 °F (36.9 °C)  Min: 98.4 °F (36.9 °C)  Max: 98.5 °F (36.9 °C)  RESPIRATIONS RANGE: Resp  Av  Min: 16  Max: 18  PULSE OXIMETRY RANGE: No data recorded  PULSE RANGE: Pulse  Av.8  Min: 54  Max: 87  BLOOD PRESSURE RANGE: Systolic (24hrs), Av , Min:85 , Max:145   ; Diastolic (24hrs), Av, Min:63, Max:95    I/O (24Hr):  No intake or output data in the 24 hours ending 24 1013  Objective  Labs/Imaging/Diagnostics    Labs:  CBC:No results for input(s): \"WBC\", \"RBC\", \"HGB\", \"HCT\", \"MCV\", \"RDW\", \"PLT\" in the last 72 hours.  CHEMISTRIES:  Recent Labs     24  0758      K 4.0      CO2 28   BUN 19   CREATININE 1.14   GLUCOSE 262*     PT/INR:No results for input(s): \"PROTIME\", \"INR\" in the last 72 hours.  APTT:No results for input(s): \"APTT\" in the last 72 hours.  LIVER PROFILE:No results for input(s): \"AST\", \"ALT\", \"BILIDIR\", \"BILITOT\", \"ALKPHOS\" in the last 72 hours.    Imaging Last 24 Hours:  No results found.  Assessment//Plan           Hospital Problems             Last Modified POA    * (Principal) MDD (major depressive disorder), recurrent episode, mild (HCC) 2024 Yes    Suicidal ideations 2024 Yes     Assessment & Plan  Continue Prozac 20mg PO QD  Continue 
Progress Note  Date:2024       Room:[facility]  Patient Name:Jordyn Smith     YOB: 1965     Age:58 y.o.        Subjective    Subjective patient has been mostly isolated to himself.  Not much interaction seen with staff or peers.  Patient is laying bed today and states he is feeling lethargic and not well.  No spiking temperature noted, vitals are stable. he also expresses he is not well due to \"both mental and physical symptoms\". He was then seen walking after this conversation. Denies auditory and visual hallucinations.     Review of Systems  Objective         Vitals Last 24 Hours:  TEMPERATURE:  Temp  Av.9 °F (36.1 °C)  Min: 94 °F (34.4 °C)  Max: 98.5 °F (36.9 °C)  RESPIRATIONS RANGE: Resp  Av  Min: 16  Max: 18  PULSE OXIMETRY RANGE: No data recorded  PULSE RANGE: Pulse  Av.3  Min: 80  Max: 95  BLOOD PRESSURE RANGE: Systolic (24hrs), Av , Min:85 , Max:145   ; Diastolic (24hrs), Av, Min:63, Max:94    I/O (24Hr):  No intake or output data in the 24 hours ending 24  Objective:  Vital signs: (most recent): Blood pressure (!) 145/94, pulse 80, temperature 98.5 °F (36.9 °C), temperature source Oral, resp. rate 18, height 1.803 m (5' 11\"), weight 72.6 kg (160 lb), SpO2 98 %.      Labs/Imaging/Diagnostics    Labs:  CBC:No results for input(s): \"WBC\", \"RBC\", \"HGB\", \"HCT\", \"MCV\", \"RDW\", \"PLT\" in the last 72 hours.  CHEMISTRIES:  Recent Labs     24  0758      K 4.0      CO2 28   BUN 19   CREATININE 1.14   GLUCOSE 262*       PT/INR:No results for input(s): \"PROTIME\", \"INR\" in the last 72 hours.  APTT:No results for input(s): \"APTT\" in the last 72 hours.  LIVER PROFILE:No results for input(s): \"AST\", \"ALT\", \"BILIDIR\", \"BILITOT\", \"ALKPHOS\" in the last 72 hours.    Imaging Last 24 Hours:  No results found.  Assessment//Plan           Hospital Problems             Last Modified POA    * (Principal) MDD (major depressive disorder), recurrent episode, mild (HCC) 
Progress Note  Date:2024       Room:[facility]  Patient Name:Jordyn Smith     YOB: 1965     Age:58 y.o.        Subjective    Subjective   The patient, Jordyn Smith, is a 58 year old male admitted to the behavioral health floor after presenting to [place] ED after reported overdose on fentanyl. Patient was in bed this morning and complained of dizziness that he has never felt before. He has spent all day yesterday in bed as well. He has a history of drug use as well as a recent overdose on fentanyl. He was able to eat breakfast this morning. He has also recently stopped taking his medications . He reports no paranoia, hallucinations, auditory and visual hallucinations today. He would prefer to talk further later, patient is a limited historian.     Review of Systems  Objective         Vitals Last 24 Hours:  TEMPERATURE:  Temp  Av.7 °F (37.1 °C)  Min: 98.6 °F (37 °C)  Max: 98.7 °F (37.1 °C)  RESPIRATIONS RANGE: Resp  Av  Min: 18  Max: 18  PULSE OXIMETRY RANGE: SpO2  Av %  Min: 98 %  Max: 98 %  PULSE RANGE: Pulse  Av.3  Min: 86  Max: 99  BLOOD PRESSURE RANGE: Systolic (24hrs), Av , Min:126 , Max:135   ; Diastolic (24hrs), Av, Min:72, Max:80    I/O (24Hr):  No intake or output data in the 24 hours ending 24 1119  Objective:  Vital signs: (most recent): Blood pressure 135/72, pulse 86, temperature 98.6 °F (37 °C), temperature source Oral, resp. rate 18, height 1.803 m (5' 11\"), weight 72.6 kg (160 lb), SpO2 98 %.      Labs/Imaging/Diagnostics    Labs:  CBC:  Recent Labs     24  0741   WBC 7.9   RBC 4.33   HGB 13.9   HCT 38.5   MCV 88.9   RDW 12.2   *       CHEMISTRIES:  Recent Labs     24  0741 24  0853 24  1336   *  --  134*   K Hemolyzed, Recollection Recommended 4.2 3.9   CL 95*  --  101   CO2 23  --  24   BUN 32*  --  26*   CREATININE 1.66*  --  1.20   GLUCOSE 328*  --  246*   MG Hemolyzed, Recollection Recommended  --   --  
Progress Note  Date:2024       Room:[facility]  Patient Name:Jordyn Smith     YOB: 1965     Age:58 y.o.        Subjective    Subjective   Patient is awake alert and state she is feeling a little better today. He feels less depressed and less dizzy. He reports better sleep last night. Also states his brother is a really good support for him and he will call him again today. Patient reports a desire to find a PCP after discharge and would like to get an insulin pump to better control his blood sugars. He usually injects insulin on the outside. Denies SI/HI/auditory/visual hallucinations.     Review of Systems  Objective         Vitals Last 24 Hours:  TEMPERATURE:  Temp  Av.4 °F (36.9 °C)  Min: 98.4 °F (36.9 °C)  Max: 98.4 °F (36.9 °C)  RESPIRATIONS RANGE: Resp  Av  Min: 18  Max: 18  PULSE OXIMETRY RANGE: No data recorded  PULSE RANGE: Pulse  Av  Min: 91  Max: 91  BLOOD PRESSURE RANGE: Systolic (24hrs), Av , Min:129 , Max:129   ; Diastolic (24hrs), Av, Min:80, Max:80    I/O (24Hr):  No intake or output data in the 24 hours ending 24 1100  Objective  Labs/Imaging/Diagnostics    Labs:  CBC:No results for input(s): \"WBC\", \"RBC\", \"HGB\", \"HCT\", \"MCV\", \"RDW\", \"PLT\" in the last 72 hours.  CHEMISTRIES:No results for input(s): \"NA\", \"K\", \"CL\", \"CO2\", \"BUN\", \"CREATININE\", \"GLUCOSE\", \"CA\", \"PHOS\", \"MG\" in the last 72 hours.  PT/INR:No results for input(s): \"PROTIME\", \"INR\" in the last 72 hours.  APTT:No results for input(s): \"APTT\" in the last 72 hours.  LIVER PROFILE:No results for input(s): \"AST\", \"ALT\", \"BILIDIR\", \"BILITOT\", \"ALKPHOS\" in the last 72 hours.    Imaging Last 24 Hours:  No results found.  Assessment//Plan           Hospital Problems             Last Modified POA    * (Principal) MDD (major depressive disorder), recurrent episode, mild (HCC) 2024 Yes    Suicidal ideations 2024 Yes     Assessment & Plan  Continue Prozac 20 mg PO QD  Continue Seroquel 50 mg PO 
Progress Note  Date:2024       Room:[facility]  Patient Name:Jordyn Smith     YOB: 1965     Age:58 y.o.        Subjective    Subjective  he remains to himself, does not seem to engage in therapy. Feelings lightly better today  Patient is sleeping in bed and wakes to converse this morning. He is alert and oriented x3 and feeling better physically today. He reports feeling sad today and denies SI/HI/auditory/visual hallucinations. He was able to walked round this morning after eating breakfast. Blood glucose levels have remained high. He is agreeable to look for substance abuse programs following discharge. As per RN, he only took blood pressure and diabetic medications; Prozac was not taken this morning.     Review of Systems  Objective         Vitals Last 24 Hours:  TEMPERATURE:  Temp  Av.4 °F (36.9 °C)  Min: 98.4 °F (36.9 °C)  Max: 98.4 °F (36.9 °C)  RESPIRATIONS RANGE: Resp  Av  Min: 16  Max: 16  PULSE OXIMETRY RANGE: No data recorded  PULSE RANGE: Pulse  Av.7  Min: 54  Max: 80  BLOOD PRESSURE RANGE: Systolic (24hrs), Av , Min:108 , Max:145   ; Diastolic (24hrs), Av, Min:64, Max:95    I/O (24Hr):  No intake or output data in the 24 hours ending 24 190  Objective:  Vital signs: (most recent): Blood pressure 108/64, pulse 54, temperature 98.4 °F (36.9 °C), temperature source Oral, resp. rate 16, height 1.803 m (5' 11\"), weight 72.6 kg (160 lb), SpO2 98 %.      Labs/Imaging/Diagnostics    Labs:  CBC:No results for input(s): \"WBC\", \"RBC\", \"HGB\", \"HCT\", \"MCV\", \"RDW\", \"PLT\" in the last 72 hours.  CHEMISTRIES:  Recent Labs     24  0758      K 4.0      CO2 28   BUN 19   CREATININE 1.14   GLUCOSE 262*       PT/INR:No results for input(s): \"PROTIME\", \"INR\" in the last 72 hours.  APTT:No results for input(s): \"APTT\" in the last 72 hours.  LIVER PROFILE:No results for input(s): \"AST\", \"ALT\", \"BILIDIR\", \"BILITOT\", \"ALKPHOS\" in the last 72 hours.    Imaging Last 
Progress Note  Date:2024       Room:[facility]  Patient Name:Jordyn Smith     YOB: 1965     Age:58 y.o.        Subjective    Subjective   Patient has been mostly to himself in his bed, expresses not feeling well.  He states he has not been on medications for some time and being currently restarted.  Patient states he just gets out for his breakfast and comes and lies down as he is not feeling well.    Denies having any hallucinations.  Limited engagement in conversation and not participated in therapy.    Review of Systems  Objective         Vitals Last 24 Hours:  TEMPERATURE:  Temp  Av.2 °F (36.8 °C)  Min: 98 °F (36.7 °C)  Max: 98.6 °F (37 °C)  RESPIRATIONS RANGE: Resp  Av  Min: 18  Max: 18  PULSE OXIMETRY RANGE: No data recorded  PULSE RANGE: Pulse  Av.5  Min: 87  Max: 90  BLOOD PRESSURE RANGE: Systolic (24hrs), Av , Min:116 , Max:119   ; Diastolic (24hrs), Av, Min:66, Max:76    I/O (24Hr):  No intake or output data in the 24 hours ending 24 1237  Objective:  Vital signs: (most recent): Blood pressure 119/76, pulse 87, temperature 98.6 °F (37 °C), temperature source Oral, resp. rate 18, height 1.803 m (5' 11\"), weight 72.6 kg (160 lb), SpO2 98 %.      Labs/Imaging/Diagnostics    Labs:  CBC:  No results for input(s): \"WBC\", \"RBC\", \"HGB\", \"HCT\", \"MCV\", \"RDW\", \"PLT\" in the last 72 hours.    CHEMISTRIES:  Recent Labs     24  1336   *   K 3.9      CO2 24   BUN 26*   CREATININE 1.20   GLUCOSE 246*       PT/INR:No results for input(s): \"PROTIME\", \"INR\" in the last 72 hours.  APTT:No results for input(s): \"APTT\" in the last 72 hours.  LIVER PROFILE:  Recent Labs     01/04/24  1336   AST 55*   ALT 82*   BILITOT 0.6   ALKPHOS 68         Imaging Last 24 Hours:  No results found.  Assessment//Plan           Hospital Problems             Last Modified POA    * (Principal) MDD (major depressive disorder), recurrent episode, mild (HCC) 2024 Yes    Suicidal 
Progress Note  Date:2024       Room:[facility]  Patient Name:Jordyn Smith     YOB: 1965     Age:58 y.o.        Subjective    Subjective   Patient is laying bed today and states he is feeling lethargic and not well. He endorses body aches and headache. Also expresses he is not well due to \"both mental and physical symptoms\". He was then seen walking after this conversation. Denies auditory and visual hallucinations.     Review of Systems  Objective         Vitals Last 24 Hours:  TEMPERATURE:  Temp  Av.2 °F (36.2 °C)  Min: 94 °F (34.4 °C)  Max: 98.2 °F (36.8 °C)  RESPIRATIONS RANGE: Resp  Av  Min: 16  Max: 16  PULSE OXIMETRY RANGE: No data recorded  PULSE RANGE: Pulse  Av.6  Min: 95  Max: 96  BLOOD PRESSURE RANGE: Systolic (24hrs), Av , Min:97 , Max:119   ; Diastolic (24hrs), Av, Min:64, Max:83    I/O (24Hr):  No intake or output data in the 24 hours ending 24 1216  Objective  Labs/Imaging/Diagnostics    Labs:  CBC:No results for input(s): \"WBC\", \"RBC\", \"HGB\", \"HCT\", \"MCV\", \"RDW\", \"PLT\" in the last 72 hours.  CHEMISTRIES:  Recent Labs     24  0758      K 4.0      CO2 28   BUN 19   CREATININE 1.14   GLUCOSE 262*     PT/INR:No results for input(s): \"PROTIME\", \"INR\" in the last 72 hours.  APTT:No results for input(s): \"APTT\" in the last 72 hours.  LIVER PROFILE:No results for input(s): \"AST\", \"ALT\", \"BILIDIR\", \"BILITOT\", \"ALKPHOS\" in the last 72 hours.    Imaging Last 24 Hours:  No results found.  Assessment//Plan           Hospital Problems             Last Modified POA    * (Principal) MDD (major depressive disorder), recurrent episode, mild (HCC) 2024 Yes    Suicidal ideations 2024 Yes     Assessment & Plan  Continue Prozac and Seroquel at reduced dose  Will order blood tests for an infectious cause to body aches and monitor temperature  Monitor blood sugars  Encourage patient to attend group therapy sessions         Electronically signed by 
Progress Note  Date:2024       Room:[facility]  Patient Name:Jordyn Smith     YOB: 1965     Age:58 y.o.        Subjective    Subjective   Patient is resting quietly this morning. Complains of diarrhea and upset stomach. Still presents with depressed feelings but is motivated for his step down program and maintaining a relationship with his brother. He was accepted into [facility].    Review of Systems  Objective         Vitals Last 24 Hours:  TEMPERATURE:  Temp  Av.4 °F (36.9 °C)  Min: 98.2 °F (36.8 °C)  Max: 98.6 °F (37 °C)  RESPIRATIONS RANGE: Resp  Av  Min: 16  Max: 18  PULSE OXIMETRY RANGE: SpO2  Av %  Min: 100 %  Max: 100 %  PULSE RANGE: Pulse  Av.7  Min: 87  Max: 95  BLOOD PRESSURE RANGE: Systolic (24hrs), Av , Min:117 , Max:130   ; Diastolic (24hrs), Av, Min:80, Max:91    I/O (24Hr):  No intake or output data in the 24 hours ending 24 1104  Objective  Labs/Imaging/Diagnostics    Labs:  CBC:No results for input(s): \"WBC\", \"RBC\", \"HGB\", \"HCT\", \"MCV\", \"RDW\", \"PLT\" in the last 72 hours.  CHEMISTRIES:No results for input(s): \"NA\", \"K\", \"CL\", \"CO2\", \"BUN\", \"CREATININE\", \"GLUCOSE\", \"CA\", \"PHOS\", \"MG\" in the last 72 hours.  PT/INR:No results for input(s): \"PROTIME\", \"INR\" in the last 72 hours.  APTT:No results for input(s): \"APTT\" in the last 72 hours.  LIVER PROFILE:No results for input(s): \"AST\", \"ALT\", \"BILIDIR\", \"BILITOT\", \"ALKPHOS\" in the last 72 hours.    Imaging Last 24 Hours:  No results found.  Assessment//Plan           Hospital Problems             Last Modified POA    * (Principal) MDD (major depressive disorder), recurrent episode, mild (HCC) 2024 Yes    Suicidal ideations 2024 Yes     Assessment & Plan  Continue Prozac 20mg PO QD  Continue Seroquel 50 mg PO nightly   Continue increased meal time Humolog treatment  Encourage patient to attend group therapy   Plan for discharge to [facility] 
Progress Note  Date:2024       Room:[facility]  Patient Name:Jordyn Smith     YOB: 1965     Age:58 y.o.        Subjective    Subjective   He states he has been continuing to feel better.  He expresses his energy has been improving.  Still presents depressed some limitation in his sleep.  Denies having any active SI HI.    Review of Systems  Objective         Vitals Last 24 Hours:  TEMPERATURE:  Temp  Av.4 °F (36.9 °C)  Min: 98.2 °F (36.8 °C)  Max: 98.6 °F (37 °C)  RESPIRATIONS RANGE: Resp  Av  Min: 16  Max: 18  PULSE OXIMETRY RANGE: No data recorded  PULSE RANGE: Pulse  Av  Min: 81  Max: 87  BLOOD PRESSURE RANGE: Systolic (24hrs), Av , Min:117 , Max:140   ; Diastolic (24hrs), Av, Min:79, Max:80    I/O (24Hr):  No intake or output data in the 24 hours ending 24  Objective:  Vital signs: (most recent): Blood pressure 117/80, pulse 87, temperature 98.6 °F (37 °C), temperature source Oral, resp. rate 18, height 1.803 m (5' 11\"), weight 72.6 kg (160 lb), SpO2 98 %.      Labs/Imaging/Diagnostics    Labs:  CBC:No results for input(s): \"WBC\", \"RBC\", \"HGB\", \"HCT\", \"MCV\", \"RDW\", \"PLT\" in the last 72 hours.  CHEMISTRIES:  No results for input(s): \"NA\", \"K\", \"CL\", \"CO2\", \"BUN\", \"CREATININE\", \"GLUCOSE\", \"CA\", \"PHOS\", \"MG\" in the last 72 hours.    PT/INR:No results for input(s): \"PROTIME\", \"INR\" in the last 72 hours.  APTT:No results for input(s): \"APTT\" in the last 72 hours.  LIVER PROFILE:No results for input(s): \"AST\", \"ALT\", \"BILIDIR\", \"BILITOT\", \"ALKPHOS\" in the last 72 hours.    Imaging Last 24 Hours:  No results found.  Assessment//Plan           Hospital Problems             Last Modified POA    * (Principal) MDD (major depressive disorder), recurrent episode, mild (HCC) 2024 Yes    Suicidal ideations 2024 Yes   Assessment & Plan  Continue Prozac 20mg PO QD  Continue Seroquel 50 mg PO nightly   Continue increased meal time Humolog treatment   Work with Case 
Progress Note  Date:2024      Room:[facility]  Patient Name:Jordyn Smith     YOB: 1965     Age:58 y.o.        Subjective    Subjective Jordyn has been engaging more in therpay.  Patient is resting quietly this morning. Complains of diarrhea and upset stomach. Still presents with depressed feelings but is motivated for his step down program and maintaining a relationship with his brother. He was accepted into [facility].    Review of Systems  Objective         Vitals Last 24 Hours:  TEMPERATURE:  Temp  Av.3 °F (36.8 °C)  Min: 98.2 °F (36.8 °C)  Max: 98.4 °F (36.9 °C)  RESPIRATIONS RANGE: Resp  Av  Min: 14  Max: 18  PULSE OXIMETRY RANGE: SpO2  Av %  Min: 99 %  Max: 99 %  PULSE RANGE: Pulse  Av  Min: 87  Max: 93  BLOOD PRESSURE RANGE: Systolic (24hrs), Av , Min:122 , Max:125   ; Diastolic (24hrs), Av, Min:82, Max:84    I/O (24Hr):  No intake or output data in the 24 hours ending 24 1052  Objective:  Vital signs: (most recent): Blood pressure 122/84, pulse 87, temperature 98.2 °F (36.8 °C), temperature source Oral, resp. rate 16, height 1.803 m (5' 11\"), weight 72.6 kg (160 lb), SpO2 99 %.      Labs/Imaging/Diagnostics    Labs:  CBC:No results for input(s): \"WBC\", \"RBC\", \"HGB\", \"HCT\", \"MCV\", \"RDW\", \"PLT\" in the last 72 hours.  CHEMISTRIES:No results for input(s): \"NA\", \"K\", \"CL\", \"CO2\", \"BUN\", \"CREATININE\", \"GLUCOSE\", \"CA\", \"PHOS\", \"MG\" in the last 72 hours.  PT/INR:No results for input(s): \"PROTIME\", \"INR\" in the last 72 hours.  APTT:No results for input(s): \"APTT\" in the last 72 hours.  LIVER PROFILE:No results for input(s): \"AST\", \"ALT\", \"BILIDIR\", \"BILITOT\", \"ALKPHOS\" in the last 72 hours.    Imaging Last 24 Hours:  No results found.  Assessment//Plan           Hospital Problems             Last Modified POA    * (Principal) MDD (major depressive disorder), recurrent episode, mild (HCC) 2024 Yes    Suicidal ideations 2024 Yes 
Hemolyzed, Recollection Recommended 55*   * 82*   BILITOT 0.8 0.6   ALKPHOS 77 68       Imaging Last 24 Hours:  No results found.  Assessment//Plan           Hospital Problems             Last Modified POA    * (Principal) MDD (major depressive disorder), recurrent episode, mild (HCC) 1/4/2024 Yes    Suicidal ideations 1/4/2024 Yes     Assessment & Plan  Placed on close observation, for safety  Gather information regarding patient's current support to progress in family meetings and address stressors that led to hospitalization  Encourage participation in group therapy and educate patient on healthy coping mechanisms   Discuss outpatient therapies and rehab programs   Review medications and adjust as needed       Electronically signed by Maryjo Post on 1/6/24 at 10:13 AM EST    
mild (HCC) 1/4/2024 Yes    Suicidal ideations 1/4/2024 Yes   Assessment & Plan  Continue Prozac 20mg PO QD  Continue Seroquel 50 mg PO nightly   Continue increased meal time Humolog treatment   Work with  on post discharge substance abuse facility and contact with brother   Encourage patient to attend group therapy session and discuss healthy coping habits for stressors      Current Facility-Administered Medications:     traZODone (DESYREL) tablet 50 mg, 50 mg, Oral, Nightly PRN, Lissett Munoz MD    insulin glargine (LANTUS) injection vial 20 Units, 20 Units, SubCUTAneous, BID, Joshua Shore MD, 20 Units at 01/10/24 0917    insulin lispro (HUMALOG) injection vial 0-16 Units, 0-16 Units, SubCUTAneous, TID WC, Joshua Shore MD, 12 Units at 01/10/24 1732    insulin lispro (HUMALOG) injection vial 0-4 Units, 0-4 Units, SubCUTAneous, Nightly, Joshua Shore MD, 4 Units at 01/08/24 2057    QUEtiapine (SEROQUEL) tablet 50 mg, 50 mg, Oral, Nightly, Lissett Munoz MD, 50 mg at 01/09/24 2053    glucose chewable tablet 16 g, 4 tablet, Oral, PRN, Joshua Shore MD    dextrose bolus 10% 125 mL, 125 mL, IntraVENous, PRN **OR** dextrose bolus 10% 250 mL, 250 mL, IntraVENous, PRN, Joshua Shore MD    glucagon injection 1 mg, 1 mg, SubCUTAneous, PRN, Joshua Shore MD    dextrose 10 % infusion, , IntraVENous, Continuous PRN, Joshua Shore MD    acetaminophen (TYLENOL) tablet 650 mg, 650 mg, Oral, Q4H PRN, Lissett Munoz MD, 650 mg at 01/05/24 2041    hydrOXYzine HCl (ATARAX) tablet 50 mg, 50 mg, Oral, TID PRN, Lissett Munoz MD    magnesium hydroxide (MILK OF MAGNESIA) 400 MG/5ML suspension 30 mL, 30 mL, Oral, Daily PRN, Lissett Munoz MD    [Held by provider] amLODIPine (NORVASC) tablet 5 mg, 5 mg, Oral, Daily, Lissett Munoz MD, 5 mg at 01/09/24 0806    FLUoxetine (PROZAC) capsule 20 mg, 20 mg, Oral, Daily, Lissett Munoz MD, 20 mg at 01/10/24 0916   Electronically signed by Maryjo Post on 1/10/24 at 12:57 PM

## 2024-01-14 NOTE — BH NOTE
Nursing Note    Patient is alert and oriented x 3.  He denies any SI/HI/AH/VH.  Patient denies any anxiety or depression.  Restricted affect and calm mood.  Patient seen watching TV in the dayroom with peers.  He complained of diarrhea, Immodium 2 mg PO given.  Patient accepted his medications without difficulty.  Staff will continue to monitor patient for safety.

## 2024-01-14 NOTE — GROUP NOTE
Group Therapy Note    Date: 1/14/2024    Group Start Time: 0940  Group End Time: 1015  Group Topic: Education Group - Inpatient    SSR 2  NON ACUTE    Elvira Douglas        Group Therapy Note    Facilitated group to focus on understanding the importance of healthy boundaries and developing healthy boundaries to help improve relationships         Attendees: 1/7      Notes:  Encouraged but did not attend    Discipline Responsible: Recreational Therapist      Signature:  JOEY Gentile

## 2024-01-14 NOTE — GROUP NOTE
Group Therapy Note    Date: 1/14/2024    Group Start Time: 1335  Group End Time: 1420  Group Topic: Recreational    SSR 2 BH NON ACUTE    Elvira Douglas        Group Therapy Note    Facilitated leisure skills group to reinforce positive coping and to manage mood through music, social interaction, group activities and art task       Attendees: 5/7      Notes:  Encouraged but did not attend    Discipline Responsible: Recreational Therapist      Signature:  JOEY Gentile

## 2024-01-15 VITALS
HEART RATE: 81 BPM | DIASTOLIC BLOOD PRESSURE: 66 MMHG | WEIGHT: 160 LBS | HEIGHT: 71 IN | OXYGEN SATURATION: 100 % | BODY MASS INDEX: 22.4 KG/M2 | SYSTOLIC BLOOD PRESSURE: 112 MMHG | RESPIRATION RATE: 18 BRPM | TEMPERATURE: 98 F

## 2024-01-15 LAB
GLUCOSE BLD STRIP.AUTO-MCNC: 204 MG/DL (ref 65–100)
GLUCOSE BLD STRIP.AUTO-MCNC: 219 MG/DL (ref 65–100)
PERFORMED BY:: ABNORMAL
PERFORMED BY:: ABNORMAL

## 2024-01-15 PROCEDURE — 82962 GLUCOSE BLOOD TEST: CPT

## 2024-01-15 PROCEDURE — 6370000000 HC RX 637 (ALT 250 FOR IP): Performed by: INTERNAL MEDICINE

## 2024-01-15 PROCEDURE — 6370000000 HC RX 637 (ALT 250 FOR IP): Performed by: PSYCHIATRY & NEUROLOGY

## 2024-01-15 PROCEDURE — 6370000000 HC RX 637 (ALT 250 FOR IP): Performed by: FAMILY MEDICINE

## 2024-01-15 RX ADMIN — LAMOTRIGINE 25 MG: 25 TABLET ORAL at 07:59

## 2024-01-15 RX ADMIN — INSULIN LISPRO 4 UNITS: 100 INJECTION, SOLUTION INTRAVENOUS; SUBCUTANEOUS at 08:01

## 2024-01-15 RX ADMIN — INSULIN GLARGINE 25 UNITS: 100 INJECTION, SOLUTION SUBCUTANEOUS at 08:00

## 2024-01-15 NOTE — BH NOTE
Nursing Note    Patient is alert and oriented x 3.  He denies any SI/HI/AH/VH.  Patient denies any anxiety and rates his depression 5/10. CIWA = 0.  Restricted affect and calm mood.  Patient  voiced no complaints and accepted his medications without difficulty.  Staff will continue to monitor patient for safety.

## 2024-01-15 NOTE — BH NOTE
Discharge/Dayshift    Pt ordered to be discharged today by Dr. Munoz to [facility]. Pt presented this AM with bright affect, denies SI/HI/AVH, depression, anxiety. No pain or physical complaints. AM VS WNL. Writer reviewed AVS discharge summary with pt and pt verbalized understanding of instructions. Pt reviewed and acknowledged safety plan with this writer. Pt received all belongings from storage and unit safe and accounted for all items. Pt received medication from pharmacy. Pt given folder with hard copy prescriptions and AVS discharge summary. Pt continued to deny SI/HI at time of discharge. No physical complaints at time of discharge. Pt escorted off unit by this writer at 0952 to cab at main entrance of the hospital.

## 2024-01-15 NOTE — GROUP NOTE
Group Therapy Note    Date: 1/15/2024    Group Start Time: 0935  Group End Time: 1025  Group Topic: Education Group - Inpatient    SSR 2  NON ACUTE    Elvira Douglas        Group Therapy Note    Facilitated group to introduce the definition of self-esteem and discuss information relating to creating steps to greater self-appreciation and recognizing symptoms of self-defeat     Attendees: 4/7      Notes:  Did not attend.  Pt waiting on transportation to be discharged    Discipline Responsible: Recreational Therapist      Signature:  JOEY Gentile

## 2024-01-15 NOTE — DISCHARGE SUMMARY
(*)     Glucose, UA >500 (*)     Ketones, Urine 20 (*)     Blood, Urine Moderate (*)     Mucus, UA Trace (*)     All other components within normal limits   COMPREHENSIVE METABOLIC PANEL - Abnormal; Notable for the following components:    Sodium 134 (*)     Glucose 246 (*)     BUN 26 (*)     Bun/Cre Ratio 22 (*)     AST 55 (*)     ALT 82 (*)     Albumin 3.0 (*)     Albumin/Globulin Ratio 0.8 (*)     All other components within normal limits   BASIC METABOLIC PANEL - Abnormal; Notable for the following components:    Glucose 262 (*)     All other components within normal limits   HEMOGLOBIN A1C - Abnormal; Notable for the following components:    Hemoglobin A1C 10.9 (*)     All other components within normal limits   POCT GLUCOSE - Abnormal; Notable for the following components:    POC Glucose 354 (*)     All other components within normal limits   POCT GLUCOSE - Abnormal; Notable for the following components:    POC Glucose 191 (*)     All other components within normal limits   POCT GLUCOSE - Abnormal; Notable for the following components:    POC Glucose 255 (*)     All other components within normal limits   POCT GLUCOSE - Abnormal; Notable for the following components:    POC Glucose 178 (*)     All other components within normal limits   POCT GLUCOSE - Abnormal; Notable for the following components:    POC Glucose 277 (*)     All other components within normal limits   POCT GLUCOSE - Abnormal; Notable for the following components:    POC Glucose 263 (*)     All other components within normal limits   POCT GLUCOSE - Abnormal; Notable for the following components:    POC Glucose 294 (*)     All other components within normal limits   POCT GLUCOSE - Abnormal; Notable for the following components:    POC Glucose 163 (*)     All other components within normal limits   POCT GLUCOSE - Abnormal; Notable for the following components:    POC Glucose 501 (*)     All other components within normal limits   POCT GLUCOSE -

## 2024-03-04 NOTE — PROGRESS NOTES
Pt with tentative acceptances [place] [address]; as well as [address]. Pt's preferred facility is [address] [facility]. Call placed to [facility] with [employer] and requested that she initiate insurance auth. [address] with [facility] with [employer] and requested that she initiate insurance authorization. Per [facility], she will need to do a phone screening with the pt before starting auth. [place] to call pt's room and then contact me once she has spoken with him.         Siria Barlow, MSN, RN, ACM-RN     [phone]- pager  [phone]- main office No

## 2024-07-02 NOTE — PROGRESS NOTES
Addended by: NATALEE CARR on: 7/2/2024 01:55 PM     Modules accepted: Orders     Problem: Diabetes Self-Management  Goal: *Disease process and treatment process  Description: Define diabetes and identify own type of diabetes; list 3 options for treating diabetes. Outcome: Progressing Towards Goal  Goal: *Incorporating nutritional management into lifestyle  Description: Describe effect of type, amount and timing of food on blood glucose; list 3 methods for planning meals. Outcome: Progressing Towards Goal  Goal: *Incorporating physical activity into lifestyle  Description: State effect of exercise on blood glucose levels. Outcome: Progressing Towards Goal  Goal: *Developing strategies to promote health/change behavior  Description: Define the ABC's of diabetes; identify appropriate screenings, schedule and personal plan for screenings. Outcome: Progressing Towards Goal  Goal: *Using medications safely  Description: State effect of diabetes medications on diabetes; name diabetes medication taking, action and side effects. Outcome: Progressing Towards Goal  Goal: *Monitoring blood glucose, interpreting and using results  Description: Identify recommended blood glucose targets  and personal targets. Outcome: Progressing Towards Goal  Goal: *Prevention, detection, treatment of acute complications  Description: List symptoms of hyper- and hypoglycemia; describe how to treat low blood sugar and actions for lowering  high blood glucose level. Outcome: Progressing Towards Goal  Goal: *Prevention, detection and treatment of chronic complications  Description: Define the natural course of diabetes and describe the relationship of blood glucose levels to long term complications of diabetes.   Outcome: Progressing Towards Goal  Goal: *Developing strategies to address psychosocial issues  Description: Describe feelings about living with diabetes; identify support needed and support network  Outcome: Progressing Towards Goal  Goal: *Insulin pump training  Outcome: Progressing Towards Goal  Goal: *Sick day guidelines  Outcome: Progressing Towards Goal  Goal: *Patient Specific Goal (EDIT GOAL, INSERT TEXT)  Outcome: Progressing Towards Goal

## 2024-07-27 ENCOUNTER — HOSPITAL ENCOUNTER (EMERGENCY)
Facility: HOSPITAL | Age: 59
Discharge: HOME | End: 2024-07-27
Attending: EMERGENCY MEDICINE
Payer: MEDICAID

## 2024-07-27 VITALS
WEIGHT: 167 LBS | RESPIRATION RATE: 14 BRPM | HEART RATE: 73 BPM | HEIGHT: 71 IN | SYSTOLIC BLOOD PRESSURE: 129 MMHG | DIASTOLIC BLOOD PRESSURE: 84 MMHG | OXYGEN SATURATION: 98 % | TEMPERATURE: 98.1 F | BODY MASS INDEX: 23.38 KG/M2

## 2024-07-27 DIAGNOSIS — E16.2 HYPOGLYCEMIA: Primary | ICD-10-CM

## 2024-07-27 LAB
ALBUMIN SERPL-MCNC: 3.2 G/DL (ref 3.5–5)
ALBUMIN/GLOB SERPL: 0.8 (ref 1.1–2.2)
ALP SERPL-CCNC: 87 U/L (ref 45–117)
ALT SERPL-CCNC: 80 U/L (ref 12–78)
ANION GAP SERPL CALC-SCNC: 5 MMOL/L (ref 5–15)
AST SERPL-CCNC: 61 U/L (ref 15–37)
BASOPHILS # BLD: 0 K/UL (ref 0–0.1)
BASOPHILS NFR BLD: 0 % (ref 0–1)
BILIRUB SERPL-MCNC: 0.6 MG/DL (ref 0.2–1)
BUN SERPL-MCNC: 24 MG/DL (ref 6–20)
BUN/CREAT SERPL: 19 (ref 12–20)
CALCIUM SERPL-MCNC: 9.1 MG/DL (ref 8.5–10.1)
CHLORIDE SERPL-SCNC: 112 MMOL/L (ref 97–108)
CO2 SERPL-SCNC: 22 MMOL/L (ref 21–32)
CREAT SERPL-MCNC: 1.29 MG/DL (ref 0.7–1.3)
DIFFERENTIAL METHOD BLD: ABNORMAL
EKG ATRIAL RATE: 82 BPM
EKG DIAGNOSIS: NORMAL
EKG P AXIS: 49 DEGREES
EKG P-R INTERVAL: 176 MS
EKG Q-T INTERVAL: 444 MS
EKG QRS DURATION: 106 MS
EKG QTC CALCULATION (BAZETT): 518 MS
EKG R AXIS: -2 DEGREES
EKG T AXIS: 88 DEGREES
EKG VENTRICULAR RATE: 82 BPM
EOSINOPHIL # BLD: 0.4 K/UL (ref 0–0.4)
EOSINOPHIL NFR BLD: 4 % (ref 0–7)
ERYTHROCYTE [DISTWIDTH] IN BLOOD BY AUTOMATED COUNT: 12.2 % (ref 11.5–14.5)
GLOBULIN SER CALC-MCNC: 4.1 G/DL (ref 2–4)
GLUCOSE BLD STRIP.AUTO-MCNC: 132 MG/DL (ref 65–117)
GLUCOSE BLD STRIP.AUTO-MCNC: 149 MG/DL (ref 65–117)
GLUCOSE BLD STRIP.AUTO-MCNC: 339 MG/DL (ref 65–117)
GLUCOSE BLD STRIP.AUTO-MCNC: 43 MG/DL (ref 65–117)
GLUCOSE BLD STRIP.AUTO-MCNC: 53 MG/DL (ref 65–117)
GLUCOSE SERPL-MCNC: 132 MG/DL (ref 65–100)
HCT VFR BLD AUTO: 35 % (ref 36.6–50.3)
HGB BLD-MCNC: 12.2 G/DL (ref 12.1–17)
IMM GRANULOCYTES # BLD AUTO: 0 K/UL (ref 0–0.04)
IMM GRANULOCYTES NFR BLD AUTO: 0 % (ref 0–0.5)
LYMPHOCYTES # BLD: 5 K/UL (ref 0.8–3.5)
LYMPHOCYTES NFR BLD: 60 % (ref 12–49)
MAGNESIUM SERPL-MCNC: 1.9 MG/DL (ref 1.6–2.4)
MCH RBC QN AUTO: 31.4 PG (ref 26–34)
MCHC RBC AUTO-ENTMCNC: 34.9 G/DL (ref 30–36.5)
MCV RBC AUTO: 90 FL (ref 80–99)
MONOCYTES # BLD: 1 K/UL (ref 0–1)
MONOCYTES NFR BLD: 12 % (ref 5–13)
NEUTS SEG # BLD: 2 K/UL (ref 1.8–8)
NEUTS SEG NFR BLD: 24 % (ref 32–75)
NRBC # BLD: 0 K/UL (ref 0–0.01)
NRBC BLD-RTO: 0 PER 100 WBC
PLATELET # BLD AUTO: 107 K/UL (ref 150–400)
PMV BLD AUTO: 12.4 FL (ref 8.9–12.9)
POTASSIUM SERPL-SCNC: 4.1 MMOL/L (ref 3.5–5.1)
PROT SERPL-MCNC: 7.3 G/DL (ref 6.4–8.2)
RBC # BLD AUTO: 3.89 M/UL (ref 4.1–5.7)
SERVICE CMNT-IMP: ABNORMAL
SODIUM SERPL-SCNC: 139 MMOL/L (ref 136–145)
TROPONIN I SERPL HS-MCNC: 11 NG/L (ref 0–76)
WBC # BLD AUTO: 8.4 K/UL (ref 4.1–11.1)

## 2024-07-27 PROCEDURE — 2500000003 HC RX 250 WO HCPCS

## 2024-07-27 PROCEDURE — 99284 EMERGENCY DEPT VISIT MOD MDM: CPT

## 2024-07-27 PROCEDURE — 80053 COMPREHEN METABOLIC PANEL: CPT

## 2024-07-27 PROCEDURE — 84484 ASSAY OF TROPONIN QUANT: CPT

## 2024-07-27 PROCEDURE — 82962 GLUCOSE BLOOD TEST: CPT

## 2024-07-27 PROCEDURE — 85025 COMPLETE CBC W/AUTO DIFF WBC: CPT

## 2024-07-27 PROCEDURE — 93005 ELECTROCARDIOGRAM TRACING: CPT | Performed by: EMERGENCY MEDICINE

## 2024-07-27 PROCEDURE — 83735 ASSAY OF MAGNESIUM: CPT

## 2024-07-27 RX ORDER — DEXTROSE MONOHYDRATE 25 G/50ML
INJECTION, SOLUTION INTRAVENOUS
Status: COMPLETED
Start: 2024-07-27 | End: 2024-07-27

## 2024-07-27 RX ADMIN — DEXTROSE MONOHYDRATE 50 ML: 25 INJECTION, SOLUTION INTRAVENOUS at 20:37

## 2024-07-27 ASSESSMENT — PAIN - FUNCTIONAL ASSESSMENT: PAIN_FUNCTIONAL_ASSESSMENT: NONE - DENIES PAIN

## 2024-07-28 NOTE — ED PROVIDER NOTES
[facility] EMERGENCY DEPT  EMERGENCY DEPARTMENT ENCOUNTER       Pt Name: Jordyn Smith  MRN: [id number]  Birthdate 1965  Date of evaluation: 7/27/2024  Provider: Toby Ribeiro DO   PCP: Segundo Campo MD  Note Started: 9:09 PM EDT 7/27/24     CHIEF COMPLAINT       Chief Complaint   Patient presents with    Hypoglycemia    Seizures        HISTORY OF PRESENT ILLNESS: 1 or more elements      History From: patient/EMS, History limited by: none     Jordyn Smith is a 59 y.o. male presents to the emergency department for evaluation of hypoglycemia.       Please See MDM for Additional Details of the HPI/PMH  Nursing Notes were all reviewed and agreed with or any disagreements were addressed in the HPI.     REVIEW OF SYSTEMS        Positives and Pertinent negatives as per HPI.    PAST HISTORY     Past Medical History:  Past Medical History:   Diagnosis Date    Diabetes (HCC)     Hepatitis C infection     Hypertension     Psychiatric disorder        Past Surgical History:  Past Surgical History:   Procedure Laterality Date    TOE AMPUTATION Left 5/19/2023    PARTIAL LEFT THIRD TOE AMPUTATION performed by Teto Leblanc DPM at [facility] MAIN OR       Family History:  History reviewed. No pertinent family history.    Social History:  Social History     Tobacco Use    Smoking status: Every Day     Current packs/day: 1.00     Types: Cigarettes    Smokeless tobacco: Never   Substance Use Topics    Alcohol use: Not Currently    Drug use: Yes     Frequency: 7.0 times per week     Types: Cocaine       Allergies:  No Known Allergies    CURRENT MEDICATIONS      Previous Medications    AMLODIPINE (NORVASC) 5 MG TABLET    Take 1 tablet by mouth daily    FLUOXETINE (PROZAC) 20 MG CAPSULE    Take 1 capsule by mouth daily    GLUCOSE 4 G CHEWABLE TABLET    Take 4 tablets by mouth as needed for Low blood sugar    INSULIN GLARGINE (LANTUS) 100 UNIT/ML INJECTION VIAL    Inject 20 Units into the skin    INSULIN GLARGINE (LANTUS) 100

## 2024-07-28 NOTE — ED TRIAGE NOTES
Pt. Presents s/p having a seizure and low glucose witnessed at his group home, pt was postictal and initially hypoglycemic for EMS; given glucagon and D10 en route, pt glucose 43 on arrival; Dr. Ribeiro notified. Pt. Alert, answers questions appropriately, VSS.

## 2024-07-29 LAB
EKG ATRIAL RATE: 82 BPM
EKG DIAGNOSIS: NORMAL
EKG P AXIS: 49 DEGREES
EKG P-R INTERVAL: 176 MS
EKG Q-T INTERVAL: 444 MS
EKG QRS DURATION: 106 MS
EKG QTC CALCULATION (BAZETT): 518 MS
EKG R AXIS: -2 DEGREES
EKG T AXIS: 88 DEGREES
EKG VENTRICULAR RATE: 82 BPM

## 2024-09-13 ENCOUNTER — HOSPITAL ENCOUNTER (EMERGENCY)
Facility: HOSPITAL | Age: 59
Discharge: HOME | End: 2024-09-13
Attending: EMERGENCY MEDICINE
Payer: MEDICAID

## 2024-09-13 VITALS
HEART RATE: 70 BPM | OXYGEN SATURATION: 99 % | DIASTOLIC BLOOD PRESSURE: 90 MMHG | TEMPERATURE: 98 F | BODY MASS INDEX: 22.59 KG/M2 | WEIGHT: 161.38 LBS | RESPIRATION RATE: 16 BRPM | HEIGHT: 71 IN | SYSTOLIC BLOOD PRESSURE: 150 MMHG

## 2024-09-13 DIAGNOSIS — R11.2 NAUSEA AND VOMITING, UNSPECIFIED VOMITING TYPE: ICD-10-CM

## 2024-09-13 DIAGNOSIS — B34.9 VIRAL ILLNESS: Primary | ICD-10-CM

## 2024-09-13 LAB
FLUAV RNA SPEC QL NAA+PROBE: NOT DETECTED
FLUBV RNA SPEC QL NAA+PROBE: NOT DETECTED
SARS-COV-2 RNA RESP QL NAA+PROBE: NOT DETECTED
SOURCE: NORMAL

## 2024-09-13 PROCEDURE — 87636 SARSCOV2 & INF A&B AMP PRB: CPT

## 2024-09-13 PROCEDURE — 6370000000 HC RX 637 (ALT 250 FOR IP): Performed by: STUDENT IN AN ORGANIZED HEALTH CARE EDUCATION/TRAINING PROGRAM

## 2024-09-13 PROCEDURE — 99283 EMERGENCY DEPT VISIT LOW MDM: CPT

## 2024-09-13 RX ORDER — ONDANSETRON 4 MG/1
4 TABLET, ORALLY DISINTEGRATING ORAL ONCE
Status: COMPLETED | OUTPATIENT
Start: 2024-09-13 | End: 2024-09-13

## 2024-09-13 RX ORDER — ONDANSETRON 4 MG/1
4 TABLET, ORALLY DISINTEGRATING ORAL 3 TIMES DAILY PRN
Qty: 21 TABLET | Refills: 0 | Status: SHIPPED | OUTPATIENT
Start: 2024-09-13

## 2024-09-13 RX ORDER — ACETAMINOPHEN 500 MG
1000 TABLET ORAL
Status: COMPLETED | OUTPATIENT
Start: 2024-09-13 | End: 2024-09-13

## 2024-09-13 RX ADMIN — ACETAMINOPHEN 1000 MG: 500 TABLET ORAL at 14:34

## 2024-09-13 RX ADMIN — ONDANSETRON 4 MG: 4 TABLET, ORALLY DISINTEGRATING ORAL at 14:35

## 2024-09-13 ASSESSMENT — PAIN DESCRIPTION - LOCATION
LOCATION: GENERALIZED
LOCATION: HEAD;GENERALIZED
LOCATION: GENERALIZED

## 2024-09-13 ASSESSMENT — PAIN - FUNCTIONAL ASSESSMENT
PAIN_FUNCTIONAL_ASSESSMENT: PREVENTS OR INTERFERES SOME ACTIVE ACTIVITIES AND ADLS
PAIN_FUNCTIONAL_ASSESSMENT: ACTIVITIES ARE NOT PREVENTED
PAIN_FUNCTIONAL_ASSESSMENT: 0-10
PAIN_FUNCTIONAL_ASSESSMENT: ACTIVITIES ARE NOT PREVENTED
PAIN_FUNCTIONAL_ASSESSMENT: 0-10

## 2024-09-13 ASSESSMENT — PAIN DESCRIPTION - DESCRIPTORS
DESCRIPTORS: ACHING
DESCRIPTORS: DISCOMFORT;ACHING
DESCRIPTORS: ACHING

## 2024-09-13 ASSESSMENT — PAIN DESCRIPTION - ORIENTATION
ORIENTATION: RIGHT;LEFT

## 2024-09-13 ASSESSMENT — ENCOUNTER SYMPTOMS
EYE PAIN: 0
DIARRHEA: 1
NAUSEA: 1
SHORTNESS OF BREATH: 0
COUGH: 0
ABDOMINAL PAIN: 0
VOMITING: 1
SORE THROAT: 0

## 2024-09-13 ASSESSMENT — PAIN SCALES - GENERAL
PAINLEVEL_OUTOF10: 4
PAINLEVEL_OUTOF10: 8
PAINLEVEL_OUTOF10: 4

## 2024-09-13 ASSESSMENT — PAIN DESCRIPTION - PAIN TYPE: TYPE: ACUTE PAIN

## 2024-09-13 ASSESSMENT — PAIN DESCRIPTION - FREQUENCY: FREQUENCY: CONTINUOUS

## 2024-09-13 ASSESSMENT — PAIN DESCRIPTION - ONSET: ONSET: ON-GOING

## 2024-10-29 ENCOUNTER — HOSPITAL ENCOUNTER (EMERGENCY)
Facility: HOSPITAL | Age: 59
Discharge: HOME | End: 2024-10-29
Attending: EMERGENCY MEDICINE
Payer: MEDICAID

## 2024-10-29 ENCOUNTER — APPOINTMENT (OUTPATIENT)
Facility: HOSPITAL | Age: 59
End: 2024-10-29
Payer: MEDICAID

## 2024-10-29 VITALS
RESPIRATION RATE: 19 BRPM | SYSTOLIC BLOOD PRESSURE: 155 MMHG | HEART RATE: 81 BPM | DIASTOLIC BLOOD PRESSURE: 93 MMHG | WEIGHT: 165 LBS | HEIGHT: 71 IN | TEMPERATURE: 97.7 F | BODY MASS INDEX: 23.1 KG/M2 | OXYGEN SATURATION: 99 %

## 2024-10-29 DIAGNOSIS — J40 BRONCHITIS: Primary | ICD-10-CM

## 2024-10-29 DIAGNOSIS — R73.9 HYPERGLYCEMIA: ICD-10-CM

## 2024-10-29 LAB
ALBUMIN SERPL-MCNC: 3.4 G/DL (ref 3.5–5)
ALBUMIN/GLOB SERPL: 0.8 (ref 1.1–2.2)
ALP SERPL-CCNC: 234 U/L (ref 45–117)
ALT SERPL-CCNC: 93 U/L (ref 12–78)
ANION GAP SERPL CALC-SCNC: 6 MMOL/L (ref 2–12)
APPEARANCE UR: CLEAR
AST SERPL-CCNC: 58 U/L (ref 15–37)
BACTERIA URNS QL MICRO: NEGATIVE /HPF
BASOPHILS # BLD: 0 K/UL (ref 0–0.1)
BASOPHILS NFR BLD: 0 % (ref 0–1)
BILIRUB SERPL-MCNC: 0.5 MG/DL (ref 0.2–1)
BILIRUB UR QL: NEGATIVE
BUN SERPL-MCNC: 46 MG/DL (ref 6–20)
BUN/CREAT SERPL: 25 (ref 12–20)
CALCIUM SERPL-MCNC: 8.9 MG/DL (ref 8.5–10.1)
CHLORIDE SERPL-SCNC: 96 MMOL/L (ref 97–108)
CO2 SERPL-SCNC: 28 MMOL/L (ref 21–32)
COLOR UR: ABNORMAL
COMMENT:: NORMAL
CREAT SERPL-MCNC: 1.84 MG/DL (ref 0.7–1.3)
DIFFERENTIAL METHOD BLD: ABNORMAL
EOSINOPHIL # BLD: 0.2 K/UL (ref 0–0.4)
EOSINOPHIL NFR BLD: 2 % (ref 0–7)
EPITH CASTS URNS QL MICRO: ABNORMAL /LPF
ERYTHROCYTE [DISTWIDTH] IN BLOOD BY AUTOMATED COUNT: 11.3 % (ref 11.5–14.5)
FLUAV RNA SPEC QL NAA+PROBE: NOT DETECTED
FLUBV RNA SPEC QL NAA+PROBE: NOT DETECTED
GLOBULIN SER CALC-MCNC: 4.2 G/DL (ref 2–4)
GLUCOSE BLD STRIP.AUTO-MCNC: 318 MG/DL (ref 65–117)
GLUCOSE BLD STRIP.AUTO-MCNC: >600 MG/DL (ref 65–117)
GLUCOSE SERPL-MCNC: 677 MG/DL (ref 65–100)
GLUCOSE UR STRIP.AUTO-MCNC: >1000 MG/DL
HCT VFR BLD AUTO: 39.7 % (ref 36.6–50.3)
HGB BLD-MCNC: 14.3 G/DL (ref 12.1–17)
HGB UR QL STRIP: NEGATIVE
IMM GRANULOCYTES # BLD AUTO: 0 K/UL (ref 0–0.04)
IMM GRANULOCYTES NFR BLD AUTO: 0 % (ref 0–0.5)
KETONES UR QL STRIP.AUTO: NEGATIVE MG/DL
LEUKOCYTE ESTERASE UR QL STRIP.AUTO: NEGATIVE
LYMPHOCYTES # BLD: 2.5 K/UL (ref 0.8–3.5)
LYMPHOCYTES NFR BLD: 34 % (ref 12–49)
MAGNESIUM SERPL-MCNC: 2.8 MG/DL (ref 1.6–2.4)
MCH RBC QN AUTO: 31.6 PG (ref 26–34)
MCHC RBC AUTO-ENTMCNC: 36 G/DL (ref 30–36.5)
MCV RBC AUTO: 87.6 FL (ref 80–99)
MONOCYTES # BLD: 0.7 K/UL (ref 0–1)
MONOCYTES NFR BLD: 9 % (ref 5–13)
NEUTS SEG # BLD: 4.1 K/UL (ref 1.8–8)
NEUTS SEG NFR BLD: 55 % (ref 32–75)
NITRITE UR QL STRIP.AUTO: NEGATIVE
NRBC # BLD: 0 K/UL (ref 0–0.01)
NRBC BLD-RTO: 0 PER 100 WBC
PH UR STRIP: 5.5 (ref 5–8)
PLATELET # BLD AUTO: 128 K/UL (ref 150–400)
PMV BLD AUTO: 12.8 FL (ref 8.9–12.9)
POTASSIUM SERPL-SCNC: 4.6 MMOL/L (ref 3.5–5.1)
PROT SERPL-MCNC: 7.6 G/DL (ref 6.4–8.2)
PROT UR STRIP-MCNC: NEGATIVE MG/DL
RBC # BLD AUTO: 4.53 M/UL (ref 4.1–5.7)
RBC #/AREA URNS HPF: ABNORMAL /HPF (ref 0–5)
SARS-COV-2 RNA RESP QL NAA+PROBE: NOT DETECTED
SERVICE CMNT-IMP: ABNORMAL
SERVICE CMNT-IMP: ABNORMAL
SODIUM SERPL-SCNC: 130 MMOL/L (ref 136–145)
SOURCE: NORMAL
SP GR UR REFRACTOMETRY: 1.03
SPECIMEN HOLD: NORMAL
TROPONIN I SERPL HS-MCNC: 9 NG/L (ref 0–76)
URINE CULTURE IF INDICATED: ABNORMAL
UROBILINOGEN UR QL STRIP.AUTO: 1 EU/DL (ref 0.2–1)
WBC # BLD AUTO: 7.4 K/UL (ref 4.1–11.1)
WBC URNS QL MICRO: ABNORMAL /HPF (ref 0–4)

## 2024-10-29 PROCEDURE — 71045 X-RAY EXAM CHEST 1 VIEW: CPT

## 2024-10-29 PROCEDURE — 6360000002 HC RX W HCPCS: Performed by: EMERGENCY MEDICINE

## 2024-10-29 PROCEDURE — 84484 ASSAY OF TROPONIN QUANT: CPT

## 2024-10-29 PROCEDURE — 80053 COMPREHEN METABOLIC PANEL: CPT

## 2024-10-29 PROCEDURE — 85025 COMPLETE CBC W/AUTO DIFF WBC: CPT

## 2024-10-29 PROCEDURE — 87636 SARSCOV2 & INF A&B AMP PRB: CPT

## 2024-10-29 PROCEDURE — 96374 THER/PROPH/DIAG INJ IV PUSH: CPT

## 2024-10-29 PROCEDURE — 83735 ASSAY OF MAGNESIUM: CPT

## 2024-10-29 PROCEDURE — 81001 URINALYSIS AUTO W/SCOPE: CPT

## 2024-10-29 PROCEDURE — 99284 EMERGENCY DEPT VISIT MOD MDM: CPT

## 2024-10-29 PROCEDURE — 96375 TX/PRO/DX INJ NEW DRUG ADDON: CPT

## 2024-10-29 PROCEDURE — 6370000000 HC RX 637 (ALT 250 FOR IP): Performed by: EMERGENCY MEDICINE

## 2024-10-29 PROCEDURE — 82962 GLUCOSE BLOOD TEST: CPT

## 2024-10-29 PROCEDURE — 36415 COLL VENOUS BLD VENIPUNCTURE: CPT

## 2024-10-29 RX ORDER — KETOROLAC TROMETHAMINE 30 MG/ML
30 INJECTION, SOLUTION INTRAMUSCULAR; INTRAVENOUS
Status: COMPLETED | OUTPATIENT
Start: 2024-10-29 | End: 2024-10-29

## 2024-10-29 RX ORDER — MORPHINE SULFATE 4 MG/ML
4 INJECTION, SOLUTION INTRAMUSCULAR; INTRAVENOUS
Status: COMPLETED | OUTPATIENT
Start: 2024-10-29 | End: 2024-10-29

## 2024-10-29 RX ORDER — ONDANSETRON 2 MG/ML
4 INJECTION INTRAMUSCULAR; INTRAVENOUS ONCE
Status: COMPLETED | OUTPATIENT
Start: 2024-10-29 | End: 2024-10-29

## 2024-10-29 RX ORDER — AZITHROMYCIN 250 MG/1
TABLET, FILM COATED ORAL
Qty: 1 PACKET | Refills: 0 | Status: SHIPPED | OUTPATIENT
Start: 2024-10-29 | End: 2024-11-02

## 2024-10-29 RX ORDER — KETOROLAC TROMETHAMINE 10 MG/1
10 TABLET, FILM COATED ORAL EVERY 6 HOURS PRN
Qty: 10 TABLET | Refills: 0 | Status: SHIPPED | OUTPATIENT
Start: 2024-10-29

## 2024-10-29 RX ORDER — INSULIN GLARGINE 100 [IU]/ML
INJECTION, SOLUTION SUBCUTANEOUS
Qty: 10 ML | Refills: 1 | Status: SHIPPED | OUTPATIENT
Start: 2024-10-29

## 2024-10-29 RX ADMIN — MORPHINE SULFATE 4 MG: 4 INJECTION, SOLUTION INTRAMUSCULAR; INTRAVENOUS at 17:48

## 2024-10-29 RX ADMIN — INSULIN HUMAN 7 UNITS: 100 INJECTION, SOLUTION PARENTERAL at 16:46

## 2024-10-29 RX ADMIN — ONDANSETRON 4 MG: 2 INJECTION INTRAMUSCULAR; INTRAVENOUS at 17:48

## 2024-10-29 RX ADMIN — KETOROLAC TROMETHAMINE 30 MG: 30 INJECTION, SOLUTION INTRAMUSCULAR at 14:25

## 2024-10-29 ASSESSMENT — PAIN SCALES - GENERAL: PAINLEVEL_OUTOF10: 6

## 2024-10-29 ASSESSMENT — PAIN - FUNCTIONAL ASSESSMENT: PAIN_FUNCTIONAL_ASSESSMENT: 0-10

## 2024-10-29 NOTE — ED PROVIDER NOTES
diagnosis and treatment, and list of reasons why they would want to return to the ED prior to their follow-up appointment, should his condition change.     CLINICAL IMPRESSION    Discharge Note: The patient is stable for discharge home. The signs, symptoms, diagnosis, and discharge instructions have been discussed, understanding conveyed, and agreed upon. The patient is to follow up as recommended or return to ER should their symptoms worsen.      PATIENT REFERRED TO:    PCP or clinic doctor    As needed    [facility] EMERGENCY DEPT  [address] [address]  [phone]    If symptoms worsen       DISCHARGE MEDICATIONS:     Medication List        START taking these medications      azithromycin 250 MG tablet  Commonly known as: Zithromax Z-Skinny  Take 2 tablets (500 mg) on Day 1, and then take 1 tablet (250 mg) on days 2 through 5.     insulin regular 100 UNIT/ML injection  Commonly known as: HumuLIN R  Inject 10 Units into the skin 3 times daily (before meals)     ketorolac 10 MG tablet  Commonly known as: TORADOL  Take 1 tablet by mouth every 6 hours as needed for Pain            CHANGE how you take these medications      insulin glargine 100 UNIT/ML injection vial  Commonly known as: LANTUS  20 units in the morning, 30 units at night  What changed:   how much to take  how to take this  when to take this  additional instructions            ASK your doctor about these medications      amLODIPine 5 MG tablet  Commonly known as: NORVASC  Take 1 tablet by mouth daily     FLUoxetine 20 MG capsule  Commonly known as: PROZAC  Take 1 capsule by mouth daily     glucose 4 g chewable tablet  Take 4 tablets by mouth as needed for Low blood sugar     ondansetron 4 MG disintegrating tablet  Commonly known as: ZOFRAN-ODT  Take 1 tablet by mouth 3 times daily as needed for Nausea or Vomiting     QUEtiapine 50 MG tablet  Commonly known as: SEROQUEL  Take 1 tablet by mouth nightly     traZODone 50 MG tablet  Commonly

## 2024-10-29 NOTE — DISCHARGE INSTRUCTIONS
[facility] Health Departments     For adult and child immunizations, family planning, TB screening, STD testing and women's health services.   [facility]: [facility] [phone]      [facility] [phone]    [facility]   [phone]    [facility]   [phone]    [place]   [phone]    [facility]: [place] [phone]      [place] [phone]      [place] [phone]          General Health Clinics     For primary care services, woman and child wellness, and some clinics providing specialty care.   [facility] [address] [phone]   [facility] [address] [phone]   [facility] [address] [phone]   [facility] [address] [phone]   [facility] [address] [phone]   [facility] [address] [phone]   [facility] [address] [facility] [phone]   [facility]: [facility] [address] [facility] [address] [phone], ext [phone]     [place] [address] [phone]     [place] [address] [facility] [phone]   [place] [address] [phone]   [facility]  [address] [phone]   [facility] ([email or web address]) [phone]         Sexual Health/Woman Wellness Clinics    For STD/HIV testing and treatment, pregnancy testing and services, men's health, birth control services, LGBT services, and hepatitis/HPV vaccine services.   [place] [facility] [address] [phone]   [facility]D [facility] [address] [phone]   [facility] [address] [phone]   [facility] [address], 5th floor [phone]   [facility] [address] [phone]   [facility] [address] [phone]         Specialty Service

## (undated) DEVICE — PADDING CAST W4INXL4YD ST COT RAYON MICROPLEATED HIGHLY

## (undated) DEVICE — SOLUTION IRRIG 500ML 0.9% SOD CHLO USP POUR PLAS BTL

## (undated) DEVICE — SPLINT CAST PLASTER 4X15FT -- DYNACAST

## (undated) DEVICE — SUT PROL 3-0 36IN SH DA BLU --

## (undated) DEVICE — SWAB CULT LIQ STUART AGR AERB MOD IN BRK SGL RAYON TIP PLAS 220099] BECTON DICKINSON MICRO]

## (undated) DEVICE — TRAY PREP DRY W/ PREM GLV 2 APPL 6 SPNG 2 UNDPD 1 OVERWRAP

## (undated) DEVICE — 1010 S-DRAPE TOWEL DRAPE 10/BX: Brand: STERI-DRAPE™

## (undated) DEVICE — GLOVE ORANGE PI 7 1/2   MSG9075

## (undated) DEVICE — MINOR EXTREMITY PACK: Brand: MEDLINE INDUSTRIES, INC.

## (undated) DEVICE — SPONGE GZ W4XL4IN 12 PLY KERLIX

## (undated) DEVICE — SPONGE GZ W4XL4IN COT 12 PLY TYP VII WVN C FLD DSGN STERILE

## (undated) DEVICE — KIT CLN UP BON SECOURS MARYV

## (undated) DEVICE — PENCIL ES CRD L10FT HND SWCHING ROCK SWCH W/ EDGE COAT BLDE

## (undated) DEVICE — DRAPE,EXTREMITY,89X128,STERILE: Brand: MEDLINE

## (undated) DEVICE — COVER LT HNDL BLU PLAS

## (undated) DEVICE — GAUZE,SPONGE,4"X4",16PLY,STRL,LF,10/TRAY: Brand: MEDLINE

## (undated) DEVICE — ZIMMER® STERILE DISPOSABLE TOURNIQUET CUFF WITH PROTECTIVE SLEEVE AND PLC, DUAL PORT, SINGLE BLADDER, 18 IN. (46 CM)

## (undated) DEVICE — HEX-LOCKING BLADE ELECTRODE: Brand: EDGE

## (undated) DEVICE — REM POLYHESIVE ADULT PATIENT RETURN ELECTRODE: Brand: VALLEYLAB

## (undated) DEVICE — SOLUTION IV 1000ML 0.9% SOD CHL

## (undated) DEVICE — BANDAGE COMPR W4INXL5YD WHT BGE POLY COT M E WRP WV HK AND

## (undated) DEVICE — BNDG ELAS HK LOOP 4X5YD NS -- MATRIX

## (undated) DEVICE — BANDAGE,GAUZE,BULKEE II,4.5"X4.1YD,STRL: Brand: MEDLINE

## (undated) DEVICE — NEEDLE HYPO 25GA L1.5IN BVL ORIENTED ECLIPSE

## (undated) DEVICE — PADDING CAST W4INXL4YD NONSTERILE COT COHESIVE HND TEARABLE

## (undated) DEVICE — DRAPE,U/ SHT,SPLIT,PLAS,STERIL: Brand: MEDLINE

## (undated) DEVICE — INTENDED FOR TISSUE SEPARATION, AND OTHER PROCEDURES THAT REQUIRE A SHARP SURGICAL BLADE TO PUNCTURE OR CUT.: Brand: BARD-PARKER SAFETY BLADES SIZE 15, STERILE

## (undated) DEVICE — NEEDLE HYPO 18GA L1.5IN PNK S STL HUB POLYPR SHLD REG BVL

## (undated) DEVICE — STERILE POLYISOPRENE POWDER-FREE SURGICAL GLOVES: Brand: PROTEXIS

## (undated) DEVICE — PREP SKN CHLRAPRP APL 26ML STR --

## (undated) DEVICE — 4-PORT MANIFOLD: Brand: NEPTUNE 2

## (undated) DEVICE — DRAPE,UNDERBUTTOCKS,PCH,STERILE: Brand: MEDLINE

## (undated) DEVICE — DRAPE TWL SURG 16X26IN BLU ORB04] ALLCARE INC]

## (undated) DEVICE — PADDING CAST 4 INX5 YD STRL

## (undated) DEVICE — INTENDED FOR TISSUE SEPARATION, AND OTHER PROCEDURES THAT REQUIRE A SHARP SURGICAL BLADE TO PUNCTURE OR CUT.: Brand: BARD-PARKER SAFETY BLADES SIZE 10, STERILE

## (undated) DEVICE — HANDPIECE SET WITH HIGH FLOW TIP AND SUCTION TUBE: Brand: INTERPULSE

## (undated) DEVICE — STOCKINETTE SYN 4INX25YD -- PROTOUCH

## (undated) DEVICE — THREE-QUARTER SHEET: Brand: CONVERTORS

## (undated) DEVICE — PACK PROCEDURE SURG MAJ W/ BASIN LF

## (undated) DEVICE — CURITY NON-ADHERENT STRIPS: Brand: CURITY

## (undated) DEVICE — APPLICATOR MEDICATED 26 CC SOLUTION HI LT ORNG CHLORAPREP

## (undated) DEVICE — BLADE SAW OSC COARSE 25X9MM --

## (undated) DEVICE — SOUTHSIDE TURNOVER: Brand: MEDLINE INDUSTRIES, INC.

## (undated) DEVICE — BASIC SINGLE BASIN-LF: Brand: MEDLINE INDUSTRIES, INC.

## (undated) DEVICE — CONTAINER,SPECIMEN,PNEU TUBE,4OZ,OR STRL: Brand: MEDLINE

## (undated) DEVICE — TUBING, SUCTION, 1/4" X 12', STRAIGHT: Brand: MEDLINE

## (undated) DEVICE — CULTURETTE SGL EVAC TUBE PALL -- 100/CA

## (undated) DEVICE — GLOVE SURG SZ 8 L12IN FNGR THK79MIL GRN LTX FREE

## (undated) DEVICE — BLADE SAW OSC 18.5X9.0X.038MM -- STRYKER 2296-3

## (undated) DEVICE — BANDAGE COBAN 4 IN COMPR W4INXL5YD FOAM COHESIVE QUIK STK SELF ADH SFT

## (undated) DEVICE — GARMENT,MEDLINE,DVT,INT,CALF,MED, GEN2: Brand: MEDLINE

## (undated) DEVICE — DRESSING GZ W3XL16IN CELOS ACETT OIL EMUL N ADH

## (undated) DEVICE — ELECTRODE PT RET AD L9FT HI MOIST COND ADH HYDRGEL CORDED

## (undated) DEVICE — SYR 10ML LUER LOK 1/5ML GRAD --

## (undated) DEVICE — SUTURE PROL SZ 3-0 L36IN NONABSORBABLE BLU L26MM SH 1/2 CIR 8522H

## (undated) DEVICE — ZIMMER® STERILE DISPOSABLE TOURNIQUET CUFF WITH PLC, DUAL PORT, SINGLE BLADDER, 18 IN. (46 CM)

## (undated) DEVICE — BANDAGE COMPR W6INXL5YD WHT BGE POLY COT M E WRP WV HK AND

## (undated) DEVICE — PREP PAD BNS: Brand: CONVERTORS